# Patient Record
Sex: FEMALE | Race: WHITE | NOT HISPANIC OR LATINO | Employment: UNEMPLOYED | URBAN - METROPOLITAN AREA
[De-identification: names, ages, dates, MRNs, and addresses within clinical notes are randomized per-mention and may not be internally consistent; named-entity substitution may affect disease eponyms.]

---

## 2017-02-21 ENCOUNTER — OFFICE VISIT (OUTPATIENT)
Dept: PSYCHIATRY | Facility: CLINIC | Age: 42
End: 2017-02-21
Payer: COMMERCIAL

## 2017-02-21 VITALS
WEIGHT: 162 LBS | HEIGHT: 65 IN | SYSTOLIC BLOOD PRESSURE: 192 MMHG | DIASTOLIC BLOOD PRESSURE: 103 MMHG | BODY MASS INDEX: 26.99 KG/M2 | HEART RATE: 75 BPM

## 2017-02-21 DIAGNOSIS — F33.1 MODERATE EPISODE OF RECURRENT MAJOR DEPRESSIVE DISORDER: Primary | ICD-10-CM

## 2017-02-21 PROCEDURE — 3077F SYST BP >= 140 MM HG: CPT | Mod: S$GLB,,, | Performed by: PSYCHIATRY & NEUROLOGY

## 2017-02-21 PROCEDURE — 1160F RVW MEDS BY RX/DR IN RCRD: CPT | Mod: S$GLB,,, | Performed by: PSYCHIATRY & NEUROLOGY

## 2017-02-21 PROCEDURE — 3080F DIAST BP >= 90 MM HG: CPT | Mod: S$GLB,,, | Performed by: PSYCHIATRY & NEUROLOGY

## 2017-02-21 PROCEDURE — 99999 PR PBB SHADOW E&M-EST. PATIENT-LVL III: CPT | Mod: PBBFAC,,, | Performed by: PSYCHIATRY & NEUROLOGY

## 2017-02-21 PROCEDURE — 99203 OFFICE O/P NEW LOW 30 MIN: CPT | Mod: S$GLB,,, | Performed by: PSYCHIATRY & NEUROLOGY

## 2017-02-21 RX ORDER — LISDEXAMFETAMINE DIMESYLATE 40 MG/1
40 CAPSULE ORAL DAILY
Qty: 30 CAPSULE | Refills: 0 | Status: SHIPPED | OUTPATIENT
Start: 2017-02-21 | End: 2017-03-27 | Stop reason: SDUPTHER

## 2017-02-21 RX ORDER — FLUOXETINE HYDROCHLORIDE 20 MG/1
20 CAPSULE ORAL DAILY
Qty: 30 CAPSULE | Refills: 0 | Status: SHIPPED | OUTPATIENT
Start: 2017-02-21 | End: 2017-03-27 | Stop reason: SDUPTHER

## 2017-02-21 NOTE — PROGRESS NOTES
"Outpatient Psychiatry I nitial Visit (MD/NP)    2/21/2017    Mirna Pollock, a 41 y.o. female, for initial evaluation visit.  Patient is referred by Luc Venegas MD.      Reason for Encounter: Self-Referral    Complaints:  Pt states that recently her 21 year old daughter told her that she was transgender and wanted to get a sex change. Pt states that she was always okay with her daughter being [place] as she came out in high school. She states that she now feels that she did something wrong as a parent and is having a very hard time dealing with this situation. She states that she does not know how else to talk to her daughter about this situation even though she probably knows she should.    She states that she has a good relationship with her  and her 17 yo son.    She endorses depression 2/2 daughter's situation and anxiety related to "everyday life and stressors" She denies hopelessness/anhedonia/poor appetite or weight changes/ guilt or worthlessness/poor energy. Has trouble with sleep onset.  Pt also endorses poor concentration and focus but can pay attention after starting a task although she still states that she has a hard time finishing a task. Pt also endorses a hard time sitting still. She states that she has always been this way, except when on the Vyvanse, and struggled in school due to this.   She denies SI/HI/AVH/vidya or hypomania/paranoia/delusions/panic attacks.    Current Medications:  Scheduled Meds:None  Continuous Infusions:  PRN Meds:None    Stressors:  Daughter, ex mother-in-law,  is a  so worries about his well being, mother has Alzheimer's and is in a nursing home.    History:     Past Psychiatric History:   Previous therapy: yes  Previous psychiatric treatment and medication trials: yes - Vyvanse 40 mg, Prozac, Klonopin. Was seeing  Psychiatrist at [facility] for 2 years and stopped a year ago because insurance changed - states that it was depression " related to previous job and unfaithfulness in previous marriage as well as a bad divorce.  Previous psychiatric hospitalizations: no  Previous diagnoses: yes - MDD, LALITA, ADHD  Previous suicide attempts: no  History of violence: no  Currently in treatment with None.  Education: some college  Other pertinent history: None  Depression screening was performed with standardized tool: Yes - Depression    Substance Abuse History:  Recreational drugs: None  Use of alcohol: occasional, social use and minimal use  Use of caffeine: caffeinated soft drinks 1-3 /day  Tobacco use: no  Legal consequences of chemical use: no  Patient feels she ought to cut down on drinking and/or drug use: no  Patient has been annoyed by others criticizing her drinking or drug use: no  Patient has felt bad or guilty about drinking or drug use:no  Patient has had a drink or used drugs as an eye opener first thing in the morning to steady nerves, get rid of a hangover or get the day started: no  Use of OTC medications: None    The following portions of the patient's history were reviewed and updated as appropriate: allergies, current medications, past family history, past medical history, past social history, past surgical history and problem list.    Additional historical information includes: Family hx:  Unsure because was adopted    Record Review: moderate      Review Of Systems:     Medical Review Of Systems:  Normal ROS      Psychiatric Review Of Systems:  Sleep: yes, has trouble on most nights falling asleep  Appetite changes: no  Weight changes: no  Energy: no  Interest/pleasure/anhedonia: yes  Somatic symptoms: no  Libido: no  Anxiety/panic: yes  Guilty/hopeless: no  Self-injurious behavior/risky behavior: no  Any drugs: no  Alcohol: no     Current Evaluation:     General appearance: alert, appears stated age and cooperative  Extremities: extremities normal, atraumatic, no cyanosis or edema  Skin: Skin color, texture, turgor normal. No rashes  or lesions  Neurologic: Grossly normal    Mental Status Evaluation:  Appearance:  unremarkable, age appropriate   Behavior:  normal, cooperative   Speech:  no latency; no press   Mood:  steady   Affect:  congruent and appropriate   Thought Process:  normal and logical   Thought Content:  normal, no suicidality, no homicidality, delusions, or paranoia   Sensorium:  grossly intact   Cognition:  grossly intact   Insight:  intact, has awareness of illness   Judgment:  behavior is adequate to circumstances     SIGECAPS  Sleep: Trouble with sleep onset  Interest: Unchanged  Guilt: Denies  Energy: good  Concentration: poor  Appetite: unchanged  Psychomotor agitation: None  Suicidal intentions: no  Suicidal plan: no    Physical/Somatic Complaints  The patient lists: no physical complaints.    Functioning in Relationships:  Spouse/partner: Good  Peers: Good  Employers: Good    Other Pertinent Information  As above    Assessment - Diagnosis - Goals:   MDD, recurrent episode  LALITA  ADHD, Combined Type    Treatment Goals:  Specify outcomes written in observable, behavioral terms:   Anxiety: acquiring relapse prevention skills and reducing negative automatic thoughts  Depression: acquiring relapse prevention skills and reducing negative automatic thoughts    Treatment Plan/Recommendations:   Start Vyvanse 40 mg qAM and Prozac 20 mg qAM. Melatonin OTC for sleep.  Follow-up plan for depression was discussed with patient.    Referral to:  Psychotherapy    Review with patient: Treatment plan reviewed with the patient.  Medication risks/benefit reviewed with the patient    Ping Hill

## 2017-03-14 ENCOUNTER — TELEPHONE (OUTPATIENT)
Dept: ORTHOPEDICS | Facility: CLINIC | Age: 42
End: 2017-03-14

## 2017-03-14 ENCOUNTER — INITIAL CONSULT (OUTPATIENT)
Dept: ORTHOPEDICS | Facility: CLINIC | Age: 42
End: 2017-03-14
Payer: COMMERCIAL

## 2017-03-14 ENCOUNTER — HOSPITAL ENCOUNTER (OUTPATIENT)
Dept: RADIOLOGY | Facility: HOSPITAL | Age: 42
Discharge: HOME | End: 2017-03-14
Attending: ORTHOPAEDIC SURGERY
Payer: COMMERCIAL

## 2017-03-14 VITALS
DIASTOLIC BLOOD PRESSURE: 103 MMHG | HEART RATE: 81 BPM | SYSTOLIC BLOOD PRESSURE: 183 MMHG | HEIGHT: 65 IN | WEIGHT: 159.5 LBS | BODY MASS INDEX: 26.57 KG/M2

## 2017-03-14 DIAGNOSIS — M54.16 LUMBAR RADICULOPATHY: Primary | ICD-10-CM

## 2017-03-14 DIAGNOSIS — M54.50 LUMBAR SPINE PAIN: ICD-10-CM

## 2017-03-14 DIAGNOSIS — M54.50 LUMBAR SPINE PAIN: Primary | ICD-10-CM

## 2017-03-14 PROCEDURE — 3080F DIAST BP >= 90 MM HG: CPT | Mod: S$GLB,,, | Performed by: ORTHOPAEDIC SURGERY

## 2017-03-14 PROCEDURE — 3077F SYST BP >= 140 MM HG: CPT | Mod: S$GLB,,, | Performed by: ORTHOPAEDIC SURGERY

## 2017-03-14 PROCEDURE — 99212 OFFICE O/P EST SF 10 MIN: CPT | Mod: S$GLB,,, | Performed by: ORTHOPAEDIC SURGERY

## 2017-03-14 PROCEDURE — 1160F RVW MEDS BY RX/DR IN RCRD: CPT | Mod: S$GLB,,, | Performed by: ORTHOPAEDIC SURGERY

## 2017-03-14 PROCEDURE — 72120 X-RAY BEND ONLY L-S SPINE: CPT | Mod: 26,,, | Performed by: RADIOLOGY

## 2017-03-14 PROCEDURE — 72100 X-RAY EXAM L-S SPINE 2/3 VWS: CPT | Mod: 26,,, | Performed by: RADIOLOGY

## 2017-03-14 PROCEDURE — 72100 X-RAY EXAM L-S SPINE 2/3 VWS: CPT | Mod: TC

## 2017-03-14 PROCEDURE — 99999 PR PBB SHADOW E&M-EST. PATIENT-LVL III: CPT | Mod: PBBFAC,,, | Performed by: ORTHOPAEDIC SURGERY

## 2017-03-14 RX ORDER — HYDROCODONE BITARTRATE AND ACETAMINOPHEN 5; 325 MG/1; MG/1
1 TABLET ORAL NIGHTLY PRN
Qty: 60 TABLET | Refills: 0 | Status: SHIPPED | OUTPATIENT
Start: 2017-03-14 | End: 2018-04-12

## 2017-03-14 RX ORDER — METHYLPREDNISOLONE 4 MG/1
TABLET ORAL
Qty: 1 PACKAGE | Refills: 0 | Status: SHIPPED | OUTPATIENT
Start: 2017-03-14 | End: 2017-06-09

## 2017-03-14 NOTE — PROGRESS NOTES
The patient returns for follow up.    Last night she had the acute onset of RLE radiculopathy.    There was no inciting event and she has never had anything like this before.    On exam she ambulates without difficulty and has no focal motor or sensory deficits.  She has symmetric 2+ Bilateral PT/AT reflexes.  She has a positive R SLR    Radiographs today demonstrate age appropriate spondylosis.    A: RLE radiculopathy  P:  1) Medrol dosepak  2) Vicodin at night  3) MRI if not improving.

## 2017-03-20 NOTE — PROGRESS NOTES
STAFF NOTE:  Patient's case was formally presented to me by Dr. Hill and patient was seen by me in supervision on the same day.  I agree with her assessment and plan as specified to try Vyvanse and Prozac for ADHD and anxiety/depressive Sx management, respectively.

## 2017-03-27 ENCOUNTER — OFFICE VISIT (OUTPATIENT)
Dept: PSYCHIATRY | Facility: CLINIC | Age: 42
End: 2017-03-27
Payer: COMMERCIAL

## 2017-03-27 VITALS
HEART RATE: 63 BPM | SYSTOLIC BLOOD PRESSURE: 151 MMHG | DIASTOLIC BLOOD PRESSURE: 94 MMHG | WEIGHT: 156.81 LBS | HEIGHT: 65 IN | BODY MASS INDEX: 26.13 KG/M2

## 2017-03-27 DIAGNOSIS — F33.1 MODERATE EPISODE OF RECURRENT MAJOR DEPRESSIVE DISORDER: Primary | ICD-10-CM

## 2017-03-27 PROCEDURE — 99213 OFFICE O/P EST LOW 20 MIN: CPT | Mod: S$GLB,,, | Performed by: PSYCHIATRY & NEUROLOGY

## 2017-03-27 PROCEDURE — 99999 PR PBB SHADOW E&M-EST. PATIENT-LVL II: CPT | Mod: PBBFAC,,, | Performed by: PSYCHIATRY & NEUROLOGY

## 2017-03-27 RX ORDER — LISDEXAMFETAMINE DIMESYLATE 40 MG/1
40 CAPSULE ORAL DAILY
Qty: 30 CAPSULE | Refills: 0 | Status: SHIPPED | OUTPATIENT
Start: 2017-03-27 | End: 2017-03-27 | Stop reason: SDUPTHER

## 2017-03-27 RX ORDER — LISDEXAMFETAMINE DIMESYLATE 40 MG/1
40 CAPSULE ORAL DAILY
Qty: 30 CAPSULE | Refills: 0 | Status: SHIPPED | OUTPATIENT
Start: 2017-04-24 | End: 2017-03-27 | Stop reason: SDUPTHER

## 2017-03-27 RX ORDER — LISDEXAMFETAMINE DIMESYLATE 40 MG/1
40 CAPSULE ORAL DAILY
Qty: 30 CAPSULE | Refills: 0 | Status: SHIPPED | OUTPATIENT
Start: 2017-05-22 | End: 2017-06-05 | Stop reason: SDUPTHER

## 2017-03-27 RX ORDER — FLUOXETINE HYDROCHLORIDE 20 MG/1
20 CAPSULE ORAL DAILY
Qty: 30 CAPSULE | Refills: 2 | Status: SHIPPED | OUTPATIENT
Start: 2017-03-27 | End: 2017-06-05

## 2017-03-27 NOTE — PROGRESS NOTES
Outpatient Psychiatry Follow-Up Visit (MD/NP)    3/27/2017    Clinical Status of Patient:  Outpatient (Ambulatory)    Chief Complaint:  Mirna Pollock is a 41 y.o. female who presents today for follow-up of depression, anxiety and attention problems.  Met with patient.      Interval History and Content of Current Session:  Interim Events/Subjective Report/Content of Current Session: Pt states that she has been doing well on Vyvanse and reports no ADHD symptoms on interview. She states that she felt as if Prozac was making her more depressed for the very beginning of the month. She states that she is still trying to deal with her daughter getting a sex change. She denies any increased depression and anxiety/SI/HI/AVH and endorses good sleep and appetite.    Psychotherapy:  · Target symptoms: depression, lack of focus, anxiety   · Why chosen therapy is appropriate versus another modality: relevant to diagnosis  · Outcome monitoring methods: self-report  · Therapeutic intervention type: supportive psychotherapy  · Topics discussed/themes: parenting issues, symptom recognition  · The patient's response to the intervention is accepting. The patient's progress toward treatment goals is good.   · Duration of intervention: 10 minutes.    Review of Systems   · PSYCHIATRIC: Pertinant items are noted in the narrative.    Past Medical, Family and Social History: The patient's past medical, family and social history have been reviewed and updated as appropriate within the electronic medical record - see encounter notes.    Compliance: yes    Side effects: None    Risk Parameters:  Patient reports no suicidal ideation  Patient reports no homicidal ideation  Patient reports no self-injurious behavior  Patient reports no violent behavior    Exam (detailed: at least 9 elements; comprehensive: all 15 elements)   Constitutional  Vitals:  Most recent vital signs, dated less than 90 days prior to this appointment, were reviewed.  "  Vitals:    03/27/17 1400   BP: (!) 151/94   Pulse: 63   Weight: 71.1 kg (156 lb 12.8 oz)   Height: 5' 5" (1.651 m)        General:  unremarkable, age appropriate     Musculoskeletal  Muscle Strength/Tone:  no spasicity, no rigidity   Gait & Station:  non-ataxic     Psychiatric  Speech:  no latency; no press      Mood & Affect:  steady  congruent and appropriate   Thought Process:  normal and logical   Associations:  intact   Thought Content:  normal, no suicidality, no homicidality, delusions, or paranoia   Insight:  intact, has awareness of illness   Judgement: behavior is adequate to circumstances   Orientation:  grossly intact   Memory: intact for content of interview   Language: grossly intact   Attention Span & Concentration:  able to focus   Fund of Knowledge:  intact and appropriate to age and level of education     Assessment and Diagnosis   Status/Progress: Based on the examination today, the patient's problem(s) is/are improved.  New problems have not been presented today.   Co-morbidities, Diagnostic uncertainty and Lack of compliance are not complicating management of the primary condition.  There are no active rule-out diagnoses for this patient at this time.     General Impression:   MDD  LALITA  ADHD, Combined Type    Intervention/Counseling/Treatment Plan   · Continue Vyvanse 40 mg daily and Prozac 20 mg daily. Continue psychotherapy.      Return to Clinic: 3 months  "

## 2017-04-25 NOTE — PROGRESS NOTES
STAFF NOTE:  Patient's case was discussed with Dr. Hill in supervision.  I agree with her assessment and plan as specified to continue Prozac and Vyvanse for management of depressive, anxiety and ADHD Sx.

## 2017-04-26 ENCOUNTER — TELEPHONE (OUTPATIENT)
Dept: ORTHOPEDICS | Facility: CLINIC | Age: 42
End: 2017-04-26

## 2017-04-26 DIAGNOSIS — M25.531 RIGHT WRIST PAIN: Primary | ICD-10-CM

## 2017-04-26 NOTE — TELEPHONE ENCOUNTER
----- Message from Tequila Lutz sent at 4/26/2017  2:04 PM CDT -----  x_  1st Request  _  2nd Request  _  3rd Request        Who: LIANA CASAS [[id number]]    Why: Pt spoke to Dr. Birch at the [facility] and she told her to call her office to get an appointment scheduled. Please call her.    What Number to Call Back:[phone]    When to Expect a call back: (Before the end of the day)   -- if the call is after 12:00, the call back will be tomorrow.

## 2017-04-26 NOTE — TELEPHONE ENCOUNTER
Spoke w/pt, appt scheduled. Pt states right wrist pain. Pt states she will get xray at her convenience at [facility].

## 2017-06-02 DIAGNOSIS — N63.20 LUMP OF BREAST, LEFT: Primary | ICD-10-CM

## 2017-06-05 ENCOUNTER — OFFICE VISIT (OUTPATIENT)
Dept: PSYCHIATRY | Facility: CLINIC | Age: 42
End: 2017-06-05
Payer: COMMERCIAL

## 2017-06-05 VITALS
DIASTOLIC BLOOD PRESSURE: 84 MMHG | HEART RATE: 71 BPM | WEIGHT: 149 LBS | HEIGHT: 65 IN | SYSTOLIC BLOOD PRESSURE: 121 MMHG | BODY MASS INDEX: 24.83 KG/M2

## 2017-06-05 DIAGNOSIS — F33.1 MODERATE EPISODE OF RECURRENT MAJOR DEPRESSIVE DISORDER: Primary | ICD-10-CM

## 2017-06-05 PROCEDURE — 99213 OFFICE O/P EST LOW 20 MIN: CPT | Mod: S$PBB,,, | Performed by: PSYCHIATRY & NEUROLOGY

## 2017-06-05 PROCEDURE — 99999 PR PBB SHADOW E&M-EST. PATIENT-LVL II: CPT | Mod: PBBFAC,,, | Performed by: PSYCHIATRY & NEUROLOGY

## 2017-06-05 RX ORDER — LISDEXAMFETAMINE DIMESYLATE 40 MG/1
40 CAPSULE ORAL DAILY
Qty: 30 CAPSULE | Refills: 0 | Status: SHIPPED | OUTPATIENT
Start: 2017-07-03 | End: 2017-06-09 | Stop reason: SDUPTHER

## 2017-06-05 RX ORDER — LISDEXAMFETAMINE DIMESYLATE 40 MG/1
40 CAPSULE ORAL DAILY
Qty: 30 CAPSULE | Refills: 0 | Status: SHIPPED | OUTPATIENT
Start: 2017-06-05 | End: 2017-09-25 | Stop reason: SDUPTHER

## 2017-06-05 RX ORDER — ESCITALOPRAM OXALATE 10 MG/1
10 TABLET ORAL DAILY
Qty: 30 TABLET | Refills: 2 | Status: SHIPPED | OUTPATIENT
Start: 2017-06-05 | End: 2017-09-25 | Stop reason: SDUPTHER

## 2017-06-05 RX ORDER — LISDEXAMFETAMINE DIMESYLATE 40 MG/1
40 CAPSULE ORAL DAILY
Qty: 30 CAPSULE | Refills: 0 | Status: SHIPPED | OUTPATIENT
Start: 2017-07-31 | End: 2017-06-09 | Stop reason: SDUPTHER

## 2017-06-05 NOTE — PROGRESS NOTES
"Outpatient Psychiatry Follow-Up Visit (MD/NP)    6/5/2017    Clinical Status of Patient:  Outpatient (Ambulatory)    Chief Complaint:  Mirna Pollock is a 41 y.o. female who presents today for follow-up of depression, anxiety and attention problems.  Met with patient.      Interval History and Content of Current Session:  Interim Events/Subjective Report/Content of Current Session: Pt states that she has been doing well since last visit and reports no symptoms of ADHD. She states that she does not want to take Prozac any longer as she feels that it makes her "sadder" especially given that her daughter is going to have her breast removal surgery next month to continue with her transition.  Pt denies any symptoms of depression or anxiety as well as any SI/HI/AVH and endorses good sleep and appetite.     Psychotherapy:  · Target symptoms: depression, lack of focus, anxiety   · Why chosen therapy is appropriate versus another modality: relevant to diagnosis  · Outcome monitoring methods: self-report  · Therapeutic intervention type: supportive psychotherapy  · Topics discussed/themes: symptom recognition  · The patient's response to the intervention is accepting. The patient's progress toward treatment goals is good.   · Duration of intervention: 10 minutes.    Review of Systems   · PSYCHIATRIC: Pertinant items are noted in the narrative.    Past Medical, Family and Social History: The patient's past medical, family and social history have been reviewed and updated as appropriate within the electronic medical record - see encounter notes.    Compliance: yes    Side effects: None    Risk Parameters:  Patient reports no suicidal ideation  Patient reports no homicidal ideation  Patient reports no self-injurious behavior  Patient reports no violent behavior    Exam (detailed: at least 9 elements; comprehensive: all 15 elements)   Constitutional  Vitals:  Most recent vital signs, dated less than 90 days prior to this " "appointment, were not reviewed.   Vitals:    06/05/17 1353   BP: 121/84   Pulse: 71   Weight: 67.6 kg (149 lb)   Height: 5' 5" (1.651 m)        General:  unremarkable, age appropriate     Musculoskeletal  Muscle Strength/Tone:  no spasicity, no rigidity   Gait & Station:  non-ataxic     Psychiatric  Speech:  no latency; no press   Mood & Affect:  steady  congruent and appropriate   Thought Process:  normal and logical   Associations:  intact   Thought Content:  normal, no suicidality, no homicidality, delusions, or paranoia   Insight:  intact   Judgement: behavior is adequate to circumstances   Orientation:  grossly intact   Memory: intact for content of interview   Language: grossly intact   Attention Span & Concentration:  able to focus   Fund of Knowledge:  intact and appropriate to age and level of education     Assessment and Diagnosis   Status/Progress: Based on the examination today, the patient's problem(s) is/are well controlled.  New problems have not been presented today.   Co-morbidities and Diagnostic uncertainty are not complicating management of the primary condition.  There are no active rule-out diagnoses for this patient at this time.     General Impression:   MDD  LALITA  ADHD    Intervention/Counseling/Treatment Plan   · Continue Vyvanse 40 mg qAM. Discontinue Prozac 20 mg qAM and start LExapro 10 mg qAM.   · Resident transition in July discussed      Return to Clinic: 3 months     DO HUYEN [facility] PGY3  "

## 2017-06-09 ENCOUNTER — HOSPITAL ENCOUNTER (OUTPATIENT)
Dept: RADIOLOGY | Facility: HOSPITAL | Age: 42
Discharge: HOME | End: 2017-06-09
Attending: SURGERY
Payer: COMMERCIAL

## 2017-06-09 ENCOUNTER — OFFICE VISIT (OUTPATIENT)
Dept: SURGERY | Facility: CLINIC | Age: 42
End: 2017-06-09
Payer: COMMERCIAL

## 2017-06-09 VITALS
HEART RATE: 72 BPM | DIASTOLIC BLOOD PRESSURE: 92 MMHG | BODY MASS INDEX: 23.32 KG/M2 | SYSTOLIC BLOOD PRESSURE: 137 MMHG | WEIGHT: 140 LBS | TEMPERATURE: 98 F | HEIGHT: 65 IN

## 2017-06-09 DIAGNOSIS — N63.20 LUMP OF BREAST, LEFT: ICD-10-CM

## 2017-06-09 DIAGNOSIS — N60.02 BREAST CYST, LEFT: Primary | ICD-10-CM

## 2017-06-09 PROCEDURE — 77066 DX MAMMO INCL CAD BI: CPT | Mod: 26,,, | Performed by: RADIOLOGY

## 2017-06-09 PROCEDURE — 99204 OFFICE O/P NEW MOD 45 MIN: CPT | Mod: S$GLB,,, | Performed by: SURGERY

## 2017-06-09 PROCEDURE — 76642 ULTRASOUND BREAST LIMITED: CPT | Mod: 26,LT,, | Performed by: RADIOLOGY

## 2017-06-09 PROCEDURE — 77062 BREAST TOMOSYNTHESIS BI: CPT | Mod: TC

## 2017-06-09 PROCEDURE — 76642 ULTRASOUND BREAST LIMITED: CPT | Mod: TC,LT

## 2017-06-09 PROCEDURE — 99999 PR PBB SHADOW E&M-EST. PATIENT-LVL III: CPT | Mod: PBBFAC,,, | Performed by: SURGERY

## 2017-06-09 PROCEDURE — G0279 TOMOSYNTHESIS, MAMMO: HCPCS | Mod: 26,,, | Performed by: RADIOLOGY

## 2017-06-09 NOTE — LETTER
June 9, 2017      Luc Venegas MD  [address]  [address]  [place] [id number]           [facility]  [address]  [facility] [address]  Phone: [phone]  Fax: [phone]          Patient: Mirna Pollock   MR Number: [id number]   YOB: 1975   Date of Visit: 6/9/2017       Dear Dr. Luc Venegas:    Thank you for referring Mirna Pollock to me for evaluation. Attached you will find relevant portions of my assessment and plan of care.    If you have questions, please do not hesitate to call me. I look forward to following Mirna Pollock along with you.    Sincerely,    Gwyn Estes MD    Enclosure  CC:  No Recipients    If you would like to receive this communication electronically, please contact [email or web address] or [phone] to request more information on [employer] Link access.    For providers and/or their staff who would like to refer a patient to [facility], please contact us through our one-stop-shop provider referral line, [facility], at [phone].    If you feel you have received this communication in error or would no longer like to receive these types of communications, please e-mail [email or web address]

## 2017-06-09 NOTE — PROGRESS NOTES
History & Physical    SUBJECTIVE:     History of Present Illness:  Patient is a 41 y.o. female presents with a history of recently discovered left breast masses (3 in total)  No pain  Had a diagnostic breast imaging exam earlier today and all are simple cysts  Patient does have a past history of a benign left breast biopsy      Chief Complaint   Patient presents with    Breast Mass     Left Breast       Review of patient's allergies indicates:   Allergen Reactions    Iodine and iodide containing products Other (See Comments)    Penicillins Rash       Current Outpatient Prescriptions   Medication Sig Dispense Refill    amlodipine (NORVASC) 10 MG tablet TK 1 T PO QD  5    cloNIDine (CATAPRES) 0.2 MG tablet   2    escitalopram oxalate (LEXAPRO) 10 MG tablet Take 1 tablet (10 mg total) by mouth once daily. 30 tablet 2    hydrochlorothiazide (HYDRODIURIL) 25 MG tablet Take 25 mg by mouth once daily.        hydrOXYzine pamoate (VISTARIL) 25 MG Cap 1 cap 3x a day for 2 days, rest left over every 8 hours as needed for migraine 20 capsule 4    lisdexamfetamine (VYVANSE) 40 MG Cap Take 1 capsule (40 mg total) by mouth once daily. 30 capsule 0    nebivolol (BYSTOLIC) 10 MG Tab Take 2 tablets (20 mg total) by mouth once daily. 60 tablet 11    candesartan (ATACAND) 8 MG tablet Take 2 tablets (16 mg total) by mouth once daily. 60 tablet 11    hydrocodone-acetaminophen 5-325mg (NORCO) 5-325 mg per tablet Take 1 tablet by mouth nightly as needed for Pain (as needed for pain). 60 tablet 0     No current facility-administered medications for this visit.        Past Medical History:   Diagnosis Date    Anxiety     Headache     Hypertension     Kidney stones fibomyalgia, migraine, heart closer, kidey stones     Past Surgical History:   Procedure Laterality Date    APPENDECTOMY       SECTION      CHOLECYSTECTOMY      GALLBLADDER SURGERY      HYSTERECTOMY      12 yrs ago, still has ovaries, due to  "endometriosis by Dr. Hernandez     Family History   Problem Relation Age of Onset    Adopted: Yes    Heart disease Father     Migraines Son      Social History   Substance Use Topics    Smoking status: Never Smoker    Smokeless tobacco: Never Used    Alcohol use No        Review of Systems:      I have reviewed the Patient Summary Reports.    I have reviewed the Nursing Notes.      Review of Systems  Anesthesia Hx:  No problems with previous Anesthesia    Social:  Non-Smoker    Hematology/Oncology:  Hematology Normal   Oncology Normal     Cardiovascular:   Exercise tolerance: good    Hepatic/GI:  Hepatic/GI Normal    Endocrine:  Endocrine Normal        OBJECTIVE:     Vital Signs (Most Recent)  Temp: 97.9 °F (36.6 °C) (06/09/17 1523)  Pulse: 72 (06/09/17 1523)  BP: (!) 137/92 (06/09/17 1523)  5' 5" (1.651 m)  63.5 kg (140 lb)     Physical Exam:    Physical Exam  General:  Well nourished    Airway/Jaw/Neck:  AIRWAY FINDINGS: Normal      Chest/Lungs:  Chest/Lungs Clear I can palpate three masses  One at 6 oclock in the left breast about 4 cm below the areola, on at the inferior areolar edge and one in the upper outer left breast 8 cm from the nipple   Heart/Vascular:  Heart Findings: Normal Heart murmur: negative Vascular Findings: Normal    Abdomen:  Abdomen Findings: Normal      Mental Status:  Mental Status Findings: Normal          ASSESSMENT/PLAN:     Left breast masses  Simple cyst by imaging    PLAN:Plan   Patient reassured  Needs annual screening       "

## 2017-07-03 NOTE — PROGRESS NOTES
STAFF NOTE:  Patient's case was discussed with Dr. Hill in supervision.  I agree with her assessment and plan as specified to continue Vyvanse for ADHD Sx management and use Lexapro in place of Prozac for depression and anxiety symptom management.

## 2017-08-17 ENCOUNTER — OFFICE VISIT (OUTPATIENT)
Dept: NEUROLOGY | Facility: CLINIC | Age: 42
End: 2017-08-17
Payer: COMMERCIAL

## 2017-08-17 ENCOUNTER — TELEPHONE (OUTPATIENT)
Dept: NEUROLOGY | Facility: CLINIC | Age: 42
End: 2017-08-17

## 2017-08-17 VITALS
WEIGHT: 141.63 LBS | SYSTOLIC BLOOD PRESSURE: 172 MMHG | HEART RATE: 75 BPM | BODY MASS INDEX: 23.6 KG/M2 | HEIGHT: 65 IN | DIASTOLIC BLOOD PRESSURE: 102 MMHG

## 2017-08-17 DIAGNOSIS — M54.81 BILATERAL OCCIPITAL NEURALGIA: ICD-10-CM

## 2017-08-17 DIAGNOSIS — G43.719 CHRONIC MIGRAINE WITHOUT AURA, WITH INTRACTABLE MIGRAINE, SO STATED, WITHOUT MENTION OF STATUS MIGRAINOSUS: Primary | ICD-10-CM

## 2017-08-17 DIAGNOSIS — M79.18 MYOFASCIAL PAIN: ICD-10-CM

## 2017-08-17 DIAGNOSIS — I10 ESSENTIAL HYPERTENSION: ICD-10-CM

## 2017-08-17 PROCEDURE — 99214 OFFICE O/P EST MOD 30 MIN: CPT | Mod: 25,S$GLB,, | Performed by: NURSE PRACTITIONER

## 2017-08-17 PROCEDURE — 64400 NJX AA&/STRD TRIGEMINAL NRV: CPT | Mod: 50,S$GLB,, | Performed by: NURSE PRACTITIONER

## 2017-08-17 PROCEDURE — 3080F DIAST BP >= 90 MM HG: CPT | Mod: S$GLB,,, | Performed by: NURSE PRACTITIONER

## 2017-08-17 PROCEDURE — 64405 NJX AA&/STRD GR OCPL NRV: CPT | Mod: 51,50,S$GLB, | Performed by: NURSE PRACTITIONER

## 2017-08-17 PROCEDURE — 3008F BODY MASS INDEX DOCD: CPT | Mod: S$GLB,,, | Performed by: NURSE PRACTITIONER

## 2017-08-17 PROCEDURE — 3077F SYST BP >= 140 MM HG: CPT | Mod: S$GLB,,, | Performed by: NURSE PRACTITIONER

## 2017-08-17 PROCEDURE — 99999 PR PBB SHADOW E&M-EST. PATIENT-LVL III: CPT | Mod: PBBFAC,,, | Performed by: NURSE PRACTITIONER

## 2017-08-17 RX ORDER — HYDROXYZINE PAMOATE 25 MG/1
CAPSULE ORAL
Qty: 12 CAPSULE | Refills: 2 | Status: SHIPPED | OUTPATIENT
Start: 2017-08-17 | End: 2018-01-15 | Stop reason: SDUPTHER

## 2017-08-17 RX ORDER — LIDOCAINE HYDROCHLORIDE 20 MG/ML
6 INJECTION, SOLUTION INFILTRATION; PERINEURAL
Status: COMPLETED | OUTPATIENT
Start: 2017-08-17 | End: 2017-08-17

## 2017-08-17 RX ORDER — TIZANIDINE 2 MG/1
TABLET ORAL
Qty: 60 TABLET | Refills: 5 | Status: SHIPPED | OUTPATIENT
Start: 2017-08-17 | End: 2018-01-15 | Stop reason: SDUPTHER

## 2017-08-17 RX ORDER — PROMETHAZINE HYDROCHLORIDE 25 MG/1
25 TABLET ORAL EVERY 4 HOURS
Qty: 20 TABLET | Refills: 2 | Status: SHIPPED | OUTPATIENT
Start: 2017-08-17 | End: 2017-10-28 | Stop reason: SDUPTHER

## 2017-08-17 RX ORDER — METHYLPREDNISOLONE ACETATE 40 MG/ML
40 INJECTION, SUSPENSION INTRA-ARTICULAR; INTRALESIONAL; INTRAMUSCULAR; SOFT TISSUE
Status: COMPLETED | OUTPATIENT
Start: 2017-08-17 | End: 2017-08-17

## 2017-08-17 RX ORDER — PREDNISONE 20 MG/1
TABLET ORAL
Qty: 9 TABLET | Refills: 0 | Status: SHIPPED | OUTPATIENT
Start: 2017-08-17 | End: 2017-09-25 | Stop reason: ALTCHOICE

## 2017-08-17 RX ADMIN — LIDOCAINE HYDROCHLORIDE 6 ML: 20 INJECTION, SOLUTION INFILTRATION; PERINEURAL at 10:08

## 2017-08-17 RX ADMIN — METHYLPREDNISOLONE ACETATE 40 MG: 40 INJECTION, SUSPENSION INTRA-ARTICULAR; INTRALESIONAL; INTRAMUSCULAR; SOFT TISSUE at 10:08

## 2017-08-17 NOTE — LETTER
August 20, 2017      Luc Venegas MD  [address]  [address]  [place] [address]           [facility]  [address]  [place] [address]  Phone: [phone]  Fax: [phone]          Patient: Mirna Pollock   MR Number: [id number]   YOB: 1975   Date of Visit: 8/17/2017       Dear Dr. Luc Venegas:    Thank you for referring Mirna Pollock to me for evaluation. Attached you will find relevant portions of my assessment and plan of care.    If you have questions, please do not hesitate to call me. I look forward to following Mirna Pollock along with you.    Sincerely,        Enclosure  CC:  No Recipients    If you would like to receive this communication electronically, please contact [email or web address] or [phone] to request more information on [employer] Link access.    For providers and/or their staff who would like to refer a patient to [facility], please contact us through our one-stop-shop provider referral line, [facility], at [phone].    If you feel you have received this communication in error or would no longer like to receive these types of communications, please e-mail [email or web address]

## 2017-08-17 NOTE — TELEPHONE ENCOUNTER
----- Message from Dmitry Bruno sent at 8/17/2017 10:26 AM CDT -----  Contact: Anuradha with Pharmacy @ [phone]  Caller is calling to get clarity on the direction for the medication ( hydrOXYzine pamoate (VISTARIL) 25 MG Cap)      [facility] [address] [place] [address] BETHANY DODSON  [address] [facility] AT [facility]  [address] [facility]  [place] [address]  Phone: [phone] Fax: [phone]

## 2017-08-17 NOTE — TELEPHONE ENCOUNTER
----- Message from Dmitry Bruno sent at 8/17/2017 10:26 AM CDT -----  Contact: Anuradha with Pharmacy @ [phone]  Caller is calling to get clarity on the direction for the medication ( hydrOXYzine pamoate (VISTARIL) 25 MG Cap)      [facility] [address] [place] [address] BETHANY DODSON  [address] [facility] AT [facility]  [address] [facility]  [place] [address]  Phone: [phone] Fax: [phone]

## 2017-08-17 NOTE — PROGRESS NOTES
Established Patient   SUBJECTIVE:  Patient ID: Mirna Pollock is a 41 y.o. female.  Chief Complaint: Consult    History of Present Illness:  Mirna Pollock is a 41 y.o. female with history of CTS, depression, HTN, headaches, and DDD, who presents to the clinic alone for follow-up of headaches.     Recommendations made at last Office Visit on 7/28/2016:  - Bilateral GONB done in clinic   - refilled Vistaril   - F/up in 3 months     08/17/2017 - Interval History:  - Had been seeing Iker Beth PA-C   - Was taking Topamax, Zanaflex, and Vistaril - however has been out of medications for about 3 months  - Since being off the medication, she had been okay, however recently noticed migraines were beginning to occur more frequently with increased intensity and duration   - She finds the nerve blocks had been helping a lot, as long as she is consistent with getting the nerve block, however hasn't had this procedure for over a year   - Has been getting 5-6 migraines per month, each lasting 1-4 days in length   - Current migraine has been going on since last Friday (6 days)  - Blood pressure significantly elevated in clinic today, states her PCP is managing her medications in order to get her blood pressure better controlled   - Is requesting refills of medication as well as repeat nerve occipital and trigeminal nerve blocks     Headache history:   Age of onset -  13  Nature of pain -  Throbbing   Location - bioccipital   Aura -  White floaters   Duration - hrs  Frequency -  2/week  Time of day - anytime   7 days of pain - jaw pain, L neck, L shoulder,  L side throbbing     Associated symptoms:   Meningeal symptoms - photophobia, phonophobia, exercise intolerance +   Nausea/vomitting +   Nasal drainage   Visual symptoms  Pallor/flushing  Vertigo  Stroke symptoms  Confusion  Impaired concentration +  Pain worsened with physical activity +  Neck pain +  Whooshing sounds   Visual spots/blotches +    Triggers:   Stress +  "  Bright or flickering light  Strong smell  Vigorous exercise  Dietary factors   Visual strain     Motion intolerance as passenger:  No   Resolution of headache with sleep: yes     Therapies Tried & Response:  Tylenol, motrin, alleve - not help  excedrin - not help   imitrex - helped initially   topamax 50 mg - helped  Percocet - helps   Gabapentin - helps  Amlodipine - for HTN  Bystolic - for HTN  Lexapro - hasn't noticed change in HA, for depression     Current Medications:    amlodipine (NORVASC) 10 MG tablet, TK 1 T PO QD, Disp: , Rfl: 5    cloNIDine (CATAPRES) 0.2 MG tablet, , Disp: , Rfl: 2    escitalopram oxalate (LEXAPRO) 10 MG tablet, Take 1 tablet (10 mg total) by mouth once daily., Disp: 30 tablet, Rfl: 2    hydrochlorothiazide (HYDRODIURIL) 25 MG tablet, Take 25 mg by mouth once daily.  , Disp: , Rfl:     hydrocodone-acetaminophen 5-325mg (NORCO) 5-325 mg per tablet, Take 1 tablet by mouth nightly as needed for Pain (as needed for pain)., Disp: 60 tablet, Rfl: 0    lisdexamfetamine (VYVANSE) 40 MG Cap, Take 1 capsule (40 mg total) by mouth once daily., Disp: 30 capsule, Rfl: 0    nebivolol (BYSTOLIC) 10 MG Tab, Take 2 tablets (20 mg total) by mouth once daily., Disp: 60 tablet, Rfl: 11    candesartan (ATACAND) 8 MG tablet, Take 2 tablets (16 mg total) by mouth once daily., Disp: 60 tablet, Rfl: 11    hydrOXYzine pamoate (VISTARIL) 25 MG Cap, 1 capsule as needed for headache rescue, Disp: 12 capsule, Rfl: 2    predniSONE (DELTASONE) 20 MG tablet, 3 tabs in the morning for 3 days.  Take with food., Disp: 9 tablet, Rfl: 0    promethazine (PHENERGAN) 25 MG tablet, Take 1 tablet (25 mg total) by mouth every 4 (four) hours., Disp: 20 tablet, Rfl: 2    tizanidine (ZANAFLEX) 2 MG tablet, 1-2 tabs at bedtime.  No alcohol or driving with medication., Disp: 60 tablet, Rfl: 5    OBJECTIVE:  Vitals:  BP (!) 172/102 Comment: right arm  Pulse 75   Ht 5' 5" (1.651 m)   Wt 64.2 kg (141 lb 9.6 oz)   LMP " 12/14/2000   BMI 23.56 kg/m²     Physical Exam:  Patient is awake, alert and oriented to person, place and time.  Moves all 4 extremities against gravity.  Muscle strength symmetric bilaterally in all extremities.  Gait and station within normal limits.  Cranial Nerves II through XII without focal deficit.     Review of Data:   Notes reviewed from Iker Beth PA-C, Dr. Hill (Psychiatry), Dr. Estes (Breast surgeon)    Focused examination was undertaken today.     Procedure Note:   GONB (Greater Occipital Nerve Block): After informed consent was obtained (a copy was given to office staff to scan into the EMR), the patient was asked to remain in a sitting position her head resting prone on her chest. The area was prepped using alcohol swabs. 2% lidocaine (2 mL x2 sides of neck=4 mL total) and 40 mg (1 bottle per sitex2 for total of 80 mg)depomedrol was drawn up utilizing a 21 gauge needle. The occipital trigger points were palpated utilizing latex gloves, a 27 gauge needle and aspiration occured to ensure no medication was introduced into the blood stream during the technique. The medication was delivered bilateral (all of the above was duplicated for the opposite side) in sites 1) midway between the inion and mastoid along the occipital ridge, 2) 2 finger breaths superior lateral to the first injection and 3) 2 finger breaths superior medial to the first injection. The patient tolerated the procedure well with no active bleeding, erythema, or discharge.  The patient was assessed and allowed to leave after ten minutes.     Procedure Note:   Nerve Block (Trigeminal Nerve/Temporal Auricular Nerve CPT: 48452): After informed consent was obtained (asked office staff to scan into EMR), the patient was asked to remain in a sitting position.The area was prepped using alcohol swabs. 2% lidocaine (2.0 cc)  was drawn up utilizing a 22 gauge needle. A 30 gauge needle was used for the procedure. Three Temperoauricular  locations were palpated on the RIGHT side of the head and 0.2 ccs injected into 3 separate sites (1 near the top of the ear and 2 along the parietal region of the head). 2 supraglabellar areas were palpated on the RIGHT, approx 5-6 mm above the orbital ridge and then 5-6 mm lateral along the orbital ridge. 0.2 cc per site for a total of 0.4 cc given. This was repeated on the LEFT for a total of 1 full cc (5 injections, 0.2 cc a piece). The patient tolerated the procedure well with no active bleeding, erythema, or discharge.  The patient was assessed and allowed to leave after ten minutes.  Findings from repeat exam:  Gen: NAD  Derm: no drainage  Neuro: AOx3, VFF, EOMI,  FROM of all extremities, gait WNL   Pre-Procedure Pain - 9  Post-Procedure Pain- 2    ASSESSMENT:  1. Chronic migraine without aura, with intractable migraine, so stated, without mention of status migrainosus    2. Bilateral occipital neuralgia    3. Myofascial pain    4. Essential hypertension      PLAN:  - Seek approval for Botox injections for Chronic Migraine   - Bilateral TNB and GONB performed in clinic   - To break up headache cycle - Prednisone 60 mg x 3 days - take in AM with food   - For headache prevention - Zanaflex 2 mg 1-2 tabs at bedtime   - For migraine rescue - Vistaril 25 mg to be taken every 6-8 hours as needed   - For nausea - Phenergan   - In the future- will consider use of Triptan therapy for migraine abortive, however until her blood pressure is better controlled, will avoid use of triptans for now   - Continue tracking headaches   - Discussed goals of therapy are to decrease the frequency, intensity, and duration of headaches  - Follow up in 6 weeks or sooner if needed      Orders Placed This Encounter    predniSONE (DELTASONE) 20 MG tablet    promethazine (PHENERGAN) 25 MG tablet    hydrOXYzine pamoate (VISTARIL) 25 MG Cap    tizanidine (ZANAFLEX) 2 MG tablet    lidocaine HCL 20 mg/ml (2%) injection 6 mL     methylPREDNISolone acetate injection 40 mg     I spent 45 minutes face-to-face with the patient with >50% of the time spent with counseling and education regarding:  - results of data, diagnosis, and recommendations stated above  - Botox injections for chronic migraine including procedure, risks, benefits and potential side effects   - risks and benefits of phenergan, prednisone, vistaril, and zanaflex   - risks and benefits of GONB and TNB  - importance of healthy diet, regular exercise and sleep hygiene in the treatment of headaches      The patient verbalizes understanding and agreement with the treatment plan. Questions were sought and answered to her stated verbal satisfaction.    CC: MD Luci Echols, FNP-C  [facility]   [phone]    Dr. Farfan was available during today's encounter.

## 2017-08-22 ENCOUNTER — PATIENT MESSAGE (OUTPATIENT)
Dept: NEUROLOGY | Facility: CLINIC | Age: 42
End: 2017-08-22

## 2017-08-30 ENCOUNTER — TELEPHONE (OUTPATIENT)
Dept: NEUROLOGY | Facility: CLINIC | Age: 42
End: 2017-08-30

## 2017-08-30 NOTE — TELEPHONE ENCOUNTER
----- Message from Marv JASON Prabhakar sent at 8/30/2017  8:33 AM CDT -----  Contact: Self @ ext [phone] ([facility] between 8:30 AM -1:00 PM) or cell: [phone] (after 1:00 PM)  Pt is calling to speak with Luci or the nurse in regards to her [place] paperwork. Pt is asking to speak with them today so she can get the paperwork sent to the necessary parties asap. Pls call.

## 2017-09-05 ENCOUNTER — TELEPHONE (OUTPATIENT)
Dept: NEUROLOGY | Facility: CLINIC | Age: 42
End: 2017-09-05

## 2017-09-05 NOTE — TELEPHONE ENCOUNTER
Called and spoke to Patient after calling disability.They state Patient's disability paperwork was sent on 8/24.Informed her to call disability.

## 2017-09-05 NOTE — TELEPHONE ENCOUNTER
Called and informed Patient and informed her that her paperwork was sent back to [employer]-and that form is complete-she states she will call [employer] and verify this.

## 2017-09-25 ENCOUNTER — OFFICE VISIT (OUTPATIENT)
Dept: PSYCHIATRY | Facility: CLINIC | Age: 42
End: 2017-09-25
Payer: COMMERCIAL

## 2017-09-25 VITALS
BODY MASS INDEX: 25.49 KG/M2 | WEIGHT: 153 LBS | DIASTOLIC BLOOD PRESSURE: 100 MMHG | HEART RATE: 71 BPM | HEIGHT: 65 IN | SYSTOLIC BLOOD PRESSURE: 187 MMHG

## 2017-09-25 DIAGNOSIS — F90.0 ATTENTION DEFICIT HYPERACTIVITY DISORDER (ADHD), PREDOMINANTLY INATTENTIVE TYPE: ICD-10-CM

## 2017-09-25 DIAGNOSIS — F33.42 RECURRENT MAJOR DEPRESSIVE DISORDER, IN FULL REMISSION: Primary | ICD-10-CM

## 2017-09-25 DIAGNOSIS — G47.00 INSOMNIA, UNSPECIFIED TYPE: ICD-10-CM

## 2017-09-25 DIAGNOSIS — F41.1 GAD (GENERALIZED ANXIETY DISORDER): ICD-10-CM

## 2017-09-25 PROCEDURE — 99999 PR PBB SHADOW E&M-EST. PATIENT-LVL III: CPT | Mod: PBBFAC,,, | Performed by: PSYCHIATRY & NEUROLOGY

## 2017-09-25 PROCEDURE — 99213 OFFICE O/P EST LOW 20 MIN: CPT | Mod: S$GLB,,, | Performed by: PSYCHIATRY & NEUROLOGY

## 2017-09-25 RX ORDER — LISDEXAMFETAMINE DIMESYLATE 50 MG/1
50 CAPSULE ORAL DAILY
Qty: 30 CAPSULE | Refills: 0 | Status: SHIPPED | OUTPATIENT
Start: 2017-09-25 | End: 2017-09-25 | Stop reason: SDUPTHER

## 2017-09-25 RX ORDER — ESCITALOPRAM OXALATE 10 MG/1
10 TABLET ORAL DAILY
Qty: 30 TABLET | Refills: 2 | Status: SHIPPED | OUTPATIENT
Start: 2017-09-25 | End: 2018-01-08 | Stop reason: ALTCHOICE

## 2017-09-25 RX ORDER — ZOLPIDEM TARTRATE 5 MG/1
5 TABLET ORAL NIGHTLY PRN
Qty: 30 TABLET | Refills: 2 | Status: SHIPPED | OUTPATIENT
Start: 2017-09-25 | End: 2017-10-17 | Stop reason: SDUPTHER

## 2017-09-25 RX ORDER — LISDEXAMFETAMINE DIMESYLATE 50 MG/1
50 CAPSULE ORAL DAILY
Qty: 30 CAPSULE | Refills: 0 | Status: SHIPPED | OUTPATIENT
Start: 2017-10-23 | End: 2018-01-08 | Stop reason: SDUPTHER

## 2017-09-25 NOTE — PROGRESS NOTES
Outpatient Psychiatry Follow-Up Visit (MD/NP)    9/25/2017    Clinical Status of Patient:  Outpatient (Ambulatory)    Chief Complaint:  Mirna Pollock is a 41 y.o. female who presents today for follow-up of depression, anxiety and attention problems.  Met with patient.      Interval History and Content of Current Session:  Interim Events/Subjective Report/Content of Current Session: Patient presents for follow up today.  She is a transfer of care from Dr. Hill.  Pt states that she has been doing well since last visit except for continued trouble sleeping.   States that she is having trouble falling asleep.  She has also noticed more trouble sitting still and concentrating recently at work.  She is unable to state if this trouble concentrating is due to anxiety or ADHD symptoms.  She notices that her mood is much improved with the Lexapro but that she is still having anxiety about going into large public places such as to a concert.  She also has anxiety at night as her  frequently works at night.  Pt denies any SI/HI/AVH.    Psychotherapy:  · Target symptoms: depression, lack of focus, anxiety   · Why chosen therapy is appropriate versus another modality: relevant to diagnosis  · Outcome monitoring methods: self-report  · Therapeutic intervention type: supportive psychotherapy  · Topics discussed/themes: symptom recognition  · The patient's response to the intervention is accepting. The patient's progress toward treatment goals is good.   · Duration of intervention: 12 minutes.    Review of Systems   · PSYCHIATRIC: Pertinant items are noted in the narrative.  · NEUROLOGIC: No weakness, sensory changes, seizures, confusion, memory loss, headaches, tremor or other abnormal movements.  · CARDIOVASCULAR: No tachycardia or chest pain.    Past Medical, Family and Social History: The patient's past medical, family and social history have been reviewed and updated as appropriate within the electronic medical  "record - see encounter notes.    Compliance: yes    Side effects: None    Risk Parameters:  Patient reports no suicidal ideation  Patient reports no homicidal ideation  Patient reports no self-injurious behavior  Patient reports no violent behavior    Exam (detailed: at least 9 elements; comprehensive: all 15 elements)   Constitutional  Vitals:  Most recent vital signs, dated less than 90 days prior to this appointment, were reviewed.   Vitals:    09/25/17 1316   BP: (!) 194/100   Pulse: 71   Weight: 69.4 kg (153 lb)   Height: 5' 5" (1.651 m)        General:  unremarkable, age appropriate     Musculoskeletal  Muscle Strength/Tone:  no spasicity, no rigidity   Gait & Station:  non-ataxic     Psychiatric  Speech:  no latency; no press   Mood & Affect:  steady  congruent and appropriate   Thought Process:  normal and logical   Associations:  intact   Thought Content:  normal, no suicidality, no homicidality, delusions, or paranoia   Insight:  intact   Judgement: behavior is adequate to circumstances   Orientation:  grossly intact   Memory: intact for content of interview   Language: grossly intact   Attention Span & Concentration:  able to focus   Fund of Knowledge:  intact and appropriate to age and level of education     Assessment and Diagnosis   Status/Progress: Based on the examination today, the patient's problem(s) is/are adequately but not ideally controlled.  New problems have not been presented today.   Co-morbidities and Diagnostic uncertainty are not complicating management of the primary condition.  There are no active rule-out diagnoses for this patient at this time.     General Impression:   MDD  LALITA  ADHD    Intervention/Counseling/Treatment Plan   · Increase Vyvanse to 50 mg qAM. Continue Lexapro 10 mg qAM for now.  Consider increasing dose at future appointment if anxiety persists.  Patient unable to decipher if symptoms are from uncontrolled ADHD or worsening anxiety.  Will try the increase in " Vyvanse first given the quicker onset of action.  · Start Ambien 5mg nightly PRN for insomnia.  Discussed diagnosis, risks and benefits of proposed treatment above vs alternative treatments vs no treatment, and potential side effects of these treatments. The patient expresses understanding of the above and displays the capacity to agree with this treatment given said understanding. Patient also agrees that, currently, the benefits outweigh the risks and would like to pursue treatment at this time.  · Patient with known hypertension, currently on Norvasc, HCTZ, and clonidine.  Increased BP noted and previous vitals reviewed.  Instructed patient to follow up with PCP for management of BP prior to next appointment.  Also had patient repeat BP prior to leaving today.      Return to Clinic: 6 weeks

## 2017-09-25 NOTE — PATIENT INSTRUCTIONS
Lisdexamfetamine Oral Capsule  What is this medicine?  LISDEXAMFETAMINE (lis DEX am fet a meen) is used to treat attention-deficit hyperactivity disorder (ADHD) in adults and children. It is also used to treat binge-eating disorder in adults. Federal law prohibits giving this medicine to any person other than the person for whom it was prescribed. Do not share this medicine with anyone else.  How should I use this medicine?  Take this medicine by mouth. Follow the directions on the prescription label. Swallow the capsules with a drink of water. You may open capsule and add to a glass of water, then drink right away. Take your doses at regular intervals. Do not take your medicine more often than directed. Do not suddenly stop your medicine. You must gradually reduce the dose or you may feel withdrawal effects. Ask your doctor or health care professional for advice.  A special MedGuide will be given to you by the pharmacist with each prescription and refill. Be sure to read this information carefully each time.  Talk to your pediatrician regarding the use of this medicine in children. While this drug may be prescribed for children as young as 6 years of age for selected conditions, precautions do apply.  What side effects may I notice from receiving this medicine?  Side effects that you should report to your doctor or health care professional as soon as possible:  · allergic reactions like skin rash, itching or hives, swelling of the face, lips, or tongue  · changes in vision  · chest pain or chest tightness  · confusion, trouble speaking or understanding  · fast, irregular heartbeat  · fingers or toes feel numb, cool, painful  · hallucination, loss of contact with reality  · high blood pressure  · males: prolonged or painful erection  · seizures  · severe headaches  · shortness of breath  · suicidal thoughts or other mood changes  · trouble walking, dizziness, loss of balance or coordination  · uncontrollable head,  mouth, neck, arm, or leg movements  Side effects that usually do not require medical attention (report to your doctor or health care professional if they continue or are bothersome):  · anxious  · headache  · loss of appetite  · nausea, vomiting  · trouble sleeping  · weight loss  What may interact with this medicine?  Do not take this medicine with any of the following medications:  · MAOIs like Carbex, Eldepryl, Marplan, Nardil, and Parnate  · other stimulant medicines for attention disorders, weight loss, or to stay awake  This medicine may also interact with the following medications:  · acetazolamide  · ammonium chloride  · antacids  · ascorbic acid  · atomoxetine  · caffeine  · certain medicines for blood pressure  · certain medicines for depression, anxiety, or psychotic disturbances  · certain medicines for seizures like carbamazepine, phenobarbital, phenytoin  · certain medicines for stomach problems like cimetidine, famotidine, omeprazole, lansoprazole  · cold or allergy medicines  · green tea  · levodopa  · linezolid  · medicines for sleep during surgery  · methenamine  · norepinephrine  · phenothiazines like chlorpromazine, mesoridazine, prochlorperazine, thioridazine  · propoxyphene  · sodium acid phosphate  · sodium bicarbonate  What if I miss a dose?  If you miss a dose, take it as soon as you can. If it is almost time for your next dose, take only that dose. Do not take double or extra doses.  Where should I keep my medicine?  Keep out of the reach of children. This medicine can be abused. Keep your medicine in a safe place to protect it from theft. Do not share this medicine with anyone. Selling or giving away this medicine is dangerous and against the law.  Store at room temperature between 15 and 30 degrees C (59 and 86 degrees F). Protect from light. Keep container tightly closed. Throw away any unused medicine after the expiration date.  What should I tell my health care provider before I take  this medicine?  They need to know if you have any of these conditions:  · anxiety or panic attacks  · circulation problems in fingers and toes  · glaucoma  · hardening or blockages of the arteries or heart blood vessels  · heart disease or a heart defect  · high blood pressure  · history of a drug or alcohol abuse problem  · history of stroke  · kidney disease  · liver disease  · mental illness  · seizures  · suicidal thoughts, plans, or attempt; a previous suicide attempt by you or a family member  · thyroid disease  · Tourette's syndrome  · an unusual or allergic reaction to lisdexamfetamine, other medicines, foods, dyes, or preservatives  · pregnant or trying to get pregnant  · breast-feeding  What should I watch for while using this medicine?  Visit your doctor for regular check ups. This prescription requires that you follow special procedures with your doctor and pharmacy. You will need to have a new written prescription from your doctor every time you need a refill.  This medicine may affect your concentration, or hide signs of tiredness. Until you know how this medicine affects you, do not drive, ride a bicycle, use machinery, or do anything that needs mental alertness.  Tell your doctor or health care professional if this medicine loses its effects, or if you feel you need to take more than the prescribed amount. Do not change your dose without talking to your doctor or health care professional.  Decreased appetite is a common side effect when starting this medicine. Eating small, frequent meals or snacks can help. Talk to your doctor if you continue to have poor eating habits. Height and weight growth of a child taking this medicine will be monitored closely.  Do not take this medicine close to bedtime. It may prevent you from sleeping.  If you are going to need surgery, a MRI, CT scan, or other procedure, tell your doctor that you are taking this medicine. You may need to stop taking this medicine before  the procedure.  Tell your doctor or healthcare professional right away if you notice unexplained wounds on your fingers and toes while taking this medicine. You should also tell your healthcare provider if you experience numbness or pain, changes in the skin color, or sensitivity to temperature in your fingers or toes.  NOTE:This sheet is a summary. It may not cover all possible information. If you have questions about this medicine, talk to your doctor, pharmacist, or health care provider. Copyright© 2017 [employer]

## 2017-09-27 ENCOUNTER — INITIAL CONSULT (OUTPATIENT)
Dept: NEUROSURGERY | Facility: CLINIC | Age: 42
End: 2017-09-27
Payer: COMMERCIAL

## 2017-09-27 VITALS
SYSTOLIC BLOOD PRESSURE: 187 MMHG | WEIGHT: 148.81 LBS | RESPIRATION RATE: 15 BRPM | HEART RATE: 111 BPM | TEMPERATURE: 99 F | HEIGHT: 65 IN | DIASTOLIC BLOOD PRESSURE: 120 MMHG | BODY MASS INDEX: 24.79 KG/M2

## 2017-09-27 DIAGNOSIS — M54.12 CERVICAL RADICULOPATHY: Primary | ICD-10-CM

## 2017-09-27 DIAGNOSIS — M54.81 BILATERAL OCCIPITAL NEURALGIA: ICD-10-CM

## 2017-09-27 DIAGNOSIS — G43.719 CHRONIC MIGRAINE WITHOUT AURA, WITH INTRACTABLE MIGRAINE, SO STATED, WITHOUT MENTION OF STATUS MIGRAINOSUS: ICD-10-CM

## 2017-09-27 PROCEDURE — 99999 PR PBB SHADOW E&M-EST. PATIENT-LVL IV: CPT | Mod: PBBFAC,,, | Performed by: NEUROLOGICAL SURGERY

## 2017-09-27 PROCEDURE — 99204 OFFICE O/P NEW MOD 45 MIN: CPT | Mod: S$GLB,,, | Performed by: NEUROLOGICAL SURGERY

## 2017-09-27 NOTE — PROGRESS NOTES
Subjective:    Yeimy Demarionarinder     Patient ID: Mirna Pollock is a 41 y.o. female.    Chief Complaint: No chief complaint on file.    HPI   Pt is a 41 y.o. female who is new to me, seeing me for cervical spine. This is a patient with long standing posterior headaches that started when she was 13. Pt denies any seizures, numbness or tingling. Today her blood pressure elevated, she notes that her blood pressure increases with onset of headaches; does note history of cardiac problems. She is currently being followed by Dr. Elder for headache management      Review of Systems   Constitutional: Negative for chills, fatigue and fever.   HENT: Negative for sinus pressure and trouble swallowing.    Eyes: Negative.  Negative for visual disturbance.   Respiratory: Negative.    Cardiovascular: Negative.    Gastrointestinal: Negative.  Negative for nausea and vomiting.   Endocrine: Negative.    Genitourinary: Negative.    Musculoskeletal: Negative.    Neurological: Positive for headaches. Negative for dizziness, seizures, syncope, speech difficulty, weakness and numbness.       Objective:      Physical Exam:  Nursing note and vitals reviewed.    Constitutional: She appears well-developed.     Eyes: Pupils are equal, round, and reactive to light. Conjunctivae and EOM are normal.     Cardiovascular: Normal rate, regular rhythm, normal pulses and intact distal pulses.     Abdominal: Soft.     Psych/Behavior: She is alert. She is oriented to person, place, and time. She has a normal mood and affect.     Musculoskeletal: Gait is normal.        Neck: Range of motion is full. There is no tenderness. Muscle strength is 5/5. Tone is normal.        Back: Range of motion is full. There is no tenderness. Muscle strength is 5/5. Tone is normal.        Right Upper Extremities: Range of motion is full. There is no tenderness. Muscle strength is 5/5. Tone is normal.        Left Upper Extremities: Range of motion is full. There is no  tenderness. Muscle strength is 5/5. Tone is normal.       Right Lower Extremities: Range of motion is full. There is no tenderness. Muscle strength is 5/5. Tone is normal.        Left Lower Extremities: Range of motion is full. There is no tenderness. Muscle strength is 5/5. Tone is normal.     Neurological:        Coordination: She has a normal Romberg Test, normal finger to nose coordination, normal heel to shin coordination and normal tandem walking coordination.        DTRs: DTRs are normal. Tricep reflexes are 2+ on the right side and 2+ on the left side. Bicep reflexes are 2+ on the right side and 2+ on the left side. Brachioradialis reflexes are 2+ on the right side and 2+ on the left side. Patellar reflexes are 2+ on the right side and 2+ on the left side. Achilles reflexes are 2+ on the right side and 2+ on the left side.        Cranial nerves: Cranial nerve(s) II, III, IV, V, VI, VII, VIII, IX, X, XI and XII are intact.       Pt with history of cervical spinal problems that now has debilitating HA wit uncertain etiology. No real radiculopathy, but the HA have gotten worse. She has been seeing neurology for the headaches, but no imaging.   She is awake, alert and appropriate.   EOMF  Face is symmetric.  Tongue is midline  She has no focal deficits.     Imaging:   No imaging     Assessment/Plan:   Pt with prolonged history of HA with uncertain etiology that have not been responsive to treatment. Get  MRI, MRA, MRV to rule out any sort of mass lesion.     I, FABIANO Perez MD, personally performed the services described in this documentation. All medical record entries made by the scribe, Yeimy Albarran, were at my direction and in my presence.  I have reviewed the chart and agree that the record reflects my personal performance and is accurate and complete.

## 2017-09-28 ENCOUNTER — HOSPITAL ENCOUNTER (EMERGENCY)
Facility: HOSPITAL | Age: 42
Discharge: HOME | End: 2017-09-28
Attending: EMERGENCY MEDICINE
Payer: COMMERCIAL

## 2017-09-28 VITALS
SYSTOLIC BLOOD PRESSURE: 164 MMHG | TEMPERATURE: 98 F | HEART RATE: 92 BPM | OXYGEN SATURATION: 98 % | BODY MASS INDEX: 24.99 KG/M2 | HEIGHT: 65 IN | DIASTOLIC BLOOD PRESSURE: 89 MMHG | WEIGHT: 150 LBS | RESPIRATION RATE: 16 BRPM

## 2017-09-28 DIAGNOSIS — R51.9 HEADACHE: Primary | ICD-10-CM

## 2017-09-28 DIAGNOSIS — I15.9 SECONDARY HYPERTENSION: ICD-10-CM

## 2017-09-28 DIAGNOSIS — R06.02 SHORTNESS OF BREATH: ICD-10-CM

## 2017-09-28 LAB
ALBUMIN SERPL BCP-MCNC: 3.8 G/DL
ALP SERPL-CCNC: 105 U/L
ALT SERPL W/O P-5'-P-CCNC: 24 U/L
ANION GAP SERPL CALC-SCNC: 12 MMOL/L
APTT BLDCRRT: 24.1 SEC
AST SERPL-CCNC: 32 U/L
BASOPHILS # BLD AUTO: 0.08 K/UL
BASOPHILS NFR BLD: 0.7 %
BILIRUB SERPL-MCNC: 1.2 MG/DL
BILIRUB UR QL STRIP: NEGATIVE
BNP SERPL-MCNC: 14 PG/ML
BUN SERPL-MCNC: 16 MG/DL
BUN SERPL-MCNC: 16 MG/DL (ref 6–30)
CALCIUM SERPL-MCNC: 9.5 MG/DL
CHLORIDE SERPL-SCNC: 100 MMOL/L (ref 95–110)
CHLORIDE SERPL-SCNC: 101 MMOL/L
CLARITY UR REFRACT.AUTO: ABNORMAL
CO2 SERPL-SCNC: 24 MMOL/L
COLOR UR AUTO: YELLOW
CREAT SERPL-MCNC: 0.7 MG/DL (ref 0.5–1.4)
CREAT SERPL-MCNC: 0.8 MG/DL
D DIMER PPP IA.FEU-MCNC: 0.54 MG/L FEU
DIFFERENTIAL METHOD: ABNORMAL
EOSINOPHIL # BLD AUTO: 0.2 K/UL
EOSINOPHIL NFR BLD: 1.6 %
ERYTHROCYTE [DISTWIDTH] IN BLOOD BY AUTOMATED COUNT: 12.7 %
EST. GFR  (AFRICAN AMERICAN): >60 ML/MIN/1.73 M^2
EST. GFR  (NON AFRICAN AMERICAN): >60 ML/MIN/1.73 M^2
GLUCOSE SERPL-MCNC: 97 MG/DL
GLUCOSE SERPL-MCNC: 98 MG/DL (ref 70–110)
GLUCOSE UR QL STRIP: NEGATIVE
HCT VFR BLD AUTO: 42.6 %
HCT VFR BLD CALC: 46 %PCV (ref 36–54)
HGB BLD-MCNC: 14.6 G/DL
HGB UR QL STRIP: NEGATIVE
INR PPP: 0.9
KETONES UR QL STRIP: ABNORMAL
LEUKOCYTE ESTERASE UR QL STRIP: NEGATIVE
LIPASE SERPL-CCNC: 9 U/L
LYMPHOCYTES # BLD AUTO: 1.7 K/UL
LYMPHOCYTES NFR BLD: 14.6 %
MAGNESIUM SERPL-MCNC: 2.1 MG/DL
MCH RBC QN AUTO: 30.6 PG
MCHC RBC AUTO-ENTMCNC: 34.3 G/DL
MCV RBC AUTO: 89 FL
MONOCYTES # BLD AUTO: 1 K/UL
MONOCYTES NFR BLD: 8.5 %
NEUTROPHILS # BLD AUTO: 8.8 K/UL
NEUTROPHILS NFR BLD: 73.2 %
NITRITE UR QL STRIP: NEGATIVE
PH UR STRIP: 6 [PH] (ref 5–8)
PLATELET # BLD AUTO: 382 K/UL
PMV BLD AUTO: 9.7 FL
POC IONIZED CALCIUM: 1.14 MMOL/L (ref 1.06–1.42)
POC TCO2 (MEASURED): 25 MMOL/L (ref 23–29)
POTASSIUM BLD-SCNC: 3.3 MMOL/L (ref 3.5–5.1)
POTASSIUM SERPL-SCNC: 3.3 MMOL/L
PROT SERPL-MCNC: 8.3 G/DL
PROT UR QL STRIP: NEGATIVE
PROTHROMBIN TIME: 10.1 SEC
RBC # BLD AUTO: 4.77 M/UL
SAMPLE: ABNORMAL
SODIUM BLD-SCNC: 139 MMOL/L (ref 136–145)
SODIUM SERPL-SCNC: 137 MMOL/L
SP GR UR STRIP: 1.01 (ref 1–1.03)
TROPONIN I SERPL DL<=0.01 NG/ML-MCNC: <0.006 NG/ML
TSH SERPL DL<=0.005 MIU/L-ACNC: 1.51 UIU/ML
URN SPEC COLLECT METH UR: ABNORMAL
UROBILINOGEN UR STRIP-ACNC: NEGATIVE EU/DL
WBC # BLD AUTO: 11.94 K/UL

## 2017-09-28 PROCEDURE — 85610 PROTHROMBIN TIME: CPT

## 2017-09-28 PROCEDURE — 83690 ASSAY OF LIPASE: CPT

## 2017-09-28 PROCEDURE — 63600175 PHARM REV CODE 636 W HCPCS: Performed by: EMERGENCY MEDICINE

## 2017-09-28 PROCEDURE — 83735 ASSAY OF MAGNESIUM: CPT

## 2017-09-28 PROCEDURE — 96374 THER/PROPH/DIAG INJ IV PUSH: CPT

## 2017-09-28 PROCEDURE — A9585 GADOBUTROL INJECTION: HCPCS | Performed by: EMERGENCY MEDICINE

## 2017-09-28 PROCEDURE — 81003 URINALYSIS AUTO W/O SCOPE: CPT

## 2017-09-28 PROCEDURE — 80053 COMPREHEN METABOLIC PANEL: CPT

## 2017-09-28 PROCEDURE — 99285 EMERGENCY DEPT VISIT HI MDM: CPT | Mod: 25

## 2017-09-28 PROCEDURE — 85025 COMPLETE CBC W/AUTO DIFF WBC: CPT

## 2017-09-28 PROCEDURE — 99285 EMERGENCY DEPT VISIT HI MDM: CPT | Mod: ,,, | Performed by: EMERGENCY MEDICINE

## 2017-09-28 PROCEDURE — 25500020 PHARM REV CODE 255: Performed by: EMERGENCY MEDICINE

## 2017-09-28 PROCEDURE — 84484 ASSAY OF TROPONIN QUANT: CPT

## 2017-09-28 PROCEDURE — 96361 HYDRATE IV INFUSION ADD-ON: CPT

## 2017-09-28 PROCEDURE — 96375 TX/PRO/DX INJ NEW DRUG ADDON: CPT

## 2017-09-28 PROCEDURE — 85379 FIBRIN DEGRADATION QUANT: CPT

## 2017-09-28 PROCEDURE — 83880 ASSAY OF NATRIURETIC PEPTIDE: CPT

## 2017-09-28 PROCEDURE — 85730 THROMBOPLASTIN TIME PARTIAL: CPT

## 2017-09-28 PROCEDURE — 25000003 PHARM REV CODE 250: Performed by: EMERGENCY MEDICINE

## 2017-09-28 PROCEDURE — 94761 N-INVAS EAR/PLS OXIMETRY MLT: CPT

## 2017-09-28 PROCEDURE — 84443 ASSAY THYROID STIM HORMONE: CPT

## 2017-09-28 RX ORDER — METOCLOPRAMIDE HYDROCHLORIDE 5 MG/ML
10 INJECTION INTRAMUSCULAR; INTRAVENOUS
Status: COMPLETED | OUTPATIENT
Start: 2017-09-28 | End: 2017-09-28

## 2017-09-28 RX ORDER — DIPHENHYDRAMINE HYDROCHLORIDE 50 MG/ML
25 INJECTION INTRAMUSCULAR; INTRAVENOUS
Status: COMPLETED | OUTPATIENT
Start: 2017-09-28 | End: 2017-09-28

## 2017-09-28 RX ORDER — CLONIDINE HYDROCHLORIDE 0.1 MG/1
0.1 TABLET ORAL
Status: COMPLETED | OUTPATIENT
Start: 2017-09-28 | End: 2017-09-28

## 2017-09-28 RX ORDER — GADOBUTROL 604.72 MG/ML
10 INJECTION INTRAVENOUS
Status: COMPLETED | OUTPATIENT
Start: 2017-09-28 | End: 2017-09-28

## 2017-09-28 RX ORDER — CLONIDINE HYDROCHLORIDE 0.1 MG/1
0.2 TABLET ORAL
Status: DISCONTINUED | OUTPATIENT
Start: 2017-09-28 | End: 2017-09-28

## 2017-09-28 RX ADMIN — METOCLOPRAMIDE 10 MG: 5 INJECTION, SOLUTION INTRAMUSCULAR; INTRAVENOUS at 07:09

## 2017-09-28 RX ADMIN — CLONIDINE HYDROCHLORIDE 0.1 MG: 0.1 TABLET ORAL at 06:09

## 2017-09-28 RX ADMIN — DIPHENHYDRAMINE HYDROCHLORIDE 25 MG: 50 INJECTION, SOLUTION INTRAMUSCULAR; INTRAVENOUS at 07:09

## 2017-09-28 RX ADMIN — GADOBUTROL 10 ML: 604.72 INJECTION INTRAVENOUS at 10:09

## 2017-09-28 RX ADMIN — SODIUM CHLORIDE 1000 ML: 0.9 INJECTION, SOLUTION INTRAVENOUS at 06:09

## 2017-09-28 NOTE — ED TRIAGE NOTES
Pt reports high blood pressure since Monday. Pt reports migraine since Saturday. Pt states that this am she felt like her heart started racing, started feeling SOB and clammy. Pt denies CP.

## 2017-09-28 NOTE — ED PROVIDER NOTES
Encounter Date: 2017    SCRIBE #1 NOTE: I, Jackeline Marinelli, am scribing for, and in the presence of, Dr. Johnson.       History     Chief Complaint   Patient presents with    Hypertension     pt reports htn and tachycardia x 3 days    Time patient was seen by the provider: 5:42 AM      The patient is a 41 y.o. female with hx of: HTN and migraine located at the top of her head that presents to the ED with a complaint of hypertension and tachycardia for 4 days with associated headache. Pt has hx of heart surgery in her youth. Went to her PCP 3 days ago and reportedly had a systolic of 208. She endorses that it was high yesterday and today. She states that her pressure has been high before in the past but has never been accompanied with tachycardia. She endorses SOB but denies chest pain. Pt takes bystolic, lisinopril, and hydrochlorothiazide in the morning and takes clonidine at night. Pt also endorses fatigue, nausea, hot flashes, left shoulder pain and neck pain. Pt denies fevers, urinary symptoms, diaphoresis, hormone use, blood thinner use, recent travel and hx of smoking.     The history is provided by the patient and medical records.     Review of patient's allergies indicates:   Allergen Reactions    Iodine and iodide containing products Other (See Comments)    Penicillins Rash     Past Medical History:   Diagnosis Date    Anxiety     Headache(784.0)     Hypertension     Kidney stones fibomyalgia, migraine, heart closer, kidey stones     Past Surgical History:   Procedure Laterality Date    APPENDECTOMY       SECTION      CHOLECYSTECTOMY      GALLBLADDER SURGERY      HYSTERECTOMY      12 yrs ago, still has ovaries, due to endometriosis by Dr. Hernandez     Family History   Problem Relation Age of Onset    Adopted: Yes    Heart disease Father     Migraines Son      Social History   Substance Use Topics    Smoking status: Never Smoker    Smokeless tobacco: Never Used    Alcohol use No  "    Review of Systems   Constitutional: Negative for chills, diaphoresis and fever.        (+) hot flashes   HENT: Negative for congestion.    Eyes: Negative for pain.   Respiratory: Positive for shortness of breath.    Cardiovascular:        (+) HTN, tachycardia   Gastrointestinal: Positive for nausea. Negative for vomiting.   Genitourinary: Negative for difficulty urinating, dysuria and frequency.   Musculoskeletal: Positive for neck pain.        (+) left shoulder pain   Skin: Negative for rash.   Neurological: Positive for headaches.       Physical Exam     Initial Vitals [09/28/17 0523]   BP Pulse Resp Temp SpO2   (!) 202/102 (!) 125 18 98.2 °F (36.8 °C) 100 %      MAP       135.33         Vitals:    09/28/17 0523 09/28/17 0549 09/28/17 0602 09/28/17 0617   BP: (!) 202/102 (!) 180/115 (!) 167/87 (!) 167/92   Pulse: (!) 125 101 88 87   Resp: 18 (!) 22 (!) 22    Temp: 98.2 °F (36.8 °C)      TempSrc: Oral      SpO2: 100% 100% 99% 97%   Weight: 68 kg (150 lb)      Height: 5' 5" (1.651 m)       09/28/17 0632   BP: (!) 156/89   Pulse: 81   Resp: 15   Temp:    TempSrc:    SpO2: 97%   Weight:    Height:        Physical Exam    Nursing note and vitals reviewed.    Gen/Constitutional: Interactive. No acute distress  Head: Normocephalic, Atraumatic  Neck: supple, no masses or LAD  Eyes: PERRLA, conjunctiva clear  Ears, Nose and Throat: No rhinorrhea or stridor.  Cardiac: Tachycardic, No murmur  Pulmonary: CTA Bilat, no wheezes, rhonchi, rales.  GI: Abdomen soft, non-tender, non-distended; no rebound or guarding  : No CVA tenderness.  Musculoskeletal: Extremities warm, well perfused, no erythema, no edema  Skin: No rashes  Neuro: Alert and Oriented x 3; No focal motor or sensory deficits.  No pronator drift in upper or lower extremities, no face droop, no dysarthria.  Normal finger to nose and heel to shin.  No ataxia.  No direction changing nystagmus or vertical deviation on cover-uncover testing.   Psych: Normal " affect    ED Course   Procedures  Labs Reviewed   CBC W/ AUTO DIFFERENTIAL - Abnormal; Notable for the following:        Result Value    Platelets 382 (*)     Gran # 8.8 (*)     Gran% 73.2 (*)     Lymph% 14.6 (*)     All other components within normal limits   COMPREHENSIVE METABOLIC PANEL - Abnormal; Notable for the following:     Potassium 3.3 (*)     Total Bilirubin 1.2 (*)     All other components within normal limits   D DIMER, QUANTITATIVE - Abnormal; Notable for the following:     D-Dimer 0.54 (*)     All other components within normal limits   ISTAT PROCEDURE - Abnormal; Notable for the following:     POC Potassium 3.3 (*)     All other components within normal limits   B-TYPE NATRIURETIC PEPTIDE   APTT   LIPASE   MAGNESIUM   PROTIME-INR   TROPONIN I   TSH   URINALYSIS, REFLEX TO URINE CULTURE   ISTAT CHEM8     EKG Readings: (Independently Interpreted)   EKG: Sinus tachycardia, no HARRY's or STD's, non-specific twave pattern, no STEMI       Imaging Results          X-Ray Chest 1 View (Final result)  Result time 09/28/17 06:36:06    Final result by Kacey Toussaint MD (09/28/17 06:36:06)                 Impression:      No acute intra-thoracic process identified on this single radiographic view of the chest.      Electronically signed by: KACEY TOUSSAINT  Date:     09/28/17  Time:    06:36              Narrative:    Comparison: None available    Technique: Single frontal view of the chest.    Findings:   Cardiac monitoring leads overlie the chest.  The cardiomediastinal silhouette is within normal limits. The visualized airway is unremarkable.  The lungs appear symmetrically expanded without definite focal alveolar consolidation. No large pleural effusion or pneumothorax is appreciated.  Osseous structures demonstrate no acute abnormality.                                 Medical Decision Making:   History:   Old Medical Records: I decided to obtain old medical records.  Independently Interpreted Test(s):   I have  ordered and independently interpreted EKG Reading(s) - see prior notes  Pt presents with HTN, tachycardia and posterior headache I considered MI/ACS, CHF exacerbation, PRES, stroke, brain bleed, UTI, pulmonary embolism, migraine headache, tension headache, pheochromocytoma, among other diagnoses. Plan to check EKG, cardiac labs, D-dimer, MRI brain. Will treat blood pressure with oral clonidine - gave 0.1mg PO.  Prior to administration of clonidine her BP did come down some on it's own and her HR came down to below 100 on it's own as well.  Will give Reglan and benadryl for headache and will reevaluate.     D-dimer just a bit above 0.5.  I initially ordered this because of unexplained tachycardia.  The patient's tachycardia improved spontaneously after reassurance.  The patient is not having CP or hypoxia.  Ultimately I felt the chance of PE was low and did not feel patient warranted admit for VQ scan.  She cannot undergo CTA chest because has allergy to iodine.      Pt signed out to Dr. Petit pending other lab and MRI Brain and Neck imaging results.    Clinical Tests:  Labs Test(s) were ordered and reviewed by me.  Radiological study(s) were ordered and reviewed by me.  Medical test(s) were ordered and reviewed by me.          Scribe Attestation:   Scribe #1: I performed the above scribed service and the documentation accurately describes the services I performed. I attest to the accuracy of the note.    Attending Attestation:           Physician Attestation for Scribe:  Physician Attestation Statement for Scribe #1: I, Dr. Johnson, reviewed documentation, as scribed by Jackeline Marinelli in my presence, and it is both accurate and complete.                 ED Course      Clinical Impression:   Diagnoses of Shortness of breath and Headache were pertinent to this visit.                           Daniel Johnson MD  09/28/17 0713

## 2017-09-28 NOTE — ED NOTES
Pt care assumed.  Pt lying on stretcher, AAO x 4.  Pt on cardiac, blood pressure & pulse ox monitoring.  Pt denies any c/o pain at present.  Pt informed of plan of care at present.  Will continue to monitor.

## 2017-10-04 ENCOUNTER — TELEPHONE (OUTPATIENT)
Dept: NEUROSURGERY | Facility: CLINIC | Age: 42
End: 2017-10-04

## 2017-10-04 NOTE — TELEPHONE ENCOUNTER
----- Message from Cynthia Mccoy RN sent at 10/4/2017  9:06 AM CDT -----  Bhumi:  She is scheduled to see Elisa on 10/17.  Cynthia  ----- Message -----  From: Bhumi Elliott RN  Sent: 10/4/2017   8:52 AM  To: Cynthia Mccoy RN    Per Dr Perez can we get this patient scheduled with neurology or NP for headaches please

## 2017-10-17 DIAGNOSIS — G47.00 INSOMNIA, UNSPECIFIED TYPE: ICD-10-CM

## 2017-10-17 RX ORDER — ZOLPIDEM TARTRATE 10 MG/1
10 TABLET ORAL NIGHTLY PRN
Qty: 30 TABLET | Refills: 2 | Status: SHIPPED | OUTPATIENT
Start: 2017-10-17 | End: 2018-01-08 | Stop reason: SDUPTHER

## 2017-10-19 ENCOUNTER — OFFICE VISIT (OUTPATIENT)
Dept: NEUROLOGY | Facility: CLINIC | Age: 42
End: 2017-10-19
Payer: COMMERCIAL

## 2017-10-19 VITALS
SYSTOLIC BLOOD PRESSURE: 141 MMHG | HEIGHT: 65 IN | DIASTOLIC BLOOD PRESSURE: 102 MMHG | HEART RATE: 74 BPM | BODY MASS INDEX: 24.79 KG/M2 | WEIGHT: 148.81 LBS

## 2017-10-19 DIAGNOSIS — M54.81 BILATERAL OCCIPITAL NEURALGIA: ICD-10-CM

## 2017-10-19 DIAGNOSIS — G43.709 CHRONIC MIGRAINE WITHOUT AURA WITHOUT STATUS MIGRAINOSUS, NOT INTRACTABLE: Primary | ICD-10-CM

## 2017-10-19 DIAGNOSIS — I10 ESSENTIAL HYPERTENSION: ICD-10-CM

## 2017-10-19 DIAGNOSIS — M54.2 CERVICAL MUSCLE PAIN: ICD-10-CM

## 2017-10-19 DIAGNOSIS — R51.9 CHRONIC DAILY HEADACHE: ICD-10-CM

## 2017-10-19 PROCEDURE — 99999 PR PBB SHADOW E&M-EST. PATIENT-LVL III: CPT | Mod: PBBFAC,,, | Performed by: NURSE PRACTITIONER

## 2017-10-19 PROCEDURE — 99214 OFFICE O/P EST MOD 30 MIN: CPT | Mod: S$GLB,,, | Performed by: NURSE PRACTITIONER

## 2017-10-19 NOTE — LETTER
October 19, 2017      Luc Venegas MD  [address]  [address]  [facility] [address]           [facility]  [address]  [place] [address]  Phone: [phone]  Fax: [phone]          Patient: Mirna Pollock   MR Number: [id number]   YOB: 1975   Date of Visit: 10/19/2017       Dear Dr. Luc Venegas:    Thank you for referring Mirna Pollock to me for evaluation. Attached you will find relevant portions of my assessment and plan of care.    If you have questions, please do not hesitate to call me. I look forward to following Mirna Pollock along with you.    Sincerely,    Luci Ortez, [facility]    Enclosure  CC:  No Recipients    If you would like to receive this communication electronically, please contact [email or web address] or [phone] to request more information on [facility] Link access.    For providers and/or their staff who would like to refer a patient to [facility], please contact us through our one-stop-shop provider referral line, [facility], at [phone].    If you feel you have received this communication in error or would no longer like to receive these types of communications, please e-mail [email or web address]

## 2017-10-19 NOTE — PROGRESS NOTES
Established Patient   SUBJECTIVE:  Patient ID: Mirna Pollock is a 41 y.o. female.  Chief Complaint: Headache and Follow-up    History of Present Illness:  Mirna Pollock is a 41 y.o. female with a history of chronic migraines, CTS, cervical radiculopathy, insomnia, HTN, depression, who presents to clinic alone for follow-up of headaches.     Recommendations made at last Office Visit on 8/17/2017:  - Seek approval for Botox injections for Chronic Migraine   - Bilateral TNB and GONB performed in clinic   - To break up headache cycle - Prednisone 60 mg x 3 days - take in AM with food   - For headache prevention - Zanaflex 2 mg 1-2 tabs at bedtime   - For migraine rescue - Vistaril 25 mg to be taken every 6-8 hours as needed   - For nausea - Phenergan   - In the future- will consider use of Triptan therapy for migraine abortive, however until her blood pressure is better controlled, will avoid use of triptans for now   - Continue tracking headaches   - Discussed goals of therapy are to decrease the frequency, intensity, and duration of headaches  - Follow up in 6 weeks or sooner if needed      10/19/2017 - Interval History:  - Headaches have persisted in similar frequency, which she is happy about as she has been under increased stress with regards to her 's health, he has been suffering with his Gout, just got out of the hospital last week and has had to take time off work etc   - Blood pressure continues to be elevated despite numerous anti-hypertensive, states today is good for her (currently 141/102), has been regularly following up with her PCP for management   - Is very excited because she is going [place] at the end of November/early December   - Has continued to have a headache nearly everyday, not necessarily a migraine, however migraine have been at least once per month    - Has noticed more recently that she feels like she has intense pressure behind her eyes and  across the bridge of her nose that slightly feels like sinus pressure, this occurs 1-2 times per week   - Has been seeing a chiropractor as well as a masseuse regularly which she does feel has been helping   - Would still like to move forward with the Botox injections   - Does not feel adjustments to her medications are necessary at this time      08/17/2017 - Interval History:  - Had been seeing Iker Beth PA-C   - Was taking Topamax, Zanaflex, and Vistaril - however has been out of medications for about 3 months  - Since being off the medication, she had been okay, however recently noticed migraines were beginning to occur more frequently with increased intensity and duration   - She finds the nerve blocks had been helping a lot, as long as she is consistent with getting the nerve block, however hasn't had this procedure for over a year   - Has been getting 5-6 migraines per month, each lasting 1-4 days in length   - Current migraine has been going on since last Friday (6 days)  - Blood pressure significantly elevated in clinic today, states her PCP is managing her medications in order to get her blood pressure better controlled   - Is requesting refills of medication as well as repeat nerve occipital and trigeminal nerve blocks      Headache history:   Age of onset -  13  Nature of pain -  Throbbing   Location - bioccipital   Aura -  White floaters   Duration - hrs  Frequency -  2/week  Time of day - anytime   7 days of pain - jaw pain, L neck, L shoulder,  L side throbbing      Associated symptoms:   Meningeal symptoms - photophobia, phonophobia, exercise intolerance +   Nausea/vomitting +   Nasal drainage   Visual symptoms  Pallor/flushing  Vertigo  Stroke symptoms  Confusion  Impaired concentration +  Pain worsened with physical activity +  Neck pain +  Whooshing sounds   Visual spots/blotches +     Triggers:   Stress +   Bright or flickering light  Strong smell  Vigorous exercise  Dietary factors   Visual  strain      Motion intolerance as passenger:  No   Resolution of headache with sleep: yes      Therapies Tried & Response:  Tylenol, motrin, alleve - not help  excedrin - not help   imitrex - helped initially   topamax 50 mg - helped  Percocet - helps   Gabapentin - helps  Amlodipine - for HTN  Bystolic - for HTN  Lexapro - hasn't noticed change in HA, for depression     Current Medications:    Current Outpatient Prescriptions:     amlodipine (NORVASC) 10 MG tablet, TK 1 T PO QD, Disp: , Rfl: 5    cloNIDine (CATAPRES) 0.2 MG tablet, , Disp: , Rfl: 2    escitalopram oxalate (LEXAPRO) 10 MG tablet, Take 1 tablet (10 mg total) by mouth once daily., Disp: 30 tablet, Rfl: 2    hydrochlorothiazide (HYDRODIURIL) 25 MG tablet, Take 25 mg by mouth once daily.  , Disp: , Rfl:     hydrocodone-acetaminophen 5-325mg (NORCO) 5-325 mg per tablet, Take 1 tablet by mouth nightly as needed for Pain (as needed for pain)., Disp: 60 tablet, Rfl: 0    hydrOXYzine pamoate (VISTARIL) 25 MG Cap, 1 capsule as needed for headache rescue, Disp: 12 capsule, Rfl: 2    [START ON 10/23/2017] lisdexamfetamine (VYVANSE) 50 MG capsule, Take 1 capsule (50 mg total) by mouth once daily., Disp: 30 capsule, Rfl: 0    nebivolol (BYSTOLIC) 10 MG Tab, Take 2 tablets (20 mg total) by mouth once daily., Disp: 60 tablet, Rfl: 11    promethazine (PHENERGAN) 25 MG tablet, Take 1 tablet (25 mg total) by mouth every 4 (four) hours., Disp: 20 tablet, Rfl: 2    tizanidine (ZANAFLEX) 2 MG tablet, 1-2 tabs at bedtime.  No alcohol or driving with medication., Disp: 60 tablet, Rfl: 5    zolpidem (AMBIEN) 10 mg Tab, Take 1 tablet (10 mg total) by mouth nightly as needed (insomnia)., Disp: 30 tablet, Rfl: 2    candesartan (ATACAND) 8 MG tablet, Take 2 tablets (16 mg total) by mouth once daily., Disp: 60 tablet, Rfl: 11    Review of Systems - as per HPI, otherwise a balanced 10 systems review is negative     OBJECTIVE:  Vitals:  BP (!) 141/102   Pulse 74   Ht  "5' 5" (1.651 m)   Wt 67.5 kg (148 lb 13 oz)   LMP 12/14/2000   BMI 24.76 kg/m²     Physical Exam:  Patient is awake, alert and oriented to person, place and time.  Moves all 4 extremities against gravity.  Muscle strength symmetric bilaterally in all extremities.  Gait and station within normal limits.  Cranial Nerves II through XII without focal deficit.     Review of Data:   Notes from ED visit on 9/28/2017   Notes from psychiatry Dr. Love on 9/25   Labs:  Admission on 09/28/2017, Discharged on 09/28/2017   Component Date Value Ref Range Status    WBC 09/28/2017 11.94  3.90 - 12.70 K/uL Final    RBC 09/28/2017 4.77  4.00 - 5.40 M/uL Final    Hemoglobin 09/28/2017 14.6  12.0 - 16.0 g/dL Final    Hematocrit 09/28/2017 42.6  37.0 - 48.5 % Final    MCV 09/28/2017 89  82 - 98 fL Final    MCH 09/28/2017 30.6  27.0 - 31.0 pg Final    MCHC 09/28/2017 34.3  32.0 - 36.0 g/dL Final    RDW 09/28/2017 12.7  11.5 - 14.5 % Final    Platelets 09/28/2017 382* 150 - 350 K/uL Final    MPV 09/28/2017 9.7  9.2 - 12.9 fL Final    Gran # 09/28/2017 8.8* 1.8 - 7.7 K/uL Final    Lymph # 09/28/2017 1.7  1.0 - 4.8 K/uL Final    Mono # 09/28/2017 1.0  0.3 - 1.0 K/uL Final    Eos # 09/28/2017 0.2  0.0 - 0.5 K/uL Final    Baso # 09/28/2017 0.08  0.00 - 0.20 K/uL Final    Gran% 09/28/2017 73.2* 38.0 - 73.0 % Final    Lymph% 09/28/2017 14.6* 18.0 - 48.0 % Final    Mono% 09/28/2017 8.5  4.0 - 15.0 % Final    Eosinophil% 09/28/2017 1.6  0.0 - 8.0 % Final    Basophil% 09/28/2017 0.7  0.0 - 1.9 % Final    Differential Method 09/28/2017 Automated   Final    Sodium 09/28/2017 137  136 - 145 mmol/L Final    Potassium 09/28/2017 3.3* 3.5 - 5.1 mmol/L Final    Chloride 09/28/2017 101  95 - 110 mmol/L Final    CO2 09/28/2017 24  23 - 29 mmol/L Final    Glucose 09/28/2017 97  70 - 110 mg/dL Final    BUN, Bld 09/28/2017 16  6 - 20 mg/dL Final    Creatinine 09/28/2017 0.8  0.5 - 1.4 mg/dL Final    Calcium 09/28/2017 9.5  8.7 " - 10.5 mg/dL Final    Total Protein 09/28/2017 8.3  6.0 - 8.4 g/dL Final    Albumin 09/28/2017 3.8  3.5 - 5.2 g/dL Final    Total Bilirubin 09/28/2017 1.2* 0.1 - 1.0 mg/dL Final    Alkaline Phosphatase 09/28/2017 105  55 - 135 U/L Final    AST 09/28/2017 32  10 - 40 U/L Final    ALT 09/28/2017 24  10 - 44 U/L Final    Anion Gap 09/28/2017 12  8 - 16 mmol/L Final    eGFR if African American 09/28/2017 >60.0  >60 mL/min/1.73 m^2 Final    eGFR if non African American 09/28/2017 >60.0  >60 mL/min/1.73 m^2 Final    BNP 09/28/2017 14  0 - 99 pg/mL Final    aPTT 09/28/2017 24.1  21.0 - 32.0 sec Final    D-Dimer 09/28/2017 0.54* <0.50 mg/L FEU Final    Lipase 09/28/2017 9  4 - 60 U/L Final    Magnesium 09/28/2017 2.1  1.6 - 2.6 mg/dL Final    Prothrombin Time 09/28/2017 10.1  9.0 - 12.5 sec Final    INR 09/28/2017 0.9  0.8 - 1.2 Final    Troponin I 09/28/2017 <0.006  0.000 - 0.026 ng/mL Final    TSH 09/28/2017 1.507  0.400 - 4.000 uIU/mL Final    Specimen UA 09/28/2017 Urine, Clean Catch   Final    Color, UA 09/28/2017 Yellow  Yellow, Straw, Janie Final    Appearance, UA 09/28/2017 Hazy* Clear Final    pH, UA 09/28/2017 6.0  5.0 - 8.0 Final    Specific Gravity, UA 09/28/2017 1.010  1.005 - 1.030 Final    Protein, UA 09/28/2017 Negative  Negative Final    Glucose, UA 09/28/2017 Negative  Negative Final    Ketones, UA 09/28/2017 1+* Negative Final    Bilirubin (UA) 09/28/2017 Negative  Negative Final    Occult Blood UA 09/28/2017 Negative  Negative Final    Nitrite, UA 09/28/2017 Negative  Negative Final    Urobilinogen, UA 09/28/2017 Negative  <2.0 EU/dL Final    Leukocytes, UA 09/28/2017 Negative  Negative Final    POC Glucose 09/28/2017 98  70 - 110 mg/dL Final    POC BUN 09/28/2017 16  6 - 30 mg/dL Final    POC Creatinine 09/28/2017 0.7  0.5 - 1.4 mg/dL Final    POC Sodium 09/28/2017 139  136 - 145 mmol/L Final    POC Potassium 09/28/2017 3.3* 3.5 - 5.1 mmol/L Final    POC Chloride  2017 100  95 - 110 mmol/L Final    POC TCO2 (MEASURED) 2017 25  23 - 29 mmol/L Final    POC Ionized Calcium 2017 1.14  1.06 - 1.42 mmol/L Final    POC Hematocrit 2017 46  36 - 54 %PCV Final    Sample 2017 DENISSE   Final     Imagin2017 - MRI Brain W/WO contrast   Findings: The brain parenchyma is normal in contour. There is a small focus of T2 flair signal hyperintensity in the right lateral putamen suggestive for remote lacunar type infarct with diffusion hyperintensity without restriction compatible with T2 shine through. There are no abnormal areas of parenchymal enhancement. There are no areas are restricted diffusion to suggest acute infarction. A few additional punctate foci of T2 flair signal hyperintensity supratentorial white matter which are nonspecific and may represent sequela migraine headaches in light of history. The ventricles are normal in size and configuration without evidence for hydrocephalus. There are no abnormal areas of the gradient susceptibility to suggest parenchymal hemorrhage. No abnormal intra or extra axial fluid collections. The major intracranial T2  flow-voids are present.   Impression      Small T2 flair hyperintense focus right lateral putamen with diffusion hyperintensity. 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    A few scattered punctate regions of T2 flair signal hyperintensity elsewhere supratentorial parenchyma while overall nonspecific in light of the given history this may be sequela migraine headaches. Alternative differential including prior demyelination to be considered.    otherwise unremarkable MRI brain specifically without evidence for acute infarction or enhancing lesion.          Electronically signed by: VIOLETTE CARO DO  Date: 17  Time: 10:49      2017 - MRA Brain & Neck   Findings:    MRA head:    Anterior circulation:  The bilateral distal cervical, Gabby, cavernous and supraclinoid  segments of the ICA's and the visualized bilateral anterior and middle cerebral arteries are patent without evidence for significant focal stenosis or aneurysm. There is a left PCOM.    Posterior circulation: The distal vertebral arteries, basilar artery and posterior cerebral arteries are patent without evidence for significant focal stenosis or aneurysm.    MRA neck: The origins of the right brachycephalic,  left common carotid and left subclavian arteries from the arch are within normal limits. The origins of the vertebral arteries from the subclavian arteries are within normal limits. The vertebral arteries are unremarkable throughout their course without evidence for focal stenosis or occlusion.    Right carotid: The right common carotid artery, carotid bifurcation and extracranial portions of the internal carotid artery are patent without evidence for focal stenosis or occlusion.    Left carotid: The left common carotid artery, carotid bifurcation and extra cranial portions of the internal carotid artery are patent without evidence for focal stenosis or occlusion.    There is less than 50% proximal ICA stenosis by NASCET criteria.   Impression      Unremarkable MRA of the head specifically without evidence for focal stenosis or intracranial aneurysm.    Unremarkable MRA of the neck without evidence for significant focal stenosis or occlusion.      Electronically signed by: VIOLETTE CARO DO  Date: 09/28/17  Time: 10:40      9/28/2017 - MRV Brain W/O Contrast   Results:The superior sagittal sinus is patent.  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  The paired internal cerebral veins and basal veins of Jay are patent.  The vein of Zac and torcula Herophili are patent.  The straight sinus is patent.  The bilateral transverse and sigmoid dural venous sinuses are patent to the jugular foramen.   Impression      Unremarkable noncontrast MRV specifically without evidence for  venous sinus thrombosis..      Electronically signed by: VIOLETTE RENTERIAMILA CUELLAR  Date: 09/28/17  Time: 10:43      Note: I have independently reviewed any/all imaging/labs/tests and agree with the report (s) as documented.  Any discrepancies will be as noted/demarcated by free text.  BC [id number] 10/19/2017    Focused examination was undertaken today.     ASSESSMENT:  1. Chronic migraine without aura without status migrainosus, not intractable    2. Chronic daily headache    3. Essential hypertension    4. Cervical muscle pain    5. Bilateral occipital neuralgia      Medical Decision Making:  BOTOX  The patient has chronic migraines (G43.719) and suffers from headaches more than 15 days a month lasting more than 4 hours a day with no relief of symptoms despite trying multiple medications including Topamax, Gabapentin, Amlodipine, Bystolic, Lexapro, Wellbutrin, Sumatriptan, Phenergan, Clonidine, HCTZ, among others. Botox treatment was approved for chronic migraines in October 2010. The patient will be an ideal candidate for Botox. We are planning for 3 treatments 3 months apart and aiming for at least 50% improvement in the symptoms. If we see no improvement after 3 treatments, we will discontinue the injections.    PLAN:  - Will again seek approval for Botox for Chronic migraine   - Does not feel adjustments in her medications are necessary at this time  - Offered restarting Topamax, she would rather try Botox first   - Continue tracking headaches   - Discussed goals of therapy are to decrease the frequency, intensity, and duration of headaches  - Follow up in 1 month to start Botox     The patient verbalizes understanding and agreement with the treatment plan. Questions were sought and answered to her stated verbal satisfaction.    CC: MD Luci Echols, FNP-C  [facility]   [phone]    Dr. Farfan was available during today's encounter.

## 2017-10-29 RX ORDER — PROMETHAZINE HYDROCHLORIDE 25 MG/1
TABLET ORAL
Qty: 20 TABLET | Refills: 2 | Status: SHIPPED | OUTPATIENT
Start: 2017-10-29 | End: 2018-01-22 | Stop reason: SDUPTHER

## 2017-11-08 ENCOUNTER — OFFICE VISIT (OUTPATIENT)
Dept: OTOLARYNGOLOGY | Facility: CLINIC | Age: 42
End: 2017-11-08
Payer: COMMERCIAL

## 2017-11-08 VITALS
BODY MASS INDEX: 25.13 KG/M2 | SYSTOLIC BLOOD PRESSURE: 192 MMHG | WEIGHT: 151 LBS | TEMPERATURE: 99 F | HEART RATE: 79 BPM | DIASTOLIC BLOOD PRESSURE: 82 MMHG

## 2017-11-08 DIAGNOSIS — H61.21 EXCESSIVE CERUMEN IN EAR CANAL, RIGHT: Primary | ICD-10-CM

## 2017-11-08 PROCEDURE — 69210 REMOVE IMPACTED EAR WAX UNI: CPT | Mod: S$GLB,,, | Performed by: OTOLARYNGOLOGY

## 2017-11-08 PROCEDURE — 99999 PR PBB SHADOW E&M-EST. PATIENT-LVL III: CPT | Mod: PBBFAC,,, | Performed by: OTOLARYNGOLOGY

## 2017-11-08 PROCEDURE — 99203 OFFICE O/P NEW LOW 30 MIN: CPT | Mod: 25,S$GLB,, | Performed by: OTOLARYNGOLOGY

## 2017-11-08 RX ORDER — OFLOXACIN 3 MG/ML
5 SOLUTION AURICULAR (OTIC) DAILY
Qty: 5 ML | Refills: 0 | Status: SHIPPED | OUTPATIENT
Start: 2017-11-08 | End: 2017-11-12

## 2017-11-08 NOTE — PROGRESS NOTES
Subjective:      Mirna Pollock is a 41 y.o. female whom I am asked to see for evaluation of diminished hearing in the right ear recently. There is not a prior history of cerumen impaction. The patient has not been using ear drops to loosen wax immediately prior to this visit. The patient denies ear pain. She was seen by PCP yesterday who attempted cerumen removal but was not successful secondary to pain.    The following portions of the patient's history were reviewed and updated as appropriate: allergies, current medications, past family history, past medical history, past social history, past surgical history and problem list.    Review of Systems  Pertinent items are noted in HPI.      Objective:      Auditory canal(s) of the right ear are partially obstructed with cerumen.     Cerumen was removed using suction. Tympanic membranes are intact following the procedure.  Auditory canal with old blood.      Assessment:      Cerumen Impaction without otitis externa.      Plan:      1. Care instructions given.  2. Home treatment: Floxin Otic.  3. Follow-up as needed.

## 2017-11-27 NOTE — PROGRESS NOTES
STAFF NOTE:  Patient's case was discussed with Dr. Love in supervision.  I agree with his assessment and plan as specified to increase Vyvanse for better management of ADHD Sx.   I also agree with use of Lexapro for depression and anxiety Sx management.  Ambien OK PRN for insomnia, though want to avoid daily use.

## 2017-12-13 DIAGNOSIS — G43.709 CHRONIC MIGRAINE WITHOUT AURA WITHOUT STATUS MIGRAINOSUS, NOT INTRACTABLE: Primary | ICD-10-CM

## 2017-12-28 ENCOUNTER — PATIENT MESSAGE (OUTPATIENT)
Dept: PSYCHIATRY | Facility: CLINIC | Age: 42
End: 2017-12-28

## 2018-01-08 ENCOUNTER — OFFICE VISIT (OUTPATIENT)
Dept: PSYCHIATRY | Facility: CLINIC | Age: 43
End: 2018-01-08
Payer: COMMERCIAL

## 2018-01-08 VITALS
HEART RATE: 75 BPM | DIASTOLIC BLOOD PRESSURE: 89 MMHG | WEIGHT: 155.81 LBS | BODY MASS INDEX: 25.96 KG/M2 | HEIGHT: 65 IN | SYSTOLIC BLOOD PRESSURE: 179 MMHG

## 2018-01-08 DIAGNOSIS — F90.0 ATTENTION DEFICIT HYPERACTIVITY DISORDER (ADHD), PREDOMINANTLY INATTENTIVE TYPE: ICD-10-CM

## 2018-01-08 DIAGNOSIS — G47.00 INSOMNIA, UNSPECIFIED TYPE: ICD-10-CM

## 2018-01-08 DIAGNOSIS — F33.1 MODERATE EPISODE OF RECURRENT MAJOR DEPRESSIVE DISORDER: Primary | ICD-10-CM

## 2018-01-08 PROCEDURE — 3008F BODY MASS INDEX DOCD: CPT | Mod: CPTII,S$GLB,, | Performed by: PSYCHIATRY & NEUROLOGY

## 2018-01-08 PROCEDURE — 99214 OFFICE O/P EST MOD 30 MIN: CPT | Mod: S$GLB,,, | Performed by: PSYCHIATRY & NEUROLOGY

## 2018-01-08 PROCEDURE — 99999 PR PBB SHADOW E&M-EST. PATIENT-LVL III: CPT | Mod: PBBFAC,,, | Performed by: PSYCHIATRY & NEUROLOGY

## 2018-01-08 PROCEDURE — 3079F DIAST BP 80-89 MM HG: CPT | Mod: CPTII,S$GLB,, | Performed by: PSYCHIATRY & NEUROLOGY

## 2018-01-08 PROCEDURE — 3077F SYST BP >= 140 MM HG: CPT | Mod: CPTII,S$GLB,, | Performed by: PSYCHIATRY & NEUROLOGY

## 2018-01-08 RX ORDER — ZOLPIDEM TARTRATE 10 MG/1
10 TABLET ORAL NIGHTLY PRN
Qty: 30 TABLET | Refills: 2 | Status: SHIPPED | OUTPATIENT
Start: 2018-01-08 | End: 2018-04-09 | Stop reason: SDUPTHER

## 2018-01-08 RX ORDER — DULOXETIN HYDROCHLORIDE 60 MG/1
60 CAPSULE, DELAYED RELEASE ORAL DAILY
Qty: 30 CAPSULE | Refills: 2 | Status: SHIPPED | OUTPATIENT
Start: 2018-01-08 | End: 2018-04-09 | Stop reason: SDUPTHER

## 2018-01-08 RX ORDER — LISDEXAMFETAMINE DIMESYLATE 50 MG/1
50 CAPSULE ORAL DAILY
Qty: 30 CAPSULE | Refills: 0 | Status: SHIPPED | OUTPATIENT
Start: 2018-02-05 | End: 2018-01-15 | Stop reason: SDUPTHER

## 2018-01-08 RX ORDER — LISDEXAMFETAMINE DIMESYLATE 50 MG/1
50 CAPSULE ORAL DAILY
Qty: 30 CAPSULE | Refills: 0 | Status: SHIPPED | OUTPATIENT
Start: 2018-01-08 | End: 2018-01-08 | Stop reason: SDUPTHER

## 2018-01-08 RX ORDER — LISDEXAMFETAMINE DIMESYLATE 50 MG/1
50 CAPSULE ORAL EVERY MORNING
Qty: 30 CAPSULE | Refills: 0 | Status: SHIPPED | OUTPATIENT
Start: 2018-03-05 | End: 2018-09-26 | Stop reason: ALTCHOICE

## 2018-01-08 RX ORDER — DULOXETIN HYDROCHLORIDE 30 MG/1
30 CAPSULE, DELAYED RELEASE ORAL DAILY
Qty: 30 CAPSULE | Refills: 0 | Status: SHIPPED | OUTPATIENT
Start: 2018-01-08 | End: 2018-01-08 | Stop reason: SDUPTHER

## 2018-01-08 RX ORDER — DULOXETIN HYDROCHLORIDE 30 MG/1
30 CAPSULE, DELAYED RELEASE ORAL DAILY
Qty: 30 CAPSULE | Refills: 0 | Status: SHIPPED | OUTPATIENT
Start: 2018-01-08 | End: 2018-04-09

## 2018-01-08 NOTE — PROGRESS NOTES
Outpatient Psychiatry Follow-Up Visit (MD/NP)    1/8/2018    Clinical Status of Patient:  Outpatient (Ambulatory)    Chief Complaint:  Mirna Pollock is a 42 y.o. female who presents today for follow-up of depression, anxiety and attention problems.  Met with patient.      Interval History and Content of Current Session:  Interim Events/Subjective Report/Content of Current Session: Patient presents for follow up today.  She has has been battling depression for the last 6 weeks, increased stress over step children potentially leaving to move to [place]. Associated crying, decreased energy, decreased concentration. Notes that the crying comes spontaneously and is happening while she is at work.  She has had to miss days because of the depression.  She notes sleep has been good, denies anhedonia, denies hopelessness, denies SI, no plan/intent for self harm.  No HI, no AVH.  Reports intolerable side effect of diarrhea the entire time she has been on lexapro.  We tried going up to 20mg but she is interested in switching due to the side effect at this point.  Denies any side effects from the Ambien or Vyvanse and they are working well.  Anxiety is present about the well being of her step children.    Psychotherapy:  · Target symptoms: depression, anxiety   · Why chosen therapy is appropriate versus another modality: relevant to diagnosis  · Outcome monitoring methods: self-report  · Therapeutic intervention type: supportive psychotherapy  · Topics discussed/themes: symptom recognition  · The patient's response to the intervention is accepting. The patient's progress toward treatment goals is good.   · Duration of intervention: 9 minutes.    Review of Systems   · PSYCHIATRIC: Pertinant items are noted in the narrative.  · NEUROLOGIC: No weakness, sensory changes, seizures, confusion, memory loss, headaches, tremor or other abnormal movements.  · CARDIOVASCULAR: No tachycardia or chest pain.  · GASTROINTESTINAL:  "diarrhea    Past Medical, Family and Social History: The patient's past medical, family and social history have been reviewed and updated as appropriate within the electronic medical record - see encounter notes.    Compliance: yes    Side effects: GI upset, diarrhea    Risk Parameters:  Patient reports no suicidal ideation  Patient reports no homicidal ideation  Patient reports no self-injurious behavior  Patient reports no violent behavior    Exam (detailed: at least 9 elements; comprehensive: all 15 elements)   Constitutional  Vitals:  Most recent vital signs, dated less than 90 days prior to this appointment, were reviewed.   Vitals:    01/08/18 1119   BP: (!) 179/89   Pulse: 75   Weight: 70.7 kg (155 lb 12.8 oz)   Height: 5' 5" (1.651 m)        General:  unremarkable, age appropriate     Musculoskeletal  Muscle Strength/Tone:  no spasicity, no rigidity   Gait & Station:  non-ataxic     Psychiatric  Speech:  no latency; no press   Mood & Affect:  steady  congruent and appropriate   Thought Process:  normal and logical   Associations:  intact   Thought Content:  normal, no suicidality, no homicidality, delusions, or paranoia   Insight:  intact   Judgement: behavior is adequate to circumstances   Orientation:  grossly intact   Memory: intact for content of interview   Language: grossly intact   Attention Span & Concentration:  able to focus   Fund of Knowledge:  intact and appropriate to age and level of education     Assessment and Diagnosis   Status/Progress: Based on the examination today, the patient's problem(s) is/are adequately but not ideally controlled.  New problems have not been presented today.   Co-morbidities and Diagnostic uncertainty are not complicating management of the primary condition.  There are no active rule-out diagnoses for this patient at this time.      General Impression:   MDD, recurrent, moderate  LALITA  ADHD, predominately inattentive type    Intervention/Counseling/Treatment Plan "   · Continue Vyvanse 50 mg qAM. Stop lexapro (taper off by taking 10mg x 1 week then stop) and start Cymbalta 30mg daily x 2 weeks then increase to 60mg daily.  · Continue Ambien 10mg nightly PRN for insomnia.  Discussed diagnosis, risks and benefits of proposed treatment above vs alternative treatments vs no treatment, and potential side effects of these treatments. The patient expresses understanding of the above and displays the capacity to agree with this treatment given said understanding. Patient also agrees that, currently, the benefits outweigh the risks and would like to pursue treatment at this time.  Patient with known hypertension, currently on Norvasc, HCTZ, and clonidine.  She reports following up with her PCP a month ago with no changes to her regimen.  Discussed potential risks with her and she voiced understanding.  She opts to continue her regimen at this time and work closely with her PCP.    Return to Clinic: 1 month

## 2018-01-15 ENCOUNTER — OFFICE VISIT (OUTPATIENT)
Dept: NEUROLOGY | Facility: CLINIC | Age: 43
End: 2018-01-15
Payer: COMMERCIAL

## 2018-01-15 DIAGNOSIS — I10 ESSENTIAL HYPERTENSION: ICD-10-CM

## 2018-01-15 DIAGNOSIS — F32.A DEPRESSION, UNSPECIFIED DEPRESSION TYPE: ICD-10-CM

## 2018-01-15 DIAGNOSIS — G43.709 CHRONIC MIGRAINE WITHOUT AURA WITHOUT STATUS MIGRAINOSUS, NOT INTRACTABLE: Primary | ICD-10-CM

## 2018-01-15 PROCEDURE — 99999 PR PBB SHADOW E&M-EST. PATIENT-LVL III: CPT | Mod: PBBFAC,,, | Performed by: NURSE PRACTITIONER

## 2018-01-15 PROCEDURE — 99214 OFFICE O/P EST MOD 30 MIN: CPT | Mod: S$GLB,,, | Performed by: NURSE PRACTITIONER

## 2018-01-15 RX ORDER — HYDROXYZINE PAMOATE 25 MG/1
CAPSULE ORAL
Qty: 12 CAPSULE | Refills: 5 | Status: SHIPPED | OUTPATIENT
Start: 2018-01-15 | End: 2018-05-22 | Stop reason: SDUPTHER

## 2018-01-15 RX ORDER — KETOROLAC TROMETHAMINE 15.75 MG/1
15.75 SPRAY, METERED NASAL
Qty: 5 EACH | Refills: 5 | Status: SHIPPED | OUTPATIENT
Start: 2018-01-15 | End: 2019-05-27

## 2018-01-15 RX ORDER — TIZANIDINE 2 MG/1
TABLET ORAL
Qty: 60 TABLET | Refills: 5 | Status: SHIPPED | OUTPATIENT
Start: 2018-01-15 | End: 2018-08-28 | Stop reason: SDUPTHER

## 2018-01-21 VITALS
WEIGHT: 148.81 LBS | HEART RATE: 79 BPM | BODY MASS INDEX: 24.79 KG/M2 | SYSTOLIC BLOOD PRESSURE: 195 MMHG | HEIGHT: 65 IN | DIASTOLIC BLOOD PRESSURE: 124 MMHG

## 2018-01-22 ENCOUNTER — PATIENT MESSAGE (OUTPATIENT)
Dept: NEUROLOGY | Facility: CLINIC | Age: 43
End: 2018-01-22

## 2018-01-22 RX ORDER — PROMETHAZINE HYDROCHLORIDE 25 MG/1
TABLET ORAL
Qty: 20 TABLET | Refills: 4 | Status: SHIPPED | OUTPATIENT
Start: 2018-01-22 | End: 2018-05-22 | Stop reason: SDUPTHER

## 2018-01-22 NOTE — TELEPHONE ENCOUNTER
Spoke with Mrs. Pollock- (in regards to an appt..See message)    Per pt's request appt booked 01/29/2018 @ 2pm              BC Fleming   You 4 hours ago (10:51 AM)      Hey Evelina,   Can you please get this patient scheduled for a nerve block sometime this week?   Elisa  (Routing comment)

## 2018-01-29 ENCOUNTER — OFFICE VISIT (OUTPATIENT)
Dept: NEUROLOGY | Facility: CLINIC | Age: 43
End: 2018-01-29
Payer: COMMERCIAL

## 2018-01-29 ENCOUNTER — PATIENT MESSAGE (OUTPATIENT)
Dept: NEUROLOGY | Facility: CLINIC | Age: 43
End: 2018-01-29

## 2018-01-29 VITALS
HEIGHT: 65 IN | HEART RATE: 90 BPM | SYSTOLIC BLOOD PRESSURE: 161 MMHG | DIASTOLIC BLOOD PRESSURE: 104 MMHG | BODY MASS INDEX: 24.83 KG/M2 | WEIGHT: 149 LBS

## 2018-01-29 DIAGNOSIS — M54.81 BILATERAL OCCIPITAL NEURALGIA: ICD-10-CM

## 2018-01-29 DIAGNOSIS — G89.29 CHRONIC INTRACTABLE HEADACHE, UNSPECIFIED HEADACHE TYPE: ICD-10-CM

## 2018-01-29 DIAGNOSIS — R51.9 CHRONIC INTRACTABLE HEADACHE, UNSPECIFIED HEADACHE TYPE: ICD-10-CM

## 2018-01-29 DIAGNOSIS — I34.1 MITRAL VALVE PROLAPSE: ICD-10-CM

## 2018-01-29 DIAGNOSIS — Z87.74 STATUS POST PATCH CLOSURE OF ASD: ICD-10-CM

## 2018-01-29 DIAGNOSIS — I10 UNCONTROLLED HYPERTENSION: ICD-10-CM

## 2018-01-29 DIAGNOSIS — F32.A DEPRESSION, UNSPECIFIED DEPRESSION TYPE: ICD-10-CM

## 2018-01-29 DIAGNOSIS — G43.709 CHRONIC MIGRAINE WITHOUT AURA WITHOUT STATUS MIGRAINOSUS, NOT INTRACTABLE: Primary | ICD-10-CM

## 2018-01-29 PROCEDURE — 99999 PR PBB SHADOW E&M-EST. PATIENT-LVL III: CPT | Mod: PBBFAC,,, | Performed by: NURSE PRACTITIONER

## 2018-01-29 PROCEDURE — 64405 NJX AA&/STRD GR OCPL NRV: CPT | Mod: LT,S$GLB,ICN, | Performed by: NURSE PRACTITIONER

## 2018-01-29 RX ORDER — METHYLPREDNISOLONE ACETATE 40 MG/ML
40 INJECTION, SUSPENSION INTRA-ARTICULAR; INTRALESIONAL; INTRAMUSCULAR; SOFT TISSUE
Status: COMPLETED | OUTPATIENT
Start: 2018-01-29 | End: 2018-01-29

## 2018-01-29 RX ORDER — LIDOCAINE HYDROCHLORIDE 20 MG/ML
4 INJECTION, SOLUTION INFILTRATION; PERINEURAL
Status: COMPLETED | OUTPATIENT
Start: 2018-01-29 | End: 2018-01-29

## 2018-01-29 RX ADMIN — LIDOCAINE HYDROCHLORIDE 4 ML: 20 INJECTION, SOLUTION INFILTRATION; PERINEURAL at 10:01

## 2018-01-29 RX ADMIN — METHYLPREDNISOLONE ACETATE 40 MG: 40 INJECTION, SUSPENSION INTRA-ARTICULAR; INTRALESIONAL; INTRAMUSCULAR; SOFT TISSUE at 10:01

## 2018-01-29 NOTE — PROGRESS NOTES
Established Patient   SUBJECTIVE:  Patient ID: Mirna Pollock is a 42 y.o. female.  Chief Complaint: Headache and Follow-up    History of Present Illness:  Mirna Pollock is a 42 y.o. female with HTN, chronic migraines, kidney stones, anxiety, depression, CTS, and MVP, who presents to clinic alone for follow-up of headaches.     Recommendations made at last Office Visit on 1/15/2017:  - Literature on Botox injections for chronic migraine provided to patient, she will read and do research and contact office in the next few weeks to let us know how she would like to proceed with treatment   - Discussed I do feel Botox injections are the option for treatment of her headaches at this time   - Continue Zanaflex nightly to help with sleep and neck stiffness/myofascial pain as well as headache prevention   - For migraine abortive - given Sprix nasal spray               1 spray in each nostril at onset of migraine               May repeat dose every 6-8 hours as needed   - For rescue - has Vistaril available   - Refills provided of Zanaflex and Vistaril   - Advised she either go to the emergency room or to same day primary care clinic for urgent appointment for more aggressive management of her hypertension, which she is agreeable to   - Continue Cymbalta daily as advised by psychiatry for depression   - Continue tracking headaches   - Discussed goals of therapy are to decrease the frequency, intensity, and duration of headaches  - Follow up in 6 weeks or sooner if needed      01/29/2018 - Interval History:  She has been having a terrible migraine for the last 3 days, she has tried numerous medications without any relief.  States she tried to go to work today, but she had to leave because her headache got so bad.  She is requesting to repeat the nerve blocks as those shots gave her relief form 6-8 weeks in the past.  She has done research on the Botox injections and states her  is very hesitant for her to try  Botox injections for her migraines.  She has not been seen by her PCP nor cardiologist for further management of her blood pressure.  Discussed the challenges of treating her migraines with uncontrolled hypertension because I cannot prescribe her any triptan therapy until her blood pressure is under better control.  She has continued to take Cymbalta daily for her depression, she is unsure if this is giving her any relief with regards to her headaches or not.      01/15/2018 - Interval History:  She was approved for Botox injections, but was a no-show to her appointment last week.  States she spoke with her  about the injections and she was very nervous to try the Botox injections for her migraines.    She continues to experience headaches on 1-4 days per week with migraines occurring 1-2 days per week.  Recently she has been experiencing more migraines and headaches than usually, but admits she has been going through a lot emotionally as well as she got sick with the flu.  She has continued to regularly follow-up with her psychiatrist who changed her anti-depressant, she is now taking Cymbalta.  Since change in medication, she feels she has been experiencing a mild headache each day.  She has had to miss a few days of work due to her migraines since last visit.  Additionally, she feels occasionally she begins to get dizzy, this occurs with or without headaches, she has an appointment with her eye doctor next week as her vision is also getting progressively worse.  She did not find vistaril to be useful with regards to her headaches.  She does feel Zanaflex helps with her neck as well as helping to relax her neck muscles.  When she gets a migraine she has been taking Vistaril and Ibuprofen together which does help the pain, she also has been trying Excedrin migraines which also gives her some relief.  Patient with known HTN on multiple anti-hypertensive agents, blood pressure significantly elevated in clinic  today.  She is not symptomatic.  States she needs to follow-up with her primary care (who is outside the [facility] system) for better management.      10/19/2017 - Interval History:  - Headaches have persisted in similar frequency, which she is happy about as she has been under increased stress with regards to her 's health, he has been suffering with his Gout, just got out of the hospital last week and has had to take time off work etc   - Blood pressure continues to be elevated despite numerous anti-hypertensive, states today is good for her (currently 141/102), has been regularly following up with her PCP for management   - Is very excited because she is going on a cruise with her  to [place] at the end of November/early December   - Has continued to have a headache nearly everyday, not necessarily a migraine, however migraine have been at least once per month    - Has noticed more recently that she feels like she has intense pressure behind her eyes and across the bridge of her nose that slightly feels like sinus pressure, this occurs 1-2 times per week   - Has been seeing a chiropractor as well as a masseuse regularly which she does feel has been helping   - Would still like to move forward with the Botox injections   - Does not feel adjustments to her medications are necessary at this time      08/17/2017 - Interval History:  - Had been seeing Iker Beth PA-C   - Was taking Topamax, Zanaflex, and Vistaril - however has been out of medications for about 3 months  - Since being off the medication, she had been okay, however recently noticed migraines were beginning to occur more frequently with increased intensity and duration   - She finds the nerve blocks had been helping a lot, as long as she is consistent with getting the nerve block, however hasn't had this procedure for over a year   - Has been getting 5-6 migraines per month, each lasting 1-4 days in length   - Current migraine has been  going on since last Friday (6 days)  - Blood pressure significantly elevated in clinic today, states her PCP is managing her medications in order to get her blood pressure better controlled   - Is requesting refills of medication as well as repeat nerve occipital and trigeminal nerve blocks      Headache history:   Age of onset -  13  Nature of pain -  Throbbing   Location - bioccipital   Aura -  White floaters   Duration - hrs  Frequency -  2/week  Time of day - anytime   7 days of pain - jaw pain, L neck, L shoulder,  L side throbbing   Associated symptoms:   Meningeal symptoms - photophobia, phonophobia, exercise intolerance +   Nausea/vomitting +   Nasal drainage   Visual symptoms  Pallor/flushing  Vertigo  Stroke symptoms  Confusion  Impaired concentration +  Pain worsened with physical activity +  Neck pain +  Whooshing sounds   Visual spots/blotches +  Triggers:   Stress +   Bright or flickering light  Strong smell  Vigorous exercise  Dietary factors   Visual strain   Motion intolerance as passenger:  No   Resolution of headache with sleep: yes      Therapies Tried & Response:  Tylenol, motrin, alleve - not help  excedrin - not help   imitrex - helped initially   topamax 50 mg - helped  Percocet - helps   Gabapentin - helps  Amlodipine - for HTN  Bystolic - for HTN  Lexapro - hasn't noticed change in HA, for depression   GONB - help   Cymbalta - unsure, for depression     Current Medications:    amlodipine (NORVASC) 10 MG tablet, TK 1 T PO QD, Disp: , Rfl: 5    cloNIDine (CATAPRES) 0.2 MG tablet, 0.2 mg 3 (three) times daily. , Disp: , Rfl: 2    DULoxetine (CYMBALTA) 30 MG capsule, Take 1 capsule (30 mg total) by mouth once daily., Disp: 30 capsule, Rfl: 0    DULoxetine (CYMBALTA) 60 MG capsule, Take 1 capsule (60 mg total) by mouth once daily., Disp: 30 capsule, Rfl: 2    hydrochlorothiazide (HYDRODIURIL) 25 MG tablet, Take 25 mg by mouth once daily.  , Disp: , Rfl:     hydrocodone-acetaminophen  "5-325mg (NORCO) 5-325 mg per tablet, Take 1 tablet by mouth nightly as needed for Pain (as needed for pain)., Disp: 60 tablet, Rfl: 0    hydrOXYzine pamoate (VISTARIL) 25 MG Cap, 1 capsule as needed for headache rescue, Disp: 12 capsule, Rfl: 5    ketorolac 15.75 mg/spray Spry, 15.75 mg by Nasal route every 6 to 8 hours as needed., Disp: 5 each, Rfl: 5    [START ON 3/5/2018] lisdexamfetamine (VYVANSE) 50 MG capsule, Take 1 capsule (50 mg total) by mouth every morning., Disp: 30 capsule, Rfl: 0    nebivolol (BYSTOLIC) 10 MG Tab, Take 2 tablets (20 mg total) by mouth once daily., Disp: 60 tablet, Rfl: 11    promethazine (PHENERGAN) 25 MG tablet, Take 1 tab by mouth every 4-6 hours as needed for nausea., Disp: 20 tablet, Rfl: 4    tiZANidine (ZANAFLEX) 2 MG tablet, 1-2 tabs at bedtime.  No alcohol or driving with medication., Disp: 60 tablet, Rfl: 5    zolpidem (AMBIEN) 10 mg Tab, Take 1 tablet (10 mg total) by mouth nightly as needed (insomnia)., Disp: 30 tablet, Rfl: 2    candesartan (ATACAND) 8 MG tablet, Take 2 tablets (16 mg total) by mouth once daily., Disp: 60 tablet, Rfl: 1  Current Facility-Administered Medications:     onabotulinumtoxina injection 200 Units, 200 Units, Intramuscular, Q90 Days, BC Fleming    Review of Systems - as per HPI, otherwise a balanced 10 systems review is negative.    OBJECTIVE:  Vitals:  BP (!) 161/104 (BP Location: Right arm, Patient Position: Sitting, BP Method: Large (Automatic))   Pulse 90   Ht 5' 5" (1.651 m)   Wt 67.6 kg (149 lb)   LMP 12/14/2000   BMI 24.79 kg/m²     Physical Exam:  Constitutional: she appears well-developed and well-nourished. she is well groomed. NAD   HENT:    Head: Normocephalic and atraumatic  Eyes: Conjunctivae and EOM are normal  Musculoskeletal: Normal range of motion. No joint stiffness.   Skin: Skin is warm and dry.  Psychiatric: Mood and affect are normal    Neuro: Patient is awake, alert, and oriented to person, place, " and time. Language is intact and fluent.  Recent and remote memory are intact.  Normal attention and concentration.  Facial movement is symmetric.  Gait is normal.     Review of Data:   Notes from Dr. Whitaker in 2010 reviewed, surgical notes from closure of ASD reviewed   Labs:  No visits with results within 3 Month(s) from this visit.   Latest known visit with results is:   Admission on 09/28/2017, Discharged on 09/28/2017   Component Date Value Ref Range Status    WBC 09/28/2017 11.94  3.90 - 12.70 K/uL Final    RBC 09/28/2017 4.77  4.00 - 5.40 M/uL Final    Hemoglobin 09/28/2017 14.6  12.0 - 16.0 g/dL Final    Hematocrit 09/28/2017 42.6  37.0 - 48.5 % Final    MCV 09/28/2017 89  82 - 98 fL Final    MCH 09/28/2017 30.6  27.0 - 31.0 pg Final    MCHC 09/28/2017 34.3  32.0 - 36.0 g/dL Final    RDW 09/28/2017 12.7  11.5 - 14.5 % Final    Platelets 09/28/2017 382* 150 - 350 K/uL Final    MPV 09/28/2017 9.7  9.2 - 12.9 fL Final    Gran # (ANC) 09/28/2017 8.8* 1.8 - 7.7 K/uL Final    Lymph # 09/28/2017 1.7  1.0 - 4.8 K/uL Final    Mono # 09/28/2017 1.0  0.3 - 1.0 K/uL Final    Eos # 09/28/2017 0.2  0.0 - 0.5 K/uL Final    Baso # 09/28/2017 0.08  0.00 - 0.20 K/uL Final    Gran% 09/28/2017 73.2* 38.0 - 73.0 % Final    Lymph% 09/28/2017 14.6* 18.0 - 48.0 % Final    Mono% 09/28/2017 8.5  4.0 - 15.0 % Final    Eosinophil% 09/28/2017 1.6  0.0 - 8.0 % Final    Basophil% 09/28/2017 0.7  0.0 - 1.9 % Final    Differential Method 09/28/2017 Automated   Final    Sodium 09/28/2017 137  136 - 145 mmol/L Final    Potassium 09/28/2017 3.3* 3.5 - 5.1 mmol/L Final    Chloride 09/28/2017 101  95 - 110 mmol/L Final    CO2 09/28/2017 24  23 - 29 mmol/L Final    Glucose 09/28/2017 97  70 - 110 mg/dL Final    BUN, Bld 09/28/2017 16  6 - 20 mg/dL Final    Creatinine 09/28/2017 0.8  0.5 - 1.4 mg/dL Final    Calcium 09/28/2017 9.5  8.7 - 10.5 mg/dL Final    Total Protein 09/28/2017 8.3  6.0 - 8.4 g/dL Final    Albumin  09/28/2017 3.8  3.5 - 5.2 g/dL Final    Total Bilirubin 09/28/2017 1.2* 0.1 - 1.0 mg/dL Final    Alkaline Phosphatase 09/28/2017 105  55 - 135 U/L Final    AST 09/28/2017 32  10 - 40 U/L Final    ALT 09/28/2017 24  10 - 44 U/L Final    Anion Gap 09/28/2017 12  8 - 16 mmol/L Final    eGFR if African American 09/28/2017 >60.0  >60 mL/min/1.73 m^2 Final    eGFR if non African American 09/28/2017 >60.0  >60 mL/min/1.73 m^2 Final    BNP 09/28/2017 14  0 - 99 pg/mL Final    aPTT 09/28/2017 24.1  21.0 - 32.0 sec Final    D-Dimer 09/28/2017 0.54* <0.50 mg/L FEU Final    Lipase 09/28/2017 9  4 - 60 U/L Final    Magnesium 09/28/2017 2.1  1.6 - 2.6 mg/dL Final    Prothrombin Time 09/28/2017 10.1  9.0 - 12.5 sec Final    INR 09/28/2017 0.9  0.8 - 1.2 Final    Troponin I 09/28/2017 <0.006  0.000 - 0.026 ng/mL Final    TSH 09/28/2017 1.507  0.400 - 4.000 uIU/mL Final    Specimen UA 09/28/2017 Urine, Clean Catch   Final    Color, UA 09/28/2017 Yellow  Yellow, Straw, Janie Final    Appearance, UA 09/28/2017 Hazy* Clear Final    pH, UA 09/28/2017 6.0  5.0 - 8.0 Final    Specific Gravity, UA 09/28/2017 1.010  1.005 - 1.030 Final    Protein, UA 09/28/2017 Negative  Negative Final    Glucose, UA 09/28/2017 Negative  Negative Final    Ketones, UA 09/28/2017 1+* Negative Final    Bilirubin (UA) 09/28/2017 Negative  Negative Final    Occult Blood UA 09/28/2017 Negative  Negative Final    Nitrite, UA 09/28/2017 Negative  Negative Final    Urobilinogen, UA 09/28/2017 Negative  <2.0 EU/dL Final    Leukocytes, UA 09/28/2017 Negative  Negative Final    POC Glucose 09/28/2017 98  70 - 110 mg/dL Final    POC BUN 09/28/2017 16  6 - 30 mg/dL Final    POC Creatinine 09/28/2017 0.7  0.5 - 1.4 mg/dL Final    POC Sodium 09/28/2017 139  136 - 145 mmol/L Final    POC Potassium 09/28/2017 3.3* 3.5 - 5.1 mmol/L Final    POC Chloride 09/28/2017 100  95 - 110 mmol/L Final    POC TCO2 (MEASURED) 09/28/2017 25  23 - 29  mmol/L Final    POC Ionized Calcium 09/28/2017 1.14  1.06 - 1.42 mmol/L Final    POC Hematocrit 09/28/2017 46  36 - 54 %PCV Final    Sample 09/28/2017 DENISSE   Final     Imaging:  Results for orders placed or performed during the hospital encounter of 09/28/17   MRV Brain Without Contrast    Narrative    Procedure: MRV of the head without contrast    Technique: Noncontrast 2-D time-of-flight MRV of the head with coronal and sagittal source imaging and 3-dimensional mip projection views.      Comparison: None    Results:The superior sagittal sinus is patent.  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  The paired internal cerebral veins and basal veins of Jay are patent.  The vein of Zac and torcula Herophili are patent.  The straight sinus is patent.  The bilateral transverse and sigmoid dural venous sinuses are patent to the jugular foramen.    Impression     Unremarkable noncontrast MRV specifically without evidence for venous sinus thrombosis..      Electronically signed by: VIOLETTE CARO DO  Date:     09/28/17  Time:    10:43    MRI Brain W WO Contrast    Narrative    Procedure: MRI the brain with andwithout contrast.    Technique: Sagittal and axial T1, axial T2, axial FLAIR, axial gradient, axial diffusion imaging of the whole brain pre-contrast with postcontrast axial T1 and axial spoiled gradient imaging reformatted in the coronal and sagittal planes.. 10 ml of Gadavist injected intravenously.         Comparison: None    Findings: The brain parenchyma is normal in contour. There is a small focus of T2 flair signal hyperintensity in the right lateral putamen suggestive for remote lacunar type infarct with diffusion hyperintensity without restriction compatible with T2 shine through. There are no abnormal areas of parenchymal enhancement. There are no areas are restricted diffusion to suggest acute infarction. A few additional punctate foci of T2 flair signal  hyperintensity supratentorial white matter which are nonspecific and may represent sequela migraine headaches in light of history. The ventricles are normal in size and configuration without evidence for hydrocephalus. There are no abnormal areas of the gradient susceptibility to suggest parenchymal hemorrhage. No abnormal intra or extra axial fluid collections. The major intracranial T2  flow-voids are present.    Impression     Small T2 flair hyperintense focus right lateral putamen with diffusion hyperintensity. 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    A few scattered punctate regions of T2 flair signal hyperintensity elsewhere supratentorial parenchyma while overall nonspecific in light of the given history this may be sequela migraine headaches. Alternative differential including prior demyelination to be considered.    otherwise unremarkable MRI brain specifically without evidence for acute infarction or enhancing lesion.          Electronically signed by: VIOLETTE CARO DO  Date:     09/28/17  Time:    10:49    MRA Brain without contrast    Narrative    Procedure: MRA of the head and neck    Technique: noncontrast 3-D time of flight MRA head and postcontrast 3-D time-of-flight MRA head and neck. 10 ml of Gadavist injected intravenously.    Comparison: None    Findings:    MRA head:    Anterior circulation:  The bilateral distal cervical, Gabby, cavernous and supraclinoid segments of the ICA's and the visualized bilateral anterior and middle cerebral arteries are patent without evidence for significant focal stenosis or aneurysm. There is a left PCOM.    Posterior circulation: The distal vertebral arteries, basilar artery and posterior cerebral arteries are patent without evidence for significant focal stenosis or aneurysm.    MRA neck: The origins of the right brachycephalic,  left common carotid and left subclavian arteries from the arch are within normal limits. The origins of the  vertebral arteries from the subclavian arteries are within normal limits. The vertebral arteries are unremarkable throughout their course without evidence for focal stenosis or occlusion.    Right carotid: The right common carotid artery, carotid bifurcation and extracranial portions of the internal carotid artery are patent without evidence for focal stenosis or occlusion.    Left carotid: The left common carotid artery, carotid bifurcation and extra cranial portions of the internal carotid artery are patent without evidence for focal stenosis or occlusion.    There is less than 50% proximal ICA stenosis by NASCET criteria.    Impression     Unremarkable MRA of the head specifically without evidence for focal stenosis or intracranial aneurysm.    Unremarkable MRA of the neck without evidence for significant focal stenosis or occlusion.      Electronically signed by: VIOLETTE CARO DO  Date:     09/28/17  Time:    10:40      Note: I have independently reviewed any/all imaging/labs/tests and agree with the report (s) as documented.  Any discrepancies will be as noted/demarcated by free text.  ARA HDZ 1/29/2018    Focused examination was undertaken today.     ASSESSMENT:  1. Chronic migraine without aura without status migrainosus, not intractable    2. Uncontrolled hypertension    3. Status post patch closure of ASD    4. Mitral valve prolapse    5. Bilateral occipital neuralgia    6. Depression, unspecified depression type    7. Chronic intractable headache, unspecified headache type      PLAN:  - Bilateral GONB performed in clinic (see below)  - Referral placed to Cardiology for uncontrolled HTN, s/p ASD closure in 2010   - Continue Cymbalta daily to help with headaches and depression   - For migraine abortive - has Vistaril and Sprix available  - To help with sleep and headaches - has Zanaflex available    - May consider addition of Gabapentin in the future if needed   - Encouraged her to continue regular  follow-up with mental health to help with anxiety and depression   - Continue tracking headaches   - Discussed goals of therapy are to decrease the frequency, intensity, and duration of headaches  - Follow up in 3 months or sooner if needed     Orders Placed This Encounter    Ambulatory Referral to Cardiology    lidocaine HCL 20 mg/ml (2%) injection 4 mL    methylPREDNISolone acetate injection 40 mg     Procedure Note:   GONB (Greater Occipital Nerve Block): After informed consent was obtained (a copy was given to office staff to scan into the EMR), the patient was asked to remain in a sitting position her head resting prone on her chest. The area was prepped using alcohol swabs. 2% lidocaine (2 mL x2 sides of neck=4 mL total) and 40 mg (1 bottle per sitex2 for total of 80 mg)depomedrol was drawn up utilizing a 21 gauge needle. The occipital trigger points were palpated utilizing latex gloves, a 27 gauge needle and aspiration occured to ensure no medication was introduced into the blood stream during the technique. The medication was delivered bilateral (all of the above was duplicated for the opposite side) in sites 1) midway between the inion and mastoid along the occipital ridge, 2) 2 finger breaths superior lateral to the first injection and 3) 2 finger breaths superior medial to the first injection. The patient tolerated the procedure well with no active bleeding, erythema, or discharge.  The patient was assessed and allowed to leave after ten minutes.  Findings from repeat exam (10 mins after procedure) showed:  Gen: NAD  Derm: no drainage  Neuro: AOx3, EOMI, tongue in midline, FROM of all extremities, gait WNL   Pre-Procedure Pain- 9 out of 10   Post-Procedure Pain- 1 out of 10     I spent 35 minutes face-to-face with the patient with >50% of the time spent with counseling and education regarding:  - results of data, diagnosis, and recommendations stated above  - risks, benefits, and potential side effects  of GONB discussed   - alternative treatment options including cefaly device, addition of gabapentin offered   - importance of healthy diet, regular exercise and sleep hygiene in the treatment of headaches     Questions and concerns were sought and answered to the patient's stated verbal satisfaction.  The patient verbalizes understanding and agreement with the above stated treatment plan.     CC: MD Luci Echols, FNP-C  [facility]   [phone]    Dr. Farfan was available during today's encounter.

## 2018-02-01 ENCOUNTER — PATIENT MESSAGE (OUTPATIENT)
Dept: OPTOMETRY | Facility: CLINIC | Age: 43
End: 2018-02-01

## 2018-02-26 RX ORDER — TIZANIDINE 2 MG/1
TABLET ORAL
Qty: 60 TABLET | Refills: 5 | Status: SHIPPED | OUTPATIENT
Start: 2018-02-26 | End: 2018-04-25

## 2018-04-05 ENCOUNTER — TELEPHONE (OUTPATIENT)
Dept: CARDIOLOGY | Facility: CLINIC | Age: 43
End: 2018-04-05

## 2018-04-05 DIAGNOSIS — Z87.74 ADULT PATIENT WITH HISTORY OF CONGENITAL HEART DISEASE: Primary | ICD-10-CM

## 2018-04-09 ENCOUNTER — OFFICE VISIT (OUTPATIENT)
Dept: PSYCHIATRY | Facility: CLINIC | Age: 43
End: 2018-04-09
Payer: COMMERCIAL

## 2018-04-09 VITALS
HEIGHT: 65 IN | WEIGHT: 153.31 LBS | SYSTOLIC BLOOD PRESSURE: 195 MMHG | BODY MASS INDEX: 25.54 KG/M2 | DIASTOLIC BLOOD PRESSURE: 119 MMHG | HEART RATE: 86 BPM

## 2018-04-09 DIAGNOSIS — F33.1 MODERATE EPISODE OF RECURRENT MAJOR DEPRESSIVE DISORDER: ICD-10-CM

## 2018-04-09 DIAGNOSIS — G47.00 INSOMNIA, UNSPECIFIED TYPE: ICD-10-CM

## 2018-04-09 PROCEDURE — 99999 PR PBB SHADOW E&M-EST. PATIENT-LVL III: CPT | Mod: PBBFAC,,, | Performed by: PSYCHIATRY & NEUROLOGY

## 2018-04-09 PROCEDURE — 3008F BODY MASS INDEX DOCD: CPT | Mod: CPTII,S$GLB,, | Performed by: PSYCHIATRY & NEUROLOGY

## 2018-04-09 PROCEDURE — 3077F SYST BP >= 140 MM HG: CPT | Mod: CPTII,S$GLB,, | Performed by: PSYCHIATRY & NEUROLOGY

## 2018-04-09 PROCEDURE — 3080F DIAST BP >= 90 MM HG: CPT | Mod: CPTII,S$GLB,, | Performed by: PSYCHIATRY & NEUROLOGY

## 2018-04-09 PROCEDURE — 99214 OFFICE O/P EST MOD 30 MIN: CPT | Mod: S$GLB,,, | Performed by: PSYCHIATRY & NEUROLOGY

## 2018-04-09 RX ORDER — ZOLPIDEM TARTRATE 10 MG/1
10 TABLET ORAL NIGHTLY PRN
Qty: 30 TABLET | Refills: 3 | Status: SHIPPED | OUTPATIENT
Start: 2018-04-09 | End: 2018-10-08

## 2018-04-09 RX ORDER — DULOXETIN HYDROCHLORIDE 60 MG/1
60 CAPSULE, DELAYED RELEASE ORAL DAILY
Qty: 30 CAPSULE | Refills: 3 | Status: SHIPPED | OUTPATIENT
Start: 2018-04-09 | End: 2018-09-26 | Stop reason: SDUPTHER

## 2018-04-09 NOTE — PROGRESS NOTES
"Outpatient Psychiatry Follow-Up Visit (MD/NP)    4/9/2018    Clinical Status of Patient:  Outpatient (Ambulatory)    Chief Complaint:  Mirna Pollock is a 42 y.o. female who presents today for follow-up of depression, anxiety and attention problems.  Met with patient.      Interval History and Content of Current Session:  Interim Events/Subjective Report/Content of Current Session: Patient states that she had a rough week last week, feeling like she was on a "roller coaster", very emotional and crying.  Denies depression with it.  She states that she had a hysterectomy 19 years ago and is concerned that is could be related to her hormones.  She has an appointment next week for further evaluation of this.  States that she has current stressor of step children moving to [place] and the struggles that accompany the situation.  We again discuss patient's BP.  She has known congenital heart disease (mitral valve prolapse) with long history of HTN but recently uncontrolled. She has an appointment with a cardiologist on 4/25 so we opt to hold off on stimulant medication until she gets clearance from him for use.  Cymbalta 60mg has helped with her mood and anxiety overall, and denies side effects from the medication.  Also notes the Ambien works well some nights but other nights she still struggles to fall asleep.  No SI/HI/AVH.    Psychotherapy:  · Target symptoms: depression, anxiety   · Why chosen therapy is appropriate versus another modality: relevant to diagnosis  · Outcome monitoring methods: self-report  · Therapeutic intervention type: supportive psychotherapy  · Topics discussed/themes: symptom recognition  · The patient's response to the intervention is accepting. The patient's progress toward treatment goals is good.   · Duration of intervention: 9 minutes.    Review of Systems   · PSYCHIATRIC: Pertinant items are noted in the narrative.  · NEUROLOGIC: No weakness, sensory changes, seizures, confusion, memory " "loss, headaches, tremor or other abnormal movements.  · CARDIOVASCULAR: No tachycardia or chest pain.  · GASTROINTESTINAL: No abdominal pain, nausea, vomiting, diarrhea, or constipation    Past Medical, Family and Social History: The patient's past medical, family and social history have been reviewed and updated as appropriate within the electronic medical record - see encounter notes.    Compliance: yes    Side effects: None    Risk Parameters:  Patient reports no suicidal ideation  Patient reports no homicidal ideation  Patient reports no self-injurious behavior  Patient reports no violent behavior    Exam (detailed: at least 9 elements; comprehensive: all 15 elements)   Constitutional  Vitals:  Most recent vital signs, dated less than 90 days prior to this appointment, were reviewed.   Vitals:    04/09/18 1331   BP: (!) 195/119   Pulse: 86   Weight: 69.6 kg (153 lb 5.3 oz)   Height: 5' 5" (1.651 m)        General:  unremarkable, age appropriate     Musculoskeletal  Muscle Strength/Tone:  no spasicity, no rigidity   Gait & Station:  non-ataxic     Psychiatric  Speech:  no latency; no press   Mood & Affect:  steady  congruent and appropriate   Thought Process:  normal and logical   Associations:  intact   Thought Content:  normal, no suicidality, no homicidality, delusions, or paranoia   Insight:  intact   Judgement: behavior is adequate to circumstances   Orientation:  grossly intact   Memory: intact for content of interview   Language: grossly intact, able to name, able to repeat   Attention Span & Concentration:  able to focus   Fund of Knowledge:  intact and appropriate to age and level of education     Assessment and Diagnosis   Status/Progress: Based on the examination today, the patient's problem(s) is/are resolved.  New problems have not been presented today.   Co-morbidities and Diagnostic uncertainty are not complicating management of the primary condition.  There are no active rule-out diagnoses for " this patient at this time.      General Impression:   MDD, recurrent, in partial remission  LALITA  ADHD, predominately inattentive type    Intervention/Counseling/Treatment Plan   · Hold Vyvanse 50 mg qAM until cleared by cardiologist. She has an appointment on 4/25/18 with Echo and EKG scheduled for the same day.  · Continue Cymbalta 60mg daily.  · Continue Ambien 10mg nightly PRN for insomnia.    · Discussed diagnosis, risks and benefits of proposed treatment above vs alternative treatments vs no treatment, and potential side effects of these treatments. The patient expresses understanding of the above and displays the capacity to agree with this treatment given said understanding. Patient also agrees that, currently, the benefits outweigh the risks and would like to pursue treatment at this time.    Return to Clinic: 3 months

## 2018-04-12 ENCOUNTER — OFFICE VISIT (OUTPATIENT)
Dept: OBSTETRICS AND GYNECOLOGY | Facility: CLINIC | Age: 43
End: 2018-04-12
Payer: COMMERCIAL

## 2018-04-12 ENCOUNTER — PATIENT MESSAGE (OUTPATIENT)
Dept: ADMINISTRATIVE | Facility: OTHER | Age: 43
End: 2018-04-12

## 2018-04-12 ENCOUNTER — LAB VISIT (OUTPATIENT)
Dept: LAB | Facility: OTHER | Age: 43
End: 2018-04-12
Payer: COMMERCIAL

## 2018-04-12 VITALS
WEIGHT: 151.69 LBS | HEIGHT: 65 IN | BODY MASS INDEX: 25.27 KG/M2 | DIASTOLIC BLOOD PRESSURE: 98 MMHG | SYSTOLIC BLOOD PRESSURE: 172 MMHG

## 2018-04-12 DIAGNOSIS — R03.0 ELEVATED BLOOD PRESSURE READING: ICD-10-CM

## 2018-04-12 DIAGNOSIS — N64.4 BREAST PAIN: ICD-10-CM

## 2018-04-12 DIAGNOSIS — Z11.3 SCREEN FOR STD (SEXUALLY TRANSMITTED DISEASE): ICD-10-CM

## 2018-04-12 DIAGNOSIS — Z01.419 WOMEN'S ANNUAL ROUTINE GYNECOLOGICAL EXAMINATION: Primary | ICD-10-CM

## 2018-04-12 DIAGNOSIS — R92.30 DENSE BREAST TISSUE: ICD-10-CM

## 2018-04-12 DIAGNOSIS — Z12.31 ENCOUNTER FOR SCREENING MAMMOGRAM FOR BREAST CANCER: ICD-10-CM

## 2018-04-12 DIAGNOSIS — M54.2 NECK PAIN: ICD-10-CM

## 2018-04-12 DIAGNOSIS — N63.0 BREAST MASS: ICD-10-CM

## 2018-04-12 DIAGNOSIS — N62 LARGE BREASTS: ICD-10-CM

## 2018-04-12 DIAGNOSIS — R23.2 HOT FLASHES: ICD-10-CM

## 2018-04-12 LAB
ESTRADIOL SERPL-MCNC: 80 PG/ML
FSH SERPL-ACNC: 5.4 MIU/ML
LH SERPL-ACNC: 4.7 MIU/ML
TSH SERPL DL<=0.005 MIU/L-ACNC: 1.31 UIU/ML

## 2018-04-12 PROCEDURE — 87491 CHLMYD TRACH DNA AMP PROBE: CPT

## 2018-04-12 PROCEDURE — 87480 CANDIDA DNA DIR PROBE: CPT

## 2018-04-12 PROCEDURE — 99386 PREV VISIT NEW AGE 40-64: CPT | Mod: S$GLB,,, | Performed by: NURSE PRACTITIONER

## 2018-04-12 PROCEDURE — 99999 PR PBB SHADOW E&M-EST. PATIENT-LVL V: CPT | Mod: PBBFAC,,, | Performed by: NURSE PRACTITIONER

## 2018-04-12 PROCEDURE — 82670 ASSAY OF TOTAL ESTRADIOL: CPT

## 2018-04-12 PROCEDURE — 83002 ASSAY OF GONADOTROPIN (LH): CPT

## 2018-04-12 PROCEDURE — 36415 COLL VENOUS BLD VENIPUNCTURE: CPT

## 2018-04-12 PROCEDURE — 87510 GARDNER VAG DNA DIR PROBE: CPT

## 2018-04-12 PROCEDURE — 84443 ASSAY THYROID STIM HORMONE: CPT

## 2018-04-12 PROCEDURE — 83001 ASSAY OF GONADOTROPIN (FSH): CPT

## 2018-04-12 NOTE — PROGRESS NOTES
"CC: annual   Mirna Shelbie Pollock is a 42 y.o. year old  who presents for annual exam.  She is S/P hysterectomy- ovaries remain.  Hysterectomy was secondary to endometriosis. BP today is elevated 172/98.  Pt reports she has a congenital heart defect.  Reports had a Transesophageal echocardiography (NARDA) 10- years ago and since she has had uncontrolled BPs.  She has appt with peds cards next month for evaluation of uncontrolled BP. Pt is adopted- unsure of family history.  Reports she has been feeling down- on an "emotional roller coaster."  She has history of anxiety and depression - on Cymbalta and Wellbutrin.  Denies any SI/ HI.  Pt reports increased sweating and hot flashes.  She wants hormonal blood work for evaluation.  Pt reports easy bruising.  Pt reports migraines.   Pt is complaining of bilateral Breast pain that began 1 year ago.  The pain is located throughout bilateral breasts.  She describes the pain as soreness.  States the pain constant.  She is not on OCPs or HRT.  She reports occasional skin irritation between breasts and under breasts.  She denies nipple discharge.  She is not breastfeeding or pumping.  Denies any recent vigorous or repetitive use of pectoral muscles.  Denies associated neck, back, or shoulder problems. She wears a bra size 36 DD.  Reports she has been fitted for bras over the past 1 years to try and alleviate pain. Reports indentations in her shoulder from bra strap. No associated fever or erythema.  No recent history of trauma to the chest.  She has not tried any alleviating factors.  The pain is affecting her ability to perform daily activities- unable to excercise d/t pain.  The pain hinders her ability to exercise. She also reports difficulty sitting for long periods at the computer and difficulty sleeping. Unsure of family history pt is adopted.           ROS:  GENERAL: Feeling well overall.   SKIN: Denies rash or lesions.   HEAD: Denies head injury or headache.   NODES: " Denies enlarged lymph nodes.   CHEST: Denies chest pain or shortness of breath.   CARDIOVASCULAR: Denies palpitations or left sided chest pain.   ABDOMEN: No abdominal pain, nausea, vomiting or rectal bleeding.   URINARY: No dysuria or hematuria.  REPRODUCTIVE: See HPI.   BREASTS: Denies pain, lumps, or nipple discharge.   HEMATOLOGIC: No easy bruisability or excessive bleeding.   MUSCULOSKELETAL: Denies joint pain or swelling.   NEUROLOGIC: Denies syncope or weakness.   PSYCHIATRIC: Denies depression.    PE:    APPEARANCE: Well nourished, well developed, in no acute distress.  NODES: No inguinal lymph node enlargement.  ABDOMEN: Soft. No tenderness or masses. No hernias.  BREASTS: Symmetrical, no skin changes or visible lesions. Left breast with + 1 cm palpable mass at 6 o'clock and + 1 cm palpable mass between 2-3 o'clock - both freely moveable- non tender, No nipple discharge or adenopathy bilaterally.  Bilaterally dense breast tissue noted. + indentation on bilateral shoulders from bra straps  PELVIC: Normal external female genitalia without lesions. Normal hair distribution. Adequate perineal body, normal urethral meatus. Vagina without lesions or discharge. No significant cystocele or rectocele. Uterus and cervix surgically absent. Bimanual exam revealed no masses, tenderness or abnormality.  ANUS: Normal.    Diagnosis:  1. Women's annual routine gynecological examination    2. Encounter for screening mammogram for breast cancer    3. Dense breast tissue    4. Screen for STD (sexually transmitted disease)    5. Hot flashes    6. Elevated blood pressure reading    7. Breast pain    8. Large breasts    9. Neck pain          PLAN:  STD vaginal cultures  diagnostic mammo and US for evaluation of palpable masses/ dense breast tissue  FSH, LH, estradiol, TSH  Referral to cognitive behavior therapy  Discussed if SI/ HI occurs to reach out for help/ go to ER. Pt verbalized understanding  Peds cards for evaluation of  elevated BP  Discussed referral to plastic surgery for consult for Reduction Mammoplasty  Will refer to OBGYN physician for HRT consult pending clearance from cardiology   Orders Placed This Encounter    C. trachomatis/N. gonorrhoeae by AMP DNA Vagina    Vaginosis Screen by DNA Probe    Mammo Digital Screening Bilat with Tomosynthesis CAD    US Breast Bilateral Complete    Follicle stimulating hormone    Luteinizing hormone    Estradiol    TSH       Patient was counseled today on postmenopausal issues.     Follow-up in 1 year.          Camryn Barrow, BC-C

## 2018-04-13 LAB
C TRACH DNA SPEC QL NAA+PROBE: NOT DETECTED
CANDIDA RRNA VAG QL PROBE: NEGATIVE
G VAGINALIS RRNA GENITAL QL PROBE: NEGATIVE
N GONORRHOEA DNA SPEC QL NAA+PROBE: NOT DETECTED
T VAGINALIS RRNA GENITAL QL PROBE: NEGATIVE

## 2018-04-25 ENCOUNTER — OFFICE VISIT (OUTPATIENT)
Dept: CARDIOLOGY | Facility: CLINIC | Age: 43
End: 2018-04-25
Payer: COMMERCIAL

## 2018-04-25 ENCOUNTER — HOSPITAL ENCOUNTER (OUTPATIENT)
Dept: CARDIOLOGY | Facility: CLINIC | Age: 43
Discharge: HOME | End: 2018-04-25
Payer: COMMERCIAL

## 2018-04-25 ENCOUNTER — TELEPHONE (OUTPATIENT)
Dept: OPTOMETRY | Facility: CLINIC | Age: 43
End: 2018-04-25

## 2018-04-25 VITALS
OXYGEN SATURATION: 100 % | SYSTOLIC BLOOD PRESSURE: 160 MMHG | HEART RATE: 70 BPM | HEIGHT: 65 IN | DIASTOLIC BLOOD PRESSURE: 104 MMHG | WEIGHT: 153 LBS | BODY MASS INDEX: 25.49 KG/M2

## 2018-04-25 DIAGNOSIS — Z87.74 H/O CATHETER-BASED CLOSURE OF ATRIAL SEPTAL DEFECT: ICD-10-CM

## 2018-04-25 DIAGNOSIS — I10 ESSENTIAL HYPERTENSION: Primary | ICD-10-CM

## 2018-04-25 DIAGNOSIS — Q24.9 ADULT CONGENITAL HEART DISEASE: ICD-10-CM

## 2018-04-25 DIAGNOSIS — Z87.74 ADULT PATIENT WITH HISTORY OF CONGENITAL HEART DISEASE: ICD-10-CM

## 2018-04-25 LAB
DIASTOLIC DYSFUNCTION: NO
MITRAL VALVE REGURGITATION: NORMAL
RETIRED EF AND QEF - SEE NOTES: 55 (ref 55–65)

## 2018-04-25 PROCEDURE — 99204 OFFICE O/P NEW MOD 45 MIN: CPT | Mod: S$GLB,,, | Performed by: PEDIATRICS

## 2018-04-25 PROCEDURE — 3080F DIAST BP >= 90 MM HG: CPT | Mod: CPTII,S$GLB,, | Performed by: PEDIATRICS

## 2018-04-25 PROCEDURE — 93325 DOPPLER ECHO COLOR FLOW MAPG: CPT | Mod: S$GLB,,, | Performed by: PEDIATRICS

## 2018-04-25 PROCEDURE — 93320 DOPPLER ECHO COMPLETE: CPT | Mod: S$GLB,,, | Performed by: PEDIATRICS

## 2018-04-25 PROCEDURE — 93303 ECHO TRANSTHORACIC: CPT | Mod: S$GLB,,, | Performed by: PEDIATRICS

## 2018-04-25 PROCEDURE — 99999 PR PBB SHADOW E&M-EST. PATIENT-LVL III: CPT | Mod: PBBFAC,,, | Performed by: PEDIATRICS

## 2018-04-25 PROCEDURE — 3077F SYST BP >= 140 MM HG: CPT | Mod: CPTII,S$GLB,, | Performed by: PEDIATRICS

## 2018-04-25 PROCEDURE — 93000 ELECTROCARDIOGRAM COMPLETE: CPT | Mod: S$GLB,,, | Performed by: INTERNAL MEDICINE

## 2018-04-25 NOTE — TELEPHONE ENCOUNTER
EYEMED BENEFITS as of 4/26/18:    Member Name: LIANA MONTELONGO  Member ID: [id number]  Social Security Number: [id number]  YOB: 1975  Address: [address] [place] [address] [address] [place] [id number]  Phone Number: [phone]  Gender: Female  Responsible Member: LIANA MONTELONGO    Network: [id number]  Group: [employer] Vision Plan ([id number])  Benefit Level: 1  Service Eligibility  If you have more than one location under your User ID, choose a location then use the drop-down menu to choose the provider who is rendering services.     From the tabs below, select the type of benefit you will be providing, then check the box(es) next to the applicable service(s). You will not receive an authorization for this member. Instead, simply click Submit Claim to start the claim process.    Routine refers to routine vision benefits, including eye exams, lenses, frames and contact lenses.   Medical refers to benefits for medical eye care services, including diabetic eye care.   Additional Purchases will calculate member payments on additional pairs of glasses and other materials members receive discounts on so you can determine member out-of-pocket costs.  To learn more, download our Member Benefits Display Job Aid.          Location [address] [place] [address], [id number] ([id number])  Provider   Date of Service: 04/26/2018  Routine Medical Additional Purchase   is Eligible? Member Eligible As Of   Exam Yes 01/01/2018   Lenses Yes 01/01/2018   Frames Yes 01/01/2018   Contact Lenses Yes 01/01/2018   Contact Lens Fit & Follow-up Yes 01/01/2018  You will not get an authorization for this member; simply click Submit Claim to start the claim process.         Where did my authorization button go? Click here    Where is the reimbursement button? Click here    Click here to learn more about migrated members.          This information is based on data available in our system today and does not take into account future changes to the member's  plan. Please verify eligibility immediately prior to receiving services.    Service Restrictions  Plan allows the member to receive either contacts and frame, or frame and lens services  Related Members  Below are other members covered under the same subscriber ID. Select the member name to start a claim.    Member Name Group Name Member ID SPENCER#   KEELY MUÑOZ [employer] Plan [id number] 04/10/1998  LIANA MONTELONGO [employer] Plan [id number] 1975  Showing 1 to 2 of 2 family members  Member Benefits  Select the link below to view benefit details for this member. (Your browser must be java script-enabled to use this function.)    Note: you will not receive an authorization for this member. To complete the claim later, select Claims from the navigation window.     Hide Benefits    Routine In Network  Exam Services   Exam with Dilation as Necessary $10 Copay  Retinal Imaging Up to $39  Contact Lens Fit and Follow-Up   Fit and Follow-up - Standard Up to $40  Fit and Follow-up - Premium 10% off Retail Price  Frames   Frame $0 Copay; 20% off balance over $130 Allowance  Lenses   Single Vision $15 Copay  Bifocal $15 Copay  Trifocal $15 Copay  Lenticular $15 Copay  Progressive - Standard $30 Copay  Progressive - Premium $55 Copay  Lens Options   Anti Reflective Coating - Standard $45  Anti Reflective Coating - Premium Tier 1 $57  Anti Reflective Coating - Premium Tier 2 $68  Anti Reflective Coating - Premium Tier 3 20% off Retail Price  Photochromic - Plastic $75  Polycarbonate - Standard - age 19 and over $10 Copay  Polycarbonate - Standard - under age 19 $0 Copay  Scratch Coating - Standard Plastic $10 Copay  Tint - Solid or Gradient $15  UV Treatment $10 Copay  All Other Lens Options 20% off Retail Price  Contact Lenses   Contacts - Conventional $0 Copay; 15% off balance over $130 Allowance  Contacts - Disposable $0 Copay; 100% of balance over $130 Allowance  Contacts - Medically Necessary $0  Copay  Medical In Network  Medical Office Visit and Services   Office Visit - Medical Follow Up Exam $0 Copay  Retinal Imaging $0 Copay  Extended Ophthalmoscopy $0 Copay  Gonioscopy $0 Copay  Scanning Laser Imaging Posterior Segment $0 Copay  Additional Purchase In Network  Frames   Frame 20% off Retail Price  Lenses   Lenses 20% off Retail Price  Lens Options   Lens Options 20% off Retail Price  Packages   Frame, Lens and Lens Options Purchased as Complete Pair 40% off Retail Price  Contact Lenses   Contacts - Conventional 15% off Retail Price

## 2018-04-25 NOTE — PROGRESS NOTES
2018    re:Mirna Pollock  :1975    Luc Veneags MD  [address]  [place] [address]    [facility]    Dear Dr. Venegas:    Mirna Pollock is a 42 y.o. female seen today in the [facility].  Her diagnoses are as follows:  1.  Secundum atrial septal defect s/p Helex device closure in  by Dr. Whitaker for migraines.  No residual shunt.  2.  Unchanged headaches.  3.  Severe hypertension, history of renal stones.  No LVH on echo.    Plan:  1.  Check labs, renal US  2.  Referral to Dr. Sunny Nair for hypertension management.  3.  Follow up with me for repaired ASD in 2 years with echo and ekg.    Discussion:  Her congenital heart disease is well repaired, but her hypertension is significant.  I can not blame her ASD closure for the hypertension.  I am pleased that the heart looks so good, but she needs better BP control.    HPI:  She had a Helex device placed in .  Her migraines did not improve.  She tells me that her hypertension started after that procedure.  She gets ankle edema at the end of the day.  She has shortness of breath and chest tightness with exertion.  She was admitted a few years ago with severe hypertension to [facility].  She says they did a work up and found no etiology.  She has a history of renal stones.  She had lithotripsy about 15 years ago.  She checks her BP at home, and it is typically in the 160s/100s although is does often go to 200.  No hematuria.  No vision changes, weakness, TIA symptoms.    The review of systems is as noted above. It is otherwise negative for other symptoms related to the general, neurological, psychiatric, endocrine, gastrointestinal, genitourinary, respiratory, dermatologic, musculoskeletal, hematologic, and immunologic systems.    She is adopted and has no knowledge of her biological family history.    The review of systems is as noted above. It is otherwise  negative for other symptoms related to the general, neurological, psychiatric, endocrine, gastrointestinal, genitourinary, respiratory, dermatologic, musculoskeletal, hematologic, and immunologic systems.    Past Medical History:   Diagnosis Date    Anxiety     ASD (atrial septal defect)     Endometriosis     Headache(784.0)     Hypertension     Kidney stones fibomyalgia, migraine, heart closer, kidey stones     Past Surgical History:   Procedure Laterality Date    APPENDECTOMY      CARDIAC CATHETERIZATION  10/01/2010    ASD device closure     SECTION      x 2    CHOLECYSTECTOMY      HYSTERECTOMY      12 yrs ago, still has ovaries, due to endometriosis by Dr. Hernandez     Family History   Problem Relation Age of Onset    Adopted: Yes    Migraines Son     No Known Problems Son      Social History     Social History    Marital status:      Spouse name: N/A    Number of children: N/A    Years of education: N/A     Occupational History     [employer]  [employer]     Social History Main Topics    Smoking status: Never Smoker    Smokeless tobacco: Never Used    Alcohol use No    Drug use: No    Sexual activity: Yes     Partners: Male     Birth control/ protection: Coitus interruptus     Other Topics Concern    None     Social History Narrative    None     Current Outpatient Prescriptions on File Prior to Visit   Medication Sig Dispense Refill    amlodipine (NORVASC) 10 MG tablet TK 1 T PO QD  5    cloNIDine (CATAPRES) 0.2 MG tablet 0.2 mg 3 (three) times daily.   2    DULoxetine (CYMBALTA) 60 MG capsule Take 1 capsule (60 mg total) by mouth once daily. 30 capsule 3    hydrochlorothiazide (HYDRODIURIL) 25 MG tablet Take 25 mg by mouth once daily.        hydrOXYzine pamoate (VISTARIL) 25 MG Cap 1 capsule as needed for headache rescue 12 capsule 5    ketorolac 15.75 mg/spray Spry 15.75 mg by Nasal route every 6 to 8 hours as needed. 5 each 5    nebivolol (BYSTOLIC) 10 MG  "Tab Take 2 tablets (20 mg total) by mouth once daily. 60 tablet 11    promethazine (PHENERGAN) 25 MG tablet Take 1 tab by mouth every 4-6 hours as needed for nausea. 20 tablet 4    tiZANidine (ZANAFLEX) 2 MG tablet Take 1-2 tabs by mouth at bedtime.  No alcohol or driving with medication. 60 tablet 5    zolpidem (AMBIEN) 10 mg Tab Take 1 tablet (10 mg total) by mouth nightly as needed (insomnia). 30 tablet 3    lisdexamfetamine (VYVANSE) 50 MG capsule Take 1 capsule (50 mg total) by mouth every morning. 30 capsule 0    [DISCONTINUED] tiZANidine (ZANAFLEX) 2 MG tablet TAKE 1 TO 2 TABLETS BY MOUTH EVERY NIGHT AT BEDTIME. NO ALCOHOL OR DRIVING WITH MED 60 tablet 5     Current Facility-Administered Medications on File Prior to Visit   Medication Dose Route Frequency Provider Last Rate Last Dose    onabotulinumtoxina injection 200 Units  200 Units Intramuscular Q90 Days BC Fleming         Review of patient's allergies indicates:   Allergen Reactions    Iodine and iodide containing products Other (See Comments)    Penicillins Rash     Vitals:    04/25/18 1505 04/25/18 1508   BP: (!) 212/109 (!) 193/94   BP Location: Left arm Right arm   Patient Position: Sitting Sitting   BP Method: Large (Automatic) Large (Automatic)   Pulse: 73 70   SpO2: 100%    Weight: 69.4 kg (153 lb)    Height: 5' 5" (1.651 m)      In general, she is a very healthy-appearing nondysmorphic female in no apparent distress.  The eyes, nares, and oropharynx are clear.  Eyelids and conjunctiva are normal without drainage or erythema.  Pupils equal and round bilaterally.  The head is normocephalic and atraumatic.  The neck is supple without jugular venous distention or thyroid enlargement.  The lungs are clear to auscultation bilaterally.  There are no scars on the chest wall.  The first and second heart sounds are normal.  There are no murmurs, gallops, rubs, or clicks in the supine or standing position.  The abdominal exam is benign " without hepatosplenomegaly, tenderness, or distention.  Pulses are normal in all 4 extremities with brisk capillary refill and no clubbing, cyanosis, or edema.  No rashes are noted.    I personally reviewed the following tests performed today and my interpretation follows:  EKG normal except left axis deviation.  Echo normal except Helex device in good position. Normal function, no LVH.    Thank you for referring this patient to our clinic.  Please call with any questions.    Sincerely,        Jerry Peng MD  Pediatric Cardiology  Adult Congenital Heart Disease  Pediatric Heart Failure and Transplantation  [facility] [address] [place]  [address]  [phone]

## 2018-04-27 ENCOUNTER — PATIENT MESSAGE (OUTPATIENT)
Dept: OPTOMETRY | Facility: CLINIC | Age: 43
End: 2018-04-27

## 2018-05-02 PROBLEM — E66.3 OVERWEIGHT (BMI 25.0-29.9): Status: ACTIVE | Noted: 2018-05-02

## 2018-05-11 ENCOUNTER — OFFICE VISIT (OUTPATIENT)
Dept: NEUROLOGY | Facility: CLINIC | Age: 43
End: 2018-05-11
Payer: COMMERCIAL

## 2018-05-11 VITALS
SYSTOLIC BLOOD PRESSURE: 123 MMHG | DIASTOLIC BLOOD PRESSURE: 62 MMHG | WEIGHT: 149.94 LBS | BODY MASS INDEX: 24.98 KG/M2 | HEIGHT: 65 IN

## 2018-05-11 DIAGNOSIS — M54.81 BILATERAL OCCIPITAL NEURALGIA: ICD-10-CM

## 2018-05-11 DIAGNOSIS — G43.709 CHRONIC MIGRAINE WITHOUT AURA WITHOUT STATUS MIGRAINOSUS, NOT INTRACTABLE: Primary | ICD-10-CM

## 2018-05-11 DIAGNOSIS — R51.9 CHRONIC INTRACTABLE HEADACHE, UNSPECIFIED HEADACHE TYPE: ICD-10-CM

## 2018-05-11 DIAGNOSIS — G89.29 CHRONIC INTRACTABLE HEADACHE, UNSPECIFIED HEADACHE TYPE: ICD-10-CM

## 2018-05-11 DIAGNOSIS — I10 ESSENTIAL HYPERTENSION: ICD-10-CM

## 2018-05-11 PROCEDURE — 3008F BODY MASS INDEX DOCD: CPT | Mod: CPTII,S$GLB,, | Performed by: NURSE PRACTITIONER

## 2018-05-11 PROCEDURE — 3078F DIAST BP <80 MM HG: CPT | Mod: CPTII,S$GLB,, | Performed by: NURSE PRACTITIONER

## 2018-05-11 PROCEDURE — 99999 PR PBB SHADOW E&M-EST. PATIENT-LVL III: CPT | Mod: PBBFAC,,, | Performed by: NURSE PRACTITIONER

## 2018-05-11 PROCEDURE — 3074F SYST BP LT 130 MM HG: CPT | Mod: CPTII,S$GLB,, | Performed by: NURSE PRACTITIONER

## 2018-05-11 PROCEDURE — 64405 NJX AA&/STRD GR OCPL NRV: CPT | Mod: RT,S$GLB,, | Performed by: NURSE PRACTITIONER

## 2018-05-11 RX ORDER — LIDOCAINE HYDROCHLORIDE 20 MG/ML
4 INJECTION, SOLUTION INFILTRATION; PERINEURAL
Status: COMPLETED | OUTPATIENT
Start: 2018-05-11 | End: 2018-05-11

## 2018-05-11 RX ORDER — ZOLMITRIPTAN 5 MG/1
SPRAY NASAL
Qty: 12 EACH | Refills: 5 | Status: SHIPPED | OUTPATIENT
Start: 2018-05-11 | End: 2019-02-21 | Stop reason: SDUPTHER

## 2018-05-11 RX ORDER — METHYLPREDNISOLONE ACETATE 40 MG/ML
40 INJECTION, SUSPENSION INTRA-ARTICULAR; INTRALESIONAL; INTRAMUSCULAR; SOFT TISSUE
Status: COMPLETED | OUTPATIENT
Start: 2018-05-11 | End: 2018-05-11

## 2018-05-11 RX ADMIN — LIDOCAINE HYDROCHLORIDE 4 ML: 20 INJECTION, SOLUTION INFILTRATION; PERINEURAL at 02:05

## 2018-05-11 RX ADMIN — METHYLPREDNISOLONE ACETATE 40 MG: 40 INJECTION, SUSPENSION INTRA-ARTICULAR; INTRALESIONAL; INTRAMUSCULAR; SOFT TISSUE at 02:05

## 2018-05-11 NOTE — PROGRESS NOTES
Established Patient   SUBJECTIVE:  Patient ID: Mirna Pollock   Chief Complaint: Headache    History of Present Illness:  Mirna Pollock is a 42 y.o. female who presents to clinic alone for follow-up of headaches.     Recommendations made at last Office Visit on 1/29/2018:  - Bilateral GONB performed in clinic (see below)  - Referral placed to Cardiology for uncontrolled HTN, s/p ASD closure in 2010   - Continue Cymbalta daily to help with headaches and depression   - For migraine abortive - has Vistaril and Sprix available  - To help with sleep and headaches - has Zanaflex available    - May consider addition of Gabapentin in the future if needed   - Encouraged her to continue regular follow-up with mental health to help with anxiety and depression   - Continue tracking headaches   - Discussed goals of therapy are to decrease the frequency, intensity, and duration of headaches  - Follow up in 3 months or sooner if needed     05/11/2018 - Interval History:  This past week she has only been able to work two days, has missed 3 days of work this week due to migraines.  She still is unsure about getting Botox injections for chronic migraine, she would like to try it but her  does not feel this is a good treatment option, she is trying to convince him.  Of note, blood pressure significantly lower in today's visit than in past visits.  She reports her psychiatrist has discontinued vyvanse until she is given clearance from Cardio to restart taking Vyvanse.  She was seen by a pediatric cardiologist who referred her to an adult cardiologist, however she has not seen this provider, had to cancel appointment due to having a headache.  She is requesting repeat nerve blocks today.     01/29/2018 - Interval History:  She has been having a terrible migraine for the last 3 days, she has tried numerous medications without any relief.  States she tried to go to work today, but she had to leave because her headache got so  bad.  She is requesting to repeat the nerve blocks as those shots gave her relief form 6-8 weeks in the past.  She has done research on the Botox injections and states her  is very hesitant for her to try Botox injections for her migraines.  She has not been seen by her PCP nor cardiologist for further management of her blood pressure.  Discussed the challenges of treating her migraines with uncontrolled hypertension because I cannot prescribe her any triptan therapy until her blood pressure is under better control.  She has continued to take Cymbalta daily for her depression, she is unsure if this is giving her any relief with regards to her headaches or not.       01/15/2018 - Interval History:  She was approved for Botox injections, but was a no-show to her appointment last week.  States she spoke with her  about the injections and she was very nervous to try the Botox injections for her migraines.    She continues to experience headaches on 1-4 days per week with migraines occurring 1-2 days per week.  Recently she has been experiencing more migraines and headaches than usually, but admits she has been going through a lot emotionally as well as she got sick with the flu.  She has continued to regularly follow-up with her psychiatrist who changed her anti-depressant, she is now taking Cymbalta.  Since change in medication, she feels she has been experiencing a mild headache each day.  She has had to miss a few days of work due to her migraines since last visit.  Additionally, she feels occasionally she begins to get dizzy, this occurs with or without headaches, she has an appointment with her eye doctor next week as her vision is also getting progressively worse.  She did not find vistaril to be useful with regards to her headaches.  She does feel Zanaflex helps with her neck as well as helping to relax her neck muscles.  When she gets a migraine she has been taking Vistaril and Ibuprofen together  which does help the pain, she also has been trying Excedrin migraines which also gives her some relief.  Patient with known HTN on multiple anti-hypertensive agents, blood pressure significantly elevated in clinic today.  She is not symptomatic.  States she needs to follow-up with her primary care (who is outside the [facility] system) for better management.       10/19/2017 - Interval History:  - Headaches have persisted in similar frequency, which she is happy about as she has been under increased stress with regards to her 's health, he has been suffering with his Gout, just got out of the hospital last week and has had to take time off work etc   - Blood pressure continues to be elevated despite numerous anti-hypertensive, states today is good for her (currently 141/102), has been regularly following up with her PCP for management   - Is very excited because she is going on a cruise with her  to [place] at the end of November/early December   - Has continued to have a headache nearly everyday, not necessarily a migraine, however migraine have been at least once per month    - Has noticed more recently that she feels like she has intense pressure behind her eyes and across the bridge of her nose that slightly feels like sinus pressure, this occurs 1-2 times per week   - Has been seeing a chiropractor as well as a masseuse regularly which she does feel has been helping   - Would still like to move forward with the Botox injections   - Does not feel adjustments to her medications are necessary at this time      08/17/2017 - Interval History:  - Had been seeing Iker Beth PA-C   - Was taking Topamax, Zanaflex, and Vistaril - however has been out of medications for about 3 months  - Since being off the medication, she had been okay, however recently noticed migraines were beginning to occur more frequently with increased intensity and duration   - She finds the nerve blocks had been helping a lot, as  long as she is consistent with getting the nerve block, however hasn't had this procedure for over a year   - Has been getting 5-6 migraines per month, each lasting 1-4 days in length   - Current migraine has been going on since last Friday (6 days)  - Blood pressure significantly elevated in clinic today, states her PCP is managing her medications in order to get her blood pressure better controlled   - Is requesting refills of medication as well as repeat nerve occipital and trigeminal nerve blocks      Headache history:   Age of onset -  13  Nature of pain -  Throbbing   Location - bioccipital   Aura -  White floaters   Duration - hrs  Frequency -  2/week  Time of day - anytime   7 days of pain - jaw pain, L neck, L shoulder,  L side throbbing   Associated symptoms:   Meningeal symptoms - photophobia, phonophobia, exercise intolerance +   Nausea/vomitting +   Nasal drainage   Visual symptoms  Pallor/flushing  Vertigo  Stroke symptoms  Confusion  Impaired concentration +  Pain worsened with physical activity +  Neck pain +  Whooshing sounds   Visual spots/blotches +  Triggers:   Stress +   Bright or flickering light  Strong smell  Vigorous exercise  Dietary factors   Visual strain   Motion intolerance as passenger:  No   Resolution of headache with sleep: yes      Therapies Tried & Response:  Tylenol, motrin, alleve - not help  excedrin - not help   imitrex - helped initially   topamax 50 mg - helped  Percocet - helps   Gabapentin - helps  Amlodipine - for HTN  Bystolic - for HTN  Lexapro - hasn't noticed change in HA, for depression   GONB - helps   Cymbalta - unsure, for depression   Zomig - given today   Maxalt - no     Current Medications:    amlodipine (NORVASC) 10 MG tablet, TK 1 T PO QD, Disp: , Rfl: 5    cloNIDine (CATAPRES) 0.2 MG tablet, 0.2 mg 3 (three) times daily. , Disp: , Rfl: 2    DULoxetine (CYMBALTA) 60 MG capsule, Take 1 capsule (60 mg total) by mouth once daily., Disp: 30 capsule, Rfl: 3     "DULoxetine (CYMBALTA) 60 MG capsule, Take 1 capsule (60 mg total) by mouth once daily., Disp: 30 capsule, Rfl: 3    hydrochlorothiazide (HYDRODIURIL) 25 MG tablet, Take 25 mg by mouth once daily.  , Disp: , Rfl:     hydrOXYzine pamoate (VISTARIL) 25 MG Cap, 1 capsule as needed for headache rescue, Disp: 12 capsule, Rfl: 5    ketorolac 15.75 mg/spray Spry, 15.75 mg by Nasal route every 6 to 8 hours as needed., Disp: 5 each, Rfl: 5    lisdexamfetamine (VYVANSE) 50 MG capsule, Take 1 capsule (50 mg total) by mouth every morning., Disp: 30 capsule, Rfl: 0    nebivolol (BYSTOLIC) 10 MG Tab, Take 2 tablets (20 mg total) by mouth once daily., Disp: 60 tablet, Rfl: 11    promethazine (PHENERGAN) 25 MG tablet, Take 1 tab by mouth every 4-6 hours as needed for nausea., Disp: 20 tablet, Rfl: 4    tiZANidine (ZANAFLEX) 2 MG tablet, Take 1-2 tabs by mouth at bedtime.  No alcohol or driving with medication., Disp: 60 tablet, Rfl: 5    zolpidem (AMBIEN) 10 mg Tab, Take 1 tablet (10 mg total) by mouth nightly as needed (insomnia)., Disp: 30 tablet, Rfl: 3    zolpidem (AMBIEN) 10 mg Tab, Take 1 tablet (10 mg total) by mouth nightly as needed for insomnia, Disp: 30 tablet, Rfl: 3    ZOLMitriptan (ZOMIG) 5 mg Spry, 1 spray in nostril at onset of migraine, can repeat in 1 hours if needed. Max 2 sprays/day. Max 3 days/week., Disp: 12 each, Rfl: 5    Current Facility-Administered Medications:     onabotulinumtoxina injection 200 Units, 200 Units, Intramuscular, Q90 Days, BC Fleming    Review of Systems - as per HPI, otherwise a balanced 10 systems review is negative.    OBJECTIVE:  Vitals:  /62 (BP Location: Left arm, Patient Position: Sitting, BP Method: Large (Automatic))   Ht 5' 5" (1.651 m)   Wt 68 kg (149 lb 14.6 oz)   LMP 12/14/2000   BMI 24.95 kg/m²      Physical Exam:  Constitutional: She appears well-developed and well-nourished. She is well groomed. NAD, appears uncomfortable.    HENT:    Head: " Normocephalic and atraumatic, oral and nasal mucosa intact.    Eyes: Conjunctivae and EOM are normal. Pupils are equal, round, and reactive to light   Musculoskeletal: Normal range of motion. No joint stiffness.   Skin: Skin is warm and dry.  Psychiatric: Normal mood and affect.  Neuro: Patient is awake, alert and oriented to person, place and time.  Moves all 4 extremities against gravity. Gait and station normal.  Cranial Nerves II through XII without focal deficit.     Review of Data:   Notes from psychiatry, OBGYN, and cardiology reviewed   Labs:  Hospital Outpatient Visit on 04/25/2018   Component Date Value Ref Range Status    EF 04/25/2018 55  55 - 65 Final    Mitral Valve Regurgitation 04/25/2018 TRIVIAL   Final    Diastolic Dysfunction 04/25/2018 No   Final   Lab Visit on 04/25/2018   Component Date Value Ref Range Status    WBC 04/25/2018 15.63* 3.90 - 12.70 K/uL Final    RBC 04/25/2018 4.57  4.00 - 5.40 M/uL Final    Hemoglobin 04/25/2018 13.9  12.0 - 16.0 g/dL Final    Hematocrit 04/25/2018 41.2  37.0 - 48.5 % Final    MCV 04/25/2018 90  82 - 98 fL Final    MCH 04/25/2018 30.4  27.0 - 31.0 pg Final    MCHC 04/25/2018 33.7  32.0 - 36.0 g/dL Final    RDW 04/25/2018 12.5  11.5 - 14.5 % Final    Platelets 04/25/2018 449* 150 - 350 K/uL Final    MPV 04/25/2018 9.9  9.2 - 12.9 fL Final    Immature Granulocytes 04/25/2018 1.1* 0.0 - 0.5 % Final    Gran # (ANC) 04/25/2018 11.2* 1.8 - 7.7 K/uL Final    Immature Grans (Abs) 04/25/2018 0.17* 0.00 - 0.04 K/uL Final    Lymph # 04/25/2018 2.4  1.0 - 4.8 K/uL Final    Mono # 04/25/2018 1.4* 0.3 - 1.0 K/uL Final    Eos # 04/25/2018 0.3  0.0 - 0.5 K/uL Final    Baso # 04/25/2018 0.15  0.00 - 0.20 K/uL Final    nRBC 04/25/2018 0  0 /100 WBC Final    Gran% 04/25/2018 71.9  38.0 - 73.0 % Final    Lymph% 04/25/2018 15.2* 18.0 - 48.0 % Final    Mono% 04/25/2018 8.6  4.0 - 15.0 % Final    Eosinophil% 04/25/2018 2.2  0.0 - 8.0 % Final    Basophil%  04/25/2018 1.0  0.0 - 1.9 % Final    Differential Method 04/25/2018 Automated   Final    Sodium 04/25/2018 142  136 - 145 mmol/L Final    Potassium 04/25/2018 4.3  3.5 - 5.1 mmol/L Final    Chloride 04/25/2018 107  95 - 110 mmol/L Final    CO2 04/25/2018 26  23 - 29 mmol/L Final    Glucose 04/25/2018 88  70 - 110 mg/dL Final    BUN, Bld 04/25/2018 10  6 - 20 mg/dL Final    Creatinine 04/25/2018 0.8  0.5 - 1.4 mg/dL Final    Calcium 04/25/2018 10.2  8.7 - 10.5 mg/dL Final    Total Protein 04/25/2018 8.1  6.0 - 8.4 g/dL Final    Albumin 04/25/2018 4.1  3.5 - 5.2 g/dL Final    Total Bilirubin 04/25/2018 0.5  0.1 - 1.0 mg/dL Final    Alkaline Phosphatase 04/25/2018 93  55 - 135 U/L Final    AST 04/25/2018 20  10 - 40 U/L Final    ALT 04/25/2018 11  10 - 44 U/L Final    Anion Gap 04/25/2018 9  8 - 16 mmol/L Final    eGFR if African American 04/25/2018 >60.0  >60 mL/min/1.73 m^2 Final    eGFR if non African American 04/25/2018 >60.0  >60 mL/min/1.73 m^2 Final   Lab Visit on 04/12/2018   Component Date Value Ref Range Status    FSH 04/12/2018 5.40  See Text mIU/mL Final    LH 04/12/2018 4.7  See Text mIU/mL Final    Estradiol 04/12/2018 80  See Text pg/mL Final    TSH 04/12/2018 1.309  0.400 - 4.000 uIU/mL Final   Office Visit on 04/12/2018   Component Date Value Ref Range Status    Chlamydia, Amplified DNA 04/12/2018 Not Detected  Not Detected Final    N gonorrhoeae, amplified DNA 04/12/2018 Not Detected  Not Detected Final    Trichomonas vaginalis 04/12/2018 Negative  Negative Final    Gardnerella vaginalis 04/12/2018 Negative  Negative Final    Candida sp 04/12/2018 Negative  Negative Final     Imaging:  Results for orders placed or performed during the hospital encounter of 09/28/17   MRV Brain Without Contrast    Narrative    Procedure: MRV of the head without contrast    Technique: Noncontrast 2-D time-of-flight MRV of the head with coronal and sagittal source imaging and 3-dimensional mip  projection views.      Comparison: None    Results:The superior sagittal sinus is patent.  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  The paired internal cerebral veins and basal veins of Jay are patent.  The vein of Zac and torcula Herophili are patent.  The straight sinus is patent.  The bilateral transverse and sigmoid dural venous sinuses are patent to the jugular foramen.    Impression     Unremarkable noncontrast MRV specifically without evidence for venous sinus thrombosis..      Electronically signed by: VIOLETTE CARO DO  Date:     09/28/17  Time:    10:43    MRI Brain W WO Contrast    Narrative    Procedure: MRI the brain with andwithout contrast.    Technique: Sagittal and axial T1, axial T2, axial FLAIR, axial gradient, axial diffusion imaging of the whole brain pre-contrast with postcontrast axial T1 and axial spoiled gradient imaging reformatted in the coronal and sagittal planes.. 10 ml of Gadavist injected intravenously.         Comparison: None    Findings: The brain parenchyma is normal in contour. There is a small focus of T2 flair signal hyperintensity in the right lateral putamen suggestive for remote lacunar type infarct with diffusion hyperintensity without restriction compatible with T2 shine through. There are no abnormal areas of parenchymal enhancement. There are no areas are restricted diffusion to suggest acute infarction. A few additional punctate foci of T2 flair signal hyperintensity supratentorial white matter which are nonspecific and may represent sequela migraine headaches in light of history. The ventricles are normal in size and configuration without evidence for hydrocephalus. There are no abnormal areas of the gradient susceptibility to suggest parenchymal hemorrhage. No abnormal intra or extra axial fluid collections. The major intracranial T2  flow-voids are present.    Impression     Small T2 flair hyperintense focus right lateral  putamen with diffusion hyperintensity. 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    A few scattered punctate regions of T2 flair signal hyperintensity elsewhere supratentorial parenchyma while overall nonspecific in light of the given history this may be sequela migraine headaches. Alternative differential including prior demyelination to be considered.    otherwise unremarkable MRI brain specifically without evidence for acute infarction or enhancing lesion.          Electronically signed by: VIOLETTE CARO DO  Date:     09/28/17  Time:    10:49    MRA Brain without contrast    Narrative    Procedure: MRA of the head and neck    Technique: noncontrast 3-D time of flight MRA head and postcontrast 3-D time-of-flight MRA head and neck. 10 ml of Gadavist injected intravenously.    Comparison: None    Findings:    MRA head:    Anterior circulation:  The bilateral distal cervical, Gabby, cavernous and supraclinoid segments of the ICA's and the visualized bilateral anterior and middle cerebral arteries are patent without evidence for significant focal stenosis or aneurysm. There is a left PCOM.    Posterior circulation: The distal vertebral arteries, basilar artery and posterior cerebral arteries are patent without evidence for significant focal stenosis or aneurysm.    MRA neck: The origins of the right brachycephalic,  left common carotid and left subclavian arteries from the arch are within normal limits. The origins of the vertebral arteries from the subclavian arteries are within normal limits. The vertebral arteries are unremarkable throughout their course without evidence for focal stenosis or occlusion.    Right carotid: The right common carotid artery, carotid bifurcation and extracranial portions of the internal carotid artery are patent without evidence for focal stenosis or occlusion.    Left carotid: The left common carotid artery, carotid bifurcation and extra cranial portions of the  internal carotid artery are patent without evidence for focal stenosis or occlusion.    There is less than 50% proximal ICA stenosis by NASCET criteria.    Impression     Unremarkable MRA of the head specifically without evidence for focal stenosis or intracranial aneurysm.    Unremarkable MRA of the neck without evidence for significant focal stenosis or occlusion.      Electronically signed by: VIOLETTE CARO DO  Date:     09/28/17  Time:    10:40      Note: I have independently reviewed any/all imaging/labs/tests and agree with the report (s) as documented.  Any discrepancies will be as noted/demarcated by free text.  ANDREINA, ARA 5/11/2018    Focused examination was undertaken today.     ASSESSMENT:  1. Chronic migraine without aura without status migrainosus, not intractable    2. Chronic intractable headache, unspecified headache type    3. Bilateral occipital neuralgia    4. Essential hypertension      Medical Decision Making:  BOTOX  The patient has chronic migraines (G43.719) and suffers from headaches more than 15 days a month lasting more than 4 hours a day with no relief of symptoms despite trying multiple medications. Botox treatment was approved for chronic migraines in October 2010. The patient will be an ideal candidate for Botox. We are planning for 3 treatments 3 months apart and aiming for at least 50% improvement in the symptoms. If we see no improvement after 3 treatments, we will discontinue the injections.    PLAN:  - Bilateral GONB performed in clinic   - Start Botox injections for chronic migraine in two weeks   - Continue all medications as previously directed   - For migraine abortive - given Zomig nasal spray   - HTN - advised she is to monitor her blood pressure regularly, discussed dangers of using Zomig nasal spray with uncontrolled HTN   - Continue tracking headaches   - Discussed goals of therapy are to decrease the frequency, intensity, and duration of headaches  - Follow up in 2 weeks      Orders Placed This Encounter    ZOLMitriptan (ZOMIG) 5 mg Spry    lidocaine HCL 20 mg/ml (2%) injection 4 mL    methylPREDNISolone acetate injection 40 mg     Procedure Note:   GONB (Greater Occipital Nerve Block): After informed consent was obtained (a copy was given to office staff to scan into the EMR), the patient was asked to remain in a sitting position her head resting prone on her chest. The area was prepped using alcohol swabs. 2% lidocaine (2 mL x2 sides of neck=4 mL total) and 40 mg (1 bottle per sitex2 for total of 80 mg)depomedrol was drawn up utilizing a 21 gauge needle. The occipital trigger points were palpated utilizing latex gloves, a 27 gauge needle and aspiration occured to ensure no medication was introduced into the blood stream during the technique. The medication was delivered bilateral (all of the above was duplicated for the opposite side) in sites 1) midway between the inion and mastoid along the occipital ridge, 2) 2 finger breaths superior lateral to the first injection and 3) 2 finger breaths superior medial to the first injection. The patient tolerated the procedure well with no active bleeding, erythema, or discharge.  The patient was assessed and allowed to leave after ten minutes.  Findings from repeat exam (10 mins after procedure) showed:  Gen: NAD  Derm: no drainage  Neuro: AOx3, EOMI, tongue in midline, FROM of all extremities, gait WNL   Pre-Procedure Pain- 6 out of 10   Post-Procedure Pain- 0 out of 10     Counseling:  I spent 20 minutes face-to-face with the patient with > 50% of the time spent counseling and educating regarding the results of the data, diagnosis, and recommendations stated above, the risks, benefits, and potential side effects of the medications, and the future plan of care.  Questions and concerns were sought and answered to the patient's stated verbal satisfaction.  The patient verbalizes understanding and agreement with the above stated treatment  plan.     CC: MD Luci Echols, FNP-C  [facility]   [phone]    Dr. Farfan was available during today's encounter.

## 2018-05-11 NOTE — LETTER
May 14, 2018      Luc Venegas MD  [address]  [address]  [facility] [address]           [facility]  [address]  [place] [address]  Phone: [phone]  Fax: [phone]          Patient: Mirna Pollock   MR Number: [id number]   YOB: 1975   Date of Visit: 5/11/2018       Dear Dr. Luc Venegas:    Thank you for referring Mirna Pollock to me for evaluation. Attached you will find relevant portions of my assessment and plan of care.    If you have questions, please do not hesitate to call me. I look forward to following Mirna Pollock along with you.    Sincerely,    Luci Ortez, [facility]    Enclosure  CC:  No Recipients    If you would like to receive this communication electronically, please contact [email or web address] or [phone] to request more information on 5app Link access.    For providers and/or their staff who would like to refer a patient to [facility], please contact us through our one-stop-shop provider referral line, [facility], at [phone].    If you feel you have received this communication in error or would no longer like to receive these types of communications, please e-mail [email or web address]

## 2018-05-22 ENCOUNTER — PROCEDURE VISIT (OUTPATIENT)
Dept: NEUROLOGY | Facility: CLINIC | Age: 43
End: 2018-05-22
Payer: COMMERCIAL

## 2018-05-22 VITALS
WEIGHT: 154.13 LBS | SYSTOLIC BLOOD PRESSURE: 139 MMHG | DIASTOLIC BLOOD PRESSURE: 81 MMHG | BODY MASS INDEX: 25.68 KG/M2 | HEIGHT: 65 IN

## 2018-05-22 DIAGNOSIS — F41.9 ANXIETY: ICD-10-CM

## 2018-05-22 DIAGNOSIS — G43.709 CHRONIC MIGRAINE WITHOUT AURA WITHOUT STATUS MIGRAINOSUS, NOT INTRACTABLE: Primary | ICD-10-CM

## 2018-05-22 DIAGNOSIS — F33.9 RECURRENT MAJOR DEPRESSIVE DISORDER, REMISSION STATUS UNSPECIFIED: ICD-10-CM

## 2018-05-22 DIAGNOSIS — G47.9 TROUBLE IN SLEEPING: ICD-10-CM

## 2018-05-22 DIAGNOSIS — I10 ESSENTIAL HYPERTENSION: ICD-10-CM

## 2018-05-22 PROCEDURE — 64615 CHEMODENERV MUSC MIGRAINE: CPT | Mod: S$GLB,,, | Performed by: NURSE PRACTITIONER

## 2018-05-22 RX ORDER — PROMETHAZINE HYDROCHLORIDE 25 MG/1
TABLET ORAL
Qty: 20 TABLET | Refills: 5 | Status: SHIPPED | OUTPATIENT
Start: 2018-05-22 | End: 2018-07-23 | Stop reason: SDUPTHER

## 2018-05-22 RX ORDER — HYDROXYZINE PAMOATE 50 MG/1
CAPSULE ORAL
Qty: 15 CAPSULE | Refills: 2 | Status: SHIPPED | OUTPATIENT
Start: 2018-05-22 | End: 2018-07-30 | Stop reason: SDUPTHER

## 2018-05-22 NOTE — PROCEDURES
SUBJECTIVE:  Patient ID: Mirna Pollock  Chief Complaint: Botulinum Toxin Injection    History of Present Illness:  Mirna Pollock is a 42 y.o. female who presents to clinic with her  for follow-up of headaches and Botox injections.  History is provided by both the patient and her      Recommendations made at last Office Visit on 5/11/2018:  - Bilateral GONB performed in clinic   - Start Botox injections for chronic migraine in two weeks   - Continue all medications as previously directed   - For migraine abortive - given Zomig nasal spray   - HTN - advised she is to monitor her blood pressure regularly, discussed dangers of using Zomig nasal spray with uncontrolled HTN   - Continue tracking headaches   - Discussed goals of therapy are to decrease the frequency, intensity, and duration of headaches  - Follow up in 2 weeks     05/22/2018- Interval History:  Despite nerve blocks two weeks ago, she has been suffering with a headache nearly everyday since her last visit.  She did try Zomig nasal spray, however she took the first dose on day two of her migraine because she was nervous.  She does think it lessened the pain of her migraine, however did not abort it.  She did not repeat the dose.  Additionally, she continues to report poor sleep nightly.  She stopped drinking caffeine.  Headaches are the same as they have always been.  She denies any change in the nature or quality of her headaches. Risks, Benefits, and potential side effects of Botox discussed with patient.  Alternative treatments offered.  After thorough discussed regarding the procedure, patient has decided to move forward with initiating Botox injections for Chronic Migraine.  Patient understands we will complete 3 rounds of injections, if after 3 rounds, we do not see a 50% reduction in headaches, we will discontinue Botox injections.     Otherwise, information below is still accurate and current.     05/11/2018 - Interval  History:  This past week she has only been able to work two days, has missed 3 days of work this week due to migraines.  She still is unsure about getting Botox injections for chronic migraine, she would like to try it but her  does not feel this is a good treatment option, she is trying to convince him.  Of note, blood pressure significantly lower in today's visit than in past visits.  She reports her psychiatrist has discontinued vyvanse until she is given clearance from Cardio to restart taking Vyvanse.  She was seen by a pediatric cardiologist who referred her to an adult cardiologist, however she has not seen this provider, had to cancel appointment due to having a headache.  She is requesting repeat nerve blocks today.      01/29/2018 - Interval History:  She has been having a terrible migraine for the last 3 days, she has tried numerous medications without any relief.  States she tried to go to work today, but she had to leave because her headache got so bad.  She is requesting to repeat the nerve blocks as those shots gave her relief form 6-8 weeks in the past.  She has done research on the Botox injections and states her  is very hesitant for her to try Botox injections for her migraines.  She has not been seen by her PCP nor cardiologist for further management of her blood pressure.  Discussed the challenges of treating her migraines with uncontrolled hypertension because I cannot prescribe her any triptan therapy until her blood pressure is under better control.  She has continued to take Cymbalta daily for her depression, she is unsure if this is giving her any relief with regards to her headaches or not.       01/15/2018 - Interval History:  She was approved for Botox injections, but was a no-show to her appointment last week.  States she spoke with her  about the injections and she was very nervous to try the Botox injections for her migraines.    She continues to experience  headaches on 1-4 days per week with migraines occurring 1-2 days per week.  Recently she has been experiencing more migraines and headaches than usually, but admits she has been going through a lot emotionally as well as she got sick with the flu.  She has continued to regularly follow-up with her psychiatrist who changed her anti-depressant, she is now taking Cymbalta.  Since change in medication, she feels she has been experiencing a mild headache each day.  She has had to miss a few days of work due to her migraines since last visit.  Additionally, she feels occasionally she begins to get dizzy, this occurs with or without headaches, she has an appointment with her eye doctor next week as her vision is also getting progressively worse.  She did not find vistaril to be useful with regards to her headaches.  She does feel Zanaflex helps with her neck as well as helping to relax her neck muscles.  When she gets a migraine she has been taking Vistaril and Ibuprofen together which does help the pain, she also has been trying Excedrin migraines which also gives her some relief.  Patient with known HTN on multiple anti-hypertensive agents, blood pressure significantly elevated in clinic today.  She is not symptomatic.  States she needs to follow-up with her primary care (who is outside the [facility] system) for better management.       10/19/2017 - Interval History:  - Headaches have persisted in similar frequency, which she is happy about as she has been under increased stress with regards to her 's health, he has been suffering with his Gout, just got out of the hospital last week and has had to take time off work etc   - Blood pressure continues to be elevated despite numerous anti-hypertensive, states today is good for her (currently 141/102), has been regularly following up with her PCP for management   - Is very excited because [place]   - Has continued to have a headache nearly everyday, not necessarily a migraine, however migraine have been at least once per month    - Has noticed more recently that she feels like she has intense pressure behind her eyes and across the bridge of her nose that slightly feels like sinus pressure, this occurs 1-2 times per week   - Has been seeing a chiropractor as well as a masseuse regularly which she does feel has been helping   - Would still like to move forward with the Botox injections   - Does not feel adjustments to her medications are necessary at this time      08/17/2017 - Interval History:  - Had been seeing Iker Beth PA-C   - Was taking Topamax, Zanaflex, and Vistaril - however has been out of medications for about 3 months  - Since being off the medication, she had been okay, however recently noticed migraines were beginning to occur more frequently with increased intensity and duration   - She finds the nerve blocks had been helping a lot, as long as she is consistent with getting the nerve block, however hasn't had this procedure for over a year   - Has been getting 5-6 migraines per month, each lasting 1-4 days in length   - Current migraine has been going on since last Friday (6 days)  - Blood pressure significantly elevated in clinic today, states her PCP is managing her medications in order to get her blood pressure better controlled   - Is requesting refills of medication as well as repeat nerve occipital and trigeminal nerve blocks      Headache history:   Age of onset -  13  Nature of pain -  Throbbing   Location - bioccipital   Aura -  White floaters   Duration - hrs  Frequency -  2/week  Time of day - anytime   7 days of pain - jaw pain, L neck, L shoulder,  L side throbbing   Associated symptoms:   Meningeal symptoms - photophobia, phonophobia, exercise intolerance +   Nausea/vomitting +   Nasal drainage   Visual symptoms  Pallor/flushing  Vertigo  Stroke  symptoms  Confusion  Impaired concentration +  Pain worsened with physical activity +  Neck pain +  Whooshing sounds   Visual spots/blotches +  Triggers:   Stress +   Bright or flickering light  Strong smell  Vigorous exercise  Dietary factors   Visual strain   Motion intolerance as passenger:  No   Resolution of headache with sleep: yes      Therapies Tried & Response:  Tylenol, motrin, alleve - not help  excedrin - not help   imitrex - helped initially   topamax 50 mg - helped  Percocet - helps   Gabapentin - helps  Amlodipine - for HTN  Bystolic - for HTN  Lexapro - hasn't noticed change in HA, for depression   GONB - helps   Cymbalta - unsure, for depression   Zomig - given today   Maxalt - no   Botox - started today     Current Medications:    amlodipine (NORVASC) 10 MG tablet, TK 1 T PO QD, Disp: , Rfl: 5    cloNIDine (CATAPRES) 0.2 MG tablet, 0.2 mg 3 (three) times daily. , Disp: , Rfl: 2    DULoxetine (CYMBALTA) 60 MG capsule, Take 1 capsule (60 mg total) by mouth once daily., Disp: 30 capsule, Rfl: 3    DULoxetine (CYMBALTA) 60 MG capsule, Take 1 capsule (60 mg total) by mouth once daily., Disp: 30 capsule, Rfl: 3    hydrochlorothiazide (HYDRODIURIL) 25 MG tablet, Take 25 mg by mouth once daily.  , Disp: , Rfl:     hydrOXYzine pamoate (VISTARIL) 50 MG Cap, Take 1 capsule by mouth every 8 hours for 3 days.  No alcohol, no driving with medication. Rest may use as needed., Disp: 15 capsule, Rfl: 2    ketorolac 15.75 mg/spray Spry, 15.75 mg by Nasal route every 6 to 8 hours as needed., Disp: 5 each, Rfl: 5    lisdexamfetamine (VYVANSE) 50 MG capsule, Take 1 capsule (50 mg total) by mouth every morning., Disp: 30 capsule, Rfl: 0    nebivolol (BYSTOLIC) 10 MG Tab, Take 2 tablets (20 mg total) by mouth once daily., Disp: 60 tablet, Rfl: 11    promethazine (PHENERGAN) 25 MG tablet, Take 1 tab by mouth every 4-6 hours as needed for nausea., Disp: 20 tablet, Rfl: 5    tiZANidine (ZANAFLEX) 2 MG tablet,  "Take 1-2 tabs by mouth at bedtime.  No alcohol or driving with medication., Disp: 60 tablet, Rfl: 5    ZOLMitriptan (ZOMIG) 5 mg Spry, 1 spray in nostril at onset of migraine, can repeat in 1 hours if needed. Max 2 sprays/day. Max 3 days/week., Disp: 12 each, Rfl: 5    zolpidem (AMBIEN) 10 mg Tab, Take 1 tablet (10 mg total) by mouth nightly as needed (insomnia)., Disp: 30 tablet, Rfl: 3    zolpidem (AMBIEN) 10 mg Tab, Take 1 tablet (10 mg total) by mouth nightly as needed for insomnia, Disp: 30 tablet, Rfl: 3    Current Facility-Administered Medications:     onabotulinumtoxina injection 200 Units, 200 Units, Intramuscular, Q90 Days, BC Fleming    Review of Systems - as per HPI, otherwise a balanced 10 systems review is negative.    OBJECTIVE:  Vitals:  /81 (BP Location: Left arm, Patient Position: Sitting, BP Method: Large (Automatic))   Ht 5' 5" (1.651 m)   Wt 69.9 kg (154 lb 1.6 oz)   LMP 12/14/2000   BMI 25.64 kg/m²     Physical Exam:  Constitutional: she appears well-developed and well-nourished. she is well groomed. NAD     Review of Data:   Notes from cardiology reviewed.   Labs:  Hospital Outpatient Visit on 04/25/2018   Component Date Value Ref Range Status    EF 04/25/2018 55  55 - 65 Final    Mitral Valve Regurgitation 04/25/2018 TRIVIAL   Final    Diastolic Dysfunction 04/25/2018 No   Final   Lab Visit on 04/25/2018   Component Date Value Ref Range Status    WBC 04/25/2018 15.63* 3.90 - 12.70 K/uL Final    RBC 04/25/2018 4.57  4.00 - 5.40 M/uL Final    Hemoglobin 04/25/2018 13.9  12.0 - 16.0 g/dL Final    Hematocrit 04/25/2018 41.2  37.0 - 48.5 % Final    MCV 04/25/2018 90  82 - 98 fL Final    MCH 04/25/2018 30.4  27.0 - 31.0 pg Final    MCHC 04/25/2018 33.7  32.0 - 36.0 g/dL Final    RDW 04/25/2018 12.5  11.5 - 14.5 % Final    Platelets 04/25/2018 449* 150 - 350 K/uL Final    MPV 04/25/2018 9.9  9.2 - 12.9 fL Final    Immature Granulocytes 04/25/2018 1.1* 0.0 - " 0.5 % Final    Gran # (ANC) 04/25/2018 11.2* 1.8 - 7.7 K/uL Final    Immature Grans (Abs) 04/25/2018 0.17* 0.00 - 0.04 K/uL Final    Lymph # 04/25/2018 2.4  1.0 - 4.8 K/uL Final    Mono # 04/25/2018 1.4* 0.3 - 1.0 K/uL Final    Eos # 04/25/2018 0.3  0.0 - 0.5 K/uL Final    Baso # 04/25/2018 0.15  0.00 - 0.20 K/uL Final    nRBC 04/25/2018 0  0 /100 WBC Final    Gran% 04/25/2018 71.9  38.0 - 73.0 % Final    Lymph% 04/25/2018 15.2* 18.0 - 48.0 % Final    Mono% 04/25/2018 8.6  4.0 - 15.0 % Final    Eosinophil% 04/25/2018 2.2  0.0 - 8.0 % Final    Basophil% 04/25/2018 1.0  0.0 - 1.9 % Final    Differential Method 04/25/2018 Automated   Final    Sodium 04/25/2018 142  136 - 145 mmol/L Final    Potassium 04/25/2018 4.3  3.5 - 5.1 mmol/L Final    Chloride 04/25/2018 107  95 - 110 mmol/L Final    CO2 04/25/2018 26  23 - 29 mmol/L Final    Glucose 04/25/2018 88  70 - 110 mg/dL Final    BUN, Bld 04/25/2018 10  6 - 20 mg/dL Final    Creatinine 04/25/2018 0.8  0.5 - 1.4 mg/dL Final    Calcium 04/25/2018 10.2  8.7 - 10.5 mg/dL Final    Total Protein 04/25/2018 8.1  6.0 - 8.4 g/dL Final    Albumin 04/25/2018 4.1  3.5 - 5.2 g/dL Final    Total Bilirubin 04/25/2018 0.5  0.1 - 1.0 mg/dL Final    Alkaline Phosphatase 04/25/2018 93  55 - 135 U/L Final    AST 04/25/2018 20  10 - 40 U/L Final    ALT 04/25/2018 11  10 - 44 U/L Final    Anion Gap 04/25/2018 9  8 - 16 mmol/L Final    eGFR if African American 04/25/2018 >60.0  >60 mL/min/1.73 m^2 Final    eGFR if non African American 04/25/2018 >60.0  >60 mL/min/1.73 m^2 Final   Lab Visit on 04/12/2018   Component Date Value Ref Range Status    FSH 04/12/2018 5.40  See Text mIU/mL Final    LH 04/12/2018 4.7  See Text mIU/mL Final    Estradiol 04/12/2018 80  See Text pg/mL Final    TSH 04/12/2018 1.309  0.400 - 4.000 uIU/mL Final   Office Visit on 04/12/2018   Component Date Value Ref Range Status    Chlamydia, Amplified DNA 04/12/2018 Not Detected  Not  Detected Final    N gonorrhoeae, amplified DNA 04/12/2018 Not Detected  Not Detected Final    Trichomonas vaginalis 04/12/2018 Negative  Negative Final    Gardnerella vaginalis 04/12/2018 Negative  Negative Final    Candida sp 04/12/2018 Negative  Negative Final     Imaging:  Results for orders placed or performed during the hospital encounter of 09/28/17   MRV Brain Without Contrast    Narrative    Procedure: MRV of the head without contrast    Technique: Noncontrast 2-D time-of-flight MRV of the head with coronal and sagittal source imaging and 3-dimensional mip projection views.      Comparison: None    Results:The superior sagittal sinus is patent.  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  The paired internal cerebral veins and basal veins of Jay are patent.  The vein of Zac and torcula Herophili are patent.  The straight sinus is patent.  The bilateral transverse and sigmoid dural venous sinuses are patent to the jugular foramen.    Impression     Unremarkable noncontrast MRV specifically without evidence for venous sinus thrombosis..      Electronically signed by: VIOLETTE CARO DO  Date:     09/28/17  Time:    10:43    MRI Brain W WO Contrast    Narrative    Procedure: MRI the brain with andwithout contrast.    Technique: Sagittal and axial T1, axial T2, axial FLAIR, axial gradient, axial diffusion imaging of the whole brain pre-contrast with postcontrast axial T1 and axial spoiled gradient imaging reformatted in the coronal and sagittal planes.. 10 ml of Gadavist injected intravenously.         Comparison: None    Findings: The brain parenchyma is normal in contour. There is a small focus of T2 flair signal hyperintensity in the right lateral putamen suggestive for remote lacunar type infarct with diffusion hyperintensity without restriction compatible with T2 shine through. There are no abnormal areas of parenchymal enhancement. There are no areas are restricted  diffusion to suggest acute infarction. A few additional punctate foci of T2 flair signal hyperintensity supratentorial white matter which are nonspecific and may represent sequela migraine headaches in light of history. The ventricles are normal in size and configuration without evidence for hydrocephalus. There are no abnormal areas of the gradient susceptibility to suggest parenchymal hemorrhage. No abnormal intra or extra axial fluid collections. The major intracranial T2  flow-voids are present.    Impression     Small T2 flair hyperintense focus right lateral putamen with diffusion hyperintensity. 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    A few scattered punctate regions of T2 flair signal hyperintensity elsewhere supratentorial parenchyma while overall nonspecific in light of the given history this may be sequela migraine headaches. Alternative differential including prior demyelination to be considered.    otherwise unremarkable MRI brain specifically without evidence for acute infarction or enhancing lesion.          Electronically signed by: VIOLETTE CARO DO  Date:     09/28/17  Time:    10:49    MRA Brain without contrast    Narrative    Procedure: MRA of the head and neck    Technique: noncontrast 3-D time of flight MRA head and postcontrast 3-D time-of-flight MRA head and neck. 10 ml of Gadavist injected intravenously.    Comparison: None    Findings:    MRA head:    Anterior circulation:  The bilateral distal cervical, Gabby, cavernous and supraclinoid segments of the ICA's and the visualized bilateral anterior and middle cerebral arteries are patent without evidence for significant focal stenosis or aneurysm. There is a left PCOM.    Posterior circulation: The distal vertebral arteries, basilar artery and posterior cerebral arteries are patent without evidence for significant focal stenosis or aneurysm.    MRA neck: The origins of the right brachycephalic,  left common carotid and  left subclavian arteries from the arch are within normal limits. The origins of the vertebral arteries from the subclavian arteries are within normal limits. The vertebral arteries are unremarkable throughout their course without evidence for focal stenosis or occlusion.    Right carotid: The right common carotid artery, carotid bifurcation and extracranial portions of the internal carotid artery are patent without evidence for focal stenosis or occlusion.    Left carotid: The left common carotid artery, carotid bifurcation and extra cranial portions of the internal carotid artery are patent without evidence for focal stenosis or occlusion.    There is less than 50% proximal ICA stenosis by NASCET criteria.    Impression     Unremarkable MRA of the head specifically without evidence for focal stenosis or intracranial aneurysm.    Unremarkable MRA of the neck without evidence for significant focal stenosis or occlusion.      Electronically signed by: VIOLETTE CARO DO  Date:     09/28/17  Time:    10:40        Note: I have independently reviewed any/all imaging/labs/tests and agree with the report (s) as documented.  Any discrepancies will be as noted/demarcated by free text.  ARA HDZ 5/22/2018    ASSESSMENT:  1. Chronic migraine without aura without status migrainosus, not intractable    2. Essential hypertension    3. Recurrent major depressive disorder, remission status unspecified    4. Anxiety    5. Trouble in sleeping      PLAN:  - Botox administered in clinic for Chronic Migraine (see below)   - To break up headache cycle - take Hydroxyzine 50 mg capsule every 8 hours for 3 days.   - Continue all other medications as directed  - May consider addition of gabapentin at next appointment   - For migraine abortive - has Zomig NS available  - Stressed importance of using Zomig NS as soon as her migraine begins  - Discontinue use of Excedrin migraine, causing rebound headache  - Lifestyle modifications discussed at  length - eat meals at regular intervals, stay well hydrated, go to sleep and wake up around the same time each day, stress reduction, start exercising  - HTN  - well controlled on clonidine, amlodipine, bystolic and HCTZ, managed by cardiology and PCP  - MDD and anxiety - on cymbalta, established with mental health, encouraged regular f/up with mental health for further aid in anxiety/depression   - Trouble in sleep - has ambien, managed by mental health   - RTC in 6 weeks for f/up appointment   - RTC in 3 months for repeat Botox injections or sooner if needed     Orders Placed This Encounter    hydrOXYzine pamoate (VISTARIL) 50 MG Cap    promethazine (PHENERGAN) 25 MG tablet     All questions and concerns addressed.  Patient verbalizes understanding and is agreeable with the above stated treatment plan.      PROCEDURE NOTE:  BOTOX was performed as an indicated therapy for intractable chronic migraine headaches given that the patient failed more than 2 headache medications    Two patient identifiers were confirmed with the patient prior to performing this procedure. A time out to determine correct patient and and agreement on procedure performed was conducted prior to the procedure.      Botulinum Toxin Injection Procedure   Procedure: Chemical neurolysis   After risks and benefits were explained including bleeding, infection, worsening of pain, damage to the areas being injected, weakness of muscles, loss of muscle control, dysphagia if injecting the head or neck, facial droop if injecting the facial area, painful injection, allergic or other reaction to the medications being injected, and the failure of the procedure to help the problem, a signed consent was obtained.   The patient was placed in a comfortable area and the sites to be treated were identified.The area to be treated was prepped three times with alcohol and the alcohol allowed to dry. Next, a 30 gauge needle was used to inject the medication in the  area to be treated.      Total Botox used: 155 Units   Botox wastage: 45 Units     Injection sites:    muscle bilaterally ( a total of 10 units divided into 2 sites)   Procerus muscle (5 units)   Frontalis muscle bilaterally (a total of 20 units divided into 4 sites)   Temporalis muscle bilaterally (a total of 40 units divided into 8 sites)   Occipitalis muscle bilaterally (a total of 30 units divided into 6 sites)   Cervical paraspinal muscles (a total of 20 units divided into 4 sites)   Trapezius muscle bilaterally (a total of 30 units divided into 6 sites)   Complications: none   RTC for the next Botox injection: 3 months     CC: Joshua E Mizell, MD Elizabeth C Vulevich, FNP-C [facility]   [phone]

## 2018-05-22 NOTE — PATIENT INSTRUCTIONS
When you get a migraine:  1. Zomig nasal spray - 1 spray in one nostril as soon as it starts! (don't wait)  2. 2 hours later - if headache is still there, do a second zomig nasal spray in other nostril   3. 2 hours later, if headache is still there try taking a hydroxyzine and go to sleep  4. If after you take the hydroxyzine headache is still there, PLEASE SEND MESSAGE ON PATIENT PORTAL     Please work on Lifestyle modifications:  - meals at regular intervals  - start exercising  - go to sleep and wake up around the same time  - stress reduction   - go outside, enjoy the sunshine     Start tracking your headaches via Migraine Maged antonia on phone!    Will see you in 6 weeks for follow-up    Will see you in 12 weeks for repeat Botox injections

## 2018-06-05 RX ORDER — MELOXICAM 15 MG/1
15 TABLET ORAL DAILY
Qty: 30 TABLET | Refills: 1 | Status: SHIPPED | OUTPATIENT
Start: 2018-06-05 | End: 2019-05-27

## 2018-06-12 ENCOUNTER — HOSPITAL ENCOUNTER (OUTPATIENT)
Dept: RADIOLOGY | Facility: HOSPITAL | Age: 43
Discharge: HOME | End: 2018-06-12
Attending: SURGERY
Payer: COMMERCIAL

## 2018-06-12 DIAGNOSIS — N63.20 BREAST MASS, LEFT: Primary | ICD-10-CM

## 2018-06-12 DIAGNOSIS — N63.20 BREAST MASS, LEFT: ICD-10-CM

## 2018-06-12 PROCEDURE — 76642 ULTRASOUND BREAST LIMITED: CPT | Mod: TC,PO,LT

## 2018-06-12 PROCEDURE — 77062 BREAST TOMOSYNTHESIS BI: CPT | Mod: TC,PO

## 2018-06-12 PROCEDURE — 76642 ULTRASOUND BREAST LIMITED: CPT | Mod: 26,LT,, | Performed by: RADIOLOGY

## 2018-06-12 PROCEDURE — 77066 DX MAMMO INCL CAD BI: CPT | Mod: 26,,, | Performed by: RADIOLOGY

## 2018-06-12 PROCEDURE — G0279 TOMOSYNTHESIS, MAMMO: HCPCS | Mod: 26,,, | Performed by: RADIOLOGY

## 2018-06-30 NOTE — PROGRESS NOTES
STAFF NOTE:  Patient's case was discussed with Dr. Ramos Love in supervision.  I agree with his assessment and plan as specified to change Lexapro to Cymbalta and continue Vyvanse and Ambien for depression, anxiety, ADHD and insomnia Sx management.

## 2018-07-22 ENCOUNTER — PATIENT MESSAGE (OUTPATIENT)
Dept: ADMINISTRATIVE | Facility: OTHER | Age: 43
End: 2018-07-22

## 2018-07-23 DIAGNOSIS — G43.709 CHRONIC MIGRAINE WITHOUT AURA WITHOUT STATUS MIGRAINOSUS, NOT INTRACTABLE: ICD-10-CM

## 2018-07-23 RX ORDER — PROMETHAZINE HYDROCHLORIDE 25 MG/1
TABLET ORAL
Qty: 20 TABLET | Refills: 5 | Status: SHIPPED | OUTPATIENT
Start: 2018-07-23 | End: 2018-08-06 | Stop reason: SDUPTHER

## 2018-07-30 ENCOUNTER — PATIENT MESSAGE (OUTPATIENT)
Dept: NEUROLOGY | Facility: CLINIC | Age: 43
End: 2018-07-30

## 2018-07-30 DIAGNOSIS — G43.819 OTHER MIGRAINE WITHOUT STATUS MIGRAINOSUS, INTRACTABLE: Primary | ICD-10-CM

## 2018-07-30 DIAGNOSIS — G43.709 CHRONIC MIGRAINE WITHOUT AURA WITHOUT STATUS MIGRAINOSUS, NOT INTRACTABLE: ICD-10-CM

## 2018-07-30 RX ORDER — HYDROXYZINE PAMOATE 50 MG/1
CAPSULE ORAL
Qty: 15 CAPSULE | Refills: 2 | Status: SHIPPED | OUTPATIENT
Start: 2018-07-30 | End: 2018-08-28 | Stop reason: SDUPTHER

## 2018-07-31 RX ORDER — PREDNISONE 20 MG/1
TABLET ORAL
Qty: 9 TABLET | Refills: 0 | Status: SHIPPED | OUTPATIENT
Start: 2018-07-31 | End: 2018-09-26

## 2018-07-31 NOTE — TELEPHONE ENCOUNTER
Called and left message for Patient.Provided orders from [facility] for Prednisone.She has been advised that if she is no better to call and [facility] will attempt to work her in Thursday or Friday of this week.

## 2018-08-03 DIAGNOSIS — G43.709 CHRONIC MIGRAINE WITHOUT AURA WITHOUT STATUS MIGRAINOSUS, NOT INTRACTABLE: Primary | ICD-10-CM

## 2018-08-06 ENCOUNTER — PATIENT MESSAGE (OUTPATIENT)
Dept: NEUROLOGY | Facility: CLINIC | Age: 43
End: 2018-08-06

## 2018-08-06 ENCOUNTER — TELEPHONE (OUTPATIENT)
Dept: NEUROLOGY | Facility: CLINIC | Age: 43
End: 2018-08-06

## 2018-08-06 DIAGNOSIS — G43.709 CHRONIC MIGRAINE WITHOUT AURA WITHOUT STATUS MIGRAINOSUS, NOT INTRACTABLE: ICD-10-CM

## 2018-08-07 RX ORDER — PROMETHAZINE HYDROCHLORIDE 25 MG/1
TABLET ORAL
Qty: 20 TABLET | Refills: 5 | Status: SHIPPED | OUTPATIENT
Start: 2018-08-07 | End: 2018-11-30 | Stop reason: SDUPTHER

## 2018-08-08 ENCOUNTER — TELEPHONE (OUTPATIENT)
Dept: PHARMACY | Facility: CLINIC | Age: 43
End: 2018-08-08

## 2018-08-08 ENCOUNTER — PROCEDURE VISIT (OUTPATIENT)
Dept: NEUROLOGY | Facility: CLINIC | Age: 43
End: 2018-08-08
Payer: COMMERCIAL

## 2018-08-08 VITALS
WEIGHT: 154 LBS | HEIGHT: 65 IN | HEART RATE: 75 BPM | BODY MASS INDEX: 25.66 KG/M2 | DIASTOLIC BLOOD PRESSURE: 82 MMHG | SYSTOLIC BLOOD PRESSURE: 146 MMHG

## 2018-08-08 DIAGNOSIS — G43.709 CHRONIC MIGRAINE WITHOUT AURA WITHOUT STATUS MIGRAINOSUS, NOT INTRACTABLE: Primary | ICD-10-CM

## 2018-08-08 PROCEDURE — 64615 CHEMODENERV MUSC MIGRAINE: CPT | Mod: S$GLB,,, | Performed by: NURSE PRACTITIONER

## 2018-08-08 NOTE — PROCEDURES
SUBJECTIVE:  Patient ID: Mirna Pollock  Chief Complaint: Botulinum Toxin Injection and Follow-up    History of Present Illness:  Mirna Pollock is a 42 y.o. female who presents to clinic alone for follow-up of headaches and Botox injections.     Recommendations made at last Office Visit on 5/22/2018:  - Botox administered in clinic for Chronic Migraine (see below)   - To break up headache cycle - take Hydroxyzine 50 mg capsule every 8 hours for 3 days.   - Continue all other medications as directed  - May consider addition of gabapentin at next appointment   - For migraine abortive - has Zomig NS available  - Stressed importance of using Zomig NS as soon as her migraine begins  - Discontinue use of Excedrin migraine, causing rebound headache  - Lifestyle modifications discussed at length - eat meals at regular intervals, stay well hydrated, go to sleep and wake up around the same time each day, stress reduction, start exercising  - HTN  - well controlled on clonidine, amlodipine, bystolic and HCTZ, managed by cardiology and PCP  - MDD and anxiety - on cymbalta, established with mental health, encouraged regular f/up with mental health for further aid in anxiety/depression   - Trouble in sleep - has ambien, managed by mental health   - RTC in 6 weeks for f/up appointment   - RTC in 3 months for repeat Botox injections or sooner if needed     08/08/2018- Interval History:  Finds her migraines are no better since last round of Botox, admits she was under increased stress at one point due to issues with her 's job.  She does know a lot of her migraines are triggered from stress.  She has been getting a lot of migraines above her eyes as well as throughout her occipitalis and neck.  Overall, she does wish to continue with Botox injections, but is open to discussing additional prevention options.     05/22/2018- Interval History:  Despite nerve blocks two weeks ago, she has been suffering with a headache  nearly everyday since her last visit.  She did try Zomig nasal spray, however she took the first dose on day two of her migraine because she was nervous.  She does think it lessened the pain of her migraine, however did not abort it.  She did not repeat the dose.  Additionally, she continues to report poor sleep nightly.  She stopped drinking caffeine.  Headaches are the same as they have always been.  She denies any change in the nature or quality of her headaches. Risks, Benefits, and potential side effects of Botox discussed with patient.  Alternative treatments offered.  After thorough discussed regarding the procedure, patient has decided to move forward with initiating Botox injections for Chronic Migraine.  Patient understands we will complete 3 rounds of injections, if after 3 rounds, we do not see a 50% reduction in headaches, we will discontinue Botox injections.      05/11/2018 - Interval History:  This past week she has only been able to work two days, has missed 3 days of work this week due to migraines.  She still is unsure about getting Botox injections for chronic migraine, she would like to try it but her  does not feel this is a good treatment option, she is trying to convince him.  Of note, blood pressure significantly lower in today's visit than in past visits.  She reports her psychiatrist has discontinued vyvanse until she is given clearance from Cardio to restart taking Vyvanse.  She was seen by a pediatric cardiologist who referred her to an adult cardiologist, however she has not seen this provider, had to cancel appointment due to having a headache.  She is requesting repeat nerve blocks today.      01/29/2018 - Interval History:  She has been having a terrible migraine for the last 3 days, she has tried numerous medications without any relief.  States she tried to go to work today, but she had to leave because her headache got so bad.  She is requesting to repeat the nerve blocks as  those shots gave her relief form 6-8 weeks in the past.  She has done research on the Botox injections and states her  is very hesitant for her to try Botox injections for her migraines.  She has not been seen by her PCP nor cardiologist for further management of her blood pressure.  Discussed the challenges of treating her migraines with uncontrolled hypertension because I cannot prescribe her any triptan therapy until her blood pressure is under better control.  She has continued to take Cymbalta daily for her depression, she is unsure if this is giving her any relief with regards to her headaches or not.       01/15/2018 - Interval History:  She was approved for Botox injections, but was a no-show to her appointment last week.  States she spoke with her  about the injections and she was very nervous to try the Botox injections for her migraines.    She continues to experience headaches on 1-4 days per week with migraines occurring 1-2 days per week.  Recently she has been experiencing more migraines and headaches than usually, but admits she has been going through a lot emotionally as well as she got sick with the flu.  She has continued to regularly follow-up with her psychiatrist who changed her anti-depressant, she is now taking Cymbalta.  Since change in medication, she feels she has been experiencing a mild headache each day.  She has had to miss a few days of work due to her migraines since last visit.  Additionally, she feels occasionally she begins to get dizzy, this occurs with or without headaches, she has an appointment with her eye doctor next week as her vision is also getting progressively worse.  She did not find vistaril to be useful with regards to her headaches.  She does feel Zanaflex helps with her neck as well as helping to relax her neck muscles.  When she gets a migraine she has been taking Vistaril and Ibuprofen together which does help the pain, she also has been trying  Excedrin migraines which also gives her some relief.  Patient with known HTN on multiple anti-hypertensive agents, blood pressure significantly elevated in clinic today.  She is not symptomatic.  States she needs to follow-up with her primary care (who is outside the [facility] system) for better management.       10/19/2017 - Interval History:  - Headaches have persisted in similar frequency, which she is happy about as she has been under increased stress with regards to her 's health, he has been suffering with his Gout, just got out of the hospital last week and has had to take time off work etc   - Blood pressure continues to be elevated despite numerous anti-hypertensive, states today is good for her (currently 141/102), has been regularly following up with her PCP for management   - Is very excited because she is going on a cruise with her  to [place] at the end of November/early December   - Has continued to have a headache nearly everyday, not necessarily a migraine, however migraine have been at least once per month    - Has noticed more recently that she feels like she has intense pressure behind her eyes and across the bridge of her nose that slightly feels like sinus pressure, this occurs 1-2 times per week   - Has been seeing a chiropractor as well as a masseuse regularly which she does feel has been helping   - Would still like to move forward with the Botox injections   - Does not feel adjustments to her medications are necessary at this time      08/17/2017 - Interval History:  - Had been seeing Iker Beth PA-C   - Was taking Topamax, Zanaflex, and Vistaril - however has been out of medications for about 3 months  - Since being off the medication, she had been okay, however recently noticed migraines were beginning to occur more frequently with increased intensity and duration   - She finds the nerve blocks had been helping a lot, as long as she is consistent with getting the nerve  block, however hasn't had this procedure for over a year   - Has been getting 5-6 migraines per month, each lasting 1-4 days in length   - Current migraine has been going on since last Friday (6 days)  - Blood pressure significantly elevated in clinic today, states her PCP is managing her medications in order to get her blood pressure better controlled   - Is requesting refills of medication as well as repeat nerve occipital and trigeminal nerve blocks      Headache history:   Age of onset -  13  Nature of pain -  Throbbing   Location - bioccipital   Aura -  White floaters   Duration - hrs  Frequency -  2/week  Time of day - anytime   7 days of pain - jaw pain, L neck, L shoulder,  L side throbbing   Associated symptoms:   Meningeal symptoms - photophobia, phonophobia, exercise intolerance +   Nausea/vomitting +   Nasal drainage   Visual symptoms  Pallor/flushing  Vertigo  Stroke symptoms  Confusion  Impaired concentration +  Pain worsened with physical activity +  Neck pain +  Whooshing sounds   Visual spots/blotches +  Triggers:   Stress +   Bright or flickering light  Strong smell  Vigorous exercise  Dietary factors   Visual strain   Motion intolerance as passenger:  No   Resolution of headache with sleep: yes      Therapies Tried & Response:  Tylenol, motrin, alleve - not help  excedrin - not help   imitrex - helped initially   topamax 50 mg - helped  Percocet - helps   Gabapentin - helps  Amlodipine - for HTN  Bystolic - for HTN  Lexapro - hasn't noticed change in HA, for depression   GONB - helps   Cymbalta - unsure, for depression   Zomig - given today   Maxalt - no   Botox - started today     Current Medications:    amlodipine (NORVASC) 10 MG tablet, TK 1 T PO QD, Disp: , Rfl: 5    cloNIDine (CATAPRES) 0.2 MG tablet, 0.2 mg 3 (three) times daily. , Disp: , Rfl: 2    DULoxetine (CYMBALTA) 60 MG capsule, Take 1 capsule (60 mg total) by mouth once daily., Disp: 30 capsule, Rfl: 3    DULoxetine (CYMBALTA) 60  MG capsule, Take 1 capsule (60 mg total) by mouth once daily., Disp: 30 capsule, Rfl: 3    hydrochlorothiazide (HYDRODIURIL) 25 MG tablet, Take 25 mg by mouth once daily.  , Disp: , Rfl:     hydrOXYzine pamoate (VISTARIL) 50 MG Cap, Take 1 capsule by mouth every 8 hours for 3 days.  No alcohol, no driving with medication. Rest may use as needed., Disp: 15 capsule, Rfl: 2    ketorolac 15.75 mg/spray Spry, 15.75 mg by Nasal route every 6 to 8 hours as needed., Disp: 5 each, Rfl: 5    lisdexamfetamine (VYVANSE) 50 MG capsule, Take 1 capsule (50 mg total) by mouth every morning., Disp: 30 capsule, Rfl: 0    meloxicam (MOBIC) 15 MG tablet, Take 1 tablet (15 mg total) by mouth once daily., Disp: 30 tablet, Rfl: 1    nebivolol (BYSTOLIC) 10 MG Tab, Take 2 tablets (20 mg total) by mouth once daily., Disp: 60 tablet, Rfl: 11    predniSONE (DELTASONE) 20 MG tablet, Take 3 tabs in AM for 3 days. Take with food., Disp: 9 tablet, Rfl: 0    promethazine (PHENERGAN) 25 MG tablet, Take 1 tab by mouth every 4-6 hours as needed for nausea., Disp: 20 tablet, Rfl: 5    tiZANidine (ZANAFLEX) 2 MG tablet, Take 1-2 tabs by mouth at bedtime.  No alcohol or driving with medication., Disp: 60 tablet, Rfl: 5    ZOLMitriptan (ZOMIG) 5 mg Spry, 1 spray in nostril at onset of migraine, can repeat in 1 hours if needed. Max 2 sprays/day. Max 3 days/week., Disp: 12 each, Rfl: 5    zolpidem (AMBIEN) 10 mg Tab, Take 1 tablet (10 mg total) by mouth nightly as needed (insomnia)., Disp: 30 tablet, Rfl: 3    zolpidem (AMBIEN) 10 mg Tab, Take 1 tablet (10 mg total) by mouth nightly as needed for insomnia, Disp: 30 tablet, Rfl: 3    erenumab-aooe 70 mg/mL AtIn, Inject 140 mg into the skin every 28 days., Disp: 2 mL, Rfl: 11    Current Facility-Administered Medications:     onabotulinumtoxina injection 200 Units, 200 Units, Intramuscular, Q90 Days, BC Fleming, 200 Units at 08/08/18 1342    Review of Systems - as per HPI, otherwise  "a balanced 10 systems review is negative.    OBJECTIVE:  Vitals:  BP (!) 146/82 (BP Location: Left arm, Patient Position: Sitting, BP Method: X-Large (Automatic))   Pulse 75   Ht 5' 5" (1.651 m)   Wt 69.9 kg (154 lb)   LMP 12/14/2000   BMI 25.63 kg/m²     Physical Exam:  Constitutional: she appears well-developed and well-nourished. she is well groomed. NAD     Review of Data:   Labs:  No visits with results within 3 Month(s) from this visit.   Latest known visit with results is:   Hospital Outpatient Visit on 04/25/2018   Component Date Value Ref Range Status    EF 04/25/2018 55  55 - 65 Final    Mitral Valve Regurgitation 04/25/2018 TRIVIAL   Final    Diastolic Dysfunction 04/25/2018 No   Final     Imaging:  Results for orders placed or performed during the hospital encounter of 09/28/17   MRV Brain Without Contrast    Narrative    Procedure: MRV of the head without contrast    Technique: Noncontrast 2-D time-of-flight MRV of the head with coronal and sagittal source imaging and 3-dimensional mip projection views.      Comparison: None    Results:The superior sagittal sinus is patent.  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  The paired internal cerebral veins and basal veins of Jay are patent.  The vein of Zac and torcula Herophili are patent.  The straight sinus is patent.  The bilateral transverse and sigmoid dural venous sinuses are patent to the jugular foramen.    Impression     Unremarkable noncontrast MRV specifically without evidence for venous sinus thrombosis..      Electronically signed by: VIOLETTE CARO DO  Date:     09/28/17  Time:    10:43    MRI Brain W WO Contrast    Narrative    Procedure: MRI the brain with andwithout contrast.    Technique: Sagittal and axial T1, axial T2, axial FLAIR, axial gradient, axial diffusion imaging of the whole brain pre-contrast with postcontrast axial T1 and axial spoiled gradient imaging reformatted in the coronal " and sagittal planes.. 10 ml of Gadavist injected intravenously.         Comparison: None    Findings: The brain parenchyma is normal in contour. There is a small focus of T2 flair signal hyperintensity in the right lateral putamen suggestive for remote lacunar type infarct with diffusion hyperintensity without restriction compatible with T2 shine through. There are no abnormal areas of parenchymal enhancement. There are no areas are restricted diffusion to suggest acute infarction. A few additional punctate foci of T2 flair signal hyperintensity supratentorial white matter which are nonspecific and may represent sequela migraine headaches in light of history. The ventricles are normal in size and configuration without evidence for hydrocephalus. There are no abnormal areas of the gradient susceptibility to suggest parenchymal hemorrhage. No abnormal intra or extra axial fluid collections. The major intracranial T2  flow-voids are present.    Impression     Small T2 flair hyperintense focus right lateral putamen with diffusion hyperintensity. 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    A few scattered punctate regions of T2 flair signal hyperintensity elsewhere supratentorial parenchyma while overall nonspecific in light of the given history this may be sequela migraine headaches. Alternative differential including prior demyelination to be considered.    otherwise unremarkable MRI brain specifically without evidence for acute infarction or enhancing lesion.          Electronically signed by: VIOLETTE CARO DO  Date:     09/28/17  Time:    10:49    MRA Brain without contrast    Narrative    Procedure: MRA of the head and neck    Technique: noncontrast 3-D time of flight MRA head and postcontrast 3-D time-of-flight MRA head and neck. 10 ml of Gadavist injected intravenously.    Comparison: None    Findings:    MRA head:    Anterior circulation:  The bilateral distal cervical, Gabby, cavernous and  supraclinoid segments of the ICA's and the visualized bilateral anterior and middle cerebral arteries are patent without evidence for significant focal stenosis or aneurysm. There is a left PCOM.    Posterior circulation: The distal vertebral arteries, basilar artery and posterior cerebral arteries are patent without evidence for significant focal stenosis or aneurysm.    MRA neck: The origins of the right brachycephalic,  left common carotid and left subclavian arteries from the arch are within normal limits. The origins of the vertebral arteries from the subclavian arteries are within normal limits. The vertebral arteries are unremarkable throughout their course without evidence for focal stenosis or occlusion.    Right carotid: The right common carotid artery, carotid bifurcation and extracranial portions of the internal carotid artery are patent without evidence for focal stenosis or occlusion.    Left carotid: The left common carotid artery, carotid bifurcation and extra cranial portions of the internal carotid artery are patent without evidence for focal stenosis or occlusion.    There is less than 50% proximal ICA stenosis by NASCET criteria.    Impression     Unremarkable MRA of the head specifically without evidence for focal stenosis or intracranial aneurysm.    Unremarkable MRA of the neck without evidence for significant focal stenosis or occlusion.      Electronically signed by: VIOLETTE CARO DO  Date:     09/28/17  Time:    10:40      Note: I have independently reviewed any/all imaging/labs/tests and agree with the report (s) as documented.  Any discrepancies will be as noted/demarcated by free text.  ARA HDZ 8/8/2018    ASSESSMENT:  1. Chronic migraine without aura without status migrainosus, not intractable    2. Essential hypertension      PLAN:  - Botox administered in clinic for Chronic Migraine (see below)   - Continue all medications as directed  - Start Aimovig 140 mg once monthly SQ  injection   - No refills needed at this time  - RTC in 12 weeks for repeat Botox injections or sooner if needed     Orders Placed This Encounter    erenumab-aooe 70 mg/mL AtIn       All questions and concerns addressed.  Patient verbalizes understanding and is agreeable with the above stated treatment plan.      PROCEDURE NOTE:  BOTOX was performed as an indicated therapy for intractable chronic migraine headaches given that the patient failed more than 2 headache medications    Two patient identifiers were confirmed with the patient prior to performing this procedure. A time out to determine correct patient and and agreement on procedure performed was conducted prior to the procedure.      Botulinum Toxin Injection Procedure   Procedure: Chemical neurolysis   After risks and benefits were explained including bleeding, infection, worsening of pain, damage to the areas being injected, weakness of muscles, loss of muscle control, dysphagia if injecting the head or neck, facial droop if injecting the facial area, painful injection, allergic or other reaction to the medications being injected, and the failure of the procedure to help the problem, a signed consent was obtained.   The patient was placed in a comfortable area and the sites to be treated were identified.The area to be treated was prepped three times with alcohol and the alcohol allowed to dry. Next, a 30 gauge needle was used to inject the medication in the area to be treated.      Total Botox used: 155 Units   Botox wastage: 45 Units     Injection sites:    muscle bilaterally ( a total of 10 units divided into 2 sites)   Procerus muscle (5 units)   Frontalis muscle bilaterally (a total of 20 units divided into 4 sites)   Temporalis muscle bilaterally (a total of 40 units divided into 8 sites)   Occipitalis muscle bilaterally (a total of 30 units divided into 6 sites)   Cervical paraspinal muscles (a total of 20 units divided into 4 sites)    Trapezius muscle bilaterally (a total of 30 units divided into 6 sites)   Complications: none   RTC for the next Botox injection: 12 weeks     CC: MD Luci Echols FNP-YAEL  [facility]   [phone]

## 2018-08-13 NOTE — TELEPHONE ENCOUNTER
DOCUMENTATION ONLY:  Prior Authorization for Aimovig approved from 08/08/18 to 02/07/19    Case Id: [id number]    Co-pay: $148.44    Patient Assistance IS required.    DOCUMENTATION ONLY:  Financial Assistance for Aimovig approved.  Source: Aimovig Copay Card  BIN : [id number]  MOHANN : RADHIKA  ID : [id number]  Group: [id number]  Co-pay: $5.00    Forwarded to the clinical pharmacist for consult and shipment.    -ARR

## 2018-08-27 ENCOUNTER — TELEPHONE (OUTPATIENT)
Dept: PHARMACY | Facility: CLINIC | Age: 43
End: 2018-08-27

## 2018-08-27 ENCOUNTER — PATIENT MESSAGE (OUTPATIENT)
Dept: NEUROLOGY | Facility: CLINIC | Age: 43
End: 2018-08-27

## 2018-08-27 NOTE — PROGRESS NOTES
Initial Aimovig consult completed on 18 in person at [facility] . Aimovig 140mg picked up on 18. $5.00 copay paid in person at [facility]. Patient intends to start Aimovig on . Address confirmed. Confirmed 2 patient identifiers - name and . Therapy Appropriate.    Counseled patient on administration directions:  - Inject Aimvoig into the skin every 28 days.   - Take out of the refrigerator 30-60 minutes prior to injection.  - Wash hands before and after injection.  - Monthly RX will come with gauze, bandaids, and alcohol swabs.  - Patient may inject in either the tops of the thighs, abdomen- but at least 2 inches away from belly button, or the outer part of her upper arm (with assistance). Patient is injecting 2 pens, use 2 different injection sites. Patient was instructed to rotate injections sites.  - Patient is to wipe down the injection site with the alcohol pad, wait to dry.    - Remove white cap from pen  - Gently squeeze OR stretch the area of the cleaned skin and hold it firmly.  Place the pen flat against the skin then push down on the purple button and release - there will be an initial click; in 10-15 seconds you will hear a second click and the window will go from from clear to yellow, indicating injection is complete.  - Patient should rotate injection sites.   - Patient will use sharps container; once full, per [place] law, she/ he may lock the sharps container and place in trash. Pharmacy will replace the sharps at no additional charge.    Patient was counseled on possible side effects:  - Injection site reaction: redness, soreness, itching, bruising, which should resolve within 3-5 days.  - constipation    Patient did not have any further questions. Advised to keep a calendar to stay compliant. Consultation included: indication; goals of treatment; administration; storage and handling; side effects; how to handle side effects; the importance of compliance.  Patient understands to report any medication  changes to [facility] and provider. All questions answered and addressed to patients satisfaction. I will f/u with patient in 7-10 days from start, [facility] to contact patient in 3 weeks for refills.   $5.00 copay @004 paid at pick-up

## 2018-08-27 NOTE — TELEPHONE ENCOUNTER
Initial Aimovig consult begun on 18. Aimovig 140mg will be picked up at [facility] on 18. $5 Copay. Confirmed 2 patient identifiers - name and . Therapy Appropriate.    Counseled patient on administration directions:  - Inject 140mg into the skin every 28 days.   - Monthly RX will come with gauze, bandaids, and alcohol swabs.  - Patient may inject in either the tops of the thighs, abdomen- but at least 2 inches away from belly button, or the outer part of her upper arm (with assistance). If injecting 2 pens, use 2 different injection sites. Patient was instructed to rotate injections sites.  - Patient is to wipe down the injection site with the alcohol pad, wait to dry.    -Place the pen flat against the skin then push down on the purple button and release - there will be an initial click; in 10-15 seconds you will hear a second click and the window will go from from clear to yellow, indicating injection is complete.  - Patient should rotate injection sites.     MsJelly Pollock stated she'd complete the remainder of the consultation at pickup.

## 2018-08-28 DIAGNOSIS — G43.709 CHRONIC MIGRAINE WITHOUT AURA WITHOUT STATUS MIGRAINOSUS, NOT INTRACTABLE: ICD-10-CM

## 2018-08-28 RX ORDER — HYDROXYZINE PAMOATE 50 MG/1
CAPSULE ORAL
Qty: 15 CAPSULE | Refills: 2 | Status: SHIPPED | OUTPATIENT
Start: 2018-08-28 | End: 2018-09-26

## 2018-08-28 RX ORDER — TIZANIDINE 2 MG/1
TABLET ORAL
Qty: 60 TABLET | Refills: 5 | Status: SHIPPED | OUTPATIENT
Start: 2018-08-28 | End: 2019-02-21 | Stop reason: SDUPTHER

## 2018-09-19 NOTE — TELEPHONE ENCOUNTER
First Aimovig follow up. Pt started Aimovig on 8/28, next dose due 9/25. Pt plans to  Aimovig refill on 9/21. Pt is using a calendar to keep track of injection days (every 4 weeks). Pt reported no side effects. Pt stated 1 pen was fine, but she noticed a burning feeling while injecting the second pen. Pt reported she did not take medication out of fridge for 30-60 minutes before injecting. Advised pt to let Aimovig reach room temperature before injecting to help prevent this. Counseled pt on managing injection site reactions. Pt voiced understanding. Pt stated before she was on botox, she was missing weeks of work. She reported noticing a difference in her migraines after her 2nd round of botox, and has not had to miss work as much. Pt reported since starting Aimovig, she has noticed even more of a difference. She reported she has not been having nearly as much migraines. Usually they would last 3-4 days, but now they last about 1 day. Pt is rotating injection sites. Pt had no issues with the injection process. Reminded pt Aimovig is stable at room temperature for 7 days. Pt voiced understanding. Pt did not present to an ER or urgent care in the last month. Pt did not start any new medications, or develop any new allergies or health conditions. Pt had no further questions or concerns, and was advised to call [facility] should any arise.

## 2018-09-26 ENCOUNTER — OFFICE VISIT (OUTPATIENT)
Dept: PSYCHIATRY | Facility: CLINIC | Age: 43
End: 2018-09-26
Payer: COMMERCIAL

## 2018-09-26 VITALS — WEIGHT: 164.69 LBS | HEIGHT: 65 IN | BODY MASS INDEX: 27.44 KG/M2

## 2018-09-26 DIAGNOSIS — F33.9 EPISODE OF RECURRENT MAJOR DEPRESSIVE DISORDER, UNSPECIFIED DEPRESSION EPISODE SEVERITY: ICD-10-CM

## 2018-09-26 DIAGNOSIS — F40.10 SOCIAL ANXIETY DISORDER: Chronic | ICD-10-CM

## 2018-09-26 DIAGNOSIS — F41.1 GAD (GENERALIZED ANXIETY DISORDER): Primary | Chronic | ICD-10-CM

## 2018-09-26 DIAGNOSIS — M79.7 FIBROMYALGIA: Chronic | ICD-10-CM

## 2018-09-26 PROCEDURE — 99999 PR PBB SHADOW E&M-EST. PATIENT-LVL III: CPT | Mod: PBBFAC,,, | Performed by: PSYCHIATRY & NEUROLOGY

## 2018-09-26 PROCEDURE — 99213 OFFICE O/P EST LOW 20 MIN: CPT | Mod: S$GLB,,, | Performed by: PSYCHIATRY & NEUROLOGY

## 2018-09-26 RX ORDER — AMITRIPTYLINE HYDROCHLORIDE 25 MG/1
25 TABLET, FILM COATED ORAL NIGHTLY PRN
Qty: 30 TABLET | Refills: 1 | Status: SHIPPED | OUTPATIENT
Start: 2018-09-26 | End: 2018-11-07 | Stop reason: SDUPTHER

## 2018-09-26 RX ORDER — DULOXETIN HYDROCHLORIDE 60 MG/1
60 CAPSULE, DELAYED RELEASE ORAL DAILY
Qty: 30 CAPSULE | Refills: 5 | Status: SHIPPED | OUTPATIENT
Start: 2018-09-26 | End: 2019-06-05

## 2018-09-26 RX ORDER — ZOLPIDEM TARTRATE 10 MG/1
10 TABLET ORAL NIGHTLY PRN
Qty: 30 TABLET | Refills: 2 | Status: SHIPPED | OUTPATIENT
Start: 2018-09-26 | End: 2018-11-07 | Stop reason: SDUPTHER
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
## 2018-10-12 ENCOUNTER — TELEPHONE (OUTPATIENT)
Dept: PHARMACY | Facility: CLINIC | Age: 43
End: 2018-10-12

## 2018-10-12 NOTE — TELEPHONE ENCOUNTER
Patient returned phone call back to [facility] regard her specialty medication refill for estimate AIMOVIG 70mg/mL $5.00/004- Patient scheduled to have the medication ready &  on Fri 10/12 @1:30pm. Patient informed new medication: amitriptyline (ELAVIL) 25 MG tablet & zolpidem (AMBIEN) 10 mg Tab (dosage/direction changed). Patient was at work & she only have time to answer the refill questions only & declined questions for the clinical pharmacist. She estimate the injection date is: 10/25. Patient request for a sharp container to be dispense with her medication. Patient voiced understanding.

## 2018-11-01 ENCOUNTER — PROCEDURE VISIT (OUTPATIENT)
Dept: NEUROLOGY | Facility: CLINIC | Age: 43
End: 2018-11-01
Payer: COMMERCIAL

## 2018-11-01 VITALS
WEIGHT: 155 LBS | SYSTOLIC BLOOD PRESSURE: 123 MMHG | BODY MASS INDEX: 25.83 KG/M2 | HEIGHT: 65 IN | DIASTOLIC BLOOD PRESSURE: 77 MMHG

## 2018-11-01 DIAGNOSIS — G43.009 MIGRAINE WITHOUT AURA AND WITHOUT STATUS MIGRAINOSUS, NOT INTRACTABLE: ICD-10-CM

## 2018-11-01 DIAGNOSIS — F41.1 GAD (GENERALIZED ANXIETY DISORDER): Chronic | ICD-10-CM

## 2018-11-01 DIAGNOSIS — F33.9 RECURRENT MAJOR DEPRESSIVE DISORDER, REMISSION STATUS UNSPECIFIED: ICD-10-CM

## 2018-11-01 DIAGNOSIS — I10 ESSENTIAL HYPERTENSION: ICD-10-CM

## 2018-11-01 PROCEDURE — 64615 CHEMODENERV MUSC MIGRAINE: CPT | Mod: S$GLB,,, | Performed by: NURSE PRACTITIONER

## 2018-11-01 NOTE — PROCEDURES
SUBJECTIVE:  Patient ID: Mirna Pollock  Chief Complaint: Follow-up and Botulinum Toxin Injection    History of Present Illness:  Mirna Pollock is a 42 y.o. female who presents to clinic alone for follow-up of headaches and Botox injections.     Recommendations made at last Office Visit on 8/8/2018:  - Botox administered in clinic for Chronic Migraine (see below)   - Continue all medications as directed  - Start Aimovig 140 mg once monthly SQ injection   - No refills needed at this time  - RTC in 12 weeks for repeat Botox injections or sooner if needed     11/01/2018- Interval History:  Headaches have been significantly better, she has only had 3-4 migraines since her last round of Botox and is very happy Botox is helping decrease her migraine frequency.  She has been using Aimovig monthly, which she also feels has been helping decrease her migraine frequency.  Migraines continue to feel the same as they always have, she denies any change in the quality or nature of her migraines.  Wishes to continue with Botox injections due to the pronounced effect it has had on her migraines.     08/08/2018- Interval History:  Finds her migraines are no better since last round of Botox, admits she was under increased stress at one point due to issues with her 's job.  She does know a lot of her migraines are triggered from stress.  She has been getting a lot of migraines above her eyes as well as throughout her occipitalis and neck.  Overall, she does wish to continue with Botox injections, but is open to discussing additional prevention options.      05/22/2018- Interval History:  Despite nerve blocks two weeks ago, she has been suffering with a headache nearly everyday since her last visit.  She did try Zomig nasal spray, however she took the first dose on day two of her migraine because she was nervous.  She does think it lessened the pain of her migraine, however did not abort it.  She did not repeat the dose.   Additionally, she continues to report poor sleep nightly.  She stopped drinking caffeine.  Headaches are the same as they have always been.  She denies any change in the nature or quality of her headaches. Risks, Benefits, and potential side effects of Botox discussed with patient.  Alternative treatments offered.  After thorough discussed regarding the procedure, patient has decided to move forward with initiating Botox injections for Chronic Migraine.  Patient understands we will complete 3 rounds of injections, if after 3 rounds, we do not see a 50% reduction in headaches, we will discontinue Botox injections.      05/11/2018 - Interval History:  This past week she has only been able to work two days, has missed 3 days of work this week due to migraines.  She still is unsure about getting Botox injections for chronic migraine, she would like to try it but her  does not feel this is a good treatment option, she is trying to convince him.  Of note, blood pressure significantly lower in today's visit than in past visits.  She reports her psychiatrist has discontinued vyvanse until she is given clearance from Cardio to restart taking Vyvanse.  She was seen by a pediatric cardiologist who referred her to an adult cardiologist, however she has not seen this provider, had to cancel appointment due to having a headache.  She is requesting repeat nerve blocks today.      01/29/2018 - Interval History:  She has been having a terrible migraine for the last 3 days, she has tried numerous medications without any relief.  States she tried to go to work today, but she had to leave because her headache got so bad.  She is requesting to repeat the nerve blocks as those shots gave her relief form 6-8 weeks in the past.  She has done research on the Botox injections and states her  is very hesitant for her to try Botox injections for her migraines.  She has not been seen by her PCP nor cardiologist for further  management of her blood pressure.  Discussed the challenges of treating her migraines with uncontrolled hypertension because I cannot prescribe her any triptan therapy until her blood pressure is under better control.  She has continued to take Cymbalta daily for her depression, she is unsure if this is giving her any relief with regards to her headaches or not.       01/15/2018 - Interval History:  She was approved for Botox injections, but was a no-show to her appointment last week.  States she spoke with her  about the injections and she was very nervous to try the Botox injections for her migraines.    She continues to experience headaches on 1-4 days per week with migraines occurring 1-2 days per week.  Recently she has been experiencing more migraines and headaches than usually, but admits she has been going through a lot emotionally as well as she got sick with the flu.  She has continued to regularly follow-up with her psychiatrist who changed her anti-depressant, she is now taking Cymbalta.  Since change in medication, she feels she has been experiencing a mild headache each day.  She has had to miss a few days of work due to her migraines since last visit.  Additionally, she feels occasionally she begins to get dizzy, this occurs with or without headaches, she has an appointment with her eye doctor next week as her vision is also getting progressively worse.  She did not find vistaril to be useful with regards to her headaches.  She does feel Zanaflex helps with her neck as well as helping to relax her neck muscles.  When she gets a migraine she has been taking Vistaril and Ibuprofen together which does help the pain, she also has been trying Excedrin migraines which also gives her some relief.  Patient with known HTN on multiple anti-hypertensive agents, blood pressure significantly elevated in clinic today.  She is not symptomatic.  States she needs to follow-up with her primary care (who is  outside the [facility]) for better management.       10/19/2017 - Interval History:  - Headaches have persisted in similar frequency, which she is happy about as she has been under increased stress with regards to her 's health, he has been suffering with his Gout, just got out of the hospital last week and has had to take time off work etc   - Blood pressure continues to be elevated despite numerous anti-hypertensive, states today is good for her (currently 141/102), has been regularly following up with her PCP for management   - Is very excited because she is going on a cruise with her  to [place] at the end of November/early December   - Has continued to have a headache nearly everyday, not necessarily a migraine, however migraine have been at least once per month    - Has noticed more recently that she feels like she has intense pressure behind her eyes and across the bridge of her nose that slightly feels like sinus pressure, this occurs 1-2 times per week   - Has been seeing a chiropractor as well as a masseuse regularly which she does feel has been helping   - Would still like to move forward with the Botox injections   - Does not feel adjustments to her medications are necessary at this time      08/17/2017 - Interval History:  - Had been seeing Iker Beth PA-C   - Was taking Topamax, Zanaflex, and Vistaril - however has been out of medications for about 3 months  - Since being off the medication, she had been okay, however recently noticed migraines were beginning to occur more frequently with increased intensity and duration   - She finds the nerve blocks had been helping a lot, as long as she is consistent with getting the nerve block, however hasn't had this procedure for over a year   - Has been getting 5-6 migraines per month, each lasting 1-4 days in length   - Current migraine has been going on since last Friday (6 days)  - Blood pressure significantly elevated in clinic today,  states her PCP is managing her medications in order to get her blood pressure better controlled   - Is requesting refills of medication as well as repeat nerve occipital and trigeminal nerve blocks      Headache history:   Age of onset -  13  Nature of pain -  Throbbing   Location - bioccipital   Aura -  White floaters   Duration - hrs  Frequency -  2/week  Time of day - anytime   7 days of pain - jaw pain, L neck, L shoulder,  L side throbbing   Associated symptoms:   Meningeal symptoms - photophobia, phonophobia, exercise intolerance +   Nausea/vomitting +   Nasal drainage   Visual symptoms  Pallor/flushing  Vertigo  Stroke symptoms  Confusion  Impaired concentration +  Pain worsened with physical activity +  Neck pain +  Whooshing sounds   Visual spots/blotches +  Triggers:   Stress +   Bright or flickering light  Strong smell  Vigorous exercise  Dietary factors   Visual strain   Motion intolerance as passenger:  No   Resolution of headache with sleep: yes      Therapies Tried & Response:  Tylenol, motrin, alleve - not help  excedrin - not help   imitrex - helped initially   topamax 50 mg - helped  Percocet - helps   Gabapentin - helps  Amlodipine - for HTN  Bystolic - for HTN  Lexapro - hasn't noticed change in HA, for depression   GONB - helps   Cymbalta - unsure, for depression   Zomig - helps   Maxalt - no   Botox - helping     Current Medications:    amitriptyline (ELAVIL) 25 MG tablet, Take 1 tablet (25 mg total) by mouth nightly as needed for Insomnia., Disp: 30 tablet, Rfl: 1    DULoxetine (CYMBALTA) 60 MG capsule, Take 1 capsule (60 mg total) by mouth once daily., Disp: 30 capsule, Rfl: 3    DULoxetine (CYMBALTA) 60 MG capsule, Take 1 capsule (60 mg total) by mouth once daily., Disp: 30 capsule, Rfl: 5    erenumab-aooe (AIMOVIG AUTOINJECTOR) 70 mg/mL AtIn, Inject 140 mg into the skin every 28 days., Disp: 2 mL, Rfl: 11    meloxicam (MOBIC) 15 MG tablet, Take 1 tablet (15 mg total) by mouth once  "daily., Disp: 30 tablet, Rfl: 1    promethazine (PHENERGAN) 25 MG tablet, Take 1 tab by mouth every 4-6 hours as needed for nausea., Disp: 20 tablet, Rfl: 5    tiZANidine (ZANAFLEX) 2 MG tablet, Take 1 to 2 tablets by mouth at bedtime as needed.  ( No alcohol or driving with medication. ), Disp: 60 tablet, Rfl: 5    zolpidem (AMBIEN) 10 mg Tab, Take 1 tablet (10 mg total) by mouth nightly as needed (insomnia)., Disp: 30 tablet, Rfl: 2    cloNIDine (CATAPRES) 0.2 MG tablet, 0.2 mg 3 (three) times daily. , Disp: , Rfl: 2    ketorolac 15.75 mg/spray Spry, 15.75 mg by Nasal route every 6 to 8 hours as needed., Disp: 5 each, Rfl: 5    ZOLMitriptan (ZOMIG) 5 mg Spry, 1 spray in nostril at onset of migraine, can repeat in 1 hours if needed. Max 2 sprays/day. Max 3 days/week., Disp: 12 each, Rfl: 5    Current Facility-Administered Medications:     onabotulinumtoxina injection 200 Units, 200 Units, Intramuscular, Q90 Days, Lucirocío Ortez, FNP, 200 Units at 08/08/18 1342    Review of Systems - as per HPI, otherwise a balanced 10 systems review is negative.    OBJECTIVE:  Vitals:  /77 (BP Location: Left arm, Patient Position: Sitting, BP Method: Large (Automatic))   Ht 5' 5" (1.651 m)   Wt 70.3 kg (155 lb)   LMP 12/14/2000   BMI 25.79 kg/m²     Physical Exam:  Constitutional: she appears well-developed and well-nourished. she is well groomed. NAD   HENT:    Head: Normocephalic and atraumatic  Eyes: Conjunctivae and EOM are normal  Musculoskeletal: Normal range of motion. No joint stiffness.   Skin: Skin is warm and dry.  Psychiatric: Mood and affect are normal    Neuro: Patient is alert and oriented to person, place, and time. Language is intact and fluent.  Recent and remote memory are intact.  Normal attention and concentration.  Facial movement is symmetric.  Gait is normal.     Review of Data:   Notes from psychiatry reviewed.   Labs:  No visits with results within 3 Month(s) from this visit.   Latest " known visit with results is:   Hospital Outpatient Visit on 04/25/2018   Component Date Value Ref Range Status    Calculated EF 04/25/2018 55  55 - 65 Final    Mitral Valve Regurgitation 04/25/2018 TRIVIAL   Final    Diastolic Dysfunction 04/25/2018 No   Final     Imaging:  Results for orders placed or performed during the hospital encounter of 09/28/17   MRV Brain Without Contrast    Narrative    Procedure: MRV of the head without contrast    Technique: Noncontrast 2-D time-of-flight MRV of the head with coronal and sagittal source imaging and 3-dimensional mip projection views.      Comparison: None    Results:The superior sagittal sinus is patent.  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  The paired internal cerebral veins and basal veins of Jay are patent.  The vein of Zac and torcula Herophili are patent.  The straight sinus is patent.  The bilateral transverse and sigmoid dural venous sinuses are patent to the jugular foramen.    Impression     Unremarkable noncontrast MRV specifically without evidence for venous sinus thrombosis..      Electronically signed by: VIOLETTE CARO DO  Date:     09/28/17  Time:    10:43    MRI Brain W WO Contrast    Narrative    Procedure: MRI the brain with andwithout contrast.    Technique: Sagittal and axial T1, axial T2, axial FLAIR, axial gradient, axial diffusion imaging of the whole brain pre-contrast with postcontrast axial T1 and axial spoiled gradient imaging reformatted in the coronal and sagittal planes.. 10 ml of Gadavist injected intravenously.         Comparison: None    Findings: The brain parenchyma is normal in contour. There is a small focus of T2 flair signal hyperintensity in the right lateral putamen suggestive for remote lacunar type infarct with diffusion hyperintensity without restriction compatible with T2 shine through. There are no abnormal areas of parenchymal enhancement. There are no areas are restricted  diffusion to suggest acute infarction. A few additional punctate foci of T2 flair signal hyperintensity supratentorial white matter which are nonspecific and may represent sequela migraine headaches in light of history. The ventricles are normal in size and configuration without evidence for hydrocephalus. There are no abnormal areas of the gradient susceptibility to suggest parenchymal hemorrhage. No abnormal intra or extra axial fluid collections. The major intracranial T2  flow-voids are present.    Impression     Small T2 flair hyperintense focus right lateral putamen with diffusion hyperintensity. 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    A few scattered punctate regions of T2 flair signal hyperintensity elsewhere supratentorial parenchyma while overall nonspecific in light of the given history this may be sequela migraine headaches. Alternative differential including prior demyelination to be considered.    otherwise unremarkable MRI brain specifically without evidence for acute infarction or enhancing lesion.          Electronically signed by: VIOLETTE CARO DO  Date:     09/28/17  Time:    10:49    MRA Brain without contrast    Narrative    Procedure: MRA of the head and neck    Technique: noncontrast 3-D time of flight MRA head and postcontrast 3-D time-of-flight MRA head and neck. 10 ml of Gadavist injected intravenously.    Comparison: None    Findings:    MRA head:    Anterior circulation:  The bilateral distal cervical, Gabby, cavernous and supraclinoid segments of the ICA's and the visualized bilateral anterior and middle cerebral arteries are patent without evidence for significant focal stenosis or aneurysm. There is a left PCOM.    Posterior circulation: The distal vertebral arteries, basilar artery and posterior cerebral arteries are patent without evidence for significant focal stenosis or aneurysm.    MRA neck: The origins of the right brachycephalic,  left common carotid and  left subclavian arteries from the arch are within normal limits. The origins of the vertebral arteries from the subclavian arteries are within normal limits. The vertebral arteries are unremarkable throughout their course without evidence for focal stenosis or occlusion.    Right carotid: The right common carotid artery, carotid bifurcation and extracranial portions of the internal carotid artery are patent without evidence for focal stenosis or occlusion.    Left carotid: The left common carotid artery, carotid bifurcation and extra cranial portions of the internal carotid artery are patent without evidence for focal stenosis or occlusion.    There is less than 50% proximal ICA stenosis by NASCET criteria.    Impression     Unremarkable MRA of the head specifically without evidence for focal stenosis or intracranial aneurysm.    Unremarkable MRA of the neck without evidence for significant focal stenosis or occlusion.      Electronically signed by: VIOLETTE CARO DO  Date:     09/28/17  Time:    10:40      Note: I have independently reviewed any/all imaging/labs/tests and agree with the report (s) as documented.  Any discrepancies will be as noted/demarcated by free text.  ARA HDZ 11/1/2018    ASSESSMENT:  1. Chronic migraine    2. Migraine without aura and without status migrainosus, not intractable    3. Essential hypertension    4. LALITA (generalized anxiety disorder)    5. Recurrent major depressive disorder, remission status unspecified      PLAN:  - Botox administered in clinic for Chronic Migraine (see below)   - Continue Aimovig, tizanidine, cymbalta, and amitriptyline daily for migraine prevention   - For migraine abortive - has zomig NS available   - For rescue - has Sprix NS available   - No refills needed at this time   - HTN - blood pressure well controlled on clonidine, management per cardiology   - MDD/LALITA - well controlled on cymbalta and elavil, management per psychiatry who she sees regularly   -  RTC in 12 weeks for repeat Botox injections or sooner if needed     All questions and concerns addressed.  Patient verbalizes understanding and is agreeable with the above stated treatment plan.      PROCEDURE NOTE:  BOTOX was performed as an indicated therapy for intractable chronic migraine headaches given that the patient failed more than 2 headache medications    Two patient identifiers were confirmed with the patient prior to performing this procedure. A time out to determine correct patient and and agreement on procedure performed was conducted prior to the procedure.      Botulinum Toxin Injection Procedure   Procedure: Chemical neurolysis   After risks and benefits were explained including bleeding, infection, worsening of pain, damage to the areas being injected, weakness of muscles, loss of muscle control, dysphagia if injecting the head or neck, facial droop if injecting the facial area, painful injection, allergic or other reaction to the medications being injected, and the failure of the procedure to help the problem, a signed consent was obtained.   The patient was placed in a comfortable area and the sites to be treated were identified.The area to be treated was prepped three times with alcohol and the alcohol allowed to dry. Next, a 30 gauge needle was used to inject the medication in the area to be treated.      Total Botox used: 155 Units   Botox wastage: 45 Units     Injection sites:    muscle bilaterally ( a total of 10 units divided into 2 sites)   Procerus muscle (5 units)   Frontalis muscle bilaterally (a total of 20 units divided into 4 sites)   Temporalis muscle bilaterally (a total of 40 units divided into 8 sites)   Occipitalis muscle bilaterally (a total of 30 units divided into 6 sites)   Cervical paraspinal muscles (a total of 20 units divided into 4 sites)   Trapezius muscle bilaterally (a total of 30 units divided into 6 sites)   Complications: none   RTC for the next Botox  injection: 12 weeks     CC: MD Luci Echols, AVERYP-C  [facility]   [phone]

## 2018-11-05 ENCOUNTER — TELEPHONE (OUTPATIENT)
Dept: PHARMACY | Facility: CLINIC | Age: 43
End: 2018-11-05

## 2018-11-06 NOTE — PROGRESS NOTES
"Outpatient Psychiatry Follow-Up Visit (MD/NP)    11/7/2018    Clinical Status of Patient:  Outpatient (Ambulatory)    Chief Complaint:  Mirna Pollock is a 42 y.o. female who presents today for follow-up of depression, anxiety and attention problems.  Met with patient.      Interval History and Content of Current Session:  Interim Events/Subjective Report/Content of Current Session:   Chart reviewed, last seen by me on 9/26/18, continued Cymbalta and Ambien, initiated trial of Elavil; continued to hold Vyvanse until cardiology clearance. Has followed up with neurology since last visit but not cardiology.    Patient says she has been doing well since last visit, Elavil helping with sleep and mood. Less tearful, less frequent "bad days." Migraines less frequent. Taking Elavil and Ambien around 7 pm, works well without sedation when she wakes up at 3:30 am (works early hours as  in OR). Still adjusting with stepkids now in [place], says they're doing well at school but want to move back in with them. Fibromyalgia sxs under control. Appears less anxious.       Review of Systems   · PSYCHIATRIC: Pertinant items are noted in the narrative.  · NEUROLOGIC: No weakness, sensory changes, seizures, confusion, memory loss, headaches, tremor or other abnormal movements.  · CARDIOVASCULAR: No tachycardia or chest pain.  · GASTROINTESTINAL: No abdominal pain, nausea, vomiting, diarrhea, or constipation    Past Medical, Family and Social History: The patient's past medical, family and social history have been reviewed and updated as appropriate within the electronic medical record - see encounter notes.    Previous psychiatric treatment and medication trials: Vyvanse, Prozac, Klonopin, Lexapro (diarrhea)    Compliance: yes    Side effects: None    Risk Parameters:  Patient reports no suicidal ideation  Patient reports no homicidal ideation  Patient reports no self-injurious behavior  Patient reports no violent " "behavior    Exam (detailed: at least 9 elements; comprehensive: all 15 elements)   Constitutional  Vitals:  Most recent vital signs, dated less than 90 days prior to this appointment, were reviewed.   Vitals:    11/07/18 1345   Weight: 73.8 kg (162 lb 9.4 oz)   Height: 5' 5" (1.651 m)      General:  unremarkable, age appropriate     Musculoskeletal  Muscle Strength/Tone:  no spasicity, no rigidity   Gait & Station:  non-ataxic     Psychiatric  Speech:  no latency; no press   Mood & Affect:  steady  congruent and appropriate   Thought Process:  normal and logical   Associations:  intact   Thought Content:  normal, no suicidality, no homicidality, delusions, or paranoia   Insight:  intact   Judgement: behavior is adequate to circumstances   Orientation:  grossly intact   Memory: intact for content of interview   Language: grossly intact, able to name, able to repeat   Attention Span & Concentration:  able to focus   Fund of Knowledge:  intact and appropriate to age and level of education     Assessment and Diagnosis   Status/Progress: Based on the examination today, the patient's problem(s) is/are resolved.  New problems have not been presented today.   Co-morbidities and Diagnostic uncertainty are complicating management of the primary condition.  The working differential for this patient includes concentration issues 2/2 ADHD vs anxiety.      General Impression:   MDD, recurrent, in partial remission  LALITA  Social Anxiety Disorder  R/o ADHD  H/o fibromyalgia    Intervention/Counseling/Treatment Plan   · Continue Cymbalta 60mg daily for LALITA, fibromyalgia  · Continue Elavil 25 mg qHS for insomnia, migraines, insomnia  · Continue Ambien 5-10mg nightly PRN for insomnia   · Diagnostic uncertainty remaining about origin about concentration issues 2/2 ADHD/anxiety. Will continue to hold Vyvanse 50 mg qAM until cleared by cardiologist. Saw pediatric cardiologist on 4/25/18 who noted need for better BP control and referred to " adult cardiologist who patient has not yet seen.   · Referred to [facility] for CBT/supportive therapy   · Discussed diagnosis, risks and benefits of proposed treatment above vs alternative treatments vs no treatment, and potential side effects of these treatments. The patient expresses understanding of the above and displays the capacity to agree with this treatment given said understanding. Patient also agrees that, currently, the benefits outweigh the risks and would like to pursue treatment at this time.    Return to Clinic: 3 months or sooner if needed. Discussed maternity leave and coverage.    Kristi Asher MD  Psychiatry, [facility] PGY3

## 2018-11-07 ENCOUNTER — OFFICE VISIT (OUTPATIENT)
Dept: PSYCHIATRY | Facility: CLINIC | Age: 43
End: 2018-11-07
Payer: COMMERCIAL

## 2018-11-07 VITALS — BODY MASS INDEX: 27.09 KG/M2 | WEIGHT: 162.56 LBS | HEIGHT: 65 IN

## 2018-11-07 DIAGNOSIS — F40.10 SOCIAL ANXIETY DISORDER: Chronic | ICD-10-CM

## 2018-11-07 DIAGNOSIS — M79.7 FIBROMYALGIA: Chronic | ICD-10-CM

## 2018-11-07 DIAGNOSIS — F41.1 GAD (GENERALIZED ANXIETY DISORDER): Primary | Chronic | ICD-10-CM

## 2018-11-07 PROCEDURE — 99999 PR PBB SHADOW E&M-EST. PATIENT-LVL III: CPT | Mod: PBBFAC,,, | Performed by: PSYCHIATRY & NEUROLOGY

## 2018-11-07 PROCEDURE — 3008F BODY MASS INDEX DOCD: CPT | Mod: CPTII,S$GLB,, | Performed by: PSYCHIATRY & NEUROLOGY

## 2018-11-07 PROCEDURE — 99213 OFFICE O/P EST LOW 20 MIN: CPT | Mod: S$GLB,,, | Performed by: PSYCHIATRY & NEUROLOGY

## 2018-11-07 RX ORDER — AMITRIPTYLINE HYDROCHLORIDE 25 MG/1
25 TABLET, FILM COATED ORAL NIGHTLY
Qty: 90 TABLET | Refills: 1 | Status: SHIPPED | OUTPATIENT
Start: 2018-11-07 | End: 2019-02-21 | Stop reason: SDUPTHER

## 2018-11-07 RX ORDER — ZOLPIDEM TARTRATE 10 MG/1
TABLET ORAL
Qty: 30 TABLET | Refills: 2 | Status: SHIPPED | OUTPATIENT
Start: 2018-12-21 | End: 2019-03-06 | Stop reason: SDUPTHER

## 2018-11-07 RX ORDER — DULOXETIN HYDROCHLORIDE 60 MG/1
60 CAPSULE, DELAYED RELEASE ORAL DAILY
Qty: 90 CAPSULE | Refills: 1 | Status: SHIPPED | OUTPATIENT
Start: 2018-11-07 | End: 2019-03-06 | Stop reason: SDUPTHER

## 2018-11-08 NOTE — PROGRESS NOTES
Pt with anxiety is doing better with recent medication adjustments.  Agree with assessment and plan.

## 2018-11-30 DIAGNOSIS — G43.709 CHRONIC MIGRAINE WITHOUT AURA WITHOUT STATUS MIGRAINOSUS, NOT INTRACTABLE: ICD-10-CM

## 2018-11-30 RX ORDER — PROMETHAZINE HYDROCHLORIDE 25 MG/1
TABLET ORAL
Qty: 20 TABLET | Refills: 5 | Status: SHIPPED | OUTPATIENT
Start: 2018-11-30 | End: 2019-02-21 | Stop reason: SDUPTHER

## 2018-12-10 ENCOUNTER — TELEPHONE (OUTPATIENT)
Dept: PHARMACY | Facility: CLINIC | Age: 43
End: 2018-12-10

## 2019-01-14 DIAGNOSIS — G43.709 CHRONIC MIGRAINE WITHOUT AURA WITHOUT STATUS MIGRAINOSUS, NOT INTRACTABLE: Primary | ICD-10-CM

## 2019-01-16 ENCOUNTER — OFFICE VISIT (OUTPATIENT)
Dept: OPTOMETRY | Facility: CLINIC | Age: 44
End: 2019-01-16

## 2019-01-16 ENCOUNTER — OFFICE VISIT (OUTPATIENT)
Dept: OPTOMETRY | Facility: CLINIC | Age: 44
End: 2019-01-16
Payer: COMMERCIAL

## 2019-01-16 DIAGNOSIS — H52.11 MYOPIA WITH PRESBYOPIA OF RIGHT EYE: Primary | ICD-10-CM

## 2019-01-16 DIAGNOSIS — H52.4 MYOPIA WITH ASTIGMATISM AND PRESBYOPIA, LEFT: ICD-10-CM

## 2019-01-16 DIAGNOSIS — H52.202 MYOPIA WITH ASTIGMATISM AND PRESBYOPIA, LEFT: ICD-10-CM

## 2019-01-16 DIAGNOSIS — H52.12 MYOPIA WITH ASTIGMATISM AND PRESBYOPIA, LEFT: ICD-10-CM

## 2019-01-16 DIAGNOSIS — H52.4 MYOPIA WITH PRESBYOPIA OF RIGHT EYE: Primary | ICD-10-CM

## 2019-01-16 DIAGNOSIS — Z46.0 FITTING AND ADJUSTMENT OF SPECTACLES AND CONTACT LENSES: ICD-10-CM

## 2019-01-16 PROCEDURE — 92004 COMPRE OPH EXAM NEW PT 1/>: CPT | Mod: S$GLB,,, | Performed by: OPTOMETRIST

## 2019-01-16 PROCEDURE — 99999 PR PBB SHADOW E&M-EST. PATIENT-LVL II: CPT | Mod: PBBFAC,,, | Performed by: OPTOMETRIST

## 2019-01-16 PROCEDURE — 92310 CONTACT LENS FITTING OU: CPT | Mod: ,,, | Performed by: OPTOMETRIST

## 2019-01-16 PROCEDURE — 92015 DETERMINE REFRACTIVE STATE: CPT | Mod: S$GLB,,, | Performed by: OPTOMETRIST

## 2019-01-16 PROCEDURE — 92310 PR CONTACT LENS FITTING (NO CHANGE): ICD-10-PCS | Mod: ,,, | Performed by: OPTOMETRIST

## 2019-01-16 PROCEDURE — 99999 PR PBB SHADOW E&M-EST. PATIENT-LVL II: ICD-10-PCS | Mod: PBBFAC,,, | Performed by: OPTOMETRIST

## 2019-01-16 PROCEDURE — 92015 PR REFRACTION: ICD-10-PCS | Mod: S$GLB,,, | Performed by: OPTOMETRIST

## 2019-01-16 PROCEDURE — 92004 PR EYE EXAM, NEW PATIENT,COMPREHESV: ICD-10-PCS | Mod: S$GLB,,, | Performed by: OPTOMETRIST

## 2019-01-16 NOTE — PROGRESS NOTES
HPI     Pt here for contact lens fitting and routine eye exam. Pt states she   feels her current lenses dry out fast and sometimes she has to take them.   Pt does not sleep with her lenses but did when she was younger.    LESLI: 2018  Glasses? Yes  Contacts? Yes  H/o eye surgery: No  H/o eye injury: No  Known eye conditions? No    (-) Flashes/Floaters (-) Mucous (-) Tearing (-) Itching (-) Burning  (-) Eye Pain (-) Irritation (-) Redness  (-) Double vision (+) Blurry   vision up close without readers.    Eye gtt? No only saline solution      Last edited by Grabiel Paulson on 1/16/2019  1:36 PM. (History)        ROS     Negative for: Constitutional, Gastrointestinal, Neurological, Skin,   Genitourinary, Musculoskeletal, HENT, Endocrine, Cardiovascular, Eyes,   Respiratory, Psychiatric, Allergic/Imm, Heme/Lymph    Last edited by Fernando Murphy, OD on 1/16/2019  1:57 PM. (History)        Assessment /Plan     For exam results, see Encounter Report.    Myopia with presbyopia of right eye    Myopia with astigmatism and presbyopia, left      1. SRx updated. 1st time bifocal CL wearer. NVA likely better when undilated. RTC 2 weeks CLFU. Normal ocular health today.

## 2019-01-22 ENCOUNTER — PATIENT MESSAGE (OUTPATIENT)
Dept: OPTOMETRY | Facility: CLINIC | Age: 44
End: 2019-01-22

## 2019-01-22 ENCOUNTER — OFFICE VISIT (OUTPATIENT)
Dept: OPTOMETRY | Facility: CLINIC | Age: 44
End: 2019-01-22
Payer: COMMERCIAL

## 2019-01-22 DIAGNOSIS — H52.4 MYOPIA WITH PRESBYOPIA OF BOTH EYES: Primary | ICD-10-CM

## 2019-01-22 DIAGNOSIS — H10.13 ALLERGIC CONJUNCTIVITIS OF BOTH EYES: ICD-10-CM

## 2019-01-22 DIAGNOSIS — H52.13 MYOPIA WITH PRESBYOPIA OF BOTH EYES: Primary | ICD-10-CM

## 2019-01-22 PROCEDURE — 92499 UNLISTED OPH SVC/PROCEDURE: CPT | Mod: ,,, | Performed by: OPTOMETRIST

## 2019-01-22 PROCEDURE — 92499 PR CONTACT LENS F/U LEV 1: ICD-10-PCS | Mod: ,,, | Performed by: OPTOMETRIST

## 2019-01-22 NOTE — PROGRESS NOTES
HPI     Pt here for contact lens f/u. Pt states the lenses work well and she has   no problem with them except she ripped one of them.    Last edited by Grabiel Paulson on 1/22/2019  9:09 AM. (History)        ROS     Negative for: Constitutional, Gastrointestinal, Neurological, Skin,   Genitourinary, Musculoskeletal, HENT, Endocrine, Cardiovascular, Eyes,   Respiratory, Psychiatric, Allergic/Imm, Heme/Lymph    Last edited by Fernando Murphy, OD on 1/22/2019  9:19 AM. (History)        Assessment /Plan     For exam results, see Encounter Report.    Myopia with presbyopia of both eyes    Allergic conjunctivitis of both eyes      1. Pt doing well w/CL. CLRX finalized. RTC 1 year.   2. Rec Zaditor or [place] BID OU

## 2019-01-23 ENCOUNTER — OFFICE VISIT (OUTPATIENT)
Dept: PSYCHIATRY | Facility: CLINIC | Age: 44
End: 2019-01-23
Payer: COMMERCIAL

## 2019-01-23 VITALS — WEIGHT: 165.69 LBS | BODY MASS INDEX: 27.57 KG/M2

## 2019-01-23 DIAGNOSIS — F41.1 GAD (GENERALIZED ANXIETY DISORDER): Primary | Chronic | ICD-10-CM

## 2019-01-23 DIAGNOSIS — M79.7 FIBROMYALGIA: Chronic | ICD-10-CM

## 2019-01-23 DIAGNOSIS — F40.10 SOCIAL ANXIETY DISORDER: Chronic | ICD-10-CM

## 2019-01-23 PROCEDURE — 99213 PR OFFICE/OUTPT VISIT, EST, LEVL III, 20-29 MIN: ICD-10-PCS | Mod: S$GLB,,, | Performed by: PSYCHIATRY & NEUROLOGY

## 2019-01-23 PROCEDURE — 99213 OFFICE O/P EST LOW 20 MIN: CPT | Mod: S$GLB,,, | Performed by: PSYCHIATRY & NEUROLOGY

## 2019-01-23 PROCEDURE — 3008F BODY MASS INDEX DOCD: CPT | Mod: CPTII,S$GLB,, | Performed by: PSYCHIATRY & NEUROLOGY

## 2019-01-23 PROCEDURE — 99999 PR PBB SHADOW E&M-EST. PATIENT-LVL III: CPT | Mod: PBBFAC,,, | Performed by: PSYCHIATRY & NEUROLOGY

## 2019-01-23 PROCEDURE — 99999 PR PBB SHADOW E&M-EST. PATIENT-LVL III: ICD-10-PCS | Mod: PBBFAC,,, | Performed by: PSYCHIATRY & NEUROLOGY

## 2019-01-23 PROCEDURE — 3008F PR BODY MASS INDEX (BMI) DOCUMENTED: ICD-10-PCS | Mod: CPTII,S$GLB,, | Performed by: PSYCHIATRY & NEUROLOGY

## 2019-01-23 NOTE — PROGRESS NOTES
"Outpatient Psychiatry Follow-Up Visit (MD/NP)    2019    Clinical Status of Patient:  Outpatient (Ambulatory)    Chief Complaint:  Mirna Pollock is a 43 y.o. female who presents today for follow-up of depression, anxiety and attention problems.  Met with patient.      Interval History and Content of Current Session:  Interim Events/Subjective Report/Content of Current Session:   Chart reviewed, last seen by me 18, continued on medications; continued to hold Vyvanse until cardiology clearance.     Grandmother  over Thanksgiving holiday, also recently (2 weeks ago) in touch with biological family online (uncle; was adopted) which has been source of stress and anxiety but coping well.  has been supportive and source of . Has not been in touch with biological mother, says "I just want answers." Insomnia x1 week after finding out but now improved. Anxiety and fibromyalgia sxs under control. No complaints.       Review of Systems   · PSYCHIATRIC: Pertinant items are noted in the narrative.  · NEUROLOGIC: No weakness, sensory changes, seizures, confusion, memory loss, headaches, tremor or other abnormal movements.  · CARDIOVASCULAR: No tachycardia or chest pain.  · GASTROINTESTINAL: No abdominal pain, nausea, vomiting, diarrhea, or constipation    Past Medical, Family and Social History: The patient's past medical, family and social history have been reviewed and updated as appropriate within the electronic medical record - see encounter notes.    Previous psychiatric treatment and medication trials: Vyvanse, Prozac, Klonopin, Lexapro (diarrhea)    Compliance: yes    Side effects: None    Risk Parameters:  Patient reports no suicidal ideation  Patient reports no homicidal ideation  Patient reports no self-injurious behavior  Patient reports no violent behavior    Exam (detailed: at least 9 elements; comprehensive: all 15 elements)   Constitutional  Vitals:  Most recent vital signs, dated " less than 90 days prior to this appointment, were reviewed.   Vitals:    01/23/19 1051   Weight: 75.1 kg (165 lb 10.8 oz)      General:  unremarkable, age appropriate, purple hair     Musculoskeletal  Muscle Strength/Tone:  no spasicity, no rigidity   Gait & Station:  non-ataxic     Psychiatric  Speech:  no latency; no press   Mood & Affect:  steady  congruent and appropriate   Thought Process:  normal and logical   Associations:  intact   Thought Content:  normal, no suicidality, no homicidality, delusions, or paranoia   Insight:  intact   Judgement: behavior is adequate to circumstances   Orientation:  grossly intact   Memory: intact for content of interview   Language: grossly intact, able to name, able to repeat   Attention Span & Concentration:  able to focus   Fund of Knowledge:  intact and appropriate to age and level of education     Assessment and Diagnosis   Status/Progress: Based on the examination today, the patient's problem(s) is/are resolved.  New problems have not been presented today.   Co-morbidities and Diagnostic uncertainty are complicating management of the primary condition.  The working differential for this patient includes concentration issues 2/2 ADHD vs anxiety.      General Impression:   MDD, recurrent, in partial remission  LALITA  Social Anxiety Disorder  R/o ADHD  H/o fibromyalgia    Intervention/Counseling/Treatment Plan   · Continue Cymbalta 60mg daily for LALITA, fibromyalgia  · Continue Elavil 25 mg qHS for insomnia, migraines, insomnia  · Continue Ambien 5-10mg nightly PRN for insomnia   · Diagnostic uncertainty remaining about origin about concentration issues 2/2 ADHD/anxiety. Will continue to hold Vyvanse 50 mg qAM until cleared by cardiologist. Saw pediatric cardiologist on 4/25/18 who noted need for better BP control and referred to adult cardiologist who patient has not yet seen.   · Referred to [facility] for CBT/supportive therapy   · Discussed diagnosis, risks and  benefits of proposed treatment above vs alternative treatments vs no treatment, and potential side effects of these treatments. The patient expresses understanding of the above and displays the capacity to agree with this treatment given said understanding. Patient also agrees that, currently, the benefits outweigh the risks and would like to pursue treatment at this time.    Return to Clinic: 6 weeks or sooner if needed.     Kristi Asher MD  Psychiatry, [facility] PGY3

## 2019-01-23 NOTE — PROGRESS NOTES
Pt with anxiety is coping with family situation.  Agree with medication and therapy recommendations.

## 2019-01-24 ENCOUNTER — TELEPHONE (OUTPATIENT)
Dept: PHARMACY | Facility: CLINIC | Age: 44
End: 2019-01-24

## 2019-01-24 NOTE — TELEPHONE ENCOUNTER
Refill call for Aimovig. Verified that patient will pickup at [facility] on Friday 1/25/19. $5.00 copay at 004.     José Luis Carmona, PharmD  Clinical Pharmacist  [facility]  P: [phone]

## 2019-02-05 ENCOUNTER — PATIENT MESSAGE (OUTPATIENT)
Dept: NEUROLOGY | Facility: CLINIC | Age: 44
End: 2019-02-05

## 2019-02-05 RX ORDER — HYDROXYZINE PAMOATE 25 MG/1
CAPSULE ORAL
Qty: 12 CAPSULE | Refills: 0 | Status: SHIPPED | OUTPATIENT
Start: 2019-02-05 | End: 2019-02-21 | Stop reason: SDUPTHER

## 2019-02-14 ENCOUNTER — PROCEDURE VISIT (OUTPATIENT)
Dept: NEUROLOGY | Facility: CLINIC | Age: 44
End: 2019-02-14
Payer: COMMERCIAL

## 2019-02-14 VITALS — BODY MASS INDEX: 27.49 KG/M2 | HEIGHT: 65 IN | WEIGHT: 165 LBS

## 2019-02-14 DIAGNOSIS — M79.7 FIBROMYALGIA: Chronic | ICD-10-CM

## 2019-02-14 DIAGNOSIS — G43.009 MIGRAINE WITHOUT AURA AND WITHOUT STATUS MIGRAINOSUS, NOT INTRACTABLE: ICD-10-CM

## 2019-02-14 DIAGNOSIS — F41.1 GAD (GENERALIZED ANXIETY DISORDER): Chronic | ICD-10-CM

## 2019-02-14 DIAGNOSIS — I10 ESSENTIAL HYPERTENSION: ICD-10-CM

## 2019-02-14 PROCEDURE — 64615 PR CHEMODENERVATION OF MUSCLE FOR CHRONIC MIGRAINE: ICD-10-PCS | Mod: S$GLB,,, | Performed by: NURSE PRACTITIONER

## 2019-02-14 PROCEDURE — 99213 OFFICE O/P EST LOW 20 MIN: CPT | Mod: 25,S$GLB,, | Performed by: NURSE PRACTITIONER

## 2019-02-14 PROCEDURE — 64615 CHEMODENERV MUSC MIGRAINE: CPT | Mod: S$GLB,,, | Performed by: NURSE PRACTITIONER

## 2019-02-14 PROCEDURE — 99213 PR OFFICE/OUTPT VISIT, EST, LEVL III, 20-29 MIN: ICD-10-PCS | Mod: 25,S$GLB,, | Performed by: NURSE PRACTITIONER

## 2019-02-14 NOTE — PROCEDURES
"SUBJECTIVE:  Patient ID: Mirna Pollock  Chief Complaint: Follow-up and Botulinum Toxin Injection    History of Present Illness:  Mirna Pollock is a 43 y.o. female who presents to clinic alone for follow-up of headaches and Botox injections.     Recommendations made at last Office Visit on 11/1/2018:  - Botox administered in clinic for Chronic Migraine (see below)   - Continue Aimovig, tizanidine, cymbalta, and amitriptyline daily for migraine prevention   - For migraine abortive - has zomig NS available   - For rescue - has Sprix NS available   - No refills needed at this time   - HTN - blood pressure well controlled on clonidine, management per cardiology   - LALITA - well controlled on cymbalta and elavil, management per psychiatry who she sees regularly   - RTC in 12 weeks for repeat Botox injections or sooner if needed     02/14/2019- Interval History:  Headaches and migraines continue to be "much better", "I am not getting them nearly as much", though she is keenly aware of when a cold front is coming through as she will always get a migraine.  She has continued using Aimovig monthly as well as taking tizanidine, cymbalta, and amitriptyline daily for migraine prevention.  She is very happy with the current state of her migraines and does not wish to make any adjustments to her treatment plan at this time.  Migraines continue to feel the same as they always have.    11/01/2018- Interval History:  Headaches have been significantly better, she has only had 3-4 migraines since her last round of Botox and is very happy Botox is helping decrease her migraine frequency.  She has been using Aimovig monthly, which she also feels has been helping decrease her migraine frequency.  Migraines continue to feel the same as they always have, she denies any change in the quality or nature of her migraines.  Wishes to continue with Botox injections due to the pronounced effect it has had on her migraines.      08/08/2018- " Interval History:  Finds her migraines are no better since last round of Botox, admits she was under increased stress at one point due to issues with her 's job.  She does know a lot of her migraines are triggered from stress.  She has been getting a lot of migraines above her eyes as well as throughout her occipitalis and neck.  Overall, she does wish to continue with Botox injections, but is open to discussing additional prevention options.      05/22/2018- Interval History:  Despite nerve blocks two weeks ago, she has been suffering with a headache nearly everyday since her last visit.  She did try Zomig nasal spray, however she took the first dose on day two of her migraine because she was nervous.  She does think it lessened the pain of her migraine, however did not abort it.  She did not repeat the dose.  Additionally, she continues to report poor sleep nightly.  She stopped drinking caffeine.  Headaches are the same as they have always been.  She denies any change in the nature or quality of her headaches. Risks, Benefits, and potential side effects of Botox discussed with patient.  Alternative treatments offered.  After thorough discussed regarding the procedure, patient has decided to move forward with initiating Botox injections for Chronic Migraine.  Patient understands we will complete 3 rounds of injections, if after 3 rounds, we do not see a 50% reduction in headaches, we will discontinue Botox injections.      05/11/2018 - Interval History:  This past week she has only been able to work two days, has missed 3 days of work this week due to migraines.  She still is unsure about getting Botox injections for chronic migraine, she would like to try it but her  does not feel this is a good treatment option, she is trying to convince him.  Of note, blood pressure significantly lower in today's visit than in past visits.  She reports her psychiatrist has discontinued vyvanse until she is given  clearance from Cardio to restart taking Vyvanse.  She was seen by a pediatric cardiologist who referred her to an adult cardiologist, however she has not seen this provider, had to cancel appointment due to having a headache.  She is requesting repeat nerve blocks today.      01/29/2018 - Interval History:  She has been having a terrible migraine for the last 3 days, she has tried numerous medications without any relief.  States she tried to go to work today, but she had to leave because her headache got so bad.  She is requesting to repeat the nerve blocks as those shots gave her relief form 6-8 weeks in the past.  She has done research on the Botox injections and states her  is very hesitant for her to try Botox injections for her migraines.  She has not been seen by her PCP nor cardiologist for further management of her blood pressure.  Discussed the challenges of treating her migraines with uncontrolled hypertension because I cannot prescribe her any triptan therapy until her blood pressure is under better control.  She has continued to take Cymbalta daily for her depression, she is unsure if this is giving her any relief with regards to her headaches or not.       01/15/2018 - Interval History:  She was approved for Botox injections, but was a no-show to her appointment last week.  States she spoke with her  about the injections and she was very nervous to try the Botox injections for her migraines.    She continues to experience headaches on 1-4 days per week with migraines occurring 1-2 days per week.  Recently she has been experiencing more migraines and headaches than usually, but admits she has been going through a lot emotionally as well as she got sick with the flu.  She has continued to regularly follow-up with her psychiatrist who changed her anti-depressant, she is now taking Cymbalta.  Since change in medication, she feels she has been experiencing a mild headache each day.  She has had  to miss a few days of work due to her migraines since last visit.  Additionally, she feels occasionally she begins to get dizzy, this occurs with or without headaches, she has an appointment with her eye doctor next week as her vision is also getting progressively worse.  She did not find vistaril to be useful with regards to her headaches.  She does feel Zanaflex helps with her neck as well as helping to relax her neck muscles.  When she gets a migraine she has been taking Vistaril and Ibuprofen together which does help the pain, she also has been trying Excedrin migraines which also gives her some relief.  Patient with known HTN on multiple anti-hypertensive agents, blood pressure significantly elevated in clinic today.  She is not symptomatic.  States she needs to follow-up with her primary care (who is outside the [facility] system) for better management.       10/19/2017 - Interval History:  - Headaches have persisted in similar frequency, which she is happy about as she has been under increased stress with regards to her 's health, he has been suffering with his Gout, just got out of the hospital last week and has had to take time off work etc   - Blood pressure continues to be elevated despite numerous anti-hypertensive, states today is good for her (currently 141/102), has been regularly following up with her PCP for management   - Is very excited because she is going [place] [place]early December   - Has continued to have a headache nearly everyday, not necessarily a migraine, however migraine have been at least once per month    - Has noticed more recently that she feels like she has intense pressure behind her eyes and across the bridge of her nose that slightly feels like sinus pressure, this occurs 1-2 times per week   - Has been seeing a chiropractor as well as a masseuse regularly which she does feel has been helping   - Would still like to move  forward with the Botox injections   - Does not feel adjustments to her medications are necessary at this time      08/17/2017 - Interval History:  - Had been seeing Iker Beth PA-C   - Was taking Topamax, Zanaflex, and Vistaril - however has been out of medications for about 3 months  - Since being off the medication, she had been okay, however recently noticed migraines were beginning to occur more frequently with increased intensity and duration   - She finds the nerve blocks had been helping a lot, as long as she is consistent with getting the nerve block, however hasn't had this procedure for over a year   - Has been getting 5-6 migraines per month, each lasting 1-4 days in length   - Current migraine has been going on since last Friday (6 days)  - Blood pressure significantly elevated in clinic today, states her PCP is managing her medications in order to get her blood pressure better controlled   - Is requesting refills of medication as well as repeat nerve occipital and trigeminal nerve blocks      Headache history:   Age of onset -  13  Nature of pain -  Throbbing   Location - bioccipital   Aura -  White floaters   Duration - hrs  Frequency -  2/week  Time of day - anytime   7 days of pain - jaw pain, L neck, L shoulder,  L side throbbing   Associated symptoms:   Meningeal symptoms - photophobia, phonophobia, exercise intolerance +   Nausea/vomitting +   Nasal drainage   Visual symptoms  Pallor/flushing  Vertigo  Stroke symptoms  Confusion  Impaired concentration +  Pain worsened with physical activity +  Neck pain +  Whooshing sounds   Visual spots/blotches +  Triggers:   Stress +   Bright or flickering light  Strong smell  Vigorous exercise  Dietary factors   Visual strain   Motion intolerance as passenger:  No   Resolution of headache with sleep: yes      Therapies Tried & Response:  Tylenol, motrin, alleve - not help  excedrin - not help   imitrex - helped initially   topamax 50 mg - helped  Percocet -  helps   Gabapentin - helps  Amlodipine - for HTN  Bystolic - for HTN  Lexapro - hasn't noticed change in HA, for depression   GONB - helps   Cymbalta - unsure, for depression   Zomig - helps   Maxalt - no   Botox - helping     Current Medications:    amitriptyline (ELAVIL) 25 MG tablet, Take 1 tablet (25 mg total) by mouth every evening., Disp: 90 tablet, Rfl: 1    cloNIDine (CATAPRES) 0.2 MG tablet, 0.2 mg 3 (three) times daily. , Disp: , Rfl: 2    DULoxetine (CYMBALTA) 60 MG capsule, Take 1 capsule (60 mg total) by mouth once daily., Disp: 30 capsule, Rfl: 5    DULoxetine (CYMBALTA) 60 MG capsule, Take 1 capsule (60 mg total) by mouth once daily., Disp: 90 capsule, Rfl: 1    erenumab-aooe (AIMOVIG AUTOINJECTOR) 70 mg/mL AtIn, Inject 140 mg into the skin every 28 days., Disp: 2 mL, Rfl: 11    hydrOXYzine pamoate (VISTARIL) 25 MG Cap, TAKE ONE CAPSULE BY MOUTH EVERY 6 TO 8 HOURS AS NEEDED, Disp: 12 capsule, Rfl: 0    ketorolac 15.75 mg/spray Spry, 15.75 mg by Nasal route every 6 to 8 hours as needed., Disp: 5 each, Rfl: 5    meloxicam (MOBIC) 15 MG tablet, Take 1 tablet (15 mg total) by mouth once daily., Disp: 30 tablet, Rfl: 1    promethazine (PHENERGAN) 25 MG tablet, Take 1 tab by mouth every 4-6 hours as needed for nausea., Disp: 20 tablet, Rfl: 5    tiZANidine (ZANAFLEX) 2 MG tablet, Take 1 to 2 tablets by mouth at bedtime as needed.  ( No alcohol or driving with medication. ), Disp: 60 tablet, Rfl: 5    ZOLMitriptan (ZOMIG) 5 mg Spry, 1 spray in nostril at onset of migraine, can repeat in 1 hours if needed. Max 2 sprays/day. Max 3 days/week., Disp: 12 each, Rfl: 5    zolpidem (AMBIEN) 10 mg Tab, Take ½- to 1 tablet by mouth nightly as needed for  insomnia, Disp: 30 tablet, Rfl: 2    Current Facility-Administered Medications:     onabotulinumtoxina injection 200 Units, 200 Units, Intramuscular, Q90 Days, BC Fleming, 200 Units at 02/14/19 1328    onabotulinumtoxina injection 200 Units,  "200 Units, Intramuscular, Q90 Days, Luci Ortez, FNERIN    Review of Systems - as per HPI, otherwise a balanced 10 systems review is negative.    OBJECTIVE:  Vitals:  Ht 5' 5" (1.651 m)   Wt 74.8 kg (165 lb)   LMP 12/14/2000   BMI 27.46 kg/m²     Physical Exam:  Constitutional: she appears well-developed and well-nourished. she is well groomed. NAD   HENT:    Head: Normocephalic and atraumatic  Eyes: Conjunctivae and EOM are normal  Musculoskeletal: Normal range of motion. No joint stiffness.   Skin: Skin is warm and dry.  Psychiatric: Mood and affect are normal    Neuro: Patient is alert and oriented to person, place, and time. Language is intact and fluent.  Recent and remote memory are intact.  Normal attention and concentration.  Facial movement is symmetric.  Gait is normal.     Review of Data:   Notes from psychiatry, and optometry reviewed.   Labs:  No visits with results within 3 Month(s) from this visit.   Latest known visit with results is:   Hospital Outpatient Visit on 04/25/2018   Component Date Value Ref Range Status    QEF 04/25/2018 55  55 - 65 Final    Mitral Valve Regurgitation 04/25/2018 TRIVIAL   Final    Diastolic Dysfunction 04/25/2018 No   Final     Imaging:  Results for orders placed or performed during the hospital encounter of 09/28/17   MRV Brain Without Contrast    Narrative    Procedure: MRV of the head without contrast    Technique: Noncontrast 2-D time-of-flight MRV of the head with coronal and sagittal source imaging and 3-dimensional mip projection views.      Comparison: None    Results:The superior sagittal sinus is patent.  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  The paired internal cerebral veins and basal veins of Jay are patent.  The vein of Zac and torcula Herophili are patent.  The straight sinus is patent.  The bilateral transverse and sigmoid dural venous sinuses are patent to the jugular foramen.    Impression     " Unremarkable noncontrast MRV specifically without evidence for venous sinus thrombosis..      Electronically signed by: VIOLETTE CARO   Date:     09/28/17  Time:    10:43    MRI Brain W WO Contrast    Narrative    Procedure: MRI the brain with andwithout contrast.    Technique: Sagittal and axial T1, axial T2, axial FLAIR, axial gradient, axial diffusion imaging of the whole brain pre-contrast with postcontrast axial T1 and axial spoiled gradient imaging reformatted in the coronal and sagittal planes.. 10 ml of Gadavist injected intravenously.         Comparison: None    Findings: The brain parenchyma is normal in contour. There is a small focus of T2 flair signal hyperintensity in the right lateral putamen suggestive for remote lacunar type infarct with diffusion hyperintensity without restriction compatible with T2 shine through. There are no abnormal areas of parenchymal enhancement. There are no areas are restricted diffusion to suggest acute infarction. A few additional punctate foci of T2 flair signal hyperintensity supratentorial white matter which are nonspecific and may represent sequela migraine headaches in light of history. The ventricles are normal in size and configuration without evidence for hydrocephalus. There are no abnormal areas of the gradient susceptibility to suggest parenchymal hemorrhage. No abnormal intra or extra axial fluid collections. The major intracranial T2  flow-voids are present.    Impression     Small T2 flair hyperintense focus right lateral putamen with diffusion hyperintensity. 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    A few scattered punctate regions of T2 flair signal hyperintensity elsewhere supratentorial parenchyma while overall nonspecific in light of the given history this may be sequela migraine headaches. Alternative differential including prior demyelination to be considered.    otherwise unremarkable MRI brain specifically without evidence  for acute infarction or enhancing lesion.          Electronically signed by: VIOLETTE CARO DO  Date:     09/28/17  Time:    10:49    MRA Brain without contrast    Narrative    Procedure: MRA of the head and neck    Technique: noncontrast 3-D time of flight MRA head and postcontrast 3-D time-of-flight MRA head and neck. 10 ml of Gadavist injected intravenously.    Comparison: None    Findings:    MRA head:    Anterior circulation:  The bilateral distal cervical, Gabby, cavernous and supraclinoid segments of the ICA's and the visualized bilateral anterior and middle cerebral arteries are patent without evidence for significant focal stenosis or aneurysm. There is a left PCOM.    Posterior circulation: The distal vertebral arteries, basilar artery and posterior cerebral arteries are patent without evidence for significant focal stenosis or aneurysm.    MRA neck: The origins of the right brachycephalic,  left common carotid and left subclavian arteries from the arch are within normal limits. The origins of the vertebral arteries from the subclavian arteries are within normal limits. The vertebral arteries are unremarkable throughout their course without evidence for focal stenosis or occlusion.    Right carotid: The right common carotid artery, carotid bifurcation and extracranial portions of the internal carotid artery are patent without evidence for focal stenosis or occlusion.    Left carotid: The left common carotid artery, carotid bifurcation and extra cranial portions of the internal carotid artery are patent without evidence for focal stenosis or occlusion.    There is less than 50% proximal ICA stenosis by NASCET criteria.    Impression     Unremarkable MRA of the head specifically without evidence for focal stenosis or intracranial aneurysm.    Unremarkable MRA of the neck without evidence for significant focal stenosis or occlusion.      Electronically signed by: VIOLETTE CARO DO  Date:      09/28/17  Time:    10:40        Note: I have independently reviewed any/all imaging/labs/tests and agree with the report (s) as documented.  Any discrepancies will be as noted/demarcated by free text.  ARA HDZ 2/14/2019    ASSESSMENT:  1. Chronic migraine    2. Migraine without aura and without status migrainosus, not intractable    3. Essential hypertension    4. LALITA (generalized anxiety disorder)    5. Fibromyalgia      PLAN:  - Botox administered in clinic for Chronic Migraine (see below)   - Continue Aimovig, tizanidine, cymbalta, and amitriptyline daily for migraine prevention   - For migraine abortive - has zomig NS available   - For rescue - has Sprix NS available   - No refills needed at this time   - HTN - blood pressure well controlled on clonidine, management per cardiology   - LALITA - well controlled on cymbalta and elavil, management per psychiatry who she sees regularly  - Fibromyalgia - well controlled on current regimen, management per PCP   - RTC in 12 weeks for repeat Botox injections or sooner if needed     All questions and concerns addressed.  Patient verbalizes understanding and is agreeable with the above stated treatment plan.      PROCEDURE NOTE:  BOTOX was performed as an indicated therapy for intractable chronic migraine headaches given that the patient failed more than 2 headache medications    Two patient identifiers were confirmed with the patient prior to performing this procedure. A time out to determine correct patient and and agreement on procedure performed was conducted prior to the procedure.      Botulinum Toxin Injection Procedure   Procedure: Chemical neurolysis   After risks and benefits were explained including bleeding, infection, worsening of pain, damage to the areas being injected, weakness of muscles, loss of muscle control, dysphagia if injecting the head or neck, facial droop if injecting the facial area, painful injection, allergic or other reaction to the  medications being injected, and the failure of the procedure to help the problem, a signed consent was obtained.   The patient was placed in a comfortable area and the sites to be treated were identified.The area to be treated was prepped three times with alcohol and the alcohol allowed to dry. Next, a 30 gauge needle was used to inject the medication in the area to be treated.      Total Botox used: 155 Units   Botox wastage: 45 Units     Injection sites:    muscle bilaterally ( a total of 10 units divided into 2 sites)   Procerus muscle (5 units)   Frontalis muscle bilaterally (a total of 20 units divided into 4 sites)   Temporalis muscle bilaterally (a total of 40 units divided into 8 sites)   Occipitalis muscle bilaterally (a total of 30 units divided into 6 sites)   Cervical paraspinal muscles (a total of 20 units divided into 4 sites)   Trapezius muscle bilaterally (a total of 30 units divided into 6 sites)   Complications: none   RTC for the next Botox injection: 12 weeks     CC: MD Luci Echols FNP-C  [facility]   [phone]

## 2019-02-15 ENCOUNTER — TELEPHONE (OUTPATIENT)
Dept: PHARMACY | Facility: CLINIC | Age: 44
End: 2019-02-15

## 2019-02-15 NOTE — TELEPHONE ENCOUNTER
Refill call for aimovig. Patient confirmed they are in need of a refill. Will prepare for p/u 2/19 with patient consent.

## 2019-02-20 ENCOUNTER — PATIENT MESSAGE (OUTPATIENT)
Dept: NEUROLOGY | Facility: CLINIC | Age: 44
End: 2019-02-20

## 2019-02-20 DIAGNOSIS — G43.709 CHRONIC MIGRAINE WITHOUT AURA WITHOUT STATUS MIGRAINOSUS, NOT INTRACTABLE: ICD-10-CM

## 2019-02-21 RX ORDER — HYDROXYZINE PAMOATE 25 MG/1
CAPSULE ORAL
Qty: 12 CAPSULE | Refills: 0 | Status: SHIPPED | OUTPATIENT
Start: 2019-02-21 | End: 2019-08-16 | Stop reason: SDUPTHER

## 2019-02-21 RX ORDER — ZOLMITRIPTAN 5 MG/1
SPRAY NASAL
Qty: 12 EACH | Refills: 5 | Status: SHIPPED | OUTPATIENT
Start: 2019-02-21 | End: 2019-05-27

## 2019-02-21 RX ORDER — AMITRIPTYLINE HYDROCHLORIDE 25 MG/1
25 TABLET, FILM COATED ORAL NIGHTLY
Qty: 90 TABLET | Refills: 1 | Status: SHIPPED | OUTPATIENT
Start: 2019-02-21 | End: 2019-03-06 | Stop reason: SDUPTHER

## 2019-02-21 RX ORDER — PROMETHAZINE HYDROCHLORIDE 25 MG/1
TABLET ORAL
Qty: 20 TABLET | Refills: 5 | Status: SHIPPED | OUTPATIENT
Start: 2019-02-21 | End: 2019-04-23 | Stop reason: SDUPTHER

## 2019-02-21 RX ORDER — TIZANIDINE 2 MG/1
TABLET ORAL
Qty: 60 TABLET | Refills: 5 | Status: SHIPPED | OUTPATIENT
Start: 2019-02-21 | End: 2019-08-08 | Stop reason: SDUPTHER

## 2019-03-06 ENCOUNTER — OFFICE VISIT (OUTPATIENT)
Dept: PSYCHIATRY | Facility: CLINIC | Age: 44
End: 2019-03-06
Payer: COMMERCIAL

## 2019-03-06 VITALS — WEIGHT: 165.81 LBS | BODY MASS INDEX: 27.63 KG/M2 | HEIGHT: 65 IN

## 2019-03-06 DIAGNOSIS — G47.00 INSOMNIA, UNSPECIFIED TYPE: Chronic | ICD-10-CM

## 2019-03-06 DIAGNOSIS — F41.1 GAD (GENERALIZED ANXIETY DISORDER): Primary | Chronic | ICD-10-CM

## 2019-03-06 DIAGNOSIS — G43.709 CHRONIC MIGRAINE WITHOUT AURA WITHOUT STATUS MIGRAINOSUS, NOT INTRACTABLE: ICD-10-CM

## 2019-03-06 DIAGNOSIS — F40.10 SOCIAL ANXIETY DISORDER: Chronic | ICD-10-CM

## 2019-03-06 PROCEDURE — 99999 PR PBB SHADOW E&M-EST. PATIENT-LVL III: ICD-10-PCS | Mod: PBBFAC,,, | Performed by: PSYCHIATRY & NEUROLOGY

## 2019-03-06 PROCEDURE — 99213 OFFICE O/P EST LOW 20 MIN: CPT | Mod: S$GLB,,, | Performed by: PSYCHIATRY & NEUROLOGY

## 2019-03-06 PROCEDURE — 99999 PR PBB SHADOW E&M-EST. PATIENT-LVL III: CPT | Mod: PBBFAC,,, | Performed by: PSYCHIATRY & NEUROLOGY

## 2019-03-06 PROCEDURE — 99213 PR OFFICE/OUTPT VISIT, EST, LEVL III, 20-29 MIN: ICD-10-PCS | Mod: S$GLB,,, | Performed by: PSYCHIATRY & NEUROLOGY

## 2019-03-06 RX ORDER — AMITRIPTYLINE HYDROCHLORIDE 25 MG/1
25 TABLET, FILM COATED ORAL NIGHTLY
Qty: 90 TABLET | Refills: 1 | Status: SHIPPED | OUTPATIENT
Start: 2019-03-06 | End: 2019-08-27 | Stop reason: SDUPTHER

## 2019-03-06 RX ORDER — DULOXETIN HYDROCHLORIDE 60 MG/1
60 CAPSULE, DELAYED RELEASE ORAL DAILY
Qty: 90 CAPSULE | Refills: 1 | Status: SHIPPED | OUTPATIENT
Start: 2019-03-06 | End: 2019-08-27 | Stop reason: SDUPTHER

## 2019-03-06 RX ORDER — ZOLPIDEM TARTRATE 10 MG/1
TABLET ORAL
Qty: 30 TABLET | Refills: 2 | Status: SHIPPED | OUTPATIENT
Start: 2019-03-06 | End: 2019-06-05 | Stop reason: SDUPTHER

## 2019-03-06 NOTE — PROGRESS NOTES
Outpatient Psychiatry Follow-Up Visit (MD/NP)    3/6/2019    Clinical Status of Patient:  Outpatient (Ambulatory)    Chief Complaint:  Mirna Pollock is a 43 y.o. female who presents today for follow-up of depression, anxiety and attention problems.  Met with patient.      Interval History and Content of Current Session:  Interim Events/Subjective Report/Content of Current Session:   Chart reviewed, last seen by me 6 weeks ago, continued on medications; continued to hold Vyvanse until cardiology clearance.     Doing well since last visit, quit job 2 weeks ago since inheriting money. Still in touch with biological family online (uncle; was adopted) which has been source of stress and anxiety but coping well. Planning cruise in June, and [place].  has been supportive and source of . Insomnia resumed; staying up late since she doesn't have to wake up in the morning. HA, anxiety and fibromyalgia sxs under control. No complaints.     Review of Systems   · PSYCHIATRIC: Pertinant items are noted in the narrative.  · NEUROLOGIC: No weakness, sensory changes, seizures, confusion, memory loss, headaches, tremor or other abnormal movements.  · CARDIOVASCULAR: No tachycardia or chest pain.  · GASTROINTESTINAL: No abdominal pain, nausea, vomiting, diarrhea, or constipation    Past Medical, Family and Social History: The patient's past medical, family and social history have been reviewed and updated as appropriate within the electronic medical record - see encounter notes.    Previous psychiatric treatment and medication trials: Vyvanse, Prozac, Klonopin, Lexapro (diarrhea)    Compliance: yes    Side effects: None    Risk Parameters:  Patient reports no suicidal ideation  Patient reports no homicidal ideation  Patient reports no self-injurious behavior  Patient reports no violent behavior    Exam (detailed: at least 9 elements; comprehensive: all 15 elements)  "  Constitutional  Vitals:  Most recent vital signs, dated less than 90 days prior to this appointment, were reviewed.   Vitals:    03/06/19 1353   Weight: 75.2 kg (165 lb 12.6 oz)   Height: 5' 5" (1.651 m)      General:  unremarkable, age appropriate, purple hair     Musculoskeletal  Muscle Strength/Tone:  no spasicity, no rigidity   Gait & Station:  non-ataxic     Psychiatric  Speech:  no latency; no press   Mood & Affect:  steady  congruent and appropriate   Thought Process:  normal and logical   Associations:  intact   Thought Content:  normal, no suicidality, no homicidality, delusions, or paranoia   Insight:  intact   Judgement: behavior is adequate to circumstances   Orientation:  grossly intact   Memory: intact for content of interview   Language: grossly intact, able to name, able to repeat   Attention Span & Concentration:  able to focus   Fund of Knowledge:  intact and appropriate to age and level of education     Assessment and Diagnosis   Status/Progress: Based on the examination today, the patient's problem(s) is/are resolved.  New problems have not been presented today.   Co-morbidities and Diagnostic uncertainty are complicating management of the primary condition.  The working differential for this patient includes concentration issues 2/2 ADHD vs anxiety.      General Impression:   MDD, recurrent, in partial remission  LALITA  Social Anxiety Disorder  R/o ADHD  H/o fibromyalgia    Intervention/Counseling/Treatment Plan   · Continue Cymbalta 60mg daily for LALITA, fibromyalgia  · Continue Elavil 25 mg qHS for insomnia, migraines, insomnia  · Continue Ambien 5-10mg nightly PRN for insomnia   · Diagnostic uncertainty remaining about origin about concentration issues 2/2 ADHD/anxiety. Will continue to hold Vyvanse 50 mg qAM until cleared by cardiologist. Saw pediatric cardiologist on 4/25/18 who noted need for better BP control and referred to adult cardiologist who patient has not yet seen. Encouraged f/u. "   · Previously referred to [facility] for CBT/supportive therapy   · Discussed diagnosis, risks and benefits of proposed treatment above vs alternative treatments vs no treatment, and potential side effects of these treatments. The patient expresses understanding of the above and displays the capacity to agree with this treatment given said understanding. Patient also agrees that, currently, the benefits outweigh the risks and would like to pursue treatment at this time.    Return to Clinic: 3 months or sooner if needed.     Kristi Asher MD  Psychiatry, [facility] PGY3

## 2019-03-13 ENCOUNTER — TELEPHONE (OUTPATIENT)
Dept: PHARMACY | Facility: CLINIC | Age: 44
End: 2019-03-13

## 2019-03-14 NOTE — TELEPHONE ENCOUNTER
Incoming call for Aimovig refill. Confirmed two patient identifiers - name + . She denies any side effects or missed doses. She confirms no changes to allergies, health conditions, medications or insurance. Patient has 0 doses on hand and due for injection on 3/28/19. She will  A[facility]ig on 3/20/19 at [facility]. $5 copay. All questions answered to patient's satisfaction. TTN

## 2019-03-26 ENCOUNTER — PATIENT MESSAGE (OUTPATIENT)
Dept: NEUROLOGY | Facility: CLINIC | Age: 44
End: 2019-03-26

## 2019-03-26 DIAGNOSIS — G43.709 CHRONIC MIGRAINE WITHOUT AURA WITHOUT STATUS MIGRAINOSUS, NOT INTRACTABLE: ICD-10-CM

## 2019-04-04 ENCOUNTER — TELEPHONE (OUTPATIENT)
Dept: NEUROLOGY | Facility: CLINIC | Age: 44
End: 2019-04-04

## 2019-04-12 NOTE — PROGRESS NOTES
STAFF NOTE:  Patient's case was formally presented to me by Dr. Love in supervision.  I agree with the assessment and plan as specified to hold Vyvanse until cleared by Cardiology, as well as continue other meds as noted.

## 2019-04-22 ENCOUNTER — PATIENT MESSAGE (OUTPATIENT)
Dept: OBSTETRICS AND GYNECOLOGY | Facility: CLINIC | Age: 44
End: 2019-04-22

## 2019-04-23 ENCOUNTER — TELEPHONE (OUTPATIENT)
Dept: PHARMACY | Facility: CLINIC | Age: 44
End: 2019-04-23

## 2019-04-23 DIAGNOSIS — G43.709 CHRONIC MIGRAINE WITHOUT AURA WITHOUT STATUS MIGRAINOSUS, NOT INTRACTABLE: ICD-10-CM

## 2019-04-23 RX ORDER — PROMETHAZINE HYDROCHLORIDE 25 MG/1
TABLET ORAL
Qty: 20 TABLET | Refills: 5 | Status: SHIPPED | OUTPATIENT
Start: 2019-04-23 | End: 2019-08-16 | Stop reason: SDUPTHER

## 2019-04-30 ENCOUNTER — OFFICE VISIT (OUTPATIENT)
Dept: URGENT CARE | Facility: CLINIC | Age: 44
End: 2019-04-30
Payer: COMMERCIAL

## 2019-04-30 VITALS
TEMPERATURE: 99 F | BODY MASS INDEX: 27.49 KG/M2 | HEIGHT: 65 IN | WEIGHT: 165 LBS | DIASTOLIC BLOOD PRESSURE: 116 MMHG | SYSTOLIC BLOOD PRESSURE: 179 MMHG | OXYGEN SATURATION: 99 % | HEART RATE: 102 BPM | RESPIRATION RATE: 20 BRPM

## 2019-04-30 DIAGNOSIS — J01.00 ACUTE NON-RECURRENT MAXILLARY SINUSITIS: Primary | ICD-10-CM

## 2019-04-30 PROCEDURE — 3008F BODY MASS INDEX DOCD: CPT | Mod: CPTII,S$GLB,, | Performed by: SURGERY

## 2019-04-30 PROCEDURE — 3080F PR MOST RECENT DIASTOLIC BLOOD PRESSURE >= 90 MM HG: ICD-10-PCS | Mod: CPTII,S$GLB,, | Performed by: SURGERY

## 2019-04-30 PROCEDURE — 99214 OFFICE O/P EST MOD 30 MIN: CPT | Mod: S$GLB,,, | Performed by: SURGERY

## 2019-04-30 PROCEDURE — 3077F SYST BP >= 140 MM HG: CPT | Mod: CPTII,S$GLB,, | Performed by: SURGERY

## 2019-04-30 PROCEDURE — 99214 PR OFFICE/OUTPT VISIT, EST, LEVL IV, 30-39 MIN: ICD-10-PCS | Mod: S$GLB,,, | Performed by: SURGERY

## 2019-04-30 PROCEDURE — 3080F DIAST BP >= 90 MM HG: CPT | Mod: CPTII,S$GLB,, | Performed by: SURGERY

## 2019-04-30 PROCEDURE — 3008F PR BODY MASS INDEX (BMI) DOCUMENTED: ICD-10-PCS | Mod: CPTII,S$GLB,, | Performed by: SURGERY

## 2019-04-30 PROCEDURE — 3077F PR MOST RECENT SYSTOLIC BLOOD PRESSURE >= 140 MM HG: ICD-10-PCS | Mod: CPTII,S$GLB,, | Performed by: SURGERY

## 2019-04-30 RX ORDER — DOXYCYCLINE 100 MG/1
100 CAPSULE ORAL 2 TIMES DAILY
Qty: 14 CAPSULE | Refills: 0 | Status: SHIPPED | OUTPATIENT
Start: 2019-04-30 | End: 2019-05-07

## 2019-04-30 RX ORDER — FLUTICASONE PROPIONATE 50 MCG
2 SPRAY, SUSPENSION (ML) NASAL 2 TIMES DAILY
Qty: 18.2 ML | Refills: 0 | Status: SHIPPED | OUTPATIENT
Start: 2019-04-30 | End: 2019-05-27

## 2019-04-30 RX ORDER — AZELASTINE 1 MG/ML
2 SPRAY, METERED NASAL 2 TIMES DAILY
Qty: 30 ML | Refills: 0 | Status: SHIPPED | OUTPATIENT
Start: 2019-04-30 | End: 2019-08-27

## 2019-04-30 RX ORDER — PROMETHAZINE HYDROCHLORIDE AND CODEINE PHOSPHATE 6.25; 1 MG/5ML; MG/5ML
5 SOLUTION ORAL EVERY 4 HOURS PRN
Qty: 240 ML | Refills: 0 | Status: SHIPPED | OUTPATIENT
Start: 2019-04-30 | End: 2019-05-05

## 2019-04-30 RX ORDER — PREDNISONE 20 MG/1
40 TABLET ORAL DAILY
Qty: 10 TABLET | Refills: 0 | Status: SHIPPED | OUTPATIENT
Start: 2019-05-01 | End: 2019-05-06

## 2019-04-30 NOTE — PROGRESS NOTES
"Subjective:       Patient ID: Mirna Pollock is a 43 y.o. female.    Vitals:  height is 5' 5" (1.651 m) and weight is 74.8 kg (165 lb). Her temperature is 98.6 °F (37 °C). Her blood pressure is 179/116 (abnormal) and her pulse is 102. Her respiration is 20 and oxygen saturation is 99%.     Chief Complaint: Sinus Problem    Pt has rubin having sinus congestion , coughing and ear pain since Friday. She has been taking ny quil that helped her sleep and day quil that hasnt helped.    Sinus Problem   This is a new problem. The current episode started today. The problem has been gradually worsening since onset. There has been no fever. Associated symptoms include congestion, coughing and ear pain. Pertinent negatives include no chills, diaphoresis, shortness of breath, sinus pressure or sore throat. The treatment provided no relief.       Constitution: Negative for chills, sweating, fatigue and fever.   HENT: Positive for ear pain and congestion. Negative for sinus pain, sinus pressure, sore throat and voice change.    Neck: Negative for painful lymph nodes.   Eyes: Negative for eye redness.   Respiratory: Positive for cough. Negative for chest tightness, sputum production, bloody sputum, COPD, shortness of breath, stridor, wheezing and asthma.    Gastrointestinal: Negative for nausea and vomiting.   Musculoskeletal: Negative for muscle ache.   Skin: Negative for rash.   Allergic/Immunologic: Negative for seasonal allergies and asthma.   Hematologic/Lymphatic: Negative for swollen lymph nodes.       Objective:      Physical Exam   Constitutional: She is oriented to person, place, and time. She appears well-developed and well-nourished. She is cooperative.  Non-toxic appearance. She does not appear ill. No distress.   HENT:   Head: Normocephalic and atraumatic.   Right Ear: Hearing, tympanic membrane, external ear and ear canal normal.   Left Ear: Hearing, tympanic membrane, external ear and ear canal normal.   Nose: " Mucosal edema and rhinorrhea present. No nasal deformity. No epistaxis. Right sinus exhibits maxillary sinus tenderness. Right sinus exhibits no frontal sinus tenderness. Left sinus exhibits maxillary sinus tenderness. Left sinus exhibits no frontal sinus tenderness.   Mouth/Throat: Uvula is midline and mucous membranes are normal. No trismus in the jaw. Normal dentition. No uvula swelling. Posterior oropharyngeal edema and posterior oropharyngeal erythema present. No oropharyngeal exudate.   Eyes: Conjunctivae and lids are normal. No scleral icterus.   Sclera clear bilat   Neck: Trachea normal, full passive range of motion without pain and phonation normal. Neck supple.   Cardiovascular: Normal rate, regular rhythm, normal heart sounds, intact distal pulses and normal pulses.   Pulmonary/Chest: Effort normal and breath sounds normal. No respiratory distress.   Abdominal: Soft. Normal appearance and bowel sounds are normal. She exhibits no distension. There is no tenderness.   Musculoskeletal: Normal range of motion. She exhibits no edema or deformity.   Neurological: She is alert and oriented to person, place, and time. She exhibits normal muscle tone. Coordination normal.   Skin: Skin is warm, dry and intact. She is not diaphoretic. No pallor.   Psychiatric: She has a normal mood and affect. Her speech is normal and behavior is normal. Judgment and thought content normal. Cognition and memory are normal.   Nursing note and vitals reviewed.      Assessment:       1. Acute non-recurrent maxillary sinusitis        Plan:         Acute non-recurrent maxillary sinusitis  -     promethazine-codeine 6.25-10 mg/5 ml (PHENERGAN WITH CODEINE) 6.25-10 mg/5 mL syrup; Take 5 mLs by mouth every 4 (four) hours as needed for Cough.  Dispense: 240 mL; Refill: 0  -     predniSONE (DELTASONE) 20 MG tablet; Take 2 tablets (40 mg total) by mouth once daily. for 5 days  Dispense: 10 tablet; Refill: 0  -     fluticasone propionate  (FLONASE) 50 mcg/actuation nasal spray; 2 sprays (100 mcg total) by Each Nare route 2 (two) times daily.  Dispense: 18.2 mL; Refill: 0  -     azelastine (ASTELIN) 137 mcg (0.1 %) nasal spray; 2 sprays (274 mcg total) by Nasal route 2 (two) times daily.  Dispense: 30 mL; Refill: 0  -     oxymetazoline 0.05 % Mist; 2 sprays by Nasal route nightly as needed.  Dispense: 30 mL; Refill: 0  -     doxycycline (VIBRAMYCIN) 100 MG Cap; Take 1 capsule (100 mg total) by mouth 2 (two) times daily. for 7 days  Dispense: 14 capsule; Refill: 0    Recommended stop decongestants. Check BP at home and if it remains high in 3-4 hours, if chest pain or dizziness develops, go to ER.    Patient Instructions     Sinusitis (Antibiotic Treatment)    The sinuses are air-filled spaces within the bones of the face. They connect to the inside of the nose. Sinusitis is an inflammation of the tissue lining the sinus cavity. Sinus inflammation can occur during a cold. It can also be due to allergies to pollens and other particles in the air. Sinusitis can cause symptoms of sinus congestion and fullness. A sinus infection causes fever, headache and facial pain. There is often green or yellow drainage from the nose or into the back of the throat (post-nasal drip). You have been given antibiotics to treat this condition.  Home care:  · Take the full course of antibiotics as instructed. Do not stop taking them, even if you feel better.  · Drink plenty of water, hot tea, and other liquids. This may help thin mucus. It also may promote sinus drainage.  · Heat may help soothe painful areas of the face. Use a towel soaked in hot water. Or,  the shower and direct the hot spray onto your face. Using a vaporizer along with a menthol rub at night may also help.   · An expectorant containing guaifenesin may help thin the mucus and promote drainage from the sinuses.  · Over-the-counter decongestants may be used unless a similar medicine was prescribed.  Nasal sprays work the fastest. Use one that contains phenylephrine or oxymetazoline. First blow the nose gently. Then use the spray. Do not use these medicines more often than directed on the label or symptoms may get worse. You may also use tablets containing pseudoephedrine. Avoid products that combine ingredients, because side effects may be increased. Read labels. You can also ask the pharmacist for help. (NOTE: Persons with high blood pressure should not use decongestants. They can raise blood pressure.)  · Over-the-counter antihistamines may help if allergies contributed to your sinusitis.    · Do not use nasal rinses or irrigation during an acute sinus infection, unless told to by your health care provider. Rinsing may spread the infection to other sinuses.  · Use acetaminophen or ibuprofen to control pain, unless another pain medicine was prescribed. (If you have chronic liver or kidney disease or ever had a stomach ulcer, talk with your doctor before using these medicines. Aspirin should never be used in anyone under 18 years of age who is ill with a fever. It may cause severe liver damage.)  · Don't smoke. This can worsen symptoms.  Follow-up care  Follow up with your healthcare provider or our staff if you are not improving within the next week.  When to seek medical advice  Call your healthcare provider if any of these occur:  · Facial pain or headache becoming more severe  · Stiff neck  · Unusual drowsiness or confusion  · Swelling of the forehead or eyelids  · Vision problems, including blurred or double vision  · Fever of 100.4ºF (38ºC) or higher, or as directed by your healthcare provider  · Seizure  · Breathing problems  · Symptoms not resolving within 10 days  Date Last Reviewed: 4/13/2015 © 2000-2017 [employer]. [address] [place] [address]. All rights reserved. This information is not intended as a substitute for professional medical care. Always follow your  healthcare professional's instructions.

## 2019-04-30 NOTE — PATIENT INSTRUCTIONS
Sinusitis (Antibiotic Treatment)    The sinuses are air-filled spaces within the bones of the face. They connect to the inside of the nose. Sinusitis is an inflammation of the tissue lining the sinus cavity. Sinus inflammation can occur during a cold. It can also be due to allergies to pollens and other particles in the air. Sinusitis can cause symptoms of sinus congestion and fullness. A sinus infection causes fever, headache and facial pain. There is often green or yellow drainage from the nose or into the back of the throat (post-nasal drip). You have been given antibiotics to treat this condition.  Home care:  · Take the full course of antibiotics as instructed. Do not stop taking them, even if you feel better.  · Drink plenty of water, hot tea, and other liquids. This may help thin mucus. It also may promote sinus drainage.  · Heat may help soothe painful areas of the face. Use a towel soaked in hot water. Or,  the shower and direct the hot spray onto your face. Using a vaporizer along with a menthol rub at night may also help.   · An expectorant containing guaifenesin may help thin the mucus and promote drainage from the sinuses.  · Over-the-counter decongestants may be used unless a similar medicine was prescribed. Nasal sprays work the fastest. Use one that contains phenylephrine or oxymetazoline. First blow the nose gently. Then use the spray. Do not use these medicines more often than directed on the label or symptoms may get worse. You may also use tablets containing pseudoephedrine. Avoid products that combine ingredients, because side effects may be increased. Read labels. You can also ask the pharmacist for help. (NOTE: Persons with high blood pressure should not use decongestants. They can raise blood pressure.)  · Over-the-counter antihistamines may help if allergies contributed to your sinusitis.    · Do not use nasal rinses or irrigation during an acute sinus infection, unless told to by  your health care provider. Rinsing may spread the infection to other sinuses.  · Use acetaminophen or ibuprofen to control pain, unless another pain medicine was prescribed. (If you have chronic liver or kidney disease or ever had a stomach ulcer, talk with your doctor before using these medicines. Aspirin should never be used in anyone under 18 years of age who is ill with a fever. It may cause severe liver damage.)  · Don't smoke. This can worsen symptoms.  Follow-up care  Follow up with your healthcare provider or our staff if you are not improving within the next week.  When to seek medical advice  Call your healthcare provider if any of these occur:  · Facial pain or headache becoming more severe  · Stiff neck  · Unusual drowsiness or confusion  · Swelling of the forehead or eyelids  · Vision problems, including blurred or double vision  · Fever of 100.4ºF (38ºC) or higher, or as directed by your healthcare provider  · Seizure  · Breathing problems  · Symptoms not resolving within 10 days  Date Last Reviewed: 4/13/2015  © 4303-6056 [employer]. [address] [place] [address], [place] [address]. All rights reserved. This information is not intended as a substitute for professional medical care. Always follow your healthcare professional's instructions.

## 2019-05-01 ENCOUNTER — PATIENT MESSAGE (OUTPATIENT)
Dept: NEUROLOGY | Facility: CLINIC | Age: 44
End: 2019-05-01

## 2019-05-19 ENCOUNTER — PATIENT MESSAGE (OUTPATIENT)
Dept: OBSTETRICS AND GYNECOLOGY | Facility: CLINIC | Age: 44
End: 2019-05-19

## 2019-05-22 ENCOUNTER — PROCEDURE VISIT (OUTPATIENT)
Dept: NEUROLOGY | Facility: CLINIC | Age: 44
End: 2019-05-22
Payer: COMMERCIAL

## 2019-05-22 VITALS
SYSTOLIC BLOOD PRESSURE: 133 MMHG | BODY MASS INDEX: 27.66 KG/M2 | WEIGHT: 166 LBS | DIASTOLIC BLOOD PRESSURE: 62 MMHG | HEIGHT: 65 IN

## 2019-05-22 PROCEDURE — 64615 CHEMODENERV MUSC MIGRAINE: CPT | Mod: S$GLB,,, | Performed by: NURSE PRACTITIONER

## 2019-05-22 PROCEDURE — 64615 PR CHEMODENERVATION OF MUSCLE FOR CHRONIC MIGRAINE: ICD-10-PCS | Mod: S$GLB,,, | Performed by: NURSE PRACTITIONER

## 2019-05-23 ENCOUNTER — TELEPHONE (OUTPATIENT)
Dept: PHARMACY | Facility: CLINIC | Age: 44
End: 2019-05-23

## 2019-05-27 ENCOUNTER — TELEPHONE (OUTPATIENT)
Dept: OBSTETRICS AND GYNECOLOGY | Facility: CLINIC | Age: 44
End: 2019-05-27

## 2019-05-27 ENCOUNTER — HOSPITAL ENCOUNTER (OUTPATIENT)
Dept: RADIOLOGY | Facility: OTHER | Age: 44
Discharge: HOME | End: 2019-05-27
Attending: NURSE PRACTITIONER
Payer: COMMERCIAL

## 2019-05-27 ENCOUNTER — OFFICE VISIT (OUTPATIENT)
Dept: OBSTETRICS AND GYNECOLOGY | Facility: CLINIC | Age: 44
End: 2019-05-27
Payer: COMMERCIAL

## 2019-05-27 VITALS
BODY MASS INDEX: 27.77 KG/M2 | DIASTOLIC BLOOD PRESSURE: 72 MMHG | SYSTOLIC BLOOD PRESSURE: 116 MMHG | WEIGHT: 166.88 LBS

## 2019-05-27 DIAGNOSIS — N64.4 BREAST PAIN: ICD-10-CM

## 2019-05-27 DIAGNOSIS — N64.4 BREAST PAIN: Primary | ICD-10-CM

## 2019-05-27 PROCEDURE — 76642 ULTRASOUND BREAST LIMITED: CPT | Mod: 26,LT,, | Performed by: RADIOLOGY

## 2019-05-27 PROCEDURE — 3078F DIAST BP <80 MM HG: CPT | Mod: CPTII,S$GLB,, | Performed by: NURSE PRACTITIONER

## 2019-05-27 PROCEDURE — 99999 PR PBB SHADOW E&M-EST. PATIENT-LVL III: ICD-10-PCS | Mod: PBBFAC,,, | Performed by: NURSE PRACTITIONER

## 2019-05-27 PROCEDURE — 99999 PR PBB SHADOW E&M-EST. PATIENT-LVL III: CPT | Mod: PBBFAC,,, | Performed by: NURSE PRACTITIONER

## 2019-05-27 PROCEDURE — 76642 ULTRASOUND BREAST LIMITED: CPT | Mod: TC,LT

## 2019-05-27 PROCEDURE — 3078F PR MOST RECENT DIASTOLIC BLOOD PRESSURE < 80 MM HG: ICD-10-PCS | Mod: CPTII,S$GLB,, | Performed by: NURSE PRACTITIONER

## 2019-05-27 PROCEDURE — 99213 PR OFFICE/OUTPT VISIT, EST, LEVL III, 20-29 MIN: ICD-10-PCS | Mod: S$GLB,,, | Performed by: NURSE PRACTITIONER

## 2019-05-27 PROCEDURE — 3008F BODY MASS INDEX DOCD: CPT | Mod: CPTII,S$GLB,, | Performed by: NURSE PRACTITIONER

## 2019-05-27 PROCEDURE — 3074F PR MOST RECENT SYSTOLIC BLOOD PRESSURE < 130 MM HG: ICD-10-PCS | Mod: CPTII,S$GLB,, | Performed by: NURSE PRACTITIONER

## 2019-05-27 PROCEDURE — 3008F PR BODY MASS INDEX (BMI) DOCUMENTED: ICD-10-PCS | Mod: CPTII,S$GLB,, | Performed by: NURSE PRACTITIONER

## 2019-05-27 PROCEDURE — 99213 OFFICE O/P EST LOW 20 MIN: CPT | Mod: S$GLB,,, | Performed by: NURSE PRACTITIONER

## 2019-05-27 PROCEDURE — 76642 US BREAST LEFT LIMITED: ICD-10-PCS | Mod: 26,LT,, | Performed by: RADIOLOGY

## 2019-05-27 PROCEDURE — 3074F SYST BP LT 130 MM HG: CPT | Mod: CPTII,S$GLB,, | Performed by: NURSE PRACTITIONER

## 2019-05-27 NOTE — PROGRESS NOTES
CC: left breast pain    Mirna Pollock  complains of left breast pain that started  lump that she noticed 2 months ago. It is located outer quadrant of left breast.  She denies skin changes.  She denies nipple discharge.  She is not breastfeeding or pumping. she describes the pain as burning.  States the pain is constanty.  She is not on OCPs or HRT.  She denies any pain to her right breast.  Denies any recent vigorous or repetitive use of pectoral muscles.  No associated neck, back, or shoulder problems.  No associated fever or erythema.  No recent history of trauma to the chest.  She has not tried any alleviating factors.  The pain does not affect her ability to perform daily activities.  Pt wants to defer mammo until screening mammo is due in 2019- desires breast US at present.     ROS:  GENERAL: No chills, fatigability or weight loss.  NEUROLOGICAL: No headaches. No vision changes.  CARDIOVASCULAR: No chest pain. No shortness of breath. No leg cramps.  ABDOMEN: No abdominal pain. Denies nausea. Denies vomiting. No diarrhea. No constipation  BREAST: See HPI  URINARY: No incontinence, no nocturia, no frequency and no dysuria.  VULVAR: No pain, no lesions and no itching.  VAGINA: No relaxation, no itching, no discharge, no abnormal bleeding and no lesions.      Vitals:    19 1048   BP: 116/72     GENERAL: healthy  Neck: neck supply, no thryomegaly  LYMPH: No epitrochlear, supraclavicular, or axillary lymphadenopathy  BREASTS: Symmetrical, no skin changes or visible lesions. + 1.5 cm palpable mass to left breast between 8-9 o'clock freely moveable- tender to palpation, No nipple discharge or adenopathy bilaterally.  ABDOMEN: no masses, hepatosplenomegaly, hernias    Mirna was seen today for breast pain.    Diagnoses and all orders for this visit:    Breast pain  -     US Breast Left Complete; Future      Left breast US  Discussed decreasing caffeine intake   Discussed supportive measures with a good  support bra, anti-inflammatories (ibuprofen) as needed, warm compresses    F/U PRN no resolution of symptoms    Camryn Barrow, FNP-C

## 2019-05-27 NOTE — TELEPHONE ENCOUNTER
Tried contacting patient with result information, LVM for patient to contact the office back. Will try again later and send message via portal.

## 2019-06-04 ENCOUNTER — TELEPHONE (OUTPATIENT)
Dept: OBSTETRICS AND GYNECOLOGY | Facility: CLINIC | Age: 44
End: 2019-06-04

## 2019-06-04 ENCOUNTER — TELEPHONE (OUTPATIENT)
Dept: RADIOLOGY | Facility: OTHER | Age: 44
End: 2019-06-04

## 2019-06-04 ENCOUNTER — PATIENT MESSAGE (OUTPATIENT)
Dept: OBSTETRICS AND GYNECOLOGY | Facility: CLINIC | Age: 44
End: 2019-06-04

## 2019-06-04 ENCOUNTER — PATIENT MESSAGE (OUTPATIENT)
Dept: ADMINISTRATIVE | Facility: OTHER | Age: 44
End: 2019-06-04

## 2019-06-04 DIAGNOSIS — R92.8 ABNORMAL MAMMOGRAM: Primary | ICD-10-CM

## 2019-06-04 NOTE — PROGRESS NOTES
Outpatient Psychiatry Follow-Up Visit (MD/NP)    6/5/2019    Clinical Status of Patient:  Outpatient (Ambulatory)    Chief Complaint:  Mirna Pollock is a 43 y.o. female who presents today for follow-up of depression, anxiety and attention problems.  Met with patient.      Interval History and Content of Current Session:  Interim Events/Subjective Report/Content of Current Session:   Chart reviewed, last seen by me 3 months ago, continued on medications; continued to hold Vyvanse until cardiology clearance.     Met father 1 mo ago, smothering with attention; feeling overwhelmed. Leaving next week for cruise, looking forward to it. Found out yesterday has to get breast biopsy.  has been supportive and source of , lost job and financial stress. Occasionally insomnia 2/2 rumination. Amenable to therapy. HA, anxiety and fibromyalgia sxs under control. Compliant with meds, denies AEs.      Review of Systems   · PSYCHIATRIC: Pertinant items are noted in the narrative.  · NEUROLOGIC: No weakness, sensory changes, seizures, confusion, memory loss, headaches, tremor or other abnormal movements.  · CARDIOVASCULAR: No tachycardia or chest pain.  · GASTROINTESTINAL: No abdominal pain, nausea, vomiting, diarrhea, or constipation    Past Medical, Family and Social History: The patient's past medical, family and social history have been reviewed and updated as appropriate within the electronic medical record - see encounter notes.    Previous psychiatric treatment and medication trials: Vyvanse, Prozac, Klonopin, Lexapro (diarrhea)    Compliance: yes    Side effects: None    Risk Parameters:  Patient reports no suicidal ideation  Patient reports no homicidal ideation  Patient reports no self-injurious behavior  Patient reports no violent behavior    Exam (detailed: at least 9 elements; comprehensive: all 15 elements)   Constitutional  Vitals:  Most recent vital signs, dated less than 90 days prior to this  "appointment, were reviewed.   Vitals:    06/05/19 0830   Weight: 76.5 kg (168 lb 8.7 oz)   Height: 5' 5" (1.651 m)      General:  unremarkable, age appropriate, purple hair     Musculoskeletal  Muscle Strength/Tone:  no spasicity, no rigidity   Gait & Station:  non-ataxic     Psychiatric  Speech:  no latency; no press   Mood & Affect:  steady  congruent and appropriate   Thought Process:  normal and logical   Associations:  intact   Thought Content:  normal, no suicidality, no homicidality, delusions, or paranoia   Insight:  intact   Judgement: behavior is adequate to circumstances   Orientation:  grossly intact   Memory: intact for content of interview   Language: grossly intact, able to name, able to repeat   Attention Span & Concentration:  able to focus   Fund of Knowledge:  intact and appropriate to age and level of education     Assessment and Diagnosis   Status/Progress: Based on the examination today, the patient's problem(s) is/are adequately but not ideally controlled.  New problems have not been presented today.   Co-morbidities and Diagnostic uncertainty are complicating management of the primary condition.  The working differential for this patient includes concentration issues 2/2 ADHD vs anxiety.      General Impression:   MDD, recurrent, in partial remission  LALITA  Social Anxiety Disorder  R/o ADHD  H/o fibromyalgia    Intervention/Counseling/Treatment Plan   · Continue Cymbalta 60mg daily for LALITA, fibromyalgia  · Continue Elavil 25 mg qHS for insomnia, migraines  · Continue Ambien 5-10mg nightly PRN for insomnia   · Diagnostic uncertainty remaining about origin about concentration issues 2/2 ADHD/anxiety. Pt no longer employed and concentration problems no longer chief complaint. Will continue to hold Vyvanse 50 mg qAM until cleared by cardiologist. Saw pediatric cardiologist on 4/25/18 who noted need for better BP control and referred to adult cardiologist who patient has not yet seen. Encouraged " f/u.   · Encouraged therapy, placed referral   · Discussed diagnosis, risks and benefits of proposed treatment above vs alternative treatments vs no treatment, and potential side effects of these treatments. The patient expresses understanding of the above and displays the capacity to agree with this treatment given said understanding. Patient also agrees that, currently, the benefits outweigh the risks and would like to pursue treatment at this time.    Return to Clinic: 3 months or sooner if needed. Discussed resident transition.      Kristi Asher MD  Psychiatry, [facility] PGY3

## 2019-06-04 NOTE — TELEPHONE ENCOUNTER
Called patient to review recommendation for breast biopsy and schedule procedure. No answer, unable to leave voicemail. Portal message sent to return call.

## 2019-06-05 ENCOUNTER — OFFICE VISIT (OUTPATIENT)
Dept: PSYCHIATRY | Facility: CLINIC | Age: 44
End: 2019-06-05
Payer: COMMERCIAL

## 2019-06-05 VITALS — BODY MASS INDEX: 28.08 KG/M2 | WEIGHT: 168.56 LBS | HEIGHT: 65 IN

## 2019-06-05 DIAGNOSIS — F40.10 SOCIAL ANXIETY DISORDER: Chronic | ICD-10-CM

## 2019-06-05 DIAGNOSIS — F41.1 GAD (GENERALIZED ANXIETY DISORDER): Primary | ICD-10-CM

## 2019-06-05 PROCEDURE — 99213 OFFICE O/P EST LOW 20 MIN: CPT | Mod: S$GLB,,, | Performed by: PSYCHIATRY & NEUROLOGY

## 2019-06-05 PROCEDURE — 99999 PR PBB SHADOW E&M-EST. PATIENT-LVL III: CPT | Mod: PBBFAC,,, | Performed by: PSYCHIATRY & NEUROLOGY

## 2019-06-05 PROCEDURE — 99213 PR OFFICE/OUTPT VISIT, EST, LEVL III, 20-29 MIN: ICD-10-PCS | Mod: S$GLB,,, | Performed by: PSYCHIATRY & NEUROLOGY

## 2019-06-05 PROCEDURE — 99999 PR PBB SHADOW E&M-EST. PATIENT-LVL III: ICD-10-PCS | Mod: PBBFAC,,, | Performed by: PSYCHIATRY & NEUROLOGY

## 2019-06-05 RX ORDER — ZOLPIDEM TARTRATE 10 MG/1
TABLET ORAL
Qty: 30 TABLET | Refills: 2 | Status: SHIPPED | OUTPATIENT
Start: 2019-06-05 | End: 2019-08-27 | Stop reason: SDUPTHER

## 2019-06-05 NOTE — PROGRESS NOTES
Pt with anxiety and depression with attention complaints.  Agree with assessment and plan, including holding Vyvanse.

## 2019-06-25 ENCOUNTER — TELEPHONE (OUTPATIENT)
Dept: PHARMACY | Facility: CLINIC | Age: 44
End: 2019-06-25

## 2019-06-25 NOTE — TELEPHONE ENCOUNTER
Refill and followup call for Aimovig. Patient confirmed need of the refill. Patient will  from [facility] on  with patient consent. Copay $5.00 at 004. Address confirmed. Patient has 0 doses remaining (0 day supply)/ next injection due . Patient denies missed doses and no side effects.  No new medications/allergies/medical conditions. No ER/Urgent care visits in past month. Sharps container needed. Patient taking the medication as directed. Patient denies any further questions. Confirmed 2 patient identifiers - Name and .    Justice Edwards, PharmD  Clinical Pharmacist  [facility]  P: [phone]

## 2019-07-01 ENCOUNTER — HOSPITAL ENCOUNTER (OUTPATIENT)
Dept: RADIOLOGY | Facility: OTHER | Age: 44
Discharge: HOME | End: 2019-07-01
Attending: NURSE PRACTITIONER
Payer: COMMERCIAL

## 2019-07-01 DIAGNOSIS — R92.8 ABNORMAL MAMMOGRAM: ICD-10-CM

## 2019-07-01 DIAGNOSIS — N63.0 LUMP OR MASS IN BREAST: Primary | ICD-10-CM

## 2019-07-01 PROCEDURE — 27201068 US BREAST BIOPSY WITH IMAGING 1ST SITE LEFT

## 2019-07-01 PROCEDURE — 88305 TISSUE EXAM BY PATHOLOGIST: CPT | Performed by: PATHOLOGY

## 2019-07-01 PROCEDURE — 25000003 PHARM REV CODE 250: Performed by: RADIOLOGY

## 2019-07-01 PROCEDURE — 88305 TISSUE EXAM BY PATHOLOGIST: CPT | Mod: 26,,, | Performed by: PATHOLOGY

## 2019-07-01 PROCEDURE — 19083 BX BREAST 1ST LESION US IMAG: CPT | Mod: LT,,, | Performed by: RADIOLOGY

## 2019-07-01 PROCEDURE — 19083 US BREAST BIOPSY WITH IMAGING 1ST SITE LEFT: ICD-10-PCS | Mod: LT,,, | Performed by: RADIOLOGY

## 2019-07-01 PROCEDURE — 88305 TISSUE SPECIMEN TO PATHOLOGY, RADIOLOGY: ICD-10-PCS | Mod: 26,,, | Performed by: PATHOLOGY

## 2019-07-01 RX ORDER — LIDOCAINE HYDROCHLORIDE AND EPINEPHRINE 20; 10 MG/ML; UG/ML
10 INJECTION, SOLUTION INFILTRATION; PERINEURAL ONCE
Status: COMPLETED | OUTPATIENT
Start: 2019-07-01 | End: 2019-07-01

## 2019-07-01 RX ORDER — LIDOCAINE HYDROCHLORIDE 10 MG/ML
5 INJECTION INFILTRATION; PERINEURAL ONCE
Status: COMPLETED | OUTPATIENT
Start: 2019-07-01 | End: 2019-07-01

## 2019-07-01 RX ADMIN — LIDOCAINE HYDROCHLORIDE 5 ML: 10 INJECTION, SOLUTION INFILTRATION; PERINEURAL at 09:07

## 2019-07-01 RX ADMIN — LIDOCAINE HYDROCHLORIDE AND EPINEPHRINE 10 ML: 20; 10 INJECTION, SOLUTION INFILTRATION; PERINEURAL at 09:07

## 2019-07-03 ENCOUNTER — TELEPHONE (OUTPATIENT)
Dept: RADIOLOGY | Facility: OTHER | Age: 44
End: 2019-07-03

## 2019-07-03 NOTE — TELEPHONE ENCOUNTER
Patient notified of left breast biopsy results per pathology reported on 7/2/19. Diagnosis: FIBROADENOMA, BENIGN. Explained to patient results are negative for breast cancer. Instructed on need for short interval follow up in 6 months. Questions answered. Reviewed option for surgical consult if area continues to be painful. Voices understanding, will follow up if needed.

## 2019-07-16 ENCOUNTER — TELEPHONE (OUTPATIENT)
Dept: PHARMACY | Facility: CLINIC | Age: 44
End: 2019-07-16

## 2019-08-08 DIAGNOSIS — G43.709 CHRONIC MIGRAINE WITHOUT AURA WITHOUT STATUS MIGRAINOSUS, NOT INTRACTABLE: ICD-10-CM

## 2019-08-09 RX ORDER — TIZANIDINE 2 MG/1
TABLET ORAL
Qty: 60 TABLET | Refills: 0 | Status: SHIPPED | OUTPATIENT
Start: 2019-08-09 | End: 2019-08-16 | Stop reason: SDUPTHER

## 2019-08-12 ENCOUNTER — PATIENT MESSAGE (OUTPATIENT)
Dept: NEUROLOGY | Facility: CLINIC | Age: 44
End: 2019-08-12

## 2019-08-16 ENCOUNTER — TELEPHONE (OUTPATIENT)
Dept: PHARMACY | Facility: CLINIC | Age: 44
End: 2019-08-16

## 2019-08-16 ENCOUNTER — PROCEDURE VISIT (OUTPATIENT)
Dept: NEUROLOGY | Facility: CLINIC | Age: 44
End: 2019-08-16
Payer: COMMERCIAL

## 2019-08-16 VITALS
BODY MASS INDEX: 27.99 KG/M2 | DIASTOLIC BLOOD PRESSURE: 77 MMHG | HEIGHT: 65 IN | SYSTOLIC BLOOD PRESSURE: 133 MMHG | WEIGHT: 168 LBS

## 2019-08-16 DIAGNOSIS — F33.9 RECURRENT MAJOR DEPRESSIVE DISORDER, REMISSION STATUS UNSPECIFIED: ICD-10-CM

## 2019-08-16 DIAGNOSIS — R26.89 BALANCE PROBLEM: ICD-10-CM

## 2019-08-16 DIAGNOSIS — I10 ESSENTIAL HYPERTENSION: ICD-10-CM

## 2019-08-16 DIAGNOSIS — F41.1 GAD (GENERALIZED ANXIETY DISORDER): Chronic | ICD-10-CM

## 2019-08-16 DIAGNOSIS — G43.009 MIGRAINE WITHOUT AURA AND WITHOUT STATUS MIGRAINOSUS, NOT INTRACTABLE: ICD-10-CM

## 2019-08-16 DIAGNOSIS — M79.7 FIBROMYALGIA: Chronic | ICD-10-CM

## 2019-08-16 PROCEDURE — 64615 PR CHEMODENERVATION OF MUSCLE FOR CHRONIC MIGRAINE: ICD-10-PCS | Mod: S$GLB,,, | Performed by: NURSE PRACTITIONER

## 2019-08-16 PROCEDURE — 99213 OFFICE O/P EST LOW 20 MIN: CPT | Mod: 25,S$GLB,, | Performed by: NURSE PRACTITIONER

## 2019-08-16 PROCEDURE — 99213 PR OFFICE/OUTPT VISIT, EST, LEVL III, 20-29 MIN: ICD-10-PCS | Mod: 25,S$GLB,, | Performed by: NURSE PRACTITIONER

## 2019-08-16 PROCEDURE — 64615 CHEMODENERV MUSC MIGRAINE: CPT | Mod: S$GLB,,, | Performed by: NURSE PRACTITIONER

## 2019-08-16 RX ORDER — SUMATRIPTAN SUCCINATE 100 MG/1
100 TABLET ORAL
Qty: 12 TABLET | Refills: 5 | Status: SHIPPED | OUTPATIENT
Start: 2019-08-16 | End: 2019-12-04 | Stop reason: SDUPTHER

## 2019-08-16 RX ORDER — PROMETHAZINE HYDROCHLORIDE 25 MG/1
TABLET ORAL
Qty: 20 TABLET | Refills: 5 | Status: SHIPPED | OUTPATIENT
Start: 2019-08-16 | End: 2019-12-04 | Stop reason: SDUPTHER

## 2019-08-16 RX ORDER — HYDROXYZINE PAMOATE 25 MG/1
CAPSULE ORAL
Qty: 12 CAPSULE | Refills: 5 | Status: SHIPPED | OUTPATIENT
Start: 2019-08-16 | End: 2019-08-27

## 2019-08-16 RX ORDER — TIZANIDINE 2 MG/1
TABLET ORAL
Qty: 60 TABLET | Refills: 11 | Status: SHIPPED | OUTPATIENT
Start: 2019-08-16 | End: 2020-05-18

## 2019-08-16 NOTE — PROCEDURES
"SUBJECTIVE:  Patient ID: Mirna Pollock  Chief Complaint: Follow-up and Botulinum Toxin Injection    History of Present Illness:  Mirna Pollock is a 43 y.o. female who presents to clinic alone for follow-up of headaches and Botox injections.     08/16/2019- Interval History:  Migraines and headaches continue to respond well to Botox and Aimovig, gets one severe migraine per month, and 1-2 moderate migraines.  She has been treating her migraines with ibuprofen, which is not very effective.  In the past, she was using triptans, however she does not have any abortive medication available at this time.  Migraines continue to feel the same as they always have, she denies any change in the quality or nature of her migraines.  She is happy with her current regimen and does not feel adjustments to her treatment plan are needed at this time.    She has additional concerns including frequently feeling as if she is more off balance than usual, is occurring more frequently as time progresses.  She is not dizzy, just feels as if she is going to fall over, occurs 8-10 times per month.  She is also dropping things more frequently.      05/22/2019- Interval History:  Botox continues to be effective for migraine prevention, for the first 8-10 weeks she experiences less than 3 migraines per month.  She has continued using Aimovig 140 mg SQ monthly injections.  She is very happy with the response of her migraines to Botox injections and wishes to continue with current treatment plan.        02/14/2019- Interval History:  Headaches and migraines continue to be "much better", "I am not getting them nearly as much", though she is keenly aware of when a cold front is coming through as she will always get a migraine.  She has continued using Aimovig monthly as well as taking tizanidine, cymbalta, and amitriptyline daily for migraine prevention.  She is very happy with the current state of her migraines and does not wish to make any " adjustments to her treatment plan at this time.  Migraines continue to feel the same as they always have.     11/01/2018- Interval History:  Headaches have been significantly better, she has only had 3-4 migraines since her last round of Botox and is very happy Botox is helping decrease her migraine frequency.  She has been using Aimovig monthly, which she also feels has been helping decrease her migraine frequency.  Migraines continue to feel the same as they always have, she denies any change in the quality or nature of her migraines.  Wishes to continue with Botox injections due to the pronounced effect it has had on her migraines.      08/08/2018- Interval History:  Finds her migraines are no better since last round of Botox, admits she was under increased stress at one point due to issues with her 's job.  She does know a lot of her migraines are triggered from stress.  She has been getting a lot of migraines above her eyes as well as throughout her occipitalis and neck.  Overall, she does wish to continue with Botox injections, but is open to discussing additional prevention options.      05/22/2018- Interval History:  Despite nerve blocks two weeks ago, she has been suffering with a headache nearly everyday since her last visit.  She did try Zomig nasal spray, however she took the first dose on day two of her migraine because she was nervous.  She does think it lessened the pain of her migraine, however did not abort it.  She did not repeat the dose.  Additionally, she continues to report poor sleep nightly.  She stopped drinking caffeine.  Headaches are the same as they have always been.  She denies any change in the nature or quality of her headaches. Risks, Benefits, and potential side effects of Botox discussed with patient.  Alternative treatments offered.  After thorough discussed regarding the procedure, patient has decided to move forward with initiating Botox injections for Chronic Migraine.   Patient understands we will complete 3 rounds of injections, if after 3 rounds, we do not see a 50% reduction in headaches, we will discontinue Botox injections.      05/11/2018 - Interval History:  This past week she has only been able to work two days, has missed 3 days of work this week due to migraines.  She still is unsure about getting Botox injections for chronic migraine, she would like to try it but her  does not feel this is a good treatment option, she is trying to convince him.  Of note, blood pressure significantly lower in today's visit than in past visits.  She reports her psychiatrist has discontinued vyvanse until she is given clearance from Cardio to restart taking Vyvanse.  She was seen by a pediatric cardiologist who referred her to an adult cardiologist, however she has not seen this provider, had to cancel appointment due to having a headache.  She is requesting repeat nerve blocks today.      01/29/2018 - Interval History:  She has been having a terrible migraine for the last 3 days, she has tried numerous medications without any relief.  States she tried to go to work today, but she had to leave because her headache got so bad.  She is requesting to repeat the nerve blocks as those shots gave her relief form 6-8 weeks in the past.  She has done research on the Botox injections and states her  is very hesitant for her to try Botox injections for her migraines.  She has not been seen by her PCP nor cardiologist for further management of her blood pressure.  Discussed the challenges of treating her migraines with uncontrolled hypertension because I cannot prescribe her any triptan therapy until her blood pressure is under better control.  She has continued to take Cymbalta daily for her depression, she is unsure if this is giving her any relief with regards to her headaches or not.       01/15/2018 - Interval History:  She was approved for Botox injections, but was a no-show to  her appointment last week.  States she spoke with her  about the injections and she was very nervous to try the Botox injections for her migraines.    She continues to experience headaches on 1-4 days per week with migraines occurring 1-2 days per week.  Recently she has been experiencing more migraines and headaches than usually, but admits she has been going through a lot emotionally as well as she got sick with the flu.  She has continued to regularly follow-up with her psychiatrist who changed her anti-depressant, she is now taking Cymbalta.  Since change in medication, she feels she has been experiencing a mild headache each day.  She has had to miss a few days of work due to her migraines since last visit.  Additionally, she feels occasionally she begins to get dizzy, this occurs with or without headaches, she has an appointment with her eye doctor next week as her vision is also getting progressively worse.  She did not find vistaril to be useful with regards to her headaches.  She does feel Zanaflex helps with her neck as well as helping to relax her neck muscles.  When she gets a migraine she has been taking Vistaril and Ibuprofen together which does help the pain, she also has been trying Excedrin migraines which also gives her some relief.  Patient with known HTN on multiple anti-hypertensive agents, blood pressure significantly elevated in clinic today.  She is not symptomatic.  States she needs to follow-up with her primary care (who is outside the [facility] system) for better management.       10/19/2017 - Interval History:  - Headaches have persisted in similar frequency, which she is happy about as she has been under increased stress with regards to her 's health, he has been suffering with his Gout, just got out of the hospital last week and has had to take time off work etc   - Blood pressure continues to be elevated despite numerous anti-hypertensive, states today is good for her  (currently 141/102), has been regularly following up with her PCP for management   - Is very excited because she is going on a cruise with her  to [place] at the end of November/early December   - Has continued to have a headache nearly everyday, not necessarily a migraine, however migraine have been at least once per month    - Has noticed more recently that she feels like she has intense pressure behind her eyes and across the bridge of her nose that slightly feels like sinus pressure, this occurs 1-2 times per week   - Has been seeing a chiropractor as well as a masseuse regularly which she does feel has been helping   - Would still like to move forward with the Botox injections   - Does not feel adjustments to her medications are necessary at this time      08/17/2017 - Interval History:  - Had been seeing Iker Beth PA-C   - Was taking Topamax, Zanaflex, and Vistaril - however has been out of medications for about 3 months  - Since being off the medication, she had been okay, however recently noticed migraines were beginning to occur more frequently with increased intensity and duration   - She finds the nerve blocks had been helping a lot, as long as she is consistent with getting the nerve block, however hasn't had this procedure for over a year   - Has been getting 5-6 migraines per month, each lasting 1-4 days in length   - Current migraine has been going on since last Friday (6 days)  - Blood pressure significantly elevated in clinic today, states her PCP is managing her medications in order to get her blood pressure better controlled   - Is requesting refills of medication as well as repeat nerve occipital and trigeminal nerve blocks      Headache history:   Age of onset -  13  Nature of pain -  Throbbing   Location - bioccipital   Aura -  White floaters   Duration - hrs  Frequency -  2/week  Time of day - anytime   7 days of pain - jaw pain, L neck, L shoulder,  L side throbbing   Associated  symptoms:   Meningeal symptoms - photophobia, phonophobia, exercise intolerance +   Nausea/vomitting +   Nasal drainage   Visual symptoms  Pallor/flushing  Vertigo  Stroke symptoms  Confusion  Impaired concentration +  Pain worsened with physical activity +  Neck pain +  Whooshing sounds   Visual spots/blotches +  Triggers:   Stress +   Bright or flickering light  Strong smell  Vigorous exercise  Dietary factors   Visual strain   Motion intolerance as passenger:  No   Resolution of headache with sleep: yes      Therapies Tried & Response:  Tylenol, motrin, alleve - not help  excedrin - not help   imitrex - helped initially   topamax 50 mg - helped  Percocet - helps   Gabapentin - helps  Amlodipine - for HTN  Bystolic - for HTN  Lexapro - hasn't noticed change in HA, for depression   GONB - helps   Cymbalta - unsure, for depression   Zomig - helps    Maxalt - no   Botox - helping   Sumatriptan - retried today   Vistrail - effective   Aimovig - helping     Current Medications:    amitriptyline (ELAVIL) 25 MG tablet, Take 1 tablet (25 mg total) by mouth every evening., Disp: 90 tablet, Rfl: 1    cloNIDine (CATAPRES) 0.2 MG tablet, 0.2 mg 3 (three) times daily. , Disp: , Rfl: 2    DULoxetine (CYMBALTA) 60 MG capsule, Take 1 capsule (60 mg total) by mouth once daily., Disp: 90 capsule, Rfl: 1    erenumab-aooe 140 mg/mL AtIn, Inject 140 mg into the skin every 28 days., Disp: 1 mL, Rfl: 3    hydrOXYzine pamoate (VISTARIL) 25 MG Cap, TAKE ONE CAPSULE BY MOUTH EVERY 6 TO 8 HOURS AS NEEDED, Disp: 12 capsule, Rfl: 5    promethazine (PHENERGAN) 25 MG tablet, Take 1 tab by mouth every 4-6 hours as needed for nausea., Disp: 20 tablet, Rfl: 5    tiZANidine (ZANAFLEX) 2 MG tablet, TAKE 1 TO 2 TABLETS BY MOUTH AT BEDTIME AS NEEDED(NO ALCOHOL OR DRIVING WITH MEDICATION), Disp: 60 tablet, Rfl: 11    zolpidem (AMBIEN) 10 mg Tab, Take ½- to 1 tablet by mouth nightly as needed for  insomnia, Disp: 30 tablet, Rfl: 2    azelastine  "(ASTELIN) 137 mcg (0.1 %) nasal spray, 2 sprays (274 mcg total) by Nasal route 2 (two) times daily., Disp: 30 mL, Rfl: 0    sumatriptan (IMITREX) 100 MG tablet, Take 1 tablet (100 mg total) by mouth every 2 (two) hours as needed for Migraine. Max 2 tabs/day. Max 3 days/week., Disp: 12 tablet, Rfl: 5    Current Facility-Administered Medications:     onabotulinumtoxina injection 200 Units, 200 Units, Intramuscular, Q90 Days, AVERY FlemingP, 200 Units at 08/16/19 1044    onabotulinumtoxina injection 200 Units, 200 Units, Intramuscular, Q90 Days, BC Fleming    Review of Systems - as per HPI, otherwise a balanced 10 systems review is negative.    OBJECTIVE:  Vitals:  /77 (BP Location: Left arm, Patient Position: Sitting, BP Method: Large (Automatic))   Ht 5' 5" (1.651 m)   Wt 76.2 kg (167 lb 15.9 oz)   LMP 12/14/2000   BMI 27.96 kg/m²     Physical Exam:  Constitutional: she appears well-developed and well-nourished. she is well groomed. NAD   HENT:    Head: Normocephalic and atraumatic  Eyes: Conjunctivae and EOM are normal  Musculoskeletal: Normal range of motion. No joint stiffness.   Skin: Skin is warm and dry.  Psychiatric: Mood and affect are normal    Neuro: Patient is alert and oriented to person, place, and time. Language is intact and fluent.  Recent and remote memory are intact.  Normal attention and concentration.  Facial movement is symmetric.      Review of Data:   Notes from psychiatry and cardiology reviewed.   Labs:  No visits with results within 3 Month(s) from this visit.   Latest known visit with results is:   Hospital Outpatient Visit on 04/25/2018   Component Date Value Ref Range Status    QEF 04/25/2018 55  55 - 65 Final    Mitral Valve Regurgitation 04/25/2018 TRIVIAL   Final    Diastolic Dysfunction 04/25/2018 No   Final     Imaging:  Results for orders placed or performed during the hospital encounter of 09/28/17   MRV Brain Without Contrast    Narrative    " Procedure: MRV of the head without contrast    Technique: Noncontrast 2-D time-of-flight MRV of the head with coronal and sagittal source imaging and 3-dimensional mip projection views.      Comparison: None    Results:The superior sagittal sinus is patent.  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  The paired internal cerebral veins and basal veins of Jay are patent.  The vein of Zac and torcula Herophili are patent.  The straight sinus is patent.  The bilateral transverse and sigmoid dural venous sinuses are patent to the jugular foramen.    Impression     Unremarkable noncontrast MRV specifically without evidence for venous sinus thrombosis..      Electronically signed by: VIOLETTE CARO DO  Date:     09/28/17  Time:    10:43    MRI Brain W WO Contrast    Narrative    Procedure: MRI the brain with andwithout contrast.    Technique: Sagittal and axial T1, axial T2, axial FLAIR, axial gradient, axial diffusion imaging of the whole brain pre-contrast with postcontrast axial T1 and axial spoiled gradient imaging reformatted in the coronal and sagittal planes.. 10 ml of Gadavist injected intravenously.         Comparison: None    Findings: The brain parenchyma is normal in contour. There is a small focus of T2 flair signal hyperintensity in the right lateral putamen suggestive for remote lacunar type infarct with diffusion hyperintensity without restriction compatible with T2 shine through. There are no abnormal areas of parenchymal enhancement. There are no areas are restricted diffusion to suggest acute infarction. A few additional punctate foci of T2 flair signal hyperintensity supratentorial white matter which are nonspecific and may represent sequela migraine headaches in light of history. The ventricles are normal in size and configuration without evidence for hydrocephalus. There are no abnormal areas of the gradient susceptibility to suggest parenchymal hemorrhage. No  abnormal intra or extra axial fluid collections. The major intracranial T2  flow-voids are present.    Impression     Small T2 flair hyperintense focus right lateral putamen with diffusion hyperintensity. 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    A few scattered punctate regions of T2 flair signal hyperintensity elsewhere supratentorial parenchyma while overall nonspecific in light of the given history this may be sequela migraine headaches. Alternative differential including prior demyelination to be considered.    otherwise unremarkable MRI brain specifically without evidence for acute infarction or enhancing lesion.          Electronically signed by: VIOLETTE CARO DO  Date:     09/28/17  Time:    10:49    MRA Brain without contrast    Narrative    Procedure: MRA of the head and neck    Technique: noncontrast 3-D time of flight MRA head and postcontrast 3-D time-of-flight MRA head and neck. 10 ml of Gadavist injected intravenously.    Comparison: None    Findings:    MRA head:    Anterior circulation:  The bilateral distal cervical, Gabby, cavernous and supraclinoid segments of the ICA's and the visualized bilateral anterior and middle cerebral arteries are patent without evidence for significant focal stenosis or aneurysm. There is a left PCOM.    Posterior circulation: The distal vertebral arteries, basilar artery and posterior cerebral arteries are patent without evidence for significant focal stenosis or aneurysm.    MRA neck: The origins of the right brachycephalic,  left common carotid and left subclavian arteries from the arch are within normal limits. The origins of the vertebral arteries from the subclavian arteries are within normal limits. The vertebral arteries are unremarkable throughout their course without evidence for focal stenosis or occlusion.    Right carotid: The right common carotid artery, carotid bifurcation and extracranial portions of the internal carotid artery are  patent without evidence for focal stenosis or occlusion.    Left carotid: The left common carotid artery, carotid bifurcation and extra cranial portions of the internal carotid artery are patent without evidence for focal stenosis or occlusion.    There is less than 50% proximal ICA stenosis by NASCET criteria.    Impression     Unremarkable MRA of the head specifically without evidence for focal stenosis or intracranial aneurysm.    Unremarkable MRA of the neck without evidence for significant focal stenosis or occlusion.      Electronically signed by: VIOLETTE CARO DO  Date:     09/28/17  Time:    10:40        Note: I have independently reviewed any/all imaging/labs/tests and agree with the report (s) as documented.  Any discrepancies will be as noted/demarcated by free text.  ARA HDZ 8/16/2019    ASSESSMENT:  1. Chronic migraine    2. Migraine without aura and without status migrainosus, not intractable    3. Balance problem    4. LALITA (generalized anxiety disorder)    5. Essential hypertension    6. Fibromyalgia    7. Recurrent major depressive disorder, remission status unspecified      PLAN:  - Botox administered in clinic for Chronic Migraine (see below)   - Continue Amitriptyline, cymbalta, and tizanidine daily for migraine prevention  - For migraine abortive - given sumatriptan 100 mg tabs  - For rescue - refilled vistaril   - For nausea - refilled phenergan  - Referral to Dr. Jones for balance disorder   - MDD/LALITA - stable on cymbalta and elavil, management per psychiatry   - Fibromyalgia - symptoms are better with cymbalta, continue current regimen   - HTN - blood pressure well controlled on clonidine, management per cardiology   - RTC in 12 weeks for repeat Botox injections or sooner if needed     Orders Placed This Encounter    Ambulatory Referral to Neurology    hydrOXYzine pamoate (VISTARIL) 25 MG Cap    sumatriptan (IMITREX) 100 MG tablet    promethazine (PHENERGAN) 25 MG tablet     tiZANidine (ZANAFLEX) 2 MG tablet       All questions and concerns addressed.  Patient verbalizes understanding and is agreeable with the above stated treatment plan.      PROCEDURE NOTE:  BOTOX was performed as an indicated therapy for intractable chronic migraine headaches given that the patient failed more than 2 headache medications    Two patient identifiers were confirmed with the patient prior to performing this procedure. A time out to determine correct patient and and agreement on procedure performed was conducted prior to the procedure.      Botulinum Toxin Injection Procedure   Procedure: Chemical neurolysis   After risks and benefits were explained including bleeding, infection, worsening of pain, damage to the areas being injected, weakness of muscles, loss of muscle control, dysphagia if injecting the head or neck, facial droop if injecting the facial area, painful injection, allergic or other reaction to the medications being injected, and the failure of the procedure to help the problem, a signed consent was obtained.   The patient was placed in a comfortable area and the sites to be treated were identified.The area to be treated was prepped three times with alcohol and the alcohol allowed to dry. Next, a 30 gauge needle was used to inject the medication in the area to be treated.      Total Botox used: 155 Units   Botox wastage: 45 Units     Injection sites:    muscle bilaterally ( a total of 10 units divided into 2 sites)   Procerus muscle (5 units)   Frontalis muscle bilaterally (a total of 20 units divided into 4 sites)   Temporalis muscle bilaterally (a total of 40 units divided into 8 sites)   Occipitalis muscle bilaterally (a total of 30 units divided into 6 sites)   Cervical paraspinal muscles (a total of 20 units divided into 4 sites)   Trapezius muscle bilaterally (a total of 30 units divided into 6 sites)   Complications: none   RTC for the next Botox injection: 12 weeks     CC:  MD Luci Echols, FNP-C  [facility]   [phone]

## 2019-08-27 ENCOUNTER — OFFICE VISIT (OUTPATIENT)
Dept: PSYCHIATRY | Facility: CLINIC | Age: 44
End: 2019-08-27
Payer: COMMERCIAL

## 2019-08-27 VITALS — BODY MASS INDEX: 28.16 KG/M2 | HEIGHT: 65 IN | WEIGHT: 169 LBS

## 2019-08-27 DIAGNOSIS — F41.1 GAD (GENERALIZED ANXIETY DISORDER): ICD-10-CM

## 2019-08-27 DIAGNOSIS — F33.41 RECURRENT MAJOR DEPRESSION IN PARTIAL REMISSION: Primary | ICD-10-CM

## 2019-08-27 DIAGNOSIS — F40.10 SOCIAL ANXIETY DISORDER: ICD-10-CM

## 2019-08-27 DIAGNOSIS — G43.709 CHRONIC MIGRAINE WITHOUT AURA WITHOUT STATUS MIGRAINOSUS, NOT INTRACTABLE: ICD-10-CM

## 2019-08-27 PROCEDURE — 3008F PR BODY MASS INDEX (BMI) DOCUMENTED: ICD-10-PCS | Mod: CPTII,S$GLB,, | Performed by: PSYCHIATRY & NEUROLOGY

## 2019-08-27 PROCEDURE — 99999 PR PBB SHADOW E&M-EST. PATIENT-LVL III: CPT | Mod: PBBFAC,,, | Performed by: PSYCHIATRY & NEUROLOGY

## 2019-08-27 PROCEDURE — 3008F BODY MASS INDEX DOCD: CPT | Mod: CPTII,S$GLB,, | Performed by: PSYCHIATRY & NEUROLOGY

## 2019-08-27 PROCEDURE — 99212 PR OFFICE/OUTPT VISIT, EST, LEVL II, 10-19 MIN: ICD-10-PCS | Mod: S$GLB,,, | Performed by: PSYCHIATRY & NEUROLOGY

## 2019-08-27 PROCEDURE — 99999 PR PBB SHADOW E&M-EST. PATIENT-LVL III: ICD-10-PCS | Mod: PBBFAC,,, | Performed by: PSYCHIATRY & NEUROLOGY

## 2019-08-27 PROCEDURE — 99212 OFFICE O/P EST SF 10 MIN: CPT | Mod: S$GLB,,, | Performed by: PSYCHIATRY & NEUROLOGY

## 2019-08-27 RX ORDER — ZOLPIDEM TARTRATE 10 MG/1
TABLET ORAL
Qty: 30 TABLET | Refills: 2 | Status: SHIPPED | OUTPATIENT
Start: 2019-09-04 | End: 2019-11-26 | Stop reason: SDUPTHER

## 2019-08-27 RX ORDER — DULOXETIN HYDROCHLORIDE 60 MG/1
60 CAPSULE, DELAYED RELEASE ORAL DAILY
Qty: 90 CAPSULE | Refills: 1 | Status: SHIPPED | OUTPATIENT
Start: 2019-08-27 | End: 2020-02-18 | Stop reason: SDUPTHER

## 2019-08-27 RX ORDER — AMITRIPTYLINE HYDROCHLORIDE 25 MG/1
25 TABLET, FILM COATED ORAL NIGHTLY
Qty: 90 TABLET | Refills: 1 | Status: SHIPPED | OUTPATIENT
Start: 2019-08-27 | End: 2019-11-26 | Stop reason: SDUPTHER

## 2019-08-27 NOTE — PROGRESS NOTES
"Supervising Psychiatry Staff:  I discussed Ms. Pollock's progress with Dr Maryjo Gustafson in regular caseload supervision. I agree with the above interval history, ROS, findings, and plan, which reflect my own.    I would add that patient should be referred for "CBT-I" program for insomnia, which has shown greater efficacy than medications for insomnia. 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    Finally, regardless of the outcome of her further evaluation for "ADD," she is NOT a candidate for treatment with any psychostimulant due to her congential structural heart defects. She is in that small population orf persons at risk for sudden cardiac death when taking stimulants. Attention problems in a patient who also reports other anticholinergic side effects from Elavil is not surprising. She has many reasons for attention dysregulation other than a primary attention deficit disorder.      "

## 2019-10-07 ENCOUNTER — TELEPHONE (OUTPATIENT)
Dept: NEUROLOGY | Facility: CLINIC | Age: 44
End: 2019-10-07

## 2019-10-21 ENCOUNTER — TELEPHONE (OUTPATIENT)
Dept: PHARMACY | Facility: CLINIC | Age: 44
End: 2019-10-21

## 2019-11-04 ENCOUNTER — PATIENT MESSAGE (OUTPATIENT)
Dept: NEUROLOGY | Facility: CLINIC | Age: 44
End: 2019-11-04

## 2019-11-06 ENCOUNTER — PATIENT MESSAGE (OUTPATIENT)
Dept: NEUROLOGY | Facility: CLINIC | Age: 44
End: 2019-11-06

## 2019-11-13 ENCOUNTER — PATIENT MESSAGE (OUTPATIENT)
Dept: NEUROLOGY | Facility: CLINIC | Age: 44
End: 2019-11-13

## 2019-11-14 ENCOUNTER — PATIENT MESSAGE (OUTPATIENT)
Dept: NEUROLOGY | Facility: CLINIC | Age: 44
End: 2019-11-14

## 2019-11-19 ENCOUNTER — TELEPHONE (OUTPATIENT)
Dept: PHARMACY | Facility: CLINIC | Age: 44
End: 2019-11-19

## 2019-11-22 ENCOUNTER — TELEPHONE (OUTPATIENT)
Dept: NEUROLOGY | Facility: CLINIC | Age: 44
End: 2019-11-22

## 2019-11-25 NOTE — PROGRESS NOTES
"Outpatient Psychiatry Follow-Up Visit (MD/NP)    11/26/2019    Clinical Status of Patient:  Outpatient (Ambulatory)    Chief Complaint:  Mirna Pollock is a 43 y.o. female who presents today for follow-up of depression, anxiety and attention problems. Patient last seen for follow-up on 8/27/19, at which time patient was stable and was continued on Cymbalta 60mg daily, Elavil 25mg qHS, and Ambien 5-10mg qHS PRN. Met with patient.      Interval History and Content of Current Session:  Interim Events/Subjective Report/Content of Current Session:   Continues to be doing okay overall. Denies any new stressors since last appointment. Continues to require Ambien 10mg every night - "if I don't, I don't fall asleep". Discussed long-term goal to discontinue Ambien at some point and discussed the CBT-I program - patient would like to think about it before placing a referral for this program. Continues to report dry mouth and constipation with Elavil but these side effects are tolerable. Wishes to resume current medication regimen. Patient stable from a psychiatric standpoint - patient denies SI, HI, and AVH and does not demonstrate objective signs/symptoms of vidya, psychosis, or affective instability to the point of suicidality or homicidality.       Psychiatric Review of Systems-is patient experiencing or having changes in  sleep: poor sleep last night, but no changes overall   appetite: no  weight: no  energy/anergy: no  anhedonia: no  libido: no  anxiety: no  guilty/hopelessness: no  concentration: no  Irritability: no  Paranoia/delusions: no  S.I.B.s/risky behavior: no    Review of Systems   · PSYCHIATRIC: Pertinant items are noted in the narrative.  · CONSTITUTIONAL: No weight gain or loss.  · MUSCULOSKELETAL: No pain or stiffness of the joints.  · NEUROLOGIC: No weakness, sensory changes, seizures, confusion, memory loss, tremor or other abnormal movements.  · RESPIRATORY: No shortness of breath.  · CARDIOVASCULAR: " "No tachycardia or chest pain.  · GASTROINTESTINAL: + Constipation. No nausea, vomiting, pain, or diarrhea.    Past Medical, Family and Social History: The patient's past medical, family and social history have been reviewed and updated as appropriate within the electronic medical record - see encounter notes.    Compliance: yes    Side effects: constipation, dry eyes from Elavil    Risk Parameters:  Patient reports no suicidal ideation  Patient reports no homicidal ideation  Patient reports no self-injurious behavior  Patient reports no violent behavior    Exam (detailed: at least 9 elements; comprehensive: all 15 elements)   Constitutional  Vitals:  Most recent vital signs, dated less than 90 days prior to this appointment, were reviewed.   Vitals:    11/26/19 1002   Weight: 77.4 kg (170 lb 10.2 oz)        General:  age appropriate, casually dressed     Musculoskeletal  Muscle Strength/Tone:  not examined, no rigidity   Gait & Station:  non-ataxic     Psychiatric  Speech:  no latency; no press   Mood & Affect:  "okay"  congruent and appropriate   Thought Process:  goal-directed, logical   Associations:  intact   Thought Content:  no suicidality, no homicidality, delusions, or paranoia   Insight:  intact   Judgement: behavior is adequate to circumstances   Orientation:  grossly intact   Memory: intact for content of interview   Language: grossly intact   Attention Span & Concentration:  able to focus   Fund of Knowledge:  intact and appropriate to age and level of education     Assessment and Diagnosis   Status/Progress: Based on the examination today, the patient's problem(s) is/are adequately but not ideally controlled.  New problems have not been presented today. Co-morbidities and Diagnostic uncertainty are complicating management of the primary condition.  The working differential for this patient includes concentration issues 2/2 ADHD vs anxiety.     General Impression:    ICD-10-CM ICD-9-CM   1. Recurrent " major depression in partial remission F33.41 296.35   2. LALITA (generalized anxiety disorder) F41.1 300.02   3. Social anxiety disorder F40.10 300.23   R/o ADHD  H/o fibromyalgia    Intervention/Counseling/Treatment Plan   · Continue Cymbalta 60mg daily for LALITA, fibromyalgia  · Continue Elavil 25 mg qHS for insomnia, migraines  · Continue Ambien 5-10mg nightly PRN for insomnia   · Recommended referral to [facility] program for insomnia, which has shown greater efficacy than medications for insomnia. 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 However, patient is not interested at this time.   · Diagnostic uncertainty remaining about origin about concentration issues 2/2 ADHD/anxiety. Patient is NOT a candidate for treatment with any psychostimulant due to her congential structural heart defects. She is in that small population of persons at risk for sudden cardiac death when taking stimulants. Attention problems in a patient who also reports other anticholinergic side effects from Elavil is not surprising. She has many reasons for attention dysregulation other than a primary attention deficit disorder.  · Encouraged therapy, placed referral     Discussed with patient informed consent including diagnosis, risks and benefits of proposed treatment above vs. alternative treatments vs. no treatment, as well as serious and common side effects of these treatments, and the inherent unpredictability of individual responses to these treatments. The patient expresses understanding of the above and displays the capacity to agree with this current plan. Patient also agrees that, currently, the benefits outweigh the risks and would like to pursue treatment at this time, and had no other questions.    Instructions:  · Take all medications as prescribed.    · Abstain from  recreational drugs and alcohol.  · Present to ED or call 911 for SI/HI plan or intent, psychosis, or medical emergency.    Return to Clinic: 3 months    Case to be discussed with supervising staff, Dr. Abner Jin MD.    Maryjo Gustafson DO   [facility], PGY-3

## 2019-11-26 ENCOUNTER — TELEPHONE (OUTPATIENT)
Dept: PSYCHIATRY | Facility: CLINIC | Age: 44
End: 2019-11-26

## 2019-11-26 ENCOUNTER — OFFICE VISIT (OUTPATIENT)
Dept: PSYCHIATRY | Facility: CLINIC | Age: 44
End: 2019-11-26
Payer: COMMERCIAL

## 2019-11-26 VITALS — WEIGHT: 170.63 LBS | BODY MASS INDEX: 28.4 KG/M2

## 2019-11-26 DIAGNOSIS — F33.41 RECURRENT MAJOR DEPRESSION IN PARTIAL REMISSION: Primary | ICD-10-CM

## 2019-11-26 DIAGNOSIS — F40.10 SOCIAL ANXIETY DISORDER: ICD-10-CM

## 2019-11-26 DIAGNOSIS — G43.709 CHRONIC MIGRAINE WITHOUT AURA WITHOUT STATUS MIGRAINOSUS, NOT INTRACTABLE: ICD-10-CM

## 2019-11-26 DIAGNOSIS — F41.1 GAD (GENERALIZED ANXIETY DISORDER): ICD-10-CM

## 2019-11-26 PROCEDURE — 99999 PR PBB SHADOW E&M-EST. PATIENT-LVL III: ICD-10-PCS | Mod: PBBFAC,,, | Performed by: PSYCHIATRY & NEUROLOGY

## 2019-11-26 PROCEDURE — 3008F PR BODY MASS INDEX (BMI) DOCUMENTED: ICD-10-PCS | Mod: CPTII,S$GLB,, | Performed by: PSYCHIATRY & NEUROLOGY

## 2019-11-26 PROCEDURE — 99212 OFFICE O/P EST SF 10 MIN: CPT | Mod: S$GLB,,, | Performed by: PSYCHIATRY & NEUROLOGY

## 2019-11-26 PROCEDURE — 99212 PR OFFICE/OUTPT VISIT, EST, LEVL II, 10-19 MIN: ICD-10-PCS | Mod: S$GLB,,, | Performed by: PSYCHIATRY & NEUROLOGY

## 2019-11-26 PROCEDURE — 3008F BODY MASS INDEX DOCD: CPT | Mod: CPTII,S$GLB,, | Performed by: PSYCHIATRY & NEUROLOGY

## 2019-11-26 PROCEDURE — 99999 PR PBB SHADOW E&M-EST. PATIENT-LVL III: CPT | Mod: PBBFAC,,, | Performed by: PSYCHIATRY & NEUROLOGY

## 2019-11-26 RX ORDER — AMITRIPTYLINE HYDROCHLORIDE 25 MG/1
25 TABLET, FILM COATED ORAL NIGHTLY
Qty: 30 TABLET | Refills: 2 | Status: SHIPPED | OUTPATIENT
Start: 2019-11-26 | End: 2020-02-18

## 2019-11-26 RX ORDER — ZOLPIDEM TARTRATE 10 MG/1
TABLET ORAL
Qty: 30 TABLET | Refills: 2 | Status: SHIPPED | OUTPATIENT
Start: 2019-11-26 | End: 2020-02-18 | Stop reason: SDUPTHER

## 2019-11-26 NOTE — TELEPHONE ENCOUNTER
----- Message from Maryjo Gustafson DO sent at 11/26/2019  4:05 PM CST -----  Contact: pt  2/18/20##    ----- Message -----  From: Marie Vargas MA  Sent: 11/26/2019   3:00 PM CST  To: Maryjo Gustafson DO    Pt said you wanted her to sched for 2/28/2020 at 11 am. There is no schedule open for you on this date. Please advise. Thanks

## 2019-11-27 ENCOUNTER — TELEPHONE (OUTPATIENT)
Dept: NEUROLOGY | Facility: CLINIC | Age: 44
End: 2019-11-27

## 2019-11-27 NOTE — TELEPHONE ENCOUNTER
Spoke with Brandie with [facility] not arrived for patient appointment today, will have Botox to arrive on 12/3/19.     Call then placed to patient to reschedule appointment to Wednesday 12/4/19 at 12 Noon per Elisa Ortez. Patient also stated she needs refills on medications, advised will let Elisa know. Also gave patient number to billing office as she had questions about how her insurance was being billed since she had [employer] and C together at one point and now she only has [facility],

## 2019-12-01 DIAGNOSIS — G43.709 CHRONIC MIGRAINE WITHOUT AURA WITHOUT STATUS MIGRAINOSUS, NOT INTRACTABLE: ICD-10-CM

## 2019-12-02 RX ORDER — HYDROXYZINE PAMOATE 25 MG/1
CAPSULE ORAL
Qty: 12 CAPSULE | Refills: 0 | Status: SHIPPED | OUTPATIENT
Start: 2019-12-02 | End: 2019-12-04 | Stop reason: SDUPTHER

## 2019-12-04 ENCOUNTER — PROCEDURE VISIT (OUTPATIENT)
Dept: NEUROLOGY | Facility: CLINIC | Age: 44
End: 2019-12-04
Payer: COMMERCIAL

## 2019-12-04 VITALS — HEIGHT: 65 IN | BODY MASS INDEX: 28.4 KG/M2

## 2019-12-04 DIAGNOSIS — F33.41 RECURRENT MAJOR DEPRESSION IN PARTIAL REMISSION: ICD-10-CM

## 2019-12-04 DIAGNOSIS — I10 ESSENTIAL HYPERTENSION: ICD-10-CM

## 2019-12-04 DIAGNOSIS — G43.009 MIGRAINE WITHOUT AURA AND WITHOUT STATUS MIGRAINOSUS, NOT INTRACTABLE: ICD-10-CM

## 2019-12-04 DIAGNOSIS — F41.1 GAD (GENERALIZED ANXIETY DISORDER): Chronic | ICD-10-CM

## 2019-12-04 PROCEDURE — 64615 CHEMODENERV MUSC MIGRAINE: CPT | Mod: S$GLB,,, | Performed by: NURSE PRACTITIONER

## 2019-12-04 PROCEDURE — 64615 PR CHEMODENERVATION OF MUSCLE FOR CHRONIC MIGRAINE: ICD-10-PCS | Mod: S$GLB,,, | Performed by: NURSE PRACTITIONER

## 2019-12-04 PROCEDURE — 99499 NO LOS: ICD-10-PCS | Mod: S$GLB,,, | Performed by: NURSE PRACTITIONER

## 2019-12-04 PROCEDURE — 99499 UNLISTED E&M SERVICE: CPT | Mod: S$GLB,,, | Performed by: NURSE PRACTITIONER

## 2019-12-04 RX ORDER — SUMATRIPTAN SUCCINATE 100 MG/1
100 TABLET ORAL
Qty: 12 TABLET | Refills: 5 | Status: SHIPPED | OUTPATIENT
Start: 2019-12-04 | End: 2020-01-07 | Stop reason: SDUPTHER

## 2019-12-04 RX ORDER — PROMETHAZINE HYDROCHLORIDE 25 MG/1
TABLET ORAL
Qty: 20 TABLET | Refills: 5 | Status: SHIPPED | OUTPATIENT
Start: 2019-12-04 | End: 2020-03-25 | Stop reason: SDUPTHER

## 2019-12-04 RX ORDER — HYDROXYZINE PAMOATE 25 MG/1
CAPSULE ORAL
Qty: 12 CAPSULE | Refills: 0 | Status: SHIPPED | OUTPATIENT
Start: 2019-12-04 | End: 2020-03-25 | Stop reason: SDUPTHER

## 2019-12-04 NOTE — PROCEDURES
SUBJECTIVE:  Patient ID: Mirna Pollock  Chief Complaint: Follow-up and Botulinum Toxin Injection    History of Present Illness:  Mirna Pollock is a 44 y.o. female with migraines, HTN, LALITA, and MDD who presents to clinic alone for follow-up of headaches and Botox injections.     Recommendations made at last Office Visit on 8/16/2019:  - Botox administered in clinic for Chronic Migraine (see below)   - Continue Amitriptyline, cymbalta, and tizanidine daily for migraine prevention  - For migraine abortive - given sumatriptan 100 mg tabs  - For rescue - refilled vistaril   - For nausea - refilled phenergan  - Referral to Dr. Jones for balance disorder   - MDD/LALITA - stable on cymbalta and elavil, management per psychiatry   - Fibromyalgia - symptoms are better with cymbalta, continue current regimen   - HTN - blood pressure well controlled on clonidine, management per cardiology   - RTC in 12 weeks for repeat Botox injections or sooner if needed     12/04/2019- Interval History:  Reports she has been experiencing 5-6 migraines per month, admits she has been under increased stress found her birth mother who was not interested in meeting her, is in contact with twin brothers.   She has continued taking aimovig 140 mg, which she feels has greatly improved her.  She typically wakes up with a migraine if she is going to get one, typically is resolved with one dose of sumatriptan 100 mg, will feel back to normal by the afternoon.   She is pleased with the current state of her migraines and attributes the increased frequency of migraines over the last 12 weeks to fluctuating weather and increased stress.  Does not wish to make adjustments to her treatment plan at this time.     Otherwise, information below is still accurate and current.     08/16/2019- Interval History:  Migraines and headaches continue to respond well to Botox and Aimovig, gets one severe migraine per month, and 1-2 moderate migraines.  She has been  "treating her migraines with ibuprofen, which is not very effective.  In the past, she was using triptans, however she does not have any abortive medication available at this time.  Migraines continue to feel the same as they always have, she denies any change in the quality or nature of her migraines.  She is happy with her current regimen and does not feel adjustments to her treatment plan are needed at this time.    She has additional concerns including frequently feeling as if she is more off balance than usual, is occurring more frequently as time progresses.  She is not dizzy, just feels as if she is going to fall over, occurs 8-10 times per month.  She is also dropping things more frequently.       05/22/2019- Interval History:  Botox continues to be effective for migraine prevention, for the first 8-10 weeks she experiences less than 3 migraines per month.  She has continued using Aimovig 140 mg SQ monthly injections.  She is very happy with the response of her migraines to Botox injections and wishes to continue with current treatment plan.        02/14/2019- Interval History:  Headaches and migraines continue to be "much better", "I am not getting them nearly as much", though she is keenly aware of when a cold front is coming through as she will always get a migraine.  She has continued using Aimovig monthly as well as taking tizanidine, cymbalta, and amitriptyline daily for migraine prevention.  She is very happy with the current state of her migraines and does not wish to make any adjustments to her treatment plan at this time.  Migraines continue to feel the same as they always have.     11/01/2018- Interval History:  Headaches have been significantly better, she has only had 3-4 migraines since her last round of Botox and is very happy Botox is helping decrease her migraine frequency.  She has been using Aimovig monthly, which she also feels has been helping decrease her migraine frequency.  Migraines " continue to feel the same as they always have, she denies any change in the quality or nature of her migraines.  Wishes to continue with Botox injections due to the pronounced effect it has had on her migraines.      08/08/2018- Interval History:  Finds her migraines are no better since last round of Botox, admits she was under increased stress at one point due to issues with her 's job.  She does know a lot of her migraines are triggered from stress.  She has been getting a lot of migraines above her eyes as well as throughout her occipitalis and neck.  Overall, she does wish to continue with Botox injections, but is open to discussing additional prevention options.      05/22/2018- Interval History:  Despite nerve blocks two weeks ago, she has been suffering with a headache nearly everyday since her last visit.  She did try Zomig nasal spray, however she took the first dose on day two of her migraine because she was nervous.  She does think it lessened the pain of her migraine, however did not abort it.  She did not repeat the dose.  Additionally, she continues to report poor sleep nightly.  She stopped drinking caffeine.  Headaches are the same as they have always been.  She denies any change in the nature or quality of her headaches. Risks, Benefits, and potential side effects of Botox discussed with patient.  Alternative treatments offered.  After thorough discussed regarding the procedure, patient has decided to move forward with initiating Botox injections for Chronic Migraine.  Patient understands we will complete 3 rounds of injections, if after 3 rounds, we do not see a 50% reduction in headaches, we will discontinue Botox injections.      05/11/2018 - Interval History:  This past week she has only been able to work two days, has missed 3 days of work this week due to migraines.  She still is unsure about getting Botox injections for chronic migraine, she would like to try it but her  does  not feel this is a good treatment option, she is trying to convince him.  Of note, blood pressure significantly lower in today's visit than in past visits.  She reports her psychiatrist has discontinued vyvanse until she is given clearance from Cardio to restart taking Vyvanse.  She was seen by a pediatric cardiologist who referred her to an adult cardiologist, however she has not seen this provider, had to cancel appointment due to having a headache.  She is requesting repeat nerve blocks today.      01/29/2018 - Interval History:  She has been having a terrible migraine for the last 3 days, she has tried numerous medications without any relief.  States she tried to go to work today, but she had to leave because her headache got so bad.  She is requesting to repeat the nerve blocks as those shots gave her relief form 6-8 weeks in the past.  She has done research on the Botox injections and states her  is very hesitant for her to try Botox injections for her migraines.  She has not been seen by her PCP nor cardiologist for further management of her blood pressure.  Discussed the challenges of treating her migraines with uncontrolled hypertension because I cannot prescribe her any triptan therapy until her blood pressure is under better control.  She has continued to take Cymbalta daily for her depression, she is unsure if this is giving her any relief with regards to her headaches or not.       01/15/2018 - Interval History:  She was approved for Botox injections, but was a no-show to her appointment last week.  States she spoke with her  about the injections and she was very nervous to try the Botox injections for her migraines.    She continues to experience headaches on 1-4 days per week with migraines occurring 1-2 days per week.  Recently she has been experiencing more migraines and headaches than usually, but admits she has been going through a lot emotionally as well as she got sick with the  flu.  She has continued to regularly follow-up with her psychiatrist who changed her anti-depressant, she is now taking Cymbalta.  Since change in medication, she feels she has been experiencing a mild headache each day.  She has had to miss a few days of work due to her migraines since last visit.  Additionally, she feels occasionally she begins to get dizzy, this occurs with or without headaches, she has an appointment with her eye doctor next week as her vision is also getting progressively worse.  She did not find vistaril to be useful with regards to her headaches.  She does feel Zanaflex helps with her neck as well as helping to relax her neck muscles.  When she gets a migraine she has been taking Vistaril and Ibuprofen together which does help the pain, she also has been trying Excedrin migraines which also gives her some relief.  Patient with known HTN on multiple anti-hypertensive agents, blood pressure significantly elevated in clinic today.  She is not symptomatic.  States she needs to follow-up with her primary care (who is outside the [facility] system) for better management.       10/19/2017 - Interval History:  - Headaches have persisted in similar frequency, which she is happy about as she has been under increased stress with regards to her 's health, he has been suffering with his Gout, just got out of the hospital last week and has had to take time off work etc   - Blood pressure continues to be elevated despite numerous anti-hypertensive, states today is good for her (currently 141/102), has been regularly following up with her PCP for management   - Is very excited because she [place]early [place]   - Has continued to have a headache nearly everyday, not necessarily a migraine, however migraine have been at least once per month    - Has noticed more recently that she feels like she has intense pressure behind her eyes and  across the bridge of her nose that slightly feels like sinus pressure, this occurs 1-2 times per week   - Has been seeing a chiropractor as well as a masseuse regularly which she does feel has been helping   - Would still like to move forward with the Botox injections   - Does not feel adjustments to her medications are necessary at this time      08/17/2017 - Interval History:  - Had been seeing Iker Beth PA-C   - Was taking Topamax, Zanaflex, and Vistaril - however has been out of medications for about 3 months  - Since being off the medication, she had been okay, however recently noticed migraines were beginning to occur more frequently with increased intensity and duration   - She finds the nerve blocks had been helping a lot, as long as she is consistent with getting the nerve block, however hasn't had this procedure for over a year   - Has been getting 5-6 migraines per month, each lasting 1-4 days in length   - Current migraine has been going on since last Friday (6 days)  - Blood pressure significantly elevated in clinic today, states her PCP is managing her medications in order to get her blood pressure better controlled   - Is requesting refills of medication as well as repeat nerve occipital and trigeminal nerve blocks      Headache history:   Age of onset -  13  Nature of pain -  Throbbing   Location - bioccipital   Aura -  White floaters   Duration - hrs  Frequency -  2/week  Time of day - anytime   7 days of pain - jaw pain, L neck, L shoulder,  L side throbbing   Associated symptoms:   Meningeal symptoms - photophobia, phonophobia, exercise intolerance +   Nausea/vomitting +   Nasal drainage   Visual symptoms  Pallor/flushing  Vertigo  Stroke symptoms  Confusion  Impaired concentration +  Pain worsened with physical activity +  Neck pain +  Whooshing sounds   Visual spots/blotches +  Triggers:   Stress +   Bright or flickering light  Strong smell  Vigorous exercise  Dietary factors   Visual strain    Motion intolerance as passenger:  No   Resolution of headache with sleep: yes      Therapies Tried & Response:  Tylenol, motrin, alleve - not help  excedrin - not help   imitrex - helped initially   topamax 50 mg - helped  Percocet - helps   Gabapentin - helps  Amlodipine - for HTN  Bystolic - for HTN  Lexapro - hasn't noticed change in HA, for depression   GONB - helps   Cymbalta - unsure, for depression   Zomig - helps    Maxalt - no   Botox - helping   Sumatriptan - retried today   Vistrail - effective   Aimovig - helping     Current Medications:    amitriptyline (ELAVIL) 25 MG tablet, Take 1 tablet (25 mg total) by mouth every evening., Disp: 30 tablet, Rfl: 2    cloNIDine (CATAPRES) 0.2 MG tablet, 0.2 mg 3 (three) times daily. , Disp: , Rfl: 2    DULoxetine (CYMBALTA) 60 MG capsule, Take 1 capsule (60 mg total) by mouth once daily., Disp: 90 capsule, Rfl: 1    erenumab-aooe 140 mg/mL AtIn, Inject 140 mg into the skin every 28 days., Disp: 1 mL, Rfl: 3    promethazine (PHENERGAN) 25 MG tablet, Take 1 tab by mouth every 4-6 hours as needed for nausea., Disp: 20 tablet, Rfl: 5    sumatriptan (IMITREX) 100 MG tablet, Take 1 tablet (100 mg total) by mouth every 2 (two) hours as needed for Migraine. Max 2 tabs/day. Max 3 days/week., Disp: 12 tablet, Rfl: 5    tiZANidine (ZANAFLEX) 2 MG tablet, TAKE 1 TO 2 TABLETS BY MOUTH AT BEDTIME AS NEEDED(NO ALCOHOL OR DRIVING WITH MEDICATION), Disp: 60 tablet, Rfl: 11    zolpidem (AMBIEN) 10 mg Tab, Take ½- to 1 tablet by mouth nightly as needed for  insomnia, Disp: 30 tablet, Rfl: 2    hydrOXYzine pamoate (VISTARIL) 25 MG Cap, TAKE 1 CAPSULE BY MOUTH EVERY 6 TO 8 HOURS AS NEEDED, Disp: 12 capsule, Rfl: 0    Current Facility-Administered Medications:     onabotulinumtoxina injection 200 Units, 200 Units, Intramuscular, Q90 Days, BC Fleming, 200 Units at 08/16/19 1044    onabotulinumtoxina injection 200 Units, 200 Units, Intramuscular, Q90 Days,  "BC Fleming    onabotulinumtoxina injection 200 Units, 200 Units, Intramuscular, Q90 Days, BC Fleming    Review of Systems - as per HPI, otherwise a balanced 10 systems review is negative.    OBJECTIVE:  Vitals:  Ht 5' 5" (1.651 m)   LMP 12/14/2000   BMI 28.40 kg/m²     Physical Exam:  Constitutional: she appears well-developed and well-nourished. she is well groomed. NAD   HENT:    Head: Normocephalic and atraumatic  Eyes: Conjunctivae and EOM are normal  Musculoskeletal: Normal range of motion. No joint stiffness.   Skin: Skin is warm and dry.  Psychiatric: Mood and affect are normal    Neuro: Patient is alert and oriented to person, place, and time. Language is intact and fluent.  Recent and remote memory are intact.  Normal attention and concentration.  Facial movement is symmetric.  Gait is normal.     Review of Data:   Notes from psychiatry and cardiology reviewed.   Labs:  No visits with results within 3 Month(s) from this visit.   Latest known visit with results is:   Hospital Outpatient Visit on 04/25/2018   Component Date Value Ref Range Status    QEF 04/25/2018 55  55 - 65 Final    Mitral Valve Regurgitation 04/25/2018 TRIVIAL   Final    Diastolic Dysfunction 04/25/2018 No   Final   Results for LIANA CASAS (MRN [id number]) as of 12/4/2019 12:46   Ref. Range 4/25/2018 16:23   WBC Latest Ref Range: 3.90 - 12.70 K/uL 15.63 (H)   RBC Latest Ref Range: 4.00 - 5.40 M/uL 4.57   Hemoglobin Latest Ref Range: 12.0 - 16.0 g/dL 13.9   Hematocrit Latest Ref Range: 37.0 - 48.5 % 41.2   MCV Latest Ref Range: 82 - 98 fL 90   MCH Latest Ref Range: 27.0 - 31.0 pg 30.4   MCHC Latest Ref Range: 32.0 - 36.0 g/dL 33.7   RDW Latest Ref Range: 11.5 - 14.5 % 12.5   Platelets Latest Ref Range: 150 - 350 K/uL 449 (H)   MPV Latest Ref Range: 9.2 - 12.9 fL 9.9   Gran% Latest Ref Range: 38.0 - 73.0 % 71.9   Gran # (ANC) Latest Ref Range: 1.8 - 7.7 K/uL 11.2 (H)   Lymph% Latest Ref Range: 18.0 - 48.0 % " 15.2 (L)   Lymph # Latest Ref Range: 1.0 - 4.8 K/uL 2.4   Mono% Latest Ref Range: 4.0 - 15.0 % 8.6   Mono # Latest Ref Range: 0.3 - 1.0 K/uL 1.4 (H)   Eosinophil% Latest Ref Range: 0.0 - 8.0 % 2.2   Eos # Latest Ref Range: 0.0 - 0.5 K/uL 0.3   Basophil% Latest Ref Range: 0.0 - 1.9 % 1.0   Baso # Latest Ref Range: 0.00 - 0.20 K/uL 0.15   nRBC Latest Ref Range: 0 /100 WBC 0   Differential Method Unknown Automated   Immature Grans (Abs) Latest Ref Range: 0.00 - 0.04 K/uL 0.17 (H)   Immature Granulocytes Latest Ref Range: 0.0 - 0.5 % 1.1 (H)   Sodium Latest Ref Range: 136 - 145 mmol/L 142   Potassium Latest Ref Range: 3.5 - 5.1 mmol/L 4.3   Chloride Latest Ref Range: 95 - 110 mmol/L 107   CO2 Latest Ref Range: 23 - 29 mmol/L 26   Anion Gap Latest Ref Range: 8 - 16 mmol/L 9   BUN, Bld Latest Ref Range: 6 - 20 mg/dL 10   Creatinine Latest Ref Range: 0.5 - 1.4 mg/dL 0.8   eGFR if non African American Latest Ref Range: >60 mL/min/1.73 m^2 >60.0   eGFR if African American Latest Ref Range: >60 mL/min/1.73 m^2 >60.0   Glucose Latest Ref Range: 70 - 110 mg/dL 88   Calcium Latest Ref Range: 8.7 - 10.5 mg/dL 10.2   Alkaline Phosphatase Latest Ref Range: 55 - 135 U/L 93   PROTEIN TOTAL Latest Ref Range: 6.0 - 8.4 g/dL 8.1   Albumin Latest Ref Range: 3.5 - 5.2 g/dL 4.1   BILIRUBIN TOTAL Latest Ref Range: 0.1 - 1.0 mg/dL 0.5   AST Latest Ref Range: 10 - 40 U/L 20   ALT Latest Ref Range: 10 - 44 U/L 11     Imaging:  Results for orders placed or performed during the hospital encounter of 09/28/17   MRV Brain Without Contrast    Narrative    Procedure: MRV of the head without contrast    Technique: Noncontrast 2-D time-of-flight MRV of the head with coronal and sagittal source imaging and 3-dimensional mip projection views.      Comparison: None    Results:The superior sagittal sinus is patent.  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  The paired internal cerebral veins and basal veins of Jay  are patent.  The vein of Zac and torcula Herophili are patent.  The straight sinus is patent.  The bilateral transverse and sigmoid dural venous sinuses are patent to the jugular foramen.    Impression     Unremarkable noncontrast MRV specifically without evidence for venous sinus thrombosis..      Electronically signed by: VIOLETTE CARO DO  Date:     09/28/17  Time:    10:43    MRI Brain W WO Contrast    Narrative    Procedure: MRI the brain with andwithout contrast.    Technique: Sagittal and axial T1, axial T2, axial FLAIR, axial gradient, axial diffusion imaging of the whole brain pre-contrast with postcontrast axial T1 and axial spoiled gradient imaging reformatted in the coronal and sagittal planes.. 10 ml of Gadavist injected intravenously.         Comparison: None    Findings: The brain parenchyma is normal in contour. There is a small focus of T2 flair signal hyperintensity in the right lateral putamen suggestive for remote lacunar type infarct with diffusion hyperintensity without restriction compatible with T2 shine through. There are no abnormal areas of parenchymal enhancement. There are no areas are restricted diffusion to suggest acute infarction. A few additional punctate foci of T2 flair signal hyperintensity supratentorial white matter which are nonspecific and may represent sequela migraine headaches in light of history. The ventricles are normal in size and configuration without evidence for hydrocephalus. There are no abnormal areas of the gradient susceptibility to suggest parenchymal hemorrhage. No abnormal intra or extra axial fluid collections. The major intracranial T2  flow-voids are present.    Impression     Small T2 flair hyperintense focus right lateral putamen with diffusion hyperintensity. 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    A few scattered punctate regions of T2 flair signal hyperintensity elsewhere supratentorial parenchyma while overall nonspecific in  light of the given history this may be sequela migraine headaches. Alternative differential including prior demyelination to be considered.    otherwise unremarkable MRI brain specifically without evidence for acute infarction or enhancing lesion.          Electronically signed by: VIOLETTE CARO DO  Date:     09/28/17  Time:    10:49    MRA Brain without contrast    Narrative    Procedure: MRA of the head and neck    Technique: noncontrast 3-D time of flight MRA head and postcontrast 3-D time-of-flight MRA head and neck. 10 ml of Gadavist injected intravenously.    Comparison: None    Findings:    MRA head:    Anterior circulation:  The bilateral distal cervical, Gabby, cavernous and supraclinoid segments of the ICA's and the visualized bilateral anterior and middle cerebral arteries are patent without evidence for significant focal stenosis or aneurysm. There is a left PCOM.    Posterior circulation: The distal vertebral arteries, basilar artery and posterior cerebral arteries are patent without evidence for significant focal stenosis or aneurysm.    MRA neck: The origins of the right brachycephalic,  left common carotid and left subclavian arteries from the arch are within normal limits. The origins of the vertebral arteries from the subclavian arteries are within normal limits. The vertebral arteries are unremarkable throughout their course without evidence for focal stenosis or occlusion.    Right carotid: The right common carotid artery, carotid bifurcation and extracranial portions of the internal carotid artery are patent without evidence for focal stenosis or occlusion.    Left carotid: The left common carotid artery, carotid bifurcation and extra cranial portions of the internal carotid artery are patent without evidence for focal stenosis or occlusion.    There is less than 50% proximal ICA stenosis by NASCET criteria.    Impression     Unremarkable MRA of the head specifically without evidence for focal  stenosis or intracranial aneurysm.    Unremarkable MRA of the neck without evidence for significant focal stenosis or occlusion.      Electronically signed by: VIOLETTE CARO   Date:     09/28/17  Time:    10:40        Note: I have independently reviewed any/all imaging/labs/tests and agree with the report (s) as documented.  Any discrepancies will be as noted/demarcated by free text.  ARA HDZ 12/4/2019    ASSESSMENT:  1. Chronic migraine    2. Migraine without aura and without status migrainosus, not intractable    3. Recurrent major depression in partial remission    4. LALITA (generalized anxiety disorder)    5. Essential hypertension      PLAN:  - Botox administered in clinic for Chronic Migraine (see below)   - Continue Aimovig 140 mg SQ monthly injection  - Continue Amitriptyline, Cymbalta, and tizanidine for migraine prevention  - For migraine abortive - has sumatriptan 100 mg tabs available   - For rescue - has vistaril available  - She is not to take Ambien on days she uses vistaril   - For nausea - refilled phenergan 25 mg tabs   - MDD/LALITA - under increased stress related to finding her birth mother, established with psychiatrist who she sees regularly, continue f/up with Dr. Gustafson for management   - HTN - BP well controlled on clonidine, management per cardiology   - RTC in 12 weeks for repeat Botox injections or sooner if needed     Orders Placed This Encounter    sumatriptan (IMITREX) 100 MG tablet    hydrOXYzine pamoate (VISTARIL) 25 MG Cap    promethazine (PHENERGAN) 25 MG tablet       All questions and concerns addressed.  Patient verbalizes understanding and is agreeable with the above stated treatment plan.      PROCEDURE NOTE:  BOTOX was performed as an indicated therapy for intractable chronic migraine headaches given that the patient failed more than 2 headache medications    A time out was conducted just before the start of the procedure to verify the correct patient and procedure,  procedure location, and all relevant critical information.     The Botox injections have achieved well over 50%  improvement in the patient's symptoms. Migraines have been reduced at least 7 days per month and the number of cumulative hours suffering with headaches has been reduced at least 100 total hours per month. Today she does have a headache indicating that the Botox has worn off. Frequency of treatment is every 12 weeks unless no response to the treatments, at which time we will discontinue the injections.  Botulinum Toxin Injection Procedure     PROCEDURE PERFORMED: Botulinum toxin injection (23935)  CLINICAL INDICATION: G43.719  After risks and benefits were explained including bleeding, infection, worsening of pain, damage to the areas being injected, weakness of muscles, loss of muscle control, dysphagia if injecting the head or neck, facial droop if injecting the facial area, painful injection, allergic or other reaction to the medications being injected, and the failure of the procedure to help the problem, a signed consent was obtained.   The patient was placed in a comfortable area and the sites to be treated were identified.The area to be treated was prepped three times with alcohol and the alcohol allowed to dry. Next, a 30 gauge needle was used to inject the medication in the area to be treated.      Total Botox used: 155 Units   Unavoidable waste: 45 Units     Injection sites:    muscle bilaterally ( a total of 10 units divided into 2 sites)   Procerus muscle (5 units)   Frontalis muscle bilaterally (a total of 20 units divided into 4 sites)   Temporalis muscle bilaterally (a total of 40 units divided into 8 sites)   Occipitalis muscle bilaterally (a total of 30 units divided into 6 sites)   Cervical paraspinal muscles (a total of 20 units divided into 4 sites)   Trapezius muscle bilaterally (a total of 30 units divided into 6 sites)   Complications: none   RTC for the next Botox  injection: 12 weeks     BOTOX DELIVERED FROM OUTSIDE PHARMACY USED FOR PATIENT'S INJECTIONS.    CC: MD Luci Echols FNP-C  [facility]   [phone]

## 2019-12-09 NOTE — PROGRESS NOTES
Supervising Psychiatry Staff:  I discussed Ms. Pollock's progress with Dr Maryjo Gustafson in regular caseload supervision. I agree with the above interval history, ROS, findings, and plan, which reflect my own. I strongly concur with referral to CBT-I and I am very surprised at her initial decision to forgo this time-limited therapy that is extremely well proven to be superior to sedative hypnotic medications for initial insomnia. She is 44 years old and her fall risk increases daily. We will continue to work on this in her follow-up visits.

## 2019-12-17 ENCOUNTER — TELEPHONE (OUTPATIENT)
Dept: PHARMACY | Facility: CLINIC | Age: 44
End: 2019-12-17

## 2019-12-20 NOTE — TELEPHONE ENCOUNTER
RX incoming call from patient regarding Aimovig to [facility]. Shipping out  for  arrival with patients consent. Copay of $5.00 charging [facility] (8092) @ 004.Patient stated she was not in need of any supplies at this time for this refill shipment. Address and  confirmed. Patient has 0 doses on hand at this time. Patient has not started any new medications, has had no missed doses and no side effects present. Patient is currently taking the medication as directed by doctors instruction, injecting once on the  monthly. Patient does have a safe place in their residence to keep medication at desired temperature away from children and pets. Patient also does have the capability of contacting 911 in the event of an emergency. Patient states they do not have any questions or concerns at this time. Patients next injection is scheduled for Saturday, .

## 2020-01-08 RX ORDER — SUMATRIPTAN SUCCINATE 100 MG/1
100 TABLET ORAL
Qty: 12 TABLET | Refills: 5 | Status: SHIPPED | OUTPATIENT
Start: 2020-01-08 | End: 2020-06-09 | Stop reason: SDUPTHER

## 2020-01-28 ENCOUNTER — OFFICE VISIT (OUTPATIENT)
Dept: OPTOMETRY | Facility: CLINIC | Age: 45
End: 2020-01-28

## 2020-01-28 ENCOUNTER — OFFICE VISIT (OUTPATIENT)
Dept: OPTOMETRY | Facility: CLINIC | Age: 45
End: 2020-01-28
Payer: COMMERCIAL

## 2020-01-28 DIAGNOSIS — H52.4 MYOPIA WITH PRESBYOPIA OF BOTH EYES: Primary | ICD-10-CM

## 2020-01-28 DIAGNOSIS — Z46.0 FITTING AND ADJUSTMENT OF SPECTACLES AND CONTACT LENSES: ICD-10-CM

## 2020-01-28 DIAGNOSIS — H52.13 MYOPIA WITH PRESBYOPIA OF BOTH EYES: Primary | ICD-10-CM

## 2020-01-28 DIAGNOSIS — H52.202 MYOPIA WITH ASTIGMATISM AND PRESBYOPIA, LEFT: ICD-10-CM

## 2020-01-28 DIAGNOSIS — H52.4 MYOPIA WITH PRESBYOPIA OF LEFT EYE: ICD-10-CM

## 2020-01-28 DIAGNOSIS — H52.12 MYOPIA WITH PRESBYOPIA OF LEFT EYE: ICD-10-CM

## 2020-01-28 DIAGNOSIS — H52.12 MYOPIA WITH ASTIGMATISM AND PRESBYOPIA, LEFT: ICD-10-CM

## 2020-01-28 DIAGNOSIS — H52.4 MYOPIA WITH ASTIGMATISM AND PRESBYOPIA, LEFT: ICD-10-CM

## 2020-01-28 DIAGNOSIS — H10.13 ALLERGIC CONJUNCTIVITIS OF BOTH EYES: Primary | ICD-10-CM

## 2020-01-28 PROCEDURE — 99999 PR PBB SHADOW E&M-EST. PATIENT-LVL II: CPT | Mod: PBBFAC,,, | Performed by: OPTOMETRIST

## 2020-01-28 PROCEDURE — 92310 CONTACT LENS FITTING OU: CPT | Mod: CSM,,, | Performed by: OPTOMETRIST

## 2020-01-28 PROCEDURE — 92014 COMPRE OPH EXAM EST PT 1/>: CPT | Mod: S$GLB,,, | Performed by: OPTOMETRIST

## 2020-01-28 PROCEDURE — 92015 PR REFRACTION: ICD-10-PCS | Mod: S$GLB,,, | Performed by: OPTOMETRIST

## 2020-01-28 PROCEDURE — 99999 PR PBB SHADOW E&M-EST. PATIENT-LVL III: ICD-10-PCS | Mod: PBBFAC,,, | Performed by: OPTOMETRIST

## 2020-01-28 PROCEDURE — 99999 PR PBB SHADOW E&M-EST. PATIENT-LVL III: CPT | Mod: PBBFAC,,, | Performed by: OPTOMETRIST

## 2020-01-28 PROCEDURE — 99999 PR PBB SHADOW E&M-EST. PATIENT-LVL II: ICD-10-PCS | Mod: PBBFAC,,, | Performed by: OPTOMETRIST

## 2020-01-28 PROCEDURE — 92014 PR EYE EXAM, EST PATIENT,COMPREHESV: ICD-10-PCS | Mod: S$GLB,,, | Performed by: OPTOMETRIST

## 2020-01-28 PROCEDURE — 92310 PR CONTACT LENS FITTING (NO CHANGE): ICD-10-PCS | Mod: CSM,,, | Performed by: OPTOMETRIST

## 2020-01-28 PROCEDURE — 92015 DETERMINE REFRACTIVE STATE: CPT | Mod: S$GLB,,, | Performed by: OPTOMETRIST

## 2020-01-28 RX ORDER — MELOXICAM 15 MG/1
TABLET ORAL
COMMUNITY
Start: 2020-01-14 | End: 2020-06-09

## 2020-01-28 RX ORDER — NEOMYCIN SULFATE, POLYMYXIN B SULFATE AND DEXAMETHASONE 3.5; 10000; 1 MG/ML; [USP'U]/ML; MG/ML
1 SUSPENSION/ DROPS OPHTHALMIC 4 TIMES DAILY
Qty: 5 ML | Refills: 0 | Status: SHIPPED | OUTPATIENT
Start: 2020-01-28 | End: 2020-02-18

## 2020-01-28 NOTE — PROGRESS NOTES
HPI     LESLI:01/22/2019  Glasses? yes  Contacts? Yes doesn't like current brand   H/o eye surgery, injections or laser: no  H/o eye injury: no  Known eye conditions? no  Family h/o eye conditions? no  Eye gtts?no    (-) Flashes (-) Floaters (+) Mucous crusty in the am   (-) Tearing (+) Itching OU  (-) Burning   (-+ Headaches h/o migraines  (-) Eye Pain/discomfort (-) Irritation   (+) Redness when OU is itching  (-) Double vision (+) Blurry vision when   contacts are dry   Diabetic? (-)  A1c?  (No results found for: HGBA1C)        Last edited by Nery Hannah on 1/28/2020 10:47 AM. (History)            Assessment /Plan     For exam results, see Encounter Report.    Allergic conjunctivitis of both eyes  -     neomycin-polymyxin-dexamethasone (MAXITROL) 3.5mg/mL-10,000 unit/mL-0.1 % DrpS; Place 1 drop into both eyes 4 (four) times daily. for 7 days  Dispense: 5 mL; Refill: 0    Myopia with astigmatism and presbyopia, left    Myopia with presbyopia of left eye      1. Rx Maxitrol QID OU x 7 days. Recommend Zaditor or Alaway bid OU and cool compresses to help soothe itching. Patient advised to RTC if condition gets worse.   2-3. SRx released to patient. Patient educated on lens options. Normal ocular health. RTC 1 year for routine exam.    Pt didn't like her Ultra for Presb. Given trials for Dailies Total One MF. CLFU in 1 week.

## 2020-01-30 ENCOUNTER — TELEPHONE (OUTPATIENT)
Dept: PHARMACY | Facility: CLINIC | Age: 45
End: 2020-01-30

## 2020-02-04 NOTE — PROGRESS NOTES
"Outpatient Psychiatry Follow-Up Visit (MD/NP)    2/18/2020    Clinical Status of Patient:  Outpatient (Ambulatory)    Chief Complaint:  Mirna Pollock is a 44 y.o. female who presents today for follow-up of depression, anxiety and attention problems. Patient last seen for follow-up on 8/27/19, at which time patient was stable and was continued on Cymbalta 60mg daily, Elavil 25mg qHS, and Ambien 5-10mg qHS PRN. Met with patient.      Interval History and Content of Current Session:  Interim Events/Subjective Report/Content of Current Session:   Reports worsening anxiety since last appointment. Unable to identify a trigger initially, but then says that her  is a  and has been more busy due to the [place] season. Is hoping her anxiety will improve once this is over and she goes on vacation. Rates anxiety 5/10 compared to 1-2/10. "I can't shut my brain off at night". Is taking Ambien 10mg every night. Continuing to report constipation and dry eyes with Elavil. Again proposed CBT-I referral and informed patient that this has been shown to be more effective for treating insomnia than medications. Amenable to referral to CBT-I. Pt says that her insomnia is more of a problem than the anxiety. Amenable to switching Elavil to Doxepin. Again discussed the long-term risks of using Ambien (i.e., increased risk for falls), and the long-term goal to eventually discontinue Ambien.    Patient stable from a psychiatric standpoint - patient denies SI, HI, and AVH and does not demonstrate objective signs/symptoms of vidya, psychosis, or affective instability to the point of suicidality or homicidality.    Psychiatric Review of Systems-is patient experiencing or having changes in  sleep: poor sleep onset ("I'm adrian if I get 3 hours")  appetite: no  weight: no  energy/anergy: no  anhedonia: no  libido: no  anxiety:   guilty/hopelessness: no  concentration: no  Irritability: no  Paranoia/delusions: " "no  S.I.B.s/risky behavior: no    Review of Systems   · PSYCHIATRIC: Pertinant items are noted in the narrative.  · CONSTITUTIONAL: No weight gain or loss.  · MUSCULOSKELETAL: No pain or stiffness of the joints.  · NEUROLOGIC: No weakness, sensory changes, seizures, confusion, memory loss, tremor or other abnormal movements.  · RESPIRATORY: No shortness of breath.  · CARDIOVASCULAR: No tachycardia or chest pain.  · GASTROINTESTINAL: + Constipation. No nausea, vomiting, pain, or diarrhea.    Past Medical, Family and Social History: The patient's past medical, family and social history have been reviewed and updated as appropriate within the electronic medical record - see encounter notes.    Compliance: yes    Side effects: constipation, dry eyes from Elavil    Risk Parameters:  Patient reports no suicidal ideation  Patient reports no homicidal ideation  Patient reports no self-injurious behavior  Patient reports no violent behavior    Exam (detailed: at least 9 elements; comprehensive: all 15 elements)   Constitutional  Vitals:  Most recent vital signs, dated less than 90 days prior to this appointment, were reviewed.   Vitals:    02/18/20 1050   Weight: 76.9 kg (169 lb 8.5 oz)        General:  age appropriate, casually dressed     Musculoskeletal  Muscle Strength/Tone:  not examined, no rigidity   Gait & Station:  non-ataxic     Psychiatric  Speech:  no latency; no press   Mood & Affect:  "okay"  congruent and appropriate   Thought Process:  goal-directed, logical   Associations:  intact   Thought Content:  no suicidality, no homicidality, delusions, or paranoia   Insight:  intact   Judgement: behavior is adequate to circumstances   Orientation:  grossly intact   Memory: intact for content of interview   Language: grossly intact   Attention Span & Concentration:  able to focus   Fund of Knowledge:  intact and appropriate to age and level of education     Assessment and Diagnosis   Status/Progress: Based on the " examination today, the patient's problem(s) is/are adequately but not ideally controlled.  New problems have not been presented today. Co-morbidities and Diagnostic uncertainty are complicating management of the primary condition.    Diagnostic uncertainty remaining about origin about concentration issues 2/2 ADHD/anxiety. Patient is NOT a candidate for treatment with any psychostimulant due to her congential structural heart defects. She is in that small population of persons at risk for sudden cardiac death when taking stimulants. Attention problems in a patient who also reports other anticholinergic side effects from Elavil is not surprising. She has many reasons for attention dysregulation other than a primary attention deficit disorder.    In addition, I recommended a referral to [facility] program for insomnia, which has shown greater efficacy than medications for insomnia. 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 However, patient is not interested at this time.    General Impression:    ICD-10-CM ICD-9-CM   1. Recurrent major depression in partial remission F33.41 296.35   2. LALITA (generalized anxiety disorder) F41.1 300.02   3. Social anxiety disorder F40.10 300.23   4. Insomnia, unspecified type G47.00 780.52   R/o ADHD  H/o fibromyalgia    Intervention/Counseling/Treatment Plan   · Continue Cymbalta 60mg daily for LALITA, fibromyalgia  · Stop Elavil 25 mg at bedtime  · Start Doxepin 10mg at bedtime   · Continue Ambien 10mg nightly PRN for insomnia    · Referral to Psychology for CBT-I     Discussed with patient informed consent including diagnosis, risks and benefits of proposed treatment above vs. alternative treatments vs. no treatment, as well as serious and common side effects of these treatments, and the inherent unpredictability of individual  responses to these treatments. The patient expresses understanding of the above and displays the capacity to agree with this current plan. Patient also agrees that, currently, the benefits outweigh the risks and would like to pursue treatment at this time, and had no other questions.    Instructions:  · Take all medications as prescribed.    · Abstain from recreational drugs and alcohol.  · Present to ED or call 911 for SI/HI plan or intent, psychosis, or medical emergency.    Return to Clinic: 3 months    Case to be discussed with supervising staff, Dr. Abner Jin MD.    Maryjo Gustafson, DO   [facility], PGY-3

## 2020-02-05 ENCOUNTER — PATIENT MESSAGE (OUTPATIENT)
Dept: OPTOMETRY | Facility: CLINIC | Age: 45
End: 2020-02-05

## 2020-02-18 ENCOUNTER — OFFICE VISIT (OUTPATIENT)
Dept: PSYCHIATRY | Facility: CLINIC | Age: 45
End: 2020-02-18
Payer: COMMERCIAL

## 2020-02-18 VITALS — WEIGHT: 169.56 LBS | BODY MASS INDEX: 28.21 KG/M2

## 2020-02-18 DIAGNOSIS — F41.1 GAD (GENERALIZED ANXIETY DISORDER): Chronic | ICD-10-CM

## 2020-02-18 DIAGNOSIS — F40.10 SOCIAL ANXIETY DISORDER: Chronic | ICD-10-CM

## 2020-02-18 DIAGNOSIS — F33.41 RECURRENT MAJOR DEPRESSION IN PARTIAL REMISSION: Primary | ICD-10-CM

## 2020-02-18 DIAGNOSIS — G47.00 INSOMNIA, UNSPECIFIED TYPE: ICD-10-CM

## 2020-02-18 PROCEDURE — 3008F PR BODY MASS INDEX (BMI) DOCUMENTED: ICD-10-PCS | Mod: CPTII,S$GLB,, | Performed by: PSYCHIATRY & NEUROLOGY

## 2020-02-18 PROCEDURE — 3008F BODY MASS INDEX DOCD: CPT | Mod: CPTII,S$GLB,, | Performed by: PSYCHIATRY & NEUROLOGY

## 2020-02-18 PROCEDURE — 99212 PR OFFICE/OUTPT VISIT, EST, LEVL II, 10-19 MIN: ICD-10-PCS | Mod: S$GLB,,, | Performed by: PSYCHIATRY & NEUROLOGY

## 2020-02-18 PROCEDURE — 99999 PR PBB SHADOW E&M-EST. PATIENT-LVL III: CPT | Mod: PBBFAC,,, | Performed by: PSYCHIATRY & NEUROLOGY

## 2020-02-18 PROCEDURE — 99999 PR PBB SHADOW E&M-EST. PATIENT-LVL III: ICD-10-PCS | Mod: PBBFAC,,, | Performed by: PSYCHIATRY & NEUROLOGY

## 2020-02-18 PROCEDURE — 99212 OFFICE O/P EST SF 10 MIN: CPT | Mod: S$GLB,,, | Performed by: PSYCHIATRY & NEUROLOGY

## 2020-02-18 RX ORDER — ZOLPIDEM TARTRATE 10 MG/1
TABLET ORAL
Qty: 30 TABLET | Refills: 2 | Status: SHIPPED | OUTPATIENT
Start: 2020-02-18 | End: 2020-07-28 | Stop reason: SDUPTHER

## 2020-02-18 RX ORDER — DOXEPIN HYDROCHLORIDE 10 MG/1
10 CAPSULE ORAL NIGHTLY
Qty: 90 CAPSULE | Refills: 1 | Status: SHIPPED | OUTPATIENT
Start: 2020-02-18 | End: 2020-07-28 | Stop reason: SDUPTHER

## 2020-02-18 RX ORDER — DULOXETIN HYDROCHLORIDE 60 MG/1
60 CAPSULE, DELAYED RELEASE ORAL DAILY
Qty: 90 CAPSULE | Refills: 1 | Status: SHIPPED | OUTPATIENT
Start: 2020-02-18 | End: 2020-07-28 | Stop reason: SDUPTHER

## 2020-02-19 ENCOUNTER — DOCUMENTATION ONLY (OUTPATIENT)
Dept: PSYCHIATRY | Facility: CLINIC | Age: 45
End: 2020-02-19

## 2020-02-19 NOTE — PROGRESS NOTES
Sw received a referral for pt in regards to CBT-I. Sw placed a call to pt to discuss tx. No answer. Sw left a vm for immediate call back.

## 2020-02-21 ENCOUNTER — TELEPHONE (OUTPATIENT)
Dept: PHARMACY | Facility: CLINIC | Age: 45
End: 2020-02-21

## 2020-02-26 ENCOUNTER — OFFICE VISIT (OUTPATIENT)
Dept: OPTOMETRY | Facility: CLINIC | Age: 45
End: 2020-02-26
Payer: COMMERCIAL

## 2020-02-26 DIAGNOSIS — H52.4 MYOPIA WITH PRESBYOPIA OF BOTH EYES: Primary | ICD-10-CM

## 2020-02-26 DIAGNOSIS — H52.13 MYOPIA WITH PRESBYOPIA OF BOTH EYES: Primary | ICD-10-CM

## 2020-02-26 PROCEDURE — 92499 UNLISTED OPH SVC/PROCEDURE: CPT | Mod: ,,, | Performed by: OPTOMETRIST

## 2020-02-26 PROCEDURE — 99999 PR PBB SHADOW E&M-EST. PATIENT-LVL II: CPT | Mod: PBBFAC,,, | Performed by: OPTOMETRIST

## 2020-02-26 PROCEDURE — 99999 PR PBB SHADOW E&M-EST. PATIENT-LVL II: ICD-10-PCS | Mod: PBBFAC,,, | Performed by: OPTOMETRIST

## 2020-02-26 PROCEDURE — 92499 PR CONTACT LENS F/U LEV 1: ICD-10-PCS | Mod: ,,, | Performed by: OPTOMETRIST

## 2020-02-26 NOTE — PROGRESS NOTES
HPI     Pt is coming in for CL fit   Pt likes comfort and va OU       Last edited by Nery Hannah on 2/26/2020 11:19 AM. (History)            Assessment /Plan     For exam results, see Encounter Report.    Myopia with presbyopia of both eyes      CLRx updated. RTC 1 year Routine and CL exam

## 2020-02-27 ENCOUNTER — TELEPHONE (OUTPATIENT)
Dept: OPTOMETRY | Facility: CLINIC | Age: 45
End: 2020-02-27

## 2020-02-27 ENCOUNTER — TELEPHONE (OUTPATIENT)
Dept: NEUROLOGY | Facility: CLINIC | Age: 45
End: 2020-02-27

## 2020-02-27 NOTE — TELEPHONE ENCOUNTER
MAMADOU ---- Message from Fernando Murphy OD sent at 2/26/2020  4:25 PM CST -----  Pt was mistakenly written out Rx for Dailies Aqua Comfort Plus MF but was given Dailies total 1 MF. Changed in computer. Please notify the pt of the mistake and let her know that it's updated on [facility]. I'm not sure if she ordered her CL already

## 2020-02-27 NOTE — TELEPHONE ENCOUNTER
Call placed to patient. Left detailed message highlighting policy and explained that receiving outside medications have been discontinued. Also advised patient that she is free to contact the clinic to speak with a supervisor or manager to discuss further.

## 2020-02-28 ENCOUNTER — TELEPHONE (OUTPATIENT)
Dept: NEUROLOGY | Facility: CLINIC | Age: 45
End: 2020-02-28

## 2020-02-28 NOTE — TELEPHONE ENCOUNTER
----- Message from Wilma Kim sent at 2/27/2020  4:37 PM CST -----  Contact: pt called [phone]  Patient is calling back because  Nurse left Voicemail message about her Botox appointment that is scheduled on tomorrow.  She was just informed that they can no longer accept outside medications. And Nurse proceeded to leave message that patient can call back and speak with Supervisor. Please contact Patient at earliest if possible she states that she is available at anytime.

## 2020-02-28 NOTE — TELEPHONE ENCOUNTER
Call returned to pt. Discussed issues with Botox and C requiring med come from contracted pharmacy. Pt states will contact this office once she returns from vacation to determine if a resolution was reached. Thanked me for the call.

## 2020-03-12 ENCOUNTER — TELEPHONE (OUTPATIENT)
Dept: NEUROLOGY | Facility: CLINIC | Age: 45
End: 2020-03-12

## 2020-03-13 ENCOUNTER — PATIENT MESSAGE (OUTPATIENT)
Dept: NEUROLOGY | Facility: CLINIC | Age: 45
End: 2020-03-13

## 2020-03-24 ENCOUNTER — TELEPHONE (OUTPATIENT)
Dept: PHARMACY | Facility: CLINIC | Age: 45
End: 2020-03-24

## 2020-03-25 ENCOUNTER — PROCEDURE VISIT (OUTPATIENT)
Dept: NEUROLOGY | Facility: CLINIC | Age: 45
End: 2020-03-25
Payer: COMMERCIAL

## 2020-03-25 VITALS
TEMPERATURE: 98 F | HEIGHT: 65 IN | SYSTOLIC BLOOD PRESSURE: 165 MMHG | DIASTOLIC BLOOD PRESSURE: 100 MMHG | BODY MASS INDEX: 28.21 KG/M2

## 2020-03-25 DIAGNOSIS — G43.009 MIGRAINE WITHOUT AURA AND WITHOUT STATUS MIGRAINOSUS, NOT INTRACTABLE: ICD-10-CM

## 2020-03-25 PROCEDURE — 64615 CHEMODENERV MUSC MIGRAINE: CPT | Mod: S$GLB,,, | Performed by: NURSE PRACTITIONER

## 2020-03-25 PROCEDURE — 64615 PR CHEMODENERVATION OF MUSCLE FOR CHRONIC MIGRAINE: ICD-10-PCS | Mod: S$GLB,,, | Performed by: NURSE PRACTITIONER

## 2020-03-25 RX ORDER — HYDROXYZINE PAMOATE 25 MG/1
CAPSULE ORAL
Qty: 12 CAPSULE | Refills: 0 | Status: SHIPPED | OUTPATIENT
Start: 2020-03-25 | End: 2020-09-15 | Stop reason: SDUPTHER

## 2020-03-25 RX ORDER — PROMETHAZINE HYDROCHLORIDE 25 MG/1
TABLET ORAL
Qty: 20 TABLET | Refills: 5 | Status: SHIPPED | OUTPATIENT
Start: 2020-03-25 | End: 2020-06-09 | Stop reason: SDUPTHER

## 2020-03-25 RX ORDER — AMITRIPTYLINE HYDROCHLORIDE 25 MG/1
TABLET, FILM COATED ORAL
COMMUNITY
Start: 2020-03-21 | End: 2020-07-17

## 2020-03-25 NOTE — PROCEDURES
SUBJECTIVE:  Patient ID: Mirna Pollock  Chief Complaint: Follow-up and Botulinum Toxin Injection    History of Present Illness:  Mirna Pollock is a 44 y.o. female who presents to clinic alone for follow-up of headaches and Botox injections.     Recommendations made at last Office Visit on 12/4/2019:  - Botox administered in clinic for Chronic Migraine (see below)   - Continue Aimovig 140 mg SQ monthly injection  - Continue Amitriptyline, Cymbalta, and tizanidine for migraine prevention  - For migraine abortive - has sumatriptan 100 mg tabs available   - For rescue - has vistaril available  - She is not to take Ambien on days she uses vistaril   - For nausea - refilled phenergan 25 mg tabs   - MDD/LALITA - under increased stress related to finding her birth mother, established with psychiatrist who she sees regularly, continue f/up with Dr. Gustafson for management   - HTN - BP well controlled on clonidine, management per cardiology   - RTC in 12 weeks for repeat Botox injections or sooner if needed     03/25/2020- Interval History:  She is about a month overdue for her Botox injections and has noticed her migraines have gradually become more frequent and more intense.  Prior to one month ago, migraines had been under good control.  She does admit she has been under a significant amount of stress related to Covid-19 outbreak.      Otherwise, information below is still accurate and current.     12/04/2019- Interval History:  Reports she has been experiencing 5-6 migraines per month, admits she has been under increased stress found her birth mother who was not interested in meeting her, is in contact with twin brothers.   She has continued taking aimovig 140 mg, which she feels has greatly improved her.  She typically wakes up with a migraine if she is going to get one, typically is resolved with one dose of sumatriptan 100 mg, will feel back to normal by the afternoon.   She is pleased with the current state of her  "migraines and attributes the increased frequency of migraines over the last 12 weeks to fluctuating weather and increased stress.  Does not wish to make adjustments to her treatment plan at this time.      Otherwise, information below is still accurate and current.      08/16/2019- Interval History:  Migraines and headaches continue to respond well to Botox and Aimovig, gets one severe migraine per month, and 1-2 moderate migraines.  She has been treating her migraines with ibuprofen, which is not very effective.  In the past, she was using triptans, however she does not have any abortive medication available at this time.  Migraines continue to feel the same as they always have, she denies any change in the quality or nature of her migraines.  She is happy with her current regimen and does not feel adjustments to her treatment plan are needed at this time.    She has additional concerns including frequently feeling as if she is more off balance than usual, is occurring more frequently as time progresses.  She is not dizzy, just feels as if she is going to fall over, occurs 8-10 times per month.  She is also dropping things more frequently.       05/22/2019- Interval History:  Botox continues to be effective for migraine prevention, for the first 8-10 weeks she experiences less than 3 migraines per month.  She has continued using Aimovig 140 mg SQ monthly injections.  She is very happy with the response of her migraines to Botox injections and wishes to continue with current treatment plan.        02/14/2019- Interval History:  Headaches and migraines continue to be "much better", "I am not getting them nearly as much", though she is keenly aware of when a cold front is coming through as she will always get a migraine.  She has continued using Aimovig monthly as well as taking tizanidine, cymbalta, and amitriptyline daily for migraine prevention.  She is very happy with the current state of her migraines and does " not wish to make any adjustments to her treatment plan at this time.  Migraines continue to feel the same as they always have.     11/01/2018- Interval History:  Headaches have been significantly better, she has only had 3-4 migraines since her last round of Botox and is very happy Botox is helping decrease her migraine frequency.  She has been using Aimovig monthly, which she also feels has been helping decrease her migraine frequency.  Migraines continue to feel the same as they always have, she denies any change in the quality or nature of her migraines.  Wishes to continue with Botox injections due to the pronounced effect it has had on her migraines.      08/08/2018- Interval History:  Finds her migraines are no better since last round of Botox, admits she was under increased stress at one point due to issues with her 's job.  She does know a lot of her migraines are triggered from stress.  She has been getting a lot of migraines above her eyes as well as throughout her occipitalis and neck.  Overall, she does wish to continue with Botox injections, but is open to discussing additional prevention options.      05/22/2018- Interval History:  Despite nerve blocks two weeks ago, she has been suffering with a headache nearly everyday since her last visit.  She did try Zomig nasal spray, however she took the first dose on day two of her migraine because she was nervous.  She does think it lessened the pain of her migraine, however did not abort it.  She did not repeat the dose.  Additionally, she continues to report poor sleep nightly.  She stopped drinking caffeine.  Headaches are the same as they have always been.  She denies any change in the nature or quality of her headaches. Risks, Benefits, and potential side effects of Botox discussed with patient.  Alternative treatments offered.  After thorough discussed regarding the procedure, patient has decided to move forward with initiating Botox injections  for Chronic Migraine.  Patient understands we will complete 3 rounds of injections, if after 3 rounds, we do not see a 50% reduction in headaches, we will discontinue Botox injections.      05/11/2018 - Interval History:  This past week she has only been able to work two days, has missed 3 days of work this week due to migraines.  She still is unsure about getting Botox injections for chronic migraine, she would like to try it but her  does not feel this is a good treatment option, she is trying to convince him.  Of note, blood pressure significantly lower in today's visit than in past visits.  She reports her psychiatrist has discontinued vyvanse until she is given clearance from Cardio to restart taking Vyvanse.  She was seen by a pediatric cardiologist who referred her to an adult cardiologist, however she has not seen this provider, had to cancel appointment due to having a headache.  She is requesting repeat nerve blocks today.      01/29/2018 - Interval History:  She has been having a terrible migraine for the last 3 days, she has tried numerous medications without any relief.  States she tried to go to work today, but she had to leave because her headache got so bad.  She is requesting to repeat the nerve blocks as those shots gave her relief form 6-8 weeks in the past.  She has done research on the Botox injections and states her  is very hesitant for her to try Botox injections for her migraines.  She has not been seen by her PCP nor cardiologist for further management of her blood pressure.  Discussed the challenges of treating her migraines with uncontrolled hypertension because I cannot prescribe her any triptan therapy until her blood pressure is under better control.  She has continued to take Cymbalta daily for her depression, she is unsure if this is giving her any relief with regards to her headaches or not.       01/15/2018 - Interval History:  She was approved for Botox injections,  but was a no-show to her appointment last week.  States she spoke with her  about the injections and she was very nervous to try the Botox injections for her migraines.    She continues to experience headaches on 1-4 days per week with migraines occurring 1-2 days per week.  Recently she has been experiencing more migraines and headaches than usually, but admits she has been going through a lot emotionally as well as she got sick with the flu.  She has continued to regularly follow-up with her psychiatrist who changed her anti-depressant, she is now taking Cymbalta.  Since change in medication, she feels she has been experiencing a mild headache each day.  She has had to miss a few days of work due to her migraines since last visit.  Additionally, she feels occasionally she begins to get dizzy, this occurs with or without headaches, she has an appointment with her eye doctor next week as her vision is also getting progressively worse.  She did not find vistaril to be useful with regards to her headaches.  She does feel Zanaflex helps with her neck as well as helping to relax her neck muscles.  When she gets a migraine she has been taking Vistaril and Ibuprofen together which does help the pain, she also has been trying Excedrin migraines which also gives her some relief.  Patient with known HTN on multiple anti-hypertensive agents, blood pressure significantly elevated in clinic today.  She is not symptomatic.  States she needs to follow-up with her primary care (who is outside the [facility] system) for better management.       10/19/2017 - Interval History:  - Headaches have persisted in similar frequency, which she is happy about as she has been under increased stress with regards to her 's health, he has been suffering with his Gout, just got out of the hospital last week and has had to take time off work etc   - Blood pressure continues to be elevated despite numerous anti-hypertensive, states today  is good for her (currently 141/102), has been regularly following up with her PCP for management   - Is very excited because she is going on a cruise with her  to [place] at the end of November/early December   - Has continued to have a headache nearly everyday, not necessarily a migraine, however migraine have been at least once per month    - Has noticed more recently that she feels like she has intense pressure behind her eyes and across the bridge of her nose that slightly feels like sinus pressure, this occurs 1-2 times per week   - Has been seeing a chiropractor as well as a masseuse regularly which she does feel has been helping   - Would still like to move forward with the Botox injections   - Does not feel adjustments to her medications are necessary at this time      08/17/2017 - Interval History:  - Had been seeing Iker Beth PA-C   - Was taking Topamax, Zanaflex, and Vistaril - however has been out of medications for about 3 months  - Since being off the medication, she had been okay, however recently noticed migraines were beginning to occur more frequently with increased intensity and duration   - She finds the nerve blocks had been helping a lot, as long as she is consistent with getting the nerve block, however hasn't had this procedure for over a year   - Has been getting 5-6 migraines per month, each lasting 1-4 days in length   - Current migraine has been going on since last Friday (6 days)  - Blood pressure significantly elevated in clinic today, states her PCP is managing her medications in order to get her blood pressure better controlled   - Is requesting refills of medication as well as repeat nerve occipital and trigeminal nerve blocks      Headache history:   Age of onset -  13  Nature of pain -  Throbbing   Location - bioccipital   Aura -  White floaters   Duration - hrs  Frequency -  2/week  Time of day - anytime   7 days of pain - jaw pain, L neck, L shoulder,  L side throbbing    Associated symptoms:   Meningeal symptoms - photophobia, phonophobia, exercise intolerance +   Nausea/vomitting +   Nasal drainage   Visual symptoms  Pallor/flushing  Vertigo  Stroke symptoms  Confusion  Impaired concentration +  Pain worsened with physical activity +  Neck pain +  Whooshing sounds   Visual spots/blotches +  Triggers:   Stress +   Bright or flickering light  Strong smell  Vigorous exercise  Dietary factors   Visual strain   Motion intolerance as passenger:  No   Resolution of headache with sleep: yes      Therapies Tried & Response:  Tylenol, motrin, alleve - not help  excedrin - not help   imitrex - helped initially   topamax 50 mg - helped  Percocet - helps   Gabapentin - helps  Amlodipine - for HTN  Bystolic - for HTN  Lexapro - hasn't noticed change in HA, for depression   GONB - helps   Cymbalta - unsure, for depression   Zomig - helps    Maxalt - no   Botox - helping   Sumatriptan - retried today   Vistrail - effective   Aimovig - helping     Current Medications:    doxepin (SINEQUAN) 10 MG capsule, Take 1 capsule (10 mg total) by mouth every evening., Disp: 90 capsule, Rfl: 1    DULoxetine (CYMBALTA) 60 MG capsule, Take 1 capsule (60 mg total) by mouth once daily., Disp: 90 capsule, Rfl: 1    erenumab-aooe 140 mg/mL AtIn, Inject 140 mg into the skin every 28 days., Disp: 1 mL, Rfl: 3    hydrOXYzine pamoate (VISTARIL) 25 MG Cap, TAKE 1 CAPSULE BY MOUTH EVERY 6 TO 8 HOURS AS NEEDED, Disp: 12 capsule, Rfl: 0    promethazine (PHENERGAN) 25 MG tablet, Take 1 tab by mouth every 4-6 hours as needed for nausea., Disp: 20 tablet, Rfl: 5    sumatriptan (IMITREX) 100 MG tablet, Take 1 tablet (100 mg total) by mouth every 2 (two) hours as needed for Migraine. Max 2 tabs/day. Max 3 days/week., Disp: 12 tablet, Rfl: 5    tiZANidine (ZANAFLEX) 2 MG tablet, TAKE 1 TO 2 TABLETS BY MOUTH AT BEDTIME AS NEEDED(NO ALCOHOL OR DRIVING WITH MEDICATION), Disp: 60 tablet, Rfl: 11    zolpidem (AMBIEN) 10 mg  "Tab, Take ½- to 1 tablet by mouth nightly as needed for  insomnia, Disp: 30 tablet, Rfl: 2    amitriptyline (ELAVIL) 25 MG tablet, TK 1 T PO QPM, Disp: , Rfl:     meloxicam (MOBIC) 15 MG tablet, TK 1 T PO ONCE D, Disp: , Rfl:     Current Facility-Administered Medications:     onabotulinumtoxina injection 200 Units, 200 Units, Intramuscular, Q90 Days, Luci Vulevich, FNP, 200 Units at 12/04/19 1251    onabotulinumtoxina injection 200 Units, 200 Units, Intramuscular, Q90 Days, Luci Vulevich, FNP    onabotulinumtoxina injection 200 Units, 200 Units, Intramuscular, Q90 Days, Luci Vulevich, FNP    onabotulinumtoxina injection 200 Units, 200 Units, Intramuscular, Q10 weeks, Luci Vulevich, FNP    Review of Systems - as per HPI, otherwise a balanced 10 systems review is negative.    OBJECTIVE:  Vitals:  BP (!) 165/100 (BP Location: Left arm, Patient Position: Sitting)   Temp 98.3 °F (36.8 °C) (Oral)   Ht 5' 5" (1.651 m)   LMP 12/14/2000   BMI 28.21 kg/m²     Physical Exam:  Constitutional: she appears well-developed and well-nourished. she is well groomed. NAD     ASSESSMENT:  1. Chronic migraine    2. Migraine without aura and without status migrainosus, not intractable      PLAN:  - Botox administered in clinic for Chronic Migraine (see below)   - Continue all medications as directed   - Refills provided   - RTC in 12 weeks for repeat Botox injections or sooner if needed     Orders Placed This Encounter    promethazine (PHENERGAN) 25 MG tablet    hydrOXYzine pamoate (VISTARIL) 25 MG Cap       All questions and concerns addressed.  Patient verbalizes understanding and is agreeable with the above stated treatment plan.      PROCEDURE NOTE:  BOTOX was performed as an indicated therapy for intractable chronic migraine headaches given that the patient failed more than 2 headache medications    A time out was conducted just before the start of the procedure to verify the correct patient and " procedure, procedure location, and all relevant critical information.     Botulinum Toxin Injection Procedure     PROCEDURE PERFORMED: Botulinum toxin injection (11419)  CLINICAL INDICATION: G43.719  After risks and benefits were explained including bleeding, infection, worsening of pain, damage to the areas being injected, weakness of muscles, loss of muscle control, dysphagia if injecting the head or neck, facial droop if injecting the facial area, painful injection, allergic or other reaction to the medications being injected, and the failure of the procedure to help the problem, a signed consent was obtained.   The patient was placed in a comfortable area and the sites to be treated were identified.The area to be treated was prepped three times with alcohol and the alcohol allowed to dry. Next, a 30 gauge needle was used to inject the medication in the area to be treated.      Total Botox used: 155 Units   Unavoidable waste: 45 Units     Injection sites:    muscle bilaterally ( a total of 10 units divided into 2 sites)   Procerus muscle (5 units)   Frontalis muscle bilaterally (a total of 20 units divided into 4 sites)   Temporalis muscle bilaterally (a total of 40 units divided into 8 sites)   Occipitalis muscle bilaterally (a total of 30 units divided into 6 sites)   Cervical paraspinal muscles (a total of 20 units divided into 4 sites)   Trapezius muscle bilaterally (a total of 30 units divided into 6 sites)   Complications: none   RTC for the next Botox injection: 12 weeks     CC: MD Luci Echols, BC-YAEL  [facility] Department of Neurology   [phone]

## 2020-03-31 NOTE — PROGRESS NOTES
Supervising Psychiatry Staff:  I discussed Ms. Pollock's progress with Dr Maryjo Gustafson in regular caseload supervision. I agree with the above interval history, ROS, findings, and plan, which reflect my own.

## 2020-04-07 ENCOUNTER — PATIENT MESSAGE (OUTPATIENT)
Dept: NEUROLOGY | Facility: CLINIC | Age: 45
End: 2020-04-07

## 2020-04-23 ENCOUNTER — TELEPHONE (OUTPATIENT)
Dept: PHARMACY | Facility: CLINIC | Age: 45
End: 2020-04-23

## 2020-05-04 NOTE — PROGRESS NOTES
"Outpatient Psychiatry Follow-Up Visit (MD/NP)    5/18/2020    Clinical Status of Patient:  Outpatient (Ambulatory)    TELE PSYCHIATRY   Disclaimer   *The patient was informed despite using HIPPA compliant technology there may be risks including security breach, technological failure, inability to perform a comprehensive physical exam which could delay or prevent an accurate diagnosis, and potential complications from treatment decisions rendered over a telemedical platform. The patient understands and consented to the use of tele-health service as being a safe measure to mitigate during COVID Crisis.  The patient was also informed of the relationship between the physician and patient and the respective role of any other health care provider with respect to management of the patient; and notified that the pt may decline to receive medical services by telemedicine and may withdraw from such care at any time.     Patient's Current location, exact physical address: "not sure, I am in [place] right now"  In Case of Emergency pts next of kin  Name: Jeremias Pollock ()  Phone number:  [phone]  Visit type: Virtual visit with synchronous audio only  Total time spent with patient: 11 minutes    Chief Complaint:  Mirna Pollock is a 44 y.o. female who presents today for follow-up of depression, anxiety and attention problems. Patient last seen for follow-up on 2/18/20, at which time Elavil 25mg QHS was switched to Doxpein 10mg daily and was continued on Cymbalta 60mg daily and Ambien 5-10mg qHS PRN. Referral to CBT-I was placed, but appears that pt never f/u with getting set up w/ this program. Met with patient.      Interval History and Content of Current Session:  Interim Events/Subjective Report/Content of Current Session:   "I've been up and down". Mother passed away from COVID-19 pandemic. "It's been a lot in terms of not being able to leave the house". Says that her anxiety has largely remained unchanged " "(5/10, same as last appointment). Reports significant improvement in sleep with initiation of Doxepin 10mg QHS, however, continues to take Ambien nightly. Again, discussed the long-term risks of using Ambien (i.e., increased risk for falls), and the long-term goal to eventually discontinue Ambien. Patient would like to continue medication regimen as is. Does not need refills for any her of medications at this time. Denies SI/HI/AVH.    Psychiatric Review of Systems-is patient experiencing or having changes in  sleep: improved, 6-7 hours/night  appetite: no  weight: no  energy/anergy: no  anhedonia: no  libido: no  anxiety: "about the same"  guilty/hopelessness: no  concentration: no  Irritability: no  Paranoia/delusions: no  S.I.B.s/risky behavior: no    Review of Systems   · PSYCHIATRIC: Pertinant items are noted in the narrative.  · CONSTITUTIONAL: No weight gain or loss.  · MUSCULOSKELETAL: No pain or stiffness of the joints.  · NEUROLOGIC: No weakness, sensory changes, seizures, confusion, memory loss, tremor or other abnormal movements.  · RESPIRATORY: No shortness of breath.  · CARDIOVASCULAR: No tachycardia or chest pain.  · GASTROINTESTINAL: No nausea, vomiting, pain, constipation, diarrhea.    Past Medical, Family and Social History: The patient's past medical, family and social history have been reviewed and updated as appropriate within the electronic medical record - see encounter notes.    Compliance: yes    Side effects: None     Risk Parameters:  Patient reports no suicidal ideation  Patient reports no homicidal ideation  Patient reports no self-injurious behavior  Patient reports no violent behavior    Exam (detailed: at least 9 elements; comprehensive: all 15 elements)   Constitutional  Vitals:  Most recent vital signs, dated less than 90 days prior to this appointment, were reviewed.   There were no vitals filed for this visit.     General:  not examined (telephone visit) " "    Musculoskeletal  Muscle Strength/Tone:  not examined (telephone visit)   Gait & Station:  not examined (telephone visit)     Psychiatric  Speech:  no latency; no press   Mood & Affect:  "okay"  not examined (telephone visit)   Thought Process:  goal-directed, logical   Associations:  intact   Thought Content:  no suicidality, no homicidality, delusions, or paranoia   Insight:  intact   Judgement: behavior is adequate to circumstances   Orientation:  grossly intact   Memory: intact for content of interview   Language: grossly intact   Attention Span & Concentration:  able to focus   Fund of Knowledge:  intact and appropriate to age and level of education     Assessment and Diagnosis   Status/Progress: Based on the examination today, the patient's problem(s) is/are improved.  New problems have not been presented today. Co-morbidities and Diagnostic uncertainty are complicating management of the primary condition.    Diagnostic uncertainty remaining about origin about concentration issues 2/2 ADHD/anxiety. Patient is NOT a candidate for treatment with any psychostimulant due to her congential structural heart defects. She is in that small population of persons at risk for sudden cardiac death when taking stimulants. Attention problems in a patient who also reports other anticholinergic side effects from Elavil is not surprising. She has many reasons for attention dysregulation other than a primary attention deficit disorder.    In addition, I recommended a referral to [facility] program for insomnia, which has shown greater efficacy than medications for insomnia. 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 However, patient is not interested at this time.    General Impression:    ICD-10-CM ICD-9-CM   1. Recurrent major depression in partial remission F33.41 " 296.35   2. LALITA (generalized anxiety disorder) F41.1 300.02   3. Social anxiety disorder F40.10 300.23   R/o ADHD  H/o fibromyalgia    Intervention/Counseling/Treatment Plan   · Continue Cymbalta 60mg daily for LALITA, fibromyalgia  · Continue Doxepin 10mg at bedtime   · Continue Ambien 10mg nightly PRN for insomnia    · Referral to Psychology for CBT-I     Discussed with patient informed consent including diagnosis, risks and benefits of proposed treatment above vs. alternative treatments vs. no treatment, as well as serious and common side effects of these treatments, and the inherent unpredictability of individual responses to these treatments. The patient expresses understanding of the above and displays the capacity to agree with this current plan. Patient also agrees that, currently, the benefits outweigh the risks and would like to pursue treatment at this time, and had no other questions.    Instructions:  · Take all medications as prescribed.    · Abstain from recreational drugs and alcohol.  · Present to ED or call 911 for SI/HI plan or intent, psychosis, or medical emergency.    Return to Clinic: 3 months. Discussed resident transition in July.    Case to be discussed with supervising staff, Dr. Abner Jin MD.    Maryjo Gustafson DO   [facility], PGY-3

## 2020-05-07 RX ORDER — TIZANIDINE 2 MG/1
TABLET ORAL
Qty: 60 TABLET | Refills: 2 | Status: SHIPPED | OUTPATIENT
Start: 2020-05-07 | End: 2020-06-09

## 2020-05-13 ENCOUNTER — PATIENT MESSAGE (OUTPATIENT)
Dept: PSYCHIATRY | Facility: CLINIC | Age: 45
End: 2020-05-13

## 2020-05-18 ENCOUNTER — OFFICE VISIT (OUTPATIENT)
Dept: PSYCHIATRY | Facility: CLINIC | Age: 45
End: 2020-05-18
Payer: COMMERCIAL

## 2020-05-18 DIAGNOSIS — F40.10 SOCIAL ANXIETY DISORDER: ICD-10-CM

## 2020-05-18 DIAGNOSIS — F33.41 RECURRENT MAJOR DEPRESSION IN PARTIAL REMISSION: Primary | ICD-10-CM

## 2020-05-18 DIAGNOSIS — F41.1 GAD (GENERALIZED ANXIETY DISORDER): ICD-10-CM

## 2020-05-18 PROCEDURE — 99213 PR OFFICE/OUTPT VISIT, EST, LEVL III, 20-29 MIN: ICD-10-PCS | Mod: 95,,, | Performed by: PSYCHIATRY & NEUROLOGY

## 2020-05-18 PROCEDURE — 99213 OFFICE O/P EST LOW 20 MIN: CPT | Mod: 95,,, | Performed by: PSYCHIATRY & NEUROLOGY

## 2020-05-21 ENCOUNTER — TELEPHONE (OUTPATIENT)
Dept: PHARMACY | Facility: CLINIC | Age: 45
End: 2020-05-21

## 2020-05-22 NOTE — TELEPHONE ENCOUNTER
Call placed to [facility]. New verbal prescription given to pharmacist Olivia for Botox 100 unit vial, quantity of 2 to inject up to 200 units every 10 weeks with 4 refills.  
No

## 2020-06-04 ENCOUNTER — TELEPHONE (OUTPATIENT)
Dept: NEUROLOGY | Facility: CLINIC | Age: 45
End: 2020-06-04

## 2020-06-04 NOTE — TELEPHONE ENCOUNTER
Maggie Gaffneybeth Staff             Luci Ortez,     I have not been able to secure an authorization for Ms. Pollock which is scheduled for 6/8/2020. I am attempting to work with the insurance company. However, case is currently pending consent from Ms. Pollock to ship Botox to office. The [employer] rep called Ms Pollock & left a message for return call.  [employer] does not ship medication on Friday due to the weekend. Is this request of medical urgency or can it be rescheduled to give additional time to obtain authorization? Please reach out to myself and patient with decision, for I am unable to reschedule and/or cancel services. If you have any further questions, you can contact me at [phone].     Thanks   Maggie MILLIGAN

## 2020-06-05 ENCOUNTER — TELEPHONE (OUTPATIENT)
Dept: NEUROLOGY | Facility: CLINIC | Age: 45
End: 2020-06-05

## 2020-06-05 NOTE — TELEPHONE ENCOUNTER
----- Message from Tamia Torres sent at 6/5/2020 12:17 PM CDT -----  Contact: [facility]  Reason Medication would be at the office on Monday        Contact: [phone]

## 2020-06-05 NOTE — TELEPHONE ENCOUNTER
Left detailed message on voicemail requesting a call back in regards to rescheduling Monday's Botox appt (See details below)        Luci Ortez, BC Osuna MA   Cc: Shannon Pedro LPN; Maggie Cohen   Caller: Unspecified (Yesterday,  4:11 PM)             Yep we can push her appointment back   Thanks,   Elisa    Previous Messages      ----- Message -----   From: Evelina Osuna MA   Sent: 6/4/2020   4:11 PM CDT   To: Shannon Pedro LPN, *

## 2020-06-05 NOTE — TELEPHONE ENCOUNTER
----- Message from Malgorzata Mckeon sent at 6/5/2020  1:48 PM CDT -----  Contact: self @ [phone]  Pt says she is returning Evelina's call concerning her appt on Monday.  Pt says her Botox should arrive on Monday so she can be rescheduled for anytime next week mid morning.

## 2020-06-05 NOTE — TELEPHONE ENCOUNTER
----- Message from Claudia Edwards sent at 6/5/2020 11:35 AM CDT -----  Contact: Mirna Wallace returning call from Evelina about rescheduling Botox appt.  Please contact her again at [phone]

## 2020-06-09 ENCOUNTER — PROCEDURE VISIT (OUTPATIENT)
Dept: NEUROLOGY | Facility: CLINIC | Age: 45
End: 2020-06-09
Payer: COMMERCIAL

## 2020-06-09 ENCOUNTER — TELEPHONE (OUTPATIENT)
Dept: NEUROLOGY | Facility: CLINIC | Age: 45
End: 2020-06-09

## 2020-06-09 VITALS
BODY MASS INDEX: 28.45 KG/M2 | DIASTOLIC BLOOD PRESSURE: 88 MMHG | WEIGHT: 170.75 LBS | SYSTOLIC BLOOD PRESSURE: 138 MMHG | HEIGHT: 65 IN

## 2020-06-09 DIAGNOSIS — G43.009 MIGRAINE WITHOUT AURA AND WITHOUT STATUS MIGRAINOSUS, NOT INTRACTABLE: ICD-10-CM

## 2020-06-09 PROCEDURE — 64615 PR CHEMODENERVATION OF MUSCLE FOR CHRONIC MIGRAINE: ICD-10-PCS | Mod: S$GLB,,, | Performed by: NURSE PRACTITIONER

## 2020-06-09 PROCEDURE — 64615 CHEMODENERV MUSC MIGRAINE: CPT | Mod: S$GLB,,, | Performed by: NURSE PRACTITIONER

## 2020-06-09 RX ORDER — TIZANIDINE 4 MG/1
6 TABLET ORAL NIGHTLY
Qty: 45 TABLET | Refills: 5 | Status: SHIPPED | OUTPATIENT
Start: 2020-06-09 | End: 2020-09-15 | Stop reason: SDUPTHER

## 2020-06-09 RX ORDER — SUMATRIPTAN SUCCINATE 100 MG/1
100 TABLET ORAL
Qty: 12 TABLET | Refills: 5 | Status: SHIPPED | OUTPATIENT
Start: 2020-06-09 | End: 2020-09-15 | Stop reason: SDUPTHER

## 2020-06-09 RX ORDER — PROMETHAZINE HYDROCHLORIDE 25 MG/1
TABLET ORAL
Qty: 20 TABLET | Refills: 5 | Status: SHIPPED | OUTPATIENT
Start: 2020-06-09 | End: 2020-09-15 | Stop reason: SDUPTHER

## 2020-06-09 NOTE — PROCEDURES
SUBJECTIVE:  Patient ID: Mirna Pollock  Chief Complaint: Follow-up and Botulinum Toxin Injection    History of Present Illness:  Mirna Pollock is a 44 y.o. female who presents to clinic alone for follow-up of headaches and Botox injections.     06/09/2020- Interval History:  Migraines continue to be well controlled with dual Aimovig and Botox injections, she is very pleased with the current state of her migraines and wishes to continue with current treatment plan.      Otherwise, information below is still accurate and current.     03/25/2020- Interval History:  She is about a month overdue for her Botox injections and has noticed her migraines have gradually become more frequent and more intense.  Prior to one month ago, migraines had been under good control.  She does admit she has been under a significant amount of stress related to Covid-19 outbreak.       Otherwise, information below is still accurate and current.      12/04/2019- Interval History:  Reports she has been experiencing 5-6 migraines per month, admits she has been under increased stress found her birth mother who was not interested in meeting her, is in contact with twin brothers.   She has continued taking aimovig 140 mg, which she feels has greatly improved her.  She typically wakes up with a migraine if she is going to get one, typically is resolved with one dose of sumatriptan 100 mg, will feel back to normal by the afternoon.   She is pleased with the current state of her migraines and attributes the increased frequency of migraines over the last 12 weeks to fluctuating weather and increased stress.  Does not wish to make adjustments to her treatment plan at this time.      Otherwise, information below is still accurate and current.      08/16/2019- Interval History:  Migraines and headaches continue to respond well to Botox and Aimovig, gets one severe migraine per month, and 1-2 moderate migraines.  She has been treating her  "migraines with ibuprofen, which is not very effective.  In the past, she was using triptans, however she does not have any abortive medication available at this time.  Migraines continue to feel the same as they always have, she denies any change in the quality or nature of her migraines.  She is happy with her current regimen and does not feel adjustments to her treatment plan are needed at this time.    She has additional concerns including frequently feeling as if she is more off balance than usual, is occurring more frequently as time progresses.  She is not dizzy, just feels as if she is going to fall over, occurs 8-10 times per month.  She is also dropping things more frequently.       05/22/2019- Interval History:  Botox continues to be effective for migraine prevention, for the first 8-10 weeks she experiences less than 3 migraines per month.  She has continued using Aimovig 140 mg SQ monthly injections.  She is very happy with the response of her migraines to Botox injections and wishes to continue with current treatment plan.        02/14/2019- Interval History:  Headaches and migraines continue to be "much better", "I am not getting them nearly as much", though she is keenly aware of when a cold front is coming through as she will always get a migraine.  She has continued using Aimovig monthly as well as taking tizanidine, cymbalta, and amitriptyline daily for migraine prevention.  She is very happy with the current state of her migraines and does not wish to make any adjustments to her treatment plan at this time.  Migraines continue to feel the same as they always have.     11/01/2018- Interval History:  Headaches have been significantly better, she has only had 3-4 migraines since her last round of Botox and is very happy Botox is helping decrease her migraine frequency.  She has been using Aimovig monthly, which she also feels has been helping decrease her migraine frequency.  Migraines continue to " feel the same as they always have, she denies any change in the quality or nature of her migraines.  Wishes to continue with Botox injections due to the pronounced effect it has had on her migraines.      08/08/2018- Interval History:  Finds her migraines are no better since last round of Botox, admits she was under increased stress at one point due to issues with her 's job.  She does know a lot of her migraines are triggered from stress.  She has been getting a lot of migraines above her eyes as well as throughout her occipitalis and neck.  Overall, she does wish to continue with Botox injections, but is open to discussing additional prevention options.      05/22/2018- Interval History:  Despite nerve blocks two weeks ago, she has been suffering with a headache nearly everyday since her last visit.  She did try Zomig nasal spray, however she took the first dose on day two of her migraine because she was nervous.  She does think it lessened the pain of her migraine, however did not abort it.  She did not repeat the dose.  Additionally, she continues to report poor sleep nightly.  She stopped drinking caffeine.  Headaches are the same as they have always been.  She denies any change in the nature or quality of her headaches. Risks, Benefits, and potential side effects of Botox discussed with patient.  Alternative treatments offered.  After thorough discussed regarding the procedure, patient has decided to move forward with initiating Botox injections for Chronic Migraine.  Patient understands we will complete 3 rounds of injections, if after 3 rounds, we do not see a 50% reduction in headaches, we will discontinue Botox injections.      05/11/2018 - Interval History:  This past week she has only been able to work two days, has missed 3 days of work this week due to migraines.  She still is unsure about getting Botox injections for chronic migraine, she would like to try it but her  does not feel this  is a good treatment option, she is trying to convince him.  Of note, blood pressure significantly lower in today's visit than in past visits.  She reports her psychiatrist has discontinued vyvanse until she is given clearance from Cardio to restart taking Vyvanse.  She was seen by a pediatric cardiologist who referred her to an adult cardiologist, however she has not seen this provider, had to cancel appointment due to having a headache.  She is requesting repeat nerve blocks today.      01/29/2018 - Interval History:  She has been having a terrible migraine for the last 3 days, she has tried numerous medications without any relief.  States she tried to go to work today, but she had to leave because her headache got so bad.  She is requesting to repeat the nerve blocks as those shots gave her relief form 6-8 weeks in the past.  She has done research on the Botox injections and states her  is very hesitant for her to try Botox injections for her migraines.  She has not been seen by her PCP nor cardiologist for further management of her blood pressure.  Discussed the challenges of treating her migraines with uncontrolled hypertension because I cannot prescribe her any triptan therapy until her blood pressure is under better control.  She has continued to take Cymbalta daily for her depression, she is unsure if this is giving her any relief with regards to her headaches or not.       01/15/2018 - Interval History:  She was approved for Botox injections, but was a no-show to her appointment last week.  States she spoke with her  about the injections and she was very nervous to try the Botox injections for her migraines.    She continues to experience headaches on 1-4 days per week with migraines occurring 1-2 days per week.  Recently she has been experiencing more migraines and headaches than usually, but admits she has been going through a lot emotionally as well as she got sick with the flu.  She has  continued to regularly follow-up with her psychiatrist who changed her anti-depressant, she is now taking Cymbalta.  Since change in medication, she feels she has been experiencing a mild headache each day.  She has had to miss a few days of work due to her migraines since last visit.  Additionally, she feels occasionally she begins to get dizzy, this occurs with or without headaches, she has an appointment with her eye doctor next week as her vision is also getting progressively worse.  She did not find vistaril to be useful with regards to her headaches.  She does feel Zanaflex helps with her neck as well as helping to relax her neck muscles.  When she gets a migraine she has been taking Vistaril and Ibuprofen together which does help the pain, she also has been trying Excedrin migraines which also gives her some relief.  Patient with known HTN on multiple anti-hypertensive agents, blood pressure significantly elevated in clinic today.  She is not symptomatic.  States she needs to follow-up with her primary care (who is outside the [facility] system) for better management.       10/19/2017 - Interval History:  - Headaches have persisted in similar frequency, which she is happy about as she has been under increased stress with regards to her 's health, he has been suffering with his Gout, just got out of the hospital last week and has had to take time off work etc   - Blood pressure continues to be elevated despite numerous anti-hypertensive, states today is good for her (currently 141/102), has been regularly following up with her PCP for management   - Is very excited because she is going [place] the end of [place]early December   - Has continued to have a headache nearly everyday, not necessarily a migraine, however migraine have been at least once per month    - Has noticed more recently that she feels like she has intense pressure behind her eyes and across the bridge  of her nose that slightly feels like sinus pressure, this occurs 1-2 times per week   - Has been seeing a chiropractor as well as a masseuse regularly which she does feel has been helping   - Would still like to move forward with the Botox injections   - Does not feel adjustments to her medications are necessary at this time      08/17/2017 - Interval History:  - Had been seeing Iker Beth PA-C   - Was taking Topamax, Zanaflex, and Vistaril - however has been out of medications for about 3 months  - Since being off the medication, she had been okay, however recently noticed migraines were beginning to occur more frequently with increased intensity and duration   - She finds the nerve blocks had been helping a lot, as long as she is consistent with getting the nerve block, however hasn't had this procedure for over a year   - Has been getting 5-6 migraines per month, each lasting 1-4 days in length   - Current migraine has been going on since last Friday (6 days)  - Blood pressure significantly elevated in clinic today, states her PCP is managing her medications in order to get her blood pressure better controlled   - Is requesting refills of medication as well as repeat nerve occipital and trigeminal nerve blocks      Headache history:   Age of onset -  13  Nature of pain -  Throbbing   Location - bioccipital   Aura -  White floaters   Duration - hrs  Frequency -  2/week  Time of day - anytime   7 days of pain - jaw pain, L neck, L shoulder,  L side throbbing   Associated symptoms:   Meningeal symptoms - photophobia, phonophobia, exercise intolerance +   Nausea/vomitting +   Nasal drainage   Visual symptoms  Pallor/flushing  Vertigo  Stroke symptoms  Confusion  Impaired concentration +  Pain worsened with physical activity +  Neck pain +  Whooshing sounds   Visual spots/blotches +  Triggers:   Stress +   Bright or flickering light  Strong smell  Vigorous exercise  Dietary factors   Visual strain   Motion  intolerance as passenger:  No   Resolution of headache with sleep: yes      Therapies Tried & Response:  Tylenol, motrin, alleve - not help  excedrin - not help   imitrex - helped initially   topamax 50 mg - helped  Percocet - helps   Gabapentin - helps  Amlodipine - for HTN  Bystolic - for HTN  Lexapro - hasn't noticed change in HA, for depression   GONB - helps   Cymbalta - unsure, for depression   Zomig - helps    Maxalt - no   Botox - helping   Sumatriptan -  Vistrail - effective   Aimovig - helping     Current Medications:    amitriptyline (ELAVIL) 25 MG tablet, TK 1 T PO QPM, Disp: , Rfl:     doxepin (SINEQUAN) 10 MG capsule, Take 1 capsule (10 mg total) by mouth every evening., Disp: 90 capsule, Rfl: 1    DULoxetine (CYMBALTA) 60 MG capsule, Take 1 capsule (60 mg total) by mouth once daily., Disp: 90 capsule, Rfl: 1    erenumab-aooe 140 mg/mL AtIn, Inject 140 mg into the skin every 28 days., Disp: 1 mL, Rfl: 3    hydrOXYzine pamoate (VISTARIL) 25 MG Cap, TAKE 1 CAPSULE BY MOUTH EVERY 6 TO 8 HOURS AS NEEDED, Disp: 12 capsule, Rfl: 0    promethazine (PHENERGAN) 25 MG tablet, Take 1 tab by mouth every 4-6 hours as needed for nausea., Disp: 20 tablet, Rfl: 5    sumatriptan (IMITREX) 100 MG tablet, Take 1 tablet (100 mg total) by mouth every 2 (two) hours as needed for Migraine. Max 2 tabs/day. Max 3 days/week., Disp: 12 tablet, Rfl: 5    tiZANidine (ZANAFLEX) 4 MG tablet, Take 1.5 tablets (6 mg total) by mouth every evening. ( NO ALCOHOL OR DRIVING WITH MEDICATION), Disp: 45 tablet, Rfl: 5    zolpidem (AMBIEN) 10 mg Tab, Take ½- to 1 tablet by mouth nightly as needed for  insomnia, Disp: 30 tablet, Rfl: 2    Current Facility-Administered Medications:     onabotulinumtoxina injection 200 Units, 200 Units, Intramuscular, Q90 Days, BC Fleming, 200 Units at 12/04/19 1251    onabotulinumtoxina injection 200 Units, 200 Units, Intramuscular, Q90 Days, BC Fleming     "onabotulinumtoxina injection 200 Units, 200 Units, Intramuscular, Q90 Days, Luci Vulevich, FNP    onabotulinumtoxina injection 200 Units, 200 Units, Intramuscular, Q10 weeks, Luci Vulevich, FNP, 200 Units at 03/25/20 0949    onabotulinumtoxina injection 200 Units, 200 Units, Intramuscular, Q90 Days, Luci Vulevich, FNP, 200 Units at 06/09/20 1436    Review of Systems - as per HPI, otherwise a balanced 10 systems review is negative.    OBJECTIVE:  Vitals:  /88 (BP Location: Left arm, Patient Position: Sitting, BP Method: Large (Automatic))   Ht 5' 5" (1.651 m)   Wt 77.4 kg (170 lb 11.9 oz)   LMP 12/14/2000   BMI 28.41 kg/m²     Physical Exam:  Constitutional: she appears well-developed and well-nourished. she is well groomed. NAD     ASSESSMENT:  1. Chronic migraine    2. Migraine without aura and without status migrainosus, not intractable      PLAN:  - Botox administered in clinic for Chronic Migraine (see below)   - Okay to increase tizanidine to 6 mg nightly   - Continue elavil 25 mg nightly and cymbalta 60 mg daily as recommended by psychiatrist   - For migraine abortive - sumatriptan 100 mg tabs   - Refills provided   - RTC in 12 weeks for repeat Botox injections or sooner if needed     Orders Placed This Encounter    sumatriptan (IMITREX) 100 MG tablet    promethazine (PHENERGAN) 25 MG tablet    tiZANidine (ZANAFLEX) 4 MG tablet       All questions and concerns addressed.  Patient verbalizes understanding and is agreeable with the above stated treatment plan.      PROCEDURE NOTE:  BOTOX was performed as an indicated therapy for intractable chronic migraine headaches given that the patient failed more than 2 headache medications    A time out was conducted just before the start of the procedure to verify the correct patient and procedure, procedure location, and all relevant critical information.     Botulinum Toxin Injection Procedure     PROCEDURE PERFORMED: Botulinum toxin " injection (42574)  CLINICAL INDICATION: G43.719  After risks and benefits were explained including bleeding, infection, worsening of pain, damage to the areas being injected, weakness of muscles, loss of muscle control, dysphagia if injecting the head or neck, facial droop if injecting the facial area, painful injection, allergic or other reaction to the medications being injected, and the failure of the procedure to help the problem, a signed consent was obtained.   The patient was placed in a comfortable area and the sites to be treated were identified.The area to be treated was prepped three times with alcohol and the alcohol allowed to dry. Next, a 30 gauge needle was used to inject the medication in the area to be treated.      Total Botox used: 155 Units   Unavoidable waste: 45 Units     Injection sites:    muscle bilaterally ( a total of 10 units divided into 2 sites)   Procerus muscle (5 units)   Frontalis muscle bilaterally (a total of 20 units divided into 4 sites)   Temporalis muscle bilaterally (a total of 40 units divided into 8 sites)   Occipitalis muscle bilaterally (a total of 30 units divided into 6 sites)   Cervical paraspinal muscles (a total of 20 units divided into 4 sites)   Trapezius muscle bilaterally (a total of 30 units divided into 6 sites)   Complications: none   RTC for the next Botox injection: 12 weeks     BOTOX DELIVERED FROM OUTSIDE PHARMACY USED FOR PATIENT'S INJECTIONS.     CC: MD Luci Echols, [facility] Department [facility]   [phone]

## 2020-06-17 ENCOUNTER — TELEPHONE (OUTPATIENT)
Dept: PHARMACY | Facility: CLINIC | Age: 45
End: 2020-06-17

## 2020-06-30 NOTE — ED NOTES
Order updated to  [id number]    Patient can have performed at [facility].      Please update patient and let them schedule with radiology for ultrasound.       Pt ambulated to bathroom without difficulty.  Returned to stretcher & placed back on monitor.

## 2020-07-13 ENCOUNTER — TELEPHONE (OUTPATIENT)
Dept: PHARMACY | Facility: CLINIC | Age: 45
End: 2020-07-13

## 2020-07-24 ENCOUNTER — TELEPHONE (OUTPATIENT)
Dept: OBSTETRICS AND GYNECOLOGY | Facility: CLINIC | Age: 45
End: 2020-07-24

## 2020-07-28 ENCOUNTER — OFFICE VISIT (OUTPATIENT)
Dept: PSYCHIATRY | Facility: CLINIC | Age: 45
End: 2020-07-28
Payer: COMMERCIAL

## 2020-07-28 VITALS
DIASTOLIC BLOOD PRESSURE: 97 MMHG | BODY MASS INDEX: 28.32 KG/M2 | WEIGHT: 170.19 LBS | HEART RATE: 88 BPM | SYSTOLIC BLOOD PRESSURE: 192 MMHG

## 2020-07-28 DIAGNOSIS — M79.7 FIBROMYALGIA: Chronic | ICD-10-CM

## 2020-07-28 DIAGNOSIS — F33.41 RECURRENT MAJOR DEPRESSION IN PARTIAL REMISSION: ICD-10-CM

## 2020-07-28 DIAGNOSIS — G43.009 MIGRAINE WITHOUT AURA AND WITHOUT STATUS MIGRAINOSUS, NOT INTRACTABLE: ICD-10-CM

## 2020-07-28 DIAGNOSIS — I10 ESSENTIAL HYPERTENSION: ICD-10-CM

## 2020-07-28 DIAGNOSIS — F40.10 SOCIAL ANXIETY DISORDER: Chronic | ICD-10-CM

## 2020-07-28 DIAGNOSIS — Z91.89 EXCESSIVE CONSUMPTION OF SODA POP: ICD-10-CM

## 2020-07-28 DIAGNOSIS — F41.1 GAD (GENERALIZED ANXIETY DISORDER): Primary | Chronic | ICD-10-CM

## 2020-07-28 PROCEDURE — 99999 PR PBB SHADOW E&M-EST. PATIENT-LVL III: CPT | Mod: PBBFAC,,, | Performed by: STUDENT IN AN ORGANIZED HEALTH CARE EDUCATION/TRAINING PROGRAM

## 2020-07-28 PROCEDURE — 90833 PR PSYCHOTHERAPY W/PATIENT W/E&M, 30 MIN (ADD ON): ICD-10-PCS | Mod: ,,, | Performed by: STUDENT IN AN ORGANIZED HEALTH CARE EDUCATION/TRAINING PROGRAM

## 2020-07-28 PROCEDURE — 90833 PSYTX W PT W E/M 30 MIN: CPT | Mod: ,,, | Performed by: STUDENT IN AN ORGANIZED HEALTH CARE EDUCATION/TRAINING PROGRAM

## 2020-07-28 PROCEDURE — 99999 PR PBB SHADOW E&M-EST. PATIENT-LVL III: ICD-10-PCS | Mod: PBBFAC,,, | Performed by: STUDENT IN AN ORGANIZED HEALTH CARE EDUCATION/TRAINING PROGRAM

## 2020-07-28 PROCEDURE — 99215 OFFICE O/P EST HI 40 MIN: CPT | Mod: S$PBB,,, | Performed by: STUDENT IN AN ORGANIZED HEALTH CARE EDUCATION/TRAINING PROGRAM

## 2020-07-28 PROCEDURE — 99215 PR OFFICE/OUTPT VISIT, EST, LEVL V, 40-54 MIN: ICD-10-PCS | Mod: S$PBB,,, | Performed by: STUDENT IN AN ORGANIZED HEALTH CARE EDUCATION/TRAINING PROGRAM

## 2020-07-28 RX ORDER — ZOLPIDEM TARTRATE 10 MG/1
TABLET ORAL
Qty: 30 TABLET | Refills: 1 | Status: SHIPPED | OUTPATIENT
Start: 2020-07-28 | End: 2020-07-28 | Stop reason: SDUPTHER

## 2020-07-28 RX ORDER — DOXEPIN HYDROCHLORIDE 10 MG/1
10 CAPSULE ORAL NIGHTLY
Qty: 30 CAPSULE | Refills: 1 | Status: SHIPPED | OUTPATIENT
Start: 2020-07-28 | End: 2020-08-28 | Stop reason: SDUPTHER

## 2020-07-28 RX ORDER — BUPROPION HYDROCHLORIDE 150 MG/1
150 TABLET ORAL DAILY
Qty: 30 TABLET | Refills: 1 | Status: SHIPPED | OUTPATIENT
Start: 2020-07-28 | End: 2020-08-28 | Stop reason: SDUPTHER

## 2020-07-28 RX ORDER — DULOXETIN HYDROCHLORIDE 60 MG/1
60 CAPSULE, DELAYED RELEASE ORAL DAILY
Qty: 90 CAPSULE | Refills: 1 | Status: SHIPPED | OUTPATIENT
Start: 2020-07-28 | End: 2020-11-20 | Stop reason: SDUPTHER

## 2020-07-28 NOTE — PATIENT INSTRUCTIONS
Start wellbutrin    Buy blood pressure cuff and record all three values daily    Slow decrease in coca cola consumption   - for next month: drink one water for every 3 sodas

## 2020-07-28 NOTE — PROGRESS NOTES
"  7/28/2020 10:42 AM   Mirna Pollock   1975   [id number]           OUTPATIENT PSYCHIATRY FOLLOW- UP VISIT    Reason for Encounter:  Mirna Pollock, a 44 y.o. female, with past psychiatric history of depression, anxiety, insomnia significant past medical history of migraines (requiring every 3 months botox; and monthly monoclonal antibodies); fibromyalgia; possible IBS . Patient has been involved with mental health providers for 10 years. Today, patient presents alone with for follow up of anxiety .  At last appointment in May ago, elavil was discontinued and doxepin was started; CBT- I was encouraged.     Life event tracker/ stressors/ relationships: mom passed away from COVID;  is  which is stressful now; step kids     Diet: drinks coke a cola all day; has cut down on junk food    Activity- walks on treadmill     Current psychotropic medication regimen-  Doxepin 10mg nightly  Ambien 5-10 mg nightly   Cymbalta 60 mg daily     Compliance: yes    Side effects: no    Patient's overall opinion of current regimen: "seems to work"      Interval History and Content of Current Session:    Just met biological mom and uncle in [place] 2 weeks ago- it went well  She was expecting her mom to be emotional but she was not; she wasn't able to ask her mom a lot of questions she wanted to ask  Texts with bio dad everyday; she met him 2 years ago  Continues to have trouble with migraines; they are worsened with stress  For breakthrough; up to 5 times a month uses Imitrex   Continues to have daily anxiety which is unchanged   Has bowel cramps and constipation  "Cry at drop of hat"; wonders if it has to do with not being on hormones after having hysterectomy   Crying spells weekly   Easily irritated  Both worsening               Review of Systems     Key features of past history:  Ex- was an alcoholic - has 2 older kids with  Bio father was an alcoholic  Been with current  since 2010  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    Medical Review of Symptoms  History obtained from the patient    General : NO chills or fever   Respiratory: NO cough, shortness of breath   Cardiovascular: NO chest pain, palpitations or racing heart   Gastrointestinal: NO nausea, vomiting, + constipation    Neurological: NO confusion, dizziness, or tremors, + headaches    Psychiatric: please see HPI     Objective       ALLERGIES:  Review of patient's allergies indicates:   Allergen Reactions    Iodine and iodide containing products Other (See Comments)    Penicillins Rash       RELEVANT LABS/STUDIES:    Recent results reviewed and discussed with patient.     Significant Past labs/ labs to track:  none    Constitutional  Vitals:    Vitals:    07/28/20 1004   BP: (!) 192/97   Pulse: 88   Weight: 77.2 kg (170 lb 3.1 oz)            PHYSICAL EXAM:   General: well developed, in no acute distress   Neurologic:   Gait: Normal   Psychomotor signs:  No involuntary movements or tremor  AIMS: none    PSYCHIATRIC EXAM:   Level of Consciousness: awake and alert  Orientation: orientated to situation, person, place, and time  Grooming: non- disheveled, well- groomed   Psychomotor Behavior: no psychomotor agitation or retardation; fidgety    Speech: normal rate, rhythm, and tone  Language: fluent and appropriate  Mood: neutral   Affect: mood-congruent; anxious   Thought Process: linear, fair motivation for goals   Associations: fully intact   Thought Content: no SI HI AVH  Memory: intact  Attention: intact  Fund of Knowledge: appropriate for education level   Insight: fair to good  Judgment: fair to good    Assessment and Diagnosis   Status/Progress: Based on the examination today, the patient's problem(s) is/are adequately but not ideally  controlled.  New problems have been presented today.   Co-morbidities are complicating management of the primary condition.  There are no active rule-out diagnoses for this patient at this time.     General Impression:       ICD-10-CM ICD-9-CM   1. LALITA (generalized anxiety disorder)  F41.1 300.02   2. Social anxiety disorder  F40.10 300.23   3. Recurrent major depression in partial remission  F33.41 296.35   4. Migraine without aura and without status migrainosus, not intractable  G43.009 346.10   5. Chronic migraine  G43.709 346.70   6. Fibromyalgia  M79.7 729.1   7. Essential hypertension  I10 401.9   8. Excessive consumption of soda pop  Z91.89 V15.89         Intervention/Counseling/Treatment Plan   · Medication Management: -    Continue:  Doxepin 10mg nightly  Ambien 5-10 mg nightly   Cymbalta 60 mg daily     Start:  Wellbutrin  mg daily for depression augmentation; dopamine dysregulation from soda addiction   -patient understands that she must buy a blood pressure cuff and record (instructed how to properly take BP)    Long-term goal to cut down on coca-cola consumption- she drinks daily and believe this is major contributor to patients inflammatory processes (migraines; IBS; fibromyalgia; and psychiatric symptoms)     · Labs: reviewed most recent  · The treatment plan and follow up plan were reviewed with the patient.  · Discussed with patient informed consent, risks vs. benefits, alternative treatments, side effect profile and the inherent unpredictability of individual responses to these treatments. The patient expresses understanding of the above and displays the capacity to agree with this current plan and had no other questions.  · Encouraged Patient to keep future appointments.   · Take medications as prescribed and abstain from substance abuse.   · In the event of an emergency patient was advised to go to the emergency room.    Return to Clinic: 1 month    60 total minutes spent with > than 50% of  total time spend on coordination of care and counseling   (which included pts differential diagnosis and prognosis for psychiatric conditions, risks, benefits of treatments, instructions and adherence to treatment plan, risk reduction, reviewing current psychiatric medication regimen, medical problems and social stressors. In addtion to possible discussion with other healthcare provider/s)    PSYCHOTHERAPY ADD-ON:  35 total minutes spent with patient alone in session today on psychotherapeutic techniques including motivational interviewing, supportive psychotherapy, and introduction to concepts of Cognitive Behavioral Therapy. Patient responds well to these therapeutic techniques.     Primary focus today: decreasing soda consumption    Target symptoms: anxiety , substance abuse, mood swings  Outcome monitoring methods: self-report  The patient's response to intervention is accepting.  The patient's progress toward goals is fair .      Felipe Nair MD  [facility], PGY-3  7/28/2020 10:42 AM

## 2020-08-04 ENCOUNTER — HOSPITAL ENCOUNTER (OUTPATIENT)
Dept: RADIOLOGY | Facility: OTHER | Age: 45
Discharge: HOME | End: 2020-08-04
Attending: OBSTETRICS & GYNECOLOGY
Payer: COMMERCIAL

## 2020-08-04 ENCOUNTER — OFFICE VISIT (OUTPATIENT)
Dept: OBSTETRICS AND GYNECOLOGY | Facility: CLINIC | Age: 45
End: 2020-08-04

## 2020-08-04 VITALS
HEIGHT: 65 IN | SYSTOLIC BLOOD PRESSURE: 128 MMHG | WEIGHT: 168.63 LBS | DIASTOLIC BLOOD PRESSURE: 68 MMHG | BODY MASS INDEX: 28.1 KG/M2

## 2020-08-04 DIAGNOSIS — Z12.39 BREAST CANCER SCREENING: Primary | ICD-10-CM

## 2020-08-04 DIAGNOSIS — N39.3 SUI (STRESS URINARY INCONTINENCE, FEMALE): ICD-10-CM

## 2020-08-04 DIAGNOSIS — N63.0 BREAST MASS IN FEMALE: ICD-10-CM

## 2020-08-04 DIAGNOSIS — R10.2 PELVIC PAIN: ICD-10-CM

## 2020-08-04 DIAGNOSIS — Z01.419 WELL WOMAN EXAM WITH ROUTINE GYNECOLOGICAL EXAM: ICD-10-CM

## 2020-08-04 DIAGNOSIS — N95.1 HOT FLASHES DUE TO MENOPAUSE: ICD-10-CM

## 2020-08-04 LAB
BILIRUB SERPL-MCNC: ABNORMAL MG/DL
BLOOD URINE, POC: ABNORMAL
CLARITY, POC UA: ABNORMAL
COLOR, POC UA: YELLOW
GLUCOSE UR QL STRIP: NORMAL
KETONES UR QL STRIP: ABNORMAL
LEUKOCYTE ESTERASE URINE, POC: ABNORMAL
NITRITE, POC UA: ABNORMAL
PH, POC UA: 5
PROTEIN, POC: ABNORMAL
SPECIFIC GRAVITY, POC UA: 1.01
UROBILINOGEN, POC UA: NORMAL

## 2020-08-04 PROCEDURE — 76830 TRANSVAGINAL US NON-OB: CPT | Mod: 26,,, | Performed by: RADIOLOGY

## 2020-08-04 PROCEDURE — 87510 GARDNER VAG DNA DIR PROBE: CPT

## 2020-08-04 PROCEDURE — 99396 PREV VISIT EST AGE 40-64: CPT | Mod: 25,S$PBB,, | Performed by: OBSTETRICS & GYNECOLOGY

## 2020-08-04 PROCEDURE — 87491 CHLMYD TRACH DNA AMP PROBE: CPT

## 2020-08-04 PROCEDURE — 99999 PR PBB SHADOW E&M-EST. PATIENT-LVL IV: CPT | Mod: PBBFAC,,, | Performed by: OBSTETRICS & GYNECOLOGY

## 2020-08-04 PROCEDURE — 81002 URINALYSIS NONAUTO W/O SCOPE: CPT | Mod: PBBFAC | Performed by: OBSTETRICS & GYNECOLOGY

## 2020-08-04 PROCEDURE — 99396 PR PREVENTIVE VISIT,EST,40-64: ICD-10-PCS | Mod: 25,S$PBB,, | Performed by: OBSTETRICS & GYNECOLOGY

## 2020-08-04 PROCEDURE — 76856 US PELVIS COMP WITH TRANSVAG NON-OB (XPD): ICD-10-PCS | Mod: 26,,, | Performed by: RADIOLOGY

## 2020-08-04 PROCEDURE — 76856 US EXAM PELVIC COMPLETE: CPT | Mod: 26,,, | Performed by: RADIOLOGY

## 2020-08-04 PROCEDURE — 99999 PR PBB SHADOW E&M-EST. PATIENT-LVL IV: ICD-10-PCS | Mod: PBBFAC,,, | Performed by: OBSTETRICS & GYNECOLOGY

## 2020-08-04 PROCEDURE — 87480 CANDIDA DNA DIR PROBE: CPT

## 2020-08-04 PROCEDURE — 76830 TRANSVAGINAL US NON-OB: CPT | Mod: TC

## 2020-08-04 PROCEDURE — 76830 US PELVIS COMP WITH TRANSVAG NON-OB (XPD): ICD-10-PCS | Mod: 26,,, | Performed by: RADIOLOGY

## 2020-08-05 ENCOUNTER — HOSPITAL ENCOUNTER (OUTPATIENT)
Dept: RADIOLOGY | Facility: OTHER | Age: 45
Discharge: HOME | End: 2020-08-05
Attending: OBSTETRICS & GYNECOLOGY
Payer: COMMERCIAL

## 2020-08-05 DIAGNOSIS — Z12.39 BREAST CANCER SCREENING: ICD-10-CM

## 2020-08-05 DIAGNOSIS — Z12.31 VISIT FOR SCREENING MAMMOGRAM: ICD-10-CM

## 2020-08-05 LAB
CANDIDA RRNA VAG QL PROBE: NEGATIVE
G VAGINALIS RRNA GENITAL QL PROBE: NEGATIVE
T VAGINALIS RRNA GENITAL QL PROBE: NEGATIVE

## 2020-08-05 PROCEDURE — 77067 SCR MAMMO BI INCL CAD: CPT | Mod: TC

## 2020-08-05 PROCEDURE — 77067 SCR MAMMO BI INCL CAD: CPT | Mod: 26,,, | Performed by: RADIOLOGY

## 2020-08-05 PROCEDURE — 77063 MAMMO DIGITAL SCREENING BILAT WITH TOMOSYNTHESIS_CAD: ICD-10-PCS | Mod: 26,,, | Performed by: RADIOLOGY

## 2020-08-05 PROCEDURE — 77067 MAMMO DIGITAL SCREENING BILAT WITH TOMOSYNTHESIS_CAD: ICD-10-PCS | Mod: 26,,, | Performed by: RADIOLOGY

## 2020-08-05 PROCEDURE — 77063 BREAST TOMOSYNTHESIS BI: CPT | Mod: 26,,, | Performed by: RADIOLOGY

## 2020-08-05 RX ORDER — ESTRADIOL 0.05 MG/D
1 FILM, EXTENDED RELEASE TRANSDERMAL WEEKLY
Qty: 4 PATCH | Refills: 11 | Status: SHIPPED | OUTPATIENT
Start: 2020-08-05 | End: 2023-06-08

## 2020-08-07 LAB
C TRACH DNA SPEC QL NAA+PROBE: NOT DETECTED
N GONORRHOEA DNA SPEC QL NAA+PROBE: NOT DETECTED

## 2020-08-11 NOTE — PROGRESS NOTES
Supervising Psychiatry Staff:  I discussed Ms. Pollock's progress with Dr Angie Nair (MD) in regular caseload supervision. I agree with the above interval history, ROS, findings, and plan, which reflect my own.

## 2020-08-12 ENCOUNTER — TELEPHONE (OUTPATIENT)
Dept: PHARMACY | Facility: CLINIC | Age: 45
End: 2020-08-12

## 2020-08-26 ENCOUNTER — TELEPHONE (OUTPATIENT)
Dept: NEUROLOGY | Facility: CLINIC | Age: 45
End: 2020-08-26
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
## 2020-08-26 NOTE — TELEPHONE ENCOUNTER
----- Message from Marv Radford sent at 8/26/2020  4:53 PM CDT -----  Contact: pt @ [phone]  Pt calling back to confirm she can do botox appt on 09/02/20 at 3 PM

## 2020-08-26 NOTE — TELEPHONE ENCOUNTER
Maggie Verma Staff             Luci Ortez,SHAMA,       Called [facility] at [phone] spoke to Mounika regan states that they are unable to set up shipment for Botox to be delivered due to the weather conditions in [place].   I am unable to reschedule and/or cancel services. If you have any further questions, you can contact me at [phone].     Thanks,   Maggie MILLIGAN

## 2020-08-28 ENCOUNTER — OFFICE VISIT (OUTPATIENT)
Dept: PSYCHIATRY | Facility: CLINIC | Age: 45
End: 2020-08-28
Payer: COMMERCIAL

## 2020-08-28 DIAGNOSIS — E66.3 OVERWEIGHT (BMI 25.0-29.9): ICD-10-CM

## 2020-08-28 DIAGNOSIS — F41.1 GAD (GENERALIZED ANXIETY DISORDER): Primary | Chronic | ICD-10-CM

## 2020-08-28 DIAGNOSIS — I10 ESSENTIAL HYPERTENSION: ICD-10-CM

## 2020-08-28 DIAGNOSIS — Q24.9 ADULT CONGENITAL HEART DISEASE: ICD-10-CM

## 2020-08-28 DIAGNOSIS — M79.7 FIBROMYALGIA: Chronic | ICD-10-CM

## 2020-08-28 DIAGNOSIS — Z91.89 EXCESSIVE CONSUMPTION OF SODA POP: ICD-10-CM

## 2020-08-28 DIAGNOSIS — G43.009 MIGRAINE WITHOUT AURA AND WITHOUT STATUS MIGRAINOSUS, NOT INTRACTABLE: ICD-10-CM

## 2020-08-28 DIAGNOSIS — F33.41 RECURRENT MAJOR DEPRESSION IN PARTIAL REMISSION: ICD-10-CM

## 2020-08-28 DIAGNOSIS — F40.10 SOCIAL ANXIETY DISORDER: Chronic | ICD-10-CM

## 2020-08-28 PROCEDURE — 99214 OFFICE O/P EST MOD 30 MIN: CPT | Mod: 95,,, | Performed by: STUDENT IN AN ORGANIZED HEALTH CARE EDUCATION/TRAINING PROGRAM

## 2020-08-28 PROCEDURE — 90833 PR PSYCHOTHERAPY W/PATIENT W/E&M, 30 MIN (ADD ON): ICD-10-PCS | Mod: 95,,, | Performed by: STUDENT IN AN ORGANIZED HEALTH CARE EDUCATION/TRAINING PROGRAM

## 2020-08-28 PROCEDURE — 99214 PR OFFICE/OUTPT VISIT, EST, LEVL IV, 30-39 MIN: ICD-10-PCS | Mod: 95,,, | Performed by: STUDENT IN AN ORGANIZED HEALTH CARE EDUCATION/TRAINING PROGRAM

## 2020-08-28 PROCEDURE — 90833 PSYTX W PT W E/M 30 MIN: CPT | Mod: 95,,, | Performed by: STUDENT IN AN ORGANIZED HEALTH CARE EDUCATION/TRAINING PROGRAM

## 2020-08-28 RX ORDER — DOXEPIN HYDROCHLORIDE 10 MG/1
10 CAPSULE ORAL NIGHTLY
Qty: 30 CAPSULE | Refills: 2 | Status: SHIPPED | OUTPATIENT
Start: 2020-08-28 | End: 2021-12-02

## 2020-08-28 RX ORDER — ZOLPIDEM TARTRATE 10 MG/1
TABLET ORAL
Qty: 30 TABLET | Refills: 2 | Status: SHIPPED | OUTPATIENT
Start: 2020-08-28 | End: 2020-11-20 | Stop reason: SDUPTHER

## 2020-08-28 RX ORDER — BUPROPION HYDROCHLORIDE 150 MG/1
150 TABLET ORAL DAILY
Qty: 30 TABLET | Refills: 2 | Status: SHIPPED | OUTPATIENT
Start: 2020-08-28 | End: 2020-11-20 | Stop reason: SDUPTHER
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
## 2020-09-03 DIAGNOSIS — G43.009 MIGRAINE WITHOUT AURA AND WITHOUT STATUS MIGRAINOSUS, NOT INTRACTABLE: ICD-10-CM

## 2020-09-14 ENCOUNTER — TELEPHONE (OUTPATIENT)
Dept: PHARMACY | Facility: CLINIC | Age: 45
End: 2020-09-14

## 2020-09-14 NOTE — TELEPHONE ENCOUNTER
Call attempt 1 for [employer] refill - LVM and MyChart message sent. Copay $35.00 at 004. **Patient will need a new copay card**

## 2020-09-24 ENCOUNTER — TELEPHONE (OUTPATIENT)
Dept: NEUROLOGY | Facility: CLINIC | Age: 45
End: 2020-09-24

## 2020-09-24 ENCOUNTER — OFFICE VISIT (OUTPATIENT)
Dept: PHYSICAL MEDICINE AND REHAB | Facility: CLINIC | Age: 45
End: 2020-09-24
Payer: COMMERCIAL

## 2020-09-24 VITALS — HEIGHT: 65 IN | BODY MASS INDEX: 28.12 KG/M2

## 2020-09-24 DIAGNOSIS — G43.709 CHRONIC MIGRAINE WITHOUT AURA WITHOUT STATUS MIGRAINOSUS, NOT INTRACTABLE: ICD-10-CM

## 2020-09-24 DIAGNOSIS — G43.009 MIGRAINE WITHOUT AURA AND WITHOUT STATUS MIGRAINOSUS, NOT INTRACTABLE: ICD-10-CM

## 2020-09-24 PROCEDURE — 99499 NO LOS: ICD-10-PCS | Mod: S$GLB,,, | Performed by: NURSE PRACTITIONER

## 2020-09-24 PROCEDURE — 99499 UNLISTED E&M SERVICE: CPT | Mod: S$GLB,,, | Performed by: NURSE PRACTITIONER

## 2020-09-24 PROCEDURE — 64615 PR CHEMODENERVATION OF MUSCLE FOR CHRONIC MIGRAINE: ICD-10-PCS | Mod: S$GLB,,, | Performed by: NURSE PRACTITIONER

## 2020-09-24 PROCEDURE — 64615 CHEMODENERV MUSC MIGRAINE: CPT | Mod: S$GLB,,, | Performed by: NURSE PRACTITIONER

## 2020-09-24 PROCEDURE — 99999 PR PBB SHADOW E&M-EST. PATIENT-LVL III: CPT | Mod: PBBFAC,,, | Performed by: NURSE PRACTITIONER

## 2020-09-24 PROCEDURE — 99999 PR PBB SHADOW E&M-EST. PATIENT-LVL III: ICD-10-PCS | Mod: PBBFAC,,, | Performed by: NURSE PRACTITIONER

## 2020-09-24 RX ORDER — HYDROXYZINE PAMOATE 25 MG/1
CAPSULE ORAL
Qty: 12 CAPSULE | Refills: 2 | Status: SHIPPED | OUTPATIENT
Start: 2020-09-24 | End: 2021-02-10 | Stop reason: SDUPTHER

## 2020-09-24 NOTE — PROGRESS NOTES
Ativan 0.5 mg IV given for c/o nausea. SUBJECTIVE:  Patient ID: Mirna Pollock  Chief Complaint: Botulinum Toxin Injection    History of Present Illness:  Mirna Pollock is a 44 y.o. female who presents to clinic alone for follow-up of headaches and Botox injections.     Recommendations made at last Office Visit on 6/9/2020:  - Botox administered in clinic for Chronic Migraine (see below)   - Okay to increase tizanidine to 6 mg nightly   - Continue elavil 25 mg nightly and cymbalta 60 mg daily as recommended by psychiatrist   - For migraine abortive - sumatriptan 100 mg tabs   - Refills provided   - RTC in 12 weeks for repeat Botox injections or sooner if needed     09/24/2020- Interval History:  She continues to feels migraines are well controlled on her current regimen. She continues to experience a greater than 50% reduction in the frequency of her migraines since starting Botox injections and wishes to continue with Botox for migraine control.      Otherwise, information below is still accurate and current.     06/09/2020- Interval History:  Migraines continue to be well controlled with dual Aimovig and Botox injections, she is very pleased with the current state of her migraines and wishes to continue with current treatment plan.       Otherwise, information below is still accurate and current.      03/25/2020- Interval History:  She is about a month overdue for her Botox injections and has noticed her migraines have gradually become more frequent and more intense.  Prior to one month ago, migraines had been under good control.  She does admit she has been under a significant amount of stress related to Covid-19 outbreak.       Otherwise, information below is still accurate and current.      12/04/2019- Interval History:  Reports she has been experiencing 5-6 migraines per month, admits she has been under increased stress found her birth mother who was not interested in meeting her, is in contact with twin brothers.   She has continued taking aimovig 140  "mg, which she feels has greatly improved her.  She typically wakes up with a migraine if she is going to get one, typically is resolved with one dose of sumatriptan 100 mg, will feel back to normal by the afternoon.   She is pleased with the current state of her migraines and attributes the increased frequency of migraines over the last 12 weeks to fluctuating weather and increased stress.  Does not wish to make adjustments to her treatment plan at this time.      Otherwise, information below is still accurate and current.      08/16/2019- Interval History:  Migraines and headaches continue to respond well to Botox and Aimovig, gets one severe migraine per month, and 1-2 moderate migraines.  She has been treating her migraines with ibuprofen, which is not very effective.  In the past, she was using triptans, however she does not have any abortive medication available at this time.  Migraines continue to feel the same as they always have, she denies any change in the quality or nature of her migraines.  She is happy with her current regimen and does not feel adjustments to her treatment plan are needed at this time.    She has additional concerns including frequently feeling as if she is more off balance than usual, is occurring more frequently as time progresses.  She is not dizzy, just feels as if she is going to fall over, occurs 8-10 times per month.  She is also dropping things more frequently.       05/22/2019- Interval History:  Botox continues to be effective for migraine prevention, for the first 8-10 weeks she experiences less than 3 migraines per month.  She has continued using Aimovig 140 mg SQ monthly injections.  She is very happy with the response of her migraines to Botox injections and wishes to continue with current treatment plan.        02/14/2019- Interval History:  Headaches and migraines continue to be "much better", "I am not getting them nearly as much", though she is keenly aware of when a " cold front is coming through as she will always get a migraine.  She has continued using Aimovig monthly as well as taking tizanidine, cymbalta, and amitriptyline daily for migraine prevention.  She is very happy with the current state of her migraines and does not wish to make any adjustments to her treatment plan at this time.  Migraines continue to feel the same as they always have.     11/01/2018- Interval History:  Headaches have been significantly better, she has only had 3-4 migraines since her last round of Botox and is very happy Botox is helping decrease her migraine frequency.  She has been using Aimovig monthly, which she also feels has been helping decrease her migraine frequency.  Migraines continue to feel the same as they always have, she denies any change in the quality or nature of her migraines.  Wishes to continue with Botox injections due to the pronounced effect it has had on her migraines.      08/08/2018- Interval History:  Finds her migraines are no better since last round of Botox, admits she was under increased stress at one point due to issues with her 's job.  She does know a lot of her migraines are triggered from stress.  She has been getting a lot of migraines above her eyes as well as throughout her occipitalis and neck.  Overall, she does wish to continue with Botox injections, but is open to discussing additional prevention options.      05/22/2018- Interval History:  Despite nerve blocks two weeks ago, she has been suffering with a headache nearly everyday since her last visit.  She did try Zomig nasal spray, however she took the first dose on day two of her migraine because she was nervous.  She does think it lessened the pain of her migraine, however did not abort it.  She did not repeat the dose.  Additionally, she continues to report poor sleep nightly.  She stopped drinking caffeine.  Headaches are the same as they have always been.  She denies any change in the nature  or quality of her headaches. Risks, Benefits, and potential side effects of Botox discussed with patient.  Alternative treatments offered.  After thorough discussed regarding the procedure, patient has decided to move forward with initiating Botox injections for Chronic Migraine.  Patient understands we will complete 3 rounds of injections, if after 3 rounds, we do not see a 50% reduction in headaches, we will discontinue Botox injections.      05/11/2018 - Interval History:  This past week she has only been able to work two days, has missed 3 days of work this week due to migraines.  She still is unsure about getting Botox injections for chronic migraine, she would like to try it but her  does not feel this is a good treatment option, she is trying to convince him.  Of note, blood pressure significantly lower in today's visit than in past visits.  She reports her psychiatrist has discontinued vyvanse until she is given clearance from Cardio to restart taking Vyvanse.  She was seen by a pediatric cardiologist who referred her to an adult cardiologist, however she has not seen this provider, had to cancel appointment due to having a headache.  She is requesting repeat nerve blocks today.      01/29/2018 - Interval History:  She has been having a terrible migraine for the last 3 days, she has tried numerous medications without any relief.  States she tried to go to work today, but she had to leave because her headache got so bad.  She is requesting to repeat the nerve blocks as those shots gave her relief form 6-8 weeks in the past.  She has done research on the Botox injections and states her  is very hesitant for her to try Botox injections for her migraines.  She has not been seen by her PCP nor cardiologist for further management of her blood pressure.  Discussed the challenges of treating her migraines with uncontrolled hypertension because I cannot prescribe her any triptan therapy until her blood  pressure is under better control.  She has continued to take Cymbalta daily for her depression, she is unsure if this is giving her any relief with regards to her headaches or not.       01/15/2018 - Interval History:  She was approved for Botox injections, but was a no-show to her appointment last week.  States she spoke with her  about the injections and she was very nervous to try the Botox injections for her migraines.    She continues to experience headaches on 1-4 days per week with migraines occurring 1-2 days per week.  Recently she has been experiencing more migraines and headaches than usually, but admits she has been going through a lot emotionally as well as she got sick with the flu.  She has continued to regularly follow-up with her psychiatrist who changed her anti-depressant, she is now taking Cymbalta.  Since change in medication, she feels she has been experiencing a mild headache each day.  She has had to miss a few days of work due to her migraines since last visit.  Additionally, she feels occasionally she begins to get dizzy, this occurs with or without headaches, she has an appointment with her eye doctor next week as her vision is also getting progressively worse.  She did not find vistaril to be useful with regards to her headaches.  She does feel Zanaflex helps with her neck as well as helping to relax her neck muscles.  When she gets a migraine she has been taking Vistaril and Ibuprofen together which does help the pain, she also has been trying Excedrin migraines which also gives her some relief.  Patient with known HTN on multiple anti-hypertensive agents, blood pressure significantly elevated in clinic today.  She is not symptomatic.  States she needs to follow-up with her primary care (who is outside the [facility] system) for better management.       10/19/2017 - Interval History:  - Headaches have persisted in similar frequency, which she is happy about as she has been under  increased stress with regards to her 's health, he has been suffering with his Gout, just got out of the hospital last week and has had to take time off work etc   - Blood pressure continues to be elevated despite numerous anti-hypertensive, states today is good for her (currently 141/102), has been regularly following up with her PCP for management   - Is very excited because she is going on a cruise with her  to [place] at the end of November/early December   - Has continued to have a headache nearly everyday, not necessarily a migraine, however migraine have been at least once per month    - Has noticed more recently that she feels like she has intense pressure behind her eyes and across the bridge of her nose that slightly feels like sinus pressure, this occurs 1-2 times per week   - Has been seeing a chiropractor as well as a masseuse regularly which she does feel has been helping   - Would still like to move forward with the Botox injections   - Does not feel adjustments to her medications are necessary at this time      08/17/2017 - Interval History:  - Had been seeing Iker Beth PA-C   - Was taking Topamax, Zanaflex, and Vistaril - however has been out of medications for about 3 months  - Since being off the medication, she had been okay, however recently noticed migraines were beginning to occur more frequently with increased intensity and duration   - She finds the nerve blocks had been helping a lot, as long as she is consistent with getting the nerve block, however hasn't had this procedure for over a year   - Has been getting 5-6 migraines per month, each lasting 1-4 days in length   - Current migraine has been going on since last Friday (6 days)  - Blood pressure significantly elevated in clinic today, states her PCP is managing her medications in order to get her blood pressure better controlled   - Is requesting refills of medication as well as repeat nerve occipital and trigeminal  nerve blocks      Headache history:   Age of onset -  13  Nature of pain -  Throbbing   Location - bioccipital   Aura -  White floaters   Duration - hrs  Frequency -  2/week  Time of day - anytime   7 days of pain - jaw pain, L neck, L shoulder,  L side throbbing   Associated symptoms:   Meningeal symptoms - photophobia, phonophobia, exercise intolerance +   Nausea/vomitting +   Nasal drainage   Visual symptoms  Pallor/flushing  Vertigo  Stroke symptoms  Confusion  Impaired concentration +  Pain worsened with physical activity +  Neck pain +  Whooshing sounds   Visual spots/blotches +  Triggers:   Stress +   Bright or flickering light  Strong smell  Vigorous exercise  Dietary factors   Visual strain   Motion intolerance as passenger:  No   Resolution of headache with sleep: yes      Therapies Tried & Response:  Tylenol, motrin, alleve - not help  excedrin - not help   imitrex - helped initially   topamax 50 mg - helped  Percocet - helps   Gabapentin - helps  Amlodipine - for HTN  Bystolic - for HTN  Lexapro - hasn't noticed change in HA, for depression   GONB - helps   Cymbalta - unsure, for depression   Zomig - helps    Maxalt - no   Botox - helping   Sumatriptan -  Vistrail - effective   Aimovig - helping     Current Medications:    buPROPion (WELLBUTRIN XL) 150 MG TB24 tablet, Take 1 tablet (150 mg total) by mouth once daily., Disp: 30 tablet, Rfl: 2    doxepin (SINEQUAN) 10 MG capsule, Take 1 capsule (10 mg total) by mouth every evening., Disp: 30 capsule, Rfl: 2    DULoxetine (CYMBALTA) 60 MG capsule, Take 1 capsule (60 mg total) by mouth once daily., Disp: 90 capsule, Rfl: 1    erenumab-aooe (AIMOVIG) 140 mg/mL autoinjector, Inject 140 mg into the skin every 28 days., Disp: 1 mL, Rfl: 5    estradiol 0.05 mg/24 hr td ptsw (VIVELLE-DOT) 0.05 mg/24 hr, Place 1 patch onto the skin once a week., Disp: 4 patch, Rfl: 11    hydrOXYzine pamoate (VISTARIL) 25 MG Cap, TAKE 1 CAPSULE BY MOUTH EVERY 6 TO 8 HOURS AS  "NEEDED, Disp: 12 capsule, Rfl: 2    promethazine (PHENERGAN) 25 MG tablet, Take 1 tab by mouth every 4-6 hours as needed for nausea., Disp: 20 tablet, Rfl: 5    sumatriptan (IMITREX) 100 MG tablet, Take 1 tablet (100 mg total) by mouth every 2 (two) hours as needed for Migraine. Max 2 tabs/day., Disp: 12 tablet, Rfl: 5    tiZANidine (ZANAFLEX) 4 MG tablet, Take 1.5 tablets (6 mg total) by mouth every evening. ( NO ALCOHOL OR DRIVING WITH MEDICATION), Disp: 45 tablet, Rfl: 5    zolpidem (AMBIEN) 10 mg Tab, Take once tab nightly, Disp: 30 tablet, Rfl: 2    Current Facility-Administered Medications:     onabotulinumtoxina injection 200 Units, 200 Units, Intramuscular, Q90 Days, Luci Vulevich, FNP, 200 Units at 12/04/19 1251    onabotulinumtoxina injection 200 Units, 200 Units, Intramuscular, Q90 Days, Luci Vulevich, FNP    onabotulinumtoxina injection 200 Units, 200 Units, Intramuscular, Q90 Days, Luci Vulevich, FNP    onabotulinumtoxina injection 200 Units, 200 Units, Intramuscular, Q10 weeks, Luci Vulevich, FNP, 200 Units at 03/25/20 0949    onabotulinumtoxina injection 200 Units, 200 Units, Intramuscular, Q90 Days, Luci Vulevich, FNP, 200 Units at 09/29/20 0920    onabotulinumtoxina injection 200 Units, 200 Units, Intramuscular, q12 weeks, Luci Vulevich, FNP    Review of Systems - as per HPI, otherwise a balanced 10 systems review is negative.    OBJECTIVE:  Vitals:  Ht 5' 5" (1.651 m)   LMP 12/14/2000   BMI 28.12 kg/m²     Physical Exam:  Constitutional: she appears well-developed and well-nourished. she is well groomed. NAD     ASSESSMENT:  1. Chronic migraine    2. Migraine without aura and without status migrainosus, not intractable      PLAN:  - Botox administered in clinic for Chronic Migraine (see below)   - Continue all medications as directed   - No refills needed at this time   - RTC in 12 weeks for repeat Botox injections or sooner if needed     Orders Placed This " Encounter    hydrOXYzine pamoate (VISTARIL) 25 MG Cap       All questions and concerns addressed.  Patient verbalizes understanding and is agreeable with the above stated treatment plan.      PROCEDURE NOTE:  BOTOX was performed as an indicated therapy for intractable chronic migraine headaches given that the patient failed more than 2 headache medications    A time out was conducted just before the start of the procedure to verify the correct patient and procedure, procedure location, and all relevant critical information.     Botulinum Toxin Injection Procedure     PROCEDURE PERFORMED: Botulinum toxin injection (79013)  CLINICAL INDICATION: G43.719  After risks and benefits were explained including bleeding, infection, worsening of pain, damage to the areas being injected, weakness of muscles, loss of muscle control, dysphagia if injecting the head or neck, facial droop if injecting the facial area, painful injection, allergic or other reaction to the medications being injected, and the failure of the procedure to help the problem, a signed consent was obtained.   The patient was placed in a comfortable area and the sites to be treated were identified.The area to be treated was prepped three times with alcohol and the alcohol allowed to dry. Next, a 30 gauge needle was used to inject the medication in the area to be treated.      Total Botox used: 155 Units   Unavoidable waste: 45 Units     Injection sites:    muscle bilaterally ( a total of 10 units divided into 2 sites)   Procerus muscle (5 units)   Frontalis muscle bilaterally (a total of 20 units divided into 4 sites)   Temporalis muscle bilaterally (a total of 40 units divided into 8 sites)   Occipitalis muscle bilaterally (a total of 30 units divided into 6 sites)   Cervical paraspinal muscles (a total of 20 units divided into 4 sites)   Trapezius muscle bilaterally (a total of 30 units divided into 6 sites)   Complications: none   RTC for the  next Botox injection: 12 weeks     BOTOX DELIVERED FROM OUTSIDE SPECIALTY PHARMACY USED FOR PATIENT'S INJECTIONS.     CC: MD Luci Echols, BC-YAEL  [facility]   [phone]

## 2020-09-29 ENCOUNTER — TELEPHONE (OUTPATIENT)
Dept: NEUROLOGY | Facility: CLINIC | Age: 45
End: 2020-09-29

## 2020-09-29 PROCEDURE — 99499 NO LOS: ICD-10-PCS | Mod: JG,S$GLB,, | Performed by: NURSE PRACTITIONER

## 2020-09-29 PROCEDURE — 99499 UNLISTED E&M SERVICE: CPT | Mod: JG,S$GLB,, | Performed by: NURSE PRACTITIONER

## 2020-10-01 ENCOUNTER — TELEPHONE (OUTPATIENT)
Dept: PHARMACY | Facility: CLINIC | Age: 45
End: 2020-10-01

## 2020-10-01 NOTE — TELEPHONE ENCOUNTER
FOR DOCUMENTATION ONLY:  [Patient gave permission to renew Aimovig copay card]  Financial Assistance for Aimovig approved with a Co-Pay Card from 10/01/20 - 10/01/21    ID: [id number]  BIN: [id number]  PCN: [id number]  GRP: [id number]    Max Amount: $5 Co-Pay per fill with a cap of $3500/yr in assistance.

## 2020-10-09 ENCOUNTER — PATIENT MESSAGE (OUTPATIENT)
Dept: PHYSICAL MEDICINE AND REHAB | Facility: CLINIC | Age: 45
End: 2020-10-09

## 2020-10-09 DIAGNOSIS — G43.009 MIGRAINE WITHOUT AURA AND WITHOUT STATUS MIGRAINOSUS, NOT INTRACTABLE: Primary | ICD-10-CM

## 2020-10-09 RX ORDER — AMITRIPTYLINE HYDROCHLORIDE 25 MG/1
25 TABLET, FILM COATED ORAL NIGHTLY
Qty: 30 TABLET | Refills: 2 | Status: SHIPPED | OUTPATIENT
Start: 2020-10-09 | End: 2021-01-11 | Stop reason: SDUPTHER

## 2020-10-30 NOTE — PROGRESS NOTES
Supervising Psychiatry Staff:  I discussed Ms. Pollock's progress with Dr. Felipe KEBEDE) in regular caseload supervision. I agree with the above interval history, ROS, findings, and plan, which reflect my own.

## 2020-11-02 ENCOUNTER — SPECIALTY PHARMACY (OUTPATIENT)
Dept: PHARMACY | Facility: CLINIC | Age: 45
End: 2020-11-02

## 2020-11-02 NOTE — TELEPHONE ENCOUNTER
Specialty Pharmacy - Refill Coordination    Specialty Medication Orders Linked to Encounter      Most Recent Value   Medication #1  erenumab-aooe (AIMOVIG) 140 mg/mL autoinjector (Order#[id number], Rx#[id number])          Refill Questions - Documented Responses      Most Recent Value   Relationship to patient of person spoken to?  Self   HIPAA/medical authority confirmed?  Yes   Any changes in contact preferences or allowed representatives?  No   Has the patient had any insurance changes?  No   Has the patient had any changes to specialty medication, dose, or instructions?  No   Has the patient started taking any new medications, herbals, or supplements?  No   Has the patient been diagnosed with any new medical conditions?  No   Does the patient have any new allergies to medications or foods?  No   Does the patient have any concerns about side effects?  No   Can the patient store medication/sharps container properly (at the correct temperature, away from children/pets, etc.)?  Yes   Can the patient call emergency services (911) in the event of an emergency?  Yes   Does the patient have any concerns or questions about taking or administering this medication as prescribed?  No   How many doses did the patient miss in the past 4 weeks or since the last fill?  0   How many doses does the patient have on hand?  0   How many days does the patient report on hand quantity will last?  0   Does the number of doses/days supply remaining match pharmacy expected amounts?  Yes   How will the patient receive the medication?  Mail   When does the patient need to receive the medication?  11/05/20   Shipping Address  Home   Address in [facility] confirmed and updated if neccessary?  Yes   Expected Copay ($)  5   Is the patient able to afford the medication copay?  Yes   Payment Method  CC on file   Days supply of Refill  30   Do you want to trigger an intervention?  No   Do you want to trigger an additional referral task?  No    Refill activity completed?  Yes   Refill activity plan  Refill scheduled   Shipment/Pickup Date:  11/03/20          Current Outpatient Medications   Medication Sig    amitriptyline (ELAVIL) 25 MG tablet Take 1 tablet (25 mg total) by mouth every evening.    buPROPion (WELLBUTRIN XL) 150 MG TB24 tablet Take 1 tablet (150 mg total) by mouth once daily.    doxepin (SINEQUAN) 10 MG capsule Take 1 capsule (10 mg total) by mouth every evening.    DULoxetine (CYMBALTA) 60 MG capsule Take 1 capsule (60 mg total) by mouth once daily.    erenumab-aooe (AIMOVIG) 140 mg/mL autoinjector Inject 140 mg into the skin every 28 days.    estradiol 0.05 mg/24 hr td ptsw (VIVELLE-DOT) 0.05 mg/24 hr Place 1 patch onto the skin once a week.    hydrOXYzine pamoate (VISTARIL) 25 MG Cap TAKE 1 CAPSULE BY MOUTH EVERY 6 TO 8 HOURS AS NEEDED    promethazine (PHENERGAN) 25 MG tablet Take 1 tab by mouth every 4-6 hours as needed for nausea.    sumatriptan (IMITREX) 100 MG tablet Take 1 tablet (100 mg total) by mouth every 2 (two) hours as needed for Migraine. Max 2 tabs/day.    tiZANidine (ZANAFLEX) 4 MG tablet Take 1.5 tablets (6 mg total) by mouth every evening. ( NO ALCOHOL OR DRIVING WITH MEDICATION)    zolpidem (AMBIEN) 10 mg Tab Take once tab nightly   Last reviewed on 10/30/2020  4:19 PM by Abner Jin MD    Review of patient's allergies indicates:   Allergen Reactions    Iodine and iodide containing products Other (See Comments)    Penicillins Rash    Last reviewed on  11/2/2020 11:35 AM by Oma Buck      Tasks added this encounter   11/2/2020 - Refill Call   Tasks due within next 3 months   No tasks due.     Oma Buck  [facility]  [address] [place] [id number]  Phone: [phone]  Fax: [phone]

## 2020-11-03 ENCOUNTER — SPECIALTY PHARMACY (OUTPATIENT)
Dept: PHARMACY | Facility: CLINIC | Age: 45
End: 2020-11-03

## 2020-11-17 ENCOUNTER — PATIENT MESSAGE (OUTPATIENT)
Dept: PHYSICAL MEDICINE AND REHAB | Facility: CLINIC | Age: 45
End: 2020-11-17

## 2020-11-20 ENCOUNTER — OFFICE VISIT (OUTPATIENT)
Dept: PSYCHIATRY | Facility: CLINIC | Age: 45
End: 2020-11-20
Payer: COMMERCIAL

## 2020-11-20 DIAGNOSIS — M79.7 FIBROMYALGIA: Chronic | ICD-10-CM

## 2020-11-20 DIAGNOSIS — Q24.9 ADULT CONGENITAL HEART DISEASE: ICD-10-CM

## 2020-11-20 DIAGNOSIS — F41.1 GAD (GENERALIZED ANXIETY DISORDER): Primary | Chronic | ICD-10-CM

## 2020-11-20 DIAGNOSIS — E66.3 OVERWEIGHT (BMI 25.0-29.9): ICD-10-CM

## 2020-11-20 DIAGNOSIS — G43.009 MIGRAINE WITHOUT AURA AND WITHOUT STATUS MIGRAINOSUS, NOT INTRACTABLE: ICD-10-CM

## 2020-11-20 DIAGNOSIS — Z91.89 EXCESSIVE CONSUMPTION OF SODA POP: ICD-10-CM

## 2020-11-20 DIAGNOSIS — F40.10 SOCIAL ANXIETY DISORDER: Chronic | ICD-10-CM

## 2020-11-20 DIAGNOSIS — I10 ESSENTIAL HYPERTENSION: ICD-10-CM

## 2020-11-20 DIAGNOSIS — F33.41 RECURRENT MAJOR DEPRESSION IN PARTIAL REMISSION: ICD-10-CM

## 2020-11-20 PROCEDURE — 99214 OFFICE O/P EST MOD 30 MIN: CPT | Mod: 95,,, | Performed by: STUDENT IN AN ORGANIZED HEALTH CARE EDUCATION/TRAINING PROGRAM

## 2020-11-20 PROCEDURE — 99214 PR OFFICE/OUTPT VISIT, EST, LEVL IV, 30-39 MIN: ICD-10-PCS | Mod: 95,,, | Performed by: STUDENT IN AN ORGANIZED HEALTH CARE EDUCATION/TRAINING PROGRAM

## 2020-11-20 PROCEDURE — 90833 PSYTX W PT W E/M 30 MIN: CPT | Mod: 95,,, | Performed by: STUDENT IN AN ORGANIZED HEALTH CARE EDUCATION/TRAINING PROGRAM

## 2020-11-20 PROCEDURE — 90833 PR PSYCHOTHERAPY W/PATIENT W/E&M, 30 MIN (ADD ON): ICD-10-PCS | Mod: 95,,, | Performed by: STUDENT IN AN ORGANIZED HEALTH CARE EDUCATION/TRAINING PROGRAM

## 2020-11-20 RX ORDER — ZOLPIDEM TARTRATE 10 MG/1
TABLET ORAL
Qty: 30 TABLET | Refills: 2 | Status: SHIPPED | OUTPATIENT
Start: 2020-11-20 | End: 2021-01-19 | Stop reason: SDUPTHER

## 2020-11-20 RX ORDER — BUPROPION HYDROCHLORIDE 300 MG/1
300 TABLET ORAL DAILY
Qty: 30 TABLET | Refills: 2 | Status: SHIPPED | OUTPATIENT
Start: 2020-11-20 | End: 2021-06-15 | Stop reason: SDUPTHER

## 2020-11-20 RX ORDER — DULOXETIN HYDROCHLORIDE 60 MG/1
60 CAPSULE, DELAYED RELEASE ORAL DAILY
Qty: 90 CAPSULE | Refills: 2 | Status: SHIPPED | OUTPATIENT
Start: 2020-11-20 | End: 2021-06-15 | Stop reason: SDUPTHER

## 2020-11-20 NOTE — PROGRESS NOTES
11/20/2020 10:42 AM   Mirna Pollock   1975   [id number]           OUTPATIENT PSYCHIATRY FOLLOW- UP VISIT    TELE PSYCHIATRY Disclaimer   *The patient was informed despite using HIPPA compliant technology there may be risks including security breach, technological failure, inability to perform a comprehensive physical exam which could delay or prevent an accurate diagnosis, and potential complications from treatment decisions rendered over a telemedical platform. The patient understands and consented to the use of tele-health service as being a safe measure to mitigate during COVID 19 Pandemic .  The patient was also informed of the relationship between the physician and patient and the respective role of any other health care provider with respect to management of the patient; and notified that the pt may decline to receive medical services by telemedicine and may withdraw from such care at any time.     Patient's Current location:   [address] [place] [address]  In Case of Emergency pts next of kin  Name:   Phone number:  [phone]   Visit type: Virtual visit with synchronous audio and video      Reason for Encounter:  Mirna Pollock, a 44 y.o. female, with past psychiatric history of depression, anxiety, insomnia significant past medical history of migraines (requiring every 3 months botox; and monthly monoclonal antibodies); fibromyalgia; possible IBS . Patient has been involved with mental health providers for 10 years. Today, patient presents alone with for follow up of anxiety .      Life event tracker/ stressors/ relationships:  Applying for jobs: has worked as  for surgery at [employer] as well as at [employer]'s office mom passed away from COVID   is  which is stressful now  step kids stay every other 2 weekends    Diet: cutting down on coke intake; has cut down on junk food    Activity- walks on treadmill     Current psychotropic medication regimen-  Doxepin 10mg nightly  Ambien  5-10 mg nightly   Cymbalta 60 mg daily   Wellbutrin  mg daily    Zanaflex 4 mg nightly     Compliance: yes    Side effects: no    Patient's overall opinion of current regimen: working well      Interval History and Content of Current Session:  Over the past months since last follow-up patient has had continued mild worry associated with mild tiredness and migraines.   Overall, she feels she is doing well.   Looking for work; been out of work 2 years though feeling like she needs the social interaction at this point.   Depression is remitted  Anxiety is tolerable      Review of Systems     Key features of past history:  Ex- was an alcoholic - has 2 older kids   One lives in [place] and does not see much  Son , 22, lives in [facility] father was an alcoholic  Been with current  since 2010  No longer working- used to surgery [facility] social interaction      Past Medical and Family hx: The patient's past medical and family histories have been reviewed and updated as appropriate within the electronic medical record system.    Medical Review of Symptoms  History obtained from the patient    General : NO chills or fever   Respiratory: NO cough, shortness of breath   Cardiovascular: NO chest pain, palpitations or racing heart   Gastrointestinal: NO nausea, vomiting, + constipation    Neurological: NO confusion, dizziness, or tremors, + headaches    Psychiatric: please see HPI     Objective       ALLERGIES:  Review of patient's allergies indicates:   Allergen Reactions    Iodine and iodide containing products Other (See Comments)    Penicillins Rash       RELEVANT LABS/STUDIES:    Recent results reviewed and discussed with patient.     Significant Past labs/ labs to track:  none    Constitutional  Vitals:    There were no vitals filed for this visit.         PHYSICAL EXAM:   General: well developed, in no acute distress   Neurologic:   Gait: Normal   Psychomotor signs:  No involuntary  movements or tremor  AIMS: none    PSYCHIATRIC EXAM:   Level of Consciousness: awake and alert  Orientation: orientated to situation, person, place, and time  Grooming: non- disheveled, well- groomed   Psychomotor Behavior: no psychomotor agitation or retardation; fidgety    Speech: normal rate, rhythm, and tone  Language: fluent and appropriate  Mood: neutral   Affect: mood-congruent; euthymic  Thought Process: linear, good current  motivation for goals   Associations: fully intact   Thought Content: no SI HI AVH  Memory: intact  Attention: intact  Fund of Knowledge: appropriate for education level   Insight: fair to good  Judgment: fair to good    Assessment and Diagnosis   Status/Progress: Based on the examination today, the patient's problem(s) is/are improved and well controlled.  New problems have not been presented today.   Co-morbidities are complicating management of the primary condition.  There are no active rule-out diagnoses for this patient at this time.     General Impression:       ICD-10-CM ICD-9-CM   1. LALITA (generalized anxiety disorder)  F41.1 300.02   2. Recurrent major depression in partial remission  F33.41 296.35   3. Social anxiety disorder  F40.10 300.23   4. Migraine without aura and without status migrainosus, not intractable  G43.009 346.10   5. Essential hypertension  I10 401.9   6. Adult congenital heart disease  Q24.9 746.9   7. Overweight (BMI 25.0-29.9)  E66.3 278.02   8. Fibromyalgia  M79.7 729.1         Intervention/Counseling/Treatment Plan   · Medication Management: -    Continue:  Doxepin 10mg nightly (holding until patient answers mychart message about being started on another TCA elavil by neuro)    Ambien 5-10 mg nightly   Cymbalta 60 mg daily     longterm goal would be to consolidate sleeping medication     Increase wellbutrin XL to 300 mg daily     Long-term goal to continue cut down on coca-cola consumption- she drinks daily and believe this is major contributor to  patients inflammatory processes (migraines; IBS; fibromyalgia; and psychiatric symptoms)        · Labs: reviewed most recent  · The treatment plan and follow up plan were reviewed with the patient.  · Discussed with patient informed consent, risks vs. benefits, alternative treatments, side effect profile and the inherent unpredictability of individual responses to these treatments. The patient expresses understanding of the above and displays the capacity to agree with this current plan and had no other questions.  · Encouraged Patient to keep future appointments.   · Take medications as prescribed and abstain from substance abuse.   · In the event of an emergency patient was advised to go to the emergency room.    Return to Clinic: 6 months    30 total minutes spent with > than 50% of total time spend on coordination of care and counseling   (which included pts differential diagnosis and prognosis for psychiatric conditions, risks, benefits of treatments, instructions and adherence to treatment plan, risk reduction, reviewing current psychiatric medication regimen, medical problems and social stressors. In addtion to possible discussion with other healthcare provider/s)    PSYCHOTHERAPY ADD-ON:  17 total minutes spent with patient alone in session today on psychotherapeutic techniques including motivational interviewing, supportive psychotherapy, and introduction to concepts of Cognitive Behavioral Therapy. Patient responds well to these therapeutic techniques.     Primary focus today: MI for life-style modification     Target symptoms: anxiety , substance abuse, mood swings  Outcome monitoring methods: self-report  The patient's response to intervention is accepting.  The patient's progress toward goals is good .      Felipe Nair MD  [facility], PGY-3  11/20/2020 10:42 AM

## 2020-11-24 ENCOUNTER — PATIENT MESSAGE (OUTPATIENT)
Dept: PHARMACY | Facility: CLINIC | Age: 45
End: 2020-11-24

## 2020-11-24 NOTE — TELEPHONE ENCOUNTER
Specialty Pharmacy - Refill Coordination    Specialty Medication Orders Linked to Encounter      Most Recent Value   Medication #1  erenumab-aooe (AIMOVIG) 140 mg/mL autoinjector (Order#[id number], Rx#[id number])          Refill Questions - Documented Responses      Most Recent Value   Relationship to patient of person spoken to?  Self   HIPAA/medical authority confirmed?  Yes   Any changes in contact preferences or allowed representatives?  No   Has the patient had any insurance changes?  No   Has the patient had any changes to specialty medication, dose, or instructions?  No   Has the patient started taking any new medications, herbals, or supplements?  No   Has the patient been diagnosed with any new medical conditions?  No   Does the patient have any new allergies to medications or foods?  No   Does the patient have any concerns about side effects?  No   Can the patient store medication/sharps container properly (at the correct temperature, away from children/pets, etc.)?  Yes   Can the patient call emergency services (911) in the event of an emergency?  Yes   Does the patient have any concerns or questions about taking or administering this medication as prescribed?  No   How many doses did the patient miss in the past 4 weeks or since the last fill?  0   How many doses does the patient have on hand?  0   How many days does the patient report on hand quantity will last?  0   Does the number of doses/days supply remaining match pharmacy expected amounts?  Yes   Does the patient feel that this medication is effective?  Yes   During the past 4 weeks, has patient missed any activities due to condition or medication?  No   During the past 4 weeks, did patient have any of the following urgent care visits?  None   How will the patient receive the medication?  Mail   When does the patient need to receive the medication?  12/01/20   Shipping Address  Home   Address in [facility] confirmed and updated if  neccessary?  Yes   Expected Copay ($)  0   Is the patient able to afford the medication copay?  Yes   Payment Method  zero copay   Days supply of Refill  30   Would patient like to speak to a pharmacist?  No   Do you want to trigger an intervention?  No   Do you want to trigger an additional referral task?  No   Refill activity completed?  Yes   Refill activity plan  Refill scheduled   Shipment/Pickup Date:  11/30/20          Current Outpatient Medications   Medication Sig    amitriptyline (ELAVIL) 25 MG tablet Take 1 tablet (25 mg total) by mouth every evening.    buPROPion (WELLBUTRIN XL) 300 MG 24 hr tablet Take 1 tablet (300 mg total) by mouth once daily.    doxepin (SINEQUAN) 10 MG capsule Take 1 capsule (10 mg total) by mouth every evening.    DULoxetine (CYMBALTA) 60 MG capsule Take 1 capsule (60 mg total) by mouth once daily.    erenumab-aooe (AIMOVIG) 140 mg/mL autoinjector Inject 140 mg into the skin every 28 days.    estradiol 0.05 mg/24 hr td ptsw (VIVELLE-DOT) 0.05 mg/24 hr Place 1 patch onto the skin once a week.    hydrOXYzine pamoate (VISTARIL) 25 MG Cap TAKE 1 CAPSULE BY MOUTH EVERY 6 TO 8 HOURS AS NEEDED    promethazine (PHENERGAN) 25 MG tablet Take 1 tab by mouth every 4-6 hours as needed for nausea.    sumatriptan (IMITREX) 100 MG tablet Take 1 tablet (100 mg total) by mouth every 2 (two) hours as needed for Migraine. Max 2 tabs/day.    tiZANidine (ZANAFLEX) 4 MG tablet Take 1.5 tablets (6 mg total) by mouth every evening. ( NO ALCOHOL OR DRIVING WITH MEDICATION)    zolpidem (AMBIEN) 10 mg Tab Take once tab nightly   Last reviewed on 11/24/2020  9:51 AM by Abner Jin MD    Review of patient's allergies indicates:   Allergen Reactions    Iodine and iodide containing products Other (See Comments)    Penicillins Rash    Last reviewed on  11/24/2020 9:51 AM by Abner Jin      Tasks added this encounter   12/22/2020 - Refill Call (Auto Added)   Tasks due within next 3 months    No tasks due.     Jesús Lynn  [facility]  [address] [facility] [address]  Phone: [phone]  Fax: [phone]

## 2020-12-31 ENCOUNTER — TELEPHONE (OUTPATIENT)
Dept: NEUROLOGY | Facility: CLINIC | Age: 45
End: 2020-12-31
Payer: COMMERCIAL

## 2020-12-31 NOTE — TELEPHONE ENCOUNTER
----- Message from Evelina Osuna MA sent at 12/30/2020  5:21 PM CST -----  Contact: self @ [phone]    ----- Message -----  From: Malgorzata Mckeon  Sent: 12/30/2020   4:04 PM CST  To: [facility] Staff    Pt is calling to schedule a Botox appt for anytime other then this Monday.

## 2021-01-05 ENCOUNTER — PATIENT MESSAGE (OUTPATIENT)
Dept: PHYSICAL MEDICINE AND REHAB | Facility: CLINIC | Age: 46
End: 2021-01-05

## 2021-01-05 ENCOUNTER — TELEPHONE (OUTPATIENT)
Dept: NEUROLOGY | Facility: CLINIC | Age: 46
End: 2021-01-05

## 2021-01-06 ENCOUNTER — TELEPHONE (OUTPATIENT)
Dept: NEUROLOGY | Facility: CLINIC | Age: 46
End: 2021-01-06

## 2021-01-07 ENCOUNTER — TELEPHONE (OUTPATIENT)
Dept: TRANSPLANT | Facility: CLINIC | Age: 46
End: 2021-01-07

## 2021-01-07 ENCOUNTER — TELEPHONE (OUTPATIENT)
Dept: NEUROLOGY | Facility: CLINIC | Age: 46
End: 2021-01-07

## 2021-01-07 ENCOUNTER — TELEPHONE (OUTPATIENT)
Dept: NEUROLOGY | Facility: CLINIC | Age: 46
End: 2021-01-07
Payer: COMMERCIAL

## 2021-01-07 NOTE — TELEPHONE ENCOUNTER
----- Message from Dolly Cartwright sent at 1/7/2021  3:36 PM CST -----  Contact: Pt @ [phone]  Pt returning call     Pt @ [phone]

## 2021-01-11 DIAGNOSIS — G43.009 MIGRAINE WITHOUT AURA AND WITHOUT STATUS MIGRAINOSUS, NOT INTRACTABLE: ICD-10-CM

## 2021-01-11 RX ORDER — AMITRIPTYLINE HYDROCHLORIDE 25 MG/1
25 TABLET, FILM COATED ORAL NIGHTLY
Qty: 30 TABLET | Refills: 8 | Status: SHIPPED | OUTPATIENT
Start: 2021-01-11 | End: 2021-12-02

## 2021-01-14 ENCOUNTER — PATIENT MESSAGE (OUTPATIENT)
Dept: PSYCHIATRY | Facility: CLINIC | Age: 46
End: 2021-01-14

## 2021-01-19 ENCOUNTER — PATIENT MESSAGE (OUTPATIENT)
Dept: PSYCHIATRY | Facility: CLINIC | Age: 46
End: 2021-01-19

## 2021-01-19 DIAGNOSIS — F33.41 RECURRENT MAJOR DEPRESSION IN PARTIAL REMISSION: ICD-10-CM

## 2021-01-19 RX ORDER — ZOLPIDEM TARTRATE 10 MG/1
TABLET ORAL
Qty: 30 TABLET | Refills: 2 | Status: SHIPPED | OUTPATIENT
Start: 2021-01-19 | End: 2021-06-04 | Stop reason: SDUPTHER

## 2021-01-22 ENCOUNTER — PATIENT MESSAGE (OUTPATIENT)
Dept: PHARMACY | Facility: CLINIC | Age: 46
End: 2021-01-22

## 2021-01-28 ENCOUNTER — TELEPHONE (OUTPATIENT)
Dept: NEUROLOGY | Facility: CLINIC | Age: 46
End: 2021-01-28
Payer: COMMERCIAL

## 2021-01-28 ENCOUNTER — PATIENT MESSAGE (OUTPATIENT)
Dept: PHYSICAL MEDICINE AND REHAB | Facility: CLINIC | Age: 46
End: 2021-01-28

## 2021-01-28 NOTE — TELEPHONE ENCOUNTER
----- Message from Dmitry Bruno sent at 1/28/2021  1:25 PM CST -----  Contact: @ [phone]  Patient calling to r/s the 1-28th Botox appt,( Flat tire ) pls call

## 2021-02-19 ENCOUNTER — PATIENT MESSAGE (OUTPATIENT)
Dept: PHARMACY | Facility: CLINIC | Age: 46
End: 2021-02-19

## 2021-02-25 ENCOUNTER — OFFICE VISIT (OUTPATIENT)
Dept: PHYSICAL MEDICINE AND REHAB | Facility: CLINIC | Age: 46
End: 2021-02-25
Payer: COMMERCIAL

## 2021-02-25 DIAGNOSIS — F41.1 GAD (GENERALIZED ANXIETY DISORDER): Chronic | ICD-10-CM

## 2021-02-25 DIAGNOSIS — G43.009 MIGRAINE WITHOUT AURA AND WITHOUT STATUS MIGRAINOSUS, NOT INTRACTABLE: ICD-10-CM

## 2021-02-25 DIAGNOSIS — F33.41 RECURRENT MAJOR DEPRESSION IN PARTIAL REMISSION: ICD-10-CM

## 2021-02-25 PROCEDURE — 99999 PR PBB SHADOW E&M-EST. PATIENT-LVL II: CPT | Mod: PBBFAC,,, | Performed by: NURSE PRACTITIONER

## 2021-02-25 PROCEDURE — 99213 OFFICE O/P EST LOW 20 MIN: CPT | Mod: 25,S$GLB,, | Performed by: NURSE PRACTITIONER

## 2021-02-25 PROCEDURE — 64615 PR CHEMODENERVATION OF MUSCLE FOR CHRONIC MIGRAINE: ICD-10-PCS | Mod: S$GLB,,, | Performed by: NURSE PRACTITIONER

## 2021-02-25 PROCEDURE — 64615 CHEMODENERV MUSC MIGRAINE: CPT | Mod: S$GLB,,, | Performed by: NURSE PRACTITIONER

## 2021-02-25 PROCEDURE — 99999 PR PBB SHADOW E&M-EST. PATIENT-LVL II: ICD-10-PCS | Mod: PBBFAC,,, | Performed by: NURSE PRACTITIONER

## 2021-02-25 PROCEDURE — 99213 PR OFFICE/OUTPT VISIT, EST, LEVL III, 20-29 MIN: ICD-10-PCS | Mod: 25,S$GLB,, | Performed by: NURSE PRACTITIONER

## 2021-02-25 RX ORDER — PROMETHAZINE HYDROCHLORIDE 25 MG/1
TABLET ORAL
Qty: 20 TABLET | Refills: 5 | Status: SHIPPED | OUTPATIENT
Start: 2021-02-25 | End: 2021-09-20 | Stop reason: SDUPTHER

## 2021-03-23 ENCOUNTER — OFFICE VISIT (OUTPATIENT)
Dept: OPTOMETRY | Facility: CLINIC | Age: 46
End: 2021-03-23

## 2021-03-23 ENCOUNTER — OFFICE VISIT (OUTPATIENT)
Dept: OPTOMETRY | Facility: CLINIC | Age: 46
End: 2021-03-23
Payer: COMMERCIAL

## 2021-03-23 DIAGNOSIS — G45.3 AMAUROSIS FUGAX, BOTH EYES: ICD-10-CM

## 2021-03-23 DIAGNOSIS — H04.123 DRY EYE SYNDROME OF BOTH EYES: ICD-10-CM

## 2021-03-23 DIAGNOSIS — H52.13 MYOPIA WITH PRESBYOPIA OF BOTH EYES: ICD-10-CM

## 2021-03-23 DIAGNOSIS — Z46.0 FITTING AND ADJUSTMENT OF SPECTACLES AND CONTACT LENSES: Primary | ICD-10-CM

## 2021-03-23 DIAGNOSIS — G43.109 OCULAR MIGRAINE: Primary | ICD-10-CM

## 2021-03-23 DIAGNOSIS — H52.4 MYOPIA WITH PRESBYOPIA OF BOTH EYES: ICD-10-CM

## 2021-03-23 DIAGNOSIS — H43.393 VISUAL FLOATERS, BILATERAL: ICD-10-CM

## 2021-03-23 PROCEDURE — 92015 PR REFRACTION: ICD-10-PCS | Mod: S$GLB,,, | Performed by: OPTOMETRIST

## 2021-03-23 PROCEDURE — 99999 PR PBB SHADOW E&M-EST. PATIENT-LVL I: ICD-10-PCS | Mod: PBBFAC,,, | Performed by: OPTOMETRIST

## 2021-03-23 PROCEDURE — 1126F AMNT PAIN NOTED NONE PRSNT: CPT | Mod: S$GLB,,, | Performed by: OPTOMETRIST

## 2021-03-23 PROCEDURE — 92310 CONTACT LENS FITTING OU: CPT | Mod: S$GLB,,, | Performed by: OPTOMETRIST

## 2021-03-23 PROCEDURE — 99999 PR PBB SHADOW E&M-EST. PATIENT-LVL III: ICD-10-PCS | Mod: PBBFAC,,, | Performed by: OPTOMETRIST

## 2021-03-23 PROCEDURE — 92310 PR CONTACT LENS FITTING (NO CHANGE): ICD-10-PCS | Mod: S$GLB,,, | Performed by: OPTOMETRIST

## 2021-03-23 PROCEDURE — 92012 INTRM OPH EXAM EST PATIENT: CPT | Mod: S$GLB,,, | Performed by: OPTOMETRIST

## 2021-03-23 PROCEDURE — 92015 DETERMINE REFRACTIVE STATE: CPT | Mod: S$GLB,,, | Performed by: OPTOMETRIST

## 2021-03-23 PROCEDURE — 1126F PR PAIN SEVERITY QUANTIFIED, NO PAIN PRESENT: ICD-10-PCS | Mod: S$GLB,,, | Performed by: OPTOMETRIST

## 2021-03-23 PROCEDURE — 92012 PR EYE EXAM, EST PATIENT,INTERMED: ICD-10-PCS | Mod: S$GLB,,, | Performed by: OPTOMETRIST

## 2021-03-23 PROCEDURE — 99999 PR PBB SHADOW E&M-EST. PATIENT-LVL III: CPT | Mod: PBBFAC,,, | Performed by: OPTOMETRIST

## 2021-03-23 PROCEDURE — 99999 PR PBB SHADOW E&M-EST. PATIENT-LVL I: CPT | Mod: PBBFAC,,, | Performed by: OPTOMETRIST

## 2021-03-26 ENCOUNTER — TELEPHONE (OUTPATIENT)
Dept: OPTOMETRY | Facility: CLINIC | Age: 46
End: 2021-03-26

## 2021-04-01 ENCOUNTER — PATIENT MESSAGE (OUTPATIENT)
Dept: OPTOMETRY | Facility: CLINIC | Age: 46
End: 2021-04-01

## 2021-05-06 NOTE — PROGRESS NOTES
SUBJECTIVE:     Chief Complaint: annual, problems       History of Present Illness:  Annual Exam  Patient presents for annual exam.   She c/o hot flashes, mood swings, lump left breast, pelvic pain, occasional dyspareunia today. Sh ehas h/o hyst 12 years ago for endometriosis, still has ovaries. + occasional ADIS.   LMP:   She denies any vd, vb, vaginal dryness dysuria, depression, anxiety.  Last pap was in prior to hyst and was neg.  Birth Control: hyst  Declines STD testing.    GYN screening history: denies h/o abnl paps, stds  Mammogram history: mass with bx   Colonoscopy history: na  Dexa history: na    FH:   Breast cancer: unsure  Colon cancer: unsure  Ovarian cancer: unsure    Review of patient's allergies indicates:   Allergen Reactions    Iodine and iodide containing products Other (See Comments)    Penicillins Rash       Past Medical History:   Diagnosis Date    Anxiety     ASD (atrial septal defect)     Endometriosis     Headache(784.0)     Hypertension     Kidney stones fibomyalgia, migraine, heart closer, kidey stones     Past Surgical History:   Procedure Laterality Date    APPENDECTOMY      CARDIAC CATHETERIZATION  10/01/2010    ASD device closure     SECTION      x 2    CHOLECYSTECTOMY      HYSTERECTOMY      12 yrs ago, still has ovaries, due to endometriosis by Dr. Hernandez     OB History        2    Para   2    Term   2            AB        Living   2       SAB        TAB        Ectopic        Multiple        Live Births                   Family History   Adopted: Yes   Problem Relation Age of Onset    Migraines Son     No Known Problems Son      Social History     Tobacco Use    Smoking status: Never Smoker    Smokeless tobacco: Never Used   Substance Use Topics    Alcohol use: No     Alcohol/week: 0.0 standard drinks    Drug use: No       Current Outpatient Medications   Medication Sig    buPROPion (WELLBUTRIN XL) 150 MG TB24 tablet Take 1 tablet  (150 mg total) by mouth once daily.    doxepin (SINEQUAN) 10 MG capsule Take 1 capsule (10 mg total) by mouth every evening.    promethazine (PHENERGAN) 25 MG tablet Take 1 tab by mouth every 4-6 hours as needed for nausea.    sumatriptan (IMITREX) 100 MG tablet Take 1 tablet (100 mg total) by mouth every 2 (two) hours as needed for Migraine. Max 2 tabs/day. Max 3 days/week.    tiZANidine (ZANAFLEX) 4 MG tablet Take 1.5 tablets (6 mg total) by mouth every evening. ( NO ALCOHOL OR DRIVING WITH MEDICATION)    zolpidem (AMBIEN) 10 mg Tab Take once tab nightly    DULoxetine (CYMBALTA) 60 MG capsule Take 1 capsule (60 mg total) by mouth once daily. (Patient not taking: Reported on 8/4/2020)    erenumab-aooe 140 mg/mL AtIn Inject 140 mg into the skin every 28 days.    estradiol 0.05 mg/24 hr td ptsw (VIVELLE-DOT) 0.05 mg/24 hr Place 1 patch onto the skin once a week.    hydrOXYzine pamoate (VISTARIL) 25 MG Cap TAKE 1 CAPSULE BY MOUTH EVERY 6 TO 8 HOURS AS NEEDED     Current Facility-Administered Medications   Medication    onabotulinumtoxina injection 200 Units    onabotulinumtoxina injection 200 Units    onabotulinumtoxina injection 200 Units    onabotulinumtoxina injection 200 Units    onabotulinumtoxina injection 200 Units       Review of Systems:  GENERAL: No fever, chills, fatigability or weight loss.  CARDIOVASCULAR: No chest pain. No palpitations.  RESPIRATORY: No SOB, no wheezing.  BREAST: Denies pain. No lumps. No discharge.  VULVAR: No pain, no lesions and no itching.  VAGINAL: No relaxation, no itching, no discharge, no abnormal bleeding and no lesions.  ABDOMEN: + abdominal pain. Denies nausea. Denies vomiting. No diarrhea. No constipation  URINARY: Rare incontinence, no nocturia, no frequency and no dysuria.  NEUROLOGICAL: No headaches. No vision changes.       OBJECTIVE:     Vitals:    08/04/20 1340   BP: 128/68       Physical Exam:  Gen: NAD, well developed, well-nourished  HEENT:  Normocephalic, atraumatic  Eyes: EOM nl, conjuntivae normal  Neck: ROM normal, no thyromegaly  Respiratory: Effort normal   Abd: soft, nontender, no masses palpated  Breast: Normal bilaterally, no masses, lesions or tenderness. No nipple discharge on expression, no lymphadenopathy bilaterally.  SSE:  Vulva: no lesions or rashes  Cervix: surgically absent  BME:   Cervix: surgically absent  Adnexa: nl bilaterally, no masses or fullness palpated  Uterus: surgically absent  Musculoskeletal: normal ROM  Neuro: alert, AAOx3  Skin: warm and dry  Psych: mood/affect nl, behavior normal, judgement normal, thought content normal        ASSESSMENT:       ICD-10-CM ICD-9-CM    1. Breast cancer screening  Z12.39 V76.10 CANCELED: Mammo Digital Screening Bilat   2. Well woman exam with routine gynecological exam  Z01.419 V72.31 CANCELED: Liquid-Based Pap Smear, Screening      CANCELED: HPV High Risk Genotypes, PCR   3. Pelvic pain  R10.2 [id number] US Pelvis Comp with Transvag NON-OB (xpd      POCT URINE DIPSTICK WITHOUT MICROSCOPE      C. trachomatis/N. gonorrhoeae by AMP DNA      Vaginosis Screen by DNA Probe   4. ADIS (stress urinary incontinence, female)  N39.3 625.6    5. Hot flashes due to menopause  N95.1 627.2    6. Breast mass in female  N63.0 611.72           Plan:      Mirna was seen today for consult.    Diagnoses and all orders for this visit:    Breast cancer screening  -     Cancel: Mammo Digital Screening Bilat; Future    Well woman exam with routine gynecological exam  -     Cancel: Liquid-Based Pap Smear, Screening  -     Cancel: HPV High Risk Genotypes, PCR    Pelvic pain  -     US Pelvis Comp with Transvag NON-OB (xpd; Future  -     POCT URINE DIPSTICK WITHOUT MICROSCOPE  -     C. trachomatis/N. gonorrhoeae by AMP DNA  -     Vaginosis Screen by DNA Probe    ADIS (stress urinary incontinence, female)    Hot flashes due to menopause    Breast mass in female    Other orders  -     estradiol 0.05 mg/24 hr td ptsw  (VIVELLE-DOT) 0.05 mg/24 hr; Place 1 patch onto the skin once a week.        Orders Placed This Encounter   Procedures    C. trachomatis/N. gonorrhoeae by AMP DNA    Vaginosis Screen by DNA Probe    US Pelvis Comp with Transvag NON-OB (xpd    POCT URINE DIPSTICK WITHOUT MICROSCOPE     -Options for menopausal symptoms discussed including all R/B/A. Pt would like to try HRT patch. Advised to let us know if no improvement in the next few weeks and then can increase dose.  -Will consider referral to UroGyn for ADIS if symptoms worsen.  -TVUS and cultures today for pelvic pain. If negative, consider GI workup.  >30 min spent in room with patient and over 50% spent counseling.   Follow up in one year for annual, or prn.    Julie R Jeansonne         Relaxed

## 2021-05-20 ENCOUNTER — TELEPHONE (OUTPATIENT)
Dept: NEUROLOGY | Facility: CLINIC | Age: 46
End: 2021-05-20

## 2021-06-04 ENCOUNTER — PROCEDURE VISIT (OUTPATIENT)
Dept: NEUROLOGY | Facility: CLINIC | Age: 46
End: 2021-06-04
Payer: COMMERCIAL

## 2021-06-04 ENCOUNTER — LAB VISIT (OUTPATIENT)
Dept: LAB | Facility: HOSPITAL | Age: 46
End: 2021-06-04
Payer: COMMERCIAL

## 2021-06-04 VITALS
DIASTOLIC BLOOD PRESSURE: 78 MMHG | SYSTOLIC BLOOD PRESSURE: 148 MMHG | BODY MASS INDEX: 27.99 KG/M2 | WEIGHT: 168 LBS | HEIGHT: 65 IN

## 2021-06-04 DIAGNOSIS — G43.009 MIGRAINE WITHOUT AURA AND WITHOUT STATUS MIGRAINOSUS, NOT INTRACTABLE: ICD-10-CM

## 2021-06-04 DIAGNOSIS — I10 ESSENTIAL HYPERTENSION: ICD-10-CM

## 2021-06-04 LAB
ALBUMIN SERPL BCP-MCNC: 3.8 G/DL (ref 3.5–5.2)
ALP SERPL-CCNC: 90 U/L (ref 55–135)
ALT SERPL W/O P-5'-P-CCNC: 8 U/L (ref 10–44)
ANION GAP SERPL CALC-SCNC: 12 MMOL/L (ref 8–16)
AST SERPL-CCNC: 17 U/L (ref 10–40)
BASOPHILS # BLD AUTO: 0.21 K/UL (ref 0–0.2)
BASOPHILS NFR BLD: 1.9 % (ref 0–1.9)
BILIRUB SERPL-MCNC: 0.5 MG/DL (ref 0.1–1)
BUN SERPL-MCNC: 7 MG/DL (ref 6–20)
CALCIUM SERPL-MCNC: 9.9 MG/DL (ref 8.7–10.5)
CHLORIDE SERPL-SCNC: 103 MMOL/L (ref 95–110)
CO2 SERPL-SCNC: 27 MMOL/L (ref 23–29)
CREAT SERPL-MCNC: 0.8 MG/DL (ref 0.5–1.4)
DIFFERENTIAL METHOD: ABNORMAL
EOSINOPHIL # BLD AUTO: 0.5 K/UL (ref 0–0.5)
EOSINOPHIL NFR BLD: 4.4 % (ref 0–8)
ERYTHROCYTE [DISTWIDTH] IN BLOOD BY AUTOMATED COUNT: 13.3 % (ref 11.5–14.5)
EST. GFR  (AFRICAN AMERICAN): >60 ML/MIN/1.73 M^2
EST. GFR  (NON AFRICAN AMERICAN): >60 ML/MIN/1.73 M^2
ESTIMATED AVG GLUCOSE: 111 MG/DL (ref 68–131)
GLUCOSE SERPL-MCNC: 88 MG/DL (ref 70–110)
HBA1C MFR BLD: 5.5 % (ref 4–5.6)
HCT VFR BLD AUTO: 42.8 % (ref 37–48.5)
HGB BLD-MCNC: 14.5 G/DL (ref 12–16)
IMM GRANULOCYTES # BLD AUTO: 0.09 K/UL (ref 0–0.04)
IMM GRANULOCYTES NFR BLD AUTO: 0.8 % (ref 0–0.5)
LYMPHOCYTES # BLD AUTO: 2 K/UL (ref 1–4.8)
LYMPHOCYTES NFR BLD: 18.7 % (ref 18–48)
MCH RBC QN AUTO: 30.9 PG (ref 27–31)
MCHC RBC AUTO-ENTMCNC: 33.9 G/DL (ref 32–36)
MCV RBC AUTO: 91 FL (ref 82–98)
MONOCYTES # BLD AUTO: 0.8 K/UL (ref 0.3–1)
MONOCYTES NFR BLD: 7 % (ref 4–15)
NEUTROPHILS # BLD AUTO: 7.3 K/UL (ref 1.8–7.7)
NEUTROPHILS NFR BLD: 67.2 % (ref 38–73)
NRBC BLD-RTO: 0 /100 WBC
PLATELET # BLD AUTO: 490 K/UL (ref 150–450)
PMV BLD AUTO: 9.6 FL (ref 9.2–12.9)
POTASSIUM SERPL-SCNC: 3.2 MMOL/L (ref 3.5–5.1)
PROT SERPL-MCNC: 7.9 G/DL (ref 6–8.4)
RBC # BLD AUTO: 4.69 M/UL (ref 4–5.4)
SODIUM SERPL-SCNC: 142 MMOL/L (ref 136–145)
TSH SERPL DL<=0.005 MIU/L-ACNC: 2.3 UIU/ML (ref 0.4–4)
WBC # BLD AUTO: 10.86 K/UL (ref 3.9–12.7)

## 2021-06-04 PROCEDURE — 64615 PR CHEMODENERVATION OF MUSCLE FOR CHRONIC MIGRAINE: ICD-10-PCS | Mod: S$GLB,,, | Performed by: NURSE PRACTITIONER

## 2021-06-04 PROCEDURE — 64615 CHEMODENERV MUSC MIGRAINE: CPT | Mod: S$GLB,,, | Performed by: NURSE PRACTITIONER

## 2021-06-04 PROCEDURE — 85025 COMPLETE CBC W/AUTO DIFF WBC: CPT | Performed by: NURSE PRACTITIONER

## 2021-06-04 PROCEDURE — 99499 NO LOS: ICD-10-PCS | Mod: S$GLB,,, | Performed by: NURSE PRACTITIONER

## 2021-06-04 PROCEDURE — 83036 HEMOGLOBIN GLYCOSYLATED A1C: CPT | Performed by: NURSE PRACTITIONER

## 2021-06-04 PROCEDURE — 36415 COLL VENOUS BLD VENIPUNCTURE: CPT | Performed by: NURSE PRACTITIONER

## 2021-06-04 PROCEDURE — 84443 ASSAY THYROID STIM HORMONE: CPT | Performed by: NURSE PRACTITIONER

## 2021-06-04 PROCEDURE — 80053 COMPREHEN METABOLIC PANEL: CPT | Performed by: NURSE PRACTITIONER

## 2021-06-04 PROCEDURE — 99499 UNLISTED E&M SERVICE: CPT | Mod: S$GLB,,, | Performed by: NURSE PRACTITIONER

## 2021-06-04 RX ORDER — TIZANIDINE 4 MG/1
6 TABLET ORAL NIGHTLY
Qty: 45 TABLET | Refills: 5 | Status: SHIPPED | OUTPATIENT
Start: 2021-06-04 | End: 2021-11-06 | Stop reason: SDUPTHER

## 2021-06-07 RX ORDER — HYDROXYZINE PAMOATE 25 MG/1
CAPSULE ORAL
Qty: 12 CAPSULE | Refills: 2 | Status: SHIPPED | OUTPATIENT
Start: 2021-06-07 | End: 2021-08-04

## 2021-06-15 ENCOUNTER — OFFICE VISIT (OUTPATIENT)
Dept: PSYCHIATRY | Facility: CLINIC | Age: 46
End: 2021-06-15
Payer: COMMERCIAL

## 2021-06-15 VITALS — BODY MASS INDEX: 25.57 KG/M2 | WEIGHT: 153.69 LBS

## 2021-06-15 DIAGNOSIS — F33.41 RECURRENT MAJOR DEPRESSION IN PARTIAL REMISSION: ICD-10-CM

## 2021-06-15 DIAGNOSIS — F40.10 SOCIAL ANXIETY DISORDER: Chronic | ICD-10-CM

## 2021-06-15 DIAGNOSIS — M79.7 FIBROMYALGIA: Chronic | ICD-10-CM

## 2021-06-15 DIAGNOSIS — F33.42 RECURRENT MAJOR DEPRESSIVE DISORDER, IN FULL REMISSION: ICD-10-CM

## 2021-06-15 DIAGNOSIS — Z91.89 EXCESSIVE CONSUMPTION OF SODA POP: ICD-10-CM

## 2021-06-15 DIAGNOSIS — I10 ESSENTIAL HYPERTENSION: ICD-10-CM

## 2021-06-15 DIAGNOSIS — F41.1 GAD (GENERALIZED ANXIETY DISORDER): Primary | Chronic | ICD-10-CM

## 2021-06-15 PROCEDURE — 99215 PR OFFICE/OUTPT VISIT, EST, LEVL V, 40-54 MIN: ICD-10-PCS | Mod: S$GLB,,, | Performed by: STUDENT IN AN ORGANIZED HEALTH CARE EDUCATION/TRAINING PROGRAM

## 2021-06-15 PROCEDURE — 90833 PR PSYCHOTHERAPY W/PATIENT W/E&M, 30 MIN (ADD ON): ICD-10-PCS | Mod: S$GLB,,, | Performed by: STUDENT IN AN ORGANIZED HEALTH CARE EDUCATION/TRAINING PROGRAM

## 2021-06-15 PROCEDURE — 99215 OFFICE O/P EST HI 40 MIN: CPT | Mod: S$GLB,,, | Performed by: STUDENT IN AN ORGANIZED HEALTH CARE EDUCATION/TRAINING PROGRAM

## 2021-06-15 PROCEDURE — 99999 PR PBB SHADOW E&M-EST. PATIENT-LVL III: CPT | Mod: PBBFAC,,, | Performed by: STUDENT IN AN ORGANIZED HEALTH CARE EDUCATION/TRAINING PROGRAM

## 2021-06-15 PROCEDURE — 90833 PSYTX W PT W E/M 30 MIN: CPT | Mod: S$GLB,,, | Performed by: STUDENT IN AN ORGANIZED HEALTH CARE EDUCATION/TRAINING PROGRAM

## 2021-06-15 PROCEDURE — 99999 PR PBB SHADOW E&M-EST. PATIENT-LVL III: ICD-10-PCS | Mod: PBBFAC,,, | Performed by: STUDENT IN AN ORGANIZED HEALTH CARE EDUCATION/TRAINING PROGRAM

## 2021-06-15 RX ORDER — ZOLPIDEM TARTRATE 10 MG/1
10 TABLET ORAL NIGHTLY
Qty: 30 TABLET | Refills: 2 | Status: SHIPPED | OUTPATIENT
Start: 2021-06-15 | End: 2021-09-10

## 2021-06-15 RX ORDER — DULOXETIN HYDROCHLORIDE 60 MG/1
60 CAPSULE, DELAYED RELEASE ORAL DAILY
Qty: 90 CAPSULE | Refills: 1 | Status: SHIPPED | OUTPATIENT
Start: 2021-06-15 | End: 2021-08-25 | Stop reason: CLARIF

## 2021-06-15 RX ORDER — PROPRANOLOL HYDROCHLORIDE 20 MG/1
20 TABLET ORAL 3 TIMES DAILY
Qty: 90 TABLET | Refills: 2 | Status: SHIPPED | OUTPATIENT
Start: 2021-06-15 | End: 2021-12-02 | Stop reason: SINTOL

## 2021-06-15 RX ORDER — BUPROPION HYDROCHLORIDE 300 MG/1
300 TABLET ORAL DAILY
Qty: 90 TABLET | Refills: 1 | Status: SHIPPED | OUTPATIENT
Start: 2021-06-15 | End: 2021-09-10

## 2021-08-09 DIAGNOSIS — J02.9 SORE THROAT: Primary | ICD-10-CM

## 2021-08-09 DIAGNOSIS — R43.2 TASTE IMPAIRMENT: ICD-10-CM

## 2021-08-16 ENCOUNTER — PATIENT MESSAGE (OUTPATIENT)
Dept: NEUROLOGY | Facility: CLINIC | Age: 46
End: 2021-08-16

## 2021-08-17 ENCOUNTER — LAB VISIT (OUTPATIENT)
Dept: PRIMARY CARE CLINIC | Facility: OTHER | Age: 46
End: 2021-08-17
Payer: COMMERCIAL

## 2021-08-17 ENCOUNTER — PATIENT MESSAGE (OUTPATIENT)
Dept: NEUROLOGY | Facility: CLINIC | Age: 46
End: 2021-08-17

## 2021-08-17 DIAGNOSIS — R51.9 HEADACHE, UNSPECIFIED HEADACHE TYPE: ICD-10-CM

## 2021-08-17 DIAGNOSIS — Z20.822 ENCOUNTER FOR LABORATORY TESTING FOR COVID-19 VIRUS: ICD-10-CM

## 2021-08-17 DIAGNOSIS — R50.9 FEVER, UNSPECIFIED: Primary | ICD-10-CM

## 2021-08-17 DIAGNOSIS — J02.9 SORE THROAT: ICD-10-CM

## 2021-08-17 DIAGNOSIS — R68.83 CHILLS: ICD-10-CM

## 2021-08-17 PROCEDURE — U0003 INFECTIOUS AGENT DETECTION BY NUCLEIC ACID (DNA OR RNA); SEVERE ACUTE RESPIRATORY SYNDROME CORONAVIRUS 2 (SARS-COV-2) (CORONAVIRUS DISEASE [COVID-19]), AMPLIFIED PROBE TECHNIQUE, MAKING USE OF HIGH THROUGHPUT TECHNOLOGIES AS DESCRIBED BY CMS-2020-01-R: HCPCS | Performed by: INTERNAL MEDICINE

## 2021-08-18 ENCOUNTER — LAB VISIT (OUTPATIENT)
Dept: INTERNAL MEDICINE | Facility: CLINIC | Age: 46
End: 2021-08-18

## 2021-08-18 DIAGNOSIS — J02.9 SORE THROAT: ICD-10-CM

## 2021-08-18 DIAGNOSIS — R68.83 CHILLS: ICD-10-CM

## 2021-08-18 DIAGNOSIS — R51.9 HEADACHE, UNSPECIFIED HEADACHE TYPE: ICD-10-CM

## 2021-08-18 DIAGNOSIS — R50.9 FEVER, UNSPECIFIED: ICD-10-CM

## 2021-08-18 LAB — SARS-COV-2 RDRP RESP QL NAA+PROBE: NEGATIVE

## 2021-08-18 PROCEDURE — U0002 COVID-19 LAB TEST NON-CDC: HCPCS | Performed by: PREVENTIVE MEDICINE

## 2021-08-19 LAB
SARS-COV-2 RNA RESP QL NAA+PROBE: NOT DETECTED
SARS-COV-2- CYCLE NUMBER: -1

## 2021-08-25 ENCOUNTER — DOCUMENTATION ONLY (OUTPATIENT)
Dept: NEUROLOGY | Facility: CLINIC | Age: 46
End: 2021-08-25

## 2021-08-25 ENCOUNTER — PROCEDURE VISIT (OUTPATIENT)
Dept: NEUROLOGY | Facility: CLINIC | Age: 46
End: 2021-08-25
Payer: COMMERCIAL

## 2021-08-25 ENCOUNTER — HOSPITAL ENCOUNTER (EMERGENCY)
Facility: HOSPITAL | Age: 46
Discharge: HOME | End: 2021-08-25
Attending: EMERGENCY MEDICINE
Payer: COMMERCIAL

## 2021-08-25 VITALS
HEIGHT: 65 IN | WEIGHT: 147.69 LBS | HEART RATE: 101 BPM | DIASTOLIC BLOOD PRESSURE: 100 MMHG | BODY MASS INDEX: 24.61 KG/M2 | SYSTOLIC BLOOD PRESSURE: 190 MMHG

## 2021-08-25 VITALS
HEIGHT: 65 IN | DIASTOLIC BLOOD PRESSURE: 104 MMHG | TEMPERATURE: 99 F | WEIGHT: 155 LBS | BODY MASS INDEX: 25.83 KG/M2 | OXYGEN SATURATION: 98 % | SYSTOLIC BLOOD PRESSURE: 201 MMHG | RESPIRATION RATE: 18 BRPM | HEART RATE: 73 BPM

## 2021-08-25 DIAGNOSIS — G43.009 MIGRAINE WITHOUT AURA AND WITHOUT STATUS MIGRAINOSUS, NOT INTRACTABLE: ICD-10-CM

## 2021-08-25 DIAGNOSIS — R51.9 NONINTRACTABLE HEADACHE, UNSPECIFIED CHRONICITY PATTERN, UNSPECIFIED HEADACHE TYPE: ICD-10-CM

## 2021-08-25 DIAGNOSIS — I16.0 HYPERTENSIVE URGENCY: Primary | ICD-10-CM

## 2021-08-25 DIAGNOSIS — I10 HYPERTENSION, UNSPECIFIED TYPE: Primary | ICD-10-CM

## 2021-08-25 LAB
CTP QC/QA: YES
SARS-COV-2 RDRP RESP QL NAA+PROBE: NEGATIVE

## 2021-08-25 PROCEDURE — 99284 EMERGENCY DEPT VISIT MOD MDM: CPT | Mod: 25

## 2021-08-25 PROCEDURE — U0002 COVID-19 LAB TEST NON-CDC: HCPCS | Performed by: EMERGENCY MEDICINE

## 2021-08-25 PROCEDURE — 99284 PR EMERGENCY DEPT VISIT,LEVEL IV: ICD-10-PCS | Mod: CS,,, | Performed by: EMERGENCY MEDICINE

## 2021-08-25 PROCEDURE — 99284 EMERGENCY DEPT VISIT MOD MDM: CPT | Mod: CS,,, | Performed by: EMERGENCY MEDICINE

## 2021-08-25 PROCEDURE — 99499 NO LOS: ICD-10-PCS | Mod: S$GLB,,, | Performed by: NURSE PRACTITIONER

## 2021-08-25 PROCEDURE — 25000003 PHARM REV CODE 250: Performed by: EMERGENCY MEDICINE

## 2021-08-25 PROCEDURE — 96361 HYDRATE IV INFUSION ADD-ON: CPT

## 2021-08-25 PROCEDURE — 96375 TX/PRO/DX INJ NEW DRUG ADDON: CPT

## 2021-08-25 PROCEDURE — 96374 THER/PROPH/DIAG INJ IV PUSH: CPT

## 2021-08-25 PROCEDURE — 99499 UNLISTED E&M SERVICE: CPT | Mod: S$GLB,,, | Performed by: NURSE PRACTITIONER

## 2021-08-25 PROCEDURE — 63600175 PHARM REV CODE 636 W HCPCS: Performed by: EMERGENCY MEDICINE

## 2021-08-25 RX ORDER — METOCLOPRAMIDE HYDROCHLORIDE 5 MG/ML
10 INJECTION INTRAMUSCULAR; INTRAVENOUS
Status: COMPLETED | OUTPATIENT
Start: 2021-08-25 | End: 2021-08-25

## 2021-08-25 RX ORDER — KETOROLAC TROMETHAMINE 30 MG/ML
10 INJECTION, SOLUTION INTRAMUSCULAR; INTRAVENOUS
Status: COMPLETED | OUTPATIENT
Start: 2021-08-25 | End: 2021-08-25

## 2021-08-25 RX ADMIN — KETOROLAC TROMETHAMINE 10 MG: 30 INJECTION, SOLUTION INTRAMUSCULAR; INTRAVENOUS at 02:08

## 2021-08-25 RX ADMIN — SODIUM CHLORIDE 1000 ML: 0.9 INJECTION, SOLUTION INTRAVENOUS at 02:08

## 2021-08-25 RX ADMIN — METOCLOPRAMIDE 10 MG: 5 INJECTION, SOLUTION INTRAMUSCULAR; INTRAVENOUS at 02:08

## 2021-09-03 ENCOUNTER — PATIENT MESSAGE (OUTPATIENT)
Dept: NEUROLOGY | Facility: CLINIC | Age: 46
End: 2021-09-03

## 2021-09-08 ENCOUNTER — TELEPHONE (OUTPATIENT)
Dept: PHARMACY | Facility: CLINIC | Age: 46
End: 2021-09-08

## 2021-09-10 ENCOUNTER — OFFICE VISIT (OUTPATIENT)
Dept: PSYCHIATRY | Facility: CLINIC | Age: 46
End: 2021-09-10
Payer: COMMERCIAL

## 2021-09-10 DIAGNOSIS — F41.1 GAD (GENERALIZED ANXIETY DISORDER): Primary | ICD-10-CM

## 2021-09-10 DIAGNOSIS — F40.10 SOCIAL ANXIETY DISORDER: ICD-10-CM

## 2021-09-10 DIAGNOSIS — G43.009 MIGRAINE WITHOUT AURA AND WITHOUT STATUS MIGRAINOSUS, NOT INTRACTABLE: ICD-10-CM

## 2021-09-10 DIAGNOSIS — F33.41 RECURRENT MAJOR DEPRESSION IN PARTIAL REMISSION: ICD-10-CM

## 2021-09-10 DIAGNOSIS — F33.42 RECURRENT MAJOR DEPRESSIVE DISORDER, IN FULL REMISSION: ICD-10-CM

## 2021-09-10 DIAGNOSIS — Z91.89 EXCESSIVE CONSUMPTION OF SODA POP: ICD-10-CM

## 2021-09-10 PROCEDURE — 99214 OFFICE O/P EST MOD 30 MIN: CPT | Mod: 95,,, | Performed by: STUDENT IN AN ORGANIZED HEALTH CARE EDUCATION/TRAINING PROGRAM

## 2021-09-10 PROCEDURE — 99214 PR OFFICE/OUTPT VISIT, EST, LEVL IV, 30-39 MIN: ICD-10-PCS | Mod: 95,,, | Performed by: STUDENT IN AN ORGANIZED HEALTH CARE EDUCATION/TRAINING PROGRAM

## 2021-09-10 RX ORDER — ZOLPIDEM TARTRATE 10 MG/1
10 TABLET ORAL NIGHTLY
Qty: 30 TABLET | Refills: 1 | Status: SHIPPED | OUTPATIENT
Start: 2021-09-10 | End: 2021-11-06 | Stop reason: SDUPTHER

## 2021-09-10 RX ORDER — BUPROPION HYDROCHLORIDE 300 MG/1
300 TABLET ORAL DAILY
Qty: 90 TABLET | Refills: 1 | Status: SHIPPED | OUTPATIENT
Start: 2021-09-10 | End: 2022-01-14 | Stop reason: SDUPTHER

## 2021-09-10 RX ORDER — DULOXETIN HYDROCHLORIDE 60 MG/1
60 CAPSULE, DELAYED RELEASE ORAL DAILY
Qty: 30 CAPSULE | Refills: 1 | Status: SHIPPED | OUTPATIENT
Start: 2021-09-10 | End: 2022-01-14 | Stop reason: SDUPTHER

## 2021-09-10 RX ORDER — HYDROXYZINE PAMOATE 25 MG/1
25 CAPSULE ORAL EVERY 8 HOURS PRN
Qty: 90 CAPSULE | Refills: 1 | Status: SHIPPED | OUTPATIENT
Start: 2021-09-10 | End: 2021-12-09 | Stop reason: SDUPTHER

## 2021-09-14 ENCOUNTER — HOSPITAL ENCOUNTER (EMERGENCY)
Facility: HOSPITAL | Age: 46
Discharge: HOME | End: 2021-09-14
Attending: EMERGENCY MEDICINE
Payer: COMMERCIAL

## 2021-09-14 VITALS
OXYGEN SATURATION: 98 % | BODY MASS INDEX: 24.16 KG/M2 | DIASTOLIC BLOOD PRESSURE: 100 MMHG | WEIGHT: 145 LBS | RESPIRATION RATE: 18 BRPM | SYSTOLIC BLOOD PRESSURE: 180 MMHG | HEART RATE: 73 BPM | HEIGHT: 65 IN | TEMPERATURE: 98 F

## 2021-09-14 DIAGNOSIS — Z20.822 LAB TEST NEGATIVE FOR COVID-19 VIRUS: Primary | ICD-10-CM

## 2021-09-14 LAB
CTP QC/QA: YES
SARS-COV-2 RDRP RESP QL NAA+PROBE: NEGATIVE

## 2021-09-14 PROCEDURE — U0002 COVID-19 LAB TEST NON-CDC: HCPCS | Performed by: EMERGENCY MEDICINE

## 2021-09-14 PROCEDURE — 99282 EMERGENCY DEPT VISIT SF MDM: CPT

## 2021-09-14 PROCEDURE — 99282 EMERGENCY DEPT VISIT SF MDM: CPT | Mod: CS,,, | Performed by: EMERGENCY MEDICINE

## 2021-09-14 PROCEDURE — 99282 PR EMERGENCY DEPT VISIT,LEVEL II: ICD-10-PCS | Mod: CS,,, | Performed by: EMERGENCY MEDICINE

## 2021-09-20 DIAGNOSIS — G43.009 MIGRAINE WITHOUT AURA AND WITHOUT STATUS MIGRAINOSUS, NOT INTRACTABLE: ICD-10-CM

## 2021-09-20 RX ORDER — PROMETHAZINE HYDROCHLORIDE 25 MG/1
TABLET ORAL
Qty: 20 TABLET | Refills: 2 | Status: SHIPPED | OUTPATIENT
Start: 2021-09-20 | End: 2021-12-09 | Stop reason: SDUPTHER

## 2021-09-20 RX ORDER — TIZANIDINE 4 MG/1
6 TABLET ORAL NIGHTLY
Qty: 45 TABLET | Refills: 5 | Status: CANCELLED | OUTPATIENT
Start: 2021-09-20

## 2021-10-21 ENCOUNTER — HOSPITAL ENCOUNTER (EMERGENCY)
Facility: HOSPITAL | Age: 46
Discharge: HOME | End: 2021-10-21
Attending: EMERGENCY MEDICINE
Payer: COMMERCIAL

## 2021-10-21 VITALS
WEIGHT: 145 LBS | SYSTOLIC BLOOD PRESSURE: 180 MMHG | OXYGEN SATURATION: 99 % | TEMPERATURE: 99 F | HEART RATE: 95 BPM | HEIGHT: 65 IN | DIASTOLIC BLOOD PRESSURE: 100 MMHG | RESPIRATION RATE: 18 BRPM | BODY MASS INDEX: 24.16 KG/M2

## 2021-10-21 DIAGNOSIS — S60.042A CONTUSION OF LEFT RING FINGER WITHOUT DAMAGE TO NAIL, INITIAL ENCOUNTER: Primary | ICD-10-CM

## 2021-10-21 PROCEDURE — 99284 EMERGENCY DEPT VISIT MOD MDM: CPT | Mod: 25

## 2021-10-21 PROCEDURE — 63600175 PHARM REV CODE 636 W HCPCS: Performed by: EMERGENCY MEDICINE

## 2021-10-21 PROCEDURE — 90715 TDAP VACCINE 7 YRS/> IM: CPT | Performed by: EMERGENCY MEDICINE

## 2021-10-21 PROCEDURE — 99284 EMERGENCY DEPT VISIT MOD MDM: CPT | Mod: ,,, | Performed by: EMERGENCY MEDICINE

## 2021-10-21 PROCEDURE — 90471 IMMUNIZATION ADMIN: CPT | Performed by: EMERGENCY MEDICINE

## 2021-10-21 PROCEDURE — 25000003 PHARM REV CODE 250: Performed by: EMERGENCY MEDICINE

## 2021-10-21 PROCEDURE — 99284 PR EMERGENCY DEPT VISIT,LEVEL IV: ICD-10-PCS | Mod: ,,, | Performed by: EMERGENCY MEDICINE

## 2021-10-21 RX ORDER — LIDOCAINE HYDROCHLORIDE AND EPINEPHRINE 10; 10 MG/ML; UG/ML
1 INJECTION, SOLUTION INFILTRATION; PERINEURAL ONCE
Status: COMPLETED | OUTPATIENT
Start: 2021-10-21 | End: 2021-10-21

## 2021-10-21 RX ORDER — IBUPROFEN 600 MG/1
600 TABLET ORAL
Status: COMPLETED | OUTPATIENT
Start: 2021-10-21 | End: 2021-10-21

## 2021-10-21 RX ORDER — ACETAMINOPHEN 500 MG
1000 TABLET ORAL
Status: COMPLETED | OUTPATIENT
Start: 2021-10-21 | End: 2021-10-21

## 2021-10-21 RX ADMIN — LIDOCAINE HYDROCHLORIDE,EPINEPHRINE BITARTRATE 1 ML: 10; .01 INJECTION, SOLUTION INFILTRATION; PERINEURAL at 05:10

## 2021-10-21 RX ADMIN — CLOSTRIDIUM TETANI TOXOID ANTIGEN (FORMALDEHYDE INACTIVATED), CORYNEBACTERIUM DIPHTHERIAE TOXOID ANTIGEN (FORMALDEHYDE INACTIVATED), BORDETELLA PERTUSSIS TOXOID ANTIGEN (GLUTARALDEHYDE INACTIVATED), BORDETELLA PERTUSSIS FILAMENTOUS HEMAGGLUTININ ANTIGEN (FORMALDEHYDE INACTIVATED), BORDETELLA PERTUSSIS PERTACTIN ANTIGEN, AND BORDETELLA PERTUSSIS FIMBRIAE 2/3 ANTIGEN 0.5 ML: 5; 2; 2.5; 5; 3; 5 INJECTION, SUSPENSION INTRAMUSCULAR at 06:10

## 2021-10-21 RX ADMIN — ACETAMINOPHEN 1000 MG: 500 TABLET ORAL at 05:10

## 2021-10-21 RX ADMIN — IBUPROFEN 600 MG: 600 TABLET, FILM COATED ORAL at 05:10

## 2021-11-06 DIAGNOSIS — G43.019 MIGRAINE WITHOUT AURA, INTRACTABLE: ICD-10-CM

## 2021-11-06 DIAGNOSIS — G43.009 MIGRAINE WITHOUT AURA AND WITHOUT STATUS MIGRAINOSUS, NOT INTRACTABLE: ICD-10-CM

## 2021-11-06 DIAGNOSIS — F33.41 RECURRENT MAJOR DEPRESSION IN PARTIAL REMISSION: ICD-10-CM

## 2021-11-08 RX ORDER — ZOLPIDEM TARTRATE 10 MG/1
10 TABLET ORAL NIGHTLY
Qty: 30 TABLET | Refills: 1 | Status: SHIPPED | OUTPATIENT
Start: 2021-11-08 | End: 2022-01-14 | Stop reason: SDUPTHER

## 2021-11-10 RX ORDER — TIZANIDINE 4 MG/1
6 TABLET ORAL NIGHTLY
Qty: 45 TABLET | Refills: 5 | Status: SHIPPED | OUTPATIENT
Start: 2021-11-10 | End: 2022-01-03

## 2021-11-16 ENCOUNTER — PATIENT MESSAGE (OUTPATIENT)
Dept: NEUROLOGY | Facility: CLINIC | Age: 46
End: 2021-11-16
Payer: COMMERCIAL

## 2021-11-16 DIAGNOSIS — G43.009 MIGRAINE WITHOUT AURA AND WITHOUT STATUS MIGRAINOSUS, NOT INTRACTABLE: Primary | ICD-10-CM

## 2021-11-23 DIAGNOSIS — G43.009 MIGRAINE WITHOUT AURA AND WITHOUT STATUS MIGRAINOSUS, NOT INTRACTABLE: ICD-10-CM

## 2021-12-02 ENCOUNTER — OFFICE VISIT (OUTPATIENT)
Dept: PHYSICAL MEDICINE AND REHAB | Facility: CLINIC | Age: 46
End: 2021-12-02
Payer: COMMERCIAL

## 2021-12-02 ENCOUNTER — OFFICE VISIT (OUTPATIENT)
Dept: INTERNAL MEDICINE | Facility: CLINIC | Age: 46
End: 2021-12-02
Payer: COMMERCIAL

## 2021-12-02 ENCOUNTER — PATIENT MESSAGE (OUTPATIENT)
Dept: PHYSICAL MEDICINE AND REHAB | Facility: CLINIC | Age: 46
End: 2021-12-02

## 2021-12-02 VITALS
OXYGEN SATURATION: 96 % | WEIGHT: 149.5 LBS | BODY MASS INDEX: 24.91 KG/M2 | HEIGHT: 65 IN | DIASTOLIC BLOOD PRESSURE: 100 MMHG | HEART RATE: 94 BPM | SYSTOLIC BLOOD PRESSURE: 188 MMHG

## 2021-12-02 VITALS
DIASTOLIC BLOOD PRESSURE: 147 MMHG | HEIGHT: 65 IN | HEART RATE: 89 BPM | WEIGHT: 145 LBS | SYSTOLIC BLOOD PRESSURE: 221 MMHG | BODY MASS INDEX: 24.16 KG/M2

## 2021-12-02 DIAGNOSIS — F41.1 GAD (GENERALIZED ANXIETY DISORDER): Chronic | ICD-10-CM

## 2021-12-02 DIAGNOSIS — I10 ESSENTIAL HYPERTENSION: ICD-10-CM

## 2021-12-02 DIAGNOSIS — G43.009 MIGRAINE WITHOUT AURA AND WITHOUT STATUS MIGRAINOSUS, NOT INTRACTABLE: ICD-10-CM

## 2021-12-02 DIAGNOSIS — G43.719 INTRACTABLE CHRONIC MIGRAINE WITHOUT AURA AND WITHOUT STATUS MIGRAINOSUS: Primary | ICD-10-CM

## 2021-12-02 DIAGNOSIS — I10 ESSENTIAL HYPERTENSION: Primary | ICD-10-CM

## 2021-12-02 PROCEDURE — 99214 PR OFFICE/OUTPT VISIT, EST, LEVL IV, 30-39 MIN: ICD-10-PCS | Mod: S$GLB,,, | Performed by: FAMILY MEDICINE

## 2021-12-02 PROCEDURE — 99999 PR PBB SHADOW E&M-EST. PATIENT-LVL III: CPT | Mod: PBBFAC,,, | Performed by: NURSE PRACTITIONER

## 2021-12-02 PROCEDURE — 99999 PR PBB SHADOW E&M-EST. PATIENT-LVL III: ICD-10-PCS | Mod: PBBFAC,,, | Performed by: NURSE PRACTITIONER

## 2021-12-02 PROCEDURE — 99215 OFFICE O/P EST HI 40 MIN: CPT | Mod: S$GLB,,, | Performed by: NURSE PRACTITIONER

## 2021-12-02 PROCEDURE — 99999 PR PBB SHADOW E&M-EST. PATIENT-LVL III: ICD-10-PCS | Mod: PBBFAC,,, | Performed by: FAMILY MEDICINE

## 2021-12-02 PROCEDURE — 99214 OFFICE O/P EST MOD 30 MIN: CPT | Mod: S$GLB,,, | Performed by: FAMILY MEDICINE

## 2021-12-02 PROCEDURE — 99999 PR PBB SHADOW E&M-EST. PATIENT-LVL III: CPT | Mod: PBBFAC,,, | Performed by: FAMILY MEDICINE

## 2021-12-02 PROCEDURE — 99215 PR OFFICE/OUTPT VISIT, EST, LEVL V, 40-54 MIN: ICD-10-PCS | Mod: S$GLB,,, | Performed by: NURSE PRACTITIONER

## 2021-12-02 RX ORDER — AMITRIPTYLINE HYDROCHLORIDE 25 MG/1
25 TABLET, FILM COATED ORAL NIGHTLY
Qty: 30 TABLET | Refills: 8 | OUTPATIENT
Start: 2021-12-02

## 2021-12-02 RX ORDER — LABETALOL 100 MG/1
100 TABLET, FILM COATED ORAL 2 TIMES DAILY
Qty: 60 TABLET | Refills: 0 | Status: SHIPPED | OUTPATIENT
Start: 2021-12-02 | End: 2022-01-03

## 2021-12-08 ENCOUNTER — TELEPHONE (OUTPATIENT)
Dept: NEUROLOGY | Facility: CLINIC | Age: 46
End: 2021-12-08
Payer: COMMERCIAL

## 2021-12-09 ENCOUNTER — OFFICE VISIT (OUTPATIENT)
Dept: PHYSICAL MEDICINE AND REHAB | Facility: CLINIC | Age: 46
End: 2021-12-09
Payer: COMMERCIAL

## 2021-12-09 VITALS
DIASTOLIC BLOOD PRESSURE: 99 MMHG | BODY MASS INDEX: 24.99 KG/M2 | HEART RATE: 69 BPM | WEIGHT: 150 LBS | HEIGHT: 65 IN | SYSTOLIC BLOOD PRESSURE: 163 MMHG

## 2021-12-09 DIAGNOSIS — G43.009 MIGRAINE WITHOUT AURA AND WITHOUT STATUS MIGRAINOSUS, NOT INTRACTABLE: ICD-10-CM

## 2021-12-09 DIAGNOSIS — I10 ESSENTIAL HYPERTENSION: ICD-10-CM

## 2021-12-09 DIAGNOSIS — G43.719 INTRACTABLE CHRONIC MIGRAINE WITHOUT AURA AND WITHOUT STATUS MIGRAINOSUS: Primary | ICD-10-CM

## 2021-12-09 PROCEDURE — 99999 PR PBB SHADOW E&M-EST. PATIENT-LVL III: CPT | Mod: PBBFAC,,, | Performed by: NURSE PRACTITIONER

## 2021-12-09 PROCEDURE — 64615 CHEMODENERV MUSC MIGRAINE: CPT | Mod: S$GLB,,, | Performed by: NURSE PRACTITIONER

## 2021-12-09 PROCEDURE — 99499 NO LOS: ICD-10-PCS | Mod: S$GLB,,, | Performed by: NURSE PRACTITIONER

## 2021-12-09 PROCEDURE — 64615 PR CHEMODENERVATION OF MUSCLE FOR CHRONIC MIGRAINE: ICD-10-PCS | Mod: S$GLB,,, | Performed by: NURSE PRACTITIONER

## 2021-12-09 PROCEDURE — 99499 UNLISTED E&M SERVICE: CPT | Mod: S$GLB,,, | Performed by: NURSE PRACTITIONER

## 2021-12-09 PROCEDURE — 99999 PR PBB SHADOW E&M-EST. PATIENT-LVL III: ICD-10-PCS | Mod: PBBFAC,,, | Performed by: NURSE PRACTITIONER

## 2021-12-09 RX ORDER — HYDROXYZINE PAMOATE 25 MG/1
25 CAPSULE ORAL EVERY 8 HOURS PRN
Qty: 45 CAPSULE | Refills: 1 | Status: SHIPPED | OUTPATIENT
Start: 2021-12-09 | End: 2022-01-14 | Stop reason: SDUPTHER

## 2021-12-09 RX ORDER — PROMETHAZINE HYDROCHLORIDE 25 MG/1
TABLET ORAL
Qty: 20 TABLET | Refills: 2 | Status: SHIPPED | OUTPATIENT
Start: 2021-12-09 | End: 2022-03-07

## 2021-12-29 ENCOUNTER — LAB VISIT (OUTPATIENT)
Dept: PRIMARY CARE CLINIC | Facility: OTHER | Age: 46
End: 2021-12-29

## 2021-12-29 ENCOUNTER — PATIENT MESSAGE (OUTPATIENT)
Dept: ADMINISTRATIVE | Facility: OTHER | Age: 46
End: 2021-12-29
Payer: COMMERCIAL

## 2021-12-29 DIAGNOSIS — R53.83 FATIGUE, UNSPECIFIED TYPE: Primary | ICD-10-CM

## 2021-12-29 LAB
CTP QC/QA: YES
SARS-COV-2 AG RESP QL IA.RAPID: NEGATIVE

## 2022-01-03 DIAGNOSIS — I10 ESSENTIAL HYPERTENSION: ICD-10-CM

## 2022-01-03 RX ORDER — LABETALOL 100 MG/1
TABLET, FILM COATED ORAL
Qty: 60 TABLET | Refills: 0 | Status: SHIPPED | OUTPATIENT
Start: 2022-01-03 | End: 2022-02-14

## 2022-01-14 ENCOUNTER — OFFICE VISIT (OUTPATIENT)
Dept: PSYCHIATRY | Facility: CLINIC | Age: 47
End: 2022-01-14
Payer: COMMERCIAL

## 2022-01-14 DIAGNOSIS — G43.009 MIGRAINE WITHOUT AURA AND WITHOUT STATUS MIGRAINOSUS, NOT INTRACTABLE: ICD-10-CM

## 2022-01-14 DIAGNOSIS — F41.1 GAD (GENERALIZED ANXIETY DISORDER): Primary | ICD-10-CM

## 2022-01-14 DIAGNOSIS — Z91.89 EXCESSIVE CONSUMPTION OF SODA POP: ICD-10-CM

## 2022-01-14 DIAGNOSIS — F40.10 SOCIAL ANXIETY DISORDER: ICD-10-CM

## 2022-01-14 DIAGNOSIS — F33.41 RECURRENT MAJOR DEPRESSION IN PARTIAL REMISSION: ICD-10-CM

## 2022-01-14 DIAGNOSIS — G47.00 INSOMNIA, UNSPECIFIED TYPE: ICD-10-CM

## 2022-01-14 PROCEDURE — 99213 PR OFFICE/OUTPT VISIT, EST, LEVL III, 20-29 MIN: ICD-10-PCS | Mod: 95,,, | Performed by: STUDENT IN AN ORGANIZED HEALTH CARE EDUCATION/TRAINING PROGRAM

## 2022-01-14 PROCEDURE — 99213 OFFICE O/P EST LOW 20 MIN: CPT | Mod: 95,,, | Performed by: STUDENT IN AN ORGANIZED HEALTH CARE EDUCATION/TRAINING PROGRAM

## 2022-01-14 RX ORDER — ZOLPIDEM TARTRATE 10 MG/1
10 TABLET ORAL NIGHTLY
Qty: 30 TABLET | Refills: 2 | Status: SHIPPED | OUTPATIENT
Start: 2022-01-14 | End: 2022-04-08 | Stop reason: SDUPTHER

## 2022-01-14 RX ORDER — HYDROXYZINE PAMOATE 25 MG/1
25 CAPSULE ORAL EVERY 8 HOURS PRN
Qty: 45 CAPSULE | Refills: 3 | Status: SHIPPED | OUTPATIENT
Start: 2022-01-14 | End: 2022-03-09 | Stop reason: SDUPTHER

## 2022-01-14 RX ORDER — DULOXETIN HYDROCHLORIDE 60 MG/1
60 CAPSULE, DELAYED RELEASE ORAL DAILY
Qty: 30 CAPSULE | Refills: 3 | Status: SHIPPED | OUTPATIENT
Start: 2022-01-14 | End: 2022-04-08 | Stop reason: SDUPTHER

## 2022-01-14 RX ORDER — BUPROPION HYDROCHLORIDE 300 MG/1
300 TABLET ORAL DAILY
Qty: 90 TABLET | Refills: 1 | Status: SHIPPED | OUTPATIENT
Start: 2022-01-14 | End: 2022-04-08 | Stop reason: SDUPTHER

## 2022-01-14 NOTE — PROGRESS NOTES
Outpatient Psychiatry Follow-Up Visit (MD/NP)    1/14/2022    Clinical Status of Patient:  Outpatient (Ambulatory)    Chief Complaint:  Mirna Pollock is a 46 y.o. female who presents today for follow-up of depression, anxiety and insomnia. Significant past medical history of migraines (requiring every 3 months botox; and monthly monoclonal antibodies); fibromyalgia; possible IBS . Patient has been involved with mental health providers for 10 years.Patient last seen for follow-up on 9/10/21. Met with patient.      Interval History and Content of Current Session:  Interim Events/Subjective Report/Content of Current Session:   Patient said she's doing ok. Patient said her holiday was good. Sleep is erratic. Does start work at 3:15 pm and ends at 1:30 am. Patient said this has been shift has been going on since September. Likes the later shift and less people in the hospital. Patient said she got her botox shot for her migraines. Patient said migraines have been better and blood pressure has been less elevated. Likes the Vistaril for her anxiety.      Psychiatric Review of Systems-is patient experiencing or having changes in  sleep: no  appetite: no  weight: no  energy/anergy: no  anhedonia: no  libido: no  anxiety: no  guilty/hopelessness: no  concentration: no  Irritability: no  Paranoia/delusions: no  S.I.B.s/risky behavior: no    Review of Systems   · PSYCHIATRIC: Pertinant items are noted in the narrative.  · CONSTITUTIONAL: No weight gain or loss.  · MUSCULOSKELETAL: No pain or stiffness of the joints.  · NEUROLOGIC: No weakness, sensory changes, seizures, confusion, memory loss, tremor or other abnormal movements.  · RESPIRATORY: No shortness of breath.  · CARDIOVASCULAR: No tachycardia or chest pain.  · GASTROINTESTINAL: No nausea, vomiting, pain, constipation or diarrhea.    Past Medical, Family and Social History: The patient's past medical, family and social history have been reviewed and updated as appropriate  within the electronic medical record - see encounter notes.    Compliance: yes    Side effects: None    Risk Parameters:  Patient reports no suicidal ideation  Patient reports no homicidal ideation  Patient reports no self-injurious behavior  Patient reports no violent behavior    Exam (detailed: at least 9 elements; comprehensive: all 15 elements)   Constitutional  Vitals:  Most recent vital signs, dated greater than 90 days prior to this appointment, were not reviewed.   There were no vitals filed for this visit.     General:  unremarkable, age appropriate     Musculoskeletal  Muscle Strength/Tone:  not examined   Gait & Station:  non-ataxic     Psychiatric  Speech:  no latency; no press   Mood & Affect:  steady  congruent and appropriate   Thought Process:  normal and logical   Associations:  intact   Thought Content:  no suicidality, no homicidality, delusions, or paranoia   Insight:  intact   Judgement: behavior is adequate to circumstances   Orientation:  grossly intact   Memory: intact for content of interview   Language: grossly intact   Attention Span & Concentration:  able to focus   Fund of Knowledge:  intact and appropriate to age and level of education     Assessment and Diagnosis   Status/Progress: Based on the examination today, the patient's problem(s) is/are well controlled.  New problems have not been presented today.   Co-morbidities are not complicating management of the primary condition.  There are no active rule-out diagnoses for this patient at this time.     General Impression:    ICD-10-CM ICD-9-CM   1. LALITA (generalized anxiety disorder)  F41.1 300.02   2. Recurrent major depression in partial remission  F33.41 296.35   3. Insomnia, unspecified type  G47.00 780.52   4. Social anxiety disorder  F40.10 300.23       Intervention/Counseling/Treatment Plan   · Medication Management: Continue current medications. The risks and benefits of medication were discussed with the patient.  Cymbalta 60  mg daily   Wellbutrin XL 300 mg daily  ambien 10 mg nightly   -Continue Vistaril 25 mg TID PRN     Per neurology for migraines:  Zanaflex 4 mg nightly   Elavil 25 mg nightly     Hold propranolol 20 mg daily which will be helpful for anxiety, HTN, and migraines    Discussed with patient informed consent including diagnosis, risks and benefits of proposed treatment above vs. alternative treatments vs. no treatment, as well as serious and common side effects of these treatments, and the inherent unpredictability of individual responses to these treatments. The patient expresses understanding of the above and displays the capacity to agree with this current plan. Patient also agrees that, currently, the benefits outweigh the risks and would like to pursue treatment at this time, and had no other questions.    Instructions:  · Take all medications as prescribed.    · Abstain from recreational drugs and alcohol.  · Present to ED or call 911 for SI/HI plan or intent, psychosis, or medical emergency.    Return to Clinic: 3 months    Case to be discussed with supervising staff, Dr. Abner Jin MD.    Jacquie Flores MD  [facility], PGY-3

## 2022-01-18 NOTE — PROGRESS NOTES
Supervising Psychiatry Staff:  I discussed Ms. Pollock's progress with Dr. Jacquie KEBEDE) in regular caseload supervision. I agree with the above interval history, ROS, findings, and plan, which reflect my own.

## 2022-02-02 ENCOUNTER — LAB VISIT (OUTPATIENT)
Dept: PRIMARY CARE CLINIC | Facility: OTHER | Age: 47
End: 2022-02-02

## 2022-02-02 DIAGNOSIS — R52 ACHES: ICD-10-CM

## 2022-02-02 DIAGNOSIS — R52 ACHES: Primary | ICD-10-CM

## 2022-02-02 LAB
CTP QC/QA: YES
SARS-COV-2 AG RESP QL IA.RAPID: NEGATIVE

## 2022-02-02 PROCEDURE — 87811 SARS-COV-2 COVID19 W/OPTIC: CPT
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
## 2022-02-04 DIAGNOSIS — I10 ESSENTIAL HYPERTENSION: ICD-10-CM

## 2022-02-11 ENCOUNTER — PATIENT MESSAGE (OUTPATIENT)
Dept: NEUROLOGY | Facility: CLINIC | Age: 47
End: 2022-02-11
Payer: COMMERCIAL

## 2022-02-14 RX ORDER — LABETALOL 100 MG/1
TABLET, FILM COATED ORAL
Qty: 60 TABLET | Refills: 0 | Status: SHIPPED | OUTPATIENT
Start: 2022-02-14 | End: 2023-07-05

## 2022-02-14 NOTE — TELEPHONE ENCOUNTER
I sent a refill to pharmacy , but it looks like I haven't seen patient at [facility] or here in quite a while.  Please encourage her to schedule follow up.  GREYSON

## 2022-03-02 ENCOUNTER — PATIENT MESSAGE (OUTPATIENT)
Dept: NEUROLOGY | Facility: CLINIC | Age: 47
End: 2022-03-02
Payer: COMMERCIAL

## 2022-03-09 ENCOUNTER — PATIENT MESSAGE (OUTPATIENT)
Dept: NEUROLOGY | Facility: CLINIC | Age: 47
End: 2022-03-09

## 2022-03-09 DIAGNOSIS — G43.009 MIGRAINE WITHOUT AURA AND WITHOUT STATUS MIGRAINOSUS, NOT INTRACTABLE: ICD-10-CM

## 2022-03-09 DIAGNOSIS — G43.719 INTRACTABLE CHRONIC MIGRAINE WITHOUT AURA AND WITHOUT STATUS MIGRAINOSUS: ICD-10-CM

## 2022-03-10 RX ORDER — PROMETHAZINE HYDROCHLORIDE 25 MG/1
TABLET ORAL
Qty: 20 TABLET | Refills: 2 | Status: SHIPPED | OUTPATIENT
Start: 2022-03-10 | End: 2022-07-01 | Stop reason: SDUPTHER

## 2022-03-23 NOTE — PROCEDURES
"SUBJECTIVE:  Patient ID: Mirna Pollock  Chief Complaint: Botulinum Toxin Injection    History of Present Illness:  Mirna Pollock is a 43 y.o. female who presents to clinic alone for follow-up of headaches and Botox injections.     Recommendations made at last Office Visit on 2/14/2019:  - Botox administered in clinic for Chronic Migraine (see below)   - Continue Aimovig, tizanidine, cymbalta, and amitriptyline daily for migraine prevention   - For migraine abortive - has zomig NS available   - For rescue - has Sprix NS available   - No refills needed at this time   - HTN - blood pressure well controlled on clonidine, management per cardiology   - LALITA - well controlled on cymbalta and elavil, management per psychiatry who she sees regularly  - Fibromyalgia - well controlled on current regimen, management per PCP   - RTC in 12 weeks for repeat Botox injections or sooner if needed     05/22/2019- Interval History:  Botox continues to be effective for migraine prevention, for the first 8-10 weeks she experiences less than 3 migraines per month.  She has continued using Aimovig 140 mg SQ monthly injections.  She is very happy with the response of her migraines to Botox injections and wishes to continue with current treatment plan.       02/14/2019- Interval History:  Headaches and migraines continue to be "much better", "I am not getting them nearly as much", though she is keenly aware of when a cold front is coming through as she will always get a migraine.  She has continued using Aimovig monthly as well as taking tizanidine, cymbalta, and amitriptyline daily for migraine prevention.  She is very happy with the current state of her migraines and does not wish to make any adjustments to her treatment plan at this time.  Migraines continue to feel the same as they always have.     11/01/2018- Interval History:  Headaches have been significantly better, she has only had 3-4 migraines since her last round of " Botox and is very happy Botox is helping decrease her migraine frequency.  She has been using Aimovig monthly, which she also feels has been helping decrease her migraine frequency.  Migraines continue to feel the same as they always have, she denies any change in the quality or nature of her migraines.  Wishes to continue with Botox injections due to the pronounced effect it has had on her migraines.      08/08/2018- Interval History:  Finds her migraines are no better since last round of Botox, admits she was under increased stress at one point due to issues with her 's job.  She does know a lot of her migraines are triggered from stress.  She has been getting a lot of migraines above her eyes as well as throughout her occipitalis and neck.  Overall, she does wish to continue with Botox injections, but is open to discussing additional prevention options.      05/22/2018- Interval History:  Despite nerve blocks two weeks ago, she has been suffering with a headache nearly everyday since her last visit.  She did try Zomig nasal spray, however she took the first dose on day two of her migraine because she was nervous.  She does think it lessened the pain of her migraine, however did not abort it.  She did not repeat the dose.  Additionally, she continues to report poor sleep nightly.  She stopped drinking caffeine.  Headaches are the same as they have always been.  She denies any change in the nature or quality of her headaches. Risks, Benefits, and potential side effects of Botox discussed with patient.  Alternative treatments offered.  After thorough discussed regarding the procedure, patient has decided to move forward with initiating Botox injections for Chronic Migraine.  Patient understands we will complete 3 rounds of injections, if after 3 rounds, we do not see a 50% reduction in headaches, we will discontinue Botox injections.      05/11/2018 - Interval History:  This past week she has only been able  to work two days, has missed 3 days of work this week due to migraines.  She still is unsure about getting Botox injections for chronic migraine, she would like to try it but her  does not feel this is a good treatment option, she is trying to convince him.  Of note, blood pressure significantly lower in today's visit than in past visits.  She reports her psychiatrist has discontinued vyvanse until she is given clearance from Cardio to restart taking Vyvanse.  She was seen by a pediatric cardiologist who referred her to an adult cardiologist, however she has not seen this provider, had to cancel appointment due to having a headache.  She is requesting repeat nerve blocks today.      01/29/2018 - Interval History:  She has been having a terrible migraine for the last 3 days, she has tried numerous medications without any relief.  States she tried to go to work today, but she had to leave because her headache got so bad.  She is requesting to repeat the nerve blocks as those shots gave her relief form 6-8 weeks in the past.  She has done research on the Botox injections and states her  is very hesitant for her to try Botox injections for her migraines.  She has not been seen by her PCP nor cardiologist for further management of her blood pressure.  Discussed the challenges of treating her migraines with uncontrolled hypertension because I cannot prescribe her any triptan therapy until her blood pressure is under better control.  She has continued to take Cymbalta daily for her depression, she is unsure if this is giving her any relief with regards to her headaches or not.       01/15/2018 - Interval History:  She was approved for Botox injections, but was a no-show to her appointment last week.  States she spoke with her  about the injections and she was very nervous to try the Botox injections for her migraines.    She continues to experience headaches on 1-4 days per week with migraines  occurring 1-2 days per week.  Recently she has been experiencing more migraines and headaches than usually, but admits she has been going through a lot emotionally as well as she got sick with the flu.  She has continued to regularly follow-up with her psychiatrist who changed her anti-depressant, she is now taking Cymbalta.  Since change in medication, she feels she has been experiencing a mild headache each day.  She has had to miss a few days of work due to her migraines since last visit.  Additionally, she feels occasionally she begins to get dizzy, this occurs with or without headaches, she has an appointment with her eye doctor next week as her vision is also getting progressively worse.  She did not find vistaril to be useful with regards to her headaches.  She does feel Zanaflex helps with her neck as well as helping to relax her neck muscles.  When she gets a migraine she has been taking Vistaril and Ibuprofen together which does help the pain, she also has been trying Excedrin migraines which also gives her some relief.  Patient with known HTN on multiple anti-hypertensive agents, blood pressure significantly elevated in clinic today.  She is not symptomatic.  States she needs to follow-up with her primary care (who is outside the [facility] system) for better management.       10/19/2017 - Interval History:  - Headaches have persisted in similar frequency, which she is happy about as she has been under increased stress with regards to her 's health, he has been suffering with his Gout, just got out of the hospital last week and has had to take time off work etc   - Blood pressure continues to be elevated despite numerous anti-hypertensive, states today is good for her (currently 141/102), has been regularly following up with her PCP for management   - Is very excited because [place]   - Has continued to have a headache  nearly everyday, not necessarily a migraine, however migraine have been at least once per month    - Has noticed more recently that she feels like she has intense pressure behind her eyes and across the bridge of her nose that slightly feels like sinus pressure, this occurs 1-2 times per week   - Has been seeing a chiropractor as well as a masseuse regularly which she does feel has been helping   - Would still like to move forward with the Botox injections   - Does not feel adjustments to her medications are necessary at this time      08/17/2017 - Interval History:  - Had been seeing Iker Beth PA-C   - Was taking Topamax, Zanaflex, and Vistaril - however has been out of medications for about 3 months  - Since being off the medication, she had been okay, however recently noticed migraines were beginning to occur more frequently with increased intensity and duration   - She finds the nerve blocks had been helping a lot, as long as she is consistent with getting the nerve block, however hasn't had this procedure for over a year   - Has been getting 5-6 migraines per month, each lasting 1-4 days in length   - Current migraine has been going on since last Friday (6 days)  - Blood pressure significantly elevated in clinic today, states her PCP is managing her medications in order to get her blood pressure better controlled   - Is requesting refills of medication as well as repeat nerve occipital and trigeminal nerve blocks      Headache history:   Age of onset -  13  Nature of pain -  Throbbing   Location - bioccipital   Aura -  White floaters   Duration - hrs  Frequency -  2/week  Time of day - anytime   7 days of pain - jaw pain, L neck, L shoulder,  L side throbbing   Associated symptoms:   Meningeal symptoms - photophobia, phonophobia, exercise intolerance +   Nausea/vomitting +   Nasal drainage   Visual symptoms  Pallor/flushing  Vertigo  Stroke symptoms  Confusion  Impaired concentration +  Pain worsened with  physical activity +  Neck pain +  Whooshing sounds   Visual spots/blotches +  Triggers:   Stress +   Bright or flickering light  Strong smell  Vigorous exercise  Dietary factors   Visual strain   Motion intolerance as passenger:  No   Resolution of headache with sleep: yes      Therapies Tried & Response:  Tylenol, motrin, alleve - not help  excedrin - not help   imitrex - helped initially   topamax 50 mg - helped  Percocet - helps   Gabapentin - helps  Amlodipine - for HTN  Bystolic - for HTN  Lexapro - hasn't noticed change in HA, for depression   GONB - helps   Cymbalta - unsure, for depression   Zomig - helps   Maxalt - no   Botox - helping     Current Medications:    amitriptyline (ELAVIL) 25 MG tablet, Take 1 tablet (25 mg total) by mouth every evening., Disp: 90 tablet, Rfl: 1    azelastine (ASTELIN) 137 mcg (0.1 %) nasal spray, 2 sprays (274 mcg total) by Nasal route 2 (two) times daily., Disp: 30 mL, Rfl: 0    cloNIDine (CATAPRES) 0.2 MG tablet, 0.2 mg 3 (three) times daily. , Disp: , Rfl: 2    DULoxetine (CYMBALTA) 60 MG capsule, Take 1 capsule (60 mg total) by mouth once daily., Disp: 30 capsule, Rfl: 5    DULoxetine (CYMBALTA) 60 MG capsule, Take 1 capsule (60 mg total) by mouth once daily., Disp: 90 capsule, Rfl: 1    erenumab-aooe (AIMOVIG AUTOINJECTOR) 70 mg/mL AtIn, Inject 140 mg into the skin every 28 days., Disp: 2 mL, Rfl: 11    erenumab-aooe 140 mg/mL AtIn, Inject 140 mg into the skin every 28 days., Disp: 1 mL, Rfl: 3    fluticasone propionate (FLONASE) 50 mcg/actuation nasal spray, 2 sprays (100 mcg total) by Each Nare route 2 (two) times daily., Disp: 18.2 mL, Rfl: 0    hydrOXYzine pamoate (VISTARIL) 25 MG Cap, TAKE ONE CAPSULE BY MOUTH EVERY 6 TO 8 HOURS AS NEEDED, Disp: 12 capsule, Rfl: 0    ketorolac 15.75 mg/spray Spry, 15.75 mg by Nasal route every 6 to 8 hours as needed., Disp: 5 each, Rfl: 5    meloxicam (MOBIC) 15 MG tablet, Take 1 tablet (15 mg total) by mouth once daily.,  "Disp: 30 tablet, Rfl: 1    promethazine (PHENERGAN) 25 MG tablet, Take 1 tab by mouth every 4-6 hours as needed for nausea., Disp: 20 tablet, Rfl: 5    tiZANidine (ZANAFLEX) 2 MG tablet, Take 1 to 2 tablets by mouth at bedtime as needed.  ( No alcohol or driving with medication. ), Disp: 60 tablet, Rfl: 5    ZOLMitriptan (ZOMIG) 5 mg Spry, 1 spray in nostril at onset of migraine, can repeat in 1 hours if needed. Max 2 sprays/day. Max 3 days/week., Disp: 12 each, Rfl: 5    zolpidem (AMBIEN) 10 mg Tab, Take ½- to 1 tablet by mouth nightly as needed for  insomnia, Disp: 30 tablet, Rfl: 2    Current Facility-Administered Medications:     onabotulinumtoxina injection 200 Units, 200 Units, Intramuscular, Q90 Days, Luci Pérez, FNP, 200 Units at 02/14/19 1328    onabotulinumtoxina injection 200 Units, 200 Units, Intramuscular, Q90 Days, Luci Ortez, FNP    Review of Systems - as per HPI, otherwise a balanced 10 systems review is negative.    OBJECTIVE:  Vitals:  /62 (BP Location: Right arm, Patient Position: Sitting, BP Method: Large (Automatic))   Ht 5' 5" (1.651 m)   Wt 75.3 kg (166 lb 0.1 oz)   LMP 12/14/2000   BMI 27.62 kg/m²     Physical Exam:  Constitutional: she appears well-developed and well-nourished. she is well groomed. NAD     ASSESSMENT:  1. Chronic migraine      PLAN:  - Botox administered in clinic for Chronic Migraine (see below)   - Continue Aimovig, tizanidine, cymbalta, and amitriptyline daily for migraine prevention   - For migraine abortive - has zomig NS available   - For rescue - has Sprix NS available   - No refills needed at this time   - RTC in 12 weeks for repeat Botox injections or sooner if needed     All questions and concerns addressed.  Patient verbalizes understanding and is agreeable with the above stated treatment plan.      PROCEDURE NOTE:  BOTOX was performed as an indicated therapy for intractable chronic migraine headaches given that the patient failed " more than 2 headache medications    Two patient identifiers were confirmed with the patient prior to performing this procedure. A time out to determine correct patient and and agreement on procedure performed was conducted prior to the procedure.      Botulinum Toxin Injection Procedure   Procedure: Chemical neurolysis   After risks and benefits were explained including bleeding, infection, worsening of pain, damage to the areas being injected, weakness of muscles, loss of muscle control, dysphagia if injecting the head or neck, facial droop if injecting the facial area, painful injection, allergic or other reaction to the medications being injected, and the failure of the procedure to help the problem, a signed consent was obtained.   The patient was placed in a comfortable area and the sites to be treated were identified.The area to be treated was prepped three times with alcohol and the alcohol allowed to dry. Next, a 30 gauge needle was used to inject the medication in the area to be treated.      Total Botox used: 155 Units   Botox wastage: 45 Units     Injection sites:    muscle bilaterally ( a total of 10 units divided into 2 sites)   Procerus muscle (5 units)   Frontalis muscle bilaterally (a total of 20 units divided into 4 sites)   Temporalis muscle bilaterally (a total of 40 units divided into 8 sites)   Occipitalis muscle bilaterally (a total of 30 units divided into 6 sites)   Cervical paraspinal muscles (a total of 20 units divided into 4 sites)   Trapezius muscle bilaterally (a total of 30 units divided into 6 sites)   Complications: none   RTC for the next Botox injection: 12 weeks     CC: MD Luci Echols, BC-YAEL  [facility]   [phone]     KANIKA LAZO

## 2022-04-04 ENCOUNTER — PATIENT MESSAGE (OUTPATIENT)
Dept: NEUROLOGY | Facility: CLINIC | Age: 47
End: 2022-04-04

## 2022-04-07 ENCOUNTER — PATIENT MESSAGE (OUTPATIENT)
Dept: PSYCHIATRY | Facility: CLINIC | Age: 47
End: 2022-04-07
Payer: COMMERCIAL

## 2022-04-07 ENCOUNTER — PATIENT MESSAGE (OUTPATIENT)
Dept: NEUROLOGY | Facility: CLINIC | Age: 47
End: 2022-04-07
Payer: COMMERCIAL

## 2022-04-07 DIAGNOSIS — G43.009 MIGRAINE WITHOUT AURA AND WITHOUT STATUS MIGRAINOSUS, NOT INTRACTABLE: Primary | ICD-10-CM

## 2022-04-07 RX ORDER — PREDNISONE 20 MG/1
TABLET ORAL
Qty: 9 TABLET | Refills: 0 | Status: SHIPPED | OUTPATIENT
Start: 2022-04-07 | End: 2022-05-20

## 2022-04-08 ENCOUNTER — TELEPHONE (OUTPATIENT)
Dept: PSYCHIATRY | Facility: HOSPITAL | Age: 47
End: 2022-04-08
Payer: COMMERCIAL

## 2022-04-08 DIAGNOSIS — G43.009 MIGRAINE WITHOUT AURA AND WITHOUT STATUS MIGRAINOSUS, NOT INTRACTABLE: ICD-10-CM

## 2022-04-08 DIAGNOSIS — F33.41 RECURRENT MAJOR DEPRESSION IN PARTIAL REMISSION: ICD-10-CM

## 2022-04-08 DIAGNOSIS — F41.1 GAD (GENERALIZED ANXIETY DISORDER): ICD-10-CM

## 2022-04-08 DIAGNOSIS — Z91.89 EXCESSIVE CONSUMPTION OF SODA POP: ICD-10-CM

## 2022-04-08 RX ORDER — HYDROXYZINE PAMOATE 25 MG/1
25 CAPSULE ORAL EVERY 8 HOURS PRN
Qty: 45 CAPSULE | Refills: 0 | Status: SHIPPED | OUTPATIENT
Start: 2022-04-08 | End: 2022-05-06 | Stop reason: SDUPTHER

## 2022-04-08 RX ORDER — BUPROPION HYDROCHLORIDE 300 MG/1
300 TABLET ORAL DAILY
Qty: 90 TABLET | Refills: 0 | Status: SHIPPED | OUTPATIENT
Start: 2022-04-08 | End: 2022-05-06 | Stop reason: SDUPTHER

## 2022-04-08 RX ORDER — DULOXETIN HYDROCHLORIDE 60 MG/1
60 CAPSULE, DELAYED RELEASE ORAL DAILY
Qty: 30 CAPSULE | Refills: 0 | Status: SHIPPED | OUTPATIENT
Start: 2022-04-08 | End: 2022-05-06 | Stop reason: SDUPTHER

## 2022-04-08 RX ORDER — ZOLPIDEM TARTRATE 10 MG/1
10 TABLET ORAL NIGHTLY
Qty: 30 TABLET | Refills: 0 | Status: SHIPPED | OUTPATIENT
Start: 2022-04-08 | End: 2022-05-02 | Stop reason: SDUPTHER

## 2022-04-18 ENCOUNTER — PATIENT MESSAGE (OUTPATIENT)
Dept: OBSTETRICS AND GYNECOLOGY | Facility: CLINIC | Age: 47
End: 2022-04-18
Payer: COMMERCIAL

## 2022-04-20 ENCOUNTER — PATIENT MESSAGE (OUTPATIENT)
Dept: PSYCHIATRY | Facility: CLINIC | Age: 47
End: 2022-04-20
Payer: COMMERCIAL

## 2022-04-20 ENCOUNTER — TELEPHONE (OUTPATIENT)
Dept: PSYCHIATRY | Facility: CLINIC | Age: 47
End: 2022-04-20
Payer: COMMERCIAL

## 2022-05-02 ENCOUNTER — TELEPHONE (OUTPATIENT)
Dept: PSYCHIATRY | Facility: HOSPITAL | Age: 47
End: 2022-05-02
Payer: COMMERCIAL

## 2022-05-02 ENCOUNTER — PATIENT MESSAGE (OUTPATIENT)
Dept: PSYCHIATRY | Facility: CLINIC | Age: 47
End: 2022-05-02
Payer: COMMERCIAL

## 2022-05-02 DIAGNOSIS — F33.41 RECURRENT MAJOR DEPRESSION IN PARTIAL REMISSION: ICD-10-CM

## 2022-05-02 RX ORDER — ZOLPIDEM TARTRATE 10 MG/1
10 TABLET ORAL NIGHTLY
Qty: 30 TABLET | Refills: 0 | Status: SHIPPED | OUTPATIENT
Start: 2022-05-02 | End: 2022-06-10 | Stop reason: SDUPTHER

## 2022-05-04 ENCOUNTER — PATIENT MESSAGE (OUTPATIENT)
Dept: PSYCHIATRY | Facility: CLINIC | Age: 47
End: 2022-05-04
Payer: COMMERCIAL

## 2022-05-04 ENCOUNTER — PATIENT MESSAGE (OUTPATIENT)
Dept: NEUROLOGY | Facility: CLINIC | Age: 47
End: 2022-05-04
Payer: COMMERCIAL

## 2022-05-06 ENCOUNTER — TELEPHONE (OUTPATIENT)
Dept: PSYCHIATRY | Facility: HOSPITAL | Age: 47
End: 2022-05-06
Payer: COMMERCIAL

## 2022-05-06 DIAGNOSIS — G43.009 MIGRAINE WITHOUT AURA AND WITHOUT STATUS MIGRAINOSUS, NOT INTRACTABLE: ICD-10-CM

## 2022-05-06 DIAGNOSIS — F33.41 RECURRENT MAJOR DEPRESSION IN PARTIAL REMISSION: ICD-10-CM

## 2022-05-06 DIAGNOSIS — Z91.89 EXCESSIVE CONSUMPTION OF SODA POP: ICD-10-CM

## 2022-05-06 DIAGNOSIS — F41.1 GAD (GENERALIZED ANXIETY DISORDER): ICD-10-CM

## 2022-05-06 RX ORDER — BUPROPION HYDROCHLORIDE 300 MG/1
300 TABLET ORAL DAILY
Qty: 90 TABLET | Refills: 0 | Status: SHIPPED | OUTPATIENT
Start: 2022-05-06 | End: 2022-06-10 | Stop reason: SDUPTHER

## 2022-05-06 RX ORDER — DULOXETIN HYDROCHLORIDE 60 MG/1
60 CAPSULE, DELAYED RELEASE ORAL DAILY
Qty: 30 CAPSULE | Refills: 0 | Status: SHIPPED | OUTPATIENT
Start: 2022-05-06 | End: 2022-06-10 | Stop reason: SDUPTHER

## 2022-05-06 RX ORDER — HYDROXYZINE PAMOATE 25 MG/1
25 CAPSULE ORAL EVERY 8 HOURS PRN
Qty: 45 CAPSULE | Refills: 0 | Status: SHIPPED | OUTPATIENT
Start: 2022-05-06 | End: 2022-06-10 | Stop reason: SDUPTHER

## 2022-05-09 ENCOUNTER — PATIENT MESSAGE (OUTPATIENT)
Dept: NEUROLOGY | Facility: CLINIC | Age: 47
End: 2022-05-09

## 2022-05-19 ENCOUNTER — PATIENT MESSAGE (OUTPATIENT)
Dept: PSYCHIATRY | Facility: CLINIC | Age: 47
End: 2022-05-19
Payer: COMMERCIAL

## 2022-05-20 ENCOUNTER — OFFICE VISIT (OUTPATIENT)
Dept: PRIMARY CARE CLINIC | Facility: CLINIC | Age: 47
End: 2022-05-20
Payer: COMMERCIAL

## 2022-05-20 ENCOUNTER — PATIENT MESSAGE (OUTPATIENT)
Dept: PRIMARY CARE CLINIC | Facility: CLINIC | Age: 47
End: 2022-05-20

## 2022-05-20 ENCOUNTER — LAB VISIT (OUTPATIENT)
Dept: LAB | Facility: HOSPITAL | Age: 47
End: 2022-05-20
Attending: INTERNAL MEDICINE
Payer: COMMERCIAL

## 2022-05-20 VITALS
BODY MASS INDEX: 24.28 KG/M2 | HEART RATE: 82 BPM | HEIGHT: 65 IN | SYSTOLIC BLOOD PRESSURE: 180 MMHG | WEIGHT: 145.75 LBS | RESPIRATION RATE: 18 BRPM | OXYGEN SATURATION: 99 % | DIASTOLIC BLOOD PRESSURE: 120 MMHG | TEMPERATURE: 98 F

## 2022-05-20 DIAGNOSIS — N63.0 BREAST LUMP: ICD-10-CM

## 2022-05-20 DIAGNOSIS — R07.9 CHEST PAIN, UNSPECIFIED TYPE: ICD-10-CM

## 2022-05-20 DIAGNOSIS — I10 ESSENTIAL HYPERTENSION: ICD-10-CM

## 2022-05-20 DIAGNOSIS — R07.9 CHEST PAIN, UNSPECIFIED TYPE: Primary | ICD-10-CM

## 2022-05-20 DIAGNOSIS — D75.839 THROMBOCYTOSIS: ICD-10-CM

## 2022-05-20 DIAGNOSIS — D75.839 THROMBOCYTOSIS: Primary | ICD-10-CM

## 2022-05-20 LAB
ALBUMIN SERPL BCP-MCNC: 4 G/DL (ref 3.5–5.2)
ALP SERPL-CCNC: 92 U/L (ref 55–135)
ALT SERPL W/O P-5'-P-CCNC: 13 U/L (ref 10–44)
ANION GAP SERPL CALC-SCNC: 9 MMOL/L (ref 8–16)
AST SERPL-CCNC: 17 U/L (ref 10–40)
BASOPHILS # BLD AUTO: 0.21 K/UL (ref 0–0.2)
BASOPHILS NFR BLD: 1.9 % (ref 0–1.9)
BILIRUB SERPL-MCNC: 0.7 MG/DL (ref 0.1–1)
BUN SERPL-MCNC: 6 MG/DL (ref 6–20)
CALCIUM SERPL-MCNC: 9.4 MG/DL (ref 8.7–10.5)
CHLORIDE SERPL-SCNC: 103 MMOL/L (ref 95–110)
CHOLEST SERPL-MCNC: 203 MG/DL (ref 120–199)
CHOLEST/HDLC SERPL: 3.8 {RATIO} (ref 2–5)
CO2 SERPL-SCNC: 27 MMOL/L (ref 23–29)
CREAT SERPL-MCNC: 0.8 MG/DL (ref 0.5–1.4)
DIFFERENTIAL METHOD: ABNORMAL
EOSINOPHIL # BLD AUTO: 0.5 K/UL (ref 0–0.5)
EOSINOPHIL NFR BLD: 4.2 % (ref 0–8)
ERYTHROCYTE [DISTWIDTH] IN BLOOD BY AUTOMATED COUNT: 13 % (ref 11.5–14.5)
EST. GFR  (AFRICAN AMERICAN): >60 ML/MIN/1.73 M^2
EST. GFR  (NON AFRICAN AMERICAN): >60 ML/MIN/1.73 M^2
GLUCOSE SERPL-MCNC: 99 MG/DL (ref 70–110)
HCT VFR BLD AUTO: 45.7 % (ref 37–48.5)
HDLC SERPL-MCNC: 53 MG/DL (ref 40–75)
HDLC SERPL: 26.1 % (ref 20–50)
HGB BLD-MCNC: 15 G/DL (ref 12–16)
IMM GRANULOCYTES # BLD AUTO: 0.14 K/UL (ref 0–0.04)
IMM GRANULOCYTES NFR BLD AUTO: 1.3 % (ref 0–0.5)
LDLC SERPL CALC-MCNC: 127.6 MG/DL (ref 63–159)
LYMPHOCYTES # BLD AUTO: 2.2 K/UL (ref 1–4.8)
LYMPHOCYTES NFR BLD: 19.6 % (ref 18–48)
MCH RBC QN AUTO: 30.2 PG (ref 27–31)
MCHC RBC AUTO-ENTMCNC: 32.8 G/DL (ref 32–36)
MCV RBC AUTO: 92 FL (ref 82–98)
MONOCYTES # BLD AUTO: 1 K/UL (ref 0.3–1)
MONOCYTES NFR BLD: 9.1 % (ref 4–15)
NEUTROPHILS # BLD AUTO: 7.1 K/UL (ref 1.8–7.7)
NEUTROPHILS NFR BLD: 63.9 % (ref 38–73)
NONHDLC SERPL-MCNC: 150 MG/DL
NRBC BLD-RTO: 0 /100 WBC
PLATELET # BLD AUTO: 494 K/UL (ref 150–450)
PMV BLD AUTO: 10.1 FL (ref 9.2–12.9)
POTASSIUM SERPL-SCNC: 3.2 MMOL/L (ref 3.5–5.1)
PROT SERPL-MCNC: 7.8 G/DL (ref 6–8.4)
RBC # BLD AUTO: 4.97 M/UL (ref 4–5.4)
SODIUM SERPL-SCNC: 139 MMOL/L (ref 136–145)
TRIGL SERPL-MCNC: 112 MG/DL (ref 30–150)
TSH SERPL DL<=0.005 MIU/L-ACNC: 3.26 UIU/ML (ref 0.4–4)
WBC # BLD AUTO: 11.08 K/UL (ref 3.9–12.7)

## 2022-05-20 PROCEDURE — 3008F PR BODY MASS INDEX (BMI) DOCUMENTED: ICD-10-PCS | Mod: CPTII,S$GLB,, | Performed by: INTERNAL MEDICINE

## 2022-05-20 PROCEDURE — 3077F PR MOST RECENT SYSTOLIC BLOOD PRESSURE >= 140 MM HG: ICD-10-PCS | Mod: CPTII,S$GLB,, | Performed by: INTERNAL MEDICINE

## 2022-05-20 PROCEDURE — 80061 LIPID PANEL: CPT | Performed by: INTERNAL MEDICINE

## 2022-05-20 PROCEDURE — 3077F SYST BP >= 140 MM HG: CPT | Mod: CPTII,S$GLB,, | Performed by: INTERNAL MEDICINE

## 2022-05-20 PROCEDURE — 93010 EKG 12-LEAD: ICD-10-PCS | Mod: S$GLB,,, | Performed by: INTERNAL MEDICINE

## 2022-05-20 PROCEDURE — 85025 COMPLETE CBC W/AUTO DIFF WBC: CPT | Performed by: INTERNAL MEDICINE

## 2022-05-20 PROCEDURE — 99999 PR PBB SHADOW E&M-EST. PATIENT-LVL IV: ICD-10-PCS | Mod: PBBFAC,,, | Performed by: INTERNAL MEDICINE

## 2022-05-20 PROCEDURE — 4010F ACE/ARB THERAPY RXD/TAKEN: CPT | Mod: CPTII,S$GLB,, | Performed by: INTERNAL MEDICINE

## 2022-05-20 PROCEDURE — 99214 PR OFFICE/OUTPT VISIT, EST, LEVL IV, 30-39 MIN: ICD-10-PCS | Mod: S$GLB,,, | Performed by: INTERNAL MEDICINE

## 2022-05-20 PROCEDURE — 93005 EKG 12-LEAD: ICD-10-PCS | Mod: S$GLB,,, | Performed by: INTERNAL MEDICINE

## 2022-05-20 PROCEDURE — 3080F PR MOST RECENT DIASTOLIC BLOOD PRESSURE >= 90 MM HG: ICD-10-PCS | Mod: CPTII,S$GLB,, | Performed by: INTERNAL MEDICINE

## 2022-05-20 PROCEDURE — 36415 COLL VENOUS BLD VENIPUNCTURE: CPT | Mod: PN | Performed by: INTERNAL MEDICINE

## 2022-05-20 PROCEDURE — 99999 PR PBB SHADOW E&M-EST. PATIENT-LVL IV: CPT | Mod: PBBFAC,,, | Performed by: INTERNAL MEDICINE

## 2022-05-20 PROCEDURE — 93005 ELECTROCARDIOGRAM TRACING: CPT | Mod: S$GLB,,, | Performed by: INTERNAL MEDICINE

## 2022-05-20 PROCEDURE — 80053 COMPREHEN METABOLIC PANEL: CPT | Performed by: INTERNAL MEDICINE

## 2022-05-20 PROCEDURE — 84443 ASSAY THYROID STIM HORMONE: CPT | Performed by: INTERNAL MEDICINE

## 2022-05-20 PROCEDURE — 93010 ELECTROCARDIOGRAM REPORT: CPT | Mod: S$GLB,,, | Performed by: INTERNAL MEDICINE

## 2022-05-20 PROCEDURE — 4010F PR ACE/ARB THEARPY RXD/TAKEN: ICD-10-PCS | Mod: CPTII,S$GLB,, | Performed by: INTERNAL MEDICINE

## 2022-05-20 PROCEDURE — 3008F BODY MASS INDEX DOCD: CPT | Mod: CPTII,S$GLB,, | Performed by: INTERNAL MEDICINE

## 2022-05-20 PROCEDURE — 3080F DIAST BP >= 90 MM HG: CPT | Mod: CPTII,S$GLB,, | Performed by: INTERNAL MEDICINE

## 2022-05-20 PROCEDURE — 99214 OFFICE O/P EST MOD 30 MIN: CPT | Mod: S$GLB,,, | Performed by: INTERNAL MEDICINE

## 2022-05-20 RX ORDER — BENAZEPRIL/HYDROCHLOROTHIAZIDE 20 MG-25MG
1 TABLET ORAL DAILY
Qty: 90 TABLET | Refills: 3 | Status: SHIPPED | OUTPATIENT
Start: 2022-05-20 | End: 2023-05-14

## 2022-05-20 RX ORDER — POTASSIUM CHLORIDE 750 MG/1
10 CAPSULE, EXTENDED RELEASE ORAL DAILY
Qty: 30 CAPSULE | Refills: 2 | Status: SHIPPED | OUTPATIENT
Start: 2022-05-20 | End: 2022-11-13

## 2022-05-20 NOTE — PROGRESS NOTES
[facility] Progress Note  5/20/2022          Reason for Visit:      had concerns including Annual Exam (Pt would like to discuss her blood pressure,, pt states that she was prescribe a medication but its not working her blood pressure is high).       History of Present Illness:     Patient is here today to reestablish primary care.  She is known to me from my previous practice.    Patient presents mainly takes discuss problems with high blood pressure.  She has been getting Botox injections for her migraines and says that this on several occasions when she has go gone to have these done her blood pressures been elevated.  She saw Dr. Pabon last year for the blood pressure was started on labetalol but she says it has not really seem to have much of an effect on her blood pressure readings.  She is having more frequency of her headaches and thinks that that could be related to the blood pressure is well.  She has also had some swelling in her legs intermittently toward the end of each day.      Patient recently had some chest pains on the left side of her chest that were sharp.  They occurred at rest.  They were not associated with exertion.  She did not have shortness of breath at that time though she does sometimes have shortness of breath with exertion when going up the stairs.  She has not had any diaphoresis at that time.  The pain has not recurred since then.  She says that palpation of her chest wall can reproduce the pain that she was having.    In addition patient has recently if palpated a possible lump in the left breast.  She says it was mildly painful at the time.  She says she has had previous cyst in the breast that were evaluated the past.  She is due for mammogram and wants to get that set up.    We reviewed the patient's labs.  On her most recent labs she had an elevation of her platelets.  She has had a chronic mild elevation of her platelets on most the lab draws over the past few  years.  She has not have any family history of thrombocytosis to her knowledge.  She says she has occasionally feeling hot and flushed but denies any significant widespread lymphadenopathy.  She said she did have a an enlarged lymph node in her neck that has been intermittently bothering her on the left side of her neck without any recognizable cause but it is not bothering her today.  The size of the platelets was normal on the most recent labs.    Problem List:     Patient Active Problem List   Diagnosis    Occipital neuralgia    Migraine without aura and without status migrainosus, not intractable    CTS (carpal tunnel syndrome)    Essential hypertension    Cervical muscle pain    Cervical radiculopathy    H/O catheter-based closure of atrial septal defect    Adult congenital heart disease    Overweight (BMI 25.0-29.9)    LALITA (generalized anxiety disorder)    Fibromyalgia    Social anxiety disorder    Intractable chronic migraine without aura and without status migrainosus    Insomnia    Recurrent major depressive disorder, in full remission         Surgical History:     She has a past surgical history that includes Cholecystectomy; Appendectomy;  section; Hysterectomy; and Cardiac catheterization (10/01/2010).      Family History:      Her family history includes Migraines in her son; No Known Problems in her son. She was adopted.      Social History:     She reports that she has never smoked. She has never used smokeless tobacco. She reports that she does not drink alcohol and does not use drugs.      Medications:       Current Outpatient Medications:     buPROPion (WELLBUTRIN XL) 300 MG 24 hr tablet, Take 1 tablet (300 mg total) by mouth once daily., Disp: 90 tablet, Rfl: 0    DULoxetine (CYMBALTA) 60 MG capsule, Take 1 capsule (60 mg total) by mouth once daily., Disp: 30 capsule, Rfl: 0    erenumab-aooe (AIMOVIG) 140 mg/mL autoinjector, Inject 1 pen (140 mg total) into the skin every  28 days., Disp: 1 mL, Rfl: 11    hydrOXYzine pamoate (VISTARIL) 25 MG Cap, Take 1 capsule (25 mg total) by mouth every 8 (eight) hours as needed (anxiety)., Disp: 45 capsule, Rfl: 0    labetaloL (NORMODYNE) 100 MG tablet, TAKE 1 TABLET(100 MG) BY MOUTH TWICE DAILY, Disp: 60 tablet, Rfl: 0    promethazine (PHENERGAN) 25 MG tablet, TAKE 1 TABLET BY MOUTH EVERY 4 TO 6 HOURS AS NEEDED FOR NAUSEA, Disp: 20 tablet, Rfl: 2    tiZANidine (ZANAFLEX) 4 MG tablet, Take 1.5 tablets (6 mg total) by mouth every evening. No alcohol, no driving with medication., Disp: 45 tablet, Rfl: 5    UBROGEPANT 100 mg tablet, TAKE 1 TABLET BY MOUTH EVERY 2 HOURS AS NEEDED FOR MIGRAINE.  MG PER DAY, Disp: 10 tablet, Rfl: 2    zolpidem (AMBIEN) 10 mg Tab, Take 1 tablet (10 mg total) by mouth every evening. Take once tab nightly, Disp: 30 tablet, Rfl: 0    benazepril-hydrochlorthiazide (LOTENSIN HCT) 20-25 mg Tab, Take 1 tablet by mouth once daily., Disp: 90 tablet, Rfl: 3    estradiol 0.05 mg/24 hr td ptsw (VIVELLE-DOT) 0.05 mg/24 hr, Place 1 patch onto the skin once a week., Disp: 4 patch, Rfl: 11    Current Facility-Administered Medications:     onabotulinumtoxina injection 200 Units, 200 Units, Intramuscular, q12 weeks, BC Fleming, 200 Units at 12/09/21 1457        Allergies:   She is allergic to iodine and iodide containing products and penicillins.      Review of Systems:     Review of Systems   Constitutional: Negative for chills and fever.   HENT: Negative for ear pain and sore throat.    Respiratory: Positive for shortness of breath. Negative for cough and wheezing.    Cardiovascular: Positive for chest pain. Negative for palpitations.   Gastrointestinal: Negative for constipation, diarrhea, nausea and vomiting.   Genitourinary: Negative for dysuria and hematuria.   Musculoskeletal: Positive for neck pain. Negative for joint swelling and joint deformity.   Neurological: Positive for headaches. Negative for  "dizziness and weakness.           Physical Exam:     Temp:    98.3 °F (36.8 °C) (Oral)        Blood Pressure:  (!) 180/120        Pulse:   82     Respirations:  18  Weight:   66.1 kg (145 lb 11.6 oz)  Height:   5' 5" (1.651 m)  BMI:     Body mass index is 24.25 kg/m².    Physical Exam  Constitutional:       General: She is not in acute distress.  HENT:      Right Ear: Tympanic membrane normal.      Left Ear: Tympanic membrane normal.      Nose: No congestion or rhinorrhea.      Mouth/Throat:      Pharynx: No oropharyngeal exudate or posterior oropharyngeal erythema.   Cardiovascular:      Rate and Rhythm: Normal rate and regular rhythm.   Pulmonary:      Effort: Pulmonary effort is normal. No respiratory distress.      Breath sounds: No wheezing.      Comments: Palpation of the left side of chest wall reproduces her recent chest pains.  Chest:   Breasts:      Right: No inverted nipple, mass, nipple discharge, skin change or tenderness.      Left: Mass and tenderness present. No nipple discharge or skin change.            Comments: Breast tissue is hetergeneous.  There is a mildly tender lump in the 12' oclock position of left breast superior to nipple.  Abdominal:      Palpations: Abdomen is soft.      Tenderness: There is no abdominal tenderness.   Lymphadenopathy:      Cervical: No cervical adenopathy.   Skin:     General: Skin is warm.   Neurological:      General: No focal deficit present.      Mental Status: She is alert and oriented to person, place, and time.           Labs/Imaging/Testing:     Most recent CBC:  Lab Results   Component Value Date    WBC 10.86 06/04/2021    HGB 14.5 06/04/2021     (H) 06/04/2021    MCV 91 06/04/2021       Most recent Lipids:  No results found for: CHOL, HDL, LDLCALC, TRIG    Most recent Chemistry:  Lab Results   Component Value Date     06/04/2021    K 3.2 (L) 06/04/2021     06/04/2021    CO2 27 06/04/2021    BUN 7 06/04/2021    CREATININE 0.8 06/04/2021    " GLU 88 06/04/2021    CALCIUM 9.9 06/04/2021    ALT 8 (L) 06/04/2021    AST 17 06/04/2021    ALKPHOS 90 06/04/2021    PROT 7.9 06/04/2021    ALBUMIN 3.8 06/04/2021       Other tests:  Lab Results   Component Value Date    TSH 2.301 06/04/2021    FREET4 1.67 (H) 08/05/2010    INR 0.9 09/28/2017    HGBA1C 5.5 06/04/2021    [id number] 21 (L) 07/30/2013       EKG today reviewed- NSR, sinus arrhythmia,       Assessment and Plan:     1. Essential hypertension  Add benazepril-hctz 20/25 daily  Advisd to check bp at work and send us readings in 2 weeks.    - CBC Auto Differential; Future  - Comprehensive Metabolic Panel; Future  - Lipid Panel; Future  - TSH; Future    2. Chest pain, unspecified type  EKG today - will follow up cardiology reading.    Possibly related to bp, or msk?  Will treat bp and if pain persists advised to follow up    - IN OFFICE EKG 12-LEAD (to Muse)    3. Breast lump  - Mammo Digital Diagnostic Bilat; Future  - US Breast Left Complete; Future    4. Thrombocytosis  Will recheck cbc.  Consider hematology evaluation if plt still elevated.    RTC 6 mos or sooner mike Venegas MD  5/20/2022    This note was prepared using voice-recognition software.  Although efforts are made to proofread the note, some errors may persist in the final document.

## 2022-05-23 ENCOUNTER — PATIENT MESSAGE (OUTPATIENT)
Dept: ADMINISTRATIVE | Facility: HOSPITAL | Age: 47
End: 2022-05-23
Payer: COMMERCIAL

## 2022-05-26 ENCOUNTER — PATIENT MESSAGE (OUTPATIENT)
Dept: PHYSICAL MEDICINE AND REHAB | Facility: CLINIC | Age: 47
End: 2022-05-26
Payer: COMMERCIAL

## 2022-05-30 ENCOUNTER — PATIENT MESSAGE (OUTPATIENT)
Dept: ADMINISTRATIVE | Facility: HOSPITAL | Age: 47
End: 2022-05-30
Payer: COMMERCIAL

## 2022-05-30 ENCOUNTER — PATIENT OUTREACH (OUTPATIENT)
Dept: ADMINISTRATIVE | Facility: HOSPITAL | Age: 47
End: 2022-05-30
Payer: COMMERCIAL

## 2022-06-03 ENCOUNTER — PATIENT MESSAGE (OUTPATIENT)
Dept: PSYCHIATRY | Facility: CLINIC | Age: 47
End: 2022-06-03
Payer: COMMERCIAL

## 2022-06-09 ENCOUNTER — PATIENT MESSAGE (OUTPATIENT)
Dept: PSYCHIATRY | Facility: CLINIC | Age: 47
End: 2022-06-09
Payer: COMMERCIAL

## 2022-06-09 ENCOUNTER — OFFICE VISIT (OUTPATIENT)
Dept: PHYSICAL MEDICINE AND REHAB | Facility: CLINIC | Age: 47
End: 2022-06-09
Payer: COMMERCIAL

## 2022-06-09 VITALS
WEIGHT: 140 LBS | HEART RATE: 80 BPM | BODY MASS INDEX: 23.32 KG/M2 | SYSTOLIC BLOOD PRESSURE: 150 MMHG | DIASTOLIC BLOOD PRESSURE: 95 MMHG | HEIGHT: 65 IN

## 2022-06-09 DIAGNOSIS — G43.009 MIGRAINE WITHOUT AURA AND WITHOUT STATUS MIGRAINOSUS, NOT INTRACTABLE: ICD-10-CM

## 2022-06-09 DIAGNOSIS — G43.719 INTRACTABLE CHRONIC MIGRAINE WITHOUT AURA AND WITHOUT STATUS MIGRAINOSUS: Primary | ICD-10-CM

## 2022-06-09 PROCEDURE — 1160F RVW MEDS BY RX/DR IN RCRD: CPT | Mod: CPTII,S$GLB,, | Performed by: NURSE PRACTITIONER

## 2022-06-09 PROCEDURE — 1159F PR MEDICATION LIST DOCUMENTED IN MEDICAL RECORD: ICD-10-PCS | Mod: CPTII,S$GLB,, | Performed by: NURSE PRACTITIONER

## 2022-06-09 PROCEDURE — 99499 UNLISTED E&M SERVICE: CPT | Mod: S$GLB,,, | Performed by: NURSE PRACTITIONER

## 2022-06-09 PROCEDURE — 64615 PR CHEMODENERVATION OF MUSCLE FOR CHRONIC MIGRAINE: ICD-10-PCS | Mod: S$GLB,,, | Performed by: NURSE PRACTITIONER

## 2022-06-09 PROCEDURE — 3080F DIAST BP >= 90 MM HG: CPT | Mod: CPTII,S$GLB,, | Performed by: NURSE PRACTITIONER

## 2022-06-09 PROCEDURE — 99499 NO LOS: ICD-10-PCS | Mod: S$GLB,,, | Performed by: NURSE PRACTITIONER

## 2022-06-09 PROCEDURE — 3008F PR BODY MASS INDEX (BMI) DOCUMENTED: ICD-10-PCS | Mod: CPTII,S$GLB,, | Performed by: NURSE PRACTITIONER

## 2022-06-09 PROCEDURE — 4010F PR ACE/ARB THEARPY RXD/TAKEN: ICD-10-PCS | Mod: CPTII,S$GLB,, | Performed by: NURSE PRACTITIONER

## 2022-06-09 PROCEDURE — 99999 PR PBB SHADOW E&M-EST. PATIENT-LVL IV: CPT | Mod: PBBFAC,,, | Performed by: NURSE PRACTITIONER

## 2022-06-09 PROCEDURE — 1160F PR REVIEW ALL MEDS BY PRESCRIBER/CLIN PHARMACIST DOCUMENTED: ICD-10-PCS | Mod: CPTII,S$GLB,, | Performed by: NURSE PRACTITIONER

## 2022-06-09 PROCEDURE — 1159F MED LIST DOCD IN RCRD: CPT | Mod: CPTII,S$GLB,, | Performed by: NURSE PRACTITIONER

## 2022-06-09 PROCEDURE — 3008F BODY MASS INDEX DOCD: CPT | Mod: CPTII,S$GLB,, | Performed by: NURSE PRACTITIONER

## 2022-06-09 PROCEDURE — 3080F PR MOST RECENT DIASTOLIC BLOOD PRESSURE >= 90 MM HG: ICD-10-PCS | Mod: CPTII,S$GLB,, | Performed by: NURSE PRACTITIONER

## 2022-06-09 PROCEDURE — 99999 PR PBB SHADOW E&M-EST. PATIENT-LVL IV: ICD-10-PCS | Mod: PBBFAC,,, | Performed by: NURSE PRACTITIONER

## 2022-06-09 PROCEDURE — 3077F SYST BP >= 140 MM HG: CPT | Mod: CPTII,S$GLB,, | Performed by: NURSE PRACTITIONER

## 2022-06-09 PROCEDURE — 64615 CHEMODENERV MUSC MIGRAINE: CPT | Mod: S$GLB,,, | Performed by: NURSE PRACTITIONER

## 2022-06-09 PROCEDURE — 3077F PR MOST RECENT SYSTOLIC BLOOD PRESSURE >= 140 MM HG: ICD-10-PCS | Mod: CPTII,S$GLB,, | Performed by: NURSE PRACTITIONER

## 2022-06-09 PROCEDURE — 4010F ACE/ARB THERAPY RXD/TAKEN: CPT | Mod: CPTII,S$GLB,, | Performed by: NURSE PRACTITIONER

## 2022-06-09 NOTE — PROGRESS NOTES
PROCEDURE NOTE:  BOTOX was performed as an indicated therapy for intractable chronic migraine headaches given that the patient failed more than 2 headache medications    A time out was conducted just before the start of the procedure to verify the correct patient and procedure, procedure location, and all relevant critical information.     Botulinum Toxin Injection Procedure     PROCEDURE PERFORMED: Botulinum toxin injection (33659)  CLINICAL INDICATION: G43.719  After risks and benefits were explained including bleeding, infection, worsening of pain, damage to the areas being injected, weakness of muscles, loss of muscle control, dysphagia if injecting the head or neck, facial droop if injecting the facial area, painful injection, allergic or other reaction to the medications being injected, and the failure of the procedure to help the problem, a signed consent was obtained.   The patient was placed in a comfortable area and the sites to be treated were identified.The area to be treated was prepped three times with alcohol and the alcohol allowed to dry. Next, a 30 gauge needle was used to inject the medication in the area to be treated.      Total Botox used: 155 Units   Unavoidable waste: 45 Units     Injection sites:    muscle bilaterally ( a total of 10 units divided into 2 sites)   Procerus muscle (5 units)   Frontalis muscle bilaterally (a total of 20 units divided into 4 sites)   Temporalis muscle bilaterally (a total of 40 units divided into 8 sites)   Occipitalis muscle bilaterally (a total of 30 units divided into 6 sites)   Cervical paraspinal muscles (a total of 20 units divided into 4 sites)   Trapezius muscle bilaterally (a total of 30 units divided into 6 sites)   Complications: none   RTC for the next Botox injection: 12 weeks     CC: MD Luci Echols FNP-C  [facility] [place] Department of Neurology   [phone]

## 2022-06-10 ENCOUNTER — OFFICE VISIT (OUTPATIENT)
Dept: PSYCHIATRY | Facility: CLINIC | Age: 47
End: 2022-06-10
Payer: COMMERCIAL

## 2022-06-10 DIAGNOSIS — G47.00 INSOMNIA, UNSPECIFIED TYPE: ICD-10-CM

## 2022-06-10 DIAGNOSIS — M79.7 FIBROMYALGIA: ICD-10-CM

## 2022-06-10 DIAGNOSIS — F40.10 SOCIAL ANXIETY DISORDER: ICD-10-CM

## 2022-06-10 DIAGNOSIS — G43.009 MIGRAINE WITHOUT AURA AND WITHOUT STATUS MIGRAINOSUS, NOT INTRACTABLE: ICD-10-CM

## 2022-06-10 DIAGNOSIS — F33.41 RECURRENT MAJOR DEPRESSION IN PARTIAL REMISSION: ICD-10-CM

## 2022-06-10 DIAGNOSIS — Z91.89 EXCESSIVE CONSUMPTION OF SODA POP: ICD-10-CM

## 2022-06-10 DIAGNOSIS — F41.1 GAD (GENERALIZED ANXIETY DISORDER): Primary | ICD-10-CM

## 2022-06-10 PROCEDURE — 99214 PR OFFICE/OUTPT VISIT, EST, LEVL IV, 30-39 MIN: ICD-10-PCS | Mod: 95,,, | Performed by: STUDENT IN AN ORGANIZED HEALTH CARE EDUCATION/TRAINING PROGRAM

## 2022-06-10 PROCEDURE — 99214 OFFICE O/P EST MOD 30 MIN: CPT | Mod: 95,,, | Performed by: STUDENT IN AN ORGANIZED HEALTH CARE EDUCATION/TRAINING PROGRAM

## 2022-06-10 RX ORDER — HYDROXYZINE PAMOATE 25 MG/1
25 CAPSULE ORAL EVERY 8 HOURS PRN
Qty: 45 CAPSULE | Refills: 2 | Status: SHIPPED | OUTPATIENT
Start: 2022-06-10 | End: 2022-09-15 | Stop reason: SDUPTHER

## 2022-06-10 RX ORDER — BUPROPION HYDROCHLORIDE 300 MG/1
300 TABLET ORAL DAILY
Qty: 90 TABLET | Refills: 0 | Status: SHIPPED | OUTPATIENT
Start: 2022-06-10 | End: 2022-09-15 | Stop reason: SDUPTHER

## 2022-06-10 RX ORDER — DULOXETIN HYDROCHLORIDE 60 MG/1
60 CAPSULE, DELAYED RELEASE ORAL DAILY
Qty: 90 CAPSULE | Refills: 0 | Status: SHIPPED | OUTPATIENT
Start: 2022-06-10 | End: 2022-09-15 | Stop reason: SDUPTHER

## 2022-06-10 RX ORDER — ZOLPIDEM TARTRATE 10 MG/1
10 TABLET ORAL NIGHTLY
Qty: 30 TABLET | Refills: 2 | Status: SHIPPED | OUTPATIENT
Start: 2022-06-10 | End: 2022-09-23 | Stop reason: DRUGHIGH

## 2022-06-10 NOTE — PROGRESS NOTES
Outpatient Psychiatry Follow-Up Visit (MD/NP)    6/10/2022    Clinical Status of Patient:  Outpatient (Ambulatory)    Chief Complaint:  Mirna Pollock is a 46 y.o. female who presents today for follow-up of depression, anxiety and insomnia. Significant past medical history of migraines (requiring every 3 months botox; and monthly monoclonal antibodies); fibromyalgia; possible IBS . Patient has been involved with mental health providers for 10 years.Patient last seen for follow-up on 1/14/22. Met with patient.      Interval History and Content of Current Session:  Interim Events/Subjective Report/Content of Current Session:   Patient said she had a lot going on. Patient has been missing a lot of work do to her migraines. Patient is protected from being fired but was put on extended leave. Patient also has mother in law living with her now. Patient has to go to hematologist to check on an elevated platelet level which makes her feel worried. Patient has not been leaving house. Patient is excited to go to concert soon in [place]. Patient is nervous about the flight and travelling alone.  She is also nevous about being in a crowd. Patient has lost 20lbs in the last month and a half. Patient said Vistaril has not helped as much and patient anxiety has heightened. Patient believes it is her life events that are causing the anxiety. Patient has not been able to have Botox for migraines for while due to high BP. Overall, patient is stable and wants to continue medication regimen. No SI/HI/AVH.      Psychiatric Review of Systems-is patient experiencing or having changes in  sleep: no  appetite: yes  weight: yes  energy/anergy: no  anhedonia: no  libido: no  anxiety: yes  guilty/hopelessness: no  concentration: no  Irritability: no  Paranoia/delusions: no  S.I.B.s/risky behavior: no    Review of Systems   · PSYCHIATRIC: Pertinant items are noted in the narrative.  · CONSTITUTIONAL: No weight gain or loss.  · MUSCULOSKELETAL: No pain  or stiffness of the joints.  · NEUROLOGIC: No weakness, sensory changes, seizures, confusion, memory loss, tremor or other abnormal movements.  · RESPIRATORY: No shortness of breath.  · CARDIOVASCULAR: No tachycardia or chest pain.  · GASTROINTESTINAL: No nausea, vomiting, pain, constipation or diarrhea.    Past Medical, Family and Social History: The patient's past medical, family and social history have been reviewed and updated as appropriate within the electronic medical record - see encounter notes.    Compliance: yes    Side effects: None    Risk Parameters:  Patient reports no suicidal ideation  Patient reports no homicidal ideation  Patient reports no self-injurious behavior  Patient reports no violent behavior    Exam (detailed: at least 9 elements; comprehensive: all 15 elements)   Constitutional  Vitals:  Most recent vital signs, dated greater than 90 days prior to this appointment, were not reviewed.   There were no vitals filed for this visit.     General:  unremarkable, age appropriate     Musculoskeletal  Muscle Strength/Tone:  not examined   Gait & Station:  non-ataxic     Psychiatric  Speech:  no latency; no press   Mood & Affect:  steady  congruent and appropriate   Thought Process:  normal and logical   Associations:  intact   Thought Content:  no suicidality, no homicidality, delusions, or paranoia   Insight:  intact   Judgement: behavior is adequate to circumstances   Orientation:  grossly intact   Memory: intact for content of interview   Language: grossly intact   Attention Span & Concentration:  able to focus   Fund of Knowledge:  intact and appropriate to age and level of education     Assessment and Diagnosis   Status/Progress: Based on the examination today, the patient's problem(s) is/are well controlled.  New problems have not been presented today.   Co-morbidities are not complicating management of the primary condition.  There are no active rule-out diagnoses for this patient at this  time.     General Impression:    ICD-10-CM ICD-9-CM   1. LALITA (generalized anxiety disorder)  F41.1 300.02   2. Recurrent major depression in partial remission  F33.41 296.35   3. Social anxiety disorder  F40.10 300.23   4. Insomnia, unspecified type  G47.00 780.52   5. Fibromyalgia  M79.7 729.1       Intervention/Counseling/Treatment Plan   · Medication Management: Continue current medications. The risks and benefits of medication were discussed with the patient.  Cymbalta 60 mg daily   Wellbutrin XL 300 mg daily  ambien 10 mg nightly   -Continue Vistaril 25 mg TID PRN     Per neurology for migraines:  Zanaflex 4 mg nightly   Elavil 25 mg nightly     Hold propranolol 20 mg daily which will be helpful for anxiety, HTN, and migraines    Discussed with patient informed consent including diagnosis, risks and benefits of proposed treatment above vs. alternative treatments vs. no treatment, as well as serious and common side effects of these treatments, and the inherent unpredictability of individual responses to these treatments. The patient expresses understanding of the above and displays the capacity to agree with this current plan. Patient also agrees that, currently, the benefits outweigh the risks and would like to pursue treatment at this time, and had no other questions.    Instructions:  · Take all medications as prescribed.    · Abstain from recreational drugs and alcohol.  · Present to ED or call 911 for SI/HI plan or intent, psychosis, or medical emergency.    Return to Clinic: 3 months    Case to be discussed with supervising staff, Dr. Abner Jin MD.    Jacquie Flores MD  [facility], PGY-3

## 2022-06-24 ENCOUNTER — PATIENT MESSAGE (OUTPATIENT)
Dept: NEUROLOGY | Facility: CLINIC | Age: 47
End: 2022-06-24
Payer: COMMERCIAL

## 2022-06-24 DIAGNOSIS — G43.009 MIGRAINE WITHOUT AURA AND WITHOUT STATUS MIGRAINOSUS, NOT INTRACTABLE: ICD-10-CM

## 2022-06-24 DIAGNOSIS — G43.019 MIGRAINE WITHOUT AURA, INTRACTABLE: ICD-10-CM

## 2022-06-27 RX ORDER — TIZANIDINE 4 MG/1
6 TABLET ORAL NIGHTLY
Qty: 45 TABLET | Refills: 5 | Status: SHIPPED | OUTPATIENT
Start: 2022-06-27 | End: 2022-10-27 | Stop reason: SDUPTHER

## 2022-06-28 ENCOUNTER — OFFICE VISIT (OUTPATIENT)
Dept: HEMATOLOGY/ONCOLOGY | Facility: CLINIC | Age: 47
End: 2022-06-28
Payer: COMMERCIAL

## 2022-06-28 ENCOUNTER — LAB VISIT (OUTPATIENT)
Dept: LAB | Facility: HOSPITAL | Age: 47
End: 2022-06-28
Payer: COMMERCIAL

## 2022-06-28 VITALS
HEART RATE: 67 BPM | SYSTOLIC BLOOD PRESSURE: 213 MMHG | HEIGHT: 66 IN | WEIGHT: 145.5 LBS | BODY MASS INDEX: 23.38 KG/M2 | DIASTOLIC BLOOD PRESSURE: 125 MMHG | OXYGEN SATURATION: 99 % | RESPIRATION RATE: 16 BRPM | TEMPERATURE: 98 F

## 2022-06-28 DIAGNOSIS — D75.839 THROMBOCYTOSIS: Primary | ICD-10-CM

## 2022-06-28 DIAGNOSIS — I10 ESSENTIAL HYPERTENSION: ICD-10-CM

## 2022-06-28 DIAGNOSIS — G43.009 MIGRAINE WITHOUT AURA AND WITHOUT STATUS MIGRAINOSUS, NOT INTRACTABLE: ICD-10-CM

## 2022-06-28 DIAGNOSIS — N63.20 BREAST MASS, LEFT: ICD-10-CM

## 2022-06-28 DIAGNOSIS — D75.839 THROMBOCYTOSIS: ICD-10-CM

## 2022-06-28 DIAGNOSIS — F41.1 GAD (GENERALIZED ANXIETY DISORDER): ICD-10-CM

## 2022-06-28 LAB
ALBUMIN SERPL BCP-MCNC: 3.8 G/DL (ref 3.5–5.2)
ALP SERPL-CCNC: 84 U/L (ref 55–135)
ALT SERPL W/O P-5'-P-CCNC: 11 U/L (ref 10–44)
ANION GAP SERPL CALC-SCNC: 5 MMOL/L (ref 8–16)
AST SERPL-CCNC: 20 U/L (ref 10–40)
BASOPHILS # BLD AUTO: 0.14 K/UL (ref 0–0.2)
BASOPHILS NFR BLD: 1.5 % (ref 0–1.9)
BILIRUB SERPL-MCNC: 0.6 MG/DL (ref 0.1–1)
BUN SERPL-MCNC: 7 MG/DL (ref 6–20)
CALCIUM SERPL-MCNC: 9.9 MG/DL (ref 8.7–10.5)
CHLORIDE SERPL-SCNC: 104 MMOL/L (ref 95–110)
CO2 SERPL-SCNC: 30 MMOL/L (ref 23–29)
CREAT SERPL-MCNC: 0.8 MG/DL (ref 0.5–1.4)
DIFFERENTIAL METHOD: ABNORMAL
EOSINOPHIL # BLD AUTO: 0.5 K/UL (ref 0–0.5)
EOSINOPHIL NFR BLD: 5.6 % (ref 0–8)
ERYTHROCYTE [DISTWIDTH] IN BLOOD BY AUTOMATED COUNT: 12.9 % (ref 11.5–14.5)
EST. GFR  (AFRICAN AMERICAN): >60 ML/MIN/1.73 M^2
EST. GFR  (NON AFRICAN AMERICAN): >60 ML/MIN/1.73 M^2
FERRITIN SERPL-MCNC: 145 NG/ML (ref 20–300)
GLUCOSE SERPL-MCNC: 85 MG/DL (ref 70–110)
HCT VFR BLD AUTO: 41.8 % (ref 37–48.5)
HGB BLD-MCNC: 13.9 G/DL (ref 12–16)
IMM GRANULOCYTES # BLD AUTO: 0.11 K/UL (ref 0–0.04)
IMM GRANULOCYTES NFR BLD AUTO: 1.2 % (ref 0–0.5)
LDH SERPL L TO P-CCNC: 183 U/L (ref 110–260)
LYMPHOCYTES # BLD AUTO: 1.8 K/UL (ref 1–4.8)
LYMPHOCYTES NFR BLD: 19.4 % (ref 18–48)
MCH RBC QN AUTO: 30.9 PG (ref 27–31)
MCHC RBC AUTO-ENTMCNC: 33.3 G/DL (ref 32–36)
MCV RBC AUTO: 93 FL (ref 82–98)
MONOCYTES # BLD AUTO: 0.8 K/UL (ref 0.3–1)
MONOCYTES NFR BLD: 8.5 % (ref 4–15)
NEUTROPHILS # BLD AUTO: 6 K/UL (ref 1.8–7.7)
NEUTROPHILS NFR BLD: 63.8 % (ref 38–73)
NRBC BLD-RTO: 0 /100 WBC
PLATELET # BLD AUTO: 426 K/UL (ref 150–450)
PMV BLD AUTO: 10 FL (ref 9.2–12.9)
POTASSIUM SERPL-SCNC: 3.2 MMOL/L (ref 3.5–5.1)
PROT SERPL-MCNC: 7.4 G/DL (ref 6–8.4)
RBC # BLD AUTO: 4.5 M/UL (ref 4–5.4)
SODIUM SERPL-SCNC: 139 MMOL/L (ref 136–145)
URATE SERPL-MCNC: 4.6 MG/DL (ref 2.4–5.7)
WBC # BLD AUTO: 9.44 K/UL (ref 3.9–12.7)

## 2022-06-28 PROCEDURE — 81219 CALR GENE COM VARIANTS: CPT | Performed by: PHYSICIAN ASSISTANT

## 2022-06-28 PROCEDURE — 81339 MPL GENE SEQ ALYS EXON 10: CPT | Performed by: PHYSICIAN ASSISTANT

## 2022-06-28 PROCEDURE — 84550 ASSAY OF BLOOD/URIC ACID: CPT | Performed by: PHYSICIAN ASSISTANT

## 2022-06-28 PROCEDURE — 85025 COMPLETE CBC W/AUTO DIFF WBC: CPT | Performed by: PHYSICIAN ASSISTANT

## 2022-06-28 PROCEDURE — 1159F PR MEDICATION LIST DOCUMENTED IN MEDICAL RECORD: ICD-10-PCS | Mod: CPTII,S$GLB,, | Performed by: PHYSICIAN ASSISTANT

## 2022-06-28 PROCEDURE — 4010F PR ACE/ARB THEARPY RXD/TAKEN: ICD-10-PCS | Mod: CPTII,S$GLB,, | Performed by: PHYSICIAN ASSISTANT

## 2022-06-28 PROCEDURE — 1159F MED LIST DOCD IN RCRD: CPT | Mod: CPTII,S$GLB,, | Performed by: PHYSICIAN ASSISTANT

## 2022-06-28 PROCEDURE — 83615 LACTATE (LD) (LDH) ENZYME: CPT | Performed by: PHYSICIAN ASSISTANT

## 2022-06-28 PROCEDURE — 80053 COMPREHEN METABOLIC PANEL: CPT | Performed by: PHYSICIAN ASSISTANT

## 2022-06-28 PROCEDURE — 99999 PR PBB SHADOW E&M-EST. PATIENT-LVL IV: CPT | Mod: PBBFAC,,, | Performed by: PHYSICIAN ASSISTANT

## 2022-06-28 PROCEDURE — 81270 JAK2 GENE: CPT | Performed by: PHYSICIAN ASSISTANT

## 2022-06-28 PROCEDURE — 3077F SYST BP >= 140 MM HG: CPT | Mod: CPTII,S$GLB,, | Performed by: PHYSICIAN ASSISTANT

## 2022-06-28 PROCEDURE — 82728 ASSAY OF FERRITIN: CPT | Performed by: PHYSICIAN ASSISTANT

## 2022-06-28 PROCEDURE — 3080F DIAST BP >= 90 MM HG: CPT | Mod: CPTII,S$GLB,, | Performed by: PHYSICIAN ASSISTANT

## 2022-06-28 PROCEDURE — 36415 COLL VENOUS BLD VENIPUNCTURE: CPT | Performed by: PHYSICIAN ASSISTANT

## 2022-06-28 PROCEDURE — 3008F PR BODY MASS INDEX (BMI) DOCUMENTED: ICD-10-PCS | Mod: CPTII,S$GLB,, | Performed by: PHYSICIAN ASSISTANT

## 2022-06-28 PROCEDURE — 4010F ACE/ARB THERAPY RXD/TAKEN: CPT | Mod: CPTII,S$GLB,, | Performed by: PHYSICIAN ASSISTANT

## 2022-06-28 PROCEDURE — 99205 PR OFFICE/OUTPT VISIT, NEW, LEVL V, 60-74 MIN: ICD-10-PCS | Mod: S$GLB,,, | Performed by: PHYSICIAN ASSISTANT

## 2022-06-28 PROCEDURE — 99999 PR PBB SHADOW E&M-EST. PATIENT-LVL IV: ICD-10-PCS | Mod: PBBFAC,,, | Performed by: PHYSICIAN ASSISTANT

## 2022-06-28 PROCEDURE — 3080F PR MOST RECENT DIASTOLIC BLOOD PRESSURE >= 90 MM HG: ICD-10-PCS | Mod: CPTII,S$GLB,, | Performed by: PHYSICIAN ASSISTANT

## 2022-06-28 PROCEDURE — 99205 OFFICE O/P NEW HI 60 MIN: CPT | Mod: S$GLB,,, | Performed by: PHYSICIAN ASSISTANT

## 2022-06-28 PROCEDURE — 3008F BODY MASS INDEX DOCD: CPT | Mod: CPTII,S$GLB,, | Performed by: PHYSICIAN ASSISTANT

## 2022-06-28 PROCEDURE — 3077F PR MOST RECENT SYSTOLIC BLOOD PRESSURE >= 140 MM HG: ICD-10-PCS | Mod: CPTII,S$GLB,, | Performed by: PHYSICIAN ASSISTANT

## 2022-06-28 NOTE — PROGRESS NOTES
"  Section of Hematology and Stem Cell Transplantation  New Patient Consult     Referred by:  Dr. Luc Venegas  Date of visit: 6/28/22    Reason for visit: Thrombocytosis [D75.839]    History of Present Ilness:   Mirna Pollock (Mirna) is a pleasant 46 y.o.female with PMHx of HTN, migraine d/o, anxiety, referred by PCP for thrombocytosis, accompanied by her . She has notably had a mild thrombocytosis present dating back to at least 2017,  See below. She presented to PCP for annual exam after noticing a new L-breast mass, and she is currently awaiting mammogram appt which is scheduled in 2 weeks. Otherwise, the patient has noticed approximately 15lbs unintended weight loss over the last 3 months. She denies fevers, chills, night sweats. She does endorse a waxing/waning "lump" on the left side of her neck that is painful but always self-resolves, it is not present at current. She has never had this imaged and does not feel it is associated with other symptoms. Denies any bleeding/bruising. She does endorse chronic headaches/migraine d/o for which she follows with neurology. Denies hx of VTE/CVA/miscarraiges. She is not on any oral contraceptives, she had a hysterectomy ~10 years ago d/t endometriosis. Otherwise, she has not noticed any unusual symptoms. She is very anxious during today's visit and HTN with SBP >200, after visit this was rechecked and down to SBP of 180s, she notably did not take her anti-hypertensives this morning, she denies HA/vsision changes -- does not wish to go to ED and will take meds upon returning home.      We discussed repeating labs and pursuing ET work up with JAK2/reflex panel.   She has mammogram scheduled in two weeks for L breast mass. We will f/u after this study. Discussed s/sx that warrant emergent evaluation.           Latest Reference Range & Units 09/28/17 06:05 04/25/18 16:23 06/04/21 13:29 05/20/22 10:08   Platelets 150 - 450 K/uL 382 (H) 449 (H) 490 (H) 494 (H)   (H): " Data is abnormally high     PAST MEDICAL HISTORY:   Past Medical History:   Diagnosis Date    Anxiety     ASD (atrial septal defect)     Endometriosis     Headache(784.0)     Hypertension     Kidney stones fibomyalgia, migraine, heart closer, kidey stones   Answers for HPI/ROS submitted by the patient on 2022  appetite change : Yes  unexpected weight change: Yes  mouth sores: No  visual disturbance: No  adenopathy: Yes        PAST SURGICAL HISTORY:   Past Surgical History:   Procedure Laterality Date    APPENDECTOMY      CARDIAC CATHETERIZATION  10/01/2010    ASD device closure     SECTION      x 2    CHOLECYSTECTOMY      HYSTERECTOMY      12 yrs ago, still has ovaries, due to endometriosis by Dr. Hernandez       PAST SOCIAL HISTORY:  Social History     Tobacco Use    Smoking status: Never Smoker    Smokeless tobacco: Never Used   Substance Use Topics    Alcohol use: No     Alcohol/week: 0.0 standard drinks    Drug use: No       FAMILY HISTORY:  Family History   Adopted: Yes   Problem Relation Age of Onset    Migraines Son     No Known Problems Son        CURRENT MEDICATIONS:   Current Outpatient Medications   Medication Sig    benazepril-hydrochlorthiazide (LOTENSIN HCT) 20-25 mg Tab Take 1 tablet by mouth once daily.    buPROPion (WELLBUTRIN XL) 300 MG 24 hr tablet Take 1 tablet (300 mg total) by mouth once daily.    DULoxetine (CYMBALTA) 60 MG capsule Take 1 capsule (60 mg total) by mouth once daily.    erenumab-aooe (AIMOVIG) 140 mg/mL autoinjector Inject 1 pen (140 mg total) into the skin every 28 days.    hydrOXYzine pamoate (VISTARIL) 25 MG Cap Take 1 capsule (25 mg total) by mouth every 8 (eight) hours as needed (anxiety).    labetaloL (NORMODYNE) 100 MG tablet TAKE 1 TABLET(100 MG) BY MOUTH TWICE DAILY    promethazine (PHENERGAN) 25 MG tablet TAKE 1 TABLET BY MOUTH EVERY 4 TO 6 HOURS AS NEEDED FOR NAUSEA    tiZANidine (ZANAFLEX) 4 MG tablet Take 1.5 tablets (6 mg total) by  mouth every evening. No alcohol, no driving with medication.    UBROGEPANT 100 mg tablet TAKE 1 TABLET BY MOUTH EVERY 2 HOURS AS NEEDED FOR MIGRAINE.  MG PER DAY    zolpidem (AMBIEN) 10 mg Tab Take 1 tablet (10 mg total) by mouth every evening. Take once tab nightly    estradiol 0.05 mg/24 hr td ptsw (VIVELLE-DOT) 0.05 mg/24 hr Place 1 patch onto the skin once a week. (Patient not taking: Reported on 6/28/2022)    potassium chloride (MICRO-K) 10 MEQ CpSR Take 1 capsule (10 mEq total) by mouth once daily. (Patient not taking: Reported on 6/28/2022)     Current Facility-Administered Medications   Medication    onabotulinumtoxina injection 200 Units       ALLERGIES:   Review of patient's allergies indicates:   Allergen Reactions    Iodine and iodide containing products Other (See Comments)    Penicillins Rash       Review of Systems:     Review of Systems   Constitutional: Positive for malaise/fatigue and weight loss. Negative for chills, diaphoresis and fever.   HENT: Negative for congestion and nosebleeds.    Eyes: Negative for blurred vision.   Respiratory: Negative for cough and shortness of breath.    Cardiovascular: Negative for chest pain and leg swelling.   Gastrointestinal: Negative for abdominal pain, blood in stool, constipation, diarrhea and nausea.   Genitourinary: Negative for frequency.   Musculoskeletal: Positive for back pain. Negative for falls and joint pain.   Skin: Negative for rash.   Neurological: Positive for headaches. Negative for weakness.   Psychiatric/Behavioral: The patient is nervous/anxious.          Physical Exam:     Vitals:    06/28/22 0955   BP: (!) 213/125   Pulse: 67   Resp: 16   Temp: 98.2 °F (36.8 °C)       Physical Exam  Vitals reviewed.   Constitutional:       General: She is not in acute distress.     Appearance: Normal appearance.   HENT:      Head: Normocephalic.      Right Ear: External ear normal.      Nose: Nose normal.      Mouth/Throat:      Mouth: Mucous  membranes are moist.      Pharynx: Oropharynx is clear.   Eyes:      Extraocular Movements: Extraocular movements intact.      Pupils: Pupils are equal, round, and reactive to light.   Cardiovascular:      Rate and Rhythm: Regular rhythm.      Pulses: Normal pulses.   Pulmonary:      Effort: Pulmonary effort is normal.      Breath sounds: Normal breath sounds.   Chest:   Breasts:      Left: Mass present.        Comments: L breast mass  Abdominal:      General: Bowel sounds are normal. There is no distension.      Palpations: Abdomen is soft. There is no mass.   Musculoskeletal:         General: No swelling.      Cervical back: Neck supple. No rigidity.   Lymphadenopathy:      Cervical: No cervical adenopathy.   Skin:     General: Skin is warm.      Findings: No bruising or rash.   Neurological:      General: No focal deficit present.      Mental Status: She is alert.   Psychiatric:         Mood and Affect: Mood normal.         Behavior: Behavior normal.          Labs:   Lab Results   Component Value Date    WBC 11.08 05/20/2022    HGB 15.0 05/20/2022    HCT 45.7 05/20/2022    MCV 92 05/20/2022     (H) 05/20/2022       Lab Results   Component Value Date     05/20/2022    K 3.2 (L) 05/20/2022     05/20/2022    CO2 27 05/20/2022    BUN 6 05/20/2022    CREATININE 0.8 05/20/2022    ALBUMIN 4.0 05/20/2022    BILITOT 0.7 05/20/2022    ALKPHOS 92 05/20/2022    AST 17 05/20/2022    ALT 13 05/20/2022       Lab Results   Component Value Date    FERRITIN 145 06/28/2022       No components found for: RETICULOCYTES    Lab Results   Component Value Date     06/28/2022            Assessment and Plan:   Mirna ERIN Pollock (Mirna) is a pleasant 46 y.o.female referred for consultation of thrombocytosis.     1. Thrombocytosis  - Pt with mild, isolated thrombocytosis dating back to at least 2017, slowly increasing over recent years  - No hx of VTE/CVA/miscarraiges   - No s/sx concerning for infection/inflammation,  non-smoker  - Isolated thrombocytosis without other lab abnormalities concerning for ET which was d/w patient   - Will repeat CBC, CMP, review smear, and send JAK2 with MPN reflex    2. Breast Mass  - Pt with firm fixed L breast mass, recently evaluated by PCP  - Mammogram scheduled 7/14    3. Hypertension  - Pt with chronic HTN, BP elevated in clinic today. Very anxious and did not take her BP meds this AM  - Recheck in clinic 180/96, pt w/o symptoms  - Will take BP meds when returns to home and continue to f/u with PCP     4. Migraines  - Longstanding hx of migraine d/o, follows with neuro    5. Hypokalemia  - Mild hypokalemia today, chronic in setting of HCTZ  - Continue K+ supplementation    6. Anxiety  - Pt with LALITA, reports anxiety associated with medical visit  - Continue wellbutrin, cymbalta, vistaril  - Continue following with PCP    Orders/Follow Up:           BMT Chart Routing  Urgent    Follow up with physician    Follow up with HUSEYIN 2 weeks. F/u with michelle on 7/18, with labs   Infusion scheduling note    Injection scheduling note    Labs CBC and CMP   Lab interval:     Imaging    Pharmacy appointment    Other referrals           Thank you for allowing me to partake in the care of this patient. If there are any questions, please do not hesitate to reach out.    Michelle Meyers PA-C  Hematology, Oncology, and Stem Cell Transplantation  [facility]

## 2022-06-29 ENCOUNTER — PATIENT MESSAGE (OUTPATIENT)
Dept: HEMATOLOGY/ONCOLOGY | Facility: CLINIC | Age: 47
End: 2022-06-29
Payer: COMMERCIAL

## 2022-07-01 DIAGNOSIS — G43.719 INTRACTABLE CHRONIC MIGRAINE WITHOUT AURA AND WITHOUT STATUS MIGRAINOSUS: ICD-10-CM

## 2022-07-01 DIAGNOSIS — G43.009 MIGRAINE WITHOUT AURA AND WITHOUT STATUS MIGRAINOSUS, NOT INTRACTABLE: ICD-10-CM

## 2022-07-06 RX ORDER — PROMETHAZINE HYDROCHLORIDE 25 MG/1
TABLET ORAL
Qty: 20 TABLET | Refills: 1 | Status: SHIPPED | OUTPATIENT
Start: 2022-07-06 | End: 2023-03-16 | Stop reason: SDUPTHER

## 2022-07-07 LAB
MPNR  FINAL DIAGNOSIS: NORMAL
MPNR  SPECIMEN TYPE: 3
MPNR RESULT: NORMAL

## 2022-07-15 ENCOUNTER — PATIENT MESSAGE (OUTPATIENT)
Dept: INTERNAL MEDICINE | Facility: CLINIC | Age: 47
End: 2022-07-15
Payer: COMMERCIAL

## 2022-07-19 ENCOUNTER — PATIENT MESSAGE (OUTPATIENT)
Dept: HEMATOLOGY/ONCOLOGY | Facility: CLINIC | Age: 47
End: 2022-07-19
Payer: COMMERCIAL

## 2022-07-26 ENCOUNTER — TELEPHONE (OUTPATIENT)
Dept: HEMATOLOGY/ONCOLOGY | Facility: CLINIC | Age: 47
End: 2022-07-26
Payer: COMMERCIAL

## 2022-07-31 ENCOUNTER — PATIENT MESSAGE (OUTPATIENT)
Dept: NEUROLOGY | Facility: CLINIC | Age: 47
End: 2022-07-31
Payer: COMMERCIAL

## 2022-08-05 ENCOUNTER — OFFICE VISIT (OUTPATIENT)
Dept: HEMATOLOGY/ONCOLOGY | Facility: CLINIC | Age: 47
End: 2022-08-05
Payer: COMMERCIAL

## 2022-08-05 ENCOUNTER — LAB VISIT (OUTPATIENT)
Dept: LAB | Facility: HOSPITAL | Age: 47
End: 2022-08-05
Payer: COMMERCIAL

## 2022-08-05 VITALS
HEART RATE: 62 BPM | RESPIRATION RATE: 16 BRPM | BODY MASS INDEX: 23.76 KG/M2 | HEIGHT: 65 IN | TEMPERATURE: 98 F | OXYGEN SATURATION: 100 % | DIASTOLIC BLOOD PRESSURE: 75 MMHG | WEIGHT: 142.63 LBS | SYSTOLIC BLOOD PRESSURE: 188 MMHG

## 2022-08-05 DIAGNOSIS — J02.9 SORE THROAT: Primary | ICD-10-CM

## 2022-08-05 DIAGNOSIS — D75.839 THROMBOCYTOSIS: ICD-10-CM

## 2022-08-05 LAB
ALBUMIN SERPL BCP-MCNC: 4 G/DL (ref 3.5–5.2)
ALP SERPL-CCNC: 82 U/L (ref 55–135)
ALT SERPL W/O P-5'-P-CCNC: 5 U/L (ref 10–44)
ANION GAP SERPL CALC-SCNC: 10 MMOL/L (ref 8–16)
AST SERPL-CCNC: 13 U/L (ref 10–40)
BASOPHILS # BLD AUTO: 0.2 K/UL (ref 0–0.2)
BASOPHILS NFR BLD: 1.4 % (ref 0–1.9)
BILIRUB SERPL-MCNC: 0.5 MG/DL (ref 0.1–1)
BUN SERPL-MCNC: 5 MG/DL (ref 6–20)
CALCIUM SERPL-MCNC: 9.7 MG/DL (ref 8.7–10.5)
CHLORIDE SERPL-SCNC: 102 MMOL/L (ref 95–110)
CO2 SERPL-SCNC: 27 MMOL/L (ref 23–29)
CREAT SERPL-MCNC: 0.8 MG/DL (ref 0.5–1.4)
DIFFERENTIAL METHOD: ABNORMAL
EOSINOPHIL # BLD AUTO: 0.7 K/UL (ref 0–0.5)
EOSINOPHIL NFR BLD: 4.4 % (ref 0–8)
ERYTHROCYTE [DISTWIDTH] IN BLOOD BY AUTOMATED COUNT: 13 % (ref 11.5–14.5)
EST. GFR  (NO RACE VARIABLE): >60 ML/MIN/1.73 M^2
GLUCOSE SERPL-MCNC: 83 MG/DL (ref 70–110)
HCT VFR BLD AUTO: 42.1 % (ref 37–48.5)
HGB BLD-MCNC: 14.6 G/DL (ref 12–16)
IMM GRANULOCYTES # BLD AUTO: 0.16 K/UL (ref 0–0.04)
IMM GRANULOCYTES NFR BLD AUTO: 1.1 % (ref 0–0.5)
LYMPHOCYTES # BLD AUTO: 3.8 K/UL (ref 1–4.8)
LYMPHOCYTES NFR BLD: 25.8 % (ref 18–48)
MCH RBC QN AUTO: 31.4 PG (ref 27–31)
MCHC RBC AUTO-ENTMCNC: 34.7 G/DL (ref 32–36)
MCV RBC AUTO: 91 FL (ref 82–98)
MONOCYTES # BLD AUTO: 1.3 K/UL (ref 0.3–1)
MONOCYTES NFR BLD: 9 % (ref 4–15)
NEUTROPHILS # BLD AUTO: 8.6 K/UL (ref 1.8–7.7)
NEUTROPHILS NFR BLD: 58.3 % (ref 38–73)
NRBC BLD-RTO: 0 /100 WBC
PLATELET # BLD AUTO: 418 K/UL (ref 150–450)
PMV BLD AUTO: 9.9 FL (ref 9.2–12.9)
POTASSIUM SERPL-SCNC: 3.3 MMOL/L (ref 3.5–5.1)
PROT SERPL-MCNC: 7.7 G/DL (ref 6–8.4)
RBC # BLD AUTO: 4.65 M/UL (ref 4–5.4)
SARS-COV-2 RDRP RESP QL NAA+PROBE: NEGATIVE
SODIUM SERPL-SCNC: 139 MMOL/L (ref 136–145)
WBC # BLD AUTO: 14.72 K/UL (ref 3.9–12.7)

## 2022-08-05 PROCEDURE — 1159F PR MEDICATION LIST DOCUMENTED IN MEDICAL RECORD: ICD-10-PCS | Mod: CPTII,S$GLB,, | Performed by: PHYSICIAN ASSISTANT

## 2022-08-05 PROCEDURE — 99999 PR PBB SHADOW E&M-EST. PATIENT-LVL IV: CPT | Mod: PBBFAC,,, | Performed by: PHYSICIAN ASSISTANT

## 2022-08-05 PROCEDURE — 99999 PR PBB SHADOW E&M-EST. PATIENT-LVL IV: ICD-10-PCS | Mod: PBBFAC,,, | Performed by: PHYSICIAN ASSISTANT

## 2022-08-05 PROCEDURE — 3078F PR MOST RECENT DIASTOLIC BLOOD PRESSURE < 80 MM HG: ICD-10-PCS | Mod: CPTII,S$GLB,, | Performed by: PHYSICIAN ASSISTANT

## 2022-08-05 PROCEDURE — 1160F PR REVIEW ALL MEDS BY PRESCRIBER/CLIN PHARMACIST DOCUMENTED: ICD-10-PCS | Mod: CPTII,S$GLB,, | Performed by: PHYSICIAN ASSISTANT

## 2022-08-05 PROCEDURE — 3078F DIAST BP <80 MM HG: CPT | Mod: CPTII,S$GLB,, | Performed by: PHYSICIAN ASSISTANT

## 2022-08-05 PROCEDURE — 99215 OFFICE O/P EST HI 40 MIN: CPT | Mod: S$GLB,,, | Performed by: PHYSICIAN ASSISTANT

## 2022-08-05 PROCEDURE — 4010F ACE/ARB THERAPY RXD/TAKEN: CPT | Mod: CPTII,S$GLB,, | Performed by: PHYSICIAN ASSISTANT

## 2022-08-05 PROCEDURE — 3077F PR MOST RECENT SYSTOLIC BLOOD PRESSURE >= 140 MM HG: ICD-10-PCS | Mod: CPTII,S$GLB,, | Performed by: PHYSICIAN ASSISTANT

## 2022-08-05 PROCEDURE — 3077F SYST BP >= 140 MM HG: CPT | Mod: CPTII,S$GLB,, | Performed by: PHYSICIAN ASSISTANT

## 2022-08-05 PROCEDURE — 80053 COMPREHEN METABOLIC PANEL: CPT | Performed by: PHYSICIAN ASSISTANT

## 2022-08-05 PROCEDURE — 3008F PR BODY MASS INDEX (BMI) DOCUMENTED: ICD-10-PCS | Mod: CPTII,S$GLB,, | Performed by: PHYSICIAN ASSISTANT

## 2022-08-05 PROCEDURE — U0002 COVID-19 LAB TEST NON-CDC: HCPCS | Performed by: PHYSICIAN ASSISTANT

## 2022-08-05 PROCEDURE — 36415 COLL VENOUS BLD VENIPUNCTURE: CPT | Performed by: PHYSICIAN ASSISTANT

## 2022-08-05 PROCEDURE — 1160F RVW MEDS BY RX/DR IN RCRD: CPT | Mod: CPTII,S$GLB,, | Performed by: PHYSICIAN ASSISTANT

## 2022-08-05 PROCEDURE — 1159F MED LIST DOCD IN RCRD: CPT | Mod: CPTII,S$GLB,, | Performed by: PHYSICIAN ASSISTANT

## 2022-08-05 PROCEDURE — 3008F BODY MASS INDEX DOCD: CPT | Mod: CPTII,S$GLB,, | Performed by: PHYSICIAN ASSISTANT

## 2022-08-05 PROCEDURE — 4010F PR ACE/ARB THEARPY RXD/TAKEN: ICD-10-PCS | Mod: CPTII,S$GLB,, | Performed by: PHYSICIAN ASSISTANT

## 2022-08-05 PROCEDURE — 85025 COMPLETE CBC W/AUTO DIFF WBC: CPT | Performed by: PHYSICIAN ASSISTANT

## 2022-08-05 PROCEDURE — 99215 PR OFFICE/OUTPT VISIT, EST, LEVL V, 40-54 MIN: ICD-10-PCS | Mod: S$GLB,,, | Performed by: PHYSICIAN ASSISTANT

## 2022-08-05 NOTE — PROGRESS NOTES
"Section of Hematology and Stem Cell Transplantation  Follow Up Note     Visit Date: 08/05/2022    Primary Oncologic Diagnosis: Sore throat [J02.9]    History of Present Ilness: (Initial Consult, 6/28/2022)   Mirna Pollock (Mirna) is a pleasant 46 y.o.female with PMHx of HTN, migraine d/o, anxiety, referred by PCP for thrombocytosis, accompanied by her . She has notably had a mild thrombocytosis present dating back to at least 2017,  See below. She presented to PCP for annual exam after noticing a new L-breast mass, and she is currently awaiting mammogram appt which is scheduled in 2 weeks. Otherwise, the patient has noticed approximately 15lbs unintended weight loss over the last 3 months. She denies fevers, chills, night sweats. She does endorse a waxing/waning "lump" on the left side of her neck that is painful but always self-resolves, it is not present at current. She has never had this imaged and does not feel it is associated with other symptoms. Denies any bleeding/bruising. She does endorse chronic headaches/migraine d/o for which she follows with neurology. Denies hx of VTE/CVA/miscarraiges. She is not on any oral contraceptives, she had a hysterectomy ~10 years ago d/t endometriosis. Otherwise, she has not noticed any unusual symptoms. She is very anxious during today's visit and HTN with SBP >200, after visit this was rechecked and down to SBP of 180s, she notably did not take her anti-hypertensives this morning, she denies HA/vsision changes -- does not wish to go to ED and will take meds upon returning home.       We discussed repeating labs and pursuing ET work up with JAK2/reflex panel.   She has mammogram scheduled in two weeks for L breast mass. We will f/u after this study. Discussed s/sx that warrant emergent evaluation.       Interval History: (8/5/2022)   The patient presents today for follow up of evaluation of thrombocytosis. Labs at last visit with normal CBC, platelet count again normal " today. Jak2, CALR, MPL studies negative. Exon12 not sent, but given normal plt count x2, will not send at this time - d/w patient. 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 She has had sore throat, chills, runny nose, fatigue. She is finally starting to feel better today, requested COVID swab which we have obtained.     She unfortunately had to cancel her mammogram as she transitioned to night shift, I've emphasized the importance of her following up on this.    She is again HTN during today's visit, she reports her BP gets high at doctors, it's not this high at home. She has taken her meds. She has not followed up with her PCP after initiating new anti-HTN regimen in may, I've encouraged her to follow up as she likely requires medication adjustments for her degree of HTN. She ensures that she has no dizziness, headaches, CP/SOB associated with her HTN. Manual recheck slightly improved in clinic. She does not want to go to the ED, I discussed s/sx that warrant emergent eval.       Past Medical History, Social History, and Past Family History are unchanged since last evaluation except for HPI.     CURRENT MEDICATIONS:   Current Outpatient Medications   Medication Sig    benazepril-hydrochlorthiazide (LOTENSIN HCT) 20-25 mg Tab Take 1 tablet by mouth once daily.    buPROPion (WELLBUTRIN XL) 300 MG 24 hr tablet Take 1 tablet (300 mg total) by mouth once daily.    DULoxetine (CYMBALTA) 60 MG capsule Take 1 capsule (60 mg total) by mouth once daily.    erenumab-aooe (AIMOVIG) 140 mg/mL autoinjector Inject 1 pen (140 mg total) into the skin every 28 days.    hydrOXYzine pamoate (VISTARIL) 25 MG Cap Take 1 capsule (25 mg total) by mouth every 8 (eight) hours as needed (anxiety).    labetaloL (NORMODYNE) 100 MG tablet TAKE 1 TABLET(100  MG) BY MOUTH TWICE DAILY    potassium chloride (MICRO-K) 10 MEQ CpSR Take 1 capsule (10 mEq total) by mouth once daily.    promethazine (PHENERGAN) 25 MG tablet TAKE 1 TABLET BY MOUTH EVERY 4 TO 6 HOURS AS NEEDED FOR NAUSEA    tiZANidine (ZANAFLEX) 4 MG tablet Take 1.5 tablets (6 mg total) by mouth every evening. No alcohol, no driving with medication.    UBROGEPANT 100 mg tablet TAKE 1 TABLET BY MOUTH EVERY 2 HOURS AS NEEDED FOR MIGRAINE.  MG PER DAY    estradiol 0.05 mg/24 hr td ptsw (VIVELLE-DOT) 0.05 mg/24 hr Place 1 patch onto the skin once a week. (Patient not taking: Reported on 6/28/2022)    zolpidem (AMBIEN) 10 mg Tab Take 1 tablet (10 mg total) by mouth every evening. Take once tab nightly     Current Facility-Administered Medications   Medication    onabotulinumtoxina injection 200 Units       ALLERGIES:   Review of patient's allergies indicates:   Allergen Reactions    Iodine and iodide containing products Other (See Comments)    Penicillins Rash         Review of Systems:     Review of Systems   Constitutional: Positive for malaise/fatigue and weight loss. Negative for chills, diaphoresis and fever.   HENT: Positive for congestion and sore throat. Negative for nosebleeds.    Eyes: Negative for blurred vision.   Respiratory: Negative for cough and shortness of breath.    Cardiovascular: Negative for chest pain and leg swelling.   Gastrointestinal: Negative for abdominal pain, blood in stool, constipation, diarrhea and nausea.   Genitourinary: Negative for frequency.   Musculoskeletal: Positive for back pain. Negative for falls and joint pain.   Skin: Negative for rash.   Neurological: Positive for headaches. Negative for weakness.   Psychiatric/Behavioral: The patient is nervous/anxious.        Physical Exam:     Vitals:    08/05/22 1214   BP: (!) 188/75   Pulse: 62   Resp: 16   Temp: 98 °F (36.7 °C)       Physical Exam  Vitals reviewed.   Constitutional:       General: She is not in  acute distress.     Appearance: Normal appearance.   HENT:      Head: Normocephalic.      Right Ear: External ear normal.      Nose: Congestion and rhinorrhea present.      Mouth/Throat:      Mouth: Mucous membranes are moist.      Pharynx: Oropharynx is clear.   Eyes:      Extraocular Movements: Extraocular movements intact.      Pupils: Pupils are equal, round, and reactive to light.   Cardiovascular:      Rate and Rhythm: Regular rhythm.      Pulses: Normal pulses.   Pulmonary:      Effort: Pulmonary effort is normal.      Breath sounds: Normal breath sounds.   Chest:   Breasts:      Left: Mass present.        Comments: L breast mass  Abdominal:      General: Bowel sounds are normal. There is no distension.      Palpations: Abdomen is soft. There is no mass.   Musculoskeletal:         General: No swelling.      Cervical back: Neck supple. No rigidity.   Lymphadenopathy:      Cervical: No cervical adenopathy.   Skin:     General: Skin is warm.      Findings: No bruising or rash.   Neurological:      General: No focal deficit present.      Mental Status: She is alert.   Psychiatric:         Mood and Affect: Mood normal.         Behavior: Behavior normal.           Labs:   Lab Results   Component Value Date    WBC 14.72 (H) 08/05/2022    HGB 14.6 08/05/2022    HCT 42.1 08/05/2022    MCV 91 08/05/2022     08/05/2022       Lab Results   Component Value Date     08/05/2022    K 3.3 (L) 08/05/2022     08/05/2022    CO2 27 08/05/2022    BUN 5 (L) 08/05/2022    CREATININE 0.8 08/05/2022    ALBUMIN 4.0 08/05/2022    BILITOT 0.5 08/05/2022    ALKPHOS 82 08/05/2022    AST 13 08/05/2022    ALT 5 (L) 08/05/2022            Assessment and Plan:   Mirna Pollock (Mirna) is a pleasant 46 y.o.female referred for evaluation of thrombocytosis     1. Thrombocytosis  - Pt with mild, isolated waxing/waning thrombocytosis dating back to at least 2017  - No hx of VTE/CVA/miscarraiges   - No s/sx concerning for  infection/inflammation, non-smoker  - Given isolated thrombocytosis without other lab abnormalities sent JAK2 reflex panel which was negative. Exon 12 was not sent, but recent CBC x2 is with normal platelet count. Will not send at this time, pt agreeable     2. URI  3. Leukocytosis  - New at today's visit, mild & neutrophil predominant, in setting of recent URI like sx and covid exposure, sent covid swab  - Likely reactive  - Have advised her if taking OTC cold medicine to take BP safe products (ex Coricidin)     4. Breast Mass  - Pt with firm fixed L breast mass, recently evaluated by PCP  - Mammogram appt recent no show, rescheduled for 8/29. I'm emphasized the concerning findings and need for mammogram, pt understanding     3. Hypertension  - Pt with chronic HTN, BP elevated in clinic today. Very anxious.  - Recheck in clinic 188/75, pt w/o symptoms  - Will schedule f/u with her PCP w/in next month     4. Migraines  - Longstanding hx of migraine d/o, follows with neuro     5. Hypokalemia   - Mild hypokalemia today, chronic in setting of HCTZ  - Continue K+ supplementation     6. Anxiety  - Pt with LALITA, reports anxiety associated with medical visit  - Continue wellbutrin, cymbalta, vistaril   - Continue following with PCP       Orders Placed:      Orders Placed This Encounter    COVID-19 Rapid Screening         Follow Up:          BMT Chart Routing      Follow up with physician    Follow up with HUSEYIN No follow up needed.   Infusion scheduling note    Injection scheduling note    Labs    Imaging    Pharmacy appointment    Other referrals             Thank you for allowing me to partake in the care of this patient.       Michelle Meyers PA-C  Hematology, Oncology, and Stem Cell Transplantation  [facility]

## 2022-08-09 ENCOUNTER — PATIENT MESSAGE (OUTPATIENT)
Dept: ADMINISTRATIVE | Facility: HOSPITAL | Age: 47
End: 2022-08-09
Payer: COMMERCIAL

## 2022-08-09 DIAGNOSIS — R50.9 FEVER, UNSPECIFIED FEVER CAUSE: Primary | ICD-10-CM

## 2022-08-09 LAB
CTP QC/QA: YES
SARS-COV-2 AG RESP QL IA.RAPID: POSITIVE

## 2022-08-24 ENCOUNTER — PATIENT MESSAGE (OUTPATIENT)
Dept: ADMINISTRATIVE | Facility: HOSPITAL | Age: 47
End: 2022-08-24
Payer: COMMERCIAL

## 2022-09-09 ENCOUNTER — PATIENT MESSAGE (OUTPATIENT)
Dept: PSYCHIATRY | Facility: CLINIC | Age: 47
End: 2022-09-09
Payer: COMMERCIAL

## 2022-09-09 DIAGNOSIS — F33.41 RECURRENT MAJOR DEPRESSION IN PARTIAL REMISSION: ICD-10-CM

## 2022-09-09 DIAGNOSIS — F40.10 SOCIAL ANXIETY DISORDER: ICD-10-CM

## 2022-09-09 DIAGNOSIS — G47.00 INSOMNIA, UNSPECIFIED TYPE: Primary | ICD-10-CM

## 2022-09-09 DIAGNOSIS — F41.1 GAD (GENERALIZED ANXIETY DISORDER): ICD-10-CM

## 2022-09-09 DIAGNOSIS — Z91.89 EXCESSIVE CONSUMPTION OF SODA POP: ICD-10-CM

## 2022-09-09 DIAGNOSIS — G43.009 MIGRAINE WITHOUT AURA AND WITHOUT STATUS MIGRAINOSUS, NOT INTRACTABLE: ICD-10-CM

## 2022-09-15 RX ORDER — BUPROPION HYDROCHLORIDE 300 MG/1
300 TABLET ORAL DAILY
Qty: 90 TABLET | Refills: 0 | Status: SHIPPED | OUTPATIENT
Start: 2022-09-15 | End: 2022-09-23 | Stop reason: SDUPTHER

## 2022-09-15 RX ORDER — HYDROXYZINE PAMOATE 25 MG/1
25 CAPSULE ORAL EVERY 8 HOURS PRN
Qty: 45 CAPSULE | Refills: 2 | Status: SHIPPED | OUTPATIENT
Start: 2022-09-15 | End: 2023-04-14 | Stop reason: SDUPTHER

## 2022-09-15 RX ORDER — DULOXETIN HYDROCHLORIDE 60 MG/1
60 CAPSULE, DELAYED RELEASE ORAL DAILY
Qty: 90 CAPSULE | Refills: 0 | Status: SHIPPED | OUTPATIENT
Start: 2022-09-15 | End: 2022-09-23 | Stop reason: SDUPTHER

## 2022-09-15 NOTE — TELEPHONE ENCOUNTER
Ms. Pollock requests for refills on home medications, missed last appointment and has rescheduled for September 23th.    Refill sent September 15th at 1030AM.    Kristi Nation MD  Psychiatry  [facility]

## 2022-09-22 ENCOUNTER — PATIENT MESSAGE (OUTPATIENT)
Dept: NEUROLOGY | Facility: CLINIC | Age: 47
End: 2022-09-22
Payer: COMMERCIAL

## 2022-09-23 ENCOUNTER — OFFICE VISIT (OUTPATIENT)
Dept: PSYCHIATRY | Facility: CLINIC | Age: 47
End: 2022-09-23
Payer: COMMERCIAL

## 2022-09-23 DIAGNOSIS — F33.42 RECURRENT MAJOR DEPRESSIVE DISORDER, IN FULL REMISSION: ICD-10-CM

## 2022-09-23 DIAGNOSIS — G47.00 INSOMNIA, UNSPECIFIED TYPE: ICD-10-CM

## 2022-09-23 DIAGNOSIS — F41.1 GAD (GENERALIZED ANXIETY DISORDER): Primary | ICD-10-CM

## 2022-09-23 DIAGNOSIS — F33.41 RECURRENT MAJOR DEPRESSION IN PARTIAL REMISSION: ICD-10-CM

## 2022-09-23 DIAGNOSIS — Z91.89 EXCESSIVE CONSUMPTION OF SODA POP: ICD-10-CM

## 2022-09-23 PROCEDURE — 1159F MED LIST DOCD IN RCRD: CPT | Mod: CPTII,95,, | Performed by: STUDENT IN AN ORGANIZED HEALTH CARE EDUCATION/TRAINING PROGRAM

## 2022-09-23 PROCEDURE — 99215 PR OFFICE/OUTPT VISIT, EST, LEVL V, 40-54 MIN: ICD-10-PCS | Mod: 95,,, | Performed by: STUDENT IN AN ORGANIZED HEALTH CARE EDUCATION/TRAINING PROGRAM

## 2022-09-23 PROCEDURE — 1160F RVW MEDS BY RX/DR IN RCRD: CPT | Mod: CPTII,95,, | Performed by: STUDENT IN AN ORGANIZED HEALTH CARE EDUCATION/TRAINING PROGRAM

## 2022-09-23 PROCEDURE — 99215 OFFICE O/P EST HI 40 MIN: CPT | Mod: 95,,, | Performed by: STUDENT IN AN ORGANIZED HEALTH CARE EDUCATION/TRAINING PROGRAM

## 2022-09-23 PROCEDURE — 1160F PR REVIEW ALL MEDS BY PRESCRIBER/CLIN PHARMACIST DOCUMENTED: ICD-10-PCS | Mod: CPTII,95,, | Performed by: STUDENT IN AN ORGANIZED HEALTH CARE EDUCATION/TRAINING PROGRAM

## 2022-09-23 PROCEDURE — 1159F PR MEDICATION LIST DOCUMENTED IN MEDICAL RECORD: ICD-10-PCS | Mod: CPTII,95,, | Performed by: STUDENT IN AN ORGANIZED HEALTH CARE EDUCATION/TRAINING PROGRAM

## 2022-09-23 PROCEDURE — 4010F PR ACE/ARB THEARPY RXD/TAKEN: ICD-10-PCS | Mod: CPTII,95,, | Performed by: STUDENT IN AN ORGANIZED HEALTH CARE EDUCATION/TRAINING PROGRAM

## 2022-09-23 PROCEDURE — 4010F ACE/ARB THERAPY RXD/TAKEN: CPT | Mod: CPTII,95,, | Performed by: STUDENT IN AN ORGANIZED HEALTH CARE EDUCATION/TRAINING PROGRAM

## 2022-09-23 RX ORDER — BUPROPION HYDROCHLORIDE 300 MG/1
300 TABLET ORAL DAILY
Qty: 90 TABLET | Refills: 0 | Status: SHIPPED | OUTPATIENT
Start: 2022-09-23 | End: 2022-12-23 | Stop reason: SDUPTHER

## 2022-09-23 RX ORDER — ZOLPIDEM TARTRATE 5 MG/1
5 TABLET ORAL NIGHTLY
Qty: 30 TABLET | Refills: 0 | Status: SHIPPED | OUTPATIENT
Start: 2022-09-23 | End: 2022-10-26 | Stop reason: SDUPTHER

## 2022-09-23 RX ORDER — DULOXETIN HYDROCHLORIDE 60 MG/1
60 CAPSULE, DELAYED RELEASE ORAL DAILY
Qty: 90 CAPSULE | Refills: 0 | Status: SHIPPED | OUTPATIENT
Start: 2022-09-23 | End: 2022-12-23 | Stop reason: SDUPTHER

## 2022-09-23 NOTE — PROGRESS NOTES
Outpatient Psychiatry Follow-Up Visit (MD/NP)    The patient location is: At home in [place]  The chief complaint leading to consultation is: anxiety, depression, insomnia    Visit type: audiovisual    Face to Face time with patient: 30 minutes  60 minutes of total time spent on the encounter, which includes face to face time and non-face to face time preparing to see the patient (eg, review of tests), Obtaining and/or reviewing separately obtained history, Documenting clinical information in the electronic or other health record, Independently interpreting results (not separately reported) and communicating results to the patient/family/caregiver, or Care coordination (not separately reported).         Each patient to whom he or she provides medical services by telemedicine is:  (1) informed of the relationship between the physician and patient and the respective role of any other health care provider with respect to management of the patient; and (2) notified that he or she may decline to receive medical services by telemedicine and may withdraw from such care at any time.    Notes:       9/23/2022    Clinical Status of Patient:  Outpatient (Ambulatory)    Chief Complaint:  Mirna Pollock is a 46 y.o. female who presents today for follow-up of depression, anxiety and insomnia . Significant past medical history of migraines (requiring every 3 months botox; and monthly monoclonal antibodies); fibromyalgia; possible IBS . Patient has been involved with mental health providers for 10 years.Patient last seen for follow-up on 6/10/22. Met with patient.      Interval History and Content of Current Session:  Interim Events/Subjective Report/Content of Current Session:     Reports improvement in mood since changing jobs. Also endorses improvement in migraines as wells, reports decrease frequency and continues to follow with neurology for botox.  Lives at home with , who stays are work during the day.   Mother in law  lives with them as well for about 6 months, expresses difficulties at times with regards to privacy concerns.    Overall, patient is stable and wants to continue medication regimen. No SI/HI/AVH.      Psychiatric Review of Systems-is patient experiencing or having changes in  sleep: no  appetite: no  weight: no  energy/anergy: no  anhedonia: no  libido: no  anxiety: yes, improvement since changing job  guilty/hopelessness: no  concentration: no  Irritability: no  Paranoia/delusions: no  S.I.B.s/risky behavior: no    Review of Systems   PSYCHIATRIC: Pertinant items are noted in the narrative.  CONSTITUTIONAL: No weight gain or loss.  MUSCULOSKELETAL: No pain or stiffness of the joints.  NEUROLOGIC: No weakness, sensory changes, seizures, confusion, memory loss, tremor or other abnormal movements.  RESPIRATORY: No shortness of breath.  CARDIOVASCULAR: No tachycardia or chest pain.  GASTROINTESTINAL: No nausea, vomiting, pain, constipation or diarrhea.    Past Medical, Family and Social History: The patient's past medical, family and social history have been reviewed and updated as appropriate within the electronic medical record - see encounter notes.    Compliance: yes    Side effects: None    Risk Parameters:  Patient reports no suicidal ideation  Patient reports no homicidal ideation  Patient reports no self-injurious behavior  Patient reports no violent behavior    Exam (detailed: at least 9 elements; comprehensive: all 15 elements)   Constitutional  Vitals:  Most recent vital signs, dated greater than 90 days prior to this appointment, were not reviewed.   There were no vitals filed for this visit.     General:  unremarkable, age appropriate     Musculoskeletal  Muscle Strength/Tone:  not examined   Gait & Station:  non-ataxic     Psychiatric  Speech:  no latency; no press   Mood & Affect:  steady  congruent and appropriate   Thought Process:  normal and logical   Associations:  intact   Thought Content:  no  suicidality, no homicidality, delusions, or paranoia   Insight:  intact   Judgement: behavior is adequate to circumstances   Orientation:  grossly intact   Memory: intact for content of interview   Language: grossly intact   Attention Span & Concentration:  able to focus   Fund of Knowledge:  intact and appropriate to age and level of education     Assessment and Diagnosis   Status/Progress: Based on the examination today, the patient's problem(s) is/are well controlled.  New problems have not been presented today.   Co-morbidities are not complicating management of the primary condition.  There are no active rule-out diagnoses for this patient at this time.     General Impression:    ICD-10-CM ICD-9-CM   1. LALITA (generalized anxiety disorder)  F41.1 300.02   2. Recurrent major depressive disorder, in full remission  F33.42 296.36   3. Insomnia, unspecified type  G47.00 780.52         Intervention/Counseling/Treatment Plan   Medication Management: Continue current medications. The risks and benefits of medication were discussed with the patient.  Cymbalta 60 mg daily   Wellbutrin  mg daily  Trial of ambien 5 mg nightly (decrease from 10 mg)- pt has not had ambien for the last several weeks and notes that she has been able to sleep decently   -Continue Vistaril 25 mg TID PRN     Per neurology for migraines:  Zanaflex 4 mg nightly   Elavil 25 mg nightly     Hold propranolol 20 mg daily which will be helpful for anxiety, HTN, and migraines    Discussed with patient informed consent including diagnosis, risks and benefits of proposed treatment above vs. alternative treatments vs. no treatment, as well as serious and common side effects of these treatments, and the inherent unpredictability of individual responses to these treatments. The patient expresses understanding of the above and displays the capacity to agree with this current plan. Patient also agrees that, currently, the benefits outweigh the risks and  would like to pursue treatment at this time, and had no other questions.    Instructions:  Take all medications as prescribed.    Abstain from recreational drugs and alcohol.  Present to ED or call 911 for SI/HI plan or intent, psychosis, or medical emergency.    Return to Clinic: 3 months    Case to be discussed with supervising staff, Dr. Abner Jin MD.  Kristi Nation MD  Psychiatry  [facility]

## 2022-10-07 ENCOUNTER — PATIENT OUTREACH (OUTPATIENT)
Dept: ADMINISTRATIVE | Facility: HOSPITAL | Age: 47
End: 2022-10-07
Payer: COMMERCIAL

## 2022-10-07 ENCOUNTER — PATIENT MESSAGE (OUTPATIENT)
Dept: ADMINISTRATIVE | Facility: HOSPITAL | Age: 47
End: 2022-10-07
Payer: COMMERCIAL

## 2022-10-12 ENCOUNTER — PATIENT MESSAGE (OUTPATIENT)
Dept: ADMINISTRATIVE | Facility: HOSPITAL | Age: 47
End: 2022-10-12
Payer: COMMERCIAL

## 2022-10-13 ENCOUNTER — PATIENT MESSAGE (OUTPATIENT)
Dept: NEUROLOGY | Facility: CLINIC | Age: 47
End: 2022-10-13
Payer: COMMERCIAL

## 2022-10-23 ENCOUNTER — PATIENT MESSAGE (OUTPATIENT)
Dept: PSYCHIATRY | Facility: CLINIC | Age: 47
End: 2022-10-23
Payer: COMMERCIAL

## 2022-10-23 DIAGNOSIS — G47.00 INSOMNIA, UNSPECIFIED TYPE: Primary | Chronic | ICD-10-CM

## 2022-10-26 RX ORDER — ZOLPIDEM TARTRATE 5 MG/1
5 TABLET ORAL NIGHTLY
Qty: 30 TABLET | Refills: 0 | Status: SHIPPED | OUTPATIENT
Start: 2022-10-26 | End: 2022-11-24 | Stop reason: SDUPTHER

## 2022-10-27 ENCOUNTER — PROCEDURE VISIT (OUTPATIENT)
Dept: NEUROLOGY | Facility: CLINIC | Age: 47
End: 2022-10-27
Payer: COMMERCIAL

## 2022-10-27 VITALS
HEART RATE: 70 BPM | WEIGHT: 140.13 LBS | HEIGHT: 65 IN | SYSTOLIC BLOOD PRESSURE: 136 MMHG | DIASTOLIC BLOOD PRESSURE: 97 MMHG | BODY MASS INDEX: 23.35 KG/M2

## 2022-10-27 DIAGNOSIS — I10 ESSENTIAL HYPERTENSION: ICD-10-CM

## 2022-10-27 DIAGNOSIS — G43.709 CHRONIC MIGRAINE WITHOUT AURA WITHOUT STATUS MIGRAINOSUS, NOT INTRACTABLE: Primary | ICD-10-CM

## 2022-10-27 DIAGNOSIS — G43.019 MIGRAINE WITHOUT AURA, INTRACTABLE: ICD-10-CM

## 2022-10-27 DIAGNOSIS — G43.009 MIGRAINE WITHOUT AURA AND WITHOUT STATUS MIGRAINOSUS, NOT INTRACTABLE: ICD-10-CM

## 2022-10-27 PROCEDURE — 64615 CHEMODENERV MUSC MIGRAINE: CPT | Mod: S$GLB,,, | Performed by: NURSE PRACTITIONER

## 2022-10-27 PROCEDURE — 64615 PR CHEMODENERVATION OF MUSCLE FOR CHRONIC MIGRAINE: ICD-10-PCS | Mod: S$GLB,,, | Performed by: NURSE PRACTITIONER

## 2022-10-27 PROCEDURE — 99499 NO LOS: ICD-10-PCS | Mod: S$GLB,,, | Performed by: NURSE PRACTITIONER

## 2022-10-27 PROCEDURE — 99499 UNLISTED E&M SERVICE: CPT | Mod: S$GLB,,, | Performed by: NURSE PRACTITIONER

## 2022-10-27 RX ORDER — UBROGEPANT 100 MG/1
TABLET ORAL
Qty: 10 TABLET | Refills: 2 | Status: SHIPPED | OUTPATIENT
Start: 2022-10-27 | End: 2023-03-16 | Stop reason: SDUPTHER

## 2022-10-27 RX ORDER — TIZANIDINE 4 MG/1
6 TABLET ORAL NIGHTLY
Qty: 45 TABLET | Refills: 5 | Status: SHIPPED | OUTPATIENT
Start: 2022-10-27 | End: 2023-03-16 | Stop reason: SDUPTHER

## 2022-10-27 NOTE — PROCEDURES
SUBJECTIVE:  Patient ID: Mirna Pollock  Chief Complaint: Botulinum Toxin Injection and Follow-up    History of Present Illness:  Mirna Pollock is a 46 y.o. female who presents to clinic alone for follow-up of headaches and Botox injections.     10/27/2022- Interval History:  Migraines have been very well controlled recently, got a new job which has been better for her, less stress.  Blood pressure controlled.  Patient has continued to achieve a greater than 50% reduction in the frequency of their migraines with continued Botox injections.  Wishes to proceed with today's injections as scheduled.     Otherwise, information below is still accurate and current.     12/09/2021- Interval History:  Seen by internal medicine on 12/2, started on labetalol 100 mg BID and told to follow-up with her primary care provider (affiliated with [facility]) as soon as possible for continued management and monitoring.  Unfortunately she has not scheduled an appointment with her PCP yet.  Blood pressure improved today, though still markedly elevated.    Continues to experience frequent migraines, last round of Botox over 6 months ago.  She has tried ubrelvy 100 mg tabs which is effective for treatment of acute migraines.  Migraines historically have responded very well to Botox injections and wishes to proceed with today's injections as scheduled.      Otherwise, information below is still accurate and current.      12/02/2021 - Interval History:  Patient presents to clinic today for Botox injections.  BP initially 221/147 mmHg automatically.  Rechecked manually at 218/140 mmHg.  She denies weakness, confusion, vision changes, dizziness, chest pain, SOB.  In August, she presented for Botox, blood pressure was elevated, she was referred to ED and instructed to schedule f/up appointment with her primary care provider.  She does not want to return to ED today as she does not feel they do anything for her.  Discussed I do not feel  comfortable injecting her with blood pressure as high as it is in clinic today.  I have scheduled her an appointment with primary care here at [facility] later this morning and have instructed her to call her primary care provider's office today to schedule an appointment as soon as possible.    She does admit she has been under increased stress, mother in law is moving in to her home in two weeks.  She has not been sleeping well despite taking tizanidine 6 mg and ambien nightly.  She has continued seeing her psychiatrist regularly, last seen in September.    Feels she has been suffering with frequent migraines likely because her last round of Botox was nearly 6 months ago.  She has been accumulating occurrences at work due to missed days for migraines.  She is not eligible for intermittent FMLA until next summer.  She has continued using Aimovig 140 mg SQ monthly, though she uses it inconsistently as pharmacy does not call to remind her she is due for her next injection.  Treats acute migraines with vistaril 25 mg capsule, initially was helping her sleep however feels it is losing its effectiveness.      Otherwise, information below is still accurate and current.      06/04/2021- Interval History:  Migraines have been under good control, reports she has not had many migraines what so ever over the last 3 months.  Blood pressure elevated in clinic today, still has not seen PCP in quite some time.  Would like to proceed with next round of botox as scheduled today.      Otherwise, information below is still accurate and current.      02/25/2021- Interval History:  Migraines have gradually increased in frequency over the last few months, of note, she is currently 3 months overdue for her Botox injections.  For migraine prevention, she has continued with Aimovig 140 mg SQ monthly, tizanidine 6 mg qhs, amitriptyline 25 mg qhs and cymbalta 60 mg daily.  Treats acute migraines with sumatriptan 100 mg tabs, has vistaril 25 mg  capsule available for rescue treatment.  She feels once she gets back on track with Botox injections, migraines are likely to be optimally controlled.       Otherwise, information below is still accurate and current.      09/24/2020- Interval History:  She continues to feels migraines are well controlled on her current regimen. She continues to experience a greater than 50% reduction in the frequency of her migraines since starting Botox injections and wishes to continue with Botox for migraine control.       Otherwise, information below is still accurate and current.      06/09/2020- Interval History:  Migraines continue to be well controlled with dual Aimovig and Botox injections, she is very pleased with the current state of her migraines and wishes to continue with current treatment plan.       Otherwise, information below is still accurate and current.      03/25/2020- Interval History:  She is about a month overdue for her Botox injections and has noticed her migraines have gradually become more frequent and more intense.  Prior to one month ago, migraines had been under good control.  She does admit she has been under a significant amount of stress related to Covid-19 outbreak.       Otherwise, information below is still accurate and current.      12/04/2019- Interval History:  Reports she has been experiencing 5-6 migraines per month, admits she has been under increased stress found her birth mother who was not interested in meeting her, is in contact with twin brothers.   She has continued taking aimovig 140 mg, which she feels has greatly improved her.  She typically wakes up with a migraine if she is going to get one, typically is resolved with one dose of sumatriptan 100 mg, will feel back to normal by the afternoon.   She is pleased with the current state of her migraines and attributes the increased frequency of migraines over the last 12 weeks to fluctuating weather and increased stress.  Does not  "wish to make adjustments to her treatment plan at this time.      Otherwise, information below is still accurate and current.      08/16/2019- Interval History:  Migraines and headaches continue to respond well to Botox and Aimovig, gets one severe migraine per month, and 1-2 moderate migraines.  She has been treating her migraines with ibuprofen, which is not very effective.  In the past, she was using triptans, however she does not have any abortive medication available at this time.  Migraines continue to feel the same as they always have, she denies any change in the quality or nature of her migraines.  She is happy with her current regimen and does not feel adjustments to her treatment plan are needed at this time.    She has additional concerns including frequently feeling as if she is more off balance than usual, is occurring more frequently as time progresses.  She is not dizzy, just feels as if she is going to fall over, occurs 8-10 times per month.  She is also dropping things more frequently.       05/22/2019- Interval History:  Botox continues to be effective for migraine prevention, for the first 8-10 weeks she experiences less than 3 migraines per month.  She has continued using Aimovig 140 mg SQ monthly injections.  She is very happy with the response of her migraines to Botox injections and wishes to continue with current treatment plan.        02/14/2019- Interval History:  Headaches and migraines continue to be "much better", "I am not getting them nearly as much", though she is keenly aware of when a cold front is coming through as she will always get a migraine.  She has continued using Aimovig monthly as well as taking tizanidine, cymbalta, and amitriptyline daily for migraine prevention.  She is very happy with the current state of her migraines and does not wish to make any adjustments to her treatment plan at this time.  Migraines continue to feel the same as they always have.     11/01/2018- " Interval History:  Headaches have been significantly better, she has only had 3-4 migraines since her last round of Botox and is very happy Botox is helping decrease her migraine frequency.  She has been using Aimovig monthly, which she also feels has been helping decrease her migraine frequency.  Migraines continue to feel the same as they always have, she denies any change in the quality or nature of her migraines.  Wishes to continue with Botox injections due to the pronounced effect it has had on her migraines.      08/08/2018- Interval History:  Finds her migraines are no better since last round of Botox, admits she was under increased stress at one point due to issues with her 's job.  She does know a lot of her migraines are triggered from stress.  She has been getting a lot of migraines above her eyes as well as throughout her occipitalis and neck.  Overall, she does wish to continue with Botox injections, but is open to discussing additional prevention options.      05/22/2018- Interval History:  Despite nerve blocks two weeks ago, she has been suffering with a headache nearly everyday since her last visit.  She did try Zomig nasal spray, however she took the first dose on day two of her migraine because she was nervous.  She does think it lessened the pain of her migraine, however did not abort it.  She did not repeat the dose.  Additionally, she continues to report poor sleep nightly.  She stopped drinking caffeine.  Headaches are the same as they have always been.  She denies any change in the nature or quality of her headaches. Risks, Benefits, and potential side effects of Botox discussed with patient.  Alternative treatments offered.  After thorough discussed regarding the procedure, patient has decided to move forward with initiating Botox injections for Chronic Migraine.  Patient understands we will complete 3 rounds of injections, if after 3 rounds, we do not see a 50% reduction in  headaches, we will discontinue Botox injections.      05/11/2018 - Interval History:  This past week she has only been able to work two days, has missed 3 days of work this week due to migraines.  She still is unsure about getting Botox injections for chronic migraine, she would like to try it but her  does not feel this is a good treatment option, she is trying to convince him.  Of note, blood pressure significantly lower in today's visit than in past visits.  She reports her psychiatrist has discontinued vyvanse until she is given clearance from Cardio to restart taking Vyvanse.  She was seen by a pediatric cardiologist who referred her to an adult cardiologist, however she has not seen this provider, had to cancel appointment due to having a headache.  She is requesting repeat nerve blocks today.      01/29/2018 - Interval History:  She has been having a terrible migraine for the last 3 days, she has tried numerous medications without any relief.  States she tried to go to work today, but she had to leave because her headache got so bad.  She is requesting to repeat the nerve blocks as those shots gave her relief form 6-8 weeks in the past.  She has done research on the Botox injections and states her  is very hesitant for her to try Botox injections for her migraines.  She has not been seen by her PCP nor cardiologist for further management of her blood pressure.  Discussed the challenges of treating her migraines with uncontrolled hypertension because I cannot prescribe her any triptan therapy until her blood pressure is under better control.  She has continued to take Cymbalta daily for her depression, she is unsure if this is giving her any relief with regards to her headaches or not.       01/15/2018 - Interval History:  She was approved for Botox injections, but was a no-show to her appointment last week.  States she spoke with her  about the injections and she was very nervous to try  the Botox injections for her migraines.    She continues to experience headaches on 1-4 days per week with migraines occurring 1-2 days per week.  Recently she has been experiencing more migraines and headaches than usually, but admits she has been going through a lot emotionally as well as she got sick with the flu.  She has continued to regularly follow-up with her psychiatrist who changed her anti-depressant, she is now taking Cymbalta.  Since change in medication, she feels she has been experiencing a mild headache each day.  She has had to miss a few days of work due to her migraines since last visit.  Additionally, she feels occasionally she begins to get dizzy, this occurs with or without headaches, she has an appointment with her eye doctor next week as her vision is also getting progressively worse.  She did not find vistaril to be useful with regards to her headaches.  She does feel Zanaflex helps with her neck as well as helping to relax her neck muscles.  When she gets a migraine she has been taking Vistaril and Ibuprofen together which does help the pain, she also has been trying Excedrin migraines which also gives her some relief.  Patient with known HTN on multiple anti-hypertensive agents, blood pressure significantly elevated in clinic today.  She is not symptomatic.  States she needs to follow-up with her primary care (who is outside the [facility] system) for better management.       10/19/2017 - Interval History:  - Headaches have persisted in similar frequency, which she is happy about as she has been under increased stress with regards to her 's health, he has been suffering with his Gout, just got out of the hospital last week and has had to take time off work etc   - Blood pressure continues to be elevated despite numerous anti-hypertensive, states today is good for her (currently 141/102), has been regularly following up with her PCP for management   - Is very excited because she is  going on a cruise with her  to [place] at the end of November/early December   - Has continued to have a headache nearly everyday, not necessarily a migraine, however migraine have been at least once per month    - Has noticed more recently that she feels like she has intense pressure behind her eyes and across the bridge of her nose that slightly feels like sinus pressure, this occurs 1-2 times per week   - Has been seeing a chiropractor as well as a masseuse regularly which she does feel has been helping   - Would still like to move forward with the Botox injections   - Does not feel adjustments to her medications are necessary at this time      08/17/2017 - Interval History:  - Had been seeing Iker Beth PA-C   - Was taking Topamax, Zanaflex, and Vistaril - however has been out of medications for about 3 months  - Since being off the medication, she had been okay, however recently noticed migraines were beginning to occur more frequently with increased intensity and duration   - She finds the nerve blocks had been helping a lot, as long as she is consistent with getting the nerve block, however hasn't had this procedure for over a year   - Has been getting 5-6 migraines per month, each lasting 1-4 days in length   - Current migraine has been going on since last Friday (6 days)  - Blood pressure significantly elevated in clinic today, states her PCP is managing her medications in order to get her blood pressure better controlled   - Is requesting refills of medication as well as repeat nerve occipital and trigeminal nerve blocks      Headache history:   Age of onset -  13  Nature of pain -  Throbbing   Location - bioccipital   Aura -  White floaters   Duration - hrs  Frequency -  2/week  Time of day - anytime   7 days of pain - jaw pain, L neck, L shoulder,  L side throbbing   Associated symptoms:   Meningeal symptoms - photophobia, phonophobia, exercise intolerance +   Nausea/vomitting +   Nasal drainage    Visual symptoms  Pallor/flushing  Vertigo  Stroke symptoms  Confusion  Impaired concentration +  Pain worsened with physical activity +  Neck pain +  Whooshing sounds   Visual spots/blotches +  Triggers:   Stress +   Bright or flickering light  Strong smell  Vigorous exercise  Dietary factors   Visual strain   Motion intolerance as passenger:  No   Resolution of headache with sleep: yes      Therapies Tried & Response:  Tylenol, motrin, alleve - not help  excedrin - not help   imitrex - helped initially   topamax 50 mg - helped  Percocet - helps   Gabapentin - helps  Amlodipine - for HTN  Bystolic - for HTN  Lexapro - hasn't noticed change in HA, for depression   GONB - helps   Cymbalta - unsure, for depression   Zomig - helps    Maxalt - no   Botox - helping   Sumatriptan -  Vistrail - effective   Aimovig - helping     Current Medications:    benazepril-hydrochlorthiazide (LOTENSIN HCT) 20-25 mg Tab, Take 1 tablet by mouth once daily., Disp: 90 tablet, Rfl: 3    buPROPion (WELLBUTRIN XL) 300 MG 24 hr tablet, Take 1 tablet (300 mg total) by mouth once daily., Disp: 90 tablet, Rfl: 0    DULoxetine (CYMBALTA) 60 MG capsule, Take 1 capsule (60 mg total) by mouth once daily., Disp: 90 capsule, Rfl: 0    erenumab-aooe (AIMOVIG) 140 mg/mL autoinjector, Inject 1 pen (140 mg total) into the skin every 28 days., Disp: 1 mL, Rfl: 11    hydrOXYzine pamoate (VISTARIL) 25 MG Cap, Take 1 capsule (25 mg total) by mouth every 8 (eight) hours as needed (anxiety)., Disp: 45 capsule, Rfl: 2    labetaloL (NORMODYNE) 100 MG tablet, TAKE 1 TABLET(100 MG) BY MOUTH TWICE DAILY, Disp: 60 tablet, Rfl: 0    potassium chloride (MICRO-K) 10 MEQ CpSR, Take 1 capsule (10 mEq total) by mouth once daily., Disp: 30 capsule, Rfl: 2    promethazine (PHENERGAN) 25 MG tablet, TAKE 1 TABLET BY MOUTH EVERY 4 TO 6 HOURS AS NEEDED FOR NAUSEA, Disp: 20 tablet, Rfl: 1    zolpidem (AMBIEN) 5 MG Tab, Take 1 tablet (5 mg total) by mouth every evening., Disp:  "30 tablet, Rfl: 0    estradiol 0.05 mg/24 hr td ptsw (VIVELLE-DOT) 0.05 mg/24 hr, Place 1 patch onto the skin once a week. (Patient not taking: Reported on 6/28/2022), Disp: 4 patch, Rfl: 11    tiZANidine (ZANAFLEX) 4 MG tablet, Take 1.5 tablets (6 mg total) by mouth every evening. No alcohol, no driving with medication., Disp: 45 tablet, Rfl: 5    ubrogepant (UBRELVY) 100 mg tablet, TAKE 1 TABLET BY MOUTH EVERY 2 HOURS AS NEEDED FOR MIGRAINE.  MG PER DAY Strength: 100 mg, Disp: 10 tablet, Rfl: 2    Current Facility-Administered Medications:     onabotulinumtoxina injection 200 Units, 200 Units, Intramuscular, q12 weeks, Luci Ortez, FNP, 200 Units at 10/27/22 1043    Review of Systems - A review of 10+ systems was conducted with pertinent positive and negative findings documented in HPI with all other systems reviewed and negative.    PFSH: Past medical, family, and social history reviewed as documented in chart with pertinent positive medical, family, and social history detailed in HPI.    OBJECTIVE:  Vitals:  BP (!) 136/97   Pulse 70   Ht 5' 5" (1.651 m)   Wt 63.6 kg (140 lb 2 oz)   LMP 12/14/2000   BMI 23.32 kg/m²     Physical Exam:  Constitutional: she appears well-developed and well-nourished. she is well groomed. NAD     Review of Data:   Notes from psychiatry, Hem/Onc reviewed.   Labs:  Orders Only on 08/09/2022   Component Date Value Ref Range Status    SARS Coronavirus 2 Antigen 08/09/2022 Positive (A)  Negative Final     Acceptable 08/09/2022 Yes   Final   Office Visit on 08/05/2022   Component Date Value Ref Range Status    SARS-CoV-2 RNA, Amplification, Qual 08/05/2022 Negative  Negative Final   Lab Visit on 08/05/2022   Component Date Value Ref Range Status    WBC 08/05/2022 14.72 (H)  3.90 - 12.70 K/uL Final    RBC 08/05/2022 4.65  4.00 - 5.40 M/uL Final    Hemoglobin 08/05/2022 14.6  12.0 - 16.0 g/dL Final    Hematocrit 08/05/2022 42.1  37.0 - 48.5 % Final    MCV " 08/05/2022 91  82 - 98 fL Final    MCH 08/05/2022 31.4 (H)  27.0 - 31.0 pg Final    MCHC 08/05/2022 34.7  32.0 - 36.0 g/dL Final    RDW 08/05/2022 13.0  11.5 - 14.5 % Final    Platelets 08/05/2022 418  150 - 450 K/uL Final    MPV 08/05/2022 9.9  9.2 - 12.9 fL Final    Immature Granulocytes 08/05/2022 1.1 (H)  0.0 - 0.5 % Final    Gran # (ANC) 08/05/2022 8.6 (H)  1.8 - 7.7 K/uL Final    Immature Grans (Abs) 08/05/2022 0.16 (H)  0.00 - 0.04 K/uL Final    Lymph # 08/05/2022 3.8  1.0 - 4.8 K/uL Final    Mono # 08/05/2022 1.3 (H)  0.3 - 1.0 K/uL Final    Eos # 08/05/2022 0.7 (H)  0.0 - 0.5 K/uL Final    Baso # 08/05/2022 0.20  0.00 - 0.20 K/uL Final    nRBC 08/05/2022 0  0 /100 WBC Final    Gran % 08/05/2022 58.3  38.0 - 73.0 % Final    Lymph % 08/05/2022 25.8  18.0 - 48.0 % Final    Mono % 08/05/2022 9.0  4.0 - 15.0 % Final    Eosinophil % 08/05/2022 4.4  0.0 - 8.0 % Final    Basophil % 08/05/2022 1.4  0.0 - 1.9 % Final    Differential Method 08/05/2022 Automated   Final    Sodium 08/05/2022 139  136 - 145 mmol/L Final    Potassium 08/05/2022 3.3 (L)  3.5 - 5.1 mmol/L Final    Chloride 08/05/2022 102  95 - 110 mmol/L Final    CO2 08/05/2022 27  23 - 29 mmol/L Final    Glucose 08/05/2022 83  70 - 110 mg/dL Final    BUN 08/05/2022 5 (L)  6 - 20 mg/dL Final    Creatinine 08/05/2022 0.8  0.5 - 1.4 mg/dL Final    Calcium 08/05/2022 9.7  8.7 - 10.5 mg/dL Final    Total Protein 08/05/2022 7.7  6.0 - 8.4 g/dL Final    Albumin 08/05/2022 4.0  3.5 - 5.2 g/dL Final    Total Bilirubin 08/05/2022 0.5  0.1 - 1.0 mg/dL Final    Alkaline Phosphatase 08/05/2022 82  55 - 135 U/L Final    AST 08/05/2022 13  10 - 40 U/L Final    ALT 08/05/2022 5 (L)  10 - 44 U/L Final    Anion Gap 08/05/2022 10  8 - 16 mmol/L Final    eGFR 08/05/2022 >60.0  >60 mL/min/1.73 m^2 Final   Lab Visit on 06/28/2022   Component Date Value Ref Range Status    WBC 06/28/2022 9.44  3.90 - 12.70 K/uL Final    RBC 06/28/2022 4.50  4.00 - 5.40 M/uL Final    Hemoglobin  06/28/2022 13.9  12.0 - 16.0 g/dL Final    Hematocrit 06/28/2022 41.8  37.0 - 48.5 % Final    MCV 06/28/2022 93  82 - 98 fL Final    MCH 06/28/2022 30.9  27.0 - 31.0 pg Final    MCHC 06/28/2022 33.3  32.0 - 36.0 g/dL Final    RDW 06/28/2022 12.9  11.5 - 14.5 % Final    Platelets 06/28/2022 426  150 - 450 K/uL Final    MPV 06/28/2022 10.0  9.2 - 12.9 fL Final    Immature Granulocytes 06/28/2022 1.2 (H)  0.0 - 0.5 % Final    Gran # (ANC) 06/28/2022 6.0  1.8 - 7.7 K/uL Final    Immature Grans (Abs) 06/28/2022 0.11 (H)  0.00 - 0.04 K/uL Final    Lymph # 06/28/2022 1.8  1.0 - 4.8 K/uL Final    Mono # 06/28/2022 0.8  0.3 - 1.0 K/uL Final    Eos # 06/28/2022 0.5  0.0 - 0.5 K/uL Final    Baso # 06/28/2022 0.14  0.00 - 0.20 K/uL Final    nRBC 06/28/2022 0  0 /100 WBC Final    Gran % 06/28/2022 63.8  38.0 - 73.0 % Final    Lymph % 06/28/2022 19.4  18.0 - 48.0 % Final    Mono % 06/28/2022 8.5  4.0 - 15.0 % Final    Eosinophil % 06/28/2022 5.6  0.0 - 8.0 % Final    Basophil % 06/28/2022 1.5  0.0 - 1.9 % Final    Differential Method 06/28/2022 Automated   Final    Sodium 06/28/2022 139  136 - 145 mmol/L Final    Potassium 06/28/2022 3.2 (L)  3.5 - 5.1 mmol/L Final    Chloride 06/28/2022 104  95 - 110 mmol/L Final    CO2 06/28/2022 30 (H)  23 - 29 mmol/L Final    Glucose 06/28/2022 85  70 - 110 mg/dL Final    BUN 06/28/2022 7  6 - 20 mg/dL Final    Creatinine 06/28/2022 0.8  0.5 - 1.4 mg/dL Final    Calcium 06/28/2022 9.9  8.7 - 10.5 mg/dL Final    Total Protein 06/28/2022 7.4  6.0 - 8.4 g/dL Final    Albumin 06/28/2022 3.8  3.5 - 5.2 g/dL Final    Total Bilirubin 06/28/2022 0.6  0.1 - 1.0 mg/dL Final    Alkaline Phosphatase 06/28/2022 84  55 - 135 U/L Final    AST 06/28/2022 20  10 - 40 U/L Final    ALT 06/28/2022 11  10 - 44 U/L Final    Anion Gap 06/28/2022 5 (L)  8 - 16 mmol/L Final    eGFR if African American 06/28/2022 >60.0  >60 mL/min/1.73 m^2 Final    eGFR if non African American 06/28/2022 >60.0  >60 mL/min/1.73 m^2  Final    MPNR  Specimen type 06/28/2022 3.0   Final    MPNR Result 06/28/2022 see interpretation   Final    MPNR  Final Diagnosis: 06/28/2022 SEE BELOW   Final    Ferritin 06/28/2022 145  20.0 - 300.0 ng/mL Final    LD 06/28/2022 183  110 - 260 U/L Final    Uric Acid 06/28/2022 4.6  2.4 - 5.7 mg/dL Final   Lab Visit on 05/20/2022   Component Date Value Ref Range Status    WBC 05/20/2022 11.08  3.90 - 12.70 K/uL Final    RBC 05/20/2022 4.97  4.00 - 5.40 M/uL Final    Hemoglobin 05/20/2022 15.0  12.0 - 16.0 g/dL Final    Hematocrit 05/20/2022 45.7  37.0 - 48.5 % Final    MCV 05/20/2022 92  82 - 98 fL Final    MCH 05/20/2022 30.2  27.0 - 31.0 pg Final    MCHC 05/20/2022 32.8  32.0 - 36.0 g/dL Final    RDW 05/20/2022 13.0  11.5 - 14.5 % Final    Platelets 05/20/2022 494 (H)  150 - 450 K/uL Final    MPV 05/20/2022 10.1  9.2 - 12.9 fL Final    Immature Granulocytes 05/20/2022 1.3 (H)  0.0 - 0.5 % Final    Gran # (ANC) 05/20/2022 7.1  1.8 - 7.7 K/uL Final    Immature Grans (Abs) 05/20/2022 0.14 (H)  0.00 - 0.04 K/uL Final    Lymph # 05/20/2022 2.2  1.0 - 4.8 K/uL Final    Mono # 05/20/2022 1.0  0.3 - 1.0 K/uL Final    Eos # 05/20/2022 0.5  0.0 - 0.5 K/uL Final    Baso # 05/20/2022 0.21 (H)  0.00 - 0.20 K/uL Final    nRBC 05/20/2022 0  0 /100 WBC Final    Gran % 05/20/2022 63.9  38.0 - 73.0 % Final    Lymph % 05/20/2022 19.6  18.0 - 48.0 % Final    Mono % 05/20/2022 9.1  4.0 - 15.0 % Final    Eosinophil % 05/20/2022 4.2  0.0 - 8.0 % Final    Basophil % 05/20/2022 1.9  0.0 - 1.9 % Final    Differential Method 05/20/2022 Automated   Final    Sodium 05/20/2022 139  136 - 145 mmol/L Final    Potassium 05/20/2022 3.2 (L)  3.5 - 5.1 mmol/L Final    Chloride 05/20/2022 103  95 - 110 mmol/L Final    CO2 05/20/2022 27  23 - 29 mmol/L Final    Glucose 05/20/2022 99  70 - 110 mg/dL Final    BUN 05/20/2022 6  6 - 20 mg/dL Final    Creatinine 05/20/2022 0.8  0.5 - 1.4 mg/dL Final    Calcium 05/20/2022 9.4  8.7 - 10.5 mg/dL Final     Total Protein 05/20/2022 7.8  6.0 - 8.4 g/dL Final    Albumin 05/20/2022 4.0  3.5 - 5.2 g/dL Final    Total Bilirubin 05/20/2022 0.7  0.1 - 1.0 mg/dL Final    Alkaline Phosphatase 05/20/2022 92  55 - 135 U/L Final    AST 05/20/2022 17  10 - 40 U/L Final    ALT 05/20/2022 13  10 - 44 U/L Final    Anion Gap 05/20/2022 9  8 - 16 mmol/L Final    eGFR if African American 05/20/2022 >60.0  >60 mL/min/1.73 m^2 Final    eGFR if non African American 05/20/2022 >60.0  >60 mL/min/1.73 m^2 Final    Cholesterol 05/20/2022 203 (H)  120 - 199 mg/dL Final    Triglycerides 05/20/2022 112  30 - 150 mg/dL Final    HDL 05/20/2022 53  40 - 75 mg/dL Final    LDL Cholesterol 05/20/2022 127.6  63.0 - 159.0 mg/dL Final    HDL/Cholesterol Ratio 05/20/2022 26.1  20.0 - 50.0 % Final    Total Cholesterol/HDL Ratio 05/20/2022 3.8  2.0 - 5.0 Final    Non-HDL Cholesterol 05/20/2022 150  mg/dL Final    TSH 05/20/2022 3.256  0.400 - 4.000 uIU/mL Final     Imaging:  Results for orders placed or performed during the hospital encounter of 09/28/17   MRV Brain Without Contrast    Narrative    Procedure: MRV of the head without contrast    Technique: Noncontrast 2-D time-of-flight MRV of the head with coronal and sagittal source imaging and 3-dimensional mip projection views.      Comparison: None    Results:The superior sagittal sinus is patent.  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  The paired internal cerebral veins and basal veins of Jay are patent.  The vein of Zac and torcula Herophili are patent.  The straight sinus is patent.  The bilateral transverse and sigmoid dural venous sinuses are patent to the jugular foramen.    Impression     Unremarkable noncontrast MRV specifically without evidence for venous sinus thrombosis..      Electronically signed by: VIOLETTE CARO DO  Date:     09/28/17  Time:    10:43    MRI Brain W WO Contrast    Narrative    Procedure: MRI the brain with andwithout  contrast.    Technique: Sagittal and axial T1, axial T2, axial FLAIR, axial gradient, axial diffusion imaging of the whole brain pre-contrast with postcontrast axial T1 and axial spoiled gradient imaging reformatted in the coronal and sagittal planes.. 10 ml of Gadavist injected intravenously.         Comparison: None    Findings: The brain parenchyma is normal in contour. There is a small focus of T2 flair signal hyperintensity in the right lateral putamen suggestive for remote lacunar type infarct with diffusion hyperintensity without restriction compatible with T2 shine through. There are no abnormal areas of parenchymal enhancement. There are no areas are restricted diffusion to suggest acute infarction. A few additional punctate foci of T2 flair signal hyperintensity supratentorial white matter which are nonspecific and may represent sequela migraine headaches in light of history. The ventricles are normal in size and configuration without evidence for hydrocephalus. There are no abnormal areas of the gradient susceptibility to suggest parenchymal hemorrhage. No abnormal intra or extra axial fluid collections. The major intracranial T2  flow-voids are present.    Impression     Small T2 flair hyperintense focus right lateral putamen with diffusion hyperintensity. 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    A few scattered punctate regions of T2 flair signal hyperintensity elsewhere supratentorial parenchyma while overall nonspecific in light of the given history this may be sequela migraine headaches. Alternative differential including prior demyelination to be considered.    otherwise unremarkable MRI brain specifically without evidence for acute infarction or enhancing lesion.          Electronically signed by: VIOLETTE CARO DO  Date:     09/28/17  Time:    10:49    MRA Brain without contrast    Narrative    Procedure: MRA of the head and neck    Technique: noncontrast 3-D time of flight  MRA head and postcontrast 3-D time-of-flight MRA head and neck. 10 ml of Gadavist injected intravenously.    Comparison: None    Findings:    MRA head:    Anterior circulation:  The bilateral distal cervical, Gabby, cavernous and supraclinoid segments of the ICA's and the visualized bilateral anterior and middle cerebral arteries are patent without evidence for significant focal stenosis or aneurysm. There is a left PCOM.    Posterior circulation: The distal vertebral arteries, basilar artery and posterior cerebral arteries are patent without evidence for significant focal stenosis or aneurysm.    MRA neck: The origins of the right brachycephalic,  left common carotid and left subclavian arteries from the arch are within normal limits. The origins of the vertebral arteries from the subclavian arteries are within normal limits. The vertebral arteries are unremarkable throughout their course without evidence for focal stenosis or occlusion.    Right carotid: The right common carotid artery, carotid bifurcation and extracranial portions of the internal carotid artery are patent without evidence for focal stenosis or occlusion.    Left carotid: The left common carotid artery, carotid bifurcation and extra cranial portions of the internal carotid artery are patent without evidence for focal stenosis or occlusion.    There is less than 50% proximal ICA stenosis by NASCET criteria.    Impression     Unremarkable MRA of the head specifically without evidence for focal stenosis or intracranial aneurysm.    Unremarkable MRA of the neck without evidence for significant focal stenosis or occlusion.      Electronically signed by: VIOLETTE CARO DO  Date:     09/28/17  Time:    10:40        Note: I have independently reviewed any/all imaging/labs/tests and agree with the report (s) as documented.  Any discrepancies will be as noted/demarcated by free text.  ARA HDZ 10/27/2022    ASSESSMENT:  1. Chronic migraine without aura  without status migrainosus, not intractable    2. Migraine without aura and without status migrainosus, not intractable    3. Migraine without aura, intractable    4. Essential hypertension      PLAN:  - Botox administered in clinic for Chronic Migraine (see below)   - Continue tizanidine 4 mg qhs and aimovig 140 mg SQ monthly   - For acute migraines - uberlvy 100 mg   - For nausea - phenergan 25 mg   - refills provided   - HTN - BP stable, management per PCP   - RTC in 12 weeks for repeat Botox injections or sooner if needed     Orders Placed This Encounter    tiZANidine (ZANAFLEX) 4 MG tablet    ubrogepant (UBRELVY) 100 mg tablet       All questions and concerns addressed.  Patient verbalizes understanding and is agreeable with the above stated treatment plan.      PROCEDURE NOTE:  BOTOX was performed as an indicated therapy for intractable chronic migraine headaches given that the patient failed more than 2 headache medications    A time out was conducted just before the start of the procedure to verify the correct patient and procedure, procedure location, and all relevant critical information.     Botulinum Toxin Injection Procedure     PROCEDURE PERFORMED: Botulinum toxin injection (05675)  CLINICAL INDICATION: G43.719  After risks and benefits were explained including bleeding, infection, worsening of pain, damage to the areas being injected, weakness of muscles, loss of muscle control, dysphagia if injecting the head or neck, facial droop if injecting the facial area, painful injection, allergic or other reaction to the medications being injected, and the failure of the procedure to help the problem, a signed consent was obtained.   The patient was placed in a comfortable area and the sites to be treated were identified.The area to be treated was prepped three times with alcohol and the alcohol allowed to dry. Next, a 30 gauge needle was used to inject the medication in the area to be treated.      Total  Botox used: 155 Units   Unavoidable waste: 45 Units     Injection sites:    muscle bilaterally ( a total of 10 units divided into 2 sites)   Procerus muscle (5 units)   Frontalis muscle bilaterally (a total of 20 units divided into 4 sites)   Temporalis muscle bilaterally (a total of 40 units divided into 8 sites)   Occipitalis muscle bilaterally (a total of 30 units divided into 6 sites)   Cervical paraspinal muscles (a total of 20 units divided into 4 sites)   Trapezius muscle bilaterally (a total of 30 units divided into 6 sites)   Complications: none   RTC for the next Botox injection: 12 weeks     CC: MD Luci Echols, BC-YAEL  [facility]rology   [phone]

## 2022-10-28 NOTE — PROGRESS NOTES
Supervising Psychiatry Staff:  I discussed Ms. Pollock's progress with Dr.Sara Chapis KEBEDE) in regular caseload supervision. I agree with the above interval history, ROS, findings, and plan, which reflect my own.

## 2022-11-21 ENCOUNTER — PATIENT MESSAGE (OUTPATIENT)
Dept: PSYCHIATRY | Facility: CLINIC | Age: 47
End: 2022-11-21
Payer: COMMERCIAL

## 2022-11-21 DIAGNOSIS — G47.00 INSOMNIA, UNSPECIFIED TYPE: Primary | ICD-10-CM

## 2022-11-24 RX ORDER — ZOLPIDEM TARTRATE 5 MG/1
5 TABLET ORAL NIGHTLY
Qty: 30 TABLET | Refills: 0 | Status: SHIPPED | OUTPATIENT
Start: 2022-11-24 | End: 2022-12-23

## 2022-12-05 ENCOUNTER — PATIENT OUTREACH (OUTPATIENT)
Dept: ADMINISTRATIVE | Facility: HOSPITAL | Age: 47
End: 2022-12-05
Payer: COMMERCIAL

## 2022-12-05 ENCOUNTER — PATIENT MESSAGE (OUTPATIENT)
Dept: ADMINISTRATIVE | Facility: HOSPITAL | Age: 47
End: 2022-12-05
Payer: COMMERCIAL

## 2022-12-16 ENCOUNTER — PATIENT MESSAGE (OUTPATIENT)
Dept: PRIMARY CARE CLINIC | Facility: CLINIC | Age: 47
End: 2022-12-16
Payer: COMMERCIAL

## 2022-12-23 ENCOUNTER — OFFICE VISIT (OUTPATIENT)
Dept: PSYCHIATRY | Facility: CLINIC | Age: 47
End: 2022-12-23
Payer: COMMERCIAL

## 2022-12-23 DIAGNOSIS — F33.41 RECURRENT MAJOR DEPRESSION IN PARTIAL REMISSION: ICD-10-CM

## 2022-12-23 DIAGNOSIS — Z91.89 EXCESSIVE CONSUMPTION OF SODA POP: ICD-10-CM

## 2022-12-23 DIAGNOSIS — G47.00 INSOMNIA, UNSPECIFIED TYPE: Primary | ICD-10-CM

## 2022-12-23 DIAGNOSIS — F41.1 GAD (GENERALIZED ANXIETY DISORDER): ICD-10-CM

## 2022-12-23 DIAGNOSIS — F33.42 RECURRENT MAJOR DEPRESSIVE DISORDER, IN FULL REMISSION: ICD-10-CM

## 2022-12-23 DIAGNOSIS — G43.009 MIGRAINE WITHOUT AURA AND WITHOUT STATUS MIGRAINOSUS, NOT INTRACTABLE: ICD-10-CM

## 2022-12-23 PROCEDURE — 4010F ACE/ARB THERAPY RXD/TAKEN: CPT | Mod: CPTII,95,, | Performed by: STUDENT IN AN ORGANIZED HEALTH CARE EDUCATION/TRAINING PROGRAM

## 2022-12-23 PROCEDURE — 99214 OFFICE O/P EST MOD 30 MIN: CPT | Mod: 95,,, | Performed by: STUDENT IN AN ORGANIZED HEALTH CARE EDUCATION/TRAINING PROGRAM

## 2022-12-23 PROCEDURE — 4010F PR ACE/ARB THEARPY RXD/TAKEN: ICD-10-PCS | Mod: CPTII,95,, | Performed by: STUDENT IN AN ORGANIZED HEALTH CARE EDUCATION/TRAINING PROGRAM

## 2022-12-23 PROCEDURE — 99214 PR OFFICE/OUTPT VISIT, EST, LEVL IV, 30-39 MIN: ICD-10-PCS | Mod: 95,,, | Performed by: STUDENT IN AN ORGANIZED HEALTH CARE EDUCATION/TRAINING PROGRAM

## 2022-12-23 RX ORDER — BUPROPION HYDROCHLORIDE 300 MG/1
300 TABLET ORAL DAILY
Qty: 90 TABLET | Refills: 0 | Status: SHIPPED | OUTPATIENT
Start: 2022-12-23 | End: 2023-01-27 | Stop reason: SDUPTHER

## 2022-12-23 RX ORDER — ZOLPIDEM TARTRATE 10 MG/1
10 TABLET ORAL NIGHTLY
Qty: 30 TABLET | Refills: 0 | Status: SHIPPED | OUTPATIENT
Start: 2022-12-23 | End: 2023-01-18 | Stop reason: SDUPTHER

## 2022-12-23 RX ORDER — DULOXETIN HYDROCHLORIDE 60 MG/1
60 CAPSULE, DELAYED RELEASE ORAL DAILY
Qty: 90 CAPSULE | Refills: 0 | Status: SHIPPED | OUTPATIENT
Start: 2022-12-23 | End: 2023-01-27 | Stop reason: SDUPTHER

## 2022-12-23 NOTE — PROGRESS NOTES
Outpatient Psychiatry Follow-Up Visit (MD/NP)    The patient location is: At home in [place]  The chief complaint leading to consultation is: anxiety, depression, insomnia    Visit type: audiovisual    Face to Face time with patient: 30 minutes  60 minutes of total time spent on the encounter, which includes face to face time and non-face to face time preparing to see the patient (eg, review of tests), Obtaining and/or reviewing separately obtained history, Documenting clinical information in the electronic or other health record, Independently interpreting results (not separately reported) and communicating results to the patient/family/caregiver, or Care coordination (not separately reported).         Each patient to whom he or she provides medical services by telemedicine is:  (1) informed of the relationship between the physician and patient and the respective role of any other health care provider with respect to management of the patient; and (2) notified that he or she may decline to receive medical services by telemedicine and may withdraw from such care at any time.    Notes:       12/23/2022    Clinical Status of Patient:  Outpatient (Ambulatory)    Chief Complaint:  Mirna Pollock is a 47 y.o. female who presents today for follow-up of depression, anxiety and insomnia . Significant past medical history of migraines (requiring every 3 months botox; and monthly monoclonal antibodies); fibromyalgia; possible IBS . Patient has been involved with mental health providers for 10 years. Met with patient.      Interval History and Content of Current Session:  Interim Events/Subjective Report/Content of Current Session:     Reports changes in sleep since trial of lower dose ambien. Notes that on 5 mg she is waking up at 2 in the morning and is unable to go back to sleep over the past month. Also notices decrease in mood, denies SI, but states she is feeling down several days a week; unsure if it is due to poor sleep,  work stress, weather changing, which is leading to more frequent migraines. States she is doing well on wellbutrin the morning. Would like to return to ambien 10 mg at night for sleep. Discussed option to increase cymbalta if depressed mood is persistent.         Psychiatric Review of Systems-is patient experiencing or having changes in  sleep: yes, decrease  appetite: no  weight: no  energy/anergy: no  anhedonia: no  libido: no  anxiety: yes, improvement since changing job  guilty/hopelessness: no  concentration: no  Irritability: no  Paranoia/delusions: no  S.I.B.s/risky behavior: no    Review of Systems   PSYCHIATRIC: Pertinant items are noted in the narrative.  CONSTITUTIONAL: No weight gain or loss.  MUSCULOSKELETAL: No pain or stiffness of the joints.  NEUROLOGIC: endorses more frequent migraines since weather changing, No weakness, sensory changes, seizures, confusion, memory loss, tremor or other abnormal movements.  RESPIRATORY: No shortness of breath.  CARDIOVASCULAR: No tachycardia or chest pain.  GASTROINTESTINAL: No nausea, vomiting, pain, constipation or diarrhea.    Past Medical, Family and Social History: The patient's past medical, family and social history have been reviewed and updated as appropriate within the electronic medical record - see encounter notes.    Compliance: yes    Side effects: None    Risk Parameters:  Patient reports no suicidal ideation  Patient reports no homicidal ideation  Patient reports no self-injurious behavior  Patient reports no violent behavior    Exam (detailed: at least 9 elements; comprehensive: all 15 elements)   Constitutional  Vitals:  Most recent vital signs, dated greater than 90 days prior to this appointment, were not reviewed.   There were no vitals filed for this visit.     General:  unremarkable, age appropriate     Musculoskeletal  Muscle Strength/Tone:  not examined   Gait & Station:  non-ataxic     Psychiatric  Speech:  no latency; no press   Mood &  Affect:  steady  congruent and appropriate   Thought Process:  normal and logical   Associations:  intact   Thought Content:  no suicidality, no homicidality, delusions, or paranoia   Insight:  intact   Judgement: behavior is adequate to circumstances   Orientation:  grossly intact   Memory: intact for content of interview   Language: grossly intact   Attention Span & Concentration:  able to focus   Fund of Knowledge:  intact and appropriate to age and level of education     Assessment and Diagnosis   Status/Progress: Based on the examination today, the patient's problem(s) is/are well controlled.  New problems have not been presented today.   Co-morbidities are not complicating management of the primary condition.  There are no active rule-out diagnoses for this patient at this time.     General Impression:    ICD-10-CM ICD-9-CM   1. Insomnia, unspecified type  G47.00 780.52   2. LALITA (generalized anxiety disorder)  F41.1 300.02   3. Recurrent major depressive disorder, in full remission  F33.42 296.36   4. Migraine without aura and without status migrainosus, not intractable  G43.009 346.10           Intervention/Counseling/Treatment Plan   Medication Management: Continue current medications. The risks and benefits of medication were discussed with the patient.  Cymbalta 60 mg daily - discuss increase further at next appointment   Wellbutrin  mg daily  Return to ambien 10 mg nightly   -Continue Vistaril 25 mg TID PRN     Per neurology for migraines:  Zanaflex 4 mg nightly   Elavil 25 mg nightly     Hold propranolol 20 mg daily which will be helpful for anxiety, HTN, and migraines    Discussed with patient informed consent including diagnosis, risks and benefits of proposed treatment above vs. alternative treatments vs. no treatment, as well as serious and common side effects of these treatments, and the inherent unpredictability of individual responses to these treatments. The patient expresses understanding  of the above and displays the capacity to agree with this current plan. Patient also agrees that, currently, the benefits outweigh the risks and would like to pursue treatment at this time, and had no other questions.    Instructions:  Take all medications as prescribed.    Abstain from recreational drugs and alcohol.  Present to ED or call 911 for SI/HI plan or intent, psychosis, or medical emergency.    Return to Clinic: 4-6 weeks    Case to be discussed with supervising staff, Dr. Abner Jin MD.    Kristi Nation MD  Psychiatry  [facility]

## 2023-01-10 ENCOUNTER — PATIENT MESSAGE (OUTPATIENT)
Dept: PSYCHIATRY | Facility: CLINIC | Age: 48
End: 2023-01-10
Payer: COMMERCIAL

## 2023-01-19 ENCOUNTER — PATIENT MESSAGE (OUTPATIENT)
Dept: NEUROLOGY | Facility: CLINIC | Age: 48
End: 2023-01-19
Payer: COMMERCIAL

## 2023-01-19 ENCOUNTER — PATIENT MESSAGE (OUTPATIENT)
Dept: PSYCHIATRY | Facility: CLINIC | Age: 48
End: 2023-01-19
Payer: COMMERCIAL

## 2023-01-27 ENCOUNTER — OFFICE VISIT (OUTPATIENT)
Dept: PSYCHIATRY | Facility: CLINIC | Age: 48
End: 2023-01-27
Payer: COMMERCIAL

## 2023-01-27 DIAGNOSIS — F41.1 GAD (GENERALIZED ANXIETY DISORDER): ICD-10-CM

## 2023-01-27 DIAGNOSIS — Z91.89 EXCESSIVE CONSUMPTION OF SODA POP: ICD-10-CM

## 2023-01-27 DIAGNOSIS — M79.7 FIBROMYALGIA: ICD-10-CM

## 2023-01-27 DIAGNOSIS — F33.42 RECURRENT MAJOR DEPRESSIVE DISORDER, IN FULL REMISSION: ICD-10-CM

## 2023-01-27 DIAGNOSIS — G47.00 INSOMNIA, UNSPECIFIED TYPE: Primary | ICD-10-CM

## 2023-01-27 DIAGNOSIS — F33.41 RECURRENT MAJOR DEPRESSION IN PARTIAL REMISSION: ICD-10-CM

## 2023-01-27 PROCEDURE — 99214 PR OFFICE/OUTPT VISIT, EST, LEVL IV, 30-39 MIN: ICD-10-PCS | Mod: 95,,, | Performed by: STUDENT IN AN ORGANIZED HEALTH CARE EDUCATION/TRAINING PROGRAM

## 2023-01-27 PROCEDURE — 99214 OFFICE O/P EST MOD 30 MIN: CPT | Mod: 95,,, | Performed by: STUDENT IN AN ORGANIZED HEALTH CARE EDUCATION/TRAINING PROGRAM

## 2023-01-27 RX ORDER — ZOLPIDEM TARTRATE 10 MG/1
10 TABLET ORAL NIGHTLY
Qty: 30 TABLET | Refills: 0 | Status: SHIPPED | OUTPATIENT
Start: 2023-02-15 | End: 2023-07-05

## 2023-01-27 RX ORDER — ZOLPIDEM TARTRATE 10 MG/1
10 TABLET ORAL NIGHTLY
Qty: 30 TABLET | Refills: 0 | Status: SHIPPED | OUTPATIENT
Start: 2023-03-14 | End: 2023-04-13

## 2023-01-27 RX ORDER — BUPROPION HYDROCHLORIDE 300 MG/1
300 TABLET ORAL DAILY
Qty: 90 TABLET | Refills: 1 | Status: SHIPPED | OUTPATIENT
Start: 2023-01-27 | End: 2023-04-14 | Stop reason: SDUPTHER

## 2023-01-27 RX ORDER — DULOXETIN HYDROCHLORIDE 60 MG/1
60 CAPSULE, DELAYED RELEASE ORAL DAILY
Qty: 90 CAPSULE | Refills: 1 | Status: SHIPPED | OUTPATIENT
Start: 2023-01-27 | End: 2023-04-14 | Stop reason: SDUPTHER

## 2023-01-27 NOTE — PROGRESS NOTES
Outpatient Psychiatry Follow-Up Visit (MD/NP)    The patient location is: At home in [place]  The chief complaint leading to consultation is: anxiety, depression, insomnia    Visit type: audiovisual    Face to Face time with patient: 30 minutes  60 minutes of total time spent on the encounter, which includes face to face time and non-face to face time preparing to see the patient (eg, review of tests), Obtaining and/or reviewing separately obtained history, Documenting clinical information in the electronic or other health record, Independently interpreting results (not separately reported) and communicating results to the patient/family/caregiver, or Care coordination (not separately reported).         Each patient to whom he or she provides medical services by telemedicine is:  (1) informed of the relationship between the physician and patient and the respective role of any other health care provider with respect to management of the patient; and (2) notified that he or she may decline to receive medical services by telemedicine and may withdraw from such care at any time.    Notes:       1/27/2023    Clinical Status of Patient:  Outpatient (Ambulatory)    Chief Complaint:  Mirna Pollock is a 47 y.o. female who presents today for follow-up of depression, anxiety and insomnia . Significant past medical history of migraines (requiring every 3 months botox; and monthly monoclonal antibodies); fibromyalgia; possible IBS . Patient has been involved with mental health providers for 10 years. Met with patient.      Interval History and Content of Current Session:  Interim Events/Subjective Report/Content of Current Session:     Notes improvement in sleep since increasing dose of ambien back to 10 mg (from 5 mg nightly). Shares that she is working three 12 hr shifts in a row. Notes mood has been improved as well  Moderate anxiety about changing work schedule, which will implemented in March. Overall, looking forward to  schedule adjustments, as it will allow her to have three days off in a row, allowing her to see her  more frequently.  States she is doing well on wellbutrin the morning. Discussed option to increase cymbalta if depressed mood is persistent.   Migraines- missed last botox appointment due to migraine, next appointment available is in March; she is on waitlist in case open occurs.      Psychiatric Review of Systems-is patient experiencing or having changes in  sleep: yes, improving with ambien 10 mg at night  appetite: no  weight: no  energy/anergy: no  anhedonia: no  libido: no  anxiety: yes, increase due to changing work schedule  guilty/hopelessness: no  concentration: no  Irritability: no  Paranoia/delusions: no  S.I.B.s/risky behavior: no    Review of Systems   PSYCHIATRIC: Pertinant items are noted in the narrative.  CONSTITUTIONAL: No weight gain or loss.  MUSCULOSKELETAL: No pain or stiffness of the joints.  NEUROLOGIC: endorses more frequent migraines since weather changing, No weakness, sensory changes, seizures, confusion, memory loss, tremor or other abnormal movements.  RESPIRATORY: No shortness of breath.  CARDIOVASCULAR: No tachycardia or chest pain.  GASTROINTESTINAL: No nausea, vomiting, pain, constipation or diarrhea.    Past Medical, Family and Social History: The patient's past medical, family and social history have been reviewed and updated as appropriate within the electronic medical record - see encounter notes.    Compliance: yes    Side effects: None    Risk Parameters:  Patient reports no suicidal ideation  Patient reports no homicidal ideation  Patient reports no self-injurious behavior  Patient reports no violent behavior    Exam (detailed: at least 9 elements; comprehensive: all 15 elements)   Constitutional  Vitals:  Most recent vital signs, dated greater than 90 days prior to this appointment, were not reviewed.   There were no vitals filed for this visit.     General:   unremarkable, age appropriate     Musculoskeletal  Muscle Strength/Tone:  not examined   Gait & Station:  Not examined     Psychiatric  Speech:  no latency; no press   Mood & Affect:  steady  congruent and appropriate   Thought Process:  normal and logical   Associations:  intact   Thought Content:  no suicidality, no homicidality, delusions, or paranoia   Insight:  intact   Judgement: behavior is adequate to circumstances   Orientation:  grossly intact   Memory: intact for content of interview   Language: grossly intact   Attention Span & Concentration:  able to focus   Fund of Knowledge:  intact and appropriate to age and level of education     Assessment and Diagnosis   Status/Progress: Based on the examination today, the patient's problem(s) is/are well controlled.  New problems have not been presented today.   Co-morbidities are not complicating management of the primary condition.  There are no active rule-out diagnoses for this patient at this time.     General Impression:    ICD-10-CM ICD-9-CM   1. Insomnia, unspecified type  G47.00 780.52   2. LALITA (generalized anxiety disorder)  F41.1 300.02   3. Recurrent major depressive disorder, in full remission  F33.42 296.36   4. Fibromyalgia  M79.7 729.1       Intervention/Counseling/Treatment Plan   Medication Management: Continue current medications. The risks and benefits of medication were discussed with the patient.  -Cymbalta 60 mg daily   -Wellbutrin  mg daily  -Ambien 10 mg nightly   -Continue Vistaril 25 mg TID PRN     Per neurology for migraines:  Zanaflex 4 mg nightly   Elavil 25 mg nightly     Hold propranolol 20 mg daily     Discussed with patient informed consent including diagnosis, risks and benefits of proposed treatment above vs. alternative treatments vs. no treatment, as well as serious and common side effects of these treatments, and the inherent unpredictability of individual responses to these treatments. The patient expresses  understanding of the above and displays the capacity to agree with this current plan. Patient also agrees that, currently, the benefits outweigh the risks and would like to pursue treatment at this time, and had no other questions.    Instructions:  Take all medications as prescribed.    Abstain from recreational drugs and alcohol.  Present to ED or call 911 for SI/HI plan or intent, psychosis, or medical emergency.    Return to Clinic: 2-3 months    Case to be discussed with supervising staff, Dr. Abner Jin MD.    Kristi Nation MD  Psychiatry  [facility]

## 2023-02-03 ENCOUNTER — PATIENT MESSAGE (OUTPATIENT)
Dept: ADMINISTRATIVE | Facility: OTHER | Age: 48
End: 2023-02-03
Payer: COMMERCIAL

## 2023-02-27 ENCOUNTER — PATIENT OUTREACH (OUTPATIENT)
Dept: ADMINISTRATIVE | Facility: HOSPITAL | Age: 48
End: 2023-02-27
Payer: COMMERCIAL

## 2023-02-27 ENCOUNTER — PATIENT MESSAGE (OUTPATIENT)
Dept: ADMINISTRATIVE | Facility: HOSPITAL | Age: 48
End: 2023-02-27
Payer: COMMERCIAL

## 2023-03-16 ENCOUNTER — PROCEDURE VISIT (OUTPATIENT)
Dept: NEUROLOGY | Facility: CLINIC | Age: 48
End: 2023-03-16
Payer: COMMERCIAL

## 2023-03-16 VITALS
BODY MASS INDEX: 23.11 KG/M2 | SYSTOLIC BLOOD PRESSURE: 140 MMHG | DIASTOLIC BLOOD PRESSURE: 96 MMHG | WEIGHT: 138.88 LBS | HEART RATE: 77 BPM

## 2023-03-16 DIAGNOSIS — I10 PRIMARY HYPERTENSION: ICD-10-CM

## 2023-03-16 DIAGNOSIS — F41.1 GAD (GENERALIZED ANXIETY DISORDER): Chronic | ICD-10-CM

## 2023-03-16 DIAGNOSIS — G43.009 MIGRAINE WITHOUT AURA AND WITHOUT STATUS MIGRAINOSUS, NOT INTRACTABLE: ICD-10-CM

## 2023-03-16 DIAGNOSIS — G43.709 CHRONIC MIGRAINE WITHOUT AURA WITHOUT STATUS MIGRAINOSUS, NOT INTRACTABLE: Primary | ICD-10-CM

## 2023-03-16 DIAGNOSIS — F34.89 OTHER SPECIFIED PERSISTENT MOOD DISORDERS: ICD-10-CM

## 2023-03-16 DIAGNOSIS — F33.42 RECURRENT MAJOR DEPRESSIVE DISORDER, IN FULL REMISSION: ICD-10-CM

## 2023-03-16 PROCEDURE — 64615 PR CHEMODENERVATION OF MUSCLE FOR CHRONIC MIGRAINE: ICD-10-PCS | Mod: S$GLB,,, | Performed by: NURSE PRACTITIONER

## 2023-03-16 PROCEDURE — 64615 CHEMODENERV MUSC MIGRAINE: CPT | Mod: S$GLB,,, | Performed by: NURSE PRACTITIONER

## 2023-03-16 PROCEDURE — 99499 NO LOS: ICD-10-PCS | Mod: S$GLB,,, | Performed by: NURSE PRACTITIONER

## 2023-03-16 PROCEDURE — 99499 UNLISTED E&M SERVICE: CPT | Mod: S$GLB,,, | Performed by: NURSE PRACTITIONER

## 2023-03-16 RX ORDER — IBUPROFEN 800 MG/1
TABLET ORAL
COMMUNITY
Start: 2023-02-04 | End: 2023-07-05

## 2023-03-16 RX ORDER — ONDANSETRON 4 MG/1
TABLET, ORALLY DISINTEGRATING ORAL
COMMUNITY
Start: 2023-02-04 | End: 2023-03-16 | Stop reason: SDUPTHER

## 2023-03-16 RX ORDER — ONDANSETRON 4 MG/1
4 TABLET, FILM COATED ORAL EVERY 8 HOURS PRN
Qty: 30 TABLET | Refills: 2 | Status: SHIPPED | OUTPATIENT
Start: 2023-03-16 | End: 2023-07-05

## 2023-03-16 RX ORDER — UBROGEPANT 100 MG/1
TABLET ORAL
Qty: 10 TABLET | Refills: 5 | Status: SHIPPED | OUTPATIENT
Start: 2023-03-16 | End: 2023-07-31 | Stop reason: SDUPTHER

## 2023-03-16 RX ORDER — PROMETHAZINE HYDROCHLORIDE 25 MG/1
TABLET ORAL
Qty: 20 TABLET | Refills: 1 | Status: SHIPPED | OUTPATIENT
Start: 2023-03-16

## 2023-03-16 RX ORDER — TIZANIDINE 4 MG/1
6 TABLET ORAL NIGHTLY
Qty: 45 TABLET | Refills: 5 | Status: SHIPPED | OUTPATIENT
Start: 2023-03-16 | End: 2023-06-08 | Stop reason: SDUPTHER

## 2023-03-16 NOTE — PROCEDURES
SUBJECTIVE:  Patient ID: Mirna Pollock  Chief Complaint: Botulinum Toxin Injection and Follow-up    History of Present Illness:  Mirna Pollock is a 47 y.o. female who presents to clinic alone for follow-up of headaches and Botox injections.     Recommendations made at last Office Visit on 10/27/22:  - Botox administered in clinic for Chronic Migraine (see below)   - Continue tizanidine 4 mg qhs and aimovig 140 mg SQ monthly   - For acute migraines - uberlvy 100 mg   - For nausea - phenergan 25 mg   - refills provided   - HTN - BP stable, management per PCP   - RTC in 12 weeks for repeat Botox injections or sooner if needed     03/16/2023- Interval History:  Currently nearly 3 months overdue for her Botox injections, has noticed an uptick in migraine frequency over the last 2 months or so.  Prior to this, migraines had remained stable.  She is no longer taking aimovig, did not realize she had to contact pharmacy to get prescription sent to her.  She has continued tizanidine 6 mg nightly.  Treats acute migraines with ubrelvy 100 mg.  Blood pressure remains above goal, though lower than readings at previous visits.      Otherwise, information below is still accurate and current.     10/27/2022- Interval History:  Migraines have been very well controlled recently, got a new job which has been better for her, less stress.  Blood pressure controlled.  Patient has continued to achieve a greater than 50% reduction in the frequency of their migraines with continued Botox injections.  Wishes to proceed with today's injections as scheduled.      Otherwise, information below is still accurate and current.      12/09/2021- Interval History:  Seen by internal medicine on 12/2, started on labetalol 100 mg BID and told to follow-up with her primary care provider (affiliated with [facility]) as soon as possible for continued management and monitoring.  Unfortunately she has not scheduled an appointment with her PCP yet.  Blood  pressure improved today, though still markedly elevated.    Continues to experience frequent migraines, last round of Botox over 6 months ago.  She has tried ubrelvy 100 mg tabs which is effective for treatment of acute migraines.  Migraines historically have responded very well to Botox injections and wishes to proceed with today's injections as scheduled.      Otherwise, information below is still accurate and current.      12/02/2021 - Interval History:  Patient presents to clinic today for Botox injections.  BP initially 221/147 mmHg automatically.  Rechecked manually at 218/140 mmHg.  She denies weakness, confusion, vision changes, dizziness, chest pain, SOB.  In August, she presented for Botox, blood pressure was elevated, she was referred to ED and instructed to schedule f/up appointment with her primary care provider.  She does not want to return to ED today as she does not feel they do anything for her.  Discussed I do not feel comfortable injecting her with blood pressure as high as it is in clinic today.  I have scheduled her an appointment with primary care here at [facility] later this morning and have instructed her to call her primary care provider's office today to schedule an appointment as soon as possible.    She does admit she has been under increased stress, mother in law is moving in to her home in two weeks.  She has not been sleeping well despite taking tizanidine 6 mg and ambien nightly.  She has continued seeing her psychiatrist regularly, last seen in September.    Feels she has been suffering with frequent migraines likely because her last round of Botox was nearly 6 months ago.  She has been accumulating occurrences at work due to missed days for migraines.  She is not eligible for intermittent FMLA until next summer.  She has continued using Aimovig 140 mg SQ monthly, though she uses it inconsistently as pharmacy does not call to remind her she is due for her next injection.  Treats acute  migraines with vistaril 25 mg capsule, initially was helping her sleep however feels it is losing its effectiveness.      Otherwise, information below is still accurate and current.      06/04/2021- Interval History:  Migraines have been under good control, reports she has not had many migraines what so ever over the last 3 months.  Blood pressure elevated in clinic today, still has not seen PCP in quite some time.  Would like to proceed with next round of botox as scheduled today.      Otherwise, information below is still accurate and current.      02/25/2021- Interval History:  Migraines have gradually increased in frequency over the last few months, of note, she is currently 3 months overdue for her Botox injections.  For migraine prevention, she has continued with Aimovig 140 mg SQ monthly, tizanidine 6 mg qhs, amitriptyline 25 mg qhs and cymbalta 60 mg daily.  Treats acute migraines with sumatriptan 100 mg tabs, has vistaril 25 mg capsule available for rescue treatment.  She feels once she gets back on track with Botox injections, migraines are likely to be optimally controlled.       Otherwise, information below is still accurate and current.      09/24/2020- Interval History:  She continues to feels migraines are well controlled on her current regimen. She continues to experience a greater than 50% reduction in the frequency of her migraines since starting Botox injections and wishes to continue with Botox for migraine control.       Otherwise, information below is still accurate and current.      06/09/2020- Interval History:  Migraines continue to be well controlled with dual Aimovig and Botox injections, she is very pleased with the current state of her migraines and wishes to continue with current treatment plan.       Otherwise, information below is still accurate and current.      03/25/2020- Interval History:  She is about a month overdue for her Botox injections and has noticed her migraines have  gradually become more frequent and more intense.  Prior to one month ago, migraines had been under good control.  She does admit she has been under a significant amount of stress related to Covid-19 outbreak.       Otherwise, information below is still accurate and current.      12/04/2019- Interval History:  Reports she has been experiencing 5-6 migraines per month, admits she has been under increased stress found her birth mother who was not interested in meeting her, is in contact with twin brothers.   She has continued taking aimovig 140 mg, which she feels has greatly improved her.  She typically wakes up with a migraine if she is going to get one, typically is resolved with one dose of sumatriptan 100 mg, will feel back to normal by the afternoon.   She is pleased with the current state of her migraines and attributes the increased frequency of migraines over the last 12 weeks to fluctuating weather and increased stress.  Does not wish to make adjustments to her treatment plan at this time.      Otherwise, information below is still accurate and current.      08/16/2019- Interval History:  Migraines and headaches continue to respond well to Botox and Aimovig, gets one severe migraine per month, and 1-2 moderate migraines.  She has been treating her migraines with ibuprofen, which is not very effective.  In the past, she was using triptans, however she does not have any abortive medication available at this time.  Migraines continue to feel the same as they always have, she denies any change in the quality or nature of her migraines.  She is happy with her current regimen and does not feel adjustments to her treatment plan are needed at this time.    She has additional concerns including frequently feeling as if she is more off balance than usual, is occurring more frequently as time progresses.  She is not dizzy, just feels as if she is going to fall over, occurs 8-10 times per month.  She is also dropping  "things more frequently.       05/22/2019- Interval History:  Botox continues to be effective for migraine prevention, for the first 8-10 weeks she experiences less than 3 migraines per month.  She has continued using Aimovig 140 mg SQ monthly injections.  She is very happy with the response of her migraines to Botox injections and wishes to continue with current treatment plan.        02/14/2019- Interval History:  Headaches and migraines continue to be "much better", "I am not getting them nearly as much", though she is keenly aware of when a cold front is coming through as she will always get a migraine.  She has continued using Aimovig monthly as well as taking tizanidine, cymbalta, and amitriptyline daily for migraine prevention.  She is very happy with the current state of her migraines and does not wish to make any adjustments to her treatment plan at this time.  Migraines continue to feel the same as they always have.     11/01/2018- Interval History:  Headaches have been significantly better, she has only had 3-4 migraines since her last round of Botox and is very happy Botox is helping decrease her migraine frequency.  She has been using Aimovig monthly, which she also feels has been helping decrease her migraine frequency.  Migraines continue to feel the same as they always have, she denies any change in the quality or nature of her migraines.  Wishes to continue with Botox injections due to the pronounced effect it has had on her migraines.      08/08/2018- Interval History:  Finds her migraines are no better since last round of Botox, admits she was under increased stress at one point due to issues with her 's job.  She does know a lot of her migraines are triggered from stress.  She has been getting a lot of migraines above her eyes as well as throughout her occipitalis and neck.  Overall, she does wish to continue with Botox injections, but is open to discussing additional prevention options.    "   05/22/2018- Interval History:  Despite nerve blocks two weeks ago, she has been suffering with a headache nearly everyday since her last visit.  She did try Zomig nasal spray, however she took the first dose on day two of her migraine because she was nervous.  She does think it lessened the pain of her migraine, however did not abort it.  She did not repeat the dose.  Additionally, she continues to report poor sleep nightly.  She stopped drinking caffeine.  Headaches are the same as they have always been.  She denies any change in the nature or quality of her headaches. Risks, Benefits, and potential side effects of Botox discussed with patient.  Alternative treatments offered.  After thorough discussed regarding the procedure, patient has decided to move forward with initiating Botox injections for Chronic Migraine.  Patient understands we will complete 3 rounds of injections, if after 3 rounds, we do not see a 50% reduction in headaches, we will discontinue Botox injections.      05/11/2018 - Interval History:  This past week she has only been able to work two days, has missed 3 days of work this week due to migraines.  She still is unsure about getting Botox injections for chronic migraine, she would like to try it but her  does not feel this is a good treatment option, she is trying to convince him.  Of note, blood pressure significantly lower in today's visit than in past visits.  She reports her psychiatrist has discontinued vyvanse until she is given clearance from Cardio to restart taking Vyvanse.  She was seen by a pediatric cardiologist who referred her to an adult cardiologist, however she has not seen this provider, had to cancel appointment due to having a headache.  She is requesting repeat nerve blocks today.      01/29/2018 - Interval History:  She has been having a terrible migraine for the last 3 days, she has tried numerous medications without any relief.  States she tried to go to work  today, but she had to leave because her headache got so bad.  She is requesting to repeat the nerve blocks as those shots gave her relief form 6-8 weeks in the past.  She has done research on the Botox injections and states her  is very hesitant for her to try Botox injections for her migraines.  She has not been seen by her PCP nor cardiologist for further management of her blood pressure.  Discussed the challenges of treating her migraines with uncontrolled hypertension because I cannot prescribe her any triptan therapy until her blood pressure is under better control.  She has continued to take Cymbalta daily for her depression, she is unsure if this is giving her any relief with regards to her headaches or not.       01/15/2018 - Interval History:  She was approved for Botox injections, but was a no-show to her appointment last week.  States she spoke with her  about the injections and she was very nervous to try the Botox injections for her migraines.    She continues to experience headaches on 1-4 days per week with migraines occurring 1-2 days per week.  Recently she has been experiencing more migraines and headaches than usually, but admits she has been going through a lot emotionally as well as she got sick with the flu.  She has continued to regularly follow-up with her psychiatrist who changed her anti-depressant, she is now taking Cymbalta.  Since change in medication, she feels she has been experiencing a mild headache each day.  She has had to miss a few days of work due to her migraines since last visit.  Additionally, she feels occasionally she begins to get dizzy, this occurs with or without headaches, she has an appointment with her eye doctor next week as her vision is also getting progressively worse.  She did not find vistaril to be useful with regards to her headaches.  She does feel Zanaflex helps with her neck as well as helping to relax her neck muscles.  When she gets a  migraine she has been taking Vistaril and Ibuprofen together which does help the pain, she also has been trying Excedrin migraines which also gives her some relief.  Patient with known HTN on multiple anti-hypertensive agents, blood pressure significantly elevated in clinic today.  She is not symptomatic.  States she needs to follow-up with her primary care (who is outside the [facility] system) for better management.       10/19/2017 - Interval History:  - Headaches have persisted in similar frequency, which she is happy about as she has been under increased stress with regards to her 's health, he has been suffering with his Gout, just got out of the hospital last week and has had to take time off work etc   - Blood pressure continues to be elevated despite numerous anti-hypertensive, states today is good for her (currently 141/102), has been regularly following up with her PCP for management   - Is very excited because she is going on a cruise with her  to [place] at the end of November/early December   - Has continued to have a headache nearly everyday, not necessarily a migraine, however migraine have been at least once per month    - Has noticed more recently that she feels like she has intense pressure behind her eyes and across the bridge of her nose that slightly feels like sinus pressure, this occurs 1-2 times per week   - Has been seeing a chiropractor as well as a masseuse regularly which she does feel has been helping   - Would still like to move forward with the Botox injections   - Does not feel adjustments to her medications are necessary at this time      08/17/2017 - Interval History:  - Had been seeing Iker Beth PA-C   - Was taking Topamax, Zanaflex, and Vistaril - however has been out of medications for about 3 months  - Since being off the medication, she had been okay, however recently noticed migraines were beginning to occur more frequently with increased intensity and duration    - She finds the nerve blocks had been helping a lot, as long as she is consistent with getting the nerve block, however hasn't had this procedure for over a year   - Has been getting 5-6 migraines per month, each lasting 1-4 days in length   - Current migraine has been going on since last Friday (6 days)  - Blood pressure significantly elevated in clinic today, states her PCP is managing her medications in order to get her blood pressure better controlled   - Is requesting refills of medication as well as repeat nerve occipital and trigeminal nerve blocks      Headache history:   Age of onset -  13  Nature of pain -  Throbbing   Location - bioccipital   Aura -  White floaters   Duration - hrs  Frequency -  2/week  Time of day - anytime   7 days of pain - jaw pain, L neck, L shoulder,  L side throbbing   Associated symptoms:   Meningeal symptoms - photophobia, phonophobia, exercise intolerance +   Nausea/vomitting +   Nasal drainage   Visual symptoms  Pallor/flushing  Vertigo  Stroke symptoms  Confusion  Impaired concentration +  Pain worsened with physical activity +  Neck pain +  Whooshing sounds   Visual spots/blotches +  Triggers:   Stress +   Bright or flickering light  Strong smell  Vigorous exercise  Dietary factors   Visual strain   Motion intolerance as passenger:  No   Resolution of headache with sleep: yes      Therapies Tried & Response:  Tylenol, motrin, alleve - not help  excedrin - not help   imitrex - helped initially   topamax 50 mg - helped  Percocet - helps   Gabapentin - helps  Amlodipine - for HTN  Bystolic - for HTN  Lexapro - hasn't noticed change in HA, for depression   GONB - helps   Cymbalta - unsure, for depression   Zomig - helps    Maxalt - no   Botox - helping   Sumatriptan -  Vistrail - effective   Aimovig - helping     Current Medications:    benazepril-hydrochlorthiazide (LOTENSIN HCT) 20-25 mg Tab, Take 1 tablet by mouth once daily., Disp: 90 tablet, Rfl: 3    buPROPion (WELLBUTRIN  XL) 300 MG 24 hr tablet, Take 1 tablet (300 mg total) by mouth once daily., Disp: 90 tablet, Rfl: 1    DULoxetine (CYMBALTA) 60 MG capsule, Take 1 capsule (60 mg total) by mouth once daily., Disp: 90 capsule, Rfl: 1    hydrOXYzine pamoate (VISTARIL) 25 MG Cap, Take 1 capsule (25 mg total) by mouth every 8 (eight) hours as needed (anxiety)., Disp: 45 capsule, Rfl: 2    ibuprofen (ADVIL,MOTRIN) 800 MG tablet, TAKE 1 TABLET BY MOUTH EVERY 8 HOURS FOR UP TO 10 DAYS AS NEEDED FOR PAIN, Disp: , Rfl:     labetaloL (NORMODYNE) 100 MG tablet, TAKE 1 TABLET(100 MG) BY MOUTH TWICE DAILY, Disp: 60 tablet, Rfl: 0    potassium chloride (MICRO-K) 10 MEQ CpSR, TAKE 1 CAPSULE(10 MEQ) BY MOUTH EVERY DAY, Disp: 30 capsule, Rfl: 2    zolpidem (AMBIEN) 10 mg Tab, Take 1 tablet (10 mg total) by mouth every evening., Disp: 30 tablet, Rfl: 0    zolpidem (AMBIEN) 10 mg Tab, Take 1 tablet (10 mg total) by mouth every evening., Disp: 30 tablet, Rfl: 0    erenumab-aooe (AIMOVIG) 140 mg/mL autoinjector, Inject 1 pen (140 mg total) into the skin every 28 days., Disp: 1 mL, Rfl: 11    estradiol 0.05 mg/24 hr td ptsw (VIVELLE-DOT) 0.05 mg/24 hr, Place 1 patch onto the skin once a week. (Patient not taking: Reported on 6/28/2022), Disp: 4 patch, Rfl: 11    ondansetron (ZOFRAN) 4 MG tablet, Take 1 tablet (4 mg total) by mouth every 8 (eight) hours as needed for Nausea., Disp: 30 tablet, Rfl: 2    promethazine (PHENERGAN) 25 MG tablet, TAKE 1 TABLET BY MOUTH EVERY 4 TO 6 HOURS AS NEEDED FOR NAUSEA, Disp: 20 tablet, Rfl: 1    tiZANidine (ZANAFLEX) 4 MG tablet, Take 1.5 tablets (6 mg total) by mouth every evening. No alcohol, no driving with medication., Disp: 45 tablet, Rfl: 5    ubrogepant (UBRELVY) 100 mg tablet, TAKE 1 TABLET BY MOUTH EVERY 2 HOURS AS NEEDED FOR MIGRAINE.  MG PER DAY Strength: 100 mg, Disp: 10 tablet, Rfl: 5    Current Facility-Administered Medications:     onabotulinumtoxina injection 200 Units, 200 Units, Intramuscular,  q12 weeks, Luci Ortez, FNP, 200 Units at 03/16/23 1249    Review of Systems - A review of 10+ systems was conducted with pertinent positive and negative findings documented in HPI with all other systems reviewed and negative.    PFSH: Past medical, family, and social history reviewed as documented in chart with pertinent positive medical, family, and social history detailed in HPI.    OBJECTIVE:  Vitals:  BP (!) 140/96   Pulse 77   Wt 63 kg (138 lb 14.2 oz)   LMP 12/14/2000   BMI 23.11 kg/m²     Physical Exam:  Constitutional: she appears well-developed and well-nourished. she is well groomed. NAD     Review of Data:   Notes from Hem/onc, psychiatry reviewed.   Labs:  No visits with results within 6 Month(s) from this visit.   Latest known visit with results is:   Orders Only on 08/09/2022   Component Date Value Ref Range Status    SARS Coronavirus 2 Antigen 08/09/2022 Positive (A)  Negative Final     Acceptable 08/09/2022 Yes   Final      Latest Reference Range & Units 08/05/22 11:36   WBC 3.90 - 12.70 K/uL 14.72 (H)   RBC 4.00 - 5.40 M/uL 4.65   Hemoglobin 12.0 - 16.0 g/dL 14.6   Hematocrit 37.0 - 48.5 % 42.1   MCV 82 - 98 fL 91   MCH 27.0 - 31.0 pg 31.4 (H)   MCHC 32.0 - 36.0 g/dL 34.7   RDW 11.5 - 14.5 % 13.0   Platelets 150 - 450 K/uL 418   MPV 9.2 - 12.9 fL 9.9   Gran % 38.0 - 73.0 % 58.3   Lymph % 18.0 - 48.0 % 25.8   Mono % 4.0 - 15.0 % 9.0   Eosinophil % 0.0 - 8.0 % 4.4   Basophil % 0.0 - 1.9 % 1.4   Immature Granulocytes 0.0 - 0.5 % 1.1 (H)   Gran # (ANC) 1.8 - 7.7 K/uL 8.6 (H)   Lymph # 1.0 - 4.8 K/uL 3.8   Mono # 0.3 - 1.0 K/uL 1.3 (H)   Eos # 0.0 - 0.5 K/uL 0.7 (H)   Baso # 0.00 - 0.20 K/uL 0.20   Immature Grans (Abs) 0.00 - 0.04 K/uL 0.16 (H)   nRBC 0 /100 WBC 0   Differential Method  Automated   Sodium 136 - 145 mmol/L 139   Potassium 3.5 - 5.1 mmol/L 3.3 (L)   Chloride 95 - 110 mmol/L 102   CO2 23 - 29 mmol/L 27   Anion Gap 8 - 16 mmol/L 10   BUN 6 - 20 mg/dL 5 (L)    Creatinine 0.5 - 1.4 mg/dL 0.8   eGFR >60 mL/min/1.73 m^2 >60.0   Glucose 70 - 110 mg/dL 83   Calcium 8.7 - 10.5 mg/dL 9.7   Alkaline Phosphatase 55 - 135 U/L 82   PROTEIN TOTAL 6.0 - 8.4 g/dL 7.7   Albumin 3.5 - 5.2 g/dL 4.0   BILIRUBIN TOTAL 0.1 - 1.0 mg/dL 0.5   AST 10 - 40 U/L 13   ALT 10 - 44 U/L 5 (L)   (H): Data is abnormally high  (L): Data is abnormally low  Imaging:  Results for orders placed or performed during the hospital encounter of 09/28/17   MRV Brain Without Contrast    Narrative    Procedure: MRV of the head without contrast    Technique: Noncontrast 2-D time-of-flight MRV of the head with coronal and sagittal source imaging and 3-dimensional mip projection views.      Comparison: None    Results:The superior sagittal sinus is patent.  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  The paired internal cerebral veins and basal veins of Jay are patent.  The vein of Zac and torcula Herophili are patent.  The straight sinus is patent.  The bilateral transverse and sigmoid dural venous sinuses are patent to the jugular foramen.    Impression     Unremarkable noncontrast MRV specifically without evidence for venous sinus thrombosis..      Electronically signed by: VIOLETTE CARO DO  Date:     09/28/17  Time:    10:43    MRI Brain W WO Contrast    Narrative    Procedure: MRI the brain with andwithout contrast.    Technique: Sagittal and axial T1, axial T2, axial FLAIR, axial gradient, axial diffusion imaging of the whole brain pre-contrast with postcontrast axial T1 and axial spoiled gradient imaging reformatted in the coronal and sagittal planes.. 10 ml of Gadavist injected intravenously.         Comparison: None    Findings: The brain parenchyma is normal in contour. There is a small focus of T2 flair signal hyperintensity in the right lateral putamen suggestive for remote lacunar type infarct with diffusion hyperintensity without restriction compatible with T2  shine through. There are no abnormal areas of parenchymal enhancement. There are no areas are restricted diffusion to suggest acute infarction. A few additional punctate foci of T2 flair signal hyperintensity supratentorial white matter which are nonspecific and may represent sequela migraine headaches in light of history. The ventricles are normal in size and configuration without evidence for hydrocephalus. There are no abnormal areas of the gradient susceptibility to suggest parenchymal hemorrhage. No abnormal intra or extra axial fluid collections. The major intracranial T2  flow-voids are present.    Impression     Small T2 flair hyperintense focus right lateral putamen with diffusion hyperintensity. 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    A few scattered punctate regions of T2 flair signal hyperintensity elsewhere supratentorial parenchyma while overall nonspecific in light of the given history this may be sequela migraine headaches. Alternative differential including prior demyelination to be considered.    otherwise unremarkable MRI brain specifically without evidence for acute infarction or enhancing lesion.          Electronically signed by: VIOLETTE CARO DO  Date:     09/28/17  Time:    10:49    MRA Brain without contrast    Narrative    Procedure: MRA of the head and neck    Technique: noncontrast 3-D time of flight MRA head and postcontrast 3-D time-of-flight MRA head and neck. 10 ml of Gadavist injected intravenously.    Comparison: None    Findings:    MRA head:    Anterior circulation:  The bilateral distal cervical, Gabby, cavernous and supraclinoid segments of the ICA's and the visualized bilateral anterior and middle cerebral arteries are patent without evidence for significant focal stenosis or aneurysm. There is a left PCOM.    Posterior circulation: The distal vertebral arteries, basilar artery and posterior cerebral arteries are patent without evidence for significant  focal stenosis or aneurysm.    MRA neck: The origins of the right brachycephalic,  left common carotid and left subclavian arteries from the arch are within normal limits. The origins of the vertebral arteries from the subclavian arteries are within normal limits. The vertebral arteries are unremarkable throughout their course without evidence for focal stenosis or occlusion.    Right carotid: The right common carotid artery, carotid bifurcation and extracranial portions of the internal carotid artery are patent without evidence for focal stenosis or occlusion.    Left carotid: The left common carotid artery, carotid bifurcation and extra cranial portions of the internal carotid artery are patent without evidence for focal stenosis or occlusion.    There is less than 50% proximal ICA stenosis by NASCET criteria.    Impression     Unremarkable MRA of the head specifically without evidence for focal stenosis or intracranial aneurysm.    Unremarkable MRA of the neck without evidence for significant focal stenosis or occlusion.      Electronically signed by: VIOLETTE CARO DO  Date:     09/28/17  Time:    10:40        Note: I have independently reviewed any/all imaging/labs/tests and agree with the report (s) as documented.  Any discrepancies will be as noted/demarcated by free text.  ECV, FNP-C 3/16/2023    ASSESSMENT:  1. Chronic migraine without aura without status migrainosus, not intractable    2. Migraine without aura and without status migrainosus, not intractable    3. Primary hypertension    4. LALITA (generalized anxiety disorder)    5. Recurrent major depressive disorder, in full remission    6. Other specified persistent mood disorders      PLAN:  - Botox administered in clinic for Chronic Migraine (see below)   - Restart aimovig 140 mg SQ monthly   - Continue tizanidine 6 mg qhs   - For acute migraine - ubrelvy 100 mg   - will continue to avoid triptans given elevated BP   - For nausea - zofran 4 mg tab and/or  phenergan 25 mg   - HTN - BP remains above goal, needs f/up appt with PCP for continued management and monitoring   - LALITA/Depression - management per psychiatry, will avoid use of anti-depressants for migraine prevention   - RTC in 12 weeks for repeat Botox injections or sooner if needed     Orders Placed This Encounter    erenumab-aooe (AIMOVIG) 140 mg/mL autoinjector    tiZANidine (ZANAFLEX) 4 MG tablet    promethazine (PHENERGAN) 25 MG tablet    ondansetron (ZOFRAN) 4 MG tablet    ubrogepant (UBRELVY) 100 mg tablet       All questions and concerns addressed.  Patient verbalizes understanding and is agreeable with the above stated treatment plan.      PROCEDURE NOTE:  BOTOX was performed as an indicated therapy for intractable chronic migraine headaches given that the patient failed more than 2 headache medications    A time out was conducted just before the start of the procedure to verify the correct patient and procedure, procedure location, and all relevant critical information.     The Botox injections have achieved well over 50%  improvement in the patient's symptoms. Migraines have been reduced at least 7 days per month and the number of cumulative hours suffering with headaches has been reduced at least 100 total hours per month. Today she does have a headache indicating that the Botox has worn off. Frequency of treatment is every 12 weeks unless no response to the treatments, at which time we will discontinue the injections.    PROCEDURE PERFORMED: Botulinum toxin injection (22507)  CLINICAL INDICATION: G43.719  After risks and benefits were explained including bleeding, infection, worsening of pain, damage to the areas being injected, weakness of muscles, loss of muscle control, dysphagia if injecting the head or neck, facial droop if injecting the facial area, painful injection, allergic or other reaction to the medications being injected, and the failure of the procedure to help the problem, a signed  consent was obtained.   The patient was placed in a comfortable area and the sites to be treated were identified.The area to be treated was prepped three times with alcohol and the alcohol allowed to dry. Next, a 30 gauge needle was used to inject the medication in the area to be treated.      Total Botox used: 155 Units   Unavoidable waste: 45 Units     Injection sites:    muscle bilaterally ( a total of 10 units divided into 2 sites)   Procerus muscle (5 units)   Frontalis muscle bilaterally (a total of 20 units divided into 4 sites)   Temporalis muscle bilaterally (a total of 40 units divided into 8 sites)   Occipitalis muscle bilaterally (a total of 30 units divided into 6 sites)   Cervical paraspinal muscles (a total of 20 units divided into 4 sites)   Trapezius muscle bilaterally (a total of 30 units divided into 6 sites)   Complications: none   RTC for the next Botox injection: 12 weeks     CC: MD Luci Echols, BC-YAEL  [facility] Department of Neurology   [phone]

## 2023-03-20 ENCOUNTER — TELEPHONE (OUTPATIENT)
Dept: PHARMACY | Facility: CLINIC | Age: 48
End: 2023-03-20
Payer: COMMERCIAL

## 2023-03-31 ENCOUNTER — PATIENT MESSAGE (OUTPATIENT)
Dept: ADMINISTRATIVE | Facility: HOSPITAL | Age: 48
End: 2023-03-31
Payer: COMMERCIAL

## 2023-03-31 ENCOUNTER — PATIENT OUTREACH (OUTPATIENT)
Dept: ADMINISTRATIVE | Facility: HOSPITAL | Age: 48
End: 2023-03-31
Payer: COMMERCIAL

## 2023-04-14 ENCOUNTER — OFFICE VISIT (OUTPATIENT)
Dept: PSYCHIATRY | Facility: CLINIC | Age: 48
End: 2023-04-14
Payer: COMMERCIAL

## 2023-04-14 DIAGNOSIS — F33.41 RECURRENT MAJOR DEPRESSION IN PARTIAL REMISSION: ICD-10-CM

## 2023-04-14 DIAGNOSIS — Z91.89 EXCESSIVE CONSUMPTION OF SODA POP: ICD-10-CM

## 2023-04-14 DIAGNOSIS — G43.009 MIGRAINE WITHOUT AURA AND WITHOUT STATUS MIGRAINOSUS, NOT INTRACTABLE: ICD-10-CM

## 2023-04-14 DIAGNOSIS — F33.42 RECURRENT MAJOR DEPRESSIVE DISORDER, IN FULL REMISSION: ICD-10-CM

## 2023-04-14 DIAGNOSIS — G47.00 INSOMNIA, UNSPECIFIED TYPE: Primary | ICD-10-CM

## 2023-04-14 DIAGNOSIS — F41.1 GAD (GENERALIZED ANXIETY DISORDER): ICD-10-CM

## 2023-04-14 DIAGNOSIS — M79.7 FIBROMYALGIA: ICD-10-CM

## 2023-04-14 PROCEDURE — 99214 PR OFFICE/OUTPT VISIT, EST, LEVL IV, 30-39 MIN: ICD-10-PCS | Mod: 95,,, | Performed by: STUDENT IN AN ORGANIZED HEALTH CARE EDUCATION/TRAINING PROGRAM

## 2023-04-14 PROCEDURE — 99214 OFFICE O/P EST MOD 30 MIN: CPT | Mod: 95,,, | Performed by: STUDENT IN AN ORGANIZED HEALTH CARE EDUCATION/TRAINING PROGRAM

## 2023-04-14 RX ORDER — DULOXETIN HYDROCHLORIDE 60 MG/1
60 CAPSULE, DELAYED RELEASE ORAL DAILY
Qty: 90 CAPSULE | Refills: 1 | Status: SHIPPED | OUTPATIENT
Start: 2023-04-14 | End: 2023-08-25 | Stop reason: SDUPTHER

## 2023-04-14 RX ORDER — BUPROPION HYDROCHLORIDE 300 MG/1
300 TABLET ORAL DAILY
Qty: 90 TABLET | Refills: 1 | Status: SHIPPED | OUTPATIENT
Start: 2023-04-14 | End: 2023-08-25 | Stop reason: SDUPTHER

## 2023-04-14 RX ORDER — HYDROXYZINE PAMOATE 25 MG/1
25 CAPSULE ORAL EVERY 8 HOURS PRN
Qty: 45 CAPSULE | Refills: 2 | Status: SHIPPED | OUTPATIENT
Start: 2023-04-14 | End: 2023-08-25 | Stop reason: SDUPTHER

## 2023-04-14 RX ORDER — ZOLPIDEM TARTRATE 10 MG/1
10 TABLET ORAL NIGHTLY
Qty: 30 TABLET | Refills: 2 | Status: SHIPPED | OUTPATIENT
Start: 2023-04-14 | End: 2023-07-20 | Stop reason: SDUPTHER

## 2023-04-14 NOTE — PROGRESS NOTES
Outpatient Psychiatry Follow-Up Visit (MD/NP)    The patient location is: At home in [place]  The chief complaint leading to consultation is: anxiety, depression, insomnia    Visit type: audiovisual    Face to Face time with patient: 25 minutes  30 minutes of total time spent on the encounter, which includes face to face time and non-face to face time preparing to see the patient (eg, review of tests), Obtaining and/or reviewing separately obtained history, Documenting clinical information in the electronic or other health record, Independently interpreting results (not separately reported) and communicating results to the patient/family/caregiver, or Care coordination (not separately reported).         Each patient to whom he or she provides medical services by telemedicine is:  (1) informed of the relationship between the physician and patient and the respective role of any other health care provider with respect to management of the patient; and (2) notified that he or she may decline to receive medical services by telemedicine and may withdraw from such care at any time.    Notes:       4/14/2023    Clinical Status of Patient:  Outpatient (Ambulatory)    Chief Complaint:  Mirna Pollock is a 47 y.o. female who presents today for follow-up of depression, anxiety and insomnia . Significant past medical history of migraines (requiring every 3 months botox; and monthly monoclonal antibodies); fibromyalgia; possible IBS . Patient has been involved with mental health providers for 10 years. Met with patient.      Interval History and Content of Current Session:  Interim Events/Subjective Report/Content of Current Session:     Ms. Pollock shares that she is doing well, has had some improvement in work schedule change.  Adherent with current medication regimen, no changes at this time.  Denies changes in mood, anxiety, sleep.      Psychiatric Review of Systems-is patient experiencing or having changes in  sleep: yes,  improving with ambien 10 mg at night  appetite: no  weight: no  energy/anergy: no  anhedonia: no  libido: no  anxiety: no change  guilty/hopelessness: no  concentration: no  Irritability: no  Paranoia/delusions: no  S.I.B.s/risky behavior: no    Review of Systems   PSYCHIATRIC: Pertinant items are noted in the narrative.  CONSTITUTIONAL: No weight gain or loss.  MUSCULOSKELETAL: No pain or stiffness of the joints.  NEUROLOGIC: endorses more frequent migraines since weather changing, No weakness, sensory changes, seizures, confusion, memory loss, tremor or other abnormal movements.  RESPIRATORY: No shortness of breath.  CARDIOVASCULAR: No tachycardia or chest pain.  GASTROINTESTINAL: No nausea, vomiting, pain, constipation or diarrhea.    Past Medical, Family and Social History: The patient's past medical, family and social history have been reviewed and updated as appropriate within the electronic medical record - see encounter notes.    Compliance: yes    Side effects: None    Risk Parameters:  Patient reports no suicidal ideation  Patient reports no homicidal ideation  Patient reports no self-injurious behavior  Patient reports no violent behavior    Exam (detailed: at least 9 elements; comprehensive: all 15 elements)   Constitutional  Vitals:  Most recent vital signs, dated greater than 90 days prior to this appointment, were not reviewed.   There were no vitals filed for this visit.     General:  unremarkable, age appropriate     Musculoskeletal  Muscle Strength/Tone:  not examined   Gait & Station:  Not examined     Psychiatric  Speech:  no latency; no press   Mood & Affect:  steady  congruent and appropriate   Thought Process:  normal and logical   Associations:  intact   Thought Content:  no suicidality, no homicidality, delusions, or paranoia   Insight:  intact   Judgement: behavior is adequate to circumstances   Orientation:  grossly intact   Memory: intact for content of interview   Language: grossly  intact   Attention Span & Concentration:  able to focus   Fund of Knowledge:  intact and appropriate to age and level of education     Assessment and Diagnosis   Status/Progress: Based on the examination today, the patient's problem(s) is/are well controlled.  New problems have not been presented today.   Co-morbidities are not complicating management of the primary condition.  There are no active rule-out diagnoses for this patient at this time.     General Impression:    ICD-10-CM ICD-9-CM   1. Insomnia, unspecified type  G47.00 780.52   2. LALITA (generalized anxiety disorder)  F41.1 300.02   3. Recurrent major depressive disorder, in full remission  F33.42 296.36   4. Fibromyalgia  M79.7 729.1       Intervention/Counseling/Treatment Plan   Medication Management: Continue current medications. The risks and benefits of medication were discussed with the patient.  -Cymbalta 60 mg daily   -Wellbutrin  mg daily  -Ambien 10 mg nightly   -Continue Vistaril 25 mg TID PRN     Per neurology for migraines:  Zanaflex 4 mg nightly   Elavil 25 mg nightly     Hold propranolol 20 mg daily     Discussed with patient informed consent including diagnosis, risks and benefits of proposed treatment above vs. alternative treatments vs. no treatment, as well as serious and common side effects of these treatments, and the inherent unpredictability of individual responses to these treatments. The patient expresses understanding of the above and displays the capacity to agree with this current plan. Patient also agrees that, currently, the benefits outweigh the risks and would like to pursue treatment at this time, and had no other questions.    Instructions:  Take all medications as prescribed.    Abstain from recreational drugs and alcohol.  Present to ED or call 911 for SI/HI plan or intent, psychosis, or medical emergency.    Return to Clinic: 2-3 months    Case to be discussed with supervising staff, Dr. Abner Jin,  MD.    Kristi Nation MD  Psychiatry  [facility]

## 2023-04-18 ENCOUNTER — PATIENT MESSAGE (OUTPATIENT)
Dept: PSYCHIATRY | Facility: CLINIC | Age: 48
End: 2023-04-18
Payer: COMMERCIAL

## 2023-04-21 ENCOUNTER — PATIENT MESSAGE (OUTPATIENT)
Dept: ADMINISTRATIVE | Facility: HOSPITAL | Age: 48
End: 2023-04-21
Payer: COMMERCIAL

## 2023-04-21 ENCOUNTER — PATIENT OUTREACH (OUTPATIENT)
Dept: ADMINISTRATIVE | Facility: HOSPITAL | Age: 48
End: 2023-04-21
Payer: COMMERCIAL

## 2023-05-13 NOTE — TELEPHONE ENCOUNTER
Refill Routing Note   Refill Routing Note   Medication(s) are not appropriate for processing by [facility] for the following reason(s):      Required labs abnormal  Required vitals abnormal    [facility] action(s):  Defer None identified        Medication reconciliation completed: No     Appointments  past 12m or future 3m with PCP    Date Provider   Last Visit   5/20/2022 Luc Venegas MD   Next Visit   Visit date not found Luc Venegas MD   ED visits in past 90 days: 0        Note composed:10:20 AM 05/13/2023

## 2023-05-13 NOTE — TELEPHONE ENCOUNTER
No care due was identified.  [facility] Embedded Care Due Messages. Reference number: [id number].   5/13/2023 3:28:02 AM CDT

## 2023-05-14 RX ORDER — BENAZEPRIL/HYDROCHLOROTHIAZIDE 20 MG-25MG
TABLET ORAL
Qty: 90 TABLET | Refills: 3 | Status: ON HOLD | OUTPATIENT
Start: 2023-05-14 | End: 2023-07-08 | Stop reason: SDUPTHER

## 2023-06-08 ENCOUNTER — PROCEDURE VISIT (OUTPATIENT)
Dept: NEUROLOGY | Facility: CLINIC | Age: 48
End: 2023-06-08
Payer: COMMERCIAL

## 2023-06-08 VITALS
SYSTOLIC BLOOD PRESSURE: 156 MMHG | WEIGHT: 147.69 LBS | HEIGHT: 65 IN | BODY MASS INDEX: 24.61 KG/M2 | HEART RATE: 61 BPM | DIASTOLIC BLOOD PRESSURE: 94 MMHG

## 2023-06-08 DIAGNOSIS — G43.009 MIGRAINE WITHOUT AURA AND WITHOUT STATUS MIGRAINOSUS, NOT INTRACTABLE: ICD-10-CM

## 2023-06-08 DIAGNOSIS — G43.709 CHRONIC MIGRAINE WITHOUT AURA WITHOUT STATUS MIGRAINOSUS, NOT INTRACTABLE: Primary | ICD-10-CM

## 2023-06-08 PROCEDURE — 64615 PR CHEMODENERVATION OF MUSCLE FOR CHRONIC MIGRAINE: ICD-10-PCS | Mod: S$GLB,,, | Performed by: NURSE PRACTITIONER

## 2023-06-08 PROCEDURE — 64615 CHEMODENERV MUSC MIGRAINE: CPT | Mod: S$GLB,,, | Performed by: NURSE PRACTITIONER

## 2023-06-08 RX ORDER — TIZANIDINE 4 MG/1
6 TABLET ORAL NIGHTLY
Qty: 45 TABLET | Refills: 5 | Status: SHIPPED | OUTPATIENT
Start: 2023-06-08 | End: 2023-10-16 | Stop reason: SDUPTHER

## 2023-06-08 RX ORDER — ONDANSETRON 4 MG/1
TABLET, ORALLY DISINTEGRATING ORAL
COMMUNITY
Start: 2023-05-18

## 2023-06-08 NOTE — PROCEDURES
PROCEDURE NOTE:  BOTOX was performed as an indicated therapy for intractable chronic migraine headaches given that the patient failed more than 2 headache medications    A time out was conducted just before the start of the procedure to verify the correct patient and procedure, procedure location, and all relevant critical information.     The Botox injections have achieved well over 50%  improvement in the patient's symptoms. Migraines have been reduced at least 7 days per month and the number of cumulative hours suffering with headaches has been reduced at least 100 total hours per month. Today she does have a headache indicating that the Botox has worn off. Frequency of treatment is every 12 weeks unless no response to the treatments, at which time we will discontinue the injections.    PROCEDURE PERFORMED: Botulinum toxin injection (55569)  CLINICAL INDICATION: G43.719  After risks and benefits were explained including bleeding, infection, worsening of pain, damage to the areas being injected, weakness of muscles, loss of muscle control, dysphagia if injecting the head or neck, facial droop if injecting the facial area, painful injection, allergic or other reaction to the medications being injected, and the failure of the procedure to help the problem, a signed consent was obtained.   The patient was placed in a comfortable area and the sites to be treated were identified.The area to be treated was prepped three times with alcohol and the alcohol allowed to dry. Next, a 30 gauge needle was used to inject the medication in the area to be treated.      Total Botox used: 155 Units   Unavoidable waste: 45 Units     Injection sites:    muscle bilaterally ( a total of 10 units divided into 2 sites)   Procerus muscle (5 units)   Frontalis muscle bilaterally (a total of 20 units divided into 4 sites)   Temporalis muscle bilaterally (a total of 40 units divided into 8 sites)   Occipitalis muscle bilaterally (a  total of 30 units divided into 6 sites)   Cervical paraspinal muscles (a total of 20 units divided into 4 sites)   Trapezius muscle bilaterally (a total of 30 units divided into 6 sites)   Complications: none   RTC for the next Botox injection: 12 weeks     CC: MD Luci Echols, AVERYP-C  [facility] Department of Neurology   [phone]

## 2023-07-05 ENCOUNTER — HOSPITAL ENCOUNTER (INPATIENT)
Facility: HOSPITAL | Age: 48
LOS: 3 days | Discharge: HOME | DRG: 103 | End: 2023-07-08
Attending: EMERGENCY MEDICINE | Admitting: PSYCHIATRY & NEUROLOGY
Payer: COMMERCIAL

## 2023-07-05 DIAGNOSIS — G43.809 OTHER MIGRAINE WITHOUT STATUS MIGRAINOSUS, NOT INTRACTABLE: ICD-10-CM

## 2023-07-05 DIAGNOSIS — I63.9 STROKE: ICD-10-CM

## 2023-07-05 DIAGNOSIS — Q24.9 ADULT CONGENITAL HEART DISEASE: ICD-10-CM

## 2023-07-05 DIAGNOSIS — I63.531 STROKE DUE TO OCCLUSION OF RIGHT POSTERIOR CEREBRAL ARTERY: Primary | ICD-10-CM

## 2023-07-05 LAB
ALBUMIN SERPL BCP-MCNC: 3.8 G/DL (ref 3.5–5.2)
ALP SERPL-CCNC: 82 U/L (ref 55–135)
ALT SERPL W/O P-5'-P-CCNC: 18 U/L (ref 10–44)
ANION GAP SERPL CALC-SCNC: 8 MMOL/L (ref 8–16)
AST SERPL-CCNC: 19 U/L (ref 10–40)
BASOPHILS # BLD AUTO: 0.17 K/UL (ref 0–0.2)
BASOPHILS NFR BLD: 1.3 % (ref 0–1.9)
BILIRUB SERPL-MCNC: 0.6 MG/DL (ref 0.1–1)
BUN SERPL-MCNC: 19 MG/DL (ref 6–20)
BUN SERPL-MCNC: 20 MG/DL (ref 6–30)
CALCIUM SERPL-MCNC: 9.2 MG/DL (ref 8.7–10.5)
CHLORIDE SERPL-SCNC: 105 MMOL/L (ref 95–110)
CHLORIDE SERPL-SCNC: 99 MMOL/L (ref 95–110)
CHOLEST SERPL-MCNC: 223 MG/DL (ref 120–199)
CHOLEST/HDLC SERPL: 4.5 {RATIO} (ref 2–5)
CO2 SERPL-SCNC: 28 MMOL/L (ref 23–29)
CREAT SERPL-MCNC: 1.2 MG/DL (ref 0.5–1.4)
CREAT SERPL-MCNC: 1.3 MG/DL (ref 0.5–1.4)
DIFFERENTIAL METHOD: ABNORMAL
EOSINOPHIL # BLD AUTO: 0.6 K/UL (ref 0–0.5)
EOSINOPHIL NFR BLD: 4.4 % (ref 0–8)
ERYTHROCYTE [DISTWIDTH] IN BLOOD BY AUTOMATED COUNT: 12.1 % (ref 11.5–14.5)
EST. GFR  (NO RACE VARIABLE): 55.5 ML/MIN/1.73 M^2
ESTIMATED AVG GLUCOSE: 100 MG/DL (ref 68–131)
GLUCOSE SERPL-MCNC: 116 MG/DL (ref 70–110)
GLUCOSE SERPL-MCNC: 92 MG/DL (ref 70–110)
HBA1C MFR BLD: 5.1 % (ref 4–5.6)
HCT VFR BLD AUTO: 43.2 % (ref 37–48.5)
HCT VFR BLD CALC: 46 %PCV (ref 36–54)
HCV AB SERPL QL IA: NORMAL
HDLC SERPL-MCNC: 50 MG/DL (ref 40–75)
HDLC SERPL: 22.4 % (ref 20–50)
HGB BLD-MCNC: 14.6 G/DL (ref 12–16)
HIV 1+2 AB+HIV1 P24 AG SERPL QL IA: NORMAL
IMM GRANULOCYTES # BLD AUTO: 0.12 K/UL (ref 0–0.04)
IMM GRANULOCYTES NFR BLD AUTO: 0.9 % (ref 0–0.5)
LDLC SERPL CALC-MCNC: 156.6 MG/DL (ref 63–159)
LYMPHOCYTES # BLD AUTO: 2.4 K/UL (ref 1–4.8)
LYMPHOCYTES NFR BLD: 17.9 % (ref 18–48)
MCH RBC QN AUTO: 31 PG (ref 27–31)
MCHC RBC AUTO-ENTMCNC: 33.8 G/DL (ref 32–36)
MCV RBC AUTO: 92 FL (ref 82–98)
MONOCYTES # BLD AUTO: 1.3 K/UL (ref 0.3–1)
MONOCYTES NFR BLD: 9.7 % (ref 4–15)
NEUTROPHILS # BLD AUTO: 8.8 K/UL (ref 1.8–7.7)
NEUTROPHILS NFR BLD: 65.8 % (ref 38–73)
NONHDLC SERPL-MCNC: 173 MG/DL
NRBC BLD-RTO: 0 /100 WBC
PLATELET # BLD AUTO: 437 K/UL (ref 150–450)
PMV BLD AUTO: 9.7 FL (ref 9.2–12.9)
POC IONIZED CALCIUM: 1.17 MMOL/L (ref 1.06–1.42)
POC TCO2 (MEASURED): 27 MMOL/L (ref 23–29)
POTASSIUM BLD-SCNC: 3 MMOL/L (ref 3.5–5.1)
POTASSIUM SERPL-SCNC: 3.1 MMOL/L (ref 3.5–5.1)
PROT SERPL-MCNC: 7.3 G/DL (ref 6–8.4)
RBC # BLD AUTO: 4.71 M/UL (ref 4–5.4)
SAMPLE: ABNORMAL
SODIUM BLD-SCNC: 140 MMOL/L (ref 136–145)
SODIUM SERPL-SCNC: 141 MMOL/L (ref 136–145)
TRIGL SERPL-MCNC: 82 MG/DL (ref 30–150)
TSH SERPL DL<=0.005 MIU/L-ACNC: 1.05 UIU/ML (ref 0.4–4)
WBC # BLD AUTO: 13.32 K/UL (ref 3.9–12.7)

## 2023-07-05 PROCEDURE — 25000003 PHARM REV CODE 250: Performed by: PHYSICIAN ASSISTANT

## 2023-07-05 PROCEDURE — 96375 TX/PRO/DX INJ NEW DRUG ADDON: CPT

## 2023-07-05 PROCEDURE — 63600175 PHARM REV CODE 636 W HCPCS: Performed by: EMERGENCY MEDICINE

## 2023-07-05 PROCEDURE — 93005 ELECTROCARDIOGRAM TRACING: CPT

## 2023-07-05 PROCEDURE — 80061 LIPID PANEL: CPT | Performed by: PHYSICIAN ASSISTANT

## 2023-07-05 PROCEDURE — 63600175 PHARM REV CODE 636 W HCPCS: Performed by: PHYSICIAN ASSISTANT

## 2023-07-05 PROCEDURE — 99285 EMERGENCY DEPT VISIT HI MDM: CPT | Mod: 25

## 2023-07-05 PROCEDURE — 80053 COMPREHEN METABOLIC PANEL: CPT | Performed by: EMERGENCY MEDICINE

## 2023-07-05 PROCEDURE — 86803 HEPATITIS C AB TEST: CPT | Performed by: PHYSICIAN ASSISTANT

## 2023-07-05 PROCEDURE — 11000001 HC ACUTE MED/SURG PRIVATE ROOM

## 2023-07-05 PROCEDURE — 87389 HIV-1 AG W/HIV-1&-2 AB AG IA: CPT | Performed by: PHYSICIAN ASSISTANT

## 2023-07-05 PROCEDURE — 99223 1ST HOSP IP/OBS HIGH 75: CPT | Mod: ,,, | Performed by: PSYCHIATRY & NEUROLOGY

## 2023-07-05 PROCEDURE — 25000003 PHARM REV CODE 250: Performed by: EMERGENCY MEDICINE

## 2023-07-05 PROCEDURE — 84443 ASSAY THYROID STIM HORMONE: CPT | Performed by: PHYSICIAN ASSISTANT

## 2023-07-05 PROCEDURE — 25500020 PHARM REV CODE 255: Performed by: PSYCHIATRY & NEUROLOGY

## 2023-07-05 PROCEDURE — 83036 HEMOGLOBIN GLYCOSYLATED A1C: CPT | Performed by: PHYSICIAN ASSISTANT

## 2023-07-05 PROCEDURE — 99223 PR INITIAL HOSPITAL CARE,LEVL III: ICD-10-PCS | Mod: ,,, | Performed by: PSYCHIATRY & NEUROLOGY

## 2023-07-05 PROCEDURE — 25500020 PHARM REV CODE 255: Performed by: EMERGENCY MEDICINE

## 2023-07-05 PROCEDURE — 85025 COMPLETE CBC W/AUTO DIFF WBC: CPT | Performed by: EMERGENCY MEDICINE

## 2023-07-05 PROCEDURE — 96365 THER/PROPH/DIAG IV INF INIT: CPT

## 2023-07-05 PROCEDURE — A9585 GADOBUTROL INJECTION: HCPCS | Performed by: PSYCHIATRY & NEUROLOGY

## 2023-07-05 RX ORDER — PROPARACAINE HYDROCHLORIDE 5 MG/ML
1 SOLUTION/ DROPS OPHTHALMIC
Status: COMPLETED | OUTPATIENT
Start: 2023-07-05 | End: 2023-07-05

## 2023-07-05 RX ORDER — ATORVASTATIN CALCIUM 40 MG/1
40 TABLET, FILM COATED ORAL DAILY
Status: DISCONTINUED | OUTPATIENT
Start: 2023-07-05 | End: 2023-07-08 | Stop reason: HOSPADM

## 2023-07-05 RX ORDER — KETOROLAC TROMETHAMINE 30 MG/ML
15 INJECTION, SOLUTION INTRAMUSCULAR; INTRAVENOUS
Status: COMPLETED | OUTPATIENT
Start: 2023-07-05 | End: 2023-07-05

## 2023-07-05 RX ORDER — SODIUM CHLORIDE 0.9 % (FLUSH) 0.9 %
10 SYRINGE (ML) INJECTION
Status: DISCONTINUED | OUTPATIENT
Start: 2023-07-05 | End: 2023-07-08 | Stop reason: HOSPADM

## 2023-07-05 RX ORDER — CLOPIDOGREL BISULFATE 75 MG/1
75 TABLET ORAL DAILY
Status: DISCONTINUED | OUTPATIENT
Start: 2023-07-06 | End: 2023-07-08 | Stop reason: HOSPADM

## 2023-07-05 RX ORDER — LABETALOL 100 MG/1
100 TABLET, FILM COATED ORAL
Status: DISCONTINUED | OUTPATIENT
Start: 2023-07-05 | End: 2023-07-05

## 2023-07-05 RX ORDER — MAGNESIUM SULFATE HEPTAHYDRATE 40 MG/ML
2 INJECTION, SOLUTION INTRAVENOUS ONCE
Status: COMPLETED | OUTPATIENT
Start: 2023-07-05 | End: 2023-07-05

## 2023-07-05 RX ORDER — CLOPIDOGREL 300 MG/1
300 TABLET, FILM COATED ORAL ONCE
Status: COMPLETED | OUTPATIENT
Start: 2023-07-05 | End: 2023-07-05

## 2023-07-05 RX ORDER — ONDANSETRON 2 MG/ML
4 INJECTION INTRAMUSCULAR; INTRAVENOUS EVERY 12 HOURS PRN
Status: DISCONTINUED | OUTPATIENT
Start: 2023-07-05 | End: 2023-07-07

## 2023-07-05 RX ORDER — LABETALOL HCL 20 MG/4 ML
10 SYRINGE (ML) INTRAVENOUS
Status: DISCONTINUED | OUTPATIENT
Start: 2023-07-05 | End: 2023-07-06

## 2023-07-05 RX ORDER — ASPIRIN 325 MG
325 TABLET, DELAYED RELEASE (ENTERIC COATED) ORAL ONCE
Status: COMPLETED | OUTPATIENT
Start: 2023-07-05 | End: 2023-07-05

## 2023-07-05 RX ORDER — SODIUM CHLORIDE 9 MG/ML
INJECTION, SOLUTION INTRAVENOUS CONTINUOUS
Status: DISCONTINUED | OUTPATIENT
Start: 2023-07-05 | End: 2023-07-06

## 2023-07-05 RX ORDER — ACETAMINOPHEN 325 MG/1
650 TABLET ORAL EVERY 6 HOURS PRN
Status: DISCONTINUED | OUTPATIENT
Start: 2023-07-05 | End: 2023-07-08 | Stop reason: HOSPADM

## 2023-07-05 RX ORDER — DULOXETIN HYDROCHLORIDE 30 MG/1
60 CAPSULE, DELAYED RELEASE ORAL DAILY
Status: DISCONTINUED | OUTPATIENT
Start: 2023-07-05 | End: 2023-07-08 | Stop reason: HOSPADM

## 2023-07-05 RX ORDER — ASPIRIN 81 MG/1
81 TABLET ORAL DAILY
Status: DISCONTINUED | OUTPATIENT
Start: 2023-07-06 | End: 2023-07-08 | Stop reason: HOSPADM

## 2023-07-05 RX ORDER — BUPROPION HYDROCHLORIDE 300 MG/1
300 TABLET ORAL DAILY
Status: DISCONTINUED | OUTPATIENT
Start: 2023-07-05 | End: 2023-07-08 | Stop reason: HOSPADM

## 2023-07-05 RX ORDER — ASPIRIN 325 MG
325 TABLET ORAL
Status: COMPLETED | OUTPATIENT
Start: 2023-07-05 | End: 2023-07-05

## 2023-07-05 RX ORDER — TALC
6 POWDER (GRAM) TOPICAL NIGHTLY
Status: DISCONTINUED | OUTPATIENT
Start: 2023-07-05 | End: 2023-07-06

## 2023-07-05 RX ORDER — VERAPAMIL HYDROCHLORIDE 40 MG/1
40 TABLET ORAL 2 TIMES DAILY
Status: DISCONTINUED | OUTPATIENT
Start: 2023-07-05 | End: 2023-07-08 | Stop reason: HOSPADM

## 2023-07-05 RX ORDER — METHYLPREDNISOLONE SOD SUCC 125 MG
125 VIAL (EA) INJECTION
Status: COMPLETED | OUTPATIENT
Start: 2023-07-05 | End: 2023-07-05

## 2023-07-05 RX ORDER — GADOBUTROL 604.72 MG/ML
7 INJECTION INTRAVENOUS
Status: COMPLETED | OUTPATIENT
Start: 2023-07-05 | End: 2023-07-05

## 2023-07-05 RX ORDER — HYDROCHLOROTHIAZIDE 25 MG/1
25 TABLET ORAL
Status: DISCONTINUED | OUTPATIENT
Start: 2023-07-05 | End: 2023-07-05

## 2023-07-05 RX ORDER — LISINOPRIL 20 MG/1
20 TABLET ORAL DAILY
Status: DISCONTINUED | OUTPATIENT
Start: 2023-07-05 | End: 2023-07-05

## 2023-07-05 RX ORDER — DIPHENHYDRAMINE HYDROCHLORIDE 50 MG/ML
25 INJECTION INTRAMUSCULAR; INTRAVENOUS
Status: COMPLETED | OUTPATIENT
Start: 2023-07-05 | End: 2023-07-05

## 2023-07-05 RX ADMIN — ASPIRIN 325 MG ORAL TABLET 325 MG: 325 PILL ORAL at 10:07

## 2023-07-05 RX ADMIN — ASPIRIN 325 MG: 325 TABLET, COATED ORAL at 03:07

## 2023-07-05 RX ADMIN — ATORVASTATIN CALCIUM 40 MG: 40 TABLET, FILM COATED ORAL at 03:07

## 2023-07-05 RX ADMIN — IOHEXOL 75 ML: 350 INJECTION, SOLUTION INTRAVENOUS at 12:07

## 2023-07-05 RX ADMIN — SODIUM CHLORIDE: 9 INJECTION, SOLUTION INTRAVENOUS at 04:07

## 2023-07-05 RX ADMIN — CLOPIDOGREL BISULFATE 300 MG: 300 TABLET, FILM COATED ORAL at 03:07

## 2023-07-05 RX ADMIN — MAGNESIUM SULFATE 2 G: 2 INJECTION INTRAVENOUS at 01:07

## 2023-07-05 RX ADMIN — DIPHENHYDRAMINE HYDROCHLORIDE 25 MG: 50 INJECTION, SOLUTION INTRAMUSCULAR; INTRAVENOUS at 10:07

## 2023-07-05 RX ADMIN — PROPARACAINE HYDROCHLORIDE 1 DROP: 5 SOLUTION/ DROPS OPHTHALMIC at 07:07

## 2023-07-05 RX ADMIN — VERAPAMIL HYDROCHLORIDE 40 MG: 40 TABLET, FILM COATED ORAL at 09:07

## 2023-07-05 RX ADMIN — GADOBUTROL 7 ML: 604.72 INJECTION INTRAVENOUS at 07:07

## 2023-07-05 RX ADMIN — KETOROLAC TROMETHAMINE 15 MG: 30 INJECTION INTRAMUSCULAR; INTRAVENOUS at 07:07

## 2023-07-05 RX ADMIN — DEXTROSE MONOHYDRATE 875 MG: 50 INJECTION, SOLUTION INTRAVENOUS at 04:07

## 2023-07-05 RX ADMIN — DEXTROSE MONOHYDRATE 1000 MG: 50 INJECTION, SOLUTION INTRAVENOUS at 06:07

## 2023-07-05 RX ADMIN — DULOXETINE HYDROCHLORIDE 60 MG: 30 CAPSULE, DELAYED RELEASE ORAL at 03:07

## 2023-07-05 RX ADMIN — BUPROPION HYDROCHLORIDE 300 MG: 300 TABLET, FILM COATED, EXTENDED RELEASE ORAL at 03:07

## 2023-07-05 RX ADMIN — METHYLPREDNISOLONE SODIUM SUCCINATE 125 MG: 125 INJECTION, POWDER, FOR SOLUTION INTRAMUSCULAR; INTRAVENOUS at 10:07

## 2023-07-05 RX ADMIN — ACETAMINOPHEN 650 MG: 325 TABLET ORAL at 09:07

## 2023-07-05 RX ADMIN — Medication 6 MG: at 09:07

## 2023-07-05 NOTE — ED TRIAGE NOTES
"Pt reports 7/10 frontal HA for 5 days. Pt states pain is throbbing behind eyes but also at the base of her neck. Pt reports hx of migraines, but states that she is now seeing "spots" in her field of vision that make her so disoriented she has had 3 falls in the last 5 days. Denies hitting head or LOC. Pt also states she's noticed her R arm get numb frequently when laying down. Pt states she took tizanidine with no relief. Denies CP, N/V/D, SOB, fever.   "

## 2023-07-05 NOTE — ED PROVIDER NOTES
Encounter Date: 2023       History     Chief Complaint   Patient presents with    Headache     Pt c/o constant HA since Friday and seeing spots since saturday, states took tizanidine with no relief.  Reports Hx of migraines.      47-year-old female with history of hypertension and migraine headaches presents with 5 days of headache.  She states it has features of her migraine headache in that it is throbbing and intense.  She tends to get bad migraines like this when low-pressure systems come through.  She is confused why she is getting this headache now.  She feels that normally her headaches are in the left temporal area and this is more in the occipital it.  She is also having more scotomata.  She sees spots that are wandering around her vision in both eyes.  She is able to see [place]te[place].  There is no vision loss.    Review of patient's allergies indicates:   Allergen Reactions    Iodine and iodide containing products Other (See Comments)    Penicillins Rash     Past Medical History:   Diagnosis Date    Anxiety     ASD (atrial septal defect)     Endometriosis     Headache(784.0)     Hypertension     Kidney stones fibomyalgia, migraine, heart closer, kidey stones     Past Surgical History:   Procedure Laterality Date    APPENDECTOMY      CARDIAC CATHETERIZATION  10/01/2010    ASD device closure     SECTION      x 2    CHOLECYSTECTOMY      HYSTERECTOMY      12 yrs ago, still has ovaries, due to endometriosis by Dr. Hernandez     Family History   Adopted: Yes   Problem Relation Age of Onset    Migraines Son     No Known Problems Son      Social History     Tobacco Use    Smoking status: Never    Smokeless tobacco: Never   Substance Use Topics    Alcohol use: No     Alcohol/week: 0.0 standard drinks    Drug use: No     Review of Systems    Physical Exam     Initial Vitals [23 0626]   BP Pulse Resp Temp SpO2   138/87 87 18 98.4 °F (36.9 °C) 98 %      MAP       --         Physical Exam    Constitutional: She  appears well-developed and well-nourished. She is not diaphoretic. No distress.   HENT:   Head: Normocephalic and atraumatic.   Eyes: Conjunctivae are normal.     Pressures  OS 12  OD 12    Full visual fields.   Neck: Neck supple. No tracheal deviation present. No JVD present.   Normal range of motion.  Cardiovascular:  Normal rate, regular rhythm, normal heart sounds and intact distal pulses.           Pulmonary/Chest: Breath sounds normal. No respiratory distress. She has no wheezes. She has no rhonchi. She has no rales.   Abdominal: Abdomen is soft. Bowel sounds are normal. She exhibits no distension. There is no abdominal tenderness. There is no rebound.   Musculoskeletal:         General: No edema.      Cervical back: Normal range of motion and neck supple.     Neurological: She is alert. She has normal strength. No sensory deficit. GCS score is 15. GCS eye subscore is 4. GCS verbal subscore is 5. GCS motor subscore is 6.   Normal gait.  Normal speech   Skin: Skin is warm. No rash noted.   Psychiatric: She has a normal mood and affect.       ED Course   Procedures  Labs Reviewed   CBC W/ AUTO DIFFERENTIAL - Abnormal; Notable for the following components:       Result Value    WBC 13.32 (*)     Immature Granulocytes 0.9 (*)     Gran # (ANC) 8.8 (*)     Immature Grans (Abs) 0.12 (*)     Mono # 1.3 (*)     Eos # 0.6 (*)     Lymph % 17.9 (*)     All other components within normal limits   COMPREHENSIVE METABOLIC PANEL - Abnormal; Notable for the following components:    Potassium 3.1 (*)     eGFR 55.5 (*)     All other components within normal limits   ISTAT PROCEDURE - Abnormal; Notable for the following components:    POC Glucose 116 (*)     POC Potassium 3.0 (*)     All other components within normal limits   HIV 1 / 2 ANTIBODY    Narrative:     Release to patient->Immediate   HEPATITIS C ANTIBODY    Narrative:     Release to patient->Immediate   ISTAT CHEM8          Imaging Results              CTA Head and  Neck (xpd) (Final result)  Result time 07/05/23 12:56:13      Final result by Christiano Post MD (07/05/23 12:56:13)                   Impression:      Small recent infarcts noted on the recent MR imaging study are too small to characterize.  No intracranial hemorrhage mass effect or acute territorial infarct.    Prominent chronic ischemic changes again noted unless there is clinical suspicion for prior demyelinating disease.    No significant stenosis at the carotid bifurcations by NASCET criteria.  The vertebral arteries are patent.    Stenosis of the midportion of the posterior cerebral arteries left greater than right new from 2017.  It is unclear whether this is atherosclerotic or represents vasoconstriction/vasospasm spasm or other vasculopathy.    Sclerotic foci within the cervical vertebral bodies may represent bone islands as they are potentially visualized dating back to 2016 however they are difficult to definitively confirm.  Follow-up imaging for stability as clinically warranted.      Electronically signed by: Christiano Post  Date:    07/05/2023  Time:    12:56               Narrative:    EXAMINATION:  CTA HEAD AND NECK (XPD)    CLINICAL HISTORY:  Neuro deficit, acute, stroke suspected;    TECHNIQUE:  Non contrast low dose axial images were obtained through the head.  CT angiogram was performed from the level of the vicki to the top of the head following the IV administration of 75mL of Omnipaque 350.   Sagittal and coronal reconstructions and maximum intensity projection reconstructions were performed. Arterial stenosis percentages are based on NASCET measurement criteria.    COMPARISON:  MRA 09/28/2017, MRI 07/05/2023    FINDINGS:  The small infarcts noted within the right occipital lobe are too small to visualize on the current CT study.    No hydrocephalus mass effect intracranial hemorrhage or acute territorial infarct is identified.  There is volume loss again noted.    Multiple lesions within  the deep white matter again identified.  Chronic presumed infarcts within the left thalamus and right kriss are again noted.    No enhancing intracranial lesion.    The visualized sinuses and mastoid air cells are clear    CTA:    There is no significant stenosis at the origin of the vessels from the aortic arch.  The vertebral arteries are patent with the left dominant.    There is mild soft plaque at the carotid bifurcations but no significant stenosis by NASCET criteria.    Intracranially, there is focal narrowing of the left greater than right mid posterior cerebral arteries that is new from 2017.  No other major branch stenosis/occlusion is identified at the [id number]-au-[id number].    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    No evidence of aneurysm.  The venous sinuses appear patent.    There are nonspecific small sclerotic lesions identified within the left side of the C6 vertebral body and on the right at C7 and T1.  These are potentially visualized on an MRI of 2016, and may represent bone islands, but are difficult to fully confirmed.  There is a nonspecific 10 mm right thyroid nodule not warranting further radiographic evaluation at this time.    These findings were communicated to Dr. Ricks at 12:49 on 07/05/2023.                                        MRI Brain Without Contrast (Final result)  Result time 07/05/23 10:23:44      Final result by Rodriguez Corral MD (07/05/23 10:23:44)                   Impression:      Small scattered areas of acute infarction in the right PCA territory.    Age advanced small vessel ischemic change with multiple remote lacunar type infarcts.  These findings notably progressed since the prior MRI of 09/28/2017.    Abnormal appearance of the expected flow void of the distal basilar tip concerning for slow flow or occlusion at this site.  Recommend a CTA head and neck for further  assessment.    This report was flagged in Epic as abnormal.      Electronically signed by: Rodriguez Corral MD  Date:    07/05/2023  Time:    10:23               Narrative:    EXAMINATION:  MRI BRAIN WITHOUT CONTRAST    CLINICAL HISTORY:  Headache, chronic, new features or increased frequency;    TECHNIQUE:  Multiplanar multisequence MR imaging of the brain was performed without intravenous contrast.    COMPARISON:  MRI 09/28/2017    FINDINGS:  Intracranial Compartment:    Several scattered small subcentimeter foci of diffusion restriction in the right occipital lobe in keeping with areas of acute infarction within the PCA distribution.  There is no corresponding mass effect or hemorrhage.    No additional areas of acute infarct elsewhere.  There is chronic small vessel ischemic change throughout the supratentorial white matter with multiple remote appearing lacunar type infarcts in the corona radiata and centrum semiovale.  This is notably progressed since prior study of 09/28/2017.  There is also remote lacunar type infarct in the right kriss and left thalamus, which are from prior study.    No evidence of recent or remote hemorrhage.  No significant intracranial mass effect or midline shift.    No extra-axial blood or fluid collections.    Loss expected flow void at the level of the basilar tip.    Skull/Extracranial Contents (limited evaluation):    Bone marrow signal intensity is unremarkable.    Findings were relayed to the ordering provider (Millicent) via the epic secure chat system at approximately 10:20.                                       Medications   magnesium sulfate 2g in water 50mL IVPB (premix) (2 g Intravenous New Bag 7/5/23 7226)   ketorolac injection 15 mg (15 mg Intravenous Given 7/5/23 9075)   proparacaine 0.5 % ophthalmic solution 1 drop (1 drop Both Eyes Given by Other 7/5/23 5122)   diphenhydrAMINE injection 25 mg (25 mg Intravenous Given 7/5/23 8447)   methylPREDNISolone sodium succinate  injection 125 mg (125 mg Intravenous Given 7/5/23 1047)   aspirin tablet 325 mg (325 mg Oral Given 7/5/23 1047)   iohexoL (OMNIPAQUE 350) injection 75 mL (75 mLs Intravenous Given 7/5/23 1202)     Medical Decision Making:   Initial Assessment:   Patient with headaches.  This is likely her migraine headaches.  Find no neurologic deficit on exam.  Eye exam is reassuring.  Will do MRI brain.  Will give dose of Toradol.  Offered Compazine and Benadryl but she would like to try to drive home.  ED Management:  MRI revealed acute infarcts along the PCA territory.  This was unexpected results.  We ordered a CTA head and neck.  She required Benadryl and Solu-Medrol since she would had shortness of breath before prior contrast use.  She tolerated the procedure well.  She was seen by vascular neurology.  They wondered if this was ischemia from migraine.  They ordered magnesium.  I sign case out to Dr. Coronado at 2 p.m. with completion of vascular Neurology consult pending.                        Clinical Impression:   Final diagnoses:  [I63.9] Stroke  [G43.809] Other migraine without status migrainosus, not intractable (Primary)               Rafy Ricks MD  07/05/23 8674

## 2023-07-05 NOTE — ED NOTES
"SPIRITUAL HEALTH SERVICES Progress Note  [facility] ([facility]) [address], Adolescents  REFERRAL SOURCE: Pt request to speak with     Tamra has come to several of my groups. We met in her room and had quiet conversation in which she stated she was struggling with being tired. When asked \"what brings a smile to your face, Tamra states with a big smile - Puppies. She states that she does not have a background with [facility].  PLAN: Will continue to follow and follow-up next week.     Rev. Mandy Haque MDiv, [facility]   Staff    pager [phone]                                                                                                                                          " Assumed care for pt after recieving report from KAEL Phipps. Pt resting in bed in NAD. RR e/u. VS being monitored per frequency of MD orders. Vital signs stable at this time of assessment. Pt. offered bathroom assistance and denies need at this time. Explanation of care/wait provided. Bed in low, locked position with rails up and call bell in reach. Will continue to monitor.     Pt's visitor at bedside updated on plan of care, white board updated with today's care team and plan.        Patient identifiers for Mirna Pollock 47 y.o. female checked and correct.  Chief Complaint   Patient presents with    Headache     Pt c/o constant HA since Friday and seeing spots since saturday, states took tizanidine with no relief.  Reports Hx of migraines.      Past Medical History:   Diagnosis Date    Anxiety     ASD (atrial septal defect)     Endometriosis     Headache(784.0)     Hypertension     Kidney stones fibomyalgia, migraine, heart closer, kidey stones     Allergies reported:   Review of patient's allergies indicates:   Allergen Reactions    Iodine and iodide containing products Other (See Comments)    Penicillins Rash     Worsening CHENG and intermittent right arm numbness since Friday. Pt endorses peripheral visual aura bilaterally. Pt has hx of migraine HA's; no relief @ home with usual medication regimen. Denies chest pain, SOB, dizziness, NV.     LOC: Patient is awake, alert, and aware of environment with an appropriate affect. Patient is oriented x 4 and speaking appropriately.  APPEARANCE: Patient resting comfortably and in no acute distress. Patient is clean and well groomed, patient's clothing is properly fastened.  HEENT: WDL  SKIN: The skin is warm and dry. Patient has normal skin turgor and moist mucus membranes.   MUSKULOSKELETAL: Patient is moving all extremities well, no obvious deformities noted. Pulses intact.   RESPIRATORY: Airway is open and patent. Respirations are spontaneous and non-labored with normal  effort and rate.  CARDIAC: Patient has a normal rate and rhythm 74 on cardiac monitor. No peripheral edema noted.   ABDOMEN: No distention noted. Soft and non-tender upon palpation.  NEUROLOGICAL: pupils 3 mm, PERRL. Facial expression is symmetrical. Hand grasps are equal bilaterally. Normal sensation in all extremities when touched with finger.

## 2023-07-05 NOTE — PHARMACY MED REC
"Admission Medication History     The home medication history was taken by Ashley Palma.    You may go to "Admission" then "Reconcile Home Medications" tabs to review and/or act upon these items.     The home medication list has been updated by the Pharmacy department.   Please read ALL comments highlighted in yellow.   Please address this information as you see fit.    Feel free to contact us if you have any questions or require assistance.      The medications listed below were removed from the home medication list. Please reorder if appropriate:  Patient reports no longer taking the following medication(s):  IBUPROFEN 800 MG TABLET  LABETALOL 100 MG TABLET  POTASSIUM CHLORIDE 10 MEQ TABLET      Current Outpatient Medications on File Prior to Encounter   Medication Sig    benazepril-hydrochlorthiazide (LOTENSIN HCT) 20-25 mg Tab   TAKE 1 TABLET BY MOUTH EVERY DAY    buPROPion (WELLBUTRIN XL) 300 MG 24 hr tablet   Take 1 tablet (300 mg total) by mouth once daily.    DULoxetine (CYMBALTA) 60 MG capsule   Take 1 capsule (60 mg total) by mouth once daily.    erenumab-aooe (AIMOVIG) 140 mg/mL autoinjector   Inject 1 pen (140 mg total) into the skin every 28 days.    ondansetron (ZOFRAN-ODT) 4 MG TbDL   DISSOLVE 1 TABLET ON THE TONGUE EVERY 6 HOURS AS NEEDED FOR NAUSEA    promethazine (PHENERGAN) 25 MG tablet   TAKE 1 TABLET BY MOUTH EVERY 4 TO 6 HOURS AS NEEDED FOR NAUSEA    tiZANidine (ZANAFLEX) 4 MG tablet   Take 1.5 tablets (6 mg total) by mouth every evening. No alcohol, no driving with medication.    ubrogepant (UBRELVY) 100 mg tablet     TAKE 1 TABLET BY MOUTH EVERY 2 HOURS AS NEEDED FOR MIGRAINE.  MG PER DAY Strength: 100 mg    hydrOXYzine pamoate (VISTARIL) 25 MG Cap   Take 1 capsule (25 mg total) by mouth every 8 (eight) hours as needed (anxiety).    zolpidem (AMBIEN) 10 mg Tab   Take 1 tablet (10 mg total) by mouth every evening.         Potential issues to be addressed PRIOR TO DISCHARGE  Please " discuss with the patient barriers to adherence with medication treatment plans  Patient requires education regarding drug therapies     Ashley Palma  EXT [phone]                  .

## 2023-07-05 NOTE — H&P
[facility] Dept  Vascular Neurology  [facility]  History & Physical    Inpatient consult to Vascular (Stroke) Neurology  Consult performed by: Marylin Corona PA-C  Consult ordered by: Rafy Ricks MD        Assessment/Plan:     Patient is a 47 y.o. year old female with:    * Stroke due to occlusion of right posterior cerebral artery  Mirna Pollock is a 47 y.o. female with PMHx of anxiety, ASD (s/p closure), HA, mitral valve prolapse (repaired), and HTN (difficult to control in past) who presented to ED with intermittent migraine with visual aura since Friday (6/30/23). Patient has never had aura with migraines in the past. HA pain is located in L temporal area and BL occipital area. Aura is described as black dots floating on both sides of her vision. MRI in ED with R PCA infarct. Stroke team consulted. CTA with BL PCA stenosis. Concern for migrainous infarcts.     Patient started on verapamil and treated for migraine. TTE with bubble ordered due to history of ASD s/p closure.    Of note, in December 2021, patient had an episode of complete vision with a HA. Episode lasted about 5 seconds and HA started prior to loss of vision.    Antithrombotics for secondary stroke prevention: aspirin and plavix load followed by daily DAPT    Statins for secondary stroke prevention and hyperlipidemia, if present:   Statins: Atorvastatin- 40 mg daily    Aggressive risk factor modification: HTN, migraine with aura     Rehab efforts: The patient has been evaluated by a stroke team provider and the therapy needs have been fully considered based off the presenting complaints and exam findings. The following therapy evaluations are needed: PT evaluate and treat, OT evaluate and treat, SLP evaluate and treat, PM&R evaluate for appropriate placement    Diagnostics ordered/pending: MRA head to assess vasculature, TTE to assess cardiac function/status     VTE prophylaxis: Heparin 5000 units SQ every 8  hours  Mechanical prophylaxis: Place SCDs    BP parameters: Infarct: No intervention, SBP <220        Fibromyalgia  Continuing home bupropion and duloxetine    LALITA (generalized anxiety disorder)  Continuing home bupropion and duloxetine    Essential hypertension  Stroke risk factor  Difficult to control in the past  SBP <220  Holding home BP meds at this time, starting verapamil for migraine and infarct  Prn labetalol    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 Level of Consciousness: 0-->Alert, keenly responsive  1b. LOC Questions: 0-->Answers both questions correctly  1c. LOC Commands: 0-->Performs both tasks correctly  2. Best Gaze: 0-->Normal  3. Visual: 0-->No visual loss  4. Facial Palsy: 0-->Normal symmetrical movements  5a. Motor Arm, Left: 0-->No drift, limb holds 90 (or 45) degrees for full 10 secs  5b. Motor Arm, Right: 0-->No drift, limb holds 90 (or 45) degrees for full 10 secs  6a. Motor Leg, Left: 0-->No drift, leg holds 30 degree position for full 5 secs  6b. Motor Leg, Right: 0-->No drift, leg holds 30 degree position for full 5 secs  7. Limb Ataxia: 0-->Absent  8. Sensory: 1-->Mild-to-moderate sensory loss, patient feels pinprick is less sharp or is dull on the affected side, or there is a loss of superficial pain with pinprick, but patient is aware of being touched  9. Best Language: 0-->No aphasia, normal  10. Dysarthria: 0-->Normal  11. Extinction and Inattention (formerly Neglect): 0-->No abnormality  Total (NIH Stroke Scale): 1     Modified [place] Score: 0  [place] Coma Scale:    ABCD2 Score:    [id number] Score:   HAS -BLED Score:   ICH Score:   Hunt & Guthrie Classification:      Thrombolysis  Candidate? No, Out of window - Symptom onset > 4.5 hours, Non-disabling symptoms - Low NIHSS     Delays to Thrombolysis?  Not Applicable    Interventional Revascularization Candidate?   Is the patient eligible for mechanical endovascular reperfusion (MANNY)?  No; No large vessel occlusion identified on imaging , No; no significant neurologic deficit (NIHSS <6)  and No; at this time symptoms not suggestive of large vessel occlusion    Delays to Thrombectomy? Not Applicable    Hemorrhagic change of an Ischemic Stroke: Does this patient have an ischemic stroke with hemorrhagic changes? No         Subjective:     History of Present Illness:  Mirna Pollock is a 47 y.o. female with PMHx of anxiety, ASD (s/p closure), HA, and HTN (difficult to control in past) who presented to ED with HA x 5 days. Since Friday (6/30), patient has been having intermittent migraine with visual aura and intermittent diminished sensation of the RUE. In the past, patient has never had aura with her migraine. Her aura is described as black dots floating on both sides of her vision. HA pain is located in L temporal area and BL occipital area. Patient has been taking all of her home migraine medications and has had no relief. Yesterday morning, patient felt dizzy and had a fall then her migraine symptoms returned about an hour later. She states her HA pain on Friday and Saturday was the worst HA pain in her life. On arrival, her HA was 9/10. She received a migraine cocktail and her HA is now a 6-7/10. Her aura is still persisting. MRI was ordered by ED and stroke team was consulted for DWI changes in the R PCA territory.    Of note, in December 2021, patient had an episode of complete vision with a HA. Episode lasted about 5 seconds and HA started prior to loss of vision.                Past Medical History:   Diagnosis Date    Anxiety     ASD (atrial septal defect)     Endometriosis     Headache(784.0)     Hypertension     Kidney stones  fibomyalgia, migraine, heart closer, kidey stones     Past Surgical History:   Procedure Laterality Date    APPENDECTOMY      CARDIAC CATHETERIZATION  10/01/2010    ASD device closure     SECTION      x 2    CHOLECYSTECTOMY      HYSTERECTOMY      12 yrs ago, still has ovaries, due to endometriosis by Dr. Hernandez     Family History   Adopted: Yes   Problem Relation Age of Onset    Migraines Son     No Known Problems Son      Social History     Tobacco Use    Smoking status: Never    Smokeless tobacco: Never   Substance Use Topics    Alcohol use: No     Alcohol/week: 0.0 standard drinks    Drug use: No     Review of patient's allergies indicates:   Allergen Reactions    Iodine and iodide containing products Other (See Comments)    Penicillins Rash       Medications: I have reviewed the current medication administration record.    (Not in a hospital admission)      Review of Systems   Constitutional:  Negative for fever.   HENT:  Negative for drooling and trouble swallowing.    Eyes:  Positive for visual disturbance.   Respiratory:  Negative for cough.    Cardiovascular:  Negative for chest pain.   Gastrointestinal:  Negative for nausea and vomiting.   Genitourinary:  Negative for dysuria.   Musculoskeletal:  Negative for arthralgias and myalgias.   Skin:  Negative for rash.   Neurological:  Positive for dizziness, numbness and headaches. Negative for facial asymmetry, speech difficulty and weakness.   Psychiatric/Behavioral:  Negative for agitation.    Objective:     Vital Signs (Most Recent):  Temp: 97.8 °F (36.6 °C) (23 0840)  Pulse: 78 (23 0840)  Resp: 18 (23 0840)  BP: (!) 145/77 (23 0840)  SpO2: 99 % (23 0840)    Vital Signs Range (Last 24H):  Temp:  [97.8 °F (36.6 °C)-98.4 °F (36.9 °C)]   Pulse:  [78-87]   Resp:  [18]   BP: (138-145)/(77-87)   SpO2:  [98 %-99 %]        Physical Exam  Vitals reviewed.   Constitutional:       General: She is not in acute  distress.     Appearance: She is well-developed.   HENT:      Head: Normocephalic and atraumatic.      Right Ear: External ear normal.      Left Ear: External ear normal.      Nose: Nose normal.   Eyes:      General: No scleral icterus.  Cardiovascular:      Rate and Rhythm: Normal rate.   Pulmonary:      Effort: Pulmonary effort is normal. No respiratory distress.   Abdominal:      General: There is no distension.   Musculoskeletal:      Cervical back: Normal range of motion.      Right lower leg: No edema.      Left lower leg: No edema.   Skin:     General: Skin is warm and dry.   Neurological:      Mental Status: She is alert.   Psychiatric:         Mood and Affect: Mood normal.         Behavior: Behavior normal.            Neurological Exam:   LOC: alert  Attention Span: Good   Language: No aphasia  Articulation: No dysarthria  Orientation: Person, Place, Time   Visual Fields: Full  EOM (CN III, IV, VI): Full/intact  Facial Movement (CN VII): Symmetric facial expression    Motor: Arm left  Normal 5/5  Leg left  Normal 5/5  Arm right  Normal 5/5  Leg right Normal 5/5  Cerebellum: No evidence of appendicular or axial ataxia  Sensation: decreased sensation in RUE  Tone: Normal tone throughout      Laboratory:  CMP: No results for input(s): GLUCOSE, CALCIUM, ALBUMIN, PROT, NA, K, CO2, CL, BUN, CREATININE, ALKPHOS, ALT, AST, BILITOT in the last 24 hours.  CBC:   Recent Labs   Lab 07/05/23  0746   HCT 46     Lipid Panel: No results for input(s): CHOL, LDLCALC, HDL, TRIG in the last 168 hours.  Coagulation: No results for input(s): PT, INR, APTT in the last 168 hours.  Hgb A1C: No results for input(s): HGBA1C in the last 168 hours.  TSH: No results for input(s): TSH in the last 168 hours.    Diagnostic Results:      Brain imaging:  MRI Brain. Date: 07/05/23  Small scattered areas of acute infarction in the right PCA territory.     Age advanced small vessel ischemic change with multiple remote lacunar type infarcts.   These findings notably progressed since the prior MRI of 09/28/2017.     Abnormal appearance of the expected flow void of the distal basilar tip concerning for slow flow or occlusion at this site.  Recommend a CTA head and neck for further assessment.    Vessel Imaging:  CTA Head and Neck. Date: 07/05/23  Impression:     Small recent infarcts noted on the recent MR imaging study are too small to characterize.  No intracranial hemorrhage mass effect or acute territorial infarct.     Prominent chronic ischemic changes again noted unless there is clinical suspicion for prior demyelinating disease.     No significant stenosis at the carotid bifurcations by NASCET criteria.  The vertebral arteries are patent.     Stenosis of the midportion of the posterior cerebral arteries left greater than right new from 2017.  It is unclear whether this is atherosclerotic or represents vasoconstriction/vasospasm spasm or other vasculopathy.     Sclerotic foci within the cervical vertebral bodies may represent bone islands as they are potentially visualized dating back to 2016 however they are difficult to definitively confirm.  Follow-up imaging for stability as clinically warranted.    Cardiac Evaluation:   Pending          Marylin Corona PA-C  [facility]  Department of Vascular Neurology   [facility]

## 2023-07-05 NOTE — LETTER
July 8, 2023         [address]  [place] [address]  Phone: [phone]  Fax: [phone]       Patient: Mirna Pollock   YOB: 1975  Date of Visit: 07/08/2023    To Whom It May Concern:    Luis A Pollock  has been at [facility] starting on 07/05/2023, requiring close monitoring. The patient may return to work on 7/17/2023 with no restrictions. If you have any questions or concerns, or if I can be of further assistance, please do not hesitate to contact me.      Sincerely,      Robert Carter MD

## 2023-07-05 NOTE — ASSESSMENT & PLAN NOTE
Some improvement and HA pain with ED migraine cocktail, further improvement with addition of magnesium    Verapamil 40 mg BID  IVF  2 g IV Magnesium  1 g IV solumedrol and 1 g IV depakote daily x 5 days

## 2023-07-05 NOTE — LETTER
July 13, 2023         [address]  [place] [address]  Phone: [phone]  Fax: [phone]       Patient: Mirna Pollock   YOB: 1975  Date of Visit: 07/13/2023    To Whom It May Concern:    Luis A Pollock  was at [facility] on 07/13/2023 by for a hospital follow up after getting recently admitted to [facility] July 5, 2023 for a stroke. While the patient is under my care for ongoing workup concerning for worsening stroke, and cannot return to work indefinitely, until released by me. Additionally, patient must refrain from driving due to visual field impairment.  If you have any questions or concerns, or if I can be of further assistance, please do not hesitate to contact me.    Sincerely,          Marcus Osman MD  [facility]

## 2023-07-05 NOTE — SUBJECTIVE & OBJECTIVE
Past Medical History:   Diagnosis Date    Anxiety     ASD (atrial septal defect)     Endometriosis     Headache(784.0)     Hypertension     Kidney stones fibomyalgia, migraine, heart closer, kidey stones     Past Surgical History:   Procedure Laterality Date    APPENDECTOMY      CARDIAC CATHETERIZATION  10/01/2010    ASD device closure     SECTION      x 2    CHOLECYSTECTOMY      HYSTERECTOMY      12 yrs ago, still has ovaries, due to endometriosis by Dr. Hernandez     Family History   Adopted: Yes   Problem Relation Age of Onset    Migraines Son     No Known Problems Son      Social History     Tobacco Use    Smoking status: Never    Smokeless tobacco: Never   Substance Use Topics    Alcohol use: No     Alcohol/week: 0.0 standard drinks    Drug use: No     Review of patient's allergies indicates:   Allergen Reactions    Iodine and iodide containing products Other (See Comments)    Penicillins Rash       Medications: I have reviewed the current medication administration record.    (Not in a hospital admission)      Review of Systems   Constitutional:  Negative for fever.   HENT:  Negative for drooling and trouble swallowing.    Eyes:  Positive for visual disturbance.   Respiratory:  Negative for cough.    Cardiovascular:  Negative for chest pain.   Gastrointestinal:  Negative for nausea and vomiting.   Genitourinary:  Negative for dysuria.   Musculoskeletal:  Negative for arthralgias and myalgias.   Skin:  Negative for rash.   Neurological:  Positive for dizziness, numbness and headaches. Negative for facial asymmetry, speech difficulty and weakness.   Psychiatric/Behavioral:  Negative for agitation.    Objective:     Vital Signs (Most Recent):  Temp: 97.8 °F (36.6 °C) (23 0840)  Pulse: 78 (23 0840)  Resp: 18 (23 0840)  BP: (!) 145/77 (23 0840)  SpO2: 99 % (23 0840)    Vital Signs Range (Last 24H):  Temp:  [97.8 °F (36.6 °C)-98.4 °F (36.9 °C)]   Pulse:  [78-87]   Resp:  [18]    BP: (138-145)/(77-87)   SpO2:  [98 %-99 %]        Physical Exam  Vitals reviewed.   Constitutional:       General: She is not in acute distress.     Appearance: She is well-developed.   HENT:      Head: Normocephalic and atraumatic.      Right Ear: External ear normal.      Left Ear: External ear normal.      Nose: Nose normal.   Eyes:      General: No scleral icterus.  Cardiovascular:      Rate and Rhythm: Normal rate.   Pulmonary:      Effort: Pulmonary effort is normal. No respiratory distress.   Abdominal:      General: There is no distension.   Musculoskeletal:      Cervical back: Normal range of motion.      Right lower leg: No edema.      Left lower leg: No edema.   Skin:     General: Skin is warm and dry.   Neurological:      Mental Status: She is alert.   Psychiatric:         Mood and Affect: Mood normal.         Behavior: Behavior normal.            Neurological Exam:   LOC: alert  Attention Span: Good   Language: No aphasia  Articulation: No dysarthria  Orientation: Person, Place, Time   Visual Fields: Full  EOM (CN III, IV, VI): Full/intact  Facial Movement (CN VII): Symmetric facial expression    Motor: Arm left  Normal 5/5  Leg left  Normal 5/5  Arm right  Normal 5/5  Leg right Normal 5/5  Cerebellum: No evidence of appendicular or axial ataxia  Sensation: decreased sensation in RUE  Tone: Normal tone throughout      Laboratory:  CMP: No results for input(s): GLUCOSE, CALCIUM, ALBUMIN, PROT, NA, K, CO2, CL, BUN, CREATININE, ALKPHOS, ALT, AST, BILITOT in the last 24 hours.  CBC:   Recent Labs   Lab 07/05/23  0746   HCT 46     Lipid Panel: No results for input(s): CHOL, LDLCALC, HDL, TRIG in the last 168 hours.  Coagulation: No results for input(s): PT, INR, APTT in the last 168 hours.  Hgb A1C: No results for input(s): HGBA1C in the last 168 hours.  TSH: No results for input(s): TSH in the last 168 hours.    Diagnostic Results:      Brain imaging:  MRI Brain. Date: 07/05/23  Small scattered areas of  acute infarction in the right PCA territory.     Age advanced small vessel ischemic change with multiple remote lacunar type infarcts.  These findings notably progressed since the prior MRI of 09/28/2017.     Abnormal appearance of the expected flow void of the distal basilar tip concerning for slow flow or occlusion at this site.  Recommend a CTA head and neck for further assessment.    Vessel Imaging:  CTA Head and Neck. Date: 07/05/23  Impression:     Small recent infarcts noted on the recent MR imaging study are too small to characterize.  No intracranial hemorrhage mass effect or acute territorial infarct.     Prominent chronic ischemic changes again noted unless there is clinical suspicion for prior demyelinating disease.     No significant stenosis at the carotid bifurcations by NASCET criteria.  The vertebral arteries are patent.     Stenosis of the midportion of the posterior cerebral arteries left greater than right new from 2017.  It is unclear whether this is atherosclerotic or represents vasoconstriction/vasospasm spasm or other vasculopathy.     Sclerotic foci within the cervical vertebral bodies may represent bone islands as they are potentially visualized dating back to 2016 however they are difficult to definitively confirm.  Follow-up imaging for stability as clinically warranted.    Cardiac Evaluation:   Pending

## 2023-07-05 NOTE — PROVIDER PROGRESS NOTES - EMERGENCY DEPT.
Encounter Date: 7/5/2023    ED Physician Progress Notes        Physician Note:   Received patient in sign-out  Patient was seen and evaluated by vascular neurology  They changed antihypertensive management plan.  They will admit the patient

## 2023-07-05 NOTE — ASSESSMENT & PLAN NOTE
Stroke risk factor  Difficult to control in the past  SBP <220  Holding home BP meds at this time, starting verapamil for migraine and infarct  Prn labetalol

## 2023-07-05 NOTE — ASSESSMENT & PLAN NOTE
Mirna Pollock is a 47 y.o. female with PMHx of anxiety, ASD (s/p closure), HA, mitral valve prolapse (repaired), and HTN (difficult to control in past) who presented to ED with intermittent migraine with visual aura since Friday (6/30/23). Patient has never had aura with migraines in the past. HA pain is located in L temporal area and BL occipital area. Aura is described as black dots floating on both sides of her vision. MRI in ED with R PCA infarct. Stroke team consulted. CTA with BL PCA stenosis. Concern for migrainous infarcts.     Patient started on verapamil and treated for migraine. TTE with bubble ordered due to history of ASD s/p closure.    Of note, in December 2021, patient had an episode of complete vision with a HA. Episode lasted about 5 seconds and HA started prior to loss of vision.    Antithrombotics for secondary stroke prevention: aspirin and plavix load followed by daily DAPT    Statins for secondary stroke prevention and hyperlipidemia, if present:   Statins: Atorvastatin- 40 mg daily    Aggressive risk factor modification: HTN, migraine with aura     Rehab efforts: The patient has been evaluated by a stroke team provider and the therapy needs have been fully considered based off the presenting complaints and exam findings. The following therapy evaluations are needed: PT evaluate and treat, OT evaluate and treat, SLP evaluate and treat, PM&R evaluate for appropriate placement    Diagnostics ordered/pending: MRA head to assess vasculature, TTE to assess cardiac function/status     VTE prophylaxis: Heparin 5000 units SQ every 8 hours  Mechanical prophylaxis: Place SCDs    BP parameters: Infarct: No intervention, SBP <220

## 2023-07-05 NOTE — HPI
Mirna Pollock is a 47 y.o. female with PMHx of anxiety, ASD (s/p closure), HA, and HTN (difficult to control in past) who presented to ED with HA x 5 days. Since Friday (6/30), patient has been having intermittent migraine with visual aura and intermittent diminished sensation of the RUE. In the past, patient has never had aura with her migraine. Her aura is described as black dots floating on both sides of her vision. HA pain is located in L temporal area and BL occipital area. Patient has been taking all of her home migraine medications and has had no relief. Yesterday morning, patient felt dizzy and had a fall then her migraine symptoms returned about an hour later. She states her HA pain on Friday and Saturday was the worst HA pain in her life. On arrival, her HA was 9/10. She received a migraine cocktail and her HA is now a 6-7/10. Her aura is still persisting. MRI was ordered by ED and stroke team was consulted for DWI changes in the R PCA territory.    Of note, in December 2021, patient had an episode of complete vision with a HA. Episode lasted about 5 seconds and HA started prior to loss of vision.

## 2023-07-06 PROBLEM — N18.31 STAGE 3A CHRONIC KIDNEY DISEASE: Status: ACTIVE | Noted: 2023-07-06

## 2023-07-06 PROBLEM — E87.6 HYPOKALEMIA: Status: ACTIVE | Noted: 2023-07-06

## 2023-07-06 LAB
ALBUMIN SERPL BCP-MCNC: 3.9 G/DL (ref 3.5–5.2)
ALP SERPL-CCNC: 87 U/L (ref 55–135)
ALT SERPL W/O P-5'-P-CCNC: 17 U/L (ref 10–44)
ANION GAP SERPL CALC-SCNC: 17 MMOL/L (ref 8–16)
APTT PPP: 25.3 SEC (ref 21–32)
ASCENDING AORTA: 3.24 CM
AST SERPL-CCNC: 17 U/L (ref 10–40)
AV INDEX (PROSTH): 0.99
AV MEAN GRADIENT: 4 MMHG
AV PEAK GRADIENT: 6 MMHG
AV VALVE AREA: 3.07 CM2
AV VELOCITY RATIO: 0.89
BASOPHILS # BLD AUTO: 0.05 K/UL (ref 0–0.2)
BASOPHILS NFR BLD: 0.2 % (ref 0–1.9)
BILIRUB SERPL-MCNC: 1.3 MG/DL (ref 0.1–1)
BSA FOR ECHO PROCEDURE: 1.73 M2
BUN SERPL-MCNC: 24 MG/DL (ref 6–20)
CALCIUM SERPL-MCNC: 9.7 MG/DL (ref 8.7–10.5)
CHLORIDE SERPL-SCNC: 101 MMOL/L (ref 95–110)
CO2 SERPL-SCNC: 21 MMOL/L (ref 23–29)
CREAT SERPL-MCNC: 1.2 MG/DL (ref 0.5–1.4)
CV ECHO LV RWT: 0.32 CM
DIFFERENTIAL METHOD: ABNORMAL
DOP CALC AO PEAK VEL: 1.27 M/S
DOP CALC AO VTI: 23.73 CM
DOP CALC LVOT AREA: 3.1 CM2
DOP CALC LVOT DIAMETER: 1.99 CM
DOP CALC LVOT PEAK VEL: 1.13 M/S
DOP CALC LVOT STROKE VOLUME: 72.93 CM3
DOP CALCLVOT PEAK VEL VTI: 23.46 CM
E WAVE DECELERATION TIME: 152.78 MSEC
E/A RATIO: 0.79
E/E' RATIO: 12.63 M/S
ECHO LV POSTERIOR WALL: 0.74 CM (ref 0.6–1.1)
EJECTION FRACTION: 65 %
EOSINOPHIL # BLD AUTO: 0 K/UL (ref 0–0.5)
EOSINOPHIL NFR BLD: 0 % (ref 0–8)
ERYTHROCYTE [DISTWIDTH] IN BLOOD BY AUTOMATED COUNT: 11.7 % (ref 11.5–14.5)
EST. GFR  (NO RACE VARIABLE): 55.5 ML/MIN/1.73 M^2
FRACTIONAL SHORTENING: 35 % (ref 28–44)
GLUCOSE SERPL-MCNC: 161 MG/DL (ref 70–110)
HCT VFR BLD AUTO: 44 % (ref 37–48.5)
HGB BLD-MCNC: 14.9 G/DL (ref 12–16)
IMM GRANULOCYTES # BLD AUTO: 0.25 K/UL (ref 0–0.04)
IMM GRANULOCYTES NFR BLD AUTO: 1.2 % (ref 0–0.5)
INR PPP: 0.9 (ref 0.8–1.2)
INTERVENTRICULAR SEPTUM: 0.67 CM (ref 0.6–1.1)
LA MAJOR: 5 CM
LA MINOR: 4.22 CM
LA WIDTH: 3.5 CM
LEFT ATRIUM SIZE: 2.4 CM
LEFT ATRIUM VOLUME INDEX MOD: 21.9 ML/M2
LEFT ATRIUM VOLUME INDEX: 19 ML/M2
LEFT ATRIUM VOLUME MOD: 37.62 CM3
LEFT ATRIUM VOLUME: 32.68 CM3
LEFT INTERNAL DIMENSION IN SYSTOLE: 2.97 CM (ref 2.1–4)
LEFT VENTRICLE DIASTOLIC VOLUME INDEX: 55.77 ML/M2
LEFT VENTRICLE DIASTOLIC VOLUME: 95.92 ML
LEFT VENTRICLE MASS INDEX: 58 G/M2
LEFT VENTRICLE SYSTOLIC VOLUME INDEX: 19.9 ML/M2
LEFT VENTRICLE SYSTOLIC VOLUME: 34.16 ML
LEFT VENTRICULAR INTERNAL DIMENSION IN DIASTOLE: 4.57 CM (ref 3.5–6)
LEFT VENTRICULAR MASS: 99.11 G
LV LATERAL E/E' RATIO: 11.22 M/S
LV SEPTAL E/E' RATIO: 14.43 M/S
LYMPHOCYTES # BLD AUTO: 1.3 K/UL (ref 1–4.8)
LYMPHOCYTES NFR BLD: 6 % (ref 18–48)
MAGNESIUM SERPL-MCNC: 2.3 MG/DL (ref 1.6–2.6)
MCH RBC QN AUTO: 30.2 PG (ref 27–31)
MCHC RBC AUTO-ENTMCNC: 33.9 G/DL (ref 32–36)
MCV RBC AUTO: 89 FL (ref 82–98)
MONOCYTES # BLD AUTO: 0.2 K/UL (ref 0.3–1)
MONOCYTES NFR BLD: 0.7 % (ref 4–15)
MV A" WAVE DURATION": 3.43 MSEC
MV PEAK A VEL: 1.28 M/S
MV PEAK E VEL: 1.01 M/S
MV STENOSIS PRESSURE HALF TIME: 44.31 MS
MV VALVE AREA P 1/2 METHOD: 4.97 CM2
NEUTROPHILS # BLD AUTO: 19.8 K/UL (ref 1.8–7.7)
NEUTROPHILS NFR BLD: 91.9 % (ref 38–73)
NRBC BLD-RTO: 0 /100 WBC
PHOSPHATE SERPL-MCNC: 3.1 MG/DL (ref 2.7–4.5)
PLATELET # BLD AUTO: 526 K/UL (ref 150–450)
PMV BLD AUTO: 9.7 FL (ref 9.2–12.9)
POTASSIUM SERPL-SCNC: 2.9 MMOL/L (ref 3.5–5.1)
PROT SERPL-MCNC: 7.8 G/DL (ref 6–8.4)
PROTHROMBIN TIME: 10.2 SEC (ref 9–12.5)
PULM VEIN S/D RATIO: 1.62
PV PEAK D VEL: 0.52 M/S
PV PEAK S VEL: 0.84 M/S
RA MAJOR: 4.49 CM
RA PRESSURE: 8 MMHG
RA WIDTH: 3.18 CM
RBC # BLD AUTO: 4.94 M/UL (ref 4–5.4)
RIGHT VENTRICULAR END-DIASTOLIC DIMENSION: 1.92 CM
RV TISSUE DOPPLER FREE WALL SYSTOLIC VELOCITY 1 (APICAL 4 CHAMBER VIEW): 17.7 CM/S
SINUS: 3.17 CM
SODIUM SERPL-SCNC: 139 MMOL/L (ref 136–145)
STJ: 2.86 CM
TDI LATERAL: 0.09 M/S
TDI SEPTAL: 0.07 M/S
TDI: 0.08 M/S
TRICUSPID ANNULAR PLANE SYSTOLIC EXCURSION: 1.7 CM
TROPONIN I SERPL DL<=0.01 NG/ML-MCNC: 0.01 NG/ML (ref 0–0.03)
WBC # BLD AUTO: 21.58 K/UL (ref 3.9–12.7)

## 2023-07-06 PROCEDURE — 85610 PROTHROMBIN TIME: CPT | Performed by: PHYSICIAN ASSISTANT

## 2023-07-06 PROCEDURE — 63600175 PHARM REV CODE 636 W HCPCS

## 2023-07-06 PROCEDURE — 84484 ASSAY OF TROPONIN QUANT: CPT | Performed by: PHYSICIAN ASSISTANT

## 2023-07-06 PROCEDURE — 81003 URINALYSIS AUTO W/O SCOPE: CPT | Performed by: HOSPITALIST

## 2023-07-06 PROCEDURE — 11000001 HC ACUTE MED/SURG PRIVATE ROOM

## 2023-07-06 PROCEDURE — 80053 COMPREHEN METABOLIC PANEL: CPT | Performed by: PHYSICIAN ASSISTANT

## 2023-07-06 PROCEDURE — 25000003 PHARM REV CODE 250

## 2023-07-06 PROCEDURE — 99233 PR SUBSEQUENT HOSPITAL CARE,LEVL III: ICD-10-PCS | Mod: ,,, | Performed by: PSYCHIATRY & NEUROLOGY

## 2023-07-06 PROCEDURE — 83735 ASSAY OF MAGNESIUM: CPT | Performed by: PHYSICIAN ASSISTANT

## 2023-07-06 PROCEDURE — 84100 ASSAY OF PHOSPHORUS: CPT | Performed by: PHYSICIAN ASSISTANT

## 2023-07-06 PROCEDURE — 99233 SBSQ HOSP IP/OBS HIGH 50: CPT | Mod: ,,, | Performed by: PSYCHIATRY & NEUROLOGY

## 2023-07-06 PROCEDURE — 99223 PR INITIAL HOSPITAL CARE,LEVL III: ICD-10-PCS | Mod: ,,, | Performed by: HOSPITALIST

## 2023-07-06 PROCEDURE — 63600175 PHARM REV CODE 636 W HCPCS: Performed by: PHYSICIAN ASSISTANT

## 2023-07-06 PROCEDURE — 85730 THROMBOPLASTIN TIME PARTIAL: CPT | Performed by: PHYSICIAN ASSISTANT

## 2023-07-06 PROCEDURE — 25000003 PHARM REV CODE 250: Performed by: PHYSICIAN ASSISTANT

## 2023-07-06 PROCEDURE — 94761 N-INVAS EAR/PLS OXIMETRY MLT: CPT

## 2023-07-06 PROCEDURE — 85025 COMPLETE CBC W/AUTO DIFF WBC: CPT | Performed by: PHYSICIAN ASSISTANT

## 2023-07-06 PROCEDURE — 99223 1ST HOSP IP/OBS HIGH 75: CPT | Mod: ,,, | Performed by: HOSPITALIST

## 2023-07-06 PROCEDURE — 36415 COLL VENOUS BLD VENIPUNCTURE: CPT | Performed by: PHYSICIAN ASSISTANT

## 2023-07-06 RX ORDER — LABETALOL HCL 20 MG/4 ML
10 SYRINGE (ML) INTRAVENOUS
Status: DISCONTINUED | OUTPATIENT
Start: 2023-07-06 | End: 2023-07-06

## 2023-07-06 RX ORDER — LABETALOL HCL 20 MG/4 ML
10 SYRINGE (ML) INTRAVENOUS
Status: DISCONTINUED | OUTPATIENT
Start: 2023-07-06 | End: 2023-07-08 | Stop reason: HOSPADM

## 2023-07-06 RX ORDER — PROMETHAZINE HYDROCHLORIDE 25 MG/ML
25 INJECTION, SOLUTION INTRAMUSCULAR; INTRAVENOUS EVERY 8 HOURS
Status: DISPENSED | OUTPATIENT
Start: 2023-07-06 | End: 2023-07-07

## 2023-07-06 RX ORDER — ZOLPIDEM TARTRATE 5 MG/1
10 TABLET ORAL NIGHTLY
Status: DISCONTINUED | OUTPATIENT
Start: 2023-07-06 | End: 2023-07-08 | Stop reason: HOSPADM

## 2023-07-06 RX ORDER — LOSARTAN POTASSIUM 25 MG/1
25 TABLET ORAL DAILY
Status: DISCONTINUED | OUTPATIENT
Start: 2023-07-06 | End: 2023-07-08 | Stop reason: HOSPADM

## 2023-07-06 RX ORDER — MAGNESIUM SULFATE HEPTAHYDRATE 40 MG/ML
2 INJECTION, SOLUTION INTRAVENOUS ONCE
Status: COMPLETED | OUTPATIENT
Start: 2023-07-06 | End: 2023-07-06

## 2023-07-06 RX ADMIN — LOSARTAN POTASSIUM 25 MG: 25 TABLET, FILM COATED ORAL at 02:07

## 2023-07-06 RX ADMIN — ZOLPIDEM TARTRATE 10 MG: 5 TABLET ORAL at 08:07

## 2023-07-06 RX ADMIN — POTASSIUM BICARBONATE 50 MEQ: 978 TABLET, EFFERVESCENT ORAL at 11:07

## 2023-07-06 RX ADMIN — LABETALOL HYDROCHLORIDE 10 MG: 5 INJECTION, SOLUTION INTRAVENOUS at 04:07

## 2023-07-06 RX ADMIN — ATORVASTATIN CALCIUM 40 MG: 40 TABLET, FILM COATED ORAL at 08:07

## 2023-07-06 RX ADMIN — SODIUM CHLORIDE: 9 INJECTION, SOLUTION INTRAVENOUS at 03:07

## 2023-07-06 RX ADMIN — ONDANSETRON 4 MG: 2 INJECTION INTRAMUSCULAR; INTRAVENOUS at 03:07

## 2023-07-06 RX ADMIN — DEXTROSE 1000 MG: 50 INJECTION, SOLUTION INTRAVENOUS at 09:07

## 2023-07-06 RX ADMIN — VERAPAMIL HYDROCHLORIDE 40 MG: 40 TABLET, FILM COATED ORAL at 08:07

## 2023-07-06 RX ADMIN — CLOPIDOGREL BISULFATE 75 MG: 75 TABLET ORAL at 08:07

## 2023-07-06 RX ADMIN — DEXTROSE MONOHYDRATE 1000 MG: 50 INJECTION, SOLUTION INTRAVENOUS at 09:07

## 2023-07-06 RX ADMIN — PROMETHAZINE HYDROCHLORIDE 25 MG: 25 INJECTION, SOLUTION INTRAMUSCULAR; INTRAVENOUS at 11:07

## 2023-07-06 RX ADMIN — MAGNESIUM SULFATE 2 G: 2 INJECTION INTRAVENOUS at 11:07

## 2023-07-06 RX ADMIN — ACETAMINOPHEN 650 MG: 325 TABLET ORAL at 03:07

## 2023-07-06 RX ADMIN — ACETAMINOPHEN 650 MG: 325 TABLET ORAL at 11:07

## 2023-07-06 RX ADMIN — Medication 6 MG: at 08:07

## 2023-07-06 RX ADMIN — ASPIRIN 81 MG: 81 TABLET, COATED ORAL at 08:07

## 2023-07-06 RX ADMIN — DULOXETINE HYDROCHLORIDE 60 MG: 30 CAPSULE, DELAYED RELEASE ORAL at 08:07

## 2023-07-06 RX ADMIN — TIZANIDINE 6 MG: 2 TABLET ORAL at 08:07

## 2023-07-06 RX ADMIN — BUPROPION HYDROCHLORIDE 300 MG: 300 TABLET, FILM COATED, EXTENDED RELEASE ORAL at 08:07

## 2023-07-06 RX ADMIN — PROMETHAZINE HYDROCHLORIDE 25 MG: 25 INJECTION, SOLUTION INTRAMUSCULAR; INTRAVENOUS at 10:07

## 2023-07-06 NOTE — HOSPITAL COURSE
07/06/2023 Patient with continued headaches, and elevated BPs. Patient to restart her home BP meds. She has had historically high blood pressures for over a decade per historic readings on chart. Continuing headache cocktail (except for toradol which is not recommended in setting of stroke).  7/7/2023 Headaches resolved. TTE unremarkable but questionable late bubbles, will follow up with TCD with bubble; BP well managed and responsive to losartan; No renal artery stenosis noted; superficial LUE thrombosis that is resolving and asymptomatic; otherwise medical ready for discharge once work up complete; likely to discharge today or tomorrow.  07/08/2023 Headaches returned overnight, appropriate meds given. Patient to be discharged on aspirin, clopidogrel, magnesium, riboflavin, verapamil, follow up with stroke outpatient as well as neurology for tight migraine management.

## 2023-07-06 NOTE — ED NOTES
Received report from KAEL Jiang. Assumed care of pt.   Pt is currently in MRI when care assumed, awaiting return of pt at this time.  Spoke with MRI and stroke regarding pt being on tele; stroke provider, CHERRY Corona, approves pt to be off tele for MRI since MRI nurse is leaving at this time while pt is in scanner, nursing communication placed under verbal per CHERRY Corona approval.

## 2023-07-06 NOTE — ED NOTES
Telemetry Verification   Patient placed on Telemetry Box  Verified with War Room  Box # [id number]   Monitor Tech Jennifer   Rate 102   Rhythm Sinus Tach

## 2023-07-06 NOTE — CONSULTS
"Food & Nutrition  Education     Diet Education: Cardiac diet   Time Spent: 5 minutes  Learners: Pt     Nutrition Education provided with handouts: "Cardiac Nutrition Therapy"     Comments: RD consulted for stroke pathway. Spoke with pt at bedside. Endorses good appetite PTA with 2-3 meals/day on general diet. Intake of breakfast this morning 25% 2/2 decreased appetite. RD to add Boost Plus TID. # and #. No s/s of malnutrition, appears well-nourished. Educational handout provided. LBM 7/4.     All questions and concerns answered. Dietitian's contact information provided.         Follow-Up: Yes     Please Re-consult as needed           Thanks!   Judy Hoffmann RDN,LDN    "

## 2023-07-06 NOTE — ASSESSMENT & PLAN NOTE
Mirna Pollock is a 47 y.o. female with PMHx of anxiety, ASD (s/p closure), HA, mitral valve prolapse (repaired), and HTN (difficult to control in past) who presented to ED with intermittent migraine with visual aura since Friday (6/30/23). Patient has never had aura with migraines in the past. HA pain is located in L temporal area and BL occipital area. Aura is described as black dots floating on both sides of her vision. MRI in ED with R PCA infarct. Stroke team consulted. CTA with BL PCA stenosis. Concern for migrainous infarcts.     Patient started on verapamil and treated for migraine.Headaches resolved. TTE unremarkable but questionable late bubbles, will follow up with TCD with bubble; BP well managed and responsive to losartan; No renal artery stenosis noted; superficial LUE thrombosis that is resolving and asymptomatic; otherwise medical ready for discharge once work up complete; likely to discharge today or tomorrow. Patient's  reports some confusion, spot EEG ordered, though suspicion is low.    Of note, in December 2021, patient had an episode of complete vision with a HA. Episode lasted about 5 seconds and HA started prior to loss of vision.    Antithrombotics for secondary stroke prevention: aspirin and plavix load followed by daily DAPT    Statins for secondary stroke prevention and hyperlipidemia, if present:   Statins: Atorvastatin- 40 mg daily    Aggressive risk factor modification: HTN, migraine with aura     Rehab efforts: The patient has been evaluated by a stroke team provider and the therapy needs have been fully considered based off the presenting complaints and exam findings. The following therapy evaluations are needed: PT evaluate and treat, OT evaluate and treat, SLP evaluate and treat, PM&R evaluate for appropriate placement    Diagnostics ordered/pending: MRA head to assess vasculature, TTE to assess cardiac function/status     VTE prophylaxis: Heparin 5000 units SQ every 8  hours  Mechanical prophylaxis: Place SCDs    BP parameters: Infarct: No intervention, SBP <200

## 2023-07-06 NOTE — ASSESSMENT & PLAN NOTE
Mirna Pollock is a 47 y.o. female with PMHx of anxiety, ASD (s/p closure), HA, mitral valve prolapse (repaired), and HTN (difficult to control in past) who presented to ED with intermittent migraine with visual aura since Friday (6/30/23). Patient has never had aura with migraines in the past. HA pain is located in L temporal area and BL occipital area. Aura is described as black dots floating on both sides of her vision. MRI in ED with R PCA infarct. Stroke team consulted. CTA with BL PCA stenosis. Concern for migrainous infarcts.     Patient started on verapamil and treated for migraine. Patient with continued headaches, and elevated BPs. Patient to restart her home BP meds. She has had historically high blood pressures for over a decade per historic readings on chart. Continuing headache cocktail (except for toradol which is not recommended in setting of stroke). TTE with bubble pending due to history of ASD s/p closure.    Of note, in December 2021, patient had an episode of complete vision with a HA. Episode lasted about 5 seconds and HA started prior to loss of vision.    Antithrombotics for secondary stroke prevention: aspirin and plavix load followed by daily DAPT    Statins for secondary stroke prevention and hyperlipidemia, if present:   Statins: Atorvastatin- 40 mg daily    Aggressive risk factor modification: HTN, migraine with aura     Rehab efforts: The patient has been evaluated by a stroke team provider and the therapy needs have been fully considered based off the presenting complaints and exam findings. The following therapy evaluations are needed: PT evaluate and treat, OT evaluate and treat, SLP evaluate and treat, PM&R evaluate for appropriate placement    Diagnostics ordered/pending: MRA head to assess vasculature, TTE to assess cardiac function/status     VTE prophylaxis: Heparin 5000 units SQ every 8 hours  Mechanical prophylaxis: Place SCDs    BP parameters: Infarct: No intervention, SBP  <220

## 2023-07-06 NOTE — ASSESSMENT & PLAN NOTE
Stroke risk factor  Difficult to control in the past  SBP <180  Home BP meds held on admission, continued verapamil for migraine and infarct; will consider restarting home meds; hospital medicine consulted, appreciate recommendations  Prn labetalol

## 2023-07-06 NOTE — ASSESSMENT & PLAN NOTE
Some improvement and HA pain with ED migraine cocktail, further improvement with addition of magnesium    Verapamil 40 mg BID  IVF, paused 7/6 d/t HTN  2 g IV Magnesium  1 g IV solumedrol and 1 g IV depakote daily x 3 days

## 2023-07-06 NOTE — SUBJECTIVE & OBJECTIVE
Neurologic Chief Complaint: Central vision aura and RUE numbness    Subjective:     Interval History: Patient is seen for follow-up neurological assessment and treatment recommendations:     Patient with continued headaches, and elevated BPs. Patient to restart her home BP meds. She has had historically high blood pressures for over a decade per historic readings on chart. Continuing headache cocktail (except for toradol which is not recommended in setting of stroke).    HPI, Past Medical, Family, and Social History remains the same as documented in the initial encounter.    Review of Systems   Constitutional:  Negative for fever.   HENT:  Negative for drooling and trouble swallowing.    Eyes:  Positive for visual disturbance.   Respiratory:  Negative for cough.    Cardiovascular:  Negative for chest pain.   Gastrointestinal:  Negative for nausea and vomiting.   Genitourinary:  Negative for dysuria.   Musculoskeletal:  Negative for arthralgias and myalgias.   Skin:  Negative for rash.   Neurological:  Positive for dizziness, numbness and headaches. Negative for facial asymmetry, speech difficulty and weakness.   Psychiatric/Behavioral:  Negative for agitation.    Objective:     Vital Signs (Most Recent):  Temp: 96.9 °F (36.1 °C) (07/06/23 1048)  Pulse: 99 (07/06/23 1110)  Resp: 20 (07/06/23 1048)  BP: (!) 198/90 (07/06/23 1048)  SpO2: 98 % (07/06/23 1048)    Vital Signs Range (Last 24H):  Temp:  [96.1 °F (35.6 °C)-98.2 °F (36.8 °C)]   Pulse:  []   Resp:  [16-20]   BP: (167-198)/()   SpO2:  [94 %-99 %]        Physical Exam  Vitals reviewed.   Constitutional:       General: She is not in acute distress.     Appearance: She is well-developed.   HENT:      Head: Normocephalic and atraumatic.      Right Ear: External ear normal.      Left Ear: External ear normal.      Nose: Nose normal.   Eyes:      General: No scleral icterus.  Cardiovascular:      Rate and Rhythm: Normal rate.   Pulmonary:      Effort:  Pulmonary effort is normal. No respiratory distress.   Abdominal:      General: There is no distension.   Musculoskeletal:      Cervical back: Normal range of motion.      Right lower leg: No edema.      Left lower leg: No edema.   Skin:     General: Skin is warm and dry.   Neurological:      Mental Status: She is alert.   Psychiatric:         Mood and Affect: Mood normal.         Behavior: Behavior normal.            Neurological Exam:   LOC: alert  Attention Span: Good   Language: No aphasia  Articulation: No dysarthria  Orientation: Person, Place, Time   Visual Fields: Full  EOM (CN III, IV, VI): Full/intact  Facial Movement (CN VII): Symmetric facial expression    Motor: Arm left  Normal 5/5  Leg left  Normal 5/5  Arm right  Normal 5/5  Leg right Normal 5/5  Cerebellum: No evidence of appendicular or axial ataxia  Sensation: decreased sensation in RUE  Tone: Normal tone throughout      Laboratory:  CMP:   Recent Labs   Lab 07/06/23  0456   CALCIUM 9.7   ALBUMIN 3.9   PROT 7.8      K 2.9*   CO2 21*      BUN 24*   CREATININE 1.2   ALKPHOS 87   ALT 17   AST 17   BILITOT 1.3*       CBC:   Recent Labs   Lab 07/06/23  0456   WBC 21.58*   RBC 4.94   HGB 14.9   HCT 44.0   *   MCV 89   MCH 30.2   MCHC 33.9       Lipid Panel:   Recent Labs   Lab 07/05/23  1518   CHOL 223*   LDLCALC 156.6   HDL 50   TRIG 82       Coagulation:   Recent Labs   Lab 07/06/23  0456   INR 0.9   APTT 25.3       Hgb A1C:   Recent Labs   Lab 07/05/23  1518   HGBA1C 5.1       TSH:   Recent Labs   Lab 07/05/23  1518   TSH 1.055         Diagnostic Results:      Brain imaging:  MRI Brain. Date: 07/05/23  Small scattered areas of acute infarction in the right PCA territory.     Age advanced small vessel ischemic change with multiple remote lacunar type infarcts.  These findings notably progressed since the prior MRI of 09/28/2017.     Abnormal appearance of the expected flow void of the distal basilar tip concerning for slow flow or  occlusion at this site.  Recommend a CTA head and neck for further assessment.    Vessel Imaging:  CTA Head and Neck. Date: 07/05/23  Impression:     Small recent infarcts noted on the recent MR imaging study are too small to characterize.  No intracranial hemorrhage mass effect or acute territorial infarct.     Prominent chronic ischemic changes again noted unless there is clinical suspicion for prior demyelinating disease.     No significant stenosis at the carotid bifurcations by NASCET criteria.  The vertebral arteries are patent.     Stenosis of the midportion of the posterior cerebral arteries left greater than right new from 2017.  It is unclear whether this is atherosclerotic or represents vasoconstriction/vasospasm spasm or other vasculopathy.     Sclerotic foci within the cervical vertebral bodies may represent bone islands as they are potentially visualized dating back to 2016 however they are difficult to definitively confirm.  Follow-up imaging for stability as clinically warranted.    Cardiac Evaluation:   Pending

## 2023-07-06 NOTE — CONSULTS
[facility] - Neurosurgery ([facility])  [facility] Medicine  Consult Note    Patient Name: Mirna Pollock  MRN: [id number]  Admission Date: 7/5/2023  Hospital Length of Stay: 1 days  Attending Physician: Marcus Osman MD   Primary Care Provider: Luc Venegas MD           Patient information was obtained from patient and ER records.     Inpatient consult to [facility] Medicine - Stroke Comanagement  Consult performed by: Michael Singh MD  Consult ordered by: Robert Carter MD        Subjective:     Principal Problem: Stroke due to occlusion of right posterior cerebral artery    Chief Complaint:   Chief Complaint   Patient presents with    Headache     Pt c/o constant HA since Friday and seeing spots since saturday, states took tizanidine with no relief.  Reports Hx of migraines.         HPI: 48 y/o F w/ Anxiety, Migraines, MVP (repaired in 2010), essential HTN, and hx of stimulant use who is being treated by vascular neuro for a R PCA occlusion. Pt presented to the ED on 7/5 w/ a migraine positive for aura (migraine present since 6/30). She described the headache as being the worst she has ever had in her life and explained she never experienced auras before. MRI in Ed revealed R PCA infarct for which stroke was consulted. F/u CTA showing BL PCA stenosis in the setting of migrainous infarcts. Pt was out of window for thrombolysis and not a candidate for interventional revascularization. She was stared on verapamil, ASA.Plavix, atorvastatin, Mag, and depakote. Pt.s BP was elevated to SBP nearly 200 and DBP 90s/100s. Medicine consulted for what was thought to be resistant hypertension.     Per the patient, her BP issues started after she underwent MVP repair over a decade ago. She describes being put on multiple different medications but is not sure which ones she was prescribed and was never on more than one medication at a time. She currently only takes a ACE-I/Hydralazine med which she admits she is not fully compliant  with. She does not regularly taker her BP pill and states her last dose was . She does not measure her BP at home. She states that her migraines (for which she has up to 15 monthly) are often associated w/ hot flashes, palpitations, and occasional sweating. Pt was adopted but was able to obtain a family history that is significant for HTN and heart disease. Pt has a surgical history significant for hysterectomy and is not on OCP. She underwent a sleep study in  and was not found to have BENJIE. Pts calcium and TSH levels are within normal limits. To her knowledge, no other diagnostic measures have been taken to address her persistently high blood pressure.        Past Medical History:   Diagnosis Date    Anxiety     ASD (atrial septal defect)     Endometriosis     Headache(784.0)     Hypertension     Kidney stones fibomyalgia, migraine, heart closer, kidey stones       Past Surgical History:   Procedure Laterality Date    APPENDECTOMY      CARDIAC CATHETERIZATION  10/01/2010    ASD device closure     SECTION      x 2    CHOLECYSTECTOMY      HYSTERECTOMY      12 yrs ago, still has ovaries, due to endometriosis by Dr. Hernandez       Review of patient's allergies indicates:   Allergen Reactions    Iodine and iodide containing products Other (See Comments)    Penicillins Rash       No current facility-administered medications on file prior to encounter.     Current Outpatient Medications on File Prior to Encounter   Medication Sig    benazepril-hydrochlorthiazide (LOTENSIN HCT) 20-25 mg Tab TAKE 1 TABLET BY MOUTH EVERY DAY    buPROPion (WELLBUTRIN XL) 300 MG 24 hr tablet Take 1 tablet (300 mg total) by mouth once daily.    DULoxetine (CYMBALTA) 60 MG capsule Take 1 capsule (60 mg total) by mouth once daily.    erenumab-aooe (AIMOVIG) 140 mg/mL autoinjector Inject 1 pen (140 mg total) into the skin every 28 days.    ondansetron (ZOFRAN-ODT) 4 MG TbDL DISSOLVE 1 TABLET ON THE TONGUE EVERY 6  HOURS AS NEEDED FOR NAUSEA    promethazine (PHENERGAN) 25 MG tablet TAKE 1 TABLET BY MOUTH EVERY 4 TO 6 HOURS AS NEEDED FOR NAUSEA    tiZANidine (ZANAFLEX) 4 MG tablet Take 1.5 tablets (6 mg total) by mouth every evening. No alcohol, no driving with medication.    ubrogepant (UBRELVY) 100 mg tablet TAKE 1 TABLET BY MOUTH EVERY 2 HOURS AS NEEDED FOR MIGRAINE.  MG PER DAY Strength: 100 mg    hydrOXYzine pamoate (VISTARIL) 25 MG Cap Take 1 capsule (25 mg total) by mouth every 8 (eight) hours as needed (anxiety).    zolpidem (AMBIEN) 10 mg Tab Take 1 tablet (10 mg total) by mouth every evening.     Family History       Problem Relation (Age of Onset)    Migraines Son    No Known Problems Son          Tobacco Use    Smoking status: Never    Smokeless tobacco: Never   Substance and Sexual Activity    Alcohol use: No     Alcohol/week: 0.0 standard drinks    Drug use: No    Sexual activity: Yes     Partners: Male     Birth control/protection: Coitus interruptus     Review of Systems   Constitutional:  Negative for fever and unexpected weight change.   HENT:  Negative for rhinorrhea and sore throat.    Eyes:  Negative for redness and visual disturbance.   Respiratory:  Negative for cough, chest tightness and shortness of breath.    Cardiovascular:  Negative for chest pain and palpitations.   Gastrointestinal:  Negative for abdominal distention, abdominal pain, constipation, diarrhea and vomiting.   Genitourinary:  Negative for flank pain and pelvic pain.   Musculoskeletal:  Negative for arthralgias and back pain.   Skin:  Negative for color change and pallor.   Neurological:  Positive for weakness and headaches. Negative for syncope.   Psychiatric/Behavioral:  Negative for agitation, behavioral problems and confusion.    Objective:     Vital Signs (Most Recent):  Temp: 96.9 °F (36.1 °C) (07/06/23 1048)  Pulse: 94 (07/06/23 1515)  Resp: 20 (07/06/23 1048)  BP: (!) 164/86 (07/06/23 1440)  SpO2: 98 % (07/06/23  1048) Vital Signs (24h Range):  Temp:  [96.1 °F (35.6 °C)-98.2 °F (36.8 °C)] 96.9 °F (36.1 °C)  Pulse:  [] 94  Resp:  [16-20] 20  SpO2:  [94 %-99 %] 98 %  BP: (164-198)/() 164/86     Weight: 65.6 kg (144 lb 10 oz)  Body mass index is 24.07 kg/m².     Physical Exam  Constitutional:       General: She is not in acute distress.     Appearance: Normal appearance.   HENT:      Head: Normocephalic and atraumatic.   Eyes:      Extraocular Movements: Extraocular movements intact.      Conjunctiva/sclera: Conjunctivae normal.   Cardiovascular:      Rate and Rhythm: Normal rate and regular rhythm.      Pulses: Normal pulses.      Heart sounds: Normal heart sounds. No murmur heard.    No friction rub. No gallop.   Pulmonary:      Effort: Pulmonary effort is normal.      Breath sounds: Normal breath sounds. No wheezing or rales.   Abdominal:      General: Abdomen is flat. Bowel sounds are normal. There is no distension.      Palpations: Abdomen is soft.      Tenderness: There is no abdominal tenderness.   Musculoskeletal:         General: Swelling (swelling in L and R upper extremities) present. No tenderness.   Skin:     General: Skin is warm and dry.   Neurological:      Mental Status: She is alert and oriented to person, place, and time.      Sensory: No sensory deficit.   Psychiatric:         Mood and Affect: Mood normal.         Behavior: Behavior normal.         Thought Content: Thought content normal.        Significant Labs: All pertinent labs within the past 24 hours have been reviewed.  CBC:   Recent Labs   Lab 07/05/23  0746 07/05/23  1041 07/06/23  0456   WBC  --  13.32* 21.58*   HGB  --  14.6 14.9   HCT 46 43.2 44.0   PLT  --  437 526*     CMP:   Recent Labs   Lab 07/05/23  1041 07/06/23  0456    139   K 3.1* 2.9*    101   CO2 28 21*   GLU 92 161*   BUN 19 24*   CREATININE 1.2 1.2   CALCIUM 9.2 9.7   PROT 7.3 7.8   ALBUMIN 3.8 3.9   BILITOT 0.6 1.3*   ALKPHOS 82 87   AST 19 17   ALT 18 17    ANIONGAP 8 17*     Recent Lab Results         07/06/23  1442   07/06/23  0456        Albumin   3.9       Alkaline Phosphatase   87       ALT   17       Anion Gap   17       Ascending aorta 3.24         Ao peak heber 1.27         Ao VTI 23.73         aPTT   25.3  Comment: Refer to local heparin nomogram for intensity/dose specific   therapeutic   range.         AST   17       AV valve area 3.07         AV mean gradient 4         AV index (prosthetic) 0.99         AV peak gradient 6         AV Velocity Ratio 0.89         Baso #   0.05       Basophil %   0.2       BILIRUBIN TOTAL   1.3  Comment: For infants and newborns, interpretation of results should be based  on gestational age, weight and in agreement with clinical  observations.    Premature Infant recommended reference ranges:  Up to 24 hours.............<8.0 mg/dL  Up to 48 hours............<12.0 mg/dL  3-5 days..................<15.0 mg/dL  6-29 days.................<15.0 mg/dL         BSA 1.73         BUN   24       Calcium   9.7       Chloride   101       CO2   21       Creatinine   1.2       Left Ventricle Relative Wall Thickness 0.32         Differential Method   Automated       E/A ratio 0.79         E/E' ratio 12.63         eGFR   55.5       EF 65         Eos #   0.0       Eosinophil %   0.0       E wave deceleration time 152.78         FS 35         Glucose   161       Gran # (ANC)   19.8       Gran %   91.9       Hematocrit   44.0       Hemoglobin   14.9       Immature Grans (Abs)   0.25  Comment: Mild elevation in immature granulocytes is non specific and   can be seen in a variety of conditions including stress response,   acute inflammation, trauma and pregnancy. Correlation with other   laboratory and clinical findings is essential.         Immature Granulocytes   1.2       INR   0.9  Comment: Coumadin Therapy:  2.0 - 3.0 for INR for all indicators except mechanical heart valves  and antiphospholipid syndromes which should use 2.5 - 3.5.          "IVSd 0.67         LA WIDTH 3.50         Left Atrium Major Axis 5.00         Left Atrium Minor Axis 4.22         LA size 2.40         LA volume 32.68          37.62         LA vol index 19.0         LA Volume Index (Mod) 21.9         LVOT area 3.1         LV LATERAL E/E' RATIO 11.22         LV SEPTAL E/E' RATIO 14.43         LV EDV BP 95.92         LV Diastolic Volume Index 55.77         LVIDd 4.57         LVIDs 2.97         LV mass 99.11         LV Mass Index 58         LVOT diameter 1.99         LVOT peak gerardo 1.13         LVOT stroke volume 72.93         LVOT peak VTI 23.46         LV ESV BP 34.16         LV Systolic Volume Index 19.9         Lymph #   1.3       Lymph %   6.0       Magnesium   2.3       MCH   30.2       MCHC   33.9       MCV   89       Mean e' 0.08         Mono #   0.2       Mono %   0.7       MPV   9.7       MV valve area p 1/2 method 4.97         MV "A" wave duration 3.43         MV Peak A Gerardo 1.28         MV Peak E Gerardo 1.01         MV stenosis pressure 1/2 time 44.31         nRBC   0       Phosphorus   3.1       Platelets   526       Potassium   2.9       PROTEIN TOTAL   7.8       Protime   10.2       PV Peak D Gerardo 0.52         PV Peak S Gerardo 0.84         Pulm vein S/D ratio 1.62         Posterior Wall 0.74         Right Atrial Pressure (from IVC) 8         RA Major Axis 4.49         RA Width 3.18         RBC   4.94       RDW   11.7       RV S' 17.70         RVDD 1.92         Sinus 3.17         Sodium   139       STJ 2.86         TAPSE 1.70         TDI SEPTAL 0.07         TDI LATERAL 0.09         Troponin I   0.008  Comment: The reference interval for Troponin I represents the 99th percentile   cutoff   for our facility and is consistent with 3rd generation assay   performance.         WBC   21.58               Significant Imaging: I have reviewed all pertinent imaging results/findings within the past 24 hours.    Assessment/Plan:     * Stroke due to occlusion of right posterior cerebral " artery  Pt admitted for R PCA occlusion  -mx per stroke team      Fibromyalgia  See LALITA      LALITA (generalized anxiety disorder)  Cont. home bupropion and duloxetine      Essential hypertension  Medicine consulted for uncontrolled and accelerated HTN. Pt has never been on more than one BP med at a time and is not regularly compliant with existing BP medication. Cannot determine if she has resistant HTN until appropriate tx has been initiated and adhered to.     DDx for persistent elevated BP:  1. Uncontrolled essential HTN given pt has not adequately treated her past high BP with regular and diligent medication use  2. Hyperaldosteronism given pts electrolyte profile (low K). 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 KADE given pts Kidney function (pt has long hx of kidney stones)   5. HTN 2/2 stimulant use given pts hx of taking adderrall/vyvanse, though pt denies currently using these medications    Pt has normal TSH and calcium levels making HTN in the setting of hypercalcemia or hyperthyroidism less likely.   HTN less likely in the setting of an undiagnosed aortic coarctation given lack of evidence in prior CXRs/Echo, BP reading in both arms inconclusive at this point.   BENJIE as a cause for this pts HTN is not likely considering insignificant prior sleep study     Plan:  -Recs to start pt on Losartan 25 and monitor response. 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 I will follow-up with patient. Please contact us if you have any additional questions.    Michael Singh MD  Department of Hospital Medicine   [facility] ([facility])

## 2023-07-06 NOTE — HPI
46 y/o F w/ Anxiety, Migraines, MVP (repaired in 2010), essential HTN, and hx of stimulant use who is being treated by vascular neuro for a R PCA occlusion. Pt presented to the ED on 7/5 w/ a migraine positive for aura (migraine present since 6/30). She described the headache as being the worst she has ever had in her life and explained she never experienced auras before. MRI in Ed revealed R PCA infarct for which stroke was consulted. F/u CTA showing BL PCA stenosis in the setting of migrainous infarcts. Pt was out of window for thrombolysis and not a candidate for interventional revascularization. She was stared on verapamil, ASA.Plavix, atorvastatin, Mag, and depakote. Pt.s BP was elevated to SBP nearly 200 and DBP 90s/100s. Medicine consulted for what was thought to be resistant hypertension.     Per the patient, her BP issues started after she underwent MVP repair over a decade ago. She describes being put on multiple different medications but is not sure which ones she was prescribed and was never on more than one medication at a time. She currently only takes a ACE-I/Hydralazine med which she admits she is not fully compliant with. She does not regularly taker her BP pill and states her last dose was 6/29. She does not measure her BP at home. She states that her migraines (for which she has up to 15 monthly) are often associated w/ hot flashes, palpitations, and occasional sweating. Pt was adopted but was able to obtain a family history that is significant for HTN and heart disease. Pt has a surgical history significant for hysterectomy and is not on OCP. She underwent a sleep study in 2014 and was not found to have BENJIE. Pts calcium and TSH levels are within normal limits. To her knowledge, no other diagnostic measures have been taken to address her persistently high blood pressure.

## 2023-07-06 NOTE — PROGRESS NOTES
[facility] - Neurosurgery ([facility])  Vascular Neurology  Comprehensive Stroke Center  Progress Note    Assessment/Plan:     * Stroke due to occlusion of right posterior cerebral artery  Mirna Pollock is a 47 y.o. female with PMHx of anxiety, ASD (s/p closure), HA, mitral valve prolapse (repaired), and HTN (difficult to control in past) who presented to ED with intermittent migraine with visual aura since Friday (6/30/23). Patient has never had aura with migraines in the past. HA pain is located in L temporal area and BL occipital area. Aura is described as black dots floating on both sides of her vision. MRI in ED with R PCA infarct. Stroke team consulted. CTA with BL PCA stenosis. Concern for migrainous infarcts.     Patient started on verapamil and treated for migraine. Patient with continued headaches, and elevated BPs. Patient to restart her home BP meds. She has had historically high blood pressures for over a decade per historic readings on chart. Continuing headache cocktail (except for toradol which is not recommended in setting of stroke). TTE with bubble pending due to history of ASD s/p closure.    Of note, in December 2021, patient had an episode of complete vision with a HA. Episode lasted about 5 seconds and HA started prior to loss of vision.    Antithrombotics for secondary stroke prevention: aspirin and plavix load followed by daily DAPT    Statins for secondary stroke prevention and hyperlipidemia, if present:   Statins: Atorvastatin- 40 mg daily    Aggressive risk factor modification: HTN, migraine with aura     Rehab efforts: The patient has been evaluated by a stroke team provider and the therapy needs have been fully considered based off the presenting complaints and exam findings. The following therapy evaluations are needed: PT evaluate and treat, OT evaluate and treat, SLP evaluate and treat, PM&R evaluate for appropriate placement    Diagnostics ordered/pending: MRA head to assess vasculature,  TTE to assess cardiac function/status     VTE prophylaxis: Heparin 5000 units SQ every 8 hours  Mechanical prophylaxis: Place SCDs    BP parameters: Infarct: No intervention, SBP <200      Fibromyalgia  Continuing home bupropion and duloxetine      LALITA (generalized anxiety disorder)  Continuing home bupropion and duloxetine      Essential hypertension  Stroke risk factor  Difficult to control in the past  SBP <200  Home BP meds held on admission, continued verapamil for migraine and infarct; will consider restarting home meds; hospital medicine consulted, appreciate recommendations  Prn labetalol    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 Patient to restart her home BP meds. She has had historically high blood pressures for over a decade per historic readings on chart. Continuing headache cocktail (except for toradol which is not recommended in setting of stroke).      STROKE DOCUMENTATION        NIH Scale:  1a. Level of Consciousness: 0-->Alert, keenly responsive  1b. LOC Questions: 0-->Answers both questions correctly  1c. LOC Commands: 0-->Performs both tasks correctly  2. Best Gaze: 0-->Normal  3. Visual: 0-->No visual loss  4. Facial Palsy: 0-->Normal symmetrical movements  5a. Motor Arm, Left: 0-->No drift, limb holds 90 (or 45) degrees for full 10 secs  5b. Motor Arm, Right: 0-->No drift, limb holds 90 (or 45) degrees for full 10 secs  6a. Motor Leg, Left: 0-->No drift, leg holds 30 degree position for full 5 secs  6b. Motor Leg, Right: 0-->No drift, leg holds 30 degree position for full 5 secs  7. Limb Ataxia: 0-->Absent  8. Sensory: 1-->Mild-to-moderate sensory loss,  patient feels pinprick is less sharp or is dull on the affected side, or there is a loss of superficial pain with pinprick, but patient is aware of being touched  9. Best Language: 0-->No aphasia, normal  10. Dysarthria: 0-->Normal  11. Extinction and Inattention (formerly Neglect): 0-->No abnormality  Total (NIH Stroke Scale): 1       Modified Arroyo Score: 0  Berkeley Coma Scale:    ABCD2 Score:    [id number] Score:   HAS -BLED Score:   ICH Score:   Hunt & Guthrie Classification:      Hemorrhagic change of an Ischemic Stroke: Does this patient have an ischemic stroke with hemorrhagic changes? No     Neurologic Chief Complaint: Central vision aura and RUE numbness    Subjective:     Interval History: Patient is seen for follow-up neurological assessment and treatment recommendations:     Patient with continued headaches, and elevated BPs. Patient to restart her home BP meds. She has had historically high blood pressures for over a decade per historic readings on chart. Continuing headache cocktail (except for toradol which is not recommended in setting of stroke).    HPI, Past Medical, Family, and Social History remains the same as documented in the initial encounter.    Review of Systems   Constitutional:  Negative for fever.   HENT:  Negative for drooling and trouble swallowing.    Eyes:  Positive for visual disturbance.   Respiratory:  Negative for cough.    Cardiovascular:  Negative for chest pain.   Gastrointestinal:  Negative for nausea and vomiting.   Genitourinary:  Negative for dysuria.   Musculoskeletal:  Negative for arthralgias and myalgias.   Skin:  Negative for rash.   Neurological:  Positive for dizziness, numbness and headaches. Negative for facial asymmetry, speech difficulty and weakness.   Psychiatric/Behavioral:  Negative for agitation.    Objective:     Vital Signs (Most Recent):  Temp: 96.9 °F (36.1 °C) (07/06/23 1048)  Pulse: 99 (07/06/23 1110)  Resp: 20 (07/06/23 1048)  BP: (!) 198/90  (07/06/23 1048)  SpO2: 98 % (07/06/23 1048)    Vital Signs Range (Last 24H):  Temp:  [96.1 °F (35.6 °C)-98.2 °F (36.8 °C)]   Pulse:  []   Resp:  [16-20]   BP: (167-198)/()   SpO2:  [94 %-99 %]       Physical Exam  Vitals reviewed.   Constitutional:       General: She is not in acute distress.     Appearance: She is well-developed.   HENT:      Head: Normocephalic and atraumatic.      Right Ear: External ear normal.      Left Ear: External ear normal.      Nose: Nose normal.   Eyes:      General: No scleral icterus.  Cardiovascular:      Rate and Rhythm: Normal rate.   Pulmonary:      Effort: Pulmonary effort is normal. No respiratory distress.   Abdominal:      General: There is no distension.   Musculoskeletal:      Cervical back: Normal range of motion.      Right lower leg: No edema.      Left lower leg: No edema.   Skin:     General: Skin is warm and dry.   Neurological:      Mental Status: She is alert.   Psychiatric:         Mood and Affect: Mood normal.         Behavior: Behavior normal.            Neurological Exam:   LOC: alert  Attention Span: Good   Language: No aphasia  Articulation: No dysarthria  Orientation: Person, Place, Time   Visual Fields: Full  EOM (CN III, IV, VI): Full/intact  Facial Movement (CN VII): Symmetric facial expression    Motor: Arm left  Normal 5/5  Leg left  Normal 5/5  Arm right  Normal 5/5  Leg right Normal 5/5  Cerebellum: No evidence of appendicular or axial ataxia  Sensation: decreased sensation in RUE  Tone: Normal tone throughout      Laboratory:  CMP:   Recent Labs   Lab 07/06/23  0456   CALCIUM 9.7   ALBUMIN 3.9   PROT 7.8      K 2.9*   CO2 21*      BUN 24*   CREATININE 1.2   ALKPHOS 87   ALT 17   AST 17   BILITOT 1.3*       CBC:   Recent Labs   Lab 07/06/23  0456   WBC 21.58*   RBC 4.94   HGB 14.9   HCT 44.0   *   MCV 89   MCH 30.2   MCHC 33.9       Lipid Panel:   Recent Labs   Lab 07/05/23  1518   CHOL 223*   LDLCALC 156.6   HDL 50   TRIG  82       Coagulation:   Recent Labs   Lab 07/06/23  0456   INR 0.9   APTT 25.3       Hgb A1C:   Recent Labs   Lab 07/05/23  1518   HGBA1C 5.1       TSH:   Recent Labs   Lab 07/05/23  1518   TSH 1.055         Diagnostic Results:      Brain imaging:  MRI Brain. Date: 07/05/23  Small scattered areas of acute infarction in the right PCA territory.     Age advanced small vessel ischemic change with multiple remote lacunar type infarcts.  These findings notably progressed since the prior MRI of 09/28/2017.     Abnormal appearance of the expected flow void of the distal basilar tip concerning for slow flow or occlusion at this site.  Recommend a CTA head and neck for further assessment.    Vessel Imaging:  CTA Head and Neck. Date: 07/05/23  Impression:     Small recent infarcts noted on the recent MR imaging study are too small to characterize.  No intracranial hemorrhage mass effect or acute territorial infarct.     Prominent chronic ischemic changes again noted unless there is clinical suspicion for prior demyelinating disease.     No significant stenosis at the carotid bifurcations by NASCET criteria.  The vertebral arteries are patent.     Stenosis of the midportion of the posterior cerebral arteries left greater than right new from 2017.  It is unclear whether this is atherosclerotic or represents vasoconstriction/vasospasm spasm or other vasculopathy.     Sclerotic foci within the cervical vertebral bodies may represent bone islands as they are potentially visualized dating back to 2016 however they are difficult to definitively confirm.  Follow-up imaging for stability as clinically warranted.    Cardiac Evaluation:   Pending        Robert Carter MD  Comprehensive Stroke Center  Department of Vascular Neurology   [facility] - Neurosurgery ([facility])

## 2023-07-06 NOTE — NURSING
Nurses Note -- 4 Eyes      7/6/2023   6:12 AM      Skin assessed during: Admit      [x] No Altered Skin Integrity Present    []Prevention Measures Documented      [] Yes- Altered Skin Integrity Present or Discovered   [] LDA Added if Not in Epic (Describe Wound)   [] New Altered Skin Integrity was Present on Admit and Documented in LDA   [] Wound Image Taken    Wound Care Consulted? No    Attending Nurse:  Namrata Daily RN     Second RN/Staff Member:  Jessenia Lyles RN

## 2023-07-06 NOTE — PLAN OF CARE
[facility] - Neurosurgery (Hospital)  Initial Discharge Assessment       Primary Care Provider: Luc Venegas MD    Admission Diagnosis: Stroke [I63.9]  Other migraine without status migrainosus, not intractable [G43.809]  Stroke due to occlusion of right posterior cerebral artery [I63.531]    Admission Date: 7/5/2023  Expected Discharge Date:     Transition of Care Barriers: (P) None    Payor: BLUE CROSS BLUE SHIELD / Plan: [facility] / Product Type: Commercial /     Extended Emergency Contact Information  Primary Emergency Contact: Jeremias Pollock  Address: [address] [place] [address] [facility]  Home Phone: [phone]  Mobile Phone: [phone]  Relation: Spouse    Discharge Plan A: (P) Home with family  Discharge Plan B: (P) Home Health      [id number] [place] - [address] AT [facility] [address]  [address]  [place] [address]  Phone: [phone] Fax: [phone]    [facility]  [address] [facility] [address]  Phone: [phone] Fax: [phone]    [facility] [place] - [address]   [address]   [place] [address]  Phone: [phone] Fax: [phone]      Initial Assessment (most recent)       Adult Discharge Assessment - 07/06/23 1123          Discharge Assessment    Assessment Type Discharge Planning Assessment (P)      Confirmed/corrected address, phone number and insurance Yes (P)      Confirmed Demographics Correct on Facesheet (P)      Source of Information patient;family (P)      When was your last doctors appointment? 06/30/22 (P)      Communicated FERNANDO with patient/caregiver Date not available/Unable to determine (P)      Reason For Admission stroke (P)      People in Home significant other;parent(s) (P)      Do you expect to return to your current living situation? Yes (P)      Do you have help at home  or someone to help you manage your care at home? Yes (P)      Who are your caregiver(s) and their phone number(s)?  Jeremias [phone] (P)      Prior to hospitilization cognitive status: Unable to Assess (P)      Current cognitive status: Alert/Oriented (P)      Equipment Currently Used at Home none (P)      Readmission within 30 days? No (P)      Patient currently being followed by outpatient case management? No (P)      Do you take prescription medications? No (P)      Do you have prescription coverage? Yes (P)      Coverage BCBS (P)      Do you have any problems affording any of your prescribed medications? No (P)      Is the patient taking medications as prescribed? yes (P)      Who is going to help you get home at discharge?  Jeremias (P)      How do you get to doctors appointments? car, drives self;family or friend will provide (P)      Are you on dialysis? No (P)      Do you take coumadin? No (P)      Discharge Plan A Home with family (P)      Discharge Plan B Home Health (P)      DME Needed Upon Discharge  none (P)      Discharge Plan discussed with: Spouse/sig other;Patient (P)      Transition of Care Barriers None (P)         Physical Activity    On average, how many days per week do you engage in moderate to strenuous exercise (like a brisk walk)? 5 days (P)      On average, how many minutes do you engage in exercise at this level? 30 min (P)         Financial Resource Strain    How hard is it for you to pay for the very basics like food, housing, medical care, and heating? Not hard at all (P)         Housing Stability    In the last 12 months, was there a time when you were not able to pay the mortgage or rent on time? No (P)      In the last 12 months, was there a time when you did not have a steady place to sleep or slept in a shelter (including now)? No (P)         Transportation Needs    In the past 12 months, has lack of transportation kept you from medical appointments or from getting  medications? No (P)      In the past 12 months, has lack of transportation kept you from meetings, work, or from getting things needed for daily living? No (P)         Food Insecurity    Within the past 12 months, you worried that your food would run out before you got the money to buy more. Never true (P)      Within the past 12 months, the food you bought just didn't last and you didn't have money to get more. Never true (P)         Stress    Do you feel stress - tense, restless, nervous, or anxious, or unable to sleep at night because your mind is troubled all the time - these days? To some extent (P)         Social Connections    In a typical week, how many times do you talk on the phone with family, friends, or neighbors? More than three times a week (P)      How often do you get together with friends or relatives? Three times a week (P)      How often do you attend Confucianist or Nondenominational services? More than 4 times per year (P)      Do you belong to any clubs or organizations such as Confucianist groups, unions, fraternal or athletic groups, or school groups? No (P)      How often do you attend meetings of the clubs or organizations you belong to? More than 4 times per year (P)      Are you , , , , never , or living with a partner?  (P)         Alcohol Use    Q1: How often do you have a drink containing alcohol? Monthly or less (P)      Q2: How many drinks containing alcohol do you have on a typical day when you are drinking? 1 or 2 (P)      Q3: How often do you have six or more drinks on one occasion? Less than monthly (P)         OTHER    Name(s) of People in Home  Jeremias and mother in law (P)                    SW met with patient and her  Jeremias at bedside.  Patient lives with spouse and MIL in a 2 story home with several steps.  Patient has no HH or DME and is completely independent with ambulation and ADL's.  Patient is not on HD or Coumadin.  Patient has  no post acute needs at this time and will d/c home with her  and he will transport.      Daysi Sloan, DU  [facility]  [phone]

## 2023-07-06 NOTE — ASSESSMENT & PLAN NOTE
Medicine consulted for uncontrolled and accelerated HTN. Pt has never been on more than one BP med at a time and is not regularly compliant with existing BP medication. Cannot determine if she has resistant HTN until appropriate tx has been initiated and adhered to.     DDx for persistent elevated BP:  1. Uncontrolled essential HTN given pt has not adequately treated her past high BP with regular and diligent medication use  2. Hyperaldosteronism given pts electrolyte profile (low K). 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 KADE given pts Kidney function (pt has long hx of kidney stones)   5. HTN 2/2 stimulant use given pts hx of taking adderrall/vyvanse, though pt denies currently using these medications    Pt has normal TSH and calcium levels making HTN in the setting of hypercalcemia or hyperthyroidism less likely.   HTN less likely in the setting of an undiagnosed aortic coarctation given lack of evidence in prior CXRs/Echo, BP reading in both arms inconclusive at this point.   BENJIE as a cause for this pts HTN is not likely considering insignificant prior sleep study     Plan:  -Recs to start pt on Losartan 25 and monitor response. If BP still poorly controlled, will escalate therapy and consider adding spironolactone as next drug of choice  -Obtain UA to assess for protein in urine  -Obtain bilateral retroperitoneal US to assess kidneys/adrenals and look for possible KADE  -Recommend f/u with outpt PCP for lab work to evaluate differentials that include Hyperaldosteronism/Pheocrhromocytoma

## 2023-07-06 NOTE — SUBJECTIVE & OBJECTIVE
Past Medical History:   Diagnosis Date    Anxiety     ASD (atrial septal defect)     Endometriosis     Headache(784.0)     Hypertension     Kidney stones fibomyalgia, migraine, heart closer, kidey stones       Past Surgical History:   Procedure Laterality Date    APPENDECTOMY      CARDIAC CATHETERIZATION  10/01/2010    ASD device closure     SECTION      x 2    CHOLECYSTECTOMY      HYSTERECTOMY      12 yrs ago, still has ovaries, due to endometriosis by Dr. Hernandez       Review of patient's allergies indicates:   Allergen Reactions    Iodine and iodide containing products Other (See Comments)    Penicillins Rash       No current facility-administered medications on file prior to encounter.     Current Outpatient Medications on File Prior to Encounter   Medication Sig    benazepril-hydrochlorthiazide (LOTENSIN HCT) 20-25 mg Tab TAKE 1 TABLET BY MOUTH EVERY DAY    buPROPion (WELLBUTRIN XL) 300 MG 24 hr tablet Take 1 tablet (300 mg total) by mouth once daily.    DULoxetine (CYMBALTA) 60 MG capsule Take 1 capsule (60 mg total) by mouth once daily.    erenumab-aooe (AIMOVIG) 140 mg/mL autoinjector Inject 1 pen (140 mg total) into the skin every 28 days.    ondansetron (ZOFRAN-ODT) 4 MG TbDL DISSOLVE 1 TABLET ON THE TONGUE EVERY 6 HOURS AS NEEDED FOR NAUSEA    promethazine (PHENERGAN) 25 MG tablet TAKE 1 TABLET BY MOUTH EVERY 4 TO 6 HOURS AS NEEDED FOR NAUSEA    tiZANidine (ZANAFLEX) 4 MG tablet Take 1.5 tablets (6 mg total) by mouth every evening. No alcohol, no driving with medication.    ubrogepant (UBRELVY) 100 mg tablet TAKE 1 TABLET BY MOUTH EVERY 2 HOURS AS NEEDED FOR MIGRAINE.  MG PER DAY Strength: 100 mg    hydrOXYzine pamoate (VISTARIL) 25 MG Cap Take 1 capsule (25 mg total) by mouth every 8 (eight) hours as needed (anxiety).    zolpidem (AMBIEN) 10 mg Tab Take 1 tablet (10 mg total) by mouth every evening.     Family History       Problem Relation (Age of Onset)    Migraines Son    No Known  Problems Son          Tobacco Use    Smoking status: Never    Smokeless tobacco: Never   Substance and Sexual Activity    Alcohol use: No     Alcohol/week: 0.0 standard drinks    Drug use: No    Sexual activity: Yes     Partners: Male     Birth control/protection: Coitus interruptus     Review of Systems   Constitutional:  Negative for fever and unexpected weight change.   HENT:  Negative for rhinorrhea and sore throat.    Eyes:  Negative for redness and visual disturbance.   Respiratory:  Negative for cough, chest tightness and shortness of breath.    Cardiovascular:  Negative for chest pain and palpitations.   Gastrointestinal:  Negative for abdominal distention, abdominal pain, constipation, diarrhea and vomiting.   Genitourinary:  Negative for flank pain and pelvic pain.   Musculoskeletal:  Negative for arthralgias and back pain.   Skin:  Negative for color change and pallor.   Neurological:  Positive for weakness and headaches. Negative for syncope.   Psychiatric/Behavioral:  Negative for agitation, behavioral problems and confusion.    Objective:     Vital Signs (Most Recent):  Temp: 96.9 °F (36.1 °C) (07/06/23 1048)  Pulse: 94 (07/06/23 1515)  Resp: 20 (07/06/23 1048)  BP: (!) 164/86 (07/06/23 1440)  SpO2: 98 % (07/06/23 1048) Vital Signs (24h Range):  Temp:  [96.1 °F (35.6 °C)-98.2 °F (36.8 °C)] 96.9 °F (36.1 °C)  Pulse:  [] 94  Resp:  [16-20] 20  SpO2:  [94 %-99 %] 98 %  BP: (164-198)/() 164/86     Weight: 65.6 kg (144 lb 10 oz)  Body mass index is 24.07 kg/m².     Physical Exam  Constitutional:       General: She is not in acute distress.     Appearance: Normal appearance.   HENT:      Head: Normocephalic and atraumatic.   Eyes:      Extraocular Movements: Extraocular movements intact.      Conjunctiva/sclera: Conjunctivae normal.   Cardiovascular:      Rate and Rhythm: Normal rate and regular rhythm.      Pulses: Normal pulses.      Heart sounds: Normal heart sounds. No murmur heard.    No  friction rub. No gallop.   Pulmonary:      Effort: Pulmonary effort is normal.      Breath sounds: Normal breath sounds. No wheezing or rales.   Abdominal:      General: Abdomen is flat. Bowel sounds are normal. There is no distension.      Palpations: Abdomen is soft.      Tenderness: There is no abdominal tenderness.   Musculoskeletal:         General: Swelling (swelling in L and R upper extremities) present. No tenderness.   Skin:     General: Skin is warm and dry.   Neurological:      Mental Status: She is alert and oriented to person, place, and time.      Sensory: No sensory deficit.   Psychiatric:         Mood and Affect: Mood normal.         Behavior: Behavior normal.         Thought Content: Thought content normal.        Significant Labs: All pertinent labs within the past 24 hours have been reviewed.  CBC:   Recent Labs   Lab 07/05/23  0746 07/05/23  1041 07/06/23  0456   WBC  --  13.32* 21.58*   HGB  --  14.6 14.9   HCT 46 43.2 44.0   PLT  --  437 526*     CMP:   Recent Labs   Lab 07/05/23  1041 07/06/23  0456    139   K 3.1* 2.9*    101   CO2 28 21*   GLU 92 161*   BUN 19 24*   CREATININE 1.2 1.2   CALCIUM 9.2 9.7   PROT 7.3 7.8   ALBUMIN 3.8 3.9   BILITOT 0.6 1.3*   ALKPHOS 82 87   AST 19 17   ALT 18 17   ANIONGAP 8 17*     Recent Lab Results         07/06/23  1442   07/06/23  0456        Albumin   3.9       Alkaline Phosphatase   87       ALT   17       Anion Gap   17       Ascending aorta 3.24         Ao peak heber 1.27         Ao VTI 23.73         aPTT   25.3  Comment: Refer to local heparin nomogram for intensity/dose specific   therapeutic   range.         AST   17       AV valve area 3.07         AV mean gradient 4         AV index (prosthetic) 0.99         AV peak gradient 6         AV Velocity Ratio 0.89         Baso #   0.05       Basophil %   0.2       BILIRUBIN TOTAL   1.3  Comment: For infants and newborns, interpretation of results should be based  on gestational age, weight and  in agreement with clinical  observations.    Premature Infant recommended reference ranges:  Up to 24 hours.............<8.0 mg/dL  Up to 48 hours............<12.0 mg/dL  3-5 days..................<15.0 mg/dL  6-29 days.................<15.0 mg/dL         BSA 1.73         BUN   24       Calcium   9.7       Chloride   101       CO2   21       Creatinine   1.2       Left Ventricle Relative Wall Thickness 0.32         Differential Method   Automated       E/A ratio 0.79         E/E' ratio 12.63         eGFR   55.5       EF 65         Eos #   0.0       Eosinophil %   0.0       E wave deceleration time 152.78         FS 35         Glucose   161       Gran # (ANC)   19.8       Gran %   91.9       Hematocrit   44.0       Hemoglobin   14.9       Immature Grans (Abs)   0.25  Comment: Mild elevation in immature granulocytes is non specific and   can be seen in a variety of conditions including stress response,   acute inflammation, trauma and pregnancy. Correlation with other   laboratory and clinical findings is essential.         Immature Granulocytes   1.2       INR   0.9  Comment: Coumadin Therapy:  2.0 - 3.0 for INR for all indicators except mechanical heart valves  and antiphospholipid syndromes which should use 2.5 - 3.5.         IVSd 0.67         LA WIDTH 3.50         Left Atrium Major Axis 5.00         Left Atrium Minor Axis 4.22         LA size 2.40         LA volume 32.68          37.62         LA vol index 19.0         LA Volume Index (Mod) 21.9         LVOT area 3.1         LV LATERAL E/E' RATIO 11.22         LV SEPTAL E/E' RATIO 14.43         LV EDV BP 95.92         LV Diastolic Volume Index 55.77         LVIDd 4.57         LVIDs 2.97         LV mass 99.11         LV Mass Index 58         LVOT diameter 1.99         LVOT peak heber 1.13         LVOT stroke volume 72.93         LVOT peak VTI 23.46         LV ESV BP 34.16         LV Systolic Volume Index 19.9         Lymph #   1.3       Lymph %   6.0       Magnesium   " 2.3       MCH   30.2       MCHC   33.9       MCV   89       Mean e' 0.08         Mono #   0.2       Mono %   0.7       MPV   9.7       MV valve area p 1/2 method 4.97         MV "A" wave duration 3.43         MV Peak A Gerardo 1.28         MV Peak E Gerardo 1.01         MV stenosis pressure 1/2 time 44.31         nRBC   0       Phosphorus   3.1       Platelets   526       Potassium   2.9       PROTEIN TOTAL   7.8       Protime   10.2       PV Peak D Gerardo 0.52         PV Peak S Gerardo 0.84         Pulm vein S/D ratio 1.62         Posterior Wall 0.74         Right Atrial Pressure (from IVC) 8         RA Major Axis 4.49         RA Width 3.18         RBC   4.94       RDW   11.7       RV S' 17.70         RVDD 1.92         Sinus 3.17         Sodium   139       STJ 2.86         TAPSE 1.70         TDI SEPTAL 0.07         TDI LATERAL 0.09         Troponin I   0.008  Comment: The reference interval for Troponin I represents the 99th percentile   cutoff   for our facility and is consistent with 3rd generation assay   performance.         WBC   21.58               Significant Imaging: I have reviewed all pertinent imaging results/findings within the past 24 hours.  "

## 2023-07-06 NOTE — PLAN OF CARE
Problem: Adjustment to Illness (Stroke, Ischemic/Transient Ischemic Attack)  Goal: Optimal Coping  Outcome: Ongoing, Progressing     Problem: Cerebral Tissue Perfusion (Stroke, Ischemic/Transient Ischemic Attack)  Goal: Optimal Cerebral Tissue Perfusion  Outcome: Ongoing, Progressing     Problem: Functional Ability Impaired (Stroke, Ischemic/Transient Ischemic Attack)  Goal: Optimal Functional Ability  Outcome: Ongoing, Progressing     Problem: Fall Injury Risk  Goal: Absence of Fall and Fall-Related Injury  Outcome: Ongoing, Progressing     Problem: Adult Inpatient Plan of Care  Goal: Plan of Care Review  Outcome: Ongoing, Progressing  Goal: Patient-Specific Goal (Individualized)  Outcome: Ongoing, Progressing  Goal: Absence of Hospital-Acquired Illness or Injury  Outcome: Ongoing, Progressing  Goal: Optimal Comfort and Wellbeing  Outcome: Ongoing, Progressing  Goal: Readiness for Transition of Care  Outcome: Ongoing, Progressing     Problem: Pain Chronic (Persistent)  Goal: Acceptable Pain Control and Functional Ability  Outcome: Ongoing, Progressing   Continue to experience her migraine headaches

## 2023-07-07 PROBLEM — R80.9 PROTEINURIA: Status: ACTIVE | Noted: 2023-07-07

## 2023-07-07 LAB
ALBUMIN SERPL BCP-MCNC: 3.4 G/DL (ref 3.5–5.2)
ALP SERPL-CCNC: 68 U/L (ref 55–135)
ALT SERPL W/O P-5'-P-CCNC: 16 U/L (ref 10–44)
ANION GAP SERPL CALC-SCNC: 14 MMOL/L (ref 8–16)
ANISOCYTOSIS BLD QL SMEAR: SLIGHT
AST SERPL-CCNC: 24 U/L (ref 10–40)
BASOPHILS # BLD AUTO: 0.07 K/UL (ref 0–0.2)
BASOPHILS NFR BLD: 0.2 % (ref 0–1.9)
BILIRUB SERPL-MCNC: 1 MG/DL (ref 0.1–1)
BILIRUB UR QL STRIP: NEGATIVE
BUN SERPL-MCNC: 34 MG/DL (ref 6–20)
CALCIUM SERPL-MCNC: 9.4 MG/DL (ref 8.7–10.5)
CHLORIDE SERPL-SCNC: 102 MMOL/L (ref 95–110)
CLARITY UR REFRACT.AUTO: CLEAR
CO2 SERPL-SCNC: 23 MMOL/L (ref 23–29)
COLOR UR AUTO: YELLOW
CREAT SERPL-MCNC: 1.1 MG/DL (ref 0.5–1.4)
DIFFERENTIAL METHOD: ABNORMAL
EOSINOPHIL # BLD AUTO: 0 K/UL (ref 0–0.5)
EOSINOPHIL NFR BLD: 0 % (ref 0–8)
ERYTHROCYTE [DISTWIDTH] IN BLOOD BY AUTOMATED COUNT: 12.2 % (ref 11.5–14.5)
EST. GFR  (NO RACE VARIABLE): >60 ML/MIN/1.73 M^2
GLUCOSE SERPL-MCNC: 143 MG/DL (ref 70–110)
GLUCOSE UR QL STRIP: ABNORMAL
HCG INTACT+B SERPL-ACNC: <2.4 MIU/ML
HCT VFR BLD AUTO: 38 % (ref 37–48.5)
HGB BLD-MCNC: 12.7 G/DL (ref 12–16)
HGB UR QL STRIP: ABNORMAL
HYPOCHROMIA BLD QL SMEAR: ABNORMAL
IMM GRANULOCYTES # BLD AUTO: 0.33 K/UL (ref 0–0.04)
IMM GRANULOCYTES NFR BLD AUTO: 1.1 % (ref 0–0.5)
KETONES UR QL STRIP: ABNORMAL
LEUKOCYTE ESTERASE UR QL STRIP: NEGATIVE
LYMPHOCYTES # BLD AUTO: 0.8 K/UL (ref 1–4.8)
LYMPHOCYTES NFR BLD: 2.7 % (ref 18–48)
MCH RBC QN AUTO: 31.2 PG (ref 27–31)
MCHC RBC AUTO-ENTMCNC: 33.4 G/DL (ref 32–36)
MCV RBC AUTO: 93 FL (ref 82–98)
MONOCYTES # BLD AUTO: 0.9 K/UL (ref 0.3–1)
MONOCYTES NFR BLD: 2.9 % (ref 4–15)
NEUTROPHILS # BLD AUTO: 28.3 K/UL (ref 1.8–7.7)
NEUTROPHILS NFR BLD: 93.1 % (ref 38–73)
NITRITE UR QL STRIP: NEGATIVE
NRBC BLD-RTO: 0 /100 WBC
OVALOCYTES BLD QL SMEAR: ABNORMAL
PH UR STRIP: 6 [PH] (ref 5–8)
PLATELET # BLD AUTO: 432 K/UL (ref 150–450)
PMV BLD AUTO: 10.3 FL (ref 9.2–12.9)
POIKILOCYTOSIS BLD QL SMEAR: SLIGHT
POLYCHROMASIA BLD QL SMEAR: ABNORMAL
POTASSIUM SERPL-SCNC: 3.3 MMOL/L (ref 3.5–5.1)
PROT SERPL-MCNC: 6.5 G/DL (ref 6–8.4)
PROT UR QL STRIP: ABNORMAL
RBC # BLD AUTO: 4.07 M/UL (ref 4–5.4)
SODIUM SERPL-SCNC: 139 MMOL/L (ref 136–145)
SP GR UR STRIP: 1.02 (ref 1–1.03)
SPHEROCYTES BLD QL SMEAR: ABNORMAL
URN SPEC COLLECT METH UR: ABNORMAL
WBC # BLD AUTO: 30.41 K/UL (ref 3.9–12.7)

## 2023-07-07 PROCEDURE — 84702 CHORIONIC GONADOTROPIN TEST: CPT

## 2023-07-07 PROCEDURE — 99233 PR SUBSEQUENT HOSPITAL CARE,LEVL III: ICD-10-PCS | Mod: ,,, | Performed by: PSYCHIATRY & NEUROLOGY

## 2023-07-07 PROCEDURE — 97165 OT EVAL LOW COMPLEX 30 MIN: CPT

## 2023-07-07 PROCEDURE — 25000003 PHARM REV CODE 250: Performed by: PHYSICIAN ASSISTANT

## 2023-07-07 PROCEDURE — 11000001 HC ACUTE MED/SURG PRIVATE ROOM

## 2023-07-07 PROCEDURE — 25000003 PHARM REV CODE 250

## 2023-07-07 PROCEDURE — 63600175 PHARM REV CODE 636 W HCPCS

## 2023-07-07 PROCEDURE — 85025 COMPLETE CBC W/AUTO DIFF WBC: CPT | Performed by: PHYSICIAN ASSISTANT

## 2023-07-07 PROCEDURE — 99233 PR SUBSEQUENT HOSPITAL CARE,LEVL III: ICD-10-PCS | Mod: ,,, | Performed by: HOSPITALIST

## 2023-07-07 PROCEDURE — 97530 THERAPEUTIC ACTIVITIES: CPT

## 2023-07-07 PROCEDURE — 99233 SBSQ HOSP IP/OBS HIGH 50: CPT | Mod: ,,, | Performed by: PSYCHIATRY & NEUROLOGY

## 2023-07-07 PROCEDURE — 80053 COMPREHEN METABOLIC PANEL: CPT | Performed by: PHYSICIAN ASSISTANT

## 2023-07-07 PROCEDURE — 36415 COLL VENOUS BLD VENIPUNCTURE: CPT

## 2023-07-07 PROCEDURE — 36415 COLL VENOUS BLD VENIPUNCTURE: CPT | Performed by: PHYSICIAN ASSISTANT

## 2023-07-07 PROCEDURE — 99233 SBSQ HOSP IP/OBS HIGH 50: CPT | Mod: ,,, | Performed by: HOSPITALIST

## 2023-07-07 RX ORDER — ONDANSETRON 2 MG/ML
4 INJECTION INTRAMUSCULAR; INTRAVENOUS EVERY 6 HOURS PRN
Status: DISCONTINUED | OUTPATIENT
Start: 2023-07-07 | End: 2023-07-08 | Stop reason: HOSPADM

## 2023-07-07 RX ORDER — MAGNESIUM SULFATE HEPTAHYDRATE 40 MG/ML
2 INJECTION, SOLUTION INTRAVENOUS ONCE
Status: COMPLETED | OUTPATIENT
Start: 2023-07-07 | End: 2023-07-07

## 2023-07-07 RX ADMIN — CLOPIDOGREL BISULFATE 75 MG: 75 TABLET ORAL at 08:07

## 2023-07-07 RX ADMIN — MAGNESIUM SULFATE 2 G: 2 INJECTION INTRAVENOUS at 08:07

## 2023-07-07 RX ADMIN — ATORVASTATIN CALCIUM 40 MG: 40 TABLET, FILM COATED ORAL at 08:07

## 2023-07-07 RX ADMIN — TIZANIDINE 6 MG: 2 TABLET ORAL at 08:07

## 2023-07-07 RX ADMIN — LOSARTAN POTASSIUM 25 MG: 25 TABLET, FILM COATED ORAL at 08:07

## 2023-07-07 RX ADMIN — DEXTROSE 1000 MG: 50 INJECTION, SOLUTION INTRAVENOUS at 08:07

## 2023-07-07 RX ADMIN — POTASSIUM BICARBONATE 50 MEQ: 978 TABLET, EFFERVESCENT ORAL at 08:07

## 2023-07-07 RX ADMIN — ASPIRIN 81 MG: 81 TABLET, COATED ORAL at 08:07

## 2023-07-07 RX ADMIN — VERAPAMIL HYDROCHLORIDE 40 MG: 40 TABLET, FILM COATED ORAL at 08:07

## 2023-07-07 RX ADMIN — BUPROPION HYDROCHLORIDE 300 MG: 300 TABLET, FILM COATED, EXTENDED RELEASE ORAL at 08:07

## 2023-07-07 RX ADMIN — ONDANSETRON 4 MG: 2 INJECTION INTRAMUSCULAR; INTRAVENOUS at 12:07

## 2023-07-07 RX ADMIN — ZOLPIDEM TARTRATE 10 MG: 5 TABLET ORAL at 08:07

## 2023-07-07 RX ADMIN — ACETAMINOPHEN 650 MG: 325 TABLET ORAL at 08:07

## 2023-07-07 RX ADMIN — DULOXETINE HYDROCHLORIDE 60 MG: 30 CAPSULE, DELAYED RELEASE ORAL at 08:07

## 2023-07-07 RX ADMIN — DEXTROSE MONOHYDRATE 1000 MG: 50 INJECTION, SOLUTION INTRAVENOUS at 10:07

## 2023-07-07 NOTE — PROGRESS NOTES
[facility] - Neurosurgery ([facility])  [facility] Medicine  Progress Note    Patient Name: Mirna Pollock  MRN: [id number]  Patient Class: IP- Inpatient   Admission Date: 7/5/2023  Length of Stay: 2 days  Attending Physician: Loco Lake MD  Primary Care Provider: Luc Venegas MD        Subjective:     Principal Problem:Stroke due to occlusion of right posterior cerebral artery        HPI:  46 y/o F w/ Anxiety, Migraines, MVP (repaired in 2010), essential HTN, and hx of stimulant use who is being treated by vascular neuro for a R PCA occlusion. Pt presented to the ED on 7/5 w/ a migraine positive for aura (migraine present since 6/30). She described the headache as being the worst she has ever had in her life and explained she never experienced auras before. MRI in Ed revealed R PCA infarct for which stroke was consulted. F/u CTA showing BL PCA stenosis in the setting of migrainous infarcts. Pt was out of window for thrombolysis and not a candidate for interventional revascularization. She was stared on verapamil, ASA.Plavix, atorvastatin, Mag, and depakote. Pt.s BP was elevated to SBP nearly 200 and DBP 90s/100s. Medicine consulted for what was thought to be resistant hypertension.     Per the patient, her BP issues started after she underwent MVP repair over a decade ago. She describes being put on multiple different medications but is not sure which ones she was prescribed and was never on more than one medication at a time. She currently only takes a ACE-I/Hydralazine med which she admits she is not fully compliant with. She does not regularly taker her BP pill and states her last dose was 6/29. She does not measure her BP at home. She states that her migraines (for which she has up to 15 monthly) are often associated w/ hot flashes, palpitations, and occasional sweating. Pt was adopted but was able to obtain a family history that is significant for HTN and heart disease. Pt has a surgical history significant for  hysterectomy and is not on OCP. She underwent a sleep study in 2014 and was not found to have BENJIE. Pts calcium and TSH levels are within normal limits. To her knowledge, no other diagnostic measures have been taken to address her persistently high blood pressure.        Overview/Hospital Course:  No notes on file    Interval History    NAEO. Pt not c/o of headache this morning, though aura still present. BP responded well to low dose losartan. US did not show evidence of KADE, no clear impression of adrenals on imaging. US of upper extremities revealed superficial venous thrombosis of L cephalic vein, though swelling of left arm resolved today. No patient complaints.    Review of Systems   Constitutional:  Negative for fever and unexpected weight change.   HENT:  Negative for rhinorrhea and sore throat.    Eyes:  Negative for redness and visual disturbance.   Respiratory:  Negative for cough, chest tightness and shortness of breath.    Cardiovascular:  Negative for chest pain and palpitations.   Gastrointestinal:  Negative for abdominal distention, abdominal pain, constipation, diarrhea and vomiting.   Genitourinary:  Negative for flank pain and pelvic pain.   Musculoskeletal:  Negative for arthralgias and back pain.   Skin:  Negative for color change and pallor.   Neurological:  Positive for weakness and light-headedness. Negative for syncope and headaches (persistant aura but no HA).   Psychiatric/Behavioral:  Negative for agitation, behavioral problems and confusion.    Objective:     Vital Signs (Most Recent):  Temp: 97.2 °F (36.2 °C) (07/07/23 1125)  Pulse: 100 (07/07/23 1455)  Resp: 18 (07/07/23 1125)  BP: (!) 150/75 (07/07/23 1125)  SpO2: (!) 93 % (07/07/23 1125) Vital Signs (24h Range):  Temp:  [97 °F (36.1 °C)-98.3 °F (36.8 °C)] 97.2 °F (36.2 °C)  Pulse:  [] 100  Resp:  [16-21] 18  SpO2:  [93 %-97 %] 93 %  BP: (105-186)/() 150/75     Weight: 65.6 kg (144 lb 10 oz)  Body mass index is 24.07  kg/m².     Physical Exam  Constitutional:       General: She is not in acute distress.     Appearance: Normal appearance.   HENT:      Head: Normocephalic and atraumatic.   Eyes:      Extraocular Movements: Extraocular movements intact.      Conjunctiva/sclera: Conjunctivae normal.   Cardiovascular:      Rate and Rhythm: Normal rate and regular rhythm.      Pulses: Normal pulses.      Heart sounds: Normal heart sounds. No murmur heard.    No friction rub. No gallop.   Pulmonary:      Effort: Pulmonary effort is normal.      Breath sounds: Normal breath sounds. No wheezing or rales.   Abdominal:      General: Abdomen is flat. Bowel sounds are normal. There is no distension.      Palpations: Abdomen is soft.      Tenderness: There is no abdominal tenderness.   Musculoskeletal:         General: No swelling (L upper extremity swelling resolved) or tenderness.   Skin:     General: Skin is warm and dry.   Neurological:      Mental Status: She is alert and oriented to person, place, and time.      Sensory: No sensory deficit.   Psychiatric:         Mood and Affect: Mood normal.         Behavior: Behavior normal.         Thought Content: Thought content normal.        Significant Labs: All pertinent labs within the past 24 hours have been reviewed.  CBC:   Recent Labs   Lab 07/06/23  0456 07/07/23  0401   WBC 21.58* 30.41*   HGB 14.9 12.7   HCT 44.0 38.0   * 432       CMP:   Recent Labs   Lab 07/06/23  0456 07/07/23  0401    139   K 2.9* 3.3*    102   CO2 21* 23   * 143*   BUN 24* 34*   CREATININE 1.2 1.1   CALCIUM 9.7 9.4   PROT 7.8 6.5   ALBUMIN 3.9 3.4*   BILITOT 1.3* 1.0   ALKPHOS 87 68   AST 17 24   ALT 17 16   ANIONGAP 17* 14       Recent Lab Results         07/07/23  1015   07/07/23  0401   07/06/23  2220        Albumin   3.4         Alkaline Phosphatase   68         ALT   16         Anion Gap   14         Aniso   Slight         Appearance, UA     Clear       AST   24         Baso #   0.07          Basophil %   0.2         Bilirubin (UA)     Negative       BILIRUBIN TOTAL   1.0  Comment: For infants and newborns, interpretation of results should be based  on gestational age, weight and in agreement with clinical  observations.    Premature Infant recommended reference ranges:  Up to 24 hours.............<8.0 mg/dL  Up to 48 hours............<12.0 mg/dL  3-5 days..................<15.0 mg/dL  6-29 days.................<15.0 mg/dL           BUN   34         Calcium   9.4         Chloride   102         CO2   23         Color, UA     Yellow       Creatinine   1.1         Differential Method   Automated         eGFR   >60.0         Eos #   0.0         Eosinophil %   0.0         Glucose   143         Glucose, UA     1+       Gran # (ANC)   28.3         Gran %   93.1         HCG Quant <2.4  Comment: Quantitative Total HCG Reference Ranges:  Males.....................<5.0 mIU/mL  Non-Pregnant Females:  18Y-41Y pre-menopausal....<2.4 mIU/mL  42Y-55Y bijal-menopausal...<=4.9 mIU/mL  55Y post-menopausal.......<=7.6 mIU/mL  Pregnancy:  Weeks post-LMP..........Range (mIU/mL)  1-10  weeks................202-231,000  11-15 weeks.............22,536-234,990  16-22 weeks...............8,007-50,064  23-40 weeks...............1,600-49,413    NOTE:  This assay is not FDA approved for tumor screening,   diagnosis, or monitoring.             Hematocrit   38.0         Hemoglobin   12.7         Hypo   Occasional         Immature Grans (Abs)   0.33  Comment: Mild elevation in immature granulocytes is non specific and   can be seen in a variety of conditions including stress response,   acute inflammation, trauma and pregnancy. Correlation with other   laboratory and clinical findings is essential.           Immature Granulocytes   1.1         Ketones, UA     1+       Leukocytes, UA     Negative       Lymph #   0.8         Lymph %   2.7         MCH   31.2         MCHC   33.4         MCV   93         Mono #   0.9         Mono %    2.9         MPV   10.3         NITRITE UA     Negative       nRBC   0         Occult Blood UA     Trace       Ovalocytes   Occasional         pH, UA     6.0       Platelets   432         Poikilocytosis   Slight         Poly   Occasional         Potassium   3.3         PROTEIN TOTAL   6.5         Protein, UA     Trace  Comment: Recommend a 24 hour urine protein or a urine   protein/creatinine ratio if globulin induced proteinuria is  clinically suspected.         RBC   4.07         RDW   12.2         Sodium   139         Specific Gravity, UA     1.020       Specimen UA     Urine, Clean Catch       Spherocytes   Occasional         WBC   30.41                 Significant Imaging: I have reviewed all pertinent imaging results/findings within the past 24 hours.      Assessment/Plan:      * Stroke due to occlusion of right posterior cerebral artery  Pt admitted for R PCA occlusion  -mx per stroke team      Proteinuria  -pt started on losartan  -see HTN      Stage 3a chronic kidney disease  Likely in the setting of HTN  -see HTN note      Hypokalemia  -started on potassium bicarb 50 mEq  -potassium repletion being managed per primary team      Fibromyalgia  See LALITA      LALITA (generalized anxiety disorder)  Cont. home bupropion and duloxetine      Accelerated hypertension  Medicine consulted for uncontrolled and accelerated HTN. Pt has never been on more than one BP med at a time and is not regularly compliant with existing BP medication. Cannot determine if she has resistant HTN until appropriate tx has been initiated and adhered to.     DDx for persistent elevated BP:  1. Uncontrolled essential HTN given pt has not adequately treated her past high BP with regular and diligent medication use  2. Hyperaldosteronism given pts electrolyte profile (low K). Pt has never tried BP control with spironolactone  3. Pheocromocytoma given nature of patients symptoms during hypertensive episodes (headaches, hot flashes,  palpitations)  4. KADE given pts Kidney function (pt has long hx of kidney stones)   5. HTN 2/2 stimulant use given pts hx of taking adderrall/vyvanse, though pt denies currently using these medications    Pt has normal TSH and calcium levels making HTN in the setting of hypercalcemia or hyperthyroidism less likely.   HTN less likely in the setting of an undiagnosed aortic coarctation given lack of evidence in prior CXRs/Echo, BP reading in both arms inconclusive at this point.   BENJIE as a cause for this pts HTN is not likely considering insignificant prior sleep study     Plan:  -Pt started on Losartan 25 (7/6) and responded well w/ BP dropping to normal range  -BP continued to rise into the afternoon (7/7) - orthostatic vitals obtained -> no evidence of postural hypotension  -Will continue to monitor response. 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

## 2023-07-07 NOTE — PLAN OF CARE
Problem: Adjustment to Illness (Stroke, Ischemic/Transient Ischemic Attack)  Goal: Optimal Coping  Outcome: Ongoing, Progressing     Problem: Bowel Elimination Impaired (Stroke, Ischemic/Transient Ischemic Attack)  Goal: Effective Bowel Elimination  Outcome: Ongoing, Progressing     Problem: Cerebral Tissue Perfusion (Stroke, Ischemic/Transient Ischemic Attack)  Goal: Optimal Cerebral Tissue Perfusion  Outcome: Ongoing, Progressing     Problem: Fall Injury Risk  Goal: Absence of Fall and Fall-Related Injury  Outcome: Ongoing, Progressing     Problem: Adult Inpatient Plan of Care  Goal: Plan of Care Review  Outcome: Ongoing, Progressing  Goal: Patient-Specific Goal (Individualized)  Outcome: Ongoing, Progressing  Goal: Absence of Hospital-Acquired Illness or Injury  Outcome: Ongoing, Progressing  Goal: Optimal Comfort and Wellbeing  Outcome: Ongoing, Progressing  Goal: Readiness for Transition of Care  Outcome: Ongoing, Progressing   No pain this shift. US completed waiting for results. Slept well

## 2023-07-07 NOTE — SUBJECTIVE & OBJECTIVE
Neurologic Chief Complaint: Central vision aura and RUE numbness    Subjective:     Interval History: Patient is seen for follow-up neurological assessment and treatment recommendations:     Headaches resolved. TTE unremarkable but questionable late bubbles, will follow up with TCD with bubble; BP well managed and responsive to losartan; No renal artery stenosis noted; superficial LUE thrombosis that is resolving and asymptomatic; otherwise medical ready for discharge once work up complete; likely to discharge today or tomorrow. Patient's  reports some confusion, spot EEG ordered, though suspicion is low.    HPI, Past Medical, Family, and Social History remains the same as documented in the initial encounter.    Review of Systems   Constitutional:  Negative for fever.   HENT:  Negative for drooling and trouble swallowing.    Eyes:  Positive for visual disturbance.   Respiratory:  Negative for cough.    Cardiovascular:  Negative for chest pain.   Gastrointestinal:  Negative for nausea and vomiting.   Genitourinary:  Negative for dysuria.   Musculoskeletal:  Negative for arthralgias and myalgias.   Skin:  Negative for rash.   Neurological:  Positive for dizziness, numbness and headaches. Negative for facial asymmetry, speech difficulty and weakness.   Psychiatric/Behavioral:  Negative for agitation.    Objective:     Vital Signs (Most Recent):  Temp: 98.3 °F (36.8 °C) (07/07/23 0737)  Pulse: 92 (07/07/23 0737)  Resp: 18 (07/07/23 0737)  BP: 125/62 (07/07/23 0737)  SpO2: (!) 94 % (07/07/23 0737)    Vital Signs Range (Last 24H):  Temp:  [96.9 °F (36.1 °C)-98.3 °F (36.8 °C)]   Pulse:  []   Resp:  [16-21]   BP: (105-198)/()   SpO2:  [94 %-98 %]        Physical Exam  Vitals reviewed.   Constitutional:       General: She is not in acute distress.     Appearance: She is well-developed.   HENT:      Head: Normocephalic and atraumatic.      Right Ear: External ear normal.      Left Ear: External ear normal.  "     Nose: Nose normal.   Eyes:      General: No scleral icterus.  Cardiovascular:      Rate and Rhythm: Normal rate.   Pulmonary:      Effort: Pulmonary effort is normal. No respiratory distress.   Abdominal:      General: There is no distension.   Musculoskeletal:      Cervical back: Normal range of motion.      Right lower leg: No edema.      Left lower leg: No edema.   Skin:     General: Skin is warm and dry.   Neurological:      Mental Status: She is alert.   Psychiatric:         Mood and Affect: Mood normal.         Behavior: Behavior normal.            Neurological Exam:   LOC: alert  Attention Span: Good   Language: No aphasia  Articulation: No dysarthria  Orientation: Person, Place, Time   Visual Fields: left field of view visual deficits described as "black circles"  EOM (CN III, IV, VI): Full/intact  Facial Movement (CN VII): Symmetric facial expression    Motor: Arm left  Normal 5/5  Leg left  Normal 5/5  Arm right  Normal 5/5  Leg right Normal 5/5  Cerebellum: No evidence of appendicular or axial ataxia  Sensation: decreased sensation in RUE  Tone: Normal tone throughout      Laboratory:  CMP:   Recent Labs   Lab 07/07/23  0401   CALCIUM 9.4   ALBUMIN 3.4*   PROT 6.5      K 3.3*   CO2 23      BUN 34*   CREATININE 1.1   ALKPHOS 68   ALT 16   AST 24   BILITOT 1.0     CBC:   Recent Labs   Lab 07/07/23  0401   WBC 30.41*   RBC 4.07   HGB 12.7   HCT 38.0      MCV 93   MCH 31.2*   MCHC 33.4     Lipid Panel:   Recent Labs   Lab 07/05/23  1518   CHOL 223*   LDLCALC 156.6   HDL 50   TRIG 82     Coagulation:   Recent Labs   Lab 07/06/23  0456   INR 0.9   APTT 25.3     Hgb A1C:   Recent Labs   Lab 07/05/23  1518   HGBA1C 5.1     TSH:   Recent Labs   Lab 07/05/23  1518   TSH 1.055       Diagnostic Results:      Brain imaging:  MRI Brain. Date: 07/05/23  Small scattered areas of acute infarction in the right PCA territory.     Age advanced small vessel ischemic change with multiple remote lacunar " type infarcts.  These findings notably progressed since the prior MRI of 09/28/2017.     Abnormal appearance of the expected flow void of the distal basilar tip concerning for slow flow or occlusion at this site.  Recommend a CTA head and neck for further assessment.    Vessel Imaging:  CTA Head and Neck. Date: 07/05/23  Impression:     Small recent infarcts noted on the recent MR imaging study are too small to characterize.  No intracranial hemorrhage mass effect or acute territorial infarct.     Prominent chronic ischemic changes again noted unless there is clinical suspicion for prior demyelinating disease.     No significant stenosis at the carotid bifurcations by NASCET criteria.  The vertebral arteries are patent.     Stenosis of the midportion of the posterior cerebral arteries left greater than right new from 2017.  It is unclear whether this is atherosclerotic or represents vasoconstriction/vasospasm spasm or other vasculopathy.     Sclerotic foci within the cervical vertebral bodies may represent bone islands as they are potentially visualized dating back to 2016 however they are difficult to definitively confirm.  Follow-up imaging for stability as clinically warranted.    Cardiac Evaluation:   7/6/2023 Echo with saline bubble  The left ventricle is normal in size with normal systolic function.  The estimated ejection fraction is 65%.  Normal left ventricular diastolic function.  Normal right ventricular size with normal right ventricular systolic function.  Intermediate central venous pressure (8 mmHg).  Very late bubbles present after injection with agitated saline, unable to tell when bubbles are present in relationship with initial injection, would recommended NARDA.

## 2023-07-07 NOTE — PT/OT/SLP EVAL
"Occupational Therapy   Evaluation    Name: Mirna Pollock  MRN: [id number]  Admitting Diagnosis: Stroke due to occlusion of right posterior cerebral artery  Recent Surgery: * No surgery found *      Recommendations:     Discharge Recommendations: home  Discharge Equipment Recommendations:  none  Barriers to discharge:  None    Assessment:     Miran Pollock is a 47 y.o. female with a medical diagnosis of Stroke due to occlusion of right posterior cerebral artery.  She presents with performance deficits affecting function: visual deficits.      Rehab Prognosis: Good; patient would benefit from acute skilled OT services to address these deficits and reach maximum level of function.       Plan:     Patient to be seen 3 x/week to address the above listed problems via self-care/home management, therapeutic activities, therapeutic exercises, neuromuscular re-education  Plan of Care Expires: 08/05/23  Plan of Care Reviewed with: patient, spouse    Subjective     Patient: "I had a headache so bad on Friday. It lasted all weekend.  I ended up going to the ER on Wednesday and the MRI showed multiple strokes."    Occupational Profile:  Patient resides in [place] with her , mother in law and 2 children ages 17 and 18 in 2 story home with bedroom on the 2nd floor, one step to enter.  Patient is right handed.  PTA patient independent with ADLs including driving.  Works: unit secretary.  Hobbies: TV, reading.      Pain/Comfort:  Pain Rating 1: 0/10  Pain Rating Post-Intervention 1: 0/10    Patients cultural, spiritual, [employer] conflicts given the current situation: no    Objective:     Communicated with: Nurse prior to session.  Patient found supine with telemetry, SCD upon OT entry to room.    General Precautions: Standard, aspiration, fall  Orthopedic Precautions: N/A  Braces: N/A  Respiratory Status: Room air    Occupational Performance:    Bed Mobility:    Patient completed Rolling/Turning to Left with  independence  Patient " completed Rolling/Turning to Right with independence  Patient completed Scooting/Bridging with independence  Patient completed Supine to Sit with independence  Patient completed Sit to Supine with independence    Functional Mobility/Transfers:  Patient completed Sit <> Stand Transfer with supervision  with  no assistive device   Patient completed Bed <> Chair Transfer using Stand Pivot technique with supervision with no assistive device  Patient completed Toilet Transfer Stand Pivot technique with supervision with  no AD    Activities of Daily Living:  Grooming: supervision    Upper Body Dressing: supervision    Lower Body Dressing: supervision      Cognitive/Visual Perceptual:  Cognitive/Psychosocial Skills:     -       Oriented to: Person, Place, Time, and Situation   -       Follows Commands/attention:Follows two-step commands  -       Communication: clear/fluent  Able to identify 20/20 items on visual scanning task.    Physical Exam:  Postural examination/scapula alignment:    -       Rounded shoulders  Upper Extremity Range of Motion:     -       Right Upper Extremity: WNL  -       Left Upper Extremity: WNL  Upper Extremity Strength:    -       Right Upper Extremity: WNL  -       Left Upper Extremity: WNL    AMPAC 6 Click ADL:  AMPAC Total Score: 19    Treatment & Education:  Patient alert and oriented x 3; able to follow 4/4 one step commands.  Patient attentive and interactive throughout the session.  Min assist with unilateral stance, bilaterally.     Patient left supine with all lines intact, call button in reach, and bed alarm on    GOALS:   Multidisciplinary Problems       Occupational Therapy Goals          Problem: Occupational Therapy    Goal Priority Disciplines Outcome Interventions   Occupational Therapy Goal     OT, PT/OT Ongoing, Progressing    Description: Goals set 7/7 to be addressed for 14 days with expiration date, 7/21:  Patient will increase functional independence with ADLs by  performing:  Patient will demonstrate lower body dressing with independence while seated EOB.   Not met  Patient will perform horizontal / vertical VOR exercises at 80 head turns/ minute.  Not met                               History:     Past Medical History:   Diagnosis Date    Anxiety     ASD (atrial septal defect)     Endometriosis     Headache(784.0)     Hypertension     Kidney stones fibomyalgia, migraine, heart closer, kidey stones         Past Surgical History:   Procedure Laterality Date    APPENDECTOMY      CARDIAC CATHETERIZATION  10/01/2010    ASD device closure     SECTION      x 2    CHOLECYSTECTOMY      HYSTERECTOMY      12 yrs ago, still has ovaries, due to endometriosis by Dr. Hernandez       Time Tracking:     OT Date of Treatment: 23  OT Start Time: 937  OT Stop Time: 1001  OT Total Time (min): 24 min    Billable Minutes:Evaluation 16  Therapeutic Activity 8    2023

## 2023-07-07 NOTE — SUBJECTIVE & OBJECTIVE
Interval History    NAEO. Pt not c/o of headache this morning, though aura still present. BP responded well to low dose losartan. US did not show evidence of KADE, no clear impression of adrenals on imaging. US of upper extremities revealed superficial venous thrombosis of L cephalic vein, though swelling of left arm resolved today. No patient complaints.    Review of Systems   Constitutional:  Negative for fever and unexpected weight change.   HENT:  Negative for rhinorrhea and sore throat.    Eyes:  Negative for redness and visual disturbance.   Respiratory:  Negative for cough, chest tightness and shortness of breath.    Cardiovascular:  Negative for chest pain and palpitations.   Gastrointestinal:  Negative for abdominal distention, abdominal pain, constipation, diarrhea and vomiting.   Genitourinary:  Negative for flank pain and pelvic pain.   Musculoskeletal:  Negative for arthralgias and back pain.   Skin:  Negative for color change and pallor.   Neurological:  Positive for weakness and light-headedness. Negative for syncope and headaches (persistant aura but no HA).   Psychiatric/Behavioral:  Negative for agitation, behavioral problems and confusion.    Objective:     Vital Signs (Most Recent):  Temp: 97.2 °F (36.2 °C) (07/07/23 1125)  Pulse: 100 (07/07/23 1455)  Resp: 18 (07/07/23 1125)  BP: (!) 150/75 (07/07/23 1125)  SpO2: (!) 93 % (07/07/23 1125) Vital Signs (24h Range):  Temp:  [97 °F (36.1 °C)-98.3 °F (36.8 °C)] 97.2 °F (36.2 °C)  Pulse:  [] 100  Resp:  [16-21] 18  SpO2:  [93 %-97 %] 93 %  BP: (105-186)/() 150/75     Weight: 65.6 kg (144 lb 10 oz)  Body mass index is 24.07 kg/m².     Physical Exam  Constitutional:       General: She is not in acute distress.     Appearance: Normal appearance.   HENT:      Head: Normocephalic and atraumatic.   Eyes:      Extraocular Movements: Extraocular movements intact.      Conjunctiva/sclera: Conjunctivae normal.   Cardiovascular:      Rate and Rhythm:  Normal rate and regular rhythm.      Pulses: Normal pulses.      Heart sounds: Normal heart sounds. No murmur heard.    No friction rub. No gallop.   Pulmonary:      Effort: Pulmonary effort is normal.      Breath sounds: Normal breath sounds. No wheezing or rales.   Abdominal:      General: Abdomen is flat. Bowel sounds are normal. There is no distension.      Palpations: Abdomen is soft.      Tenderness: There is no abdominal tenderness.   Musculoskeletal:         General: No swelling (L upper extremity swelling resolved) or tenderness.   Skin:     General: Skin is warm and dry.   Neurological:      Mental Status: She is alert and oriented to person, place, and time.      Sensory: No sensory deficit.   Psychiatric:         Mood and Affect: Mood normal.         Behavior: Behavior normal.         Thought Content: Thought content normal.        Significant Labs: All pertinent labs within the past 24 hours have been reviewed.  CBC:   Recent Labs   Lab 07/06/23  0456 07/07/23  0401   WBC 21.58* 30.41*   HGB 14.9 12.7   HCT 44.0 38.0   * 432       CMP:   Recent Labs   Lab 07/06/23  0456 07/07/23  0401    139   K 2.9* 3.3*    102   CO2 21* 23   * 143*   BUN 24* 34*   CREATININE 1.2 1.1   CALCIUM 9.7 9.4   PROT 7.8 6.5   ALBUMIN 3.9 3.4*   BILITOT 1.3* 1.0   ALKPHOS 87 68   AST 17 24   ALT 17 16   ANIONGAP 17* 14       Recent Lab Results         07/07/23  1015   07/07/23  0401   07/06/23  2220        Albumin   3.4         Alkaline Phosphatase   68         ALT   16         Anion Gap   14         Aniso   Slight         Appearance, UA     Clear       AST   24         Baso #   0.07         Basophil %   0.2         Bilirubin (UA)     Negative       BILIRUBIN TOTAL   1.0  Comment: For infants and newborns, interpretation of results should be based  on gestational age, weight and in agreement with clinical  observations.    Premature Infant recommended reference ranges:  Up to 24 hours.............<8.0  mg/dL  Up to 48 hours............<12.0 mg/dL  3-5 days..................<15.0 mg/dL  6-29 days.................<15.0 mg/dL           BUN   34         Calcium   9.4         Chloride   102         CO2   23         Color, UA     Yellow       Creatinine   1.1         Differential Method   Automated         eGFR   >60.0         Eos #   0.0         Eosinophil %   0.0         Glucose   143         Glucose, UA     1+       Gran # (ANC)   28.3         Gran %   93.1         HCG Quant <2.4  Comment: Quantitative Total HCG Reference Ranges:  Males.....................<5.0 mIU/mL  Non-Pregnant Females:  18Y-41Y pre-menopausal....<2.4 mIU/mL  42Y-55Y bijal-menopausal...<=4.9 mIU/mL  55Y post-menopausal.......<=7.6 mIU/mL  Pregnancy:  Weeks post-LMP..........Range (mIU/mL)  1-10  weeks................202-231,000  11-15 weeks.............22,536-234,990  16-22 weeks...............8,007-50,064  23-40 weeks...............1,600-49,413    NOTE:  This assay is not FDA approved for tumor screening,   diagnosis, or monitoring.             Hematocrit   38.0         Hemoglobin   12.7         Hypo   Occasional         Immature Grans (Abs)   0.33  Comment: Mild elevation in immature granulocytes is non specific and   can be seen in a variety of conditions including stress response,   acute inflammation, trauma and pregnancy. Correlation with other   laboratory and clinical findings is essential.           Immature Granulocytes   1.1         Ketones, UA     1+       Leukocytes, UA     Negative       Lymph #   0.8         Lymph %   2.7         MCH   31.2         MCHC   33.4         MCV   93         Mono #   0.9         Mono %   2.9         MPV   10.3         NITRITE UA     Negative       nRBC   0         Occult Blood UA     Trace       Ovalocytes   Occasional         pH, UA     6.0       Platelets   432         Poikilocytosis   Slight         Poly   Occasional         Potassium   3.3         PROTEIN TOTAL   6.5         Protein, UA      Trace  Comment: Recommend a 24 hour urine protein or a urine   protein/creatinine ratio if globulin induced proteinuria is  clinically suspected.         RBC   4.07         RDW   12.2         Sodium   139         Specific Gravity, UA     1.020       Specimen UA     Urine, Clean Catch       Spherocytes   Occasional         WBC   30.41                 Significant Imaging: I have reviewed all pertinent imaging results/findings within the past 24 hours.

## 2023-07-07 NOTE — ASSESSMENT & PLAN NOTE
Medicine consulted for uncontrolled and accelerated HTN. Pt has never been on more than one BP med at a time and is not regularly compliant with existing BP medication. Cannot determine if she has resistant HTN until appropriate tx has been initiated and adhered to.     DDx for persistent elevated BP:  1. Uncontrolled essential HTN given pt has not adequately treated her past high BP with regular and diligent medication use  2. Hyperaldosteronism given pts electrolyte profile (low K). 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 KADE given pts Kidney function (pt has long hx of kidney stones)   5. HTN 2/2 stimulant use given pts hx of taking adderrall/vyvanse, though pt denies currently using these medications    Pt has normal TSH and calcium levels making HTN in the setting of hypercalcemia or hyperthyroidism less likely.   HTN less likely in the setting of an undiagnosed aortic coarctation given lack of evidence in prior CXRs/Echo, BP reading in both arms inconclusive at this point.   BENJIE as a cause for this pts HTN is not likely considering insignificant prior sleep study     Plan:  -Pt started on Losartan 25 (7/6) and responded well w/ BP dropping to normal range  -BP continued to rise into the afternoon (7/7) - orthostatic vitals obtained -> no evidence of postural hypotension  -Will continue to monitor response. If BP still poorly controlled, will escalate therapy and consider adding spironolactone as next drug of choice  -UA positive for trace protein in urine  -Bilateral retroperitoneal US did not show evidence of KADE, adrenals poorly assessed via U/S, may need additional imaging with CT to determine if the adrenals have a lesion and/or is enlargedkimelissaeys  -Recommend f/u with outpt PCP for lab work to evaluate differentials that include Hyperaldosteronism/Pheocrhromocytoma   -Recs  to d/c pts home HCTZ when she leaves the hospital

## 2023-07-07 NOTE — PLAN OF CARE
Goals remain appropriate.  Problem: Occupational Therapy  Goal: Occupational Therapy Goal  Description: Goals set 7/7 to be addressed for 14 days with expiration date, 7/21:  Patient will increase functional independence with ADLs by performing:  Patient will demonstrate lower body dressing with independence while seated EOB.   Not met  Patient will perform horizontal / vertical VOR exercises at 80 head turns/ minute.  Not met          Outcome: Ongoing, Progressing

## 2023-07-07 NOTE — PROGRESS NOTES
[facility] - Neurosurgery ([facility])  Vascular Neurology  Comprehensive Stroke Center  Progress Note    Assessment/Plan:     * Stroke due to occlusion of right posterior cerebral artery  Mirna Pollock is a 47 y.o. female with PMHx of anxiety, ASD (s/p closure), HA, mitral valve prolapse (repaired), and HTN (difficult to control in past) who presented to ED with intermittent migraine with visual aura since Friday (6/30/23). Patient has never had aura with migraines in the past. HA pain is located in L temporal area and BL occipital area. Aura is described as black dots floating on both sides of her vision. MRI in ED with R PCA infarct. Stroke team consulted. CTA with BL PCA stenosis. Concern for migrainous infarcts.     Patient started on verapamil and treated for migraine.Headaches resolved. TTE unremarkable but questionable late bubbles, will follow up with TCD with bubble; BP well managed and responsive to losartan; No renal artery stenosis noted; superficial LUE thrombosis that is resolving and asymptomatic; otherwise medical ready for discharge once work up complete; likely to discharge today or tomorrow. Patient's  reports some confusion, spot EEG ordered, though suspicion is low.    Of note, in December 2021, patient had an episode of complete vision with a HA. Episode lasted about 5 seconds and HA started prior to loss of vision.    Antithrombotics for secondary stroke prevention: aspirin and plavix load followed by daily DAPT    Statins for secondary stroke prevention and hyperlipidemia, if present:   Statins: Atorvastatin- 40 mg daily    Aggressive risk factor modification: HTN, migraine with aura     Rehab efforts: The patient has been evaluated by a stroke team provider and the therapy needs have been fully considered based off the presenting complaints and exam findings. The following therapy evaluations are needed: PT evaluate and treat, OT evaluate and treat, SLP evaluate and treat, PM&R evaluate  for appropriate placement    Diagnostics ordered/pending: MRA head to assess vasculature, TTE to assess cardiac function/status     VTE prophylaxis: Heparin 5000 units SQ every 8 hours  Mechanical prophylaxis: Place SCDs    BP parameters: Infarct: No intervention, SBP <200      Stage 3a chronic kidney disease  Likely related to chronic HTN; no renal artery stenosis noted on US    Hypokalemia  Replenish as needed    Fibromyalgia  Continuing home bupropion and duloxetine      LALITA (generalized anxiety disorder)  Continuing home bupropion and duloxetine      Accelerated hypertension  Stroke risk factor  Difficult to control in the past  SBP <200  Losartan 25mg QD    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 Patient to restart her home BP meds. She has had historically high blood pressures for over a decade per historic readings on chart. Continuing headache cocktail (except for toradol which is not recommended in setting of stroke).  7/7/2023 Headaches resolved. TTE unremarkable but questionable late bubbles, will follow up with TCD with bubble; BP well managed and responsive to losartan; No renal artery stenosis noted; superficial LUE thrombosis that is resolving and asymptomatic; otherwise medical ready for discharge once work up complete; likely to discharge today or tomorrow. Patient's  reports some confusion, spot EEG ordered, though suspicion is low.      STROKE DOCUMENTATION        NIH Scale:          Modified Letart Score: 0  Connor Coma Scale:    ABCD2 Score:    [id number] Score:   HAS -BLED Score:   ICH Score:   Hunt & Guthrie Classification:      Hemorrhagic change of  an Ischemic Stroke: Does this patient have an ischemic stroke with hemorrhagic changes? No     Neurologic Chief Complaint: Central vision aura and RUE numbness    Subjective:     Interval History: Patient is seen for follow-up neurological assessment and treatment recommendations:     Headaches resolved. TTE unremarkable but questionable late bubbles, will follow up with TCD with bubble; BP well managed and responsive to losartan; No renal artery stenosis noted; superficial LUE thrombosis that is resolving and asymptomatic; otherwise medical ready for discharge once work up complete; likely to discharge today or tomorrow. Patient's  reports some confusion, spot EEG ordered, though suspicion is low.    HPI, Past Medical, Family, and Social History remains the same as documented in the initial encounter.    Review of Systems   Constitutional:  Negative for fever.   HENT:  Negative for drooling and trouble swallowing.    Eyes:  Positive for visual disturbance.   Respiratory:  Negative for cough.    Cardiovascular:  Negative for chest pain.   Gastrointestinal:  Negative for nausea and vomiting.   Genitourinary:  Negative for dysuria.   Musculoskeletal:  Negative for arthralgias and myalgias.   Skin:  Negative for rash.   Neurological:  Positive for dizziness, numbness and headaches. Negative for facial asymmetry, speech difficulty and weakness.   Psychiatric/Behavioral:  Negative for agitation.    Objective:     Vital Signs (Most Recent):  Temp: 98.3 °F (36.8 °C) (07/07/23 0737)  Pulse: 92 (07/07/23 0737)  Resp: 18 (07/07/23 0737)  BP: 125/62 (07/07/23 0737)  SpO2: (!) 94 % (07/07/23 0737)    Vital Signs Range (Last 24H):  Temp:  [96.9 °F (36.1 °C)-98.3 °F (36.8 °C)]   Pulse:  []   Resp:  [16-21]   BP: (105-198)/()   SpO2:  [94 %-98 %]        Physical Exam  Vitals reviewed.   Constitutional:       General: She is not in acute distress.     Appearance: She is well-developed.   HENT:      Head:  "Normocephalic and atraumatic.      Right Ear: External ear normal.      Left Ear: External ear normal.      Nose: Nose normal.   Eyes:      General: No scleral icterus.  Cardiovascular:      Rate and Rhythm: Normal rate.   Pulmonary:      Effort: Pulmonary effort is normal. No respiratory distress.   Abdominal:      General: There is no distension.   Musculoskeletal:      Cervical back: Normal range of motion.      Right lower leg: No edema.      Left lower leg: No edema.   Skin:     General: Skin is warm and dry.   Neurological:      Mental Status: She is alert.   Psychiatric:         Mood and Affect: Mood normal.         Behavior: Behavior normal.            Neurological Exam:   LOC: alert  Attention Span: Good   Language: No aphasia  Articulation: No dysarthria  Orientation: Person, Place, Time   Visual Fields: left field of view visual deficits described as "black circles"  EOM (CN III, IV, VI): Full/intact  Facial Movement (CN VII): Symmetric facial expression    Motor: Arm left  Normal 5/5  Leg left  Normal 5/5  Arm right  Normal 5/5  Leg right Normal 5/5  Cerebellum: No evidence of appendicular or axial ataxia  Sensation: decreased sensation in RUE  Tone: Normal tone throughout      Laboratory:  CMP:   Recent Labs   Lab 07/07/23  0401   CALCIUM 9.4   ALBUMIN 3.4*   PROT 6.5      K 3.3*   CO2 23      BUN 34*   CREATININE 1.1   ALKPHOS 68   ALT 16   AST 24   BILITOT 1.0     CBC:   Recent Labs   Lab 07/07/23  0401   WBC 30.41*   RBC 4.07   HGB 12.7   HCT 38.0      MCV 93   MCH 31.2*   MCHC 33.4     Lipid Panel:   Recent Labs   Lab 07/05/23  1518   CHOL 223*   LDLCALC 156.6   HDL 50   TRIG 82     Coagulation:   Recent Labs   Lab 07/06/23  0456   INR 0.9   APTT 25.3     Hgb A1C:   Recent Labs   Lab 07/05/23  1518   HGBA1C 5.1     TSH:   Recent Labs   Lab 07/05/23  1518   TSH 1.055       Diagnostic Results:      Brain imaging:  MRI Brain. Date: 07/05/23  Small scattered areas of acute infarction " in the right PCA territory.     Age advanced small vessel ischemic change with multiple remote lacunar type infarcts.  These findings notably progressed since the prior MRI of 09/28/2017.     Abnormal appearance of the expected flow void of the distal basilar tip concerning for slow flow or occlusion at this site.  Recommend a CTA head and neck for further assessment.    Vessel Imaging:  CTA Head and Neck. Date: 07/05/23  Impression:     Small recent infarcts noted on the recent MR imaging study are too small to characterize.  No intracranial hemorrhage mass effect or acute territorial infarct.     Prominent chronic ischemic changes again noted unless there is clinical suspicion for prior demyelinating disease.     No significant stenosis at the carotid bifurcations by NASCET criteria.  The vertebral arteries are patent.     Stenosis of the midportion of the posterior cerebral arteries left greater than right new from 2017.  It is unclear whether this is atherosclerotic or represents vasoconstriction/vasospasm spasm or other vasculopathy.     Sclerotic foci within the cervical vertebral bodies may represent bone islands as they are potentially visualized dating back to 2016 however they are difficult to definitively confirm.  Follow-up imaging for stability as clinically warranted.    Cardiac Evaluation:   7/6/2023 Echo with saline bubble   The left ventricle is normal in size with normal systolic function.   The estimated ejection fraction is 65%.   Normal left ventricular diastolic function.   Normal right ventricular size with normal right ventricular systolic function.   Intermediate central venous pressure (8 mmHg).   Very late bubbles present after injection with agitated saline, unable to tell when bubbles are present in relationship with initial injection, would recommended NARDA.        Robert Carter MD  [facility]  Department of Vascular Neurology   [facility]  ([facility])

## 2023-07-08 VITALS
HEART RATE: 85 BPM | SYSTOLIC BLOOD PRESSURE: 162 MMHG | OXYGEN SATURATION: 96 % | RESPIRATION RATE: 18 BRPM | WEIGHT: 144.63 LBS | HEIGHT: 65 IN | BODY MASS INDEX: 24.1 KG/M2 | DIASTOLIC BLOOD PRESSURE: 90 MMHG | TEMPERATURE: 98 F

## 2023-07-08 PROCEDURE — 25000003 PHARM REV CODE 250

## 2023-07-08 PROCEDURE — 25000003 PHARM REV CODE 250: Performed by: PHYSICIAN ASSISTANT

## 2023-07-08 PROCEDURE — 99233 PR SUBSEQUENT HOSPITAL CARE,LEVL III: ICD-10-PCS | Mod: ,,, | Performed by: STUDENT IN AN ORGANIZED HEALTH CARE EDUCATION/TRAINING PROGRAM

## 2023-07-08 PROCEDURE — 99233 SBSQ HOSP IP/OBS HIGH 50: CPT | Mod: ,,, | Performed by: STUDENT IN AN ORGANIZED HEALTH CARE EDUCATION/TRAINING PROGRAM

## 2023-07-08 PROCEDURE — 63600175 PHARM REV CODE 636 W HCPCS

## 2023-07-08 RX ORDER — ASPIRIN 81 MG/1
81 TABLET ORAL DAILY
Qty: 30 TABLET | Refills: 11 | Status: SHIPPED | OUTPATIENT
Start: 2023-07-09

## 2023-07-08 RX ORDER — ATORVASTATIN CALCIUM 40 MG/1
40 TABLET, FILM COATED ORAL DAILY
Qty: 90 TABLET | Refills: 3 | Status: SHIPPED | OUTPATIENT
Start: 2023-07-09 | End: 2024-07-08

## 2023-07-08 RX ORDER — BENAZEPRIL/HYDROCHLOROTHIAZIDE 20 MG-25MG
1 TABLET ORAL DAILY
Qty: 90 TABLET | Refills: 3 | Status: SHIPPED | OUTPATIENT
Start: 2023-07-08 | End: 2023-07-31 | Stop reason: SDUPTHER

## 2023-07-08 RX ORDER — MAGNESIUM SULFATE HEPTAHYDRATE 40 MG/ML
2 INJECTION, SOLUTION INTRAVENOUS ONCE
Status: COMPLETED | OUTPATIENT
Start: 2023-07-08 | End: 2023-07-08

## 2023-07-08 RX ORDER — CLOPIDOGREL BISULFATE 75 MG/1
75 TABLET ORAL DAILY
Qty: 30 TABLET | Refills: 0 | Status: SHIPPED | OUTPATIENT
Start: 2023-07-09 | End: 2023-11-13 | Stop reason: SDUPTHER

## 2023-07-08 RX ORDER — LOSARTAN POTASSIUM 25 MG/1
25 TABLET ORAL DAILY
Qty: 90 TABLET | Refills: 3 | Status: SHIPPED | OUTPATIENT
Start: 2023-07-09 | End: 2023-07-14

## 2023-07-08 RX ORDER — VERAPAMIL HYDROCHLORIDE 40 MG/1
40 TABLET ORAL 2 TIMES DAILY
Qty: 60 TABLET | Refills: 11 | Status: SHIPPED | OUTPATIENT
Start: 2023-07-08 | End: 2024-07-07

## 2023-07-08 RX ORDER — LANOLIN ALCOHOL/MO/W.PET/CERES
400 CREAM (GRAM) TOPICAL DAILY
Qty: 30 TABLET | Refills: 1 | Status: SHIPPED | OUTPATIENT
Start: 2023-07-08

## 2023-07-08 RX ORDER — RIBOFLAVIN (VITAMIN B2) 400 MG
400 TABLET ORAL DAILY
Qty: 30 TABLET | Refills: 11 | Status: SHIPPED | OUTPATIENT
Start: 2023-07-08

## 2023-07-08 RX ORDER — DIPHENHYDRAMINE HYDROCHLORIDE 50 MG/ML
25 INJECTION INTRAMUSCULAR; INTRAVENOUS ONCE
Status: COMPLETED | OUTPATIENT
Start: 2023-07-08 | End: 2023-07-08

## 2023-07-08 RX ORDER — BENAZEPRIL/HYDROCHLOROTHIAZIDE 20 MG-25MG
1 TABLET ORAL DAILY
Qty: 90 TABLET | Refills: 3 | Status: SHIPPED | OUTPATIENT
Start: 2023-07-08 | End: 2023-07-08 | Stop reason: SDUPTHER

## 2023-07-08 RX ADMIN — DIPHENHYDRAMINE HYDROCHLORIDE 25 MG: 50 INJECTION, SOLUTION INTRAMUSCULAR; INTRAVENOUS at 02:07

## 2023-07-08 RX ADMIN — DULOXETINE HYDROCHLORIDE 60 MG: 30 CAPSULE, DELAYED RELEASE ORAL at 08:07

## 2023-07-08 RX ADMIN — LOSARTAN POTASSIUM 25 MG: 25 TABLET, FILM COATED ORAL at 08:07

## 2023-07-08 RX ADMIN — DEXTROSE MONOHYDRATE 1000 MG: 50 INJECTION, SOLUTION INTRAVENOUS at 10:07

## 2023-07-08 RX ADMIN — CLOPIDOGREL BISULFATE 75 MG: 75 TABLET ORAL at 08:07

## 2023-07-08 RX ADMIN — ATORVASTATIN CALCIUM 40 MG: 40 TABLET, FILM COATED ORAL at 08:07

## 2023-07-08 RX ADMIN — DEXTROSE 1000 MG: 50 INJECTION, SOLUTION INTRAVENOUS at 08:07

## 2023-07-08 RX ADMIN — BUPROPION HYDROCHLORIDE 300 MG: 300 TABLET, FILM COATED, EXTENDED RELEASE ORAL at 08:07

## 2023-07-08 RX ADMIN — ASPIRIN 81 MG: 81 TABLET, COATED ORAL at 08:07

## 2023-07-08 RX ADMIN — VERAPAMIL HYDROCHLORIDE 40 MG: 40 TABLET, FILM COATED ORAL at 08:07

## 2023-07-08 RX ADMIN — MAGNESIUM SULFATE 2 G: 2 INJECTION INTRAVENOUS at 02:07

## 2023-07-08 NOTE — DISCHARGE SUMMARY
Allen Fried - Neurosurgery ([facility])  Vascular Neurology  Comprehensive Stroke Center  Discharge Summary     Summary:     Admit Date: 7/5/2023  6:35 AM    Discharge Date and Time:  07/08/2023 12:47 PM    Attending Physician: Carrie Maya MD     Discharge Provider: Robert Carter MD    History of Present Illness: Mirna Pollock is a 47 y.o. female with PMHx of anxiety, ASD (s/p closure), HA, and HTN (difficult to control in past) who presented to ED with HA x 5 days. Since Friday (6/30), patient has been having intermittent migraine with visual aura and intermittent diminished sensation of the RUE. In the past, patient has never had aura with her migraine. Her aura is described as black dots floating on both sides of her vision. HA pain is located in L temporal area and BL occipital area. Patient has been taking all of her home migraine medications and has had no relief. Yesterday morning, patient felt dizzy and had a fall then her migraine symptoms returned about an hour later. She states her HA pain on Friday and Saturday was the worst HA pain in her life. On arrival, her HA was 9/10. She received a migraine cocktail and her HA is now a 6-7/10. Her aura is still persisting. MRI was ordered by ED and stroke team was consulted for DWI changes in the R PCA territory.    Of note, in December 2021, patient had an episode of complete vision with a HA. Episode lasted about 5 seconds and HA started prior to loss of vision.          At discharge, patient was advised to avoid triptans in the future (note added to allergy list to ensure it appears as a warning for prescribers).    She was strongly advised and agreed to follow up with:    Primary care physician for:  Blood pressure control and medications; patient advised and agreed to keep a BP journal for the next 3 weeks with twice daily readings  Evaluation of cause of chronic HTN in a 47 year old; renal ultrasound was negative for renal artery stenosis, but other  causes of secondary hypertension remain  Neurology - Headache specialist  For migraine management; patient understands migraines may increase her risk of stroke and follow ups are planned with neurology  Vascular neurology hospital follow up  For further stroke evaluation post-hospital visit    Patient was advised to seek emergent care and call 911 if she has any worsening neurological changes, or stroke like symptoms. Patient expressed understanding of the above. I also sent a message to her PCP staff to ensure she has urgent follow up and explained the above concerns.    Hospital Course (synopsis of major diagnoses, care, treatment, and services provided during the course of the hospital stay): 07/06/2023 Patient with continued headaches, and elevated BPs. Patient to restart her home BP meds. She has had historically high blood pressures for over a decade per historic readings on chart. Continuing headache cocktail (except for toradol which is not recommended in setting of stroke).  7/7/2023 Headaches resolved. TTE unremarkable but questionable late bubbles, will follow up with TCD with bubble; BP well managed and responsive to losartan; No renal artery stenosis noted; superficial LUE thrombosis that is resolving and asymptomatic; otherwise medical ready for discharge once work up complete; likely to discharge today or tomorrow.  07/08/2023 Headaches returned overnight, appropriate meds given. Patient to be discharged on aspirin, clopidogrel, magnesium, riboflavin, verapamil, follow up with stroke outpatient as well as neurology for tight migraine management.       Goals of Care Treatment Preferences:  Code Status: Full Code      Stroke Etiology: Ischemic Undetermined Cause Cryptogenic Embolism / ESUS (Embolic Stroke of Undetermined Source), probable migrainous stroke    STROKE DOCUMENTATION         NIH Scale:  1a. Level of Consciousness: 0-->Alert, keenly responsive  1b. LOC Questions: 0-->Answers both questions  correctly  1c. LOC Commands: 0-->Performs both tasks correctly  2. Best Gaze: 0-->Normal  3. Visual: 0-->No visual loss  4. Facial Palsy: 0-->Normal symmetrical movements  5a. Motor Arm, Left: 0-->No drift, limb holds 90 (or 45) degrees for full 10 secs  5b. Motor Arm, Right: 0-->No drift, limb holds 90 (or 45) degrees for full 10 secs  6a. Motor Leg, Left: 0-->No drift, leg holds 30 degree position for full 5 secs  6b. Motor Leg, Right: 0-->No drift, leg holds 30 degree position for full 5 secs  7. Limb Ataxia: 0-->Absent  8. Sensory: 1-->Mild-to-moderate sensory loss, patient feels pinprick is less sharp or is dull on the affected side, or there is a loss of superficial pain with pinprick, but patient is aware of being touched  9. Best Language: 0-->No aphasia, normal  10. Dysarthria: 0-->Normal  11. Extinction and Inattention (formerly Neglect): 0-->No abnormality  Total (NIH Stroke Scale): 1        Modified [place] Score: 1  Connor Coma Scale:    ABCD2 Score:    [id number] Score:   HAS -BLED Score:   ICH Score:   Hunt & Guthrie Classification:       Assessment/Plan:     Diagnostic Results:      Brain imaging:  MRI Brain. Date: 07/05/23  Small scattered areas of acute infarction in the right PCA territory.     Age advanced small vessel ischemic change with multiple remote lacunar type infarcts.  These findings notably progressed since the prior MRI of 09/28/2017.     Abnormal appearance of the expected flow void of the distal basilar tip concerning for slow flow or occlusion at this site.  Recommend a CTA head and neck for further assessment.      Vessel Imaging:  CTA Head and Neck. Date: 07/05/23  Impression:     Small recent infarcts noted on the recent MR imaging study are too small to characterize.  No intracranial hemorrhage mass effect or acute territorial infarct.     Prominent chronic ischemic changes again noted unless there is clinical suspicion for prior demyelinating disease.     No significant  stenosis at the carotid bifurcations by NASCET criteria.  The vertebral arteries are patent.     Stenosis of the midportion of the posterior cerebral arteries left greater than right new from 2017.  It is unclear whether this is atherosclerotic or represents vasoconstriction/vasospasm spasm or other vasculopathy.     Sclerotic foci within the cervical vertebral bodies may represent bone islands as they are potentially visualized dating back to 2016 however they are difficult to definitively confirm.  Follow-up imaging for stability as clinically warranted.     Cardiac Evaluation:   7/6/2023 Echo with saline bubble  The left ventricle is normal in size with normal systolic function.  The estimated ejection fraction is 65%.  Normal left ventricular diastolic function.  Normal right ventricular size with normal right ventricular systolic function.  Intermediate central venous pressure (8 mmHg).      Interventions: None    Complications: None    Disposition:     Final Active Diagnoses:    Diagnosis Date Noted POA    PRINCIPAL PROBLEM:  Stroke due to occlusion of right posterior cerebral artery [I63.531] 07/05/2023 Yes    Proteinuria [R80.9] 07/07/2023 Unknown    Hypokalemia [E87.6] 07/06/2023 Yes    Stage 3a chronic kidney disease [N18.31] 07/06/2023 Yes    LALITA (generalized anxiety disorder) [F41.1] 09/26/2018 Yes     Chronic    Fibromyalgia [M79.7] 09/26/2018 Yes     Chronic    Accelerated hypertension [I10] 08/02/2014 Yes    Intractable persistent migraine aura with cerebral infarction and status migrainosus [G43.611, I63.9] 07/30/2013 Yes      Problems Resolved During this Admission:     Neuro  * Stroke due to occlusion of right posterior cerebral artery  Mirna Pollock is a 47 y.o. female with PMHx of anxiety, ASD (s/p closure), HA, mitral valve prolapse (repaired), and HTN (difficult to control in past) who presented to ED with intermittent migraine with visual aura since Friday (6/30/23). Patient has never had aura  with migraines in the past. HA pain is located in L temporal area and BL occipital area. Aura is described as black dots floating on both sides of her vision. MRI in ED with R PCA infarct. Stroke team consulted. CTA with BL PCA stenosis. Concern for migrainous infarcts.     Patient started on verapamil and treated for migraine.Headaches resolved. TTE unremarkable but questionable late bubbles, followed up with TCD with bubble which was negative; BP well managed and responsive to losartan; No renal artery stenosis noted; superficial LUE thrombosis that is resolving and asymptomatic; otherwise medical ready for discharge once work up complete; likely to discharge today or tomorrow. Patient's  reports some confusion, spot EEG ordered, though suspicion is low.    Headaches returned overnight, appropriate meds given. Patient to be discharged on aspirin, clopidogrel, magnesium, riboflavin, verapamil, follow up with stroke outpatient as well as neurology for tight migraine management.    Of note, in December 2021, patient had an episode of complete vision with a HA. Episode lasted about 5 seconds and HA started prior to loss of vision.    Patient should avoid triptans in the future.    Antithrombotics for secondary stroke prevention: aspirin and plavix load followed by daily DAPT    Statins for secondary stroke prevention and hyperlipidemia, if present:   Statins: Atorvastatin- 40 mg daily    Aggressive risk factor modification: HTN, migraine with aura     Rehab efforts: The patient has been evaluated by a stroke team provider and the therapy needs have been fully considered based off the presenting complaints and exam findings. The following therapy evaluations are needed: PT evaluate and treat, OT evaluate and treat, SLP evaluate and treat, PM&R evaluate for appropriate placement    Diagnostics ordered/pending: MRA head to assess vasculature, TTE to assess cardiac function/status     VTE prophylaxis: Heparin 5000  units SQ every 8 hours  Mechanical prophylaxis: Place SCDs    BP parameters: Infarct: No intervention, SBP <200      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    Referal to neurology on discharge for close migraine management      Psychiatric  LALITA (generalized anxiety disorder)  Continuing home bupropion and duloxetine    Cardiac/Vascular  Accelerated hypertension  Stroke risk factor  Difficult to control in the past  SBP <200  Losartan 25mg QD    Renal/  Stage 3a chronic kidney disease  Likely related to chronic HTN; no renal artery stenosis noted on US    Hypokalemia  Replenish as needed    Orthopedic  Fibromyalgia  Continuing home bupropion and duloxetine          Recommendations:     Post-discharge complication risks: None    Stroke Education given to: patient and family    Follow-up in Stroke Clinic in 4-6 weeks.     Discharge Plan:  Antithrombotics: Aspirin 81mg, Clopidogrel 75mg    Follow Up:   Follow-up Information       Luc Venegas MD Follow up.    Specialty: Internal Medicine  Contact information:  [address] [facility] [id number] [phone]               Madie Lee NP Follow up.    Specialties: Neurology, Vascular Neurology  Contact information:  [address] [facility]  [address]TH FLO[address]  [facility] [id number] [phone]               Vladislav Jarquin III, MD Follow up.    Specialty: Neurology  Contact information:  [address] [facility]  [address]th [address] [facility] [id number] [phone]                             Patient Instructions:      Ambulatory referral/consult to Vascular Neurology   Standing Status: Future   Referral Priority: Routine Referral Type: Consultation   Referral  Reason: Specialty Services Required   Requested Specialty: Vascular Neurology   Number of Visits Requested: 1     Ambulatory referral/consult to Neurology   Standing Status: Future   Referral Priority: Routine Referral Type: Consultation   Referral Reason: Specialty Services Required   Requested Specialty: Neurology   Number of Visits Requested: 1       Medications:  Reconciled Home Medications:      Medication List        START taking these medications      aspirin 81 MG EC tablet  Commonly known as: ECOTRIN  Take 1 tablet (81 mg total) by mouth once daily.  Start taking on: July 9, 2023     atorvastatin 40 MG tablet  Commonly known as: LIPITOR  Take 1 tablet (40 mg total) by mouth once daily.  Start taking on: July 9, 2023     clopidogreL 75 mg tablet  Commonly known as: PLAVIX  Take 1 tablet (75 mg total) by mouth once daily.  Start taking on: July 9, 2023     losartan 25 MG tablet  Commonly known as: COZAAR  Take 1 tablet (25 mg total) by mouth once daily.  Start taking on: July 9, 2023     magnesium oxide 400 mg (241.3 mg magnesium) tablet  Commonly known as: MAG-OX  Take 1 tablet (400 mg total) by mouth once daily.     riboflavin (vitamin B2) 400 mg Tab  Take 400 mg by mouth once daily.     verapamiL 40 MG Tab  Commonly known as: CALAN  Take 1 tablet (40 mg total) by mouth 2 (two) times daily.            CHANGE how you take these medications      benazepril-hydrochlorthiazide 20-25 mg Tab  Commonly known as: LOTENSIN HCT  Take 1 tablet by mouth once daily. Please check with your primary care provider. You've been started on Losartan, and so it's unclear if you'll need additional anti-hypertensive medications.  What changed:   when to take this  additional instructions  Notes to patient: Please check with your primary care provider. You've been started on Losartan, and so it's unclear if you'll need additional anti-hypertensive medications.            CONTINUE taking these medications      AIMOVIG AUTOINJECTOR  140 mg/mL autoinjector  Generic drug: erenumab-aooe  Inject 1 pen (140 mg total) into the skin every 28 days.     buPROPion 300 MG 24 hr tablet  Commonly known as: WELLBUTRIN XL  Take 1 tablet (300 mg total) by mouth once daily.     DULoxetine 60 MG capsule  Commonly known as: CYMBALTA  Take 1 capsule (60 mg total) by mouth once daily.     hydrOXYzine pamoate 25 MG Cap  Commonly known as: VISTARIL  Take 1 capsule (25 mg total) by mouth every 8 (eight) hours as needed (anxiety).     ondansetron 4 MG Tbdl  Commonly known as: ZOFRAN-ODT  DISSOLVE 1 TABLET ON THE TONGUE EVERY 6 HOURS AS NEEDED FOR NAUSEA     promethazine 25 MG tablet  Commonly known as: PHENERGAN  TAKE 1 TABLET BY MOUTH EVERY 4 TO 6 HOURS AS NEEDED FOR NAUSEA     tiZANidine 4 MG tablet  Commonly known as: ZANAFLEX  Take 1.5 tablets (6 mg total) by mouth every evening. No alcohol, no driving with medication.     UBRELVY 100 mg tablet  Generic drug: ubrogepant  TAKE 1 TABLET BY MOUTH EVERY 2 HOURS AS NEEDED FOR MIGRAINE.  MG PER DAY Strength: 100 mg     zolpidem 10 mg Tab  Commonly known as: AMBIEN  Take 1 tablet (10 mg total) by mouth every evening.              Robert Carter MD  Comprehensive Stroke Center  Department of Vascular Neurology   [facility]

## 2023-07-08 NOTE — ASSESSMENT & PLAN NOTE
Some improvement and HA pain with ED migraine cocktail, further improvement with addition of magnesium    Verapamil 40 mg BID  IVF, paused 7/6 d/t HTN  2 g IV Magnesium  1 g IV solumedrol and 1 g IV depakote daily x 3 days    Patient should avoid triptans.    Referal to neurology on discharge for close migraine management

## 2023-07-08 NOTE — PT/OT/SLP PROGRESS
Physical Therapy      Patient Name:  Mirna Pollock   MRN:  [id number]    PT attempted to evaluate pt at 1113. On PT entry, pt's BP noted to be 191/92. RN notified of hypertension. PT unable to return for additional attempt. Will follow up as scheduled.

## 2023-07-08 NOTE — SUBJECTIVE & OBJECTIVE
Neurologic Chief Complaint: Central vision aura and RUE numbness    Subjective:     Interval History: Patient is seen for follow-up neurological assessment and treatment recommendations:     Headaches returned overnight, appropriate meds given. Patient to be discharged on aspirin, clopidogrel, magnesium, riboflavin, verapamil, follow up with stroke outpatient as well as neurology for tight migraine management.    HPI, Past Medical, Family, and Social History remains the same as documented in the initial encounter.    Review of Systems   Constitutional:  Negative for fever.   HENT:  Negative for drooling and trouble swallowing.    Eyes:  Positive for visual disturbance.   Respiratory:  Negative for cough.    Cardiovascular:  Negative for chest pain.   Gastrointestinal:  Negative for nausea and vomiting.   Genitourinary:  Negative for dysuria.   Musculoskeletal:  Negative for arthralgias and myalgias.   Skin:  Negative for rash.   Neurological:  Positive for dizziness, numbness and headaches. Negative for facial asymmetry, speech difficulty and weakness.   Psychiatric/Behavioral:  Negative for agitation.    Objective:     Vital Signs (Most Recent):  Temp: 97.8 °F (36.6 °C) (07/08/23 1116)  Pulse: 85 (07/08/23 1139)  Resp: 18 (07/08/23 1116)  BP: (!) 162/90 (07/08/23 1131)  SpO2: 96 % (07/08/23 1116)    Vital Signs Range (Last 24H):  Temp:  [97.5 °F (36.4 °C)-98.3 °F (36.8 °C)]   Pulse:  []   Resp:  [16-18]   BP: (125-191)/(62-94)   SpO2:  [92 %-96 %]        Physical Exam  Vitals reviewed.   Constitutional:       General: She is not in acute distress.     Appearance: She is well-developed.   HENT:      Head: Normocephalic and atraumatic.      Right Ear: External ear normal.      Left Ear: External ear normal.      Nose: Nose normal.   Eyes:      General: No scleral icterus.  Cardiovascular:      Rate and Rhythm: Normal rate.   Pulmonary:      Effort: Pulmonary effort is normal. No respiratory distress.  "  Abdominal:      General: There is no distension.   Musculoskeletal:      Cervical back: Normal range of motion.      Right lower leg: No edema.      Left lower leg: No edema.   Skin:     General: Skin is warm and dry.   Neurological:      Mental Status: She is alert.   Psychiatric:         Mood and Affect: Mood normal.         Behavior: Behavior normal.            Neurological Exam:   LOC: alert  Attention Span: Good   Language: No aphasia  Articulation: No dysarthria  Orientation: Person, Place, Time   Visual Fields: left field of view visual deficits described as "black circles"  EOM (CN III, IV, VI): Full/intact  Facial Movement (CN VII): Symmetric facial expression    Motor: Arm left  Normal 5/5  Leg left  Normal 5/5  Arm right  Normal 5/5  Leg right Normal 5/5  Cerebellum: No evidence of appendicular or axial ataxia  Sensation: decreased sensation in RUE  Tone: Normal tone throughout      Laboratory:  CMP:   No results for input(s): GLUCOSE, CALCIUM, ALBUMIN, PROT, NA, K, CO2, CL, BUN, CREATININE, ALKPHOS, ALT, AST, BILITOT in the last 24 hours.    CBC:   Recent Labs   Lab 07/07/23  0401   WBC 30.41*   RBC 4.07   HGB 12.7   HCT 38.0      MCV 93   MCH 31.2*   MCHC 33.4     Lipid Panel:   Recent Labs   Lab 07/05/23  1518   CHOL 223*   LDLCALC 156.6   HDL 50   TRIG 82     Coagulation:   Recent Labs   Lab 07/06/23  0456   INR 0.9   APTT 25.3     Hgb A1C:   Recent Labs   Lab 07/05/23  1518   HGBA1C 5.1     TSH:   Recent Labs   Lab 07/05/23  1518   TSH 1.055       Diagnostic Results:      Brain imaging:  MRI Brain. Date: 07/05/23  Small scattered areas of acute infarction in the right PCA territory.     Age advanced small vessel ischemic change with multiple remote lacunar type infarcts.  These findings notably progressed since the prior MRI of 09/28/2017.     Abnormal appearance of the expected flow void of the distal basilar tip concerning for slow flow or occlusion at this site.  Recommend a CTA head and " neck for further assessment.    Vessel Imaging:  CTA Head and Neck. Date: 07/05/23  Impression:     Small recent infarcts noted on the recent MR imaging study are too small to characterize.  No intracranial hemorrhage mass effect or acute territorial infarct.     Prominent chronic ischemic changes again noted unless there is clinical suspicion for prior demyelinating disease.     No significant stenosis at the carotid bifurcations by NASCET criteria.  The vertebral arteries are patent.     Stenosis of the midportion of the posterior cerebral arteries left greater than right new from 2017.  It is unclear whether this is atherosclerotic or represents vasoconstriction/vasospasm spasm or other vasculopathy.     Sclerotic foci within the cervical vertebral bodies may represent bone islands as they are potentially visualized dating back to 2016 however they are difficult to definitively confirm.  Follow-up imaging for stability as clinically warranted.    Cardiac Evaluation:   7/6/2023 Echo with saline bubble  The left ventricle is normal in size with normal systolic function.  The estimated ejection fraction is 65%.  Normal left ventricular diastolic function.  Normal right ventricular size with normal right ventricular systolic function.  Intermediate central venous pressure (8 mmHg).  Very late bubbles present after injection with agitated saline, unable to tell when bubbles are present in relationship with initial injection, would recommended NARDA.

## 2023-07-08 NOTE — PLAN OF CARE
Problem: Adjustment to Illness (Stroke, Ischemic/Transient Ischemic Attack)  Goal: Optimal Coping  Outcome: Ongoing, Progressing     Problem: Cerebral Tissue Perfusion (Stroke, Ischemic/Transient Ischemic Attack)  Goal: Optimal Cerebral Tissue Perfusion  Outcome: Ongoing, Progressing  Intervention: Protect and Optimize Cerebral Perfusion  Flowsheets (Taken 7/8/2023 0435)  Sensory Stimulation Regulation:   quiet environment promoted   lighting decreased   care clustered  Cerebral Perfusion Promotion: blood pressure monitored     Problem: Fall Injury Risk  Goal: Absence of Fall and Fall-Related Injury  Outcome: Ongoing, Progressing  Intervention: Identify and Manage Contributors  Flowsheets (Taken 7/8/2023 0435)  Self-Care Promotion:   independence encouraged   BADL personal objects within reach   BADL personal routines maintained  Medication Review/Management:   medications reviewed   high-risk medications identified  Intervention: Promote Injury-Free Environment  Flowsheets (Taken 7/8/2023 0435)  Safety Promotion/Fall Prevention:   assistive device/personal item within reach   bed alarm refused   instructed to call staff for mobility     Problem: Pain Chronic (Persistent)  Goal: Acceptable Pain Control and Functional Ability  Outcome: Ongoing, Progressing  Intervention: Develop Pain Management Plan  Flowsheets (Taken 7/8/2023 0435)  Pain Management Interventions:   pain management plan reviewed with patient/caregiver   pillow support provided   position adjusted

## 2023-07-08 NOTE — ASSESSMENT & PLAN NOTE
Mirna Pollock is a 47 y.o. female with PMHx of anxiety, ASD (s/p closure), HA, mitral valve prolapse (repaired), and HTN (difficult to control in past) who presented to ED with intermittent migraine with visual aura since Friday (6/30/23). Patient has never had aura with migraines in the past. HA pain is located in L temporal area and BL occipital area. Aura is described as black dots floating on both sides of her vision. MRI in ED with R PCA infarct. Stroke team consulted. CTA with BL PCA stenosis. Concern for migrainous infarcts.     Patient started on verapamil and treated for migraine.Headaches resolved. TTE unremarkable but questionable late bubbles, followed up with TCD with bubble which was negative; BP well managed and responsive to losartan; No renal artery stenosis noted; superficial LUE thrombosis that is resolving and asymptomatic; otherwise medical ready for discharge once work up complete; likely to discharge today or tomorrow. Patient's  reports some confusion, spot EEG ordered, though suspicion is low.    Headaches returned overnight, appropriate meds given. Patient to be discharged on aspirin, clopidogrel, magnesium, riboflavin, verapamil, follow up with stroke outpatient as well as neurology for tight migraine management.    Of note, in December 2021, patient had an episode of complete vision with a HA. Episode lasted about 5 seconds and HA started prior to loss of vision.    Patient should avoid triptans in the future.    Antithrombotics for secondary stroke prevention: aspirin and plavix load followed by daily DAPT    Statins for secondary stroke prevention and hyperlipidemia, if present:   Statins: Atorvastatin- 40 mg daily    Aggressive risk factor modification: HTN, migraine with aura     Rehab efforts: The patient has been evaluated by a stroke team provider and the therapy needs have been fully considered based off the presenting complaints and exam findings. The following therapy  evaluations are needed: PT evaluate and treat, OT evaluate and treat, SLP evaluate and treat, PM&R evaluate for appropriate placement    Diagnostics ordered/pending: MRA head to assess vasculature, TTE to assess cardiac function/status     VTE prophylaxis: Heparin 5000 units SQ every 8 hours  Mechanical prophylaxis: Place SCDs    BP parameters: Infarct: No intervention, SBP <200

## 2023-07-08 NOTE — PROGRESS NOTES
[facility] - Neurosurgery ([facility])  Vascular Neurology  Comprehensive Stroke Center  Progress Note    Assessment/Plan:     * Stroke due to occlusion of right posterior cerebral artery  Mirna Pollock is a 47 y.o. female with PMHx of anxiety, ASD (s/p closure), HA, mitral valve prolapse (repaired), and HTN (difficult to control in past) who presented to ED with intermittent migraine with visual aura since Friday (6/30/23). Patient has never had aura with migraines in the past. HA pain is located in L temporal area and BL occipital area. Aura is described as black dots floating on both sides of her vision. MRI in ED with R PCA infarct. Stroke team consulted. CTA with BL PCA stenosis. Concern for migrainous infarcts.     Patient started on verapamil and treated for migraine.Headaches resolved. TTE unremarkable but questionable late bubbles, followed up with TCD with bubble which was negative; BP well managed and responsive to losartan; No renal artery stenosis noted; superficial LUE thrombosis that is resolving and asymptomatic; otherwise medical ready for discharge once work up complete; likely to discharge today or tomorrow. Patient's  reports some confusion, spot EEG ordered, though suspicion is low.    Headaches returned overnight, appropriate meds given. Patient to be discharged on aspirin, clopidogrel, magnesium, riboflavin, verapamil, follow up with stroke outpatient as well as neurology for tight migraine management.    Of note, in December 2021, patient had an episode of complete vision with a HA. Episode lasted about 5 seconds and HA started prior to loss of vision.    Patient should avoid triptans in the future.    Antithrombotics for secondary stroke prevention: aspirin and plavix load followed by daily DAPT    Statins for secondary stroke prevention and hyperlipidemia, if present:   Statins: Atorvastatin- 40 mg daily    Aggressive risk factor modification: HTN, migraine with aura     Rehab efforts: The  patient has been evaluated by a stroke team provider and the therapy needs have been fully considered based off the presenting complaints and exam findings. The following therapy evaluations are needed: PT evaluate and treat, OT evaluate and treat, SLP evaluate and treat, PM&R evaluate for appropriate placement    Diagnostics ordered/pending: MRA head to assess vasculature, TTE to assess cardiac function/status     VTE prophylaxis: Heparin 5000 units SQ every 8 hours  Mechanical prophylaxis: Place SCDs    BP parameters: Infarct: No intervention, SBP <200      Stage 3a chronic kidney disease  Likely related to chronic HTN; no renal artery stenosis noted on US    Hypokalemia  Replenish as needed    Fibromyalgia  Continuing home bupropion and duloxetine      LALITA (generalized anxiety disorder)  Continuing home bupropion and duloxetine    Accelerated hypertension  Stroke risk factor  Difficult to control in the past  SBP <200  Losartan 25mg QD    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    Referal to neurology on discharge for close migraine management         07/06/2023 Patient with continued headaches, and elevated BPs. Patient to restart her home BP meds. She has had historically high blood pressures for over a decade per historic readings on chart. Continuing headache cocktail (except for toradol which is not recommended in setting of stroke).  7/7/2023 Headaches resolved. TTE unremarkable but questionable late bubbles, will follow up with TCD with bubble; BP well managed and responsive to losartan; No renal artery stenosis noted; superficial LUE thrombosis that is resolving and asymptomatic; otherwise medical ready for  discharge once work up complete; likely to discharge today or tomorrow.  07/08/2023 Headaches returned overnight, appropriate meds given. Patient to be discharged on aspirin, clopidogrel, magnesium, riboflavin, verapamil, follow up with stroke outpatient as well as neurology for tight migraine management.       STROKE DOCUMENTATION        NIH Scale:  1a. Level of Consciousness: 0-->Alert, keenly responsive  1b. LOC Questions: 0-->Answers both questions correctly  1c. LOC Commands: 0-->Performs both tasks correctly  2. Best Gaze: 0-->Normal  3. Visual: 0-->No visual loss  4. Facial Palsy: 0-->Normal symmetrical movements  5a. Motor Arm, Left: 0-->No drift, limb holds 90 (or 45) degrees for full 10 secs  5b. Motor Arm, Right: 0-->No drift, limb holds 90 (or 45) degrees for full 10 secs  6a. Motor Leg, Left: 0-->No drift, leg holds 30 degree position for full 5 secs  6b. Motor Leg, Right: 0-->No drift, leg holds 30 degree position for full 5 secs  7. Limb Ataxia: 0-->Absent  8. Sensory: 1-->Mild-to-moderate sensory loss, patient feels pinprick is less sharp or is dull on the affected side, or there is a loss of superficial pain with pinprick, but patient is aware of being touched  9. Best Language: 0-->No aphasia, normal  10. Dysarthria: 0-->Normal  11. Extinction and Inattention (formerly Neglect): 0-->No abnormality  Total (NIH Stroke Scale): 1       Modified Richie Score: 1  [place] Coma Scale:    ABCD2 Score:    [id number] Score:   HAS -BLED Score:   ICH Score:   Hunt & Guthrie Classification:      Hemorrhagic change of an Ischemic Stroke: Does this patient have an ischemic stroke with hemorrhagic changes? No     Neurologic Chief Complaint: Central vision aura and RUE numbness    Subjective:     Interval History: Patient is seen for follow-up neurological assessment and treatment recommendations:     Headaches returned overnight, appropriate meds given. Patient to be discharged on aspirin, clopidogrel, magnesium,  riboflavin, verapamil, follow up with stroke outpatient as well as neurology for tight migraine management.    HPI, Past Medical, Family, and Social History remains the same as documented in the initial encounter.    Review of Systems   Constitutional:  Negative for fever.   HENT:  Negative for drooling and trouble swallowing.    Eyes:  Positive for visual disturbance.   Respiratory:  Negative for cough.    Cardiovascular:  Negative for chest pain.   Gastrointestinal:  Negative for nausea and vomiting.   Genitourinary:  Negative for dysuria.   Musculoskeletal:  Negative for arthralgias and myalgias.   Skin:  Negative for rash.   Neurological:  Positive for dizziness, numbness and headaches. Negative for facial asymmetry, speech difficulty and weakness.   Psychiatric/Behavioral:  Negative for agitation.    Objective:     Vital Signs (Most Recent):  Temp: 97.8 °F (36.6 °C) (07/08/23 1116)  Pulse: 85 (07/08/23 1139)  Resp: 18 (07/08/23 1116)  BP: (!) 162/90 (07/08/23 1131)  SpO2: 96 % (07/08/23 1116)    Vital Signs Range (Last 24H):  Temp:  [97.5 °F (36.4 °C)-98.3 °F (36.8 °C)]   Pulse:  []   Resp:  [16-18]   BP: (125-191)/(62-94)   SpO2:  [92 %-96 %]        Physical Exam  Vitals reviewed.   Constitutional:       General: She is not in acute distress.     Appearance: She is well-developed.   HENT:      Head: Normocephalic and atraumatic.      Right Ear: External ear normal.      Left Ear: External ear normal.      Nose: Nose normal.   Eyes:      General: No scleral icterus.  Cardiovascular:      Rate and Rhythm: Normal rate.   Pulmonary:      Effort: Pulmonary effort is normal. No respiratory distress.   Abdominal:      General: There is no distension.   Musculoskeletal:      Cervical back: Normal range of motion.      Right lower leg: No edema.      Left lower leg: No edema.   Skin:     General: Skin is warm and dry.   Neurological:      Mental Status: She is alert.   Psychiatric:         Mood and Affect: Mood  "normal.         Behavior: Behavior normal.            Neurological Exam:   LOC: alert  Attention Span: Good   Language: No aphasia  Articulation: No dysarthria  Orientation: Person, Place, Time   Visual Fields: left field of view visual deficits described as "black circles"  EOM (CN III, IV, VI): Full/intact  Facial Movement (CN VII): Symmetric facial expression    Motor: Arm left  Normal 5/5  Leg left  Normal 5/5  Arm right  Normal 5/5  Leg right Normal 5/5  Cerebellum: No evidence of appendicular or axial ataxia  Sensation: decreased sensation in RUE  Tone: Normal tone throughout      Laboratory:  CMP:   No results for input(s): GLUCOSE, CALCIUM, ALBUMIN, PROT, NA, K, CO2, CL, BUN, CREATININE, ALKPHOS, ALT, AST, BILITOT in the last 24 hours.    CBC:   Recent Labs   Lab 07/07/23  0401   WBC 30.41*   RBC 4.07   HGB 12.7   HCT 38.0      MCV 93   MCH 31.2*   MCHC 33.4     Lipid Panel:   Recent Labs   Lab 07/05/23  1518   CHOL 223*   LDLCALC 156.6   HDL 50   TRIG 82     Coagulation:   Recent Labs   Lab 07/06/23  0456   INR 0.9   APTT 25.3     Hgb A1C:   Recent Labs   Lab 07/05/23  1518   HGBA1C 5.1     TSH:   Recent Labs   Lab 07/05/23  1518   TSH 1.055       Diagnostic Results:      Brain imaging:  MRI Brain. Date: 07/05/23  Small scattered areas of acute infarction in the right PCA territory.     Age advanced small vessel ischemic change with multiple remote lacunar type infarcts.  These findings notably progressed since the prior MRI of 09/28/2017.     Abnormal appearance of the expected flow void of the distal basilar tip concerning for slow flow or occlusion at this site.  Recommend a CTA head and neck for further assessment.    Vessel Imaging:  CTA Head and Neck. Date: 07/05/23  Impression:     Small recent infarcts noted on the recent MR imaging study are too small to characterize.  No intracranial hemorrhage mass effect or acute territorial infarct.     Prominent chronic ischemic changes again noted " unless there is clinical suspicion for prior demyelinating disease.     No significant stenosis at the carotid bifurcations by NASCET criteria.  The vertebral arteries are patent.     Stenosis of the midportion of the posterior cerebral arteries left greater than right new from 2017.  It is unclear whether this is atherosclerotic or represents vasoconstriction/vasospasm spasm or other vasculopathy.     Sclerotic foci within the cervical vertebral bodies may represent bone islands as they are potentially visualized dating back to 2016 however they are difficult to definitively confirm.  Follow-up imaging for stability as clinically warranted.    Cardiac Evaluation:   7/6/2023 Echo with saline bubble   The left ventricle is normal in size with normal systolic function.   The estimated ejection fraction is 65%.   Normal left ventricular diastolic function.   Normal right ventricular size with normal right ventricular systolic function.   Intermediate central venous pressure (8 mmHg).   Very late bubbles present after injection with agitated saline, unable to tell when bubbles are present in relationship with initial injection, would recommended NARDA.        Robert Carter MD  Comprehensive Stroke Center  Department of Vascular Neurology   [facility] Neurosurgery [facility])

## 2023-07-08 NOTE — PLAN OF CARE
Inbox message sent to PCP office for seven day hospital follow up appointment. Referrals for Neurology and Vascular neurology noted on AVS and clinics will notify patient with appointments date and time. Family to provide transportation home.    07/08/23 1319   Final Note   Assessment Type Final Discharge Note   Anticipated Discharge Disposition Home   Hospital Resources/Appts/Education Provided Provided patient/caregiver with written discharge plan information   Post-Acute Status   Discharge Delays None known at this time     [facility] - Neurosurgery (Hospital)  Discharge Final Note    Primary Care Provider: Luc Venegas MD    Expected Discharge Date: 7/8/2023    Final Discharge Note (most recent)       Final Note - 07/08/23 1319          Final Note    Assessment Type Final Discharge Note (P)      Anticipated Discharge Disposition Home or Self Care (P)      Hospital Resources/Appts/Education Provided Provided patient/caregiver with written discharge plan information (P)         Post-Acute Status    Discharge Delays None known at this time (P)                      Important Message from Medicare             Contact Info       Luc Venegas MD   Specialty: Internal Medicine   Relationship: PCP - General    [address] [place] [address]  [facility]  [place] [address]   Phone: [phone]       Next Steps: Follow up    Madie Lee NP   Specialty: Neurology, Vascular Neurology    [address] [facility] [facility]  [address] FLO[address]  [facility] [address]   Phone: [phone]       Next Steps: Follow up

## 2023-07-10 ENCOUNTER — TELEPHONE (OUTPATIENT)
Dept: PRIMARY CARE CLINIC | Facility: CLINIC | Age: 48
End: 2023-07-10
Payer: COMMERCIAL

## 2023-07-10 ENCOUNTER — TELEPHONE (OUTPATIENT)
Dept: NEUROLOGY | Facility: HOSPITAL | Age: 48
End: 2023-07-10
Payer: COMMERCIAL

## 2023-07-10 NOTE — PROGRESS NOTES
[facility] Progress Note  7/14/2023          Reason for Visit:      had concerns including Hospital Follow Up, Stroke (Still have blurred vision/Feel dizzy ), and Rash (Rash started a couple of days ago/Think that it can possible be from medication/Rash itches /Think that it may be a side effect of the potassium that she is taking).       History of Present Illness:     Admit Date: 7/5/2023  6:35 AM  Discharge Date and Time:  07/08/2023 12:47 PM    Ms Pollock was seen at [facility] for a stroke likely related to her migraines (likely migrainous stroke).  Presented with headache with visual aura and decreased sensation in RUE.  Never had aura with migraine in past.  Pt felt dizzy and had a fall the day prior to admission.  MRI in ER showed acute infarction in the Right PCA territory.  TTE ok other than questionable late bubbles  CTA head/neck - no carotid stenosis,   MRA brain - stenosis of PCA    History of CVA (Migrainous Stroke)  Clopidogrel  Aspirin  Atorvastatin 40  Followed up with vascular neurology yesterday  Pt is having problems with peripheral vision -   Neurology  notes indicate left inferior quadranopsia.    Migraines  Verapamil  Magnesium  Riboflavin  Advised to avoid tripans in future.  Pt says her headache has been better since home from hospital.    HTN  Benazepirl-hctz 20/25 (prior at admit)  Started losartan 25 during admit ?  Verapamil 40 bid (started for migraine prophylaxis)  Renal artery ultrasound was normal during admit    The benazepril-hctz was held due to hypokalemia.  She has had pretty resistant HTN -   Renal artery us ok in hospital.  The blood pressure still high today, just on losartan 25 now and verapamil now  Will get additional workup for secondary htn (check renin/burton, ua, urine protein, plasma metanephrines).  The hypokalemia could be related to hyperaldo - if that is found could add spironolactone to regimen for blood pressure.  Pt feels bp was doing  better on previous regimen - will change back to benazepril-hctz, stop losartan - especailly because of rash that has occurred after hospital - unclear what may be triggering it -     Rash  Started after hospital.  Pt presumes it is due to one of the new meds.  Benadryl helps the rash temporarily, but comes back after a couple hours.  Very itchy.    Losartan, aspirin, plavix, atorvastatin, magneisum all new after hospital  Will try steroid pack, may elevated blood pressure more- need to watch closely - keeping home logs, will send us readings next week.        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    CTS (carpal tunnel syndrome)    Accelerated hypertension    Cervical muscle pain    Cervical radiculopathy    H/O catheter-based closure of atrial septal defect    Adult congenital heart disease    Overweight (BMI 25.0-29.9)    LALITA (generalized anxiety disorder)    Fibromyalgia    Social anxiety disorder    Chronic migraine without aura without status migrainosus, not intractable    Insomnia    Recurrent major depressive disorder, in full remission    Stroke due to occlusion of right posterior cerebral artery    Hypokalemia    Stage 3a chronic kidney disease    Proteinuria         Medications:       Current Outpatient Medications:     aspirin (ECOTRIN) 81 MG EC tablet, Take 1 tablet (81 mg total) by mouth once daily., Disp: 30 tablet, Rfl: 11    atorvastatin (LIPITOR) 40 MG tablet, Take 1 tablet (40 mg total) by mouth once daily., Disp: 90 tablet, Rfl: 3    benazepril-hydrochlorthiazide (LOTENSIN HCT) 20-25 mg Tab, Take 1 tablet by mouth once daily. Please check with your primary care provider. You've been started on Losartan, and so it's unclear if you'll need additional anti-hypertensive medications., Disp: 90 tablet, Rfl: 3    clopidogreL (PLAVIX) 75 mg tablet, Take 1 tablet (75 mg total)  by mouth once daily., Disp: 30 tablet, Rfl: 0    erenumab-aooe (AIMOVIG) 140 mg/mL autoinjector, Inject 1 pen (140 mg total) into the skin every 28 days., Disp: 1 mL, Rfl: 11    hydrOXYzine pamoate (VISTARIL) 25 MG Cap, Take 1 capsule (25 mg total) by mouth every 8 (eight) hours as needed (anxiety)., Disp: 45 capsule, Rfl: 2    magnesium oxide (MAG-OX) 400 mg (241.3 mg magnesium) tablet, Take 1 tablet (400 mg total) by mouth once daily., Disp: 30 tablet, Rfl: 1    ondansetron (ZOFRAN-ODT) 4 MG TbDL, DISSOLVE 1 TABLET ON THE TONGUE EVERY 6 HOURS AS NEEDED FOR NAUSEA, Disp: , Rfl:     promethazine (PHENERGAN) 25 MG tablet, TAKE 1 TABLET BY MOUTH EVERY 4 TO 6 HOURS AS NEEDED FOR NAUSEA, Disp: 20 tablet, Rfl: 1    riboflavin, vitamin B2, 400 mg Tab, Take 400 mg by mouth once daily., Disp: 30 tablet, Rfl: 11    tiZANidine (ZANAFLEX) 4 MG tablet, Take 1.5 tablets (6 mg total) by mouth every evening. No alcohol, no driving with medication., Disp: 45 tablet, Rfl: 5    ubrogepant (UBRELVY) 100 mg tablet, TAKE 1 TABLET BY MOUTH EVERY 2 HOURS AS NEEDED FOR MIGRAINE.  MG PER DAY Strength: 100 mg, Disp: 10 tablet, Rfl: 5    verapamiL (CALAN) 40 MG Tab, Take 1 tablet (40 mg total) by mouth 2 (two) times daily., Disp: 60 tablet, Rfl: 11    zolpidem (AMBIEN) 10 mg Tab, Take 1 tablet (10 mg total) by mouth every evening., Disp: 30 tablet, Rfl: 2    buPROPion (WELLBUTRIN XL) 300 MG 24 hr tablet, Take 1 tablet (300 mg total) by mouth once daily., Disp: 90 tablet, Rfl: 1    DULoxetine (CYMBALTA) 60 MG capsule, Take 1 capsule (60 mg total) by mouth once daily., Disp: 90 capsule, Rfl: 1    methylPREDNISolone (MEDROL DOSEPACK) 4 mg tablet, use as directed, Disp: 21 each, Rfl: 0    Current Facility-Administered Medications:     onabotulinumtoxina injection 200 Units, 200 Units, Intramuscular, q12 weeks, Luci Ortez, BC, 200 Units at 06/08/23 1200        Review of Systems:     Review of Systems   Constitutional:  Negative for  "chills and fever.   HENT:  Negative for ear pain and sore throat.    Respiratory:  Negative for cough, shortness of breath and wheezing.    Cardiovascular:  Negative for chest pain and palpitations.   Gastrointestinal:  Negative for constipation, diarrhea, nausea and vomiting.   Genitourinary:  Negative for dysuria and hematuria.   Musculoskeletal:  Negative for joint swelling and joint deformity.   Neurological:  Negative for dizziness and weakness.         Physical Exam:     Temp:    98.8 °F (37.1 °C) (Oral)        Blood Pressure:  (!) 160/98        Pulse:   95     Respirations:  18  Weight:   65.7 kg (144 lb 13.5 oz)  Height:   5' 5" (1.651 m)  BMI:     Body mass index is 24.1 kg/m².    Physical Exam  Constitutional:       General: She is not in acute distress.  HENT:      Right Ear: Tympanic membrane normal.      Left Ear: Tympanic membrane normal.      Nose: No congestion or rhinorrhea.      Mouth/Throat:      Pharynx: No oropharyngeal exudate or posterior oropharyngeal erythema.   Cardiovascular:      Rate and Rhythm: Normal rate and regular rhythm.   Pulmonary:      Effort: Pulmonary effort is normal. No respiratory distress.      Breath sounds: No wheezing.   Abdominal:      Palpations: Abdomen is soft.      Tenderness: There is no abdominal tenderness.   Skin:     General: Skin is warm.   Neurological:      General: No focal deficit present.      Mental Status: She is alert and oriented to person, place, and time.         Labs/Imaging/Testing:     Most recent CBC:  Lab Results   Component Value Date    WBC 23.20 (H) 07/13/2023    HGB 14.1 07/13/2023     (H) 07/13/2023    MCV 90 07/13/2023       Most recent Lipids:  Lab Results   Component Value Date    CHOL 223 (H) 07/05/2023    HDL 50 07/05/2023    LDLCALC 156.6 07/05/2023    TRIG 82 07/05/2023       Most recent Chemistry:  Lab Results   Component Value Date     07/13/2023    K 2.9 (L) 07/13/2023     07/13/2023    CO2 27 07/13/2023    " BUN 16 07/13/2023    CREATININE 0.9 07/13/2023     (H) 07/13/2023    CALCIUM 9.3 07/13/2023    ALT 17 07/13/2023    AST 21 07/13/2023    ALKPHOS 81 07/13/2023    PROT 7.1 07/13/2023    ALBUMIN 3.3 (L) 07/13/2023       Other tests:  Lab Results   Component Value Date    TSH 1.055 07/05/2023    FREET4 1.67 (H) 08/05/2010    INR 0.9 07/06/2023    HGBA1C 5.1 07/05/2023    FERRITIN 145 06/28/2022    PFCDGZMK43 333 07/30/2013    UFWDOZUY02GI 21 (L) 07/30/2013    MG 2.3 07/06/2023    SEDRATE 8 03/31/2014    LIPASE 9 09/28/2017    AMYLASE 63 12/13/2012             Assessment and Plan:     1. History of CVA (cerebrovascular accident)  Seeing vascular neurology - thought to be migranous stroke  Avoid triptans.  Continue plavix, aspirin, statin,     2. Migraine with aura and without status migrainosus, not intractable  Neurology is manging meds    3. Hypertension, unspecified type  - Protein/Creatinine Ratio, Urine; Future  - Urinalysis; Future  - Metanephrines, Plasma Free; Future  - Aldosterone/Renin Activity Ratio; Future  If Carlito elevated, add spironolactone    4. Urticaria  Medrol dosepack, change losartan to benazepril-hctz at previous dose  May need serial stopping of other new meds if rash continues to try to determine trigger    5. Hypokalemia  Will plan recheck labs after restarting benazepirl-hctz -   Hyperaldo could be potential explanation if found on labs.       Pt will send us bp logs in 1 week  Plan follow up 1 month or sooner mike Venegas MD  7/14/2023    This note was prepared using voice-recognition software.  Although efforts are made to proofread the note, some errors may persist in the final document.

## 2023-07-10 NOTE — TELEPHONE ENCOUNTER
----- Message from Robert Carter MD sent at 7/8/2023  2:58 PM CDT -----  Regarding: Urgent hospital follow up appointment  Dear Dr. Venegas,    Ms Pollock was seen at [facility] for a stroke likely related to her migraines (likely migrainous stroke). However, she has fairly uncontrolled HTN, partly related to medication non adherence. I was hoping she can get an appointment in the coming week to address several concerns.    1. Anti-HTN medication management: she was discharged on losartan 25mg. She had fluctuating BP, possibly related to steroids received for migraines. However, she has had chronic HTN for the past decade and may benefit from further titration. I've asked her to keep a BP diary for the next 2 weeks to help assist in medication choices when she sees you. We didn't escalate her anti-HTN meds further for fear of dropping her BP too much. The hospital medicine team who was consulted decided to hold her home meds 2/2 low potassium.    2. Diagnostic work up of the cause for her HTN. We tried to rule out renal artery stenosis with US. There may be other causes such as hyperaldosteronism or something else. In someone her age, it's unusual to have this level of persistent HTN.    3. We sent referrals for vascular neurology (for hospital post-stroke follow up) as well as general neurology follow up (for migraine work up). We want to avoid her having additional migraine attacks in case they contribute to another stroke.    If you have any questions or concerns, please reach out to us. I probably won't be following up personally as her neurologist, but I'm happy to address any questions or concerns.    Thanks,  Robert    07/08/2023  Robert Carter MD, [facility] [place]  Neurology Resident, PGY-3  Department of Neurology  [facility]

## 2023-07-11 ENCOUNTER — NURSE TRIAGE (OUTPATIENT)
Dept: ADMINISTRATIVE | Facility: CLINIC | Age: 48
End: 2023-07-11
Payer: COMMERCIAL

## 2023-07-11 ENCOUNTER — TELEPHONE (OUTPATIENT)
Dept: PRIMARY CARE CLINIC | Facility: CLINIC | Age: 48
End: 2023-07-11
Payer: COMMERCIAL

## 2023-07-11 ENCOUNTER — TELEPHONE (OUTPATIENT)
Dept: NEUROLOGY | Facility: CLINIC | Age: 48
End: 2023-07-11
Payer: COMMERCIAL

## 2023-07-11 NOTE — TELEPHONE ENCOUNTER
Pt transferred to nurse; pt not on line. Call placed to pt; no answer.       Pt states that she had a stroke over the weekend and started new stroke and BP meds. Now c/o itchy hives/rash all over and Benadryl not helping. Pt states that hives/rash started within 2 hours of 1st dose. Advised to go to ED/UC if haven't heard from provider within 30 mins for appt. Verbalized understanding. Encounter routed to provider for f/u.       Reason for Disposition   Widespread hives and onset < 2 hours of exposure to 1st dose of drug    Additional Information   Negative: Difficulty breathing or wheezing   Negative: Hoarseness or cough that started soon after 1st dose of drug   Negative: Swollen tongue that started soon after first dose of drug   Negative: Fever and purple or blood-colored spots or dots   Negative: Too weak or sick to stand   Negative: Sounds like a life-threatening emergency to the triager   Negative: Swollen tongue    Protocols used: Rash - Widespread On Drugs-A-OH

## 2023-07-11 NOTE — TELEPHONE ENCOUNTER
----- Message from Robert Carter MD sent at 7/8/2023  2:58 PM CDT -----  Regarding: Urgent hospital follow up appointment  Dear Dr. Venegas,    Ms Pollock was seen at [facility] for a stroke likely related to her migraines (likely migrainous stroke). However, she has fairly uncontrolled HTN, partly related to medication non adherence. I was hoping she can get an appointment in the coming week to address several concerns.    1. Anti-HTN medication management: she was discharged on losartan 25mg. She had fluctuating BP, possibly related to steroids received for migraines. However, she has had chronic HTN for the past decade and may benefit from further titration. I've asked her to keep a BP diary for the next 2 weeks to help assist in medication choices when she sees you. We didn't escalate her anti-HTN meds further for fear of dropping her BP too much. The hospital medicine team who was consulted decided to hold her home meds 2/2 low potassium.    2. Diagnostic work up of the cause for her HTN. We tried to rule out renal artery stenosis with US. There may be other causes such as hyperaldosteronism or something else. In someone her age, it's unusual to have this level of persistent HTN.    3. We sent referrals for vascular neurology (for hospital post-stroke follow up) as well as general neurology follow up (for migraine work up). We want to avoid her having additional migraine attacks in case they contribute to another stroke.    If you have any questions or concerns, please reach out to us. I probably won't be following up personally as her neurologist, but I'm happy to address any questions or concerns.    Thanks,  Robert    07/08/2023  Robert Carter MD, [facility] [place]  Neurology Resident, PGY-3  Department of Neurology  [facility]

## 2023-07-11 NOTE — TELEPHONE ENCOUNTER
----- Message from Alejandra Payan sent at 7/11/2023  9:16 AM CDT -----  Type:  Needs Medical Advice    Who Called: pt  Symptoms (please be specific): pt had a stroke over the weekend and the pt stated that the medication has caused her to get a rash and she would like to discuss       Would the patient rather a call back or a response via [facility]? Call  Best Call Back Number: [phone]  Additional Information:

## 2023-07-11 NOTE — TELEPHONE ENCOUNTER
----- Message from Magdaleno Crhistine sent at 7/10/2023  9:34 AM CDT -----  Regarding: Carline np appt from referral for stroke  Contact: @ [phone]  Pt called in wanting to carline a np appt from referral but the referral is saying Vascular Neurology. Pts dx is I63.531 (ICD-10-CM) - Stroke due to occlusion of right posterior cerebral artery. Please call pt to discuss further.

## 2023-07-11 NOTE — TELEPHONE ENCOUNTER
Called to schedule [facility]FO for stroke and spoke w pt. As pt picked up, I noticed an appt was already scheduled w Dr. Osman. Pt was aware of appt but stated she still needed to speak with someone as one of the medications given at hospital is giving her hives. I stated that if she feels as though she needs immediate attention she should go to ED, otherwise her appt w Dr. Osman is on Thursday. Pt verbalized understanding.

## 2023-07-11 NOTE — TELEPHONE ENCOUNTER
Rash Started yesterday  Had a stroke over the weekend   Was given some medication  States that she think that it may be the magnesium   No trouble with breathing   No shortness of breathe

## 2023-07-12 ENCOUNTER — CLINICAL SUPPORT (OUTPATIENT)
Dept: CARDIOLOGY | Facility: HOSPITAL | Age: 48
End: 2023-07-12
Payer: COMMERCIAL

## 2023-07-12 ENCOUNTER — TELEPHONE (OUTPATIENT)
Dept: NEUROLOGY | Facility: HOSPITAL | Age: 48
End: 2023-07-12
Payer: COMMERCIAL

## 2023-07-12 ENCOUNTER — TELEPHONE (OUTPATIENT)
Dept: NEUROLOGY | Facility: CLINIC | Age: 48
End: 2023-07-12
Payer: COMMERCIAL

## 2023-07-12 DIAGNOSIS — Q24.9 ADULT CONGENITAL HEART DISEASE: ICD-10-CM

## 2023-07-12 DIAGNOSIS — I63.531 STROKE DUE TO OCCLUSION OF RIGHT POSTERIOR CEREBRAL ARTERY: ICD-10-CM

## 2023-07-12 PROCEDURE — 93272 ECG/REVIEW INTERPRET ONLY: CPT | Mod: ,,, | Performed by: STUDENT IN AN ORGANIZED HEALTH CARE EDUCATION/TRAINING PROGRAM

## 2023-07-12 PROCEDURE — 93270 REMOTE 30 DAY ECG REV/REPORT: CPT

## 2023-07-12 PROCEDURE — 93271 ECG/MONITORING AND ANALYSIS: CPT

## 2023-07-12 PROCEDURE — 93272 CARDIAC EVENT MONITOR (CUPID ONLY): ICD-10-PCS | Mod: ,,, | Performed by: STUDENT IN AN ORGANIZED HEALTH CARE EDUCATION/TRAINING PROGRAM

## 2023-07-12 NOTE — TELEPHONE ENCOUNTER
Secure chat sent to discharging physician Dr. Carter due to adverse reaction from medication patient was dsicharged with 7/8. Patient is scheduled to see him tomorrow (7/13).    no

## 2023-07-12 NOTE — TELEPHONE ENCOUNTER
I received a message detailing development of full body pruritic rash in Ms Mirna Pollock who was recently discharged on 7/8/2023 after suffering what is suspected to be migrainous stroke with scattered small subcentimeter foci in the right occipital lobe.    Patient has a PMHx of anxiety, ASD (s/p closure), HA, and HTN (difficult to control in past). She had presented with severe headaches along with visual symptoms.    She was discharged on several new medications including: ASA, plavix, Atorvastatin, Losartan, Magnesium oxide, riboflavin, Verapamil.    I made two calls to the patient lasting a total of 30 minutes.    Patient believes one of these medications, specifically magnesium oxide resulted in her developing a rash. This is not based on any temporal relationship, but based on what she read on the side of the bottle as a possible side effect.    After being discharged 7/8 (Saturday), she took first dose of her meds on 7/9 @ 8-9am with onset of rash 2 hours later described as full body pruritic raised rash. She denies any associated respiratory symptoms. She does endorse a heartburn that has resolved since she stopped the medication (last dose 7/10/2023). She has also regained her appetite since stopping the medication and reports that the rash is less raised, though still causing her some discomfort.    She denies SOB, CP, abdominal pain, urinary symptoms. Current symptoms apart from rash include a mild headache (which is significantly improved from her initial headache that she presented when she was admitted).    She states she's able to ambulate and has left her home to visit a restaurant. She says the rash is a little less raised than before since she has stopped that medication. I explained that, if this is a drug induced reaction, it can take some time to resolve after stopping the medication.     I further instructed that she should seek emergent medical attention and call 911 or visit the ED if she has  worsening symptoms including but not limited to difficulty breathing, chest pain, and of course any worsening neurological symptoms like headache, weakness, visual changes, and worsening dizziness.    She expressed understanding and all questions were answered to the patient's satisfaction.    I had previously reached out to her primary care physician who kindly scheduled an appointment to see her for 7/14/2023.    07/12/2023  Robert Carter MD, [facility] [place]  Neurology Resident, PGY-3  Department of Neurology  [facility]

## 2023-07-12 NOTE — TELEPHONE ENCOUNTER
Is patient able to send us a picture of the rash?  It looks like she has appt Friday with us.  Is the rash itching or painful?

## 2023-07-13 ENCOUNTER — LAB VISIT (OUTPATIENT)
Dept: LAB | Facility: HOSPITAL | Age: 48
End: 2023-07-13
Attending: PSYCHIATRY & NEUROLOGY
Payer: COMMERCIAL

## 2023-07-13 ENCOUNTER — OFFICE VISIT (OUTPATIENT)
Dept: NEUROLOGY | Facility: CLINIC | Age: 48
End: 2023-07-13
Payer: COMMERCIAL

## 2023-07-13 VITALS
BODY MASS INDEX: 24.1 KG/M2 | WEIGHT: 144.63 LBS | HEIGHT: 65 IN | HEART RATE: 105 BPM | DIASTOLIC BLOOD PRESSURE: 99 MMHG | SYSTOLIC BLOOD PRESSURE: 154 MMHG

## 2023-07-13 DIAGNOSIS — G43.901 STATUS MIGRAINOSUS: ICD-10-CM

## 2023-07-13 DIAGNOSIS — I63.531 STROKE DUE TO OCCLUSION OF RIGHT POSTERIOR CEREBRAL ARTERY: ICD-10-CM

## 2023-07-13 DIAGNOSIS — L27.0 DRUG RASH: ICD-10-CM

## 2023-07-13 DIAGNOSIS — I63.89 OTHER CEREBRAL INFARCTION: Primary | ICD-10-CM

## 2023-07-13 DIAGNOSIS — I67.9 CEREBROVASCULAR SMALL VESSEL DISEASE: ICD-10-CM

## 2023-07-13 LAB
ALBUMIN SERPL BCP-MCNC: 3.3 G/DL (ref 3.5–5.2)
ALP SERPL-CCNC: 81 U/L (ref 55–135)
ALT SERPL W/O P-5'-P-CCNC: 17 U/L (ref 10–44)
ANION GAP SERPL CALC-SCNC: 10 MMOL/L (ref 8–16)
ANISOCYTOSIS BLD QL SMEAR: SLIGHT
AST SERPL-CCNC: 21 U/L (ref 10–40)
BASOPHILS NFR BLD: 0 % (ref 0–1.9)
BILIRUB SERPL-MCNC: 0.5 MG/DL (ref 0.1–1)
BUN SERPL-MCNC: 16 MG/DL (ref 6–20)
CALCIUM SERPL-MCNC: 9.3 MG/DL (ref 8.7–10.5)
CHLORIDE SERPL-SCNC: 104 MMOL/L (ref 95–110)
CO2 SERPL-SCNC: 27 MMOL/L (ref 23–29)
CREAT SERPL-MCNC: 0.9 MG/DL (ref 0.5–1.4)
DACRYOCYTES BLD QL SMEAR: ABNORMAL
DIFFERENTIAL METHOD: ABNORMAL
EOSINOPHIL NFR BLD: 12 % (ref 0–8)
ERYTHROCYTE [DISTWIDTH] IN BLOOD BY AUTOMATED COUNT: 11.8 % (ref 11.5–14.5)
EST. GFR  (NO RACE VARIABLE): >60 ML/MIN/1.73 M^2
GLUCOSE SERPL-MCNC: 132 MG/DL (ref 70–110)
HCT VFR BLD AUTO: 41.6 % (ref 37–48.5)
HGB BLD-MCNC: 14.1 G/DL (ref 12–16)
IMM GRANULOCYTES # BLD AUTO: ABNORMAL K/UL (ref 0–0.04)
IMM GRANULOCYTES NFR BLD AUTO: ABNORMAL % (ref 0–0.5)
LYMPHOCYTES NFR BLD: 9 % (ref 18–48)
MCH RBC QN AUTO: 30.5 PG (ref 27–31)
MCHC RBC AUTO-ENTMCNC: 33.9 G/DL (ref 32–36)
MCV RBC AUTO: 90 FL (ref 82–98)
METAMYELOCYTES NFR BLD MANUAL: 4 %
MONOCYTES NFR BLD: 5 % (ref 4–15)
MYELOCYTES NFR BLD MANUAL: 1 %
NEUTROPHILS NFR BLD: 67 % (ref 38–73)
NEUTS BAND NFR BLD MANUAL: 2 %
NRBC BLD-RTO: 0 /100 WBC
PLATELET # BLD AUTO: 505 K/UL (ref 150–450)
PLATELET BLD QL SMEAR: ABNORMAL
PMV BLD AUTO: 10 FL (ref 9.2–12.9)
POIKILOCYTOSIS BLD QL SMEAR: SLIGHT
POTASSIUM SERPL-SCNC: 2.9 MMOL/L (ref 3.5–5.1)
PROT SERPL-MCNC: 7.1 G/DL (ref 6–8.4)
RBC # BLD AUTO: 4.62 M/UL (ref 4–5.4)
SODIUM SERPL-SCNC: 141 MMOL/L (ref 136–145)
WBC # BLD AUTO: 23.2 K/UL (ref 3.9–12.7)

## 2023-07-13 PROCEDURE — 3077F PR MOST RECENT SYSTOLIC BLOOD PRESSURE >= 140 MM HG: ICD-10-PCS | Mod: CPTII,S$GLB,, | Performed by: PSYCHIATRY & NEUROLOGY

## 2023-07-13 PROCEDURE — 85027 COMPLETE CBC AUTOMATED: CPT | Performed by: PSYCHIATRY & NEUROLOGY

## 2023-07-13 PROCEDURE — 1111F PR DISCHARGE MEDS RECONCILED W/ CURRENT OUTPATIENT MED LIST: ICD-10-PCS | Mod: CPTII,S$GLB,, | Performed by: PSYCHIATRY & NEUROLOGY

## 2023-07-13 PROCEDURE — 99215 OFFICE O/P EST HI 40 MIN: CPT | Mod: S$GLB,,, | Performed by: PSYCHIATRY & NEUROLOGY

## 2023-07-13 PROCEDURE — 4010F PR ACE/ARB THEARPY RXD/TAKEN: ICD-10-PCS | Mod: CPTII,S$GLB,, | Performed by: PSYCHIATRY & NEUROLOGY

## 2023-07-13 PROCEDURE — 3008F BODY MASS INDEX DOCD: CPT | Mod: CPTII,S$GLB,, | Performed by: PSYCHIATRY & NEUROLOGY

## 2023-07-13 PROCEDURE — 3077F SYST BP >= 140 MM HG: CPT | Mod: CPTII,S$GLB,, | Performed by: PSYCHIATRY & NEUROLOGY

## 2023-07-13 PROCEDURE — 99999 PR PBB SHADOW E&M-EST. PATIENT-LVL V: CPT | Mod: PBBFAC,,, | Performed by: PSYCHIATRY & NEUROLOGY

## 2023-07-13 PROCEDURE — 36415 COLL VENOUS BLD VENIPUNCTURE: CPT | Performed by: PSYCHIATRY & NEUROLOGY

## 2023-07-13 PROCEDURE — 3044F HG A1C LEVEL LT 7.0%: CPT | Mod: CPTII,S$GLB,, | Performed by: PSYCHIATRY & NEUROLOGY

## 2023-07-13 PROCEDURE — 3080F PR MOST RECENT DIASTOLIC BLOOD PRESSURE >= 90 MM HG: ICD-10-PCS | Mod: CPTII,S$GLB,, | Performed by: PSYCHIATRY & NEUROLOGY

## 2023-07-13 PROCEDURE — 3008F PR BODY MASS INDEX (BMI) DOCUMENTED: ICD-10-PCS | Mod: CPTII,S$GLB,, | Performed by: PSYCHIATRY & NEUROLOGY

## 2023-07-13 PROCEDURE — 80053 COMPREHEN METABOLIC PANEL: CPT | Performed by: PSYCHIATRY & NEUROLOGY

## 2023-07-13 PROCEDURE — 99999 PR PBB SHADOW E&M-EST. PATIENT-LVL V: ICD-10-PCS | Mod: PBBFAC,,, | Performed by: PSYCHIATRY & NEUROLOGY

## 2023-07-13 PROCEDURE — 85007 BL SMEAR W/DIFF WBC COUNT: CPT | Performed by: PSYCHIATRY & NEUROLOGY

## 2023-07-13 PROCEDURE — 3044F PR MOST RECENT HEMOGLOBIN A1C LEVEL <7.0%: ICD-10-PCS | Mod: CPTII,S$GLB,, | Performed by: PSYCHIATRY & NEUROLOGY

## 2023-07-13 PROCEDURE — 1111F DSCHRG MED/CURRENT MED MERGE: CPT | Mod: CPTII,S$GLB,, | Performed by: PSYCHIATRY & NEUROLOGY

## 2023-07-13 PROCEDURE — 99215 PR OFFICE/OUTPT VISIT, EST, LEVL V, 40-54 MIN: ICD-10-PCS | Mod: S$GLB,,, | Performed by: PSYCHIATRY & NEUROLOGY

## 2023-07-13 PROCEDURE — 3080F DIAST BP >= 90 MM HG: CPT | Mod: CPTII,S$GLB,, | Performed by: PSYCHIATRY & NEUROLOGY

## 2023-07-13 PROCEDURE — 4010F ACE/ARB THERAPY RXD/TAKEN: CPT | Mod: CPTII,S$GLB,, | Performed by: PSYCHIATRY & NEUROLOGY

## 2023-07-13 NOTE — PROGRESS NOTES
Vascular Neurology  Clinic Note    ___________________  ASSESSMENT & PLAN    Problem List Items Addressed This Visit          1 - High    Stroke due to occlusion of right posterior cerebral artery    Current Assessment & Plan     Etiology: Large Artery Atherosclerosis Probable  DINORAH Major: ICAD noted in bilateral PCA  -- CE Absent --  Absent -- Migrainous component, not meeting criteria for migrainous infarct  It is likely she has underlying vasculopathy d/t uncontrolled HTN given the progression on her MRI over last 6 years. However migrainous component cannot be entirely ruled out. She was aggressively treated inpatient with depakote, verapamil,  magnesium, 3 days of IV solumedrol.    Diagnostic Orders: MRI repeated given the new inferior quadrantanopsia, though stable since discharge, does in fact demonstrate extension of this infarct within the R PCA w/o change in the repeated MRA  Secondary stroke prevention: Continue asa / plavix --> consider  asa / cilostazol long term  Continue current statin therapy w/ LDL goal < 70  Blood pressure goal < 130/80 mmHg ; this is a clear lingering vascular risk factor that needs to be addressed - not candidate for digital HTN, PCP aware  Stroke Risk Factors Addressed: HTN, intracranial atherostenosis   Stroke education administered              Unprioritized    Status migrainosus    Current Assessment & Plan     Still on verapamil. S/p depakote and IV solumedrol. Magnesium stopped by patient d/t fear of diffuse rash. However losartan most likely the trigger.  Will f/u w/ Elisa Ortez  for management. Headaches have improved substantially since discharge at this point.  Continue amovig and ubrelvy for abortive Rx         Cerebrovascular small vessel disease    Current Assessment & Plan     Substantial progression when compared to 2017 MRI for subcortical white matter disease and multiple subcortical lacunar infarcts. During this time she has had well documented markedly  uncontrolled blood pressure that still needs to be better controlled. We discussed a long term goal of < 130/80 mmHg           Drug rash    Current Assessment & Plan     Plateau with improvement now off of Losartan.  CBC/CMP checked, WBC markedly elevated (some effect from inpatient steroid) w/ + eosinophilia, low grade temperature reported by patient. Not all clinical criteria/severity met for DRES syndrome. Will continue to monitor for stability.         Relevant Orders    COMPREHENSIVE METABOLIC PANEL (Completed)    CBC W/ AUTO DIFFERENTIAL (Completed)     Other Visit Diagnoses       Other cerebral infarction    -  Primary    Relevant Orders    MRI Brain Without Contrast (Completed)    MRA Brain without contrast (Completed)            Reason For Visit (Chief Complaint): Recent R PCA stroke    HPI: 47 y.o. right handed female with recent admission to [facility] for R PCA stroke and in setting of status migrainosus.    Continues to have aura of floating dots everywhere.  Has a left inferior quadrantanopsia.  Having a hard time walking because she is off balance and needs to hold onto the wall.    Discussed HA/migraine w/ Vulevich - will set up infusions at outpatient center.    Brain Imaging:  MRI  Impression:     Small scattered areas of acute infarction in the right PCA territory.     Age advanced small vessel ischemic change with multiple remote lacunar type infarcts.  These findings notably progressed since the prior MRI of 09/28/2017.     Vessel Imaging:  CTA   Impression:     Small recent infarcts noted on the recent MR imaging study are too small to characterize.  No intracranial hemorrhage mass effect or acute territorial infarct.     Prominent chronic ischemic changes again noted unless there is clinical suspicion for prior demyelinating disease.     No significant stenosis at the carotid bifurcations by NASCET criteria.  The vertebral arteries are patent.    VWI MRA     Impression:     1. Redemonstration of  stenosis of the mid portions of the bilateral posterior cerebral arteries, left greater than right.  No vessel wall enhancement.  2. Minimal narrowing of the basilar artery.  This report was flagged in Epic as abnormal.     Cardiac Evaluation:  The left ventricle is normal in size with normal systolic function.  The estimated ejection fraction is 65%.  Normal left ventricular diastolic function.  Normal right ventricular size with normal right ventricular systolic function.  Intermediate central venous pressure (8 mmHg).  Very late bubbles present after injection with agitated saline, unable to tell when bubbles are present in relationship with initial injection, would recommended NARDA.      TCD  Impression:     Negative TCD shunt study.  Other:   REnal artery US  Impression:     No evidence of renal artery stenosis.     Small left renal cysts.  Relevant Labwork:  Recent Labs   Lab 07/05/23  1518   Hemoglobin A1C 5.1   LDL Cholesterol 156.6   HDL 50   Triglycerides 82   Cholesterol 223 H       I independently viewed the above imaging studies in addition to reviewing the report.  I reviewed the above labwork.    Review of Systems  Msk: negative for muscle pain  Skin: negative for pruritis  Neuro: negative for headache  All others negative    Past Medical History  Past Medical History:   Diagnosis Date    Anxiety     ASD (atrial septal defect)     Endometriosis     Headache(784.0)     Hypertension     Kidney stones fibomyalgia, migraine, heart closer, kidey stones     Family History  No relevant history   Social History  Social History     Socioeconomic History    Marital status:    Occupational History     Employer: [id number]   Tobacco Use    Smoking status: Never    Smokeless tobacco: Never   Substance and Sexual Activity    Alcohol use: No     Alcohol/week: 0.0 standard drinks    Drug use: No    Sexual activity: Yes     Partners: Male     Birth control/protection: Coitus interruptus      Social Determinants of Health     Financial Resource Strain: Low Risk     Difficulty of Paying Living Expenses: Not hard at all   Food Insecurity: No Food Insecurity    Worried About Running Out of Food in the Last Year: Never true    Ran Out of Food in the Last Year: Never true   Transportation Needs: No Transportation Needs    Lack of Transportation (Medical): No    Lack of Transportation (Non-Medical): No   Physical Activity: Sufficiently Active    Days of Exercise per Week: 5 days    Minutes of Exercise per Session: 30 min   Stress: Stress Concern Present    Feeling of Stress : To some extent   Social Connections: Socially Integrated    Frequency of Communication with Friends and Family: More than three times a week    Frequency of Social Gatherings with Friends and Family: Three times a week    Attends Jewish Services: More than 4 times per year    Active Member of Clubs or Organizations: No    Attends Club or Organization Meetings: More than 4 times per year    Marital Status:    Housing Stability: Unknown    Unable to Pay for Housing in the Last Year: No    Unstable Housing in the Last Year: No        Medication List with Changes/Refills   New Medications    METHYLPREDNISOLONE (MEDROL DOSEPACK) 4 MG TABLET    use as directed   Current Medications    ASPIRIN (ECOTRIN) 81 MG EC TABLET    Take 1 tablet (81 mg total) by mouth once daily.    ATORVASTATIN (LIPITOR) 40 MG TABLET    Take 1 tablet (40 mg total) by mouth once daily.    BENAZEPRIL-HYDROCHLORTHIAZIDE (LOTENSIN HCT) 20-25 MG TAB    Take 1 tablet by mouth once daily. Please check with your primary care provider. You've been started on Losartan, and so it's unclear if you'll need additional anti-hypertensive medications.    BUPROPION (WELLBUTRIN XL) 300 MG 24 HR TABLET    Take 1 tablet (300 mg total) by mouth once daily.    CLOPIDOGREL (PLAVIX) 75 MG TABLET    Take 1 tablet (75 mg total) by mouth once daily.    DULOXETINE (CYMBALTA) 60 MG  CAPSULE    Take 1 capsule (60 mg total) by mouth once daily.    ERENUMAB-AOOE (AIMOVIG) 140 MG/ML AUTOINJECTOR    Inject 1 pen (140 mg total) into the skin every 28 days.    HYDROXYZINE PAMOATE (VISTARIL) 25 MG CAP    Take 1 capsule (25 mg total) by mouth every 8 (eight) hours as needed (anxiety).    MAGNESIUM OXIDE (MAG-OX) 400 MG (241.3 MG MAGNESIUM) TABLET    Take 1 tablet (400 mg total) by mouth once daily.    ONDANSETRON (ZOFRAN-ODT) 4 MG TBDL    DISSOLVE 1 TABLET ON THE TONGUE EVERY 6 HOURS AS NEEDED FOR NAUSEA    PROMETHAZINE (PHENERGAN) 25 MG TABLET    TAKE 1 TABLET BY MOUTH EVERY 4 TO 6 HOURS AS NEEDED FOR NAUSEA    RIBOFLAVIN, VITAMIN B2, 400 MG TAB    Take 400 mg by mouth once daily.    TIZANIDINE (ZANAFLEX) 4 MG TABLET    Take 1.5 tablets (6 mg total) by mouth every evening. No alcohol, no driving with medication.    UBROGEPANT (UBRELVY) 100 MG TABLET    TAKE 1 TABLET BY MOUTH EVERY 2 HOURS AS NEEDED FOR MIGRAINE.  MG PER DAY Strength: 100 mg    VERAPAMIL (CALAN) 40 MG TAB    Take 1 tablet (40 mg total) by mouth 2 (two) times daily.    ZOLPIDEM (AMBIEN) 10 MG TAB    Take 1 tablet (10 mg total) by mouth every evening.   Discontinued Medications    LOSARTAN (COZAAR) 25 MG TABLET    Take 1 tablet (25 mg total) by mouth once daily.       EXAM  Vital Signs:  Vitals - 1 value per visit 7/8/2023 7/8/2023 7/8/2023 7/13/2023 7/13/2023 7/14/2023 7/14/2023   SYSTOLIC 191 162 - - 154 - 160   DIASTOLIC 92 90 - - 99 - 98   Pulse 88 - 85 - 105 - 95   Temp 97.8 - - - - - 98.8   Resp 18 - - - - - 18   SPO2 96 - - - - - 98   Weight (lb) - - - - 144.62 - 144.84   Weight (kg) - - - - 65.6 - 65.7   Height - - - - 65 - 65   BMI (Calculated) - - - - 24.1 - 24.1   VISIT REPORT - - - - - - -   Pain Score  - - - 8 - 0 -   Some recent data might be hidden       General: diffuse rash with pruritis  Mental Status:alert, oriented to person - place - age - month   Language: able to name, repeat, comprehend commands   Cranial  Nerves: EOMI, left lower quadrantanopsia, no facial asymmetry  Motor: 5/5 power in all extremities, normal tone  Sensory: intact light touch bilaterally, intact proprioception bilaterally  Coordination: no ataxia on finger-to-nose  no truncal ataxia  Gait & Stance: normal    Stroke Scales      MD Giselle  Vascular Neurology  Office [phone]  Fax [phone]

## 2023-07-13 NOTE — PATIENT INSTRUCTIONS
Plan to re-image with MRI and MRA because of your symptoms of persistent visual field loss in the left lower quadrant.    Continue to hold losartan as this seems to have improved your rash or at least halted it for now    Blood pressure control long term goal < 130/80 mmhg, to be addressed by PCP.     Will set up urgent appointment with SHAMA JAY.

## 2023-07-14 ENCOUNTER — HOSPITAL ENCOUNTER (OUTPATIENT)
Dept: RADIOLOGY | Facility: HOSPITAL | Age: 48
Discharge: HOME | End: 2023-07-14
Attending: PSYCHIATRY & NEUROLOGY
Payer: COMMERCIAL

## 2023-07-14 ENCOUNTER — TELEPHONE (OUTPATIENT)
Dept: NEUROLOGY | Facility: CLINIC | Age: 48
End: 2023-07-14
Payer: COMMERCIAL

## 2023-07-14 ENCOUNTER — OFFICE VISIT (OUTPATIENT)
Dept: PRIMARY CARE CLINIC | Facility: CLINIC | Age: 48
End: 2023-07-14
Payer: COMMERCIAL

## 2023-07-14 VITALS
SYSTOLIC BLOOD PRESSURE: 160 MMHG | BODY MASS INDEX: 24.12 KG/M2 | DIASTOLIC BLOOD PRESSURE: 98 MMHG | HEART RATE: 95 BPM | WEIGHT: 144.81 LBS | HEIGHT: 65 IN | TEMPERATURE: 99 F | OXYGEN SATURATION: 98 % | RESPIRATION RATE: 18 BRPM

## 2023-07-14 DIAGNOSIS — L50.9 URTICARIA: ICD-10-CM

## 2023-07-14 DIAGNOSIS — E87.6 HYPOKALEMIA: ICD-10-CM

## 2023-07-14 DIAGNOSIS — Z86.73 HISTORY OF CVA (CEREBROVASCULAR ACCIDENT): Primary | ICD-10-CM

## 2023-07-14 DIAGNOSIS — I63.89 OTHER CEREBRAL INFARCTION: ICD-10-CM

## 2023-07-14 DIAGNOSIS — I10 HYPERTENSION, UNSPECIFIED TYPE: ICD-10-CM

## 2023-07-14 DIAGNOSIS — G43.109 MIGRAINE WITH AURA AND WITHOUT STATUS MIGRAINOSUS, NOT INTRACTABLE: ICD-10-CM

## 2023-07-14 PROCEDURE — 3077F PR MOST RECENT SYSTOLIC BLOOD PRESSURE >= 140 MM HG: ICD-10-PCS | Mod: CPTII,S$GLB,, | Performed by: INTERNAL MEDICINE

## 2023-07-14 PROCEDURE — 99214 OFFICE O/P EST MOD 30 MIN: CPT | Mod: S$GLB,,, | Performed by: INTERNAL MEDICINE

## 2023-07-14 PROCEDURE — 4010F ACE/ARB THERAPY RXD/TAKEN: CPT | Mod: CPTII,S$GLB,, | Performed by: INTERNAL MEDICINE

## 2023-07-14 PROCEDURE — 1159F MED LIST DOCD IN RCRD: CPT | Mod: CPTII,S$GLB,, | Performed by: INTERNAL MEDICINE

## 2023-07-14 PROCEDURE — 1159F PR MEDICATION LIST DOCUMENTED IN MEDICAL RECORD: ICD-10-PCS | Mod: CPTII,S$GLB,, | Performed by: INTERNAL MEDICINE

## 2023-07-14 PROCEDURE — 3077F SYST BP >= 140 MM HG: CPT | Mod: CPTII,S$GLB,, | Performed by: INTERNAL MEDICINE

## 2023-07-14 PROCEDURE — 3080F DIAST BP >= 90 MM HG: CPT | Mod: CPTII,S$GLB,, | Performed by: INTERNAL MEDICINE

## 2023-07-14 PROCEDURE — 70551 MRI BRAIN WITHOUT CONTRAST: ICD-10-PCS | Mod: 26,,, | Performed by: RADIOLOGY

## 2023-07-14 PROCEDURE — 3080F PR MOST RECENT DIASTOLIC BLOOD PRESSURE >= 90 MM HG: ICD-10-PCS | Mod: CPTII,S$GLB,, | Performed by: INTERNAL MEDICINE

## 2023-07-14 PROCEDURE — 70551 MRI BRAIN STEM W/O DYE: CPT | Mod: 26,,, | Performed by: RADIOLOGY

## 2023-07-14 PROCEDURE — 99214 PR OFFICE/OUTPT VISIT, EST, LEVL IV, 30-39 MIN: ICD-10-PCS | Mod: S$GLB,,, | Performed by: INTERNAL MEDICINE

## 2023-07-14 PROCEDURE — 3008F PR BODY MASS INDEX (BMI) DOCUMENTED: ICD-10-PCS | Mod: CPTII,S$GLB,, | Performed by: INTERNAL MEDICINE

## 2023-07-14 PROCEDURE — 99999 PR PBB SHADOW E&M-EST. PATIENT-LVL V: ICD-10-PCS | Mod: PBBFAC,,, | Performed by: INTERNAL MEDICINE

## 2023-07-14 PROCEDURE — 3044F PR MOST RECENT HEMOGLOBIN A1C LEVEL <7.0%: ICD-10-PCS | Mod: CPTII,S$GLB,, | Performed by: INTERNAL MEDICINE

## 2023-07-14 PROCEDURE — 3044F HG A1C LEVEL LT 7.0%: CPT | Mod: CPTII,S$GLB,, | Performed by: INTERNAL MEDICINE

## 2023-07-14 PROCEDURE — 70544 MR ANGIOGRAPHY HEAD W/O DYE: CPT | Mod: TC

## 2023-07-14 PROCEDURE — 1111F DSCHRG MED/CURRENT MED MERGE: CPT | Mod: CPTII,S$GLB,, | Performed by: INTERNAL MEDICINE

## 2023-07-14 PROCEDURE — 4010F PR ACE/ARB THEARPY RXD/TAKEN: ICD-10-PCS | Mod: CPTII,S$GLB,, | Performed by: INTERNAL MEDICINE

## 2023-07-14 PROCEDURE — 70551 MRI BRAIN STEM W/O DYE: CPT | Mod: TC

## 2023-07-14 PROCEDURE — 1111F PR DISCHARGE MEDS RECONCILED W/ CURRENT OUTPATIENT MED LIST: ICD-10-PCS | Mod: CPTII,S$GLB,, | Performed by: INTERNAL MEDICINE

## 2023-07-14 PROCEDURE — 99999 PR PBB SHADOW E&M-EST. PATIENT-LVL V: CPT | Mod: PBBFAC,,, | Performed by: INTERNAL MEDICINE

## 2023-07-14 PROCEDURE — 3008F BODY MASS INDEX DOCD: CPT | Mod: CPTII,S$GLB,, | Performed by: INTERNAL MEDICINE

## 2023-07-14 RX ORDER — METHYLPREDNISOLONE 4 MG/1
TABLET ORAL
Qty: 21 EACH | Refills: 0 | Status: SHIPPED | OUTPATIENT
Start: 2023-07-14 | End: 2023-07-31

## 2023-07-17 ENCOUNTER — TELEPHONE (OUTPATIENT)
Dept: PRIMARY CARE CLINIC | Facility: CLINIC | Age: 48
End: 2023-07-17
Payer: COMMERCIAL

## 2023-07-18 ENCOUNTER — TELEPHONE (OUTPATIENT)
Dept: NEUROLOGY | Facility: CLINIC | Age: 48
End: 2023-07-18
Payer: COMMERCIAL

## 2023-07-18 DIAGNOSIS — I63.531 STROKE DUE TO OCCLUSION OF RIGHT POSTERIOR CEREBRAL ARTERY: Primary | ICD-10-CM

## 2023-07-18 NOTE — TELEPHONE ENCOUNTER
Reviewed MRI findings of extended infarct since last MRI during admission. Difficult to determine when this occurred. Given the left lower quadrant loss was there on discharge per patient, and has not gotten worse, would suggest that the MRI findings are late subacute and have not demonstrated significant progression since discharge,    Discussed driving cessation at this time, will order PT/OT and discussed long term possible driving test regarding getting back on the road.    Headache has improved. Apt w/ HA on 7/31    Rash has improved, currently on Medrol dose pack per PCP    Neuro-ophthalmology f/u for formal field testing and f/u

## 2023-07-19 ENCOUNTER — CLINICAL SUPPORT (OUTPATIENT)
Dept: REHABILITATION | Facility: HOSPITAL | Age: 48
End: 2023-07-19
Attending: PSYCHIATRY & NEUROLOGY
Payer: COMMERCIAL

## 2023-07-19 DIAGNOSIS — I63.531 STROKE DUE TO OCCLUSION OF RIGHT POSTERIOR CEREBRAL ARTERY: ICD-10-CM

## 2023-07-19 DIAGNOSIS — Z74.09 IMPAIRED FUNCTIONAL MOBILITY, BALANCE, GAIT, AND ENDURANCE: ICD-10-CM

## 2023-07-19 PROBLEM — I67.9 CEREBROVASCULAR SMALL VESSEL DISEASE: Status: ACTIVE | Noted: 2023-07-19

## 2023-07-19 PROBLEM — L27.0 DRUG RASH: Status: ACTIVE | Noted: 2023-07-19

## 2023-07-19 PROBLEM — G43.901 STATUS MIGRAINOSUS: Status: ACTIVE | Noted: 2019-02-14

## 2023-07-19 PROCEDURE — 97161 PT EVAL LOW COMPLEX 20 MIN: CPT | Mod: PN

## 2023-07-19 NOTE — ASSESSMENT & PLAN NOTE
Still on verapamil. S/p depakote and IV solumedrol. Magnesium stopped by patient d/t fear of diffuse rash. However losartan most likely the trigger.  Will f/u w/ Elisa Ortez 7/31 for management. Headaches have improved substantially since discharge at this point.  Continue amovig and ubrelvy for abortive Rx

## 2023-07-19 NOTE — ASSESSMENT & PLAN NOTE
Etiology: Large Artery Atherosclerosis Probable  DINORAH Major: ICAD noted in bilateral PCA  -- CE Absent --  Absent -- Migrainous component, not meeting criteria for migrainous infarct  It is likely she has underlying vasculopathy d/t uncontrolled HTN given the progression on her MRI over last 6 years. However migrainous component cannot be entirely ruled out. She was aggressively treated inpatient with depakote, verapamil,  magnesium, 3 days of IV solumedrol.    · Diagnostic Orders: MRI repeated given the new inferior quadrantanopsia, though stable since discharge, does in fact demonstrate extension of this infarct within the R PCA w/o change in the repeated MRA  · Secondary stroke prevention: Continue asa / plavix --> consider  asa / cilostazol long term  · Continue current statin therapy w/ LDL goal < 70  · Blood pressure goal < 130/80 mmHg ; this is a clear lingering vascular risk factor that needs to be addressed - not candidate for digital HTN, PCP aware  · Stroke Risk Factors Addressed: HTN, intracranial atherostenosis   · Stroke education administered

## 2023-07-19 NOTE — ASSESSMENT & PLAN NOTE
Substantial progression when compared to 2017 MRI for subcortical white matter disease and multiple subcortical lacunar infarcts. During this time she has had well documented markedly uncontrolled blood pressure that still needs to be better controlled. We discussed a long term goal of < 130/80 mmHg

## 2023-07-19 NOTE — ASSESSMENT & PLAN NOTE
Plateau with improvement now off of Losartan.  CBC/CMP checked, WBC markedly elevated (some effect from inpatient steroid) w/ + eosinophilia, low grade temperature reported by patient. Not all clinical criteria/severity met for DRES syndrome. Will continue to monitor for stability.

## 2023-07-20 ENCOUNTER — PATIENT MESSAGE (OUTPATIENT)
Dept: PSYCHIATRY | Facility: CLINIC | Age: 48
End: 2023-07-20
Payer: COMMERCIAL

## 2023-07-20 DIAGNOSIS — G47.00 INSOMNIA, UNSPECIFIED TYPE: ICD-10-CM

## 2023-07-20 NOTE — TELEPHONE ENCOUNTER
Former pt of rsident Dr. Nation requesting Ambien refill. L/s in April when plan made to continue meds unchanged. Has f/u with new provider Dr. Bourne 9/11/23. [place] PDMP reviewed- no irregularities noted. Will confer with Dr. Medina for review/approval bridge supply of requested med.

## 2023-07-21 ENCOUNTER — PATIENT MESSAGE (OUTPATIENT)
Dept: PRIMARY CARE CLINIC | Facility: CLINIC | Age: 48
End: 2023-07-21
Payer: COMMERCIAL

## 2023-07-21 DIAGNOSIS — I10 HYPERTENSION, UNSPECIFIED TYPE: Primary | ICD-10-CM

## 2023-07-21 RX ORDER — ZOLPIDEM TARTRATE 10 MG/1
10 TABLET ORAL NIGHTLY
Qty: 30 TABLET | Refills: 1 | Status: SHIPPED | OUTPATIENT
Start: 2023-07-23 | End: 2023-08-25 | Stop reason: SDUPTHER

## 2023-07-24 ENCOUNTER — PATIENT MESSAGE (OUTPATIENT)
Dept: PRIMARY CARE CLINIC | Facility: CLINIC | Age: 48
End: 2023-07-24
Payer: COMMERCIAL

## 2023-07-24 ENCOUNTER — TELEPHONE (OUTPATIENT)
Dept: NEUROLOGY | Facility: CLINIC | Age: 48
End: 2023-07-24
Payer: COMMERCIAL

## 2023-07-24 ENCOUNTER — PATIENT OUTREACH (OUTPATIENT)
Dept: ADMINISTRATIVE | Facility: HOSPITAL | Age: 48
End: 2023-07-24
Payer: COMMERCIAL

## 2023-07-24 ENCOUNTER — PATIENT MESSAGE (OUTPATIENT)
Dept: ADMINISTRATIVE | Facility: HOSPITAL | Age: 48
End: 2023-07-24
Payer: COMMERCIAL

## 2023-07-24 ENCOUNTER — PATIENT MESSAGE (OUTPATIENT)
Dept: NEUROLOGY | Facility: CLINIC | Age: 48
End: 2023-07-24
Payer: COMMERCIAL

## 2023-07-24 NOTE — TELEPHONE ENCOUNTER
Received an incoming call from patient. Patient reports following her visit with Dr. Osman (7/13/23) she had no new episodes of CHENG. However, today patient c/o a sharp pain in the back of her neck located in the same area she had her stroke that was mild yesterday evening but worsened this morning, waking her up. Patient inquired what she should do - discussed Dr. Osman is not in clinic this week and considering her stroke history and similar symptoms highly encouraged patient report to the ED to be evaluated. Informed her a message will be sent to Dr. Osman's attention and discussed the outpatient clinic does not have the necessary equipment or medications if patient were having a stroke. Patient in agreement to proceed to the ED.

## 2023-07-24 NOTE — TELEPHONE ENCOUNTER
----- Message from Hodan Neal sent at 7/24/2023 11:23 AM CDT -----  Regarding: [employer]/Advisory  Contact: [phone]  CONSULT/ADVISORY    Name of Caller: Mirna Pollock    Contact Preference: [phone]    Nature of Call: Pt is stating she had a stroke around the week of 07/04/2023.  Pt is now experiencing pain in the back of her head and shoulder area hurts.  Pt BP was 120/114 just before she took her BP medication. Please call.

## 2023-07-25 ENCOUNTER — PATIENT MESSAGE (OUTPATIENT)
Dept: NEUROLOGY | Facility: CLINIC | Age: 48
End: 2023-07-25
Payer: COMMERCIAL

## 2023-07-27 ENCOUNTER — CLINICAL SUPPORT (OUTPATIENT)
Dept: REHABILITATION | Facility: HOSPITAL | Age: 48
End: 2023-07-27
Attending: PSYCHIATRY & NEUROLOGY
Payer: COMMERCIAL

## 2023-07-27 DIAGNOSIS — Z74.09 IMPAIRED FUNCTIONAL MOBILITY, BALANCE, GAIT, AND ENDURANCE: Primary | ICD-10-CM

## 2023-07-27 PROCEDURE — 97530 THERAPEUTIC ACTIVITIES: CPT | Mod: PN

## 2023-07-27 NOTE — PROGRESS NOTES
[facility]   Physical Therapy Treatment Note     Name: Mirna Pollock  Clinic Number: [id number]    Therapy Diagnosis:   Encounter Diagnosis   Name Primary?    Impaired functional mobility, balance, gait, and endurance Yes     Physician: Marcus Osman MD    Visit Date: 7/27/2023    Physician Orders: PT Eval and Treat   Medical Diagnosis from Referral: I63.531 (ICD-10-CM) - Stroke due to occlusion of right posterior cerebral artery  Evaluation Date: 7/19/2023  Authorization Period Expiration: 12/31/23  Plan of Care Expiration: 10/19/23  Visit # / Visits authorized: 1/ 20     Progress Note Due: 8/19/23      Precautions: Standard, Fall, and Monitor BP    Time In: 7:20 AM  Time Out: 7:35 AM  Total Billable Time: 15 minutes      SUBJECTIVE     Pt reports: Headache a few days ago similar to when she had the stroke, was told to go to ER by physician over message. Pt reports that the headache subsided over the last few days. However, reports new onset of L UE weakness that she noticed yesterday after her conversation with Neurologist. BP has fluctuated between good and high. Took BP medication about 45 minutes ago.     She was not compliant with home exercise program as HEP not yet provided yet.  Response to previous treatment: No change   Functional change: None    Pain: 0/10  Location: no headache today    OBJECTIVE     BP initially = 135/96    TREATMENT     Mirna received the treatments listed below:      therapeutic activities to improve functional performance for 15 minutes, including:    BP monitoring.     Symptom observation, no changes noted from evaluation. However, pt reporting some new onset L UE weakness.    Education on at home BP monitoring,      PATIENT EDUCATION AND HOME EXERCISES     Home Exercises Provided and Patient Education Provided     Education provided:   - importance of presenting to the ER with new onset weakness and headache present two days ago  - following up with  Neurology and PCP regarding symptoms/change in status    Written Home Exercises Provided: none provided this date.     ASSESSMENT     Pt presented to session today with no reports of headache. However, pt reporting headache (similar to CVA) a few days ago, contacted Neurologist and was told to go to ER. Pt reports she did not present to the ER at this time. Since then, also started to notice new onset L UE weakness. BP noted to be high in clinic today, slightly lower than at evaluation. Secondary to headache episode a few days ago and newly reported L weakness, pt highly encouraged to present to ER again and rule out ongoing CVA. Pt in agreement. Pt also encouraged to contact Neurologist regarding ongoing care. Pt acknowledged understanding at this time. Plan to resume PT when PT follows up with Neurologist/PCP.     Mirna Is progressing well towards her goals.   Pt prognosis is Fair.     Pt will continue to benefit from skilled outpatient physical therapy to address the deficits listed in the problem list box on initial evaluation, provide pt/family education and to maximize pt's level of independence in the home and community environment.     Pt's spiritual, cultural and educational needs considered and pt agreeable to plan of care and goals.     Anticipated barriers to physical therapy: ongoing medical management    Goals:    Short Term Goals- 4 Weeks  Pt to demonstrate independence in performance of HEP in order to improve daily level of physical activity and improve carry over session to session.  Pt to improve B LE strength by > / = 1/2 MMT score in order to improve strength for daily activities.  Further goals to be established as appropriate next week - as BP allows.     Long Term Goals - 12 Weeks  Pt to demonstrate compliance with HEP > / = 3x per week in order to improve daily level of physical activity and improve carry over session to session.  Pt to improve B LE strength by > / = 1 MMT score in order to  improve strength for daily activities.  Further goals to be established as appropriate next week - as BP allows.    PLAN     Plan of care Certification: 7/19/2023 to 10/19/2023.     Outpatient Physical Therapy 2 times weekly for 12 weeks    Continue to monitor BP. Pt encouraged to present to YONIS Schwartz, PT, DPT  7/27/2023

## 2023-07-30 ENCOUNTER — HOSPITAL ENCOUNTER (EMERGENCY)
Facility: HOSPITAL | Age: 48
Discharge: HOME | End: 2023-07-30
Attending: EMERGENCY MEDICINE
Payer: COMMERCIAL

## 2023-07-30 VITALS
HEART RATE: 92 BPM | BODY MASS INDEX: 24.16 KG/M2 | RESPIRATION RATE: 16 BRPM | HEIGHT: 65 IN | TEMPERATURE: 98 F | OXYGEN SATURATION: 96 % | SYSTOLIC BLOOD PRESSURE: 124 MMHG | WEIGHT: 145 LBS | DIASTOLIC BLOOD PRESSURE: 71 MMHG

## 2023-07-30 DIAGNOSIS — R51.9 NONINTRACTABLE HEADACHE, UNSPECIFIED CHRONICITY PATTERN, UNSPECIFIED HEADACHE TYPE: Primary | ICD-10-CM

## 2023-07-30 DIAGNOSIS — I63.9 STROKE: ICD-10-CM

## 2023-07-30 PROBLEM — R20.2 FACIAL TINGLING: Status: ACTIVE | Noted: 2023-07-30

## 2023-07-30 LAB
ALBUMIN SERPL BCP-MCNC: 4.2 G/DL (ref 3.5–5.2)
ALP SERPL-CCNC: 98 U/L (ref 55–135)
ALT SERPL W/O P-5'-P-CCNC: 36 U/L (ref 10–44)
ANION GAP SERPL CALC-SCNC: 13 MMOL/L (ref 8–16)
AST SERPL-CCNC: 29 U/L (ref 10–40)
BASOPHILS # BLD AUTO: 0.4 K/UL (ref 0–0.2)
BASOPHILS NFR BLD: 2.9 % (ref 0–1.9)
BILIRUB SERPL-MCNC: 1.1 MG/DL (ref 0.1–1)
BUN SERPL-MCNC: 14 MG/DL (ref 6–20)
CALCIUM SERPL-MCNC: 10.2 MG/DL (ref 8.7–10.5)
CHLORIDE SERPL-SCNC: 100 MMOL/L (ref 95–110)
CHOLEST SERPL-MCNC: 160 MG/DL (ref 120–199)
CHOLEST/HDLC SERPL: 3.4 {RATIO} (ref 2–5)
CO2 SERPL-SCNC: 29 MMOL/L (ref 23–29)
CREAT SERPL-MCNC: 0.8 MG/DL (ref 0.5–1.4)
CREAT SERPL-MCNC: 0.8 MG/DL (ref 0.5–1.4)
DIFFERENTIAL METHOD: ABNORMAL
EOSINOPHIL # BLD AUTO: 0.7 K/UL (ref 0–0.5)
EOSINOPHIL NFR BLD: 5.2 % (ref 0–8)
ERYTHROCYTE [DISTWIDTH] IN BLOOD BY AUTOMATED COUNT: 12.5 % (ref 11.5–14.5)
EST. GFR  (NO RACE VARIABLE): >60 ML/MIN/1.73 M^2
GLUCOSE SERPL-MCNC: 107 MG/DL (ref 70–110)
HCT VFR BLD AUTO: 42.5 % (ref 37–48.5)
HDLC SERPL-MCNC: 47 MG/DL (ref 40–75)
HDLC SERPL: 29.4 % (ref 20–50)
HGB BLD-MCNC: 14 G/DL (ref 12–16)
IMM GRANULOCYTES # BLD AUTO: 0.26 K/UL (ref 0–0.04)
IMM GRANULOCYTES NFR BLD AUTO: 1.9 % (ref 0–0.5)
INR PPP: 1 (ref 0.8–1.2)
LDLC SERPL CALC-MCNC: 80.2 MG/DL (ref 63–159)
LYMPHOCYTES # BLD AUTO: 2.4 K/UL (ref 1–4.8)
LYMPHOCYTES NFR BLD: 17.1 % (ref 18–48)
MCH RBC QN AUTO: 30 PG (ref 27–31)
MCHC RBC AUTO-ENTMCNC: 32.9 G/DL (ref 32–36)
MCV RBC AUTO: 91 FL (ref 82–98)
MONOCYTES # BLD AUTO: 1.3 K/UL (ref 0.3–1)
MONOCYTES NFR BLD: 9.5 % (ref 4–15)
NEUTROPHILS # BLD AUTO: 8.7 K/UL (ref 1.8–7.7)
NEUTROPHILS NFR BLD: 63.4 % (ref 38–73)
NONHDLC SERPL-MCNC: 113 MG/DL
NRBC BLD-RTO: 0 /100 WBC
PLATELET # BLD AUTO: 556 K/UL (ref 150–450)
PMV BLD AUTO: 10.1 FL (ref 9.2–12.9)
POC PTINR: 1.1 (ref 0.9–1.2)
POC PTWBT: 13.6 SEC (ref 9.7–14.3)
POCT GLUCOSE: 110 MG/DL (ref 70–110)
POTASSIUM SERPL-SCNC: 2.8 MMOL/L (ref 3.5–5.1)
PROT SERPL-MCNC: 8.1 G/DL (ref 6–8.4)
PROTHROMBIN TIME: 10.7 SEC (ref 9–12.5)
RBC # BLD AUTO: 4.67 M/UL (ref 4–5.4)
SAMPLE: NORMAL
SAMPLE: NORMAL
SODIUM SERPL-SCNC: 142 MMOL/L (ref 136–145)
TRIGL SERPL-MCNC: 164 MG/DL (ref 30–150)
TSH SERPL DL<=0.005 MIU/L-ACNC: 1.33 UIU/ML (ref 0.4–4)
WBC # BLD AUTO: 13.73 K/UL (ref 3.9–12.7)

## 2023-07-30 PROCEDURE — 80061 LIPID PANEL: CPT | Performed by: EMERGENCY MEDICINE

## 2023-07-30 PROCEDURE — 93010 EKG 12-LEAD: ICD-10-PCS | Mod: ,,, | Performed by: INTERNAL MEDICINE

## 2023-07-30 PROCEDURE — 99284 EMERGENCY DEPT VISIT MOD MDM: CPT | Mod: ,,, | Performed by: PSYCHIATRY & NEUROLOGY

## 2023-07-30 PROCEDURE — 85025 COMPLETE CBC W/AUTO DIFF WBC: CPT | Performed by: EMERGENCY MEDICINE

## 2023-07-30 PROCEDURE — 84443 ASSAY THYROID STIM HORMONE: CPT | Performed by: EMERGENCY MEDICINE

## 2023-07-30 PROCEDURE — 63600175 PHARM REV CODE 636 W HCPCS: Performed by: EMERGENCY MEDICINE

## 2023-07-30 PROCEDURE — 82962 GLUCOSE BLOOD TEST: CPT

## 2023-07-30 PROCEDURE — 85610 PROTHROMBIN TIME: CPT

## 2023-07-30 PROCEDURE — 96374 THER/PROPH/DIAG INJ IV PUSH: CPT | Mod: 59

## 2023-07-30 PROCEDURE — 85610 PROTHROMBIN TIME: CPT | Performed by: EMERGENCY MEDICINE

## 2023-07-30 PROCEDURE — 96361 HYDRATE IV INFUSION ADD-ON: CPT

## 2023-07-30 PROCEDURE — 93010 ELECTROCARDIOGRAM REPORT: CPT | Mod: ,,, | Performed by: INTERNAL MEDICINE

## 2023-07-30 PROCEDURE — 99284 PR EMERGENCY DEPT VISIT,LEVEL IV: ICD-10-PCS | Mod: ,,, | Performed by: PSYCHIATRY & NEUROLOGY

## 2023-07-30 PROCEDURE — 80053 COMPREHEN METABOLIC PANEL: CPT | Performed by: EMERGENCY MEDICINE

## 2023-07-30 PROCEDURE — 96375 TX/PRO/DX INJ NEW DRUG ADDON: CPT | Mod: 59

## 2023-07-30 PROCEDURE — 99285 EMERGENCY DEPT VISIT HI MDM: CPT | Mod: 25

## 2023-07-30 PROCEDURE — 25500020 PHARM REV CODE 255: Performed by: EMERGENCY MEDICINE

## 2023-07-30 PROCEDURE — 94761 N-INVAS EAR/PLS OXIMETRY MLT: CPT

## 2023-07-30 PROCEDURE — 82565 ASSAY OF CREATININE: CPT

## 2023-07-30 PROCEDURE — 99900035 HC TECH TIME PER 15 MIN (STAT)

## 2023-07-30 RX ORDER — DIPHENHYDRAMINE HYDROCHLORIDE 50 MG/ML
25 INJECTION INTRAMUSCULAR; INTRAVENOUS
Status: COMPLETED | OUTPATIENT
Start: 2023-07-30 | End: 2023-07-30

## 2023-07-30 RX ORDER — METHYLPREDNISOLONE SOD SUCC 125 MG
125 VIAL (EA) INJECTION
Status: COMPLETED | OUTPATIENT
Start: 2023-07-30 | End: 2023-07-30

## 2023-07-30 RX ORDER — DIPHENHYDRAMINE HCL 25 MG
25 CAPSULE ORAL
Status: DISCONTINUED | OUTPATIENT
Start: 2023-07-30 | End: 2023-07-30

## 2023-07-30 RX ADMIN — IOHEXOL 100 ML: 350 INJECTION, SOLUTION INTRAVENOUS at 11:07

## 2023-07-30 RX ADMIN — DIPHENHYDRAMINE HYDROCHLORIDE 25 MG: 50 INJECTION, SOLUTION INTRAMUSCULAR; INTRAVENOUS at 11:07

## 2023-07-30 RX ADMIN — METHYLPREDNISOLONE SODIUM SUCCINATE 125 MG: 125 INJECTION, POWDER, FOR SOLUTION INTRAMUSCULAR; INTRAVENOUS at 11:07

## 2023-07-30 NOTE — HPI
47F presented to ED for R head tingling, headache and R arm tingling. Initially called as stroke code by ED due to her recent stroke, and code stroke cancelled by myself d/t her symptoms not correlating with stroke and no signs of LVO. She received CTA per ED concern and developed a severe reaction with hypotension down to 70's/50's (baseline -180's), diffuse flushing, difficulty breathing. She received solumedrol and benadryl and went to MRI while hypotensive. MRI was obtained and showed small focus of ischemia in subcortical white matter. Upon my arrival to bedside well after the MRI she remained hypotensive 90's/60's until several repeated examinations over the course of an hour until her BP reached 120'/80's. Her flushing and tremulousness and e/o reynaud's phenomena improved over that time period as well.    She had a recent R PCA territory infarct earlier in the month which has led to a persistent left lower quadrantanopsia. She had also had migraine symptoms at that time, although likely associated with the stroke and not the cause as she has pretty extensive atherosclerotic disease bilaterally within PCA and basilar arteries. She also has historically very poorly controlled hypertension with averages close to 190's/200's and has an extensive subcortical white matter progression and multiple chronic lacunar infarcts. After discharge she developed a significant drug rash diffusely throughout her entire body with eosinophilia and low grade fever which improved on its own with cessation of losartan. On 7/24 she reached out to my MA regarding sharp neck pains in back of neck but did not seek Ed at that time.

## 2023-07-30 NOTE — ASSESSMENT & PLAN NOTE
This is a complicated patient well known to me who I took care of in the hospital in early July and saw her as a clinic follow-up. Her HPI below is quite relevant and outlines her recent PCA stroke and drug rash and migrainous symptoms.    Re: CTA which was ordered by ED after stroke code cancelled - I spoke directly with Dr. Post and reviewed multiple prior studies including the normal MRI vessel wall imaging performed during her hospitalization. Overall stability and lack of progression does not suggest a vasculitis at this time.    Re: MRI+ DWI lesion found on today's MRI - this is an incidental, asymptomatic lesion. This is not in the prior region of PCA infarct. The highest thing on the ddx is her obtaining an MRI with a blood pressure approximately 100 mmHg lower than her baseline which led to selective ischemia in an area of her brain that has clear demonstration of chronic microvascular disease. There was nothing per history or on examination that reflected this to be a symptomatic lesion.     I did spend a very long period of time with patient and her  discussing these feelings and that I didn't feel strongly at this time to pursue any workup for a vasculitis or other disease process. Given the fact that she was actually symptomatically improved (no longer had headache or tingling) and back to her neurological baseline, and the aforementioned reasoning from the studies above, I gave her the option of returning home which she did agree to.  She has an appointment tomorrow with Elisa Ortez. She was told to call 911 if any sudden change in neurological status as these could represent stroke.

## 2023-07-30 NOTE — ED PROVIDER NOTES
Encounter Date: 7/30/2023       History     Chief Complaint   Patient presents with    Tingling     Hx stroke July 4, tingling to r side of head, no droop noted, pain to r shoulder and arm     HPI  47-year-old female who presents with headaches and tingling.  She was admitted in early July with similar symptoms however she had 5 days of headaches in the left temporal and bilateral occipital areas as well as some vision changes with floating dots on the left greater than the right.  She also had some intermittent diminished sensation in her right upper extremity.  She was evaluated with CTA and MRI.  MRI showed infarcts in the right PCA territory.  CTA showed bilateral PCA stenosis.  There was concern from migrainous infarcts.  She did not get TNK.  She was treated for her headaches and medically managed.  She was continued on aspirin, clopidogrel, magnesium, riboflavin, verapamil and reportedly follows up with Neurology outpatient.  She actually has an appointment tomorrow.  She had some right-sided headaches a few days ago but these went away so she did not call her neurologist.  She woke up this morning and at around 8:00 a.m. started having tingling and headaches on the right side of her head.  Other than that she does not have any new symptoms other than pain going down her right arm.  She denies any new vision changes, speech changes, facial tingling or numbness, extremity weakness or numbness.  No chest pain or shortness of breath.  Came in just because headaches are similar to her prior admission which was worrisome for her.    Review of patient's allergies indicates:   Allergen Reactions    Losartan Rash     Fever, eosinophilia, diffuse rash    Iodine and iodide containing products Other (See Comments)     Patient becomes hypotensive with DRESS-like syndrome even with pre-medication    [id number] antimigraine agents Other (See Comments)     Patient has a history of probable migrainous stroke. Please  proceed with caution with triptan medications specifically, given effects on vasculature. Patient may tolerate this medication, but added to chart simply to ensure it's brought to the attention of prescribers.    Penicillins Rash     Past Medical History:   Diagnosis Date    Anxiety     ASD (atrial septal defect)     Endometriosis     Headache(784.0)     Hypertension     Kidney stones fibomyalgia, migraine, heart closer, kidey stones     Past Surgical History:   Procedure Laterality Date    APPENDECTOMY      CARDIAC CATHETERIZATION  10/01/2010    ASD device closure     SECTION      x 2    CHOLECYSTECTOMY      HYSTERECTOMY      12 yrs ago, still has ovaries, due to endometriosis by Dr. Hernandez     Family History   Adopted: Yes   Problem Relation Age of Onset    Migraines Son     No Known Problems Son      Social History     Tobacco Use    Smoking status: Never    Smokeless tobacco: Never   Substance Use Topics    Alcohol use: No     Alcohol/week: 0.0 standard drinks of alcohol    Drug use: No     Review of Systems   Constitutional:  Negative for fever.   HENT:  Negative for sore throat.    Respiratory:  Negative for shortness of breath.    Cardiovascular:  Negative for chest pain.   Gastrointestinal:  Negative for nausea.   Genitourinary:  Negative for dysuria.   Musculoskeletal:  Positive for arthralgias. Negative for back pain.   Skin:  Negative for rash.   Neurological:  Positive for headaches. Negative for weakness.   Hematological:  Does not bruise/bleed easily.   All other systems reviewed and are negative.      Physical Exam     Initial Vitals [23 1109]   BP Pulse Resp Temp SpO2   (!) 141/79 92 18 98.9 °F (37.2 °C) 98 %      MAP       --         Physical Exam    Nursing note and vitals reviewed.  Constitutional: She appears well-developed and well-nourished.   HENT:   Head: Normocephalic and atraumatic.   Nose: Nose normal.   Eyes: Conjunctivae and EOM are normal. Pupils are equal, round, and  reactive to light.   Neck: Neck supple.   Normal range of motion.  Cardiovascular:  Normal rate, regular rhythm, normal heart sounds and intact distal pulses.           Pulmonary/Chest: Breath sounds normal. No respiratory distress. She has no wheezes. She has no rhonchi. She has no rales.   Abdominal: Abdomen is soft. Bowel sounds are normal. She exhibits no distension. There is no abdominal tenderness.   Musculoskeletal:         General: No tenderness or edema. Normal range of motion.      Cervical back: Normal range of motion and neck supple.     Neurological: She is alert and oriented to person, place, and time.   NIHSS:  0 = alert  0 = answers both questions correctly  0 = follows both commands  0 = normal gaze  0 = no visual loss  0 = no facial palsy  0 = no drift in b/l upper extremities  0 = no drift in b/l lower extremities  0 = no limb ataxia  0 = no sensory loss  0 = no aphasia  0 = no dysarthria  0 = no extinction           ED Course   Procedures  Labs Reviewed   CBC W/ AUTO DIFFERENTIAL - Abnormal; Notable for the following components:       Result Value    WBC 13.73 (*)     Platelets 556 (*)     Immature Granulocytes 1.9 (*)     Gran # (ANC) 8.7 (*)     Immature Grans (Abs) 0.26 (*)     Mono # 1.3 (*)     Eos # 0.7 (*)     Baso # 0.40 (*)     Lymph % 17.1 (*)     Basophil % 2.9 (*)     All other components within normal limits   COMPREHENSIVE METABOLIC PANEL - Abnormal; Notable for the following components:    Potassium 2.8 (*)     Total Bilirubin 1.1 (*)     All other components within normal limits   LIPID PANEL - Abnormal; Notable for the following components:    Triglycerides 164 (*)     All other components within normal limits   PROTIME-INR   TSH   ISTAT PROCEDURE   ISTAT CREATININE   POCT GLUCOSE          Imaging Results               MRI Brain Without Contrast (Final result)  Result time 07/30/23 12:38:08      Final result by Gaurav Layton MD (07/30/23 12:38:08)                   Impression:       1. Right centrum semiovale small, acute lacunar type infarct, new from 07/14/2023 study.  2. Nearly completely resolved evolving late subacute aged infarction in the right PCA territory.  3. Grossly stable additional findings as above.  This report was flagged in Epic as abnormal.      Electronically signed by: Gaurav Layton MD  Date:    07/30/2023  Time:    12:38               Narrative:    EXAMINATION:  MRI BRAIN WITHOUT CONTRAST    CLINICAL HISTORY:  Headache, chronic, new features or increased frequency;.    TECHNIQUE:  Multiplanar multisequence MR imaging of the brain was performed without contrast.    COMPARISON:  CTA stroke multiphase earlier same day, MRI brain 07/14/2023    FINDINGS:  Intracranial compartment:    Evolving region of signal abnormality in the right PCA territory involving the right occipital lobe primarily in the white matter and right aspect spinal corpus callosum, now barely visible most compatible with evolving late subacute aged infarction.  No hemorrhagic conversion.    Interval small focus of restricted diffusion with corresponding edema signal within the midportion of the right centrum semiovale.    No extra-axial blood or fluid collections.  The ventricles are midline and stable in overall size and configuration without distortion by mass effect or acute hydrocephalus.    Remainder of the brain parenchyma appears unchanged including scattered T2/FLAIR signal hyperintensity within the supratentorial white matter concerning for age advanced chronic ischemic change with scattered cystic encephalomalacia at the right more than left centrum semiovale, left thalamus and right kriss suggestive for remote lacunar-type infarcts.  No mass lesion, acute hemorrhage, new major vascular territory infarct or midline shift.  Unchanged punctate foci of susceptibility within the cerebral hemispheres specifically at the left parietal cortex, right inferior parietal white matter and left posterior  temporal lobe suggestive for remote microhemorrhages.    Normal vascular flow voids are preserved.  Sella and parasellar regions are within normal limits.    Skull/extracranial contents (limited evaluation): Bone marrow signal intensity is normal.  Craniocervical junction is within normal limits.                                       CTA STROKE MULTI-PHASE (Final result)  Result time 07/30/23 12:03:19      Final result by Christiano Post MD (07/30/23 12:03:19)                   Impression:      No new acute intracranial process with prior ischemic changes including a recent right occipital infarct.  No intracranial hemorrhage.  Lesions in the white matter may reflect chronic infarcts unless there is clinical suspicion of underlying demyelinating disease.    No significant stenosis at the carotid bifurcations by NASCET criteria.  The vertebral arteries are patent.    Mild to moderate atherosclerotic narrowing of the mid basilar is questionably mildly increased from the prior recent study.  Moderate narrowing of the bilateral PCA again identified.  No new major branch occlusion.    MR imaging to evaluate for acute infarct to be considered if clinically warranted.    Sclerotic lesions within the cervical spine are again identified nonspecific.  Follow-up imaging in 6 months to be considered if clinically warranted.      Electronically signed by: Christiano Post  Date:    07/30/2023  Time:    12:03               Narrative:    EXAMINATION:  CTA STROKE MULTI-PHASE    CLINICAL HISTORY:  recent stroke, headache/facial tingling, no new deficits;    TECHNIQUE:  Non contrast low dose axial images were obtained thought the head. CT angiogram was performed from the level of the vicki to the top of the head following the IV administration of 100mL of Omnipaque 350.   Sagittal and coronal reconstructions and maximum intensity projection reconstructions were performed. Arterial stenosis percentages are based on NASCET measurement  criteria.  Two additional phases of immediate post-contrast CTA images were performed through the head alone.    3D reformats were created on an independent workstation to evaluate the [id number].    COMPARISON:  CTA 7-5-2023, MRI 07/14/2020    FINDINGS:  There is no evidence of hydrocephalus mass effect intracranial hemorrhage or acute territorial infarct.  The recent right occipital infarct is vaguely identified.  No hemorrhagic conversion.    Chronic left thalamic right pontine and deep white matter infarcts again noted.  Please note the white matter lesions are nonspecific but can also be seen with demyelinating disease if there is appropriate clinical history.    CTA:    There is no advanced stenosis at the origin of the vessels from the aortic arch or at the origin of the vertebral arteries from the subclavian arteries with the left vertebral artery dominant.    Soft plaque without significant stenosis by NASCET criteria at the left greater than right carotid bifurcations.    Intracranially no major branch occlusion is identified.  There is mild to moderate narrowing of the mid basilar artery that is questionably mildly increased from the recent prior study.  There is moderate atherosclerotic narrowing of the PCA bilaterally again noted.    Developmentally the right A1 segment is mildly hypoplastic.  A prominent posterior communicating artery on the left is noted.  The venous sinuses are patent.    No soft tissue mass is identified in the neck other than for nonspecific heterogeneity of the thyroid gland with a nonspecific 10 mm nodule.  There are nonspecific sclerotic lesions within the cervical spine again identified as described on the prior study.    These findings were communicated to Dr. Kilgore and Dr. Sandoval at 11:48 a.m. on 07/30/2023.                                       Medications   methylPREDNISolone sodium succinate injection 125 mg (125 mg Intravenous Given 7/30/23 1131)   diphenhydrAMINE  injection 25 mg (25 mg Intravenous Given 7/30/23 1131)   iohexoL (OMNIPAQUE 350) injection 100 mL (100 mLs Intravenous Given 7/30/23 1128)   lactated ringers bolus 500 mL (0 mLs Intravenous Stopped 7/30/23 1250)     Medical Decision Making:   Initial Assessment:   47-year-old female with a significant past medical history of hypertension, prior ASD with closure, MVP, and a recent admission for bilateral PCA stenosis and migrainous infarct who presents with headaches.  They started around 8:00 a.m. this morning.  She came in just because this was a primary symptom from her prior infarcts.  Code stroke was called from triage.  On our exam here, no focal neuro deficits.  Vital signs stable.  Clinical Tests:   Lab Tests: Ordered and Reviewed  Radiological Study: Ordered and Reviewed  Medical Tests: Ordered and Reviewed  ED Management:  EKG, glucose, basic labs, CT head and neck ordered  Stroke team paged from triage and will be down to see the patient  Dispo per stroke team    Pt became hypotensive and flushed following administration of contrast dye for CTA. Symptoms improved spontaneously. Concern for severe contrast allergy. Have updated pt's allergy list to reflect this allergy. CTA does not demonstrate any acute abnormalities. MRI concerning for acute lacunar infarct. Discussed case with stroke team who believe MRI findings are a result of recent hypotensive episode and do not reflect acute cause of pt's presentation. Stroke team are very familiar with the pt and after extensive shared decision making have decided on the plan to discharge the pt to follow up as an outpatient. Pt well appearing and in no acute distress. Return precautions advised            Attending Attestation:   Physician Attestation Statement for Resident:  As the supervising MD   Physician Attestation Statement: I have personally seen and examined this patient.   I agree with the above history.  -:   As the supervising MD I agree with the above  PE.     As the supervising MD I agree with the above treatment, course, plan, and disposition.                                 Clinical Impression:   Final diagnoses:  [I63.9] Stroke  [R51.9] Nonintractable headache, unspecified chronicity pattern, unspecified headache type (Primary)        ED Disposition Condition    Discharge Stable        Critical care time spent on the evaluation and treatment of severe organ dysfunction, review of pertinent labs and imaging studies, discussions with consulting providers and discussions with patient/family: 30 minutes.    ED Prescriptions    None       Follow-up Information       Follow up With Specialties Details Why Contact Info    Luc Venegas MD Internal Medicine In 1 week  [address] [place] [address]  [address]  [place] [address]  [phone]               Yan Sandoval Jr., MD  Resident  07/30/23 2401       Jenniffer Kilgore MD  07/31/23 [id number]

## 2023-07-30 NOTE — SUBJECTIVE & OBJECTIVE
Past Medical History:   Diagnosis Date    Anxiety     ASD (atrial septal defect)     Endometriosis     Headache(784.0)     Hypertension     Kidney stones fibomyalgia, migraine, heart closer, kidey stones     Past Surgical History:   Procedure Laterality Date    APPENDECTOMY      CARDIAC CATHETERIZATION  10/01/2010    ASD device closure     SECTION      x 2    CHOLECYSTECTOMY      HYSTERECTOMY      12 yrs ago, still has ovaries, due to endometriosis by Dr. Hernandez     Family History   Adopted: Yes   Problem Relation Age of Onset    Migraines Son     No Known Problems Son      Social History     Tobacco Use    Smoking status: Never    Smokeless tobacco: Never   Substance Use Topics    Alcohol use: No     Alcohol/week: 0.0 standard drinks of alcohol    Drug use: No     Review of patient's allergies indicates:   Allergen Reactions    Losartan Rash     Fever, eosinophilia, diffuse rash    Iodine and iodide containing products Other (See Comments)     Patient becomes hypotensive with DRESS-like syndrome even with pre-medication    [id number] antimigraine agents Other (See Comments)     Patient has a history of probable migrainous stroke. Please proceed with caution with triptan medications specifically, given effects on vasculature. Patient may tolerate this medication, but added to chart simply to ensure it's brought to the attention of prescribers.    Penicillins Rash       Medications: I have reviewed the current medication administration record.    (Not in a hospital admission)      Review of Systems  Objective:     Vital Signs (Most Recent):  Temp: 98.4 °F (36.9 °C) (23 1443)  Pulse: 92 (23 1444)  Resp: 16 (23 1444)  BP: 124/71 (23 1444)  SpO2: 96 % (23 1444)    Vital Signs Range (Last 24H):  Temp:  [97.9 °F (36.6 °C)-98.9 °F (37.2 °C)]   Pulse:  []   Resp:  [15-25]   BP: ()/(51-79)   SpO2:  [93 %-98 %]        Physical Exam         Neurological Exam:  "  AAOx4  No facial asymmetry  Symmetrical strength noted in upper and lower extremity  No difficulty with independent ambulation and pivoting  No sensory asymmetry      Laboratory:  CMP:   Recent Labs   Lab 07/30/23  1136   CALCIUM 10.2   ALBUMIN 4.2   PROT 8.1      K 2.8*   CO2 29      BUN 14   CREATININE 0.8   ALKPHOS 98   ALT 36   AST 29   BILITOT 1.1*     CBC:   Recent Labs   Lab 07/30/23  1136   WBC 13.73*   RBC 4.67   HGB 14.0   HCT 42.5   *   MCV 91   MCH 30.0   MCHC 32.9     Lipid Panel:   Recent Labs   Lab 07/30/23  1136   CHOL 160   LDLCALC 80.2   HDL 47   TRIG 164*     Coagulation:   Recent Labs   Lab 07/30/23  1137   INR 1.1     Hgb A1C: No results for input(s): "HGBA1C" in the last 168 hours.  TSH:   Recent Labs   Lab 07/30/23  1136   TSH 1.332       Diagnostic Results:      Brain imaging:  MRI brain - new area of small infarct in subcortical white matter    Vessel Imaging:  CTA - stable findings of posterior predominant atherosclerosis    "

## 2023-07-31 ENCOUNTER — PATIENT MESSAGE (OUTPATIENT)
Dept: PRIMARY CARE CLINIC | Facility: CLINIC | Age: 48
End: 2023-07-31
Payer: COMMERCIAL

## 2023-07-31 ENCOUNTER — OFFICE VISIT (OUTPATIENT)
Dept: NEUROLOGY | Facility: CLINIC | Age: 48
End: 2023-07-31
Payer: COMMERCIAL

## 2023-07-31 VITALS
WEIGHT: 146.81 LBS | BODY MASS INDEX: 24.43 KG/M2 | HEART RATE: 103 BPM | DIASTOLIC BLOOD PRESSURE: 96 MMHG | SYSTOLIC BLOOD PRESSURE: 140 MMHG

## 2023-07-31 DIAGNOSIS — F41.1 GAD (GENERALIZED ANXIETY DISORDER): ICD-10-CM

## 2023-07-31 DIAGNOSIS — G43.009 MIGRAINE WITHOUT AURA AND WITHOUT STATUS MIGRAINOSUS, NOT INTRACTABLE: ICD-10-CM

## 2023-07-31 DIAGNOSIS — I63.531 STROKE DUE TO OCCLUSION OF RIGHT POSTERIOR CEREBRAL ARTERY: ICD-10-CM

## 2023-07-31 DIAGNOSIS — I63.89 OTHER CEREBRAL INFARCTION: ICD-10-CM

## 2023-07-31 DIAGNOSIS — F34.89 OTHER SPECIFIED PERSISTENT MOOD DISORDERS: ICD-10-CM

## 2023-07-31 DIAGNOSIS — G43.709 CHRONIC MIGRAINE WITHOUT AURA WITHOUT STATUS MIGRAINOSUS, NOT INTRACTABLE: Primary | ICD-10-CM

## 2023-07-31 DIAGNOSIS — F33.42 RECURRENT MAJOR DEPRESSIVE DISORDER, IN FULL REMISSION: ICD-10-CM

## 2023-07-31 DIAGNOSIS — I10 PRIMARY HYPERTENSION: ICD-10-CM

## 2023-07-31 PROCEDURE — 99999 PR PBB SHADOW E&M-EST. PATIENT-LVL IV: ICD-10-PCS | Mod: PBBFAC,,, | Performed by: NURSE PRACTITIONER

## 2023-07-31 PROCEDURE — 4010F ACE/ARB THERAPY RXD/TAKEN: CPT | Mod: CPTII,S$GLB,, | Performed by: NURSE PRACTITIONER

## 2023-07-31 PROCEDURE — 3044F HG A1C LEVEL LT 7.0%: CPT | Mod: CPTII,S$GLB,, | Performed by: NURSE PRACTITIONER

## 2023-07-31 PROCEDURE — 99999 PR PBB SHADOW E&M-EST. PATIENT-LVL IV: CPT | Mod: PBBFAC,,, | Performed by: NURSE PRACTITIONER

## 2023-07-31 PROCEDURE — 1111F PR DISCHARGE MEDS RECONCILED W/ CURRENT OUTPATIENT MED LIST: ICD-10-PCS | Mod: CPTII,S$GLB,, | Performed by: NURSE PRACTITIONER

## 2023-07-31 PROCEDURE — 99215 PR OFFICE/OUTPT VISIT, EST, LEVL V, 40-54 MIN: ICD-10-PCS | Mod: S$GLB,,, | Performed by: NURSE PRACTITIONER

## 2023-07-31 PROCEDURE — 99215 OFFICE O/P EST HI 40 MIN: CPT | Mod: S$GLB,,, | Performed by: NURSE PRACTITIONER

## 2023-07-31 PROCEDURE — 3077F PR MOST RECENT SYSTOLIC BLOOD PRESSURE >= 140 MM HG: ICD-10-PCS | Mod: CPTII,S$GLB,, | Performed by: NURSE PRACTITIONER

## 2023-07-31 PROCEDURE — 3008F BODY MASS INDEX DOCD: CPT | Mod: CPTII,S$GLB,, | Performed by: NURSE PRACTITIONER

## 2023-07-31 PROCEDURE — 3080F PR MOST RECENT DIASTOLIC BLOOD PRESSURE >= 90 MM HG: ICD-10-PCS | Mod: CPTII,S$GLB,, | Performed by: NURSE PRACTITIONER

## 2023-07-31 PROCEDURE — 3080F DIAST BP >= 90 MM HG: CPT | Mod: CPTII,S$GLB,, | Performed by: NURSE PRACTITIONER

## 2023-07-31 PROCEDURE — 4010F PR ACE/ARB THEARPY RXD/TAKEN: ICD-10-PCS | Mod: CPTII,S$GLB,, | Performed by: NURSE PRACTITIONER

## 2023-07-31 PROCEDURE — 3008F PR BODY MASS INDEX (BMI) DOCUMENTED: ICD-10-PCS | Mod: CPTII,S$GLB,, | Performed by: NURSE PRACTITIONER

## 2023-07-31 PROCEDURE — 3077F SYST BP >= 140 MM HG: CPT | Mod: CPTII,S$GLB,, | Performed by: NURSE PRACTITIONER

## 2023-07-31 PROCEDURE — 1111F DSCHRG MED/CURRENT MED MERGE: CPT | Mod: CPTII,S$GLB,, | Performed by: NURSE PRACTITIONER

## 2023-07-31 PROCEDURE — 3044F PR MOST RECENT HEMOGLOBIN A1C LEVEL <7.0%: ICD-10-PCS | Mod: CPTII,S$GLB,, | Performed by: NURSE PRACTITIONER

## 2023-07-31 RX ORDER — BENAZEPRIL/HYDROCHLOROTHIAZIDE 20 MG-25MG
1 TABLET ORAL DAILY
Qty: 90 TABLET | Refills: 3 | Status: SHIPPED | OUTPATIENT
Start: 2023-07-31

## 2023-07-31 RX ORDER — UBROGEPANT 100 MG/1
TABLET ORAL
Qty: 16 TABLET | Refills: 5 | Status: SHIPPED | OUTPATIENT
Start: 2023-07-31

## 2023-07-31 NOTE — TELEPHONE ENCOUNTER
No care due was identified.  [facility] Embedded Care Due Messages. Reference number: [id number].   7/31/2023 10:00:59 AM CDT

## 2023-07-31 NOTE — PROGRESS NOTES
Established Patient   SUBJECTIVE:  Patient ID: Mirna Pollock   Chief Complaint: Follow-up    History of Present Illness:  Mirna Pollock is a 47 y.o. female who presents to clinic alone for follow-up of headaches.     07/31/2023 - Interval History:  Significant changes in medical history since last visit 6/8.  Patient presented to ED on 7/5 with complaints of intermittent migraines with visual migraines x 5 days.  Aura was described as black dots floating on both sides of her vision.  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  She was started on verapamil and treated for migraine, blood pressure optimized.   She has established care with Dr. Osman who she has followed in outpatient vascular neurology clinic.  Still experiencing visual symptoms, has appt with neuro-ophthalmology late August.  She has continued experiencing frequent migraines since this hospitalization.      Otherwise, information below is still accurate and current.     03/16/2023- Interval History:  Currently nearly 3 months overdue for her Botox injections, has noticed an uptick in migraine frequency over the last 2 months or so.  Prior to this, migraines had remained stable.  She is no longer taking aimovig, did not realize she had to contact pharmacy to get prescription sent to her.  She has continued tizanidine 6 mg nightly.  Treats acute migraines with ubrelvy 100 mg.  Blood pressure remains above goal, though lower than readings at previous visits.       Otherwise, information below is still accurate and current.      10/27/2022- Interval History:  Migraines have been very well controlled recently, got a new job which has been better for her, less stress.  Blood pressure controlled.  Patient has continued to achieve a greater than 50% reduction in the frequency of their migraines with continued Botox injections.  Wishes to proceed with today's injections as scheduled.      Otherwise, information below is still  accurate and current.      12/09/2021- Interval History:  Seen by internal medicine on 12/2, started on labetalol 100 mg BID and told to follow-up with her primary care provider (affiliated with [facility]) as soon as possible for continued management and monitoring.  Unfortunately she has not scheduled an appointment with her PCP yet.  Blood pressure improved today, though still markedly elevated.    Continues to experience frequent migraines, last round of Botox over 6 months ago.  She has tried ubrelvy 100 mg tabs which is effective for treatment of acute migraines.  Migraines historically have responded very well to Botox injections and wishes to proceed with today's injections as scheduled.      Otherwise, information below is still accurate and current.      12/02/2021 - Interval History:  Patient presents to clinic today for Botox injections.  BP initially 221/147 mmHg automatically.  Rechecked manually at 218/140 mmHg.  She denies weakness, confusion, vision changes, dizziness, chest pain, SOB.  In August, she presented for Botox, blood pressure was elevated, she was referred to ED and instructed to schedule f/up appointment with her primary care provider.  She does not want to return to ED today as she does not feel they do anything for her.  Discussed I do not feel comfortable injecting her with blood pressure as high as it is in clinic today.  I have scheduled her an appointment with primary care here at [facility] later this morning and have instructed her to call her primary care provider's office today to schedule an appointment as soon as possible.    She does admit she has been under increased stress, mother in law is moving in to her home in two weeks.  She has not been sleeping well despite taking tizanidine 6 mg and ambien nightly.  She has continued seeing her psychiatrist regularly, last seen in September.    Feels she has been suffering with frequent migraines likely because her last round of  Botox was nearly 6 months ago.  She has been accumulating occurrences at work due to missed days for migraines.  She is not eligible for intermittent FMLA until next summer.  She has continued using Aimovig 140 mg SQ monthly, though she uses it inconsistently as pharmacy does not call to remind her she is due for her next injection.  Treats acute migraines with vistaril 25 mg capsule, initially was helping her sleep however feels it is losing its effectiveness.      Otherwise, information below is still accurate and current.      06/04/2021- Interval History:  Migraines have been under good control, reports she has not had many migraines what so ever over the last 3 months.  Blood pressure elevated in clinic today, still has not seen PCP in quite some time.  Would like to proceed with next round of botox as scheduled today.      Otherwise, information below is still accurate and current.      02/25/2021- Interval History:  Migraines have gradually increased in frequency over the last few months, of note, she is currently 3 months overdue for her Botox injections.  For migraine prevention, she has continued with Aimovig 140 mg SQ monthly, tizanidine 6 mg qhs, amitriptyline 25 mg qhs and cymbalta 60 mg daily.  Treats acute migraines with sumatriptan 100 mg tabs, has vistaril 25 mg capsule available for rescue treatment.  She feels once she gets back on track with Botox injections, migraines are likely to be optimally controlled.       Otherwise, information below is still accurate and current.      09/24/2020- Interval History:  She continues to feels migraines are well controlled on her current regimen. She continues to experience a greater than 50% reduction in the frequency of her migraines since starting Botox injections and wishes to continue with Botox for migraine control.       Otherwise, information below is still accurate and current.      06/09/2020- Interval History:  Migraines continue to be well  controlled with dual Aimovig and Botox injections, she is very pleased with the current state of her migraines and wishes to continue with current treatment plan.       Otherwise, information below is still accurate and current.      03/25/2020- Interval History:  She is about a month overdue for her Botox injections and has noticed her migraines have gradually become more frequent and more intense.  Prior to one month ago, migraines had been under good control.  She does admit she has been under a significant amount of stress related to Covid-19 outbreak.       Otherwise, information below is still accurate and current.      12/04/2019- Interval History:  Reports she has been experiencing 5-6 migraines per month, admits she has been under increased stress found her birth mother who was not interested in meeting her, is in contact with twin brothers.   She has continued taking aimovig 140 mg, which she feels has greatly improved her.  She typically wakes up with a migraine if she is going to get one, typically is resolved with one dose of sumatriptan 100 mg, will feel back to normal by the afternoon.   She is pleased with the current state of her migraines and attributes the increased frequency of migraines over the last 12 weeks to fluctuating weather and increased stress.  Does not wish to make adjustments to her treatment plan at this time.      Otherwise, information below is still accurate and current.      08/16/2019- Interval History:  Migraines and headaches continue to respond well to Botox and Aimovig, gets one severe migraine per month, and 1-2 moderate migraines.  She has been treating her migraines with ibuprofen, which is not very effective.  In the past, she was using triptans, however she does not have any abortive medication available at this time.  Migraines continue to feel the same as they always have, she denies any change in the quality or nature of her migraines.  She is happy with her current  "regimen and does not feel adjustments to her treatment plan are needed at this time.    She has additional concerns including frequently feeling as if she is more off balance than usual, is occurring more frequently as time progresses.  She is not dizzy, just feels as if she is going to fall over, occurs 8-10 times per month.  She is also dropping things more frequently.       05/22/2019- Interval History:  Botox continues to be effective for migraine prevention, for the first 8-10 weeks she experiences less than 3 migraines per month.  She has continued using Aimovig 140 mg SQ monthly injections.  She is very happy with the response of her migraines to Botox injections and wishes to continue with current treatment plan.        02/14/2019- Interval History:  Headaches and migraines continue to be "much better", "I am not getting them nearly as much", though she is keenly aware of when a cold front is coming through as she will always get a migraine.  She has continued using Aimovig monthly as well as taking tizanidine, cymbalta, and amitriptyline daily for migraine prevention.  She is very happy with the current state of her migraines and does not wish to make any adjustments to her treatment plan at this time.  Migraines continue to feel the same as they always have.     11/01/2018- Interval History:  Headaches have been significantly better, she has only had 3-4 migraines since her last round of Botox and is very happy Botox is helping decrease her migraine frequency.  She has been using Aimovig monthly, which she also feels has been helping decrease her migraine frequency.  Migraines continue to feel the same as they always have, she denies any change in the quality or nature of her migraines.  Wishes to continue with Botox injections due to the pronounced effect it has had on her migraines.      08/08/2018- Interval History:  Finds her migraines are no better since last round of Botox, admits she was under " increased stress at one point due to issues with her 's job.  She does know a lot of her migraines are triggered from stress.  She has been getting a lot of migraines above her eyes as well as throughout her occipitalis and neck.  Overall, she does wish to continue with Botox injections, but is open to discussing additional prevention options.      05/22/2018- Interval History:  Despite nerve blocks two weeks ago, she has been suffering with a headache nearly everyday since her last visit.  She did try Zomig nasal spray, however she took the first dose on day two of her migraine because she was nervous.  She does think it lessened the pain of her migraine, however did not abort it.  She did not repeat the dose.  Additionally, she continues to report poor sleep nightly.  She stopped drinking caffeine.  Headaches are the same as they have always been.  She denies any change in the nature or quality of her headaches. Risks, Benefits, and potential side effects of Botox discussed with patient.  Alternative treatments offered.  After thorough discussed regarding the procedure, patient has decided to move forward with initiating Botox injections for Chronic Migraine.  Patient understands we will complete 3 rounds of injections, if after 3 rounds, we do not see a 50% reduction in headaches, we will discontinue Botox injections.      05/11/2018 - Interval History:  This past week she has only been able to work two days, has missed 3 days of work this week due to migraines.  She still is unsure about getting Botox injections for chronic migraine, she would like to try it but her  does not feel this is a good treatment option, she is trying to convince him.  Of note, blood pressure significantly lower in today's visit than in past visits.  She reports her psychiatrist has discontinued vyvanse until she is given clearance from Cardio to restart taking Vyvanse.  She was seen by a pediatric cardiologist who  referred her to an adult cardiologist, however she has not seen this provider, had to cancel appointment due to having a headache.  She is requesting repeat nerve blocks today.      01/29/2018 - Interval History:  She has been having a terrible migraine for the last 3 days, she has tried numerous medications without any relief.  States she tried to go to work today, but she had to leave because her headache got so bad.  She is requesting to repeat the nerve blocks as those shots gave her relief form 6-8 weeks in the past.  She has done research on the Botox injections and states her  is very hesitant for her to try Botox injections for her migraines.  She has not been seen by her PCP nor cardiologist for further management of her blood pressure.  Discussed the challenges of treating her migraines with uncontrolled hypertension because I cannot prescribe her any triptan therapy until her blood pressure is under better control.  She has continued to take Cymbalta daily for her depression, she is unsure if this is giving her any relief with regards to her headaches or not.       01/15/2018 - Interval History:  She was approved for Botox injections, but was a no-show to her appointment last week.  States she spoke with her  about the injections and she was very nervous to try the Botox injections for her migraines.    She continues to experience headaches on 1-4 days per week with migraines occurring 1-2 days per week.  Recently she has been experiencing more migraines and headaches than usually, but admits she has been going through a lot emotionally as well as she got sick with the flu.  She has continued to regularly follow-up with her psychiatrist who changed her anti-depressant, she is now taking Cymbalta.  Since change in medication, she feels she has been experiencing a mild headache each day.  She has had to miss a few days of work due to her migraines since last visit.  Additionally, she feels  occasionally she begins to get dizzy, this occurs with or without headaches, she has an appointment with her eye doctor next week as her vision is also getting progressively worse.  She did not find vistaril to be useful with regards to her headaches.  She does feel Zanaflex helps with her neck as well as helping to relax her neck muscles.  When she gets a migraine she has been taking Vistaril and Ibuprofen together which does help the pain, she also has been trying Excedrin migraines which also gives her some relief.  Patient with known HTN on multiple anti-hypertensive agents, blood pressure significantly elevated in clinic today.  She is not symptomatic.  States she needs to follow-up with her primary care (who is outside the [facility] system) for better management.       10/19/2017 - Interval History:  - Headaches have persisted in similar frequency, which she is happy about as she has been under increased stress with regards to her 's health, he has been suffering with his Gout, just got out of the hospital last week and has had to take time off work etc   - Blood pressure continues to be elevated despite numerous anti-hypertensive, states today is good for her (currently 141/102), has been regularly following up with her PCP for management   - Is very excited because she is going on a cruise with her  to [place] at the end of November/early December   - Has continued to have a headache nearly everyday, not necessarily a migraine, however migraine have been at least once per month    - Has noticed more recently that she feels like she has intense pressure behind her eyes and across the bridge of her nose that slightly feels like sinus pressure, this occurs 1-2 times per week   - Has been seeing a chiropractor as well as a masseuse regularly which she does feel has been helping   - Would still like to move forward with the Botox injections   - Does not feel adjustments to her medications are  necessary at this time      08/17/2017 - Interval History:  - Had been seeing Iker Beth PA-C   - Was taking Topamax, Zanaflex, and Vistaril - however has been out of medications for about 3 months  - Since being off the medication, she had been okay, however recently noticed migraines were beginning to occur more frequently with increased intensity and duration   - She finds the nerve blocks had been helping a lot, as long as she is consistent with getting the nerve block, however hasn't had this procedure for over a year   - Has been getting 5-6 migraines per month, each lasting 1-4 days in length   - Current migraine has been going on since last Friday (6 days)  - Blood pressure significantly elevated in clinic today, states her PCP is managing her medications in order to get her blood pressure better controlled   - Is requesting refills of medication as well as repeat nerve occipital and trigeminal nerve blocks      Headache history:   Age of onset -  13  Nature of pain -  Throbbing   Location - bioccipital   Aura -  White floaters   Duration - hrs  Frequency -  2/week  Time of day - anytime   7 days of pain - jaw pain, L neck, L shoulder,  L side throbbing   Associated symptoms:   Meningeal symptoms - photophobia, phonophobia, exercise intolerance +   Nausea/vomitting +   Nasal drainage   Visual symptoms  Pallor/flushing  Vertigo  Stroke symptoms  Confusion  Impaired concentration +  Pain worsened with physical activity +  Neck pain +  Whooshing sounds   Visual spots/blotches +  Triggers:   Stress +   Bright or flickering light  Strong smell  Vigorous exercise  Dietary factors   Visual strain   Motion intolerance as passenger:  No   Resolution of headache with sleep: yes      Therapies Tried & Response:  Tylenol, motrin, alleve - not help  excedrin - not help   imitrex - helped initially   topamax 50 mg - helped  Percocet - helps   Gabapentin - helps  Amlodipine - for HTN  Bystolic - for HTN  Lexapro - hasn't  noticed change in HA, for depression   GONB - helps   Cymbalta - unsure, for depression   Zomig - helps    Maxalt - no   Botox - helping   Sumatriptan -  Vistrail - effective   Aimovig - helping   Emgality - given today     Current Medications:    aspirin (ECOTRIN) 81 MG EC tablet, Take 1 tablet (81 mg total) by mouth once daily., Disp: 30 tablet, Rfl: 11    atorvastatin (LIPITOR) 40 MG tablet, Take 1 tablet (40 mg total) by mouth once daily., Disp: 90 tablet, Rfl: 3    clopidogreL (PLAVIX) 75 mg tablet, Take 1 tablet (75 mg total) by mouth once daily., Disp: 30 tablet, Rfl: 0    magnesium oxide (MAG-OX) 400 mg (241.3 mg magnesium) tablet, Take 1 tablet (400 mg total) by mouth once daily., Disp: 30 tablet, Rfl: 1    ondansetron (ZOFRAN-ODT) 4 MG TbDL, DISSOLVE 1 TABLET ON THE TONGUE EVERY 6 HOURS AS NEEDED FOR NAUSEA, Disp: , Rfl:     promethazine (PHENERGAN) 25 MG tablet, TAKE 1 TABLET BY MOUTH EVERY 4 TO 6 HOURS AS NEEDED FOR NAUSEA, Disp: 20 tablet, Rfl: 1    riboflavin, vitamin B2, 400 mg Tab, Take 400 mg by mouth once daily., Disp: 30 tablet, Rfl: 11    tiZANidine (ZANAFLEX) 4 MG tablet, Take 1.5 tablets (6 mg total) by mouth every evening. No alcohol, no driving with medication., Disp: 45 tablet, Rfl: 5    verapamiL (CALAN) 40 MG Tab, Take 1 tablet (40 mg total) by mouth 2 (two) times daily., Disp: 60 tablet, Rfl: 11    benazepril-hydrochlorthiazide (LOTENSIN HCT) 20-25 mg Tab, Take 1 tablet by mouth once daily. Please check with your primary care provider. You've been started on Losartan, and so it's unclear if you'll need additional anti-hypertensive medications., Disp: 90 tablet, Rfl: 3    buPROPion (WELLBUTRIN XL) 300 MG 24 hr tablet, Take 1 tablet (300 mg total) by mouth once daily., Disp: 30 tablet, Rfl: 3    DULoxetine (CYMBALTA) 60 MG capsule, Take 1 capsule (60 mg total) by mouth once daily., Disp: 30 capsule, Rfl: 3    galcanezumab-gnlm 120 mg/mL Sid, Inject 120 mg (one pen) into the skin every 28  days. Begin 28 days after loading dose., Disp: 1 mL, Rfl: 10    hydrOXYzine pamoate (VISTARIL) 25 MG Cap, Take 1 capsule (25 mg total) by mouth every 8 (eight) hours as needed (anxiety)., Disp: 45 capsule, Rfl: 3    ubrogepant (UBRELVY) 100 mg tablet, TAKE 1 TABLET BY MOUTH EVERY 2 HOURS AS NEEDED FOR MIGRAINE. MAX 2 TABLETS (200 MG) PER DAY., Disp: 16 tablet, Rfl: 5    [START ON 9/7/2023] zolpidem (AMBIEN) 10 mg Tab, Take 1 tablet (10 mg total) by mouth every evening., Disp: 30 tablet, Rfl: 3    Current Facility-Administered Medications:     onabotulinumtoxina injection 200 Units, 200 Units, Intramuscular, q12 weeks, VulevLuci umanzor, FNP, 200 Units at 06/08/23 1200    Review of Systems - A review of 10+ systems was conducted with pertinent positive and negative findings documented in HPI with all other systems reviewed and negative.    PFSH: Past medical, family, and social history reviewed as documented in chart with pertinent positive medical, family, and social history detailed in HPI.    OBJECTIVE:  Vitals:  BP (!) 140/96   Pulse 103   Wt 66.6 kg (146 lb 13.2 oz)   LMP 12/14/2000   BMI 24.43 kg/m²      Physical Exam:  Constitutional: she appears well-developed and well-nourished. she is well groomed. NAD    Review of Data:   Notes from inpatient hospitalization, ED visits, Dr. Osman, internal medicine reviewed   Labs:  Admission on 07/30/2023, Discharged on 07/30/2023   Component Date Value Ref Range Status    WBC 07/30/2023 13.73 (H)  3.90 - 12.70 K/uL Final    RBC 07/30/2023 4.67  4.00 - 5.40 M/uL Final    Hemoglobin 07/30/2023 14.0  12.0 - 16.0 g/dL Final    Hematocrit 07/30/2023 42.5  37.0 - 48.5 % Final    MCV 07/30/2023 91  82 - 98 fL Final    MCH 07/30/2023 30.0  27.0 - 31.0 pg Final    MCHC 07/30/2023 32.9  32.0 - 36.0 g/dL Final    RDW 07/30/2023 12.5  11.5 - 14.5 % Final    Platelets 07/30/2023 556 (H)  150 - 450 K/uL Final    MPV 07/30/2023 10.1  9.2 - 12.9 fL Final    Immature Granulocytes  07/30/2023 1.9 (H)  0.0 - 0.5 % Final    Gran # (ANC) 07/30/2023 8.7 (H)  1.8 - 7.7 K/uL Final    Immature Grans (Abs) 07/30/2023 0.26 (H)  0.00 - 0.04 K/uL Final    Lymph # 07/30/2023 2.4  1.0 - 4.8 K/uL Final    Mono # 07/30/2023 1.3 (H)  0.3 - 1.0 K/uL Final    Eos # 07/30/2023 0.7 (H)  0.0 - 0.5 K/uL Final    Baso # 07/30/2023 0.40 (H)  0.00 - 0.20 K/uL Final    nRBC 07/30/2023 0  0 /100 WBC Final    Gran % 07/30/2023 63.4  38.0 - 73.0 % Final    Lymph % 07/30/2023 17.1 (L)  18.0 - 48.0 % Final    Mono % 07/30/2023 9.5  4.0 - 15.0 % Final    Eosinophil % 07/30/2023 5.2  0.0 - 8.0 % Final    Basophil % 07/30/2023 2.9 (H)  0.0 - 1.9 % Final    Differential Method 07/30/2023 Automated   Final    Sodium 07/30/2023 142  136 - 145 mmol/L Final    Potassium 07/30/2023 2.8 (L)  3.5 - 5.1 mmol/L Final    Chloride 07/30/2023 100  95 - 110 mmol/L Final    CO2 07/30/2023 29  23 - 29 mmol/L Final    Glucose 07/30/2023 107  70 - 110 mg/dL Final    BUN 07/30/2023 14  6 - 20 mg/dL Final    Creatinine 07/30/2023 0.8  0.5 - 1.4 mg/dL Final    Calcium 07/30/2023 10.2  8.7 - 10.5 mg/dL Final    Total Protein 07/30/2023 8.1  6.0 - 8.4 g/dL Final    Albumin 07/30/2023 4.2  3.5 - 5.2 g/dL Final    Total Bilirubin 07/30/2023 1.1 (H)  0.1 - 1.0 mg/dL Final    Alkaline Phosphatase 07/30/2023 98  55 - 135 U/L Final    AST 07/30/2023 29  10 - 40 U/L Final    ALT 07/30/2023 36  10 - 44 U/L Final    eGFR 07/30/2023 >60.0  >60 mL/min/1.73 m^2 Final    Anion Gap 07/30/2023 13  8 - 16 mmol/L Final    Prothrombin Time 07/30/2023 10.7  9.0 - 12.5 sec Final    INR 07/30/2023 1.0  0.8 - 1.2 Final    TSH 07/30/2023 1.332  0.400 - 4.000 uIU/mL Final    Cholesterol 07/30/2023 160  120 - 199 mg/dL Final    Triglycerides 07/30/2023 164 (H)  30 - 150 mg/dL Final    HDL 07/30/2023 47  40 - 75 mg/dL Final    LDL Cholesterol 07/30/2023 80.2  63.0 - 159.0 mg/dL Final    HDL/Cholesterol Ratio 07/30/2023 29.4  20.0 - 50.0 % Final    Total Cholesterol/HDL Ratio  07/30/2023 3.4  2.0 - 5.0 Final    Non-HDL Cholesterol 07/30/2023 113  mg/dL Final    POC PTWBT 07/30/2023 13.6  9.7 - 14.3 sec Final    POC PTINR 07/30/2023 1.1  0.9 - 1.2 Final    Sample 07/30/2023 unknown   Final    POC Creatinine 07/30/2023 0.8  0.5 - 1.4 mg/dL Final    Sample 07/30/2023 unknown   Final    POCT Glucose 07/30/2023 110  70 - 110 mg/dL Final   Lab Visit on 07/14/2023   Component Date Value Ref Range Status    Metanephrine, Free 07/14/2023 <25  < OR = 57 pg/mL Final    Metanephrine, Total, Plasma 07/14/2023 127  < OR = 205 pg/mL Final    Normetanephrine, Free 07/14/2023 127  < OR = 148 pg/mL Final    Aldosterone 07/14/2023 17.0  ng/dL Final    Renin 07/14/2023 6.6  ng/mL/hr Final    Aldosterone/Renin Activity Calcula* 07/14/2023 2.6  <=25.0 ratio Final   Lab Visit on 07/14/2023   Component Date Value Ref Range Status    Protein, Urine Random 07/14/2023 31 (H)  0 - 15 mg/dL Final    Creatinine, Urine 07/14/2023 277.0  15.0 - 325.0 mg/dL Final    Prot/Creat Ratio, Urine 07/14/2023 0.11  0.00 - 0.20 Final    Specimen UA 07/14/2023 Urine, Clean Catch   Final    Color, UA 07/14/2023 Orange (A)  Yellow, Straw, Janie Final    Appearance, UA 07/14/2023 Ex.Turbid  Clear Final    pH, UA 07/14/2023 6.0  5.0 - 8.0 Final    Specific Gravity, UA 07/14/2023 1.025  1.005 - 1.030 Final    Protein, UA 07/14/2023 Trace (A)  Negative Final    Glucose, UA 07/14/2023 Negative  Negative Final    Ketones, UA 07/14/2023 Negative  Negative Final    Bilirubin (UA) 07/14/2023 Negative  Negative Final    Occult Blood UA 07/14/2023 Negative  Negative Final    Nitrite, UA 07/14/2023 Negative  Negative Final    Leukocytes, UA 07/14/2023 Negative  Negative Final   Lab Visit on 07/13/2023   Component Date Value Ref Range Status    Sodium 07/13/2023 141  136 - 145 mmol/L Final    Potassium 07/13/2023 2.9 (L)  3.5 - 5.1 mmol/L Final    Chloride 07/13/2023 104  95 - 110 mmol/L Final    CO2 07/13/2023 27  23 - 29 mmol/L Final    Glucose  07/13/2023 132 (H)  70 - 110 mg/dL Final    BUN 07/13/2023 16  6 - 20 mg/dL Final    Creatinine 07/13/2023 0.9  0.5 - 1.4 mg/dL Final    Calcium 07/13/2023 9.3  8.7 - 10.5 mg/dL Final    Total Protein 07/13/2023 7.1  6.0 - 8.4 g/dL Final    Albumin 07/13/2023 3.3 (L)  3.5 - 5.2 g/dL Final    Total Bilirubin 07/13/2023 0.5  0.1 - 1.0 mg/dL Final    Alkaline Phosphatase 07/13/2023 81  55 - 135 U/L Final    AST 07/13/2023 21  10 - 40 U/L Final    ALT 07/13/2023 17  10 - 44 U/L Final    eGFR 07/13/2023 >60.0  >60 mL/min/1.73 m^2 Final    Anion Gap 07/13/2023 10  8 - 16 mmol/L Final    WBC 07/13/2023 23.20 (H)  3.90 - 12.70 K/uL Final    RBC 07/13/2023 4.62  4.00 - 5.40 M/uL Final    Hemoglobin 07/13/2023 14.1  12.0 - 16.0 g/dL Final    Hematocrit 07/13/2023 41.6  37.0 - 48.5 % Final    MCV 07/13/2023 90  82 - 98 fL Final    MCH 07/13/2023 30.5  27.0 - 31.0 pg Final    MCHC 07/13/2023 33.9  32.0 - 36.0 g/dL Final    RDW 07/13/2023 11.8  11.5 - 14.5 % Final    Platelets 07/13/2023 505 (H)  150 - 450 K/uL Final    MPV 07/13/2023 10.0  9.2 - 12.9 fL Final    Immature Granulocytes 07/13/2023 CANCELED  0.0 - 0.5 % Final    Immature Grans (Abs) 07/13/2023 CANCELED  0.00 - 0.04 K/uL Final    nRBC 07/13/2023 0  0 /100 WBC Final    Gran % 07/13/2023 67.0  38.0 - 73.0 % Final    Lymph % 07/13/2023 9.0 (L)  18.0 - 48.0 % Final    Mono % 07/13/2023 5.0  4.0 - 15.0 % Final    Eosinophil % 07/13/2023 12.0 (H)  0.0 - 8.0 % Final    Basophil % 07/13/2023 0.0  0.0 - 1.9 % Final    Bands 07/13/2023 2.0  % Final    Metamyelocytes 07/13/2023 4.0  % Final    Myelocytes 07/13/2023 1.0  % Final    Platelet Estimate 07/13/2023 Increased (A)   Final    Aniso 07/13/2023 Slight   Final    Poik 07/13/2023 Slight   Final    Tear Drop Cells 07/13/2023 Occasional   Final    Differential Method 07/13/2023 Manual   Final   No results displayed because visit has over 200 results.        Imaging:  Results for orders placed or performed during the hospital  encounter of 07/30/23   MRI Brain Without Contrast    Narrative    EXAMINATION:  MRI BRAIN WITHOUT CONTRAST    CLINICAL HISTORY:  Headache, chronic, new features or increased frequency;.    TECHNIQUE:  Multiplanar multisequence MR imaging of the brain was performed without contrast.    COMPARISON:  CTA stroke multiphase earlier same day, MRI brain 07/14/2023    FINDINGS:  Intracranial compartment:    Evolving region of signal abnormality in the right PCA territory involving the right occipital lobe primarily in the white matter and right aspect spinal corpus callosum, now barely visible most compatible with evolving late subacute aged infarction.  No hemorrhagic conversion.    Interval small focus of restricted diffusion with corresponding edema signal within the midportion of the right centrum semiovale.    No extra-axial blood or fluid collections.  The ventricles are midline and stable in overall size and configuration without distortion by mass effect or acute hydrocephalus.    Remainder of the brain parenchyma appears unchanged including scattered T2/FLAIR signal hyperintensity within the supratentorial white matter concerning for age advanced chronic ischemic change with scattered cystic encephalomalacia at the right more than left centrum semiovale, left thalamus and right kriss suggestive for remote lacunar-type infarcts.  No mass lesion, acute hemorrhage, new major vascular territory infarct or midline shift.  Unchanged punctate foci of susceptibility within the cerebral hemispheres specifically at the left parietal cortex, right inferior parietal white matter and left posterior temporal lobe suggestive for remote microhemorrhages.    Normal vascular flow voids are preserved.  Sella and parasellar regions are within normal limits.    Skull/extracranial contents (limited evaluation): Bone marrow signal intensity is normal.  Craniocervical junction is within normal limits.      Impression    1. Right centrum  semiovale small, acute lacunar type infarct, new from 07/14/2023 study.  2. Nearly completely resolved evolving late subacute aged infarction in the right PCA territory.  3. Grossly stable additional findings as above.  This report was flagged in Epic as abnormal.      Electronically signed by: Gaurav Layton MD  Date:    07/30/2023  Time:    12:38   Results for orders placed or performed during the hospital encounter of 07/14/23   MRA Brain without contrast    Narrative    EXAMINATION:  MRI BRAIN WITHOUT CONTRAST; MRA BRAIN WITHOUT CONTRAST    CLINICAL HISTORY:  Stroke, follow up;  Other cerebral infarction    TECHNIQUE:  Sagittal and axial T1, axial T2, axial FLAIR, axial gradient and axial diffusion imaging of the whole brain without contrast.    COMPARISON:  MRI and MRA 07/05/2023    FINDINGS:  MRI brain: Evolving region of signal abnormality right PCA territory involving the right occipital lobe primarily in the white matter and right aspect splenium corpus callosum most compatible with evolving subacute aged infarction more pronounced diffusion signal abnormality which is partially restricting in this region.  In addition there is increased T2 FLAIR signal hyperintensity without mass effect or midline shift.  There is no definite hemorrhagic conversion.  There continued punctate foci of susceptibility within the cerebral hemispheres specifically left parietal cortex right inferior parietal white matter and left posterior temporal lobe suggestive for possible remote microhemorrhages.    Scattered T2 FLAIR signal hyperintensity supratentorial white matter concerning for possible age advanced chronic ischemic change with scattered cystic encephalomalacia bilateral centrum semiovale, left thalamus and right kriss suggestive for remote lacunar-type infarcts.    MRA head: Anterior circulation: Bilateral distal cervical, petrous, cavernous and supraclinoid segments of the ICAs are patent without significant focal  stenosis.  The visualized anterior middle cerebral arteries are patent without focal stenosis or aneurysm.    Posterior circulation: Distal left vertebral artery is dominant.  Slight hypoplasia distal right vertebral artery.  The basilar artery is patent.  Left posterior cerebral artery is patent.  There is high-grade stenosis or short segment occlusion in the right P3 segment of the PCA which is stable.    This report was flagged in Epic as abnormal.      Impression    MRI brain: Increased edema signal abnormality and diffusion partial restriction right occipital white matter compatible with evolving recent to subacute right PCA infarction.  No significant mass effect or definite hemorrhagic conversion    Superimposed patchy and confluent T2 FLAIR signal abnormality elsewhere supratentorial white matter while nonspecific concerning for advanced chronic ischemic change with scattered cystic encephalomalacia centrum semiovale, left thalamus and right kriss suggestive for remote lacunar-type infarcts.    Few punctate scattered foci of susceptibility left posterior temporal lobe right inferior parietal lobe and left parietal lobe suggestive for remote microhemorrhages    MRA head: Stable high-grade stenosis versus short-segment inclusion right P3 PCA.    No additional significant focal stenosis or proximal large vessel occlusion.      Electronically signed by: Nahum Francois DO  Date:    07/15/2023  Time:    14:21   Results for orders placed or performed during the hospital encounter of 09/28/17   MRV Brain Without Contrast    Narrative    Procedure: MRV of the head without contrast    Technique: Noncontrast 2-D time-of-flight MRV of the head with coronal and sagittal source imaging and 3-dimensional mip projection views.      Comparison: None    Results:The superior sagittal sinus is patent.  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  The paired internal cerebral veins and basal veins  of Jay are patent.  The vein of Zac and torcula Herophili are patent.  The straight sinus is patent.  The bilateral transverse and sigmoid dural venous sinuses are patent to the jugular foramen.    Impression     Unremarkable noncontrast MRV specifically without evidence for venous sinus thrombosis..      Electronically signed by: VIOLETTE GORDO CUELLAR  Date:     09/28/17  Time:    10:43    MRI Brain W WO Contrast    Narrative    Procedure: MRI the brain with andwithout contrast.    Technique: Sagittal and axial T1, axial T2, axial FLAIR, axial gradient, axial diffusion imaging of the whole brain pre-contrast with postcontrast axial T1 and axial spoiled gradient imaging reformatted in the coronal and sagittal planes.. 10 ml of Gadavist injected intravenously.         Comparison: None    Findings: The brain parenchyma is normal in contour. There is a small focus of T2 flair signal hyperintensity in the right lateral putamen suggestive for remote lacunar type infarct with diffusion hyperintensity without restriction compatible with T2 shine through. There are no abnormal areas of parenchymal enhancement. There are no areas are restricted diffusion to suggest acute infarction. A few additional punctate foci of T2 flair signal hyperintensity supratentorial white matter which are nonspecific and may represent sequela migraine headaches in light of history. The ventricles are normal in size and configuration without evidence for hydrocephalus. There are no abnormal areas of the gradient susceptibility to suggest parenchymal hemorrhage. No abnormal intra or extra axial fluid collections. The major intracranial T2  flow-voids are present.    Impression     Small T2 flair hyperintense focus right lateral putamen with diffusion hyperintensity. 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    A few scattered punctate regions of T2 flair signal hyperintensity elsewhere supratentorial parenchyma while overall  nonspecific in light of the given history this may be sequela migraine headaches. Alternative differential including prior demyelination to be considered.    otherwise unremarkable MRI brain specifically without evidence for acute infarction or enhancing lesion.          Electronically signed by: VIOLETTE CARO DO  Date:     09/28/17  Time:    10:49      Note: I have independently reviewed any/all imaging/labs/tests and agree with the report (s) as documented.  Any discrepancies will be as noted/demarcated by free text.  ARA HDZ 7/31/2023    ASSESSMENT:  No diagnosis found.    PLAN:  - For migraine prevention - continue with Botox injections every 12 weeks   - Start Emgality 240 mg SQ loading dose followed by 120 mg SQ monthly injections.   - Continue verapamil 40 mg bid and cymbalta 60 mg daily   - For acute migraines - triptans contraindicated given hx stroke   - For acute migraines - ubrelvy 100 mg    - Continue tracking headaches   - Hx CVA - continue regular f/up with Dr. Osman for management/monitoring   - Mood disorder - continue wellbutrin, cymbalta daily, management per psych - will avoid use of anti-depressants for migraine prevention   - HTN - BP improved, management per PCP   - RTC in 2 months for next round of Botox      Orders Placed This Encounter    galcanezumab-gnlm 120 mg/mL PnIj    galcanezumab-gnlm 120 mg/mL PnIj    ubrogepant (UBRELVY) 100 mg tablet     42 minutes of total time spent on the encounter, which includes 24 minutes face to face time and 18 minutes non face to face time preparing to see the patient (eg, review of tests), obtaining and/or reviewing separately obtained history, documenting clinical information in the electronic or other health record, independently interpreting results (not separately reported) and communicating results to the patient/family/caregiver or care coordination (not separately reported).     Questions and concerns were sought and answered to the patient's  stated verbal satisfaction.  The patient verbalizes understanding and agreement with the above stated treatment plan.     CC: MD Luci Echols, FNP-C  [facility]   [phone]    Dr. Farfan was available during today's encounter.

## 2023-07-31 NOTE — TELEPHONE ENCOUNTER
Pt states the rash is gone but now she's having pain in her neck where the rash previously was.    Do you rec scheduling appt? Looks like pt went to ER yesterday. Confirmed she has another stroke

## 2023-08-01 ENCOUNTER — PATIENT MESSAGE (OUTPATIENT)
Dept: NEUROLOGY | Facility: CLINIC | Age: 48
End: 2023-08-01
Payer: COMMERCIAL

## 2023-08-01 ENCOUNTER — TELEPHONE (OUTPATIENT)
Dept: PHARMACY | Facility: CLINIC | Age: 48
End: 2023-08-01
Payer: COMMERCIAL

## 2023-08-01 PROBLEM — Z74.09 IMPAIRED FUNCTIONAL MOBILITY, BALANCE, GAIT, AND ENDURANCE: Status: ACTIVE | Noted: 2023-08-01

## 2023-08-01 NOTE — PLAN OF CARE
[facility]  Physical Therapy Neurological Rehabilitation Initial Evaluation    Name: Mirna Pollock  Clinic Number: [id number]    Therapy Diagnosis:   Encounter Diagnoses   Name Primary?    Stroke due to occlusion of right posterior cerebral artery     Impaired functional mobility, balance, gait, and endurance      Physician: Marcus Osman MD    Physician Orders: PT Eval and Treat   Medical Diagnosis from Referral: I63.531 (ICD-10-CM) - Stroke due to occlusion of right posterior cerebral artery  Evaluation Date: 2023  Authorization Period Expiration: 23  Plan of Care Expiration: 10/19/23  Visit # / Visits authorized:     PN due: 23    Time In: 8:50 AM  Time Out: 9:30 AM  Total Billable Time: 40 minutes    Precautions: Standard, Fall, and monitor BP    Subjective   Date of onset: 23  History of current condition - Mirna reports: stroke in early July. Continues with difficulty with L peripheral vision. Neuro-opthalmology referral placed, waiting to hear to set up appointments. States sometimes she feels like she needs to hold onto the walls to maintain her balance. States she has frequent, intermittent episodes of spinning. Typically happens when changing positions, standing after sitting prolonged periods. States she had a fall prior to going to the hospital (when walking/stepping up to area where bedroom is). Strength in her legs is fine, states her strength in her arm is affected. Took BP meds this morning around 8 AM     Medical History:   Past Medical History:   Diagnosis Date    Anxiety     ASD (atrial septal defect)     Endometriosis     Headache(784.0)     Hypertension     Kidney stones fibomyalgia, migraine, heart closer, kidey stones       Surgical History:   Mirna Pollock  has a past surgical history that includes Cholecystectomy; Appendectomy;  section; Hysterectomy; and Cardiac catheterization (10/01/2010).    Medications:   Mirna has a current medication  list which includes the following prescription(s): aspirin, atorvastatin, benazepril-hydrochlorthiazide, bupropion, clopidogrel, duloxetine, galcanezumab-gnlm, galcanezumab-gnlm, hydroxyzine pamoate, magnesium oxide, ondansetron, promethazine, riboflavin (vitamin b2), tizanidine, ubrelvy, verapamil, and zolpidem, and the following Facility-Administered Medications: onabotulinumtoxina.    Allergies:   Review of patient's allergies indicates:   Allergen Reactions    Losartan Rash     Fever, eosinophilia, diffuse rash    Iodine and iodide containing products Other (See Comments)     Patient becomes hypotensive with DRESS-like syndrome even with pre-medication    [id number] antimigraine agents Other (See Comments)     Patient has a history of probable migrainous stroke. Please proceed with caution with triptan medications specifically, given effects on vasculature. Patient may tolerate this medication, but added to chart simply to ensure it's brought to the attention of prescribers.    Penicillins Rash        Imaging, MRI studies: 7/5/23 as read by Rodriguez Corral MD          Prior Therapy: none for CVA  Social History: two story house, 1 step to enter, lives with their spouse  Falls: none reported, some almost falls   DME: tub shower, difficulty in the beginning, holds onto the walls    Occupation: [employer], Ortho surgery   Prior Level of Function: independent in all tasks, working full time, migraines  Current Level of Function: dizziness, migraines, visual impairment, imbalance    Pain:  No pain reported this AM, ongoing headaches    Pts goals: return to normal, return to work    Objective     BP after MMT: 155/113  BP after 5 min rest: 155/106    - Follows commands: yes   - Speech: no deficits noted, some slowing in processing per pt report    Mental status: alert, oriented to person, place, and time, normal mood, behavior, speech, dress, motor activity, and thought processes  Appearance: Well  groomed  Behavior:  calm, cooperative, and adequate rapport can be established  Attention Span and Concentration:  Normal    Posture Alignment :slouched posture    Skin integrity:  Intact    Visual/Auditory:   Impaired peripheral vision to L side (per Neurology chart left inferior quadrantanopsia)   -upon testing, significantly limited L peripheral vision (~50% limited ROM)    Coordination:   - fine motor: impaired  - UE coordination: finger to nose: impaired - speed                  - LE coordination:  alternating toe taps: impaired- speed                                Heel to shin: intact    ROM:   UPPER EXTREMITY--AROM/PROM  (R) UE: WNLs  (L) UE: WNLs           RANGE OF MOTION--LOWER EXTREMITIES  (R) LE Hip: normal   Knee: normal   Ankle: normal    (L) LE: Hip: normal   Knee: normal   Ankle: normal    Strength: manual muscle test grades below     Upper Extremity Strength  Grossly 4/5    Lower Extremity Strength   RLE LLE   Hip Flexion: 5/5 5/5   Hip Extension:  4/5 4/5   Hip Abduction: 3+/5 4/5   Knee Extension: 4/5 5/5   Knee Flexion: NT NT   Ankle Dorsiflexion: 4-/5 5/5   Ankle Plantarflexion:  seated 4/5 5/5        Evaluation   5 times sit-stand 15 seconds       5 times sit<>stand Cutoff scores:  >12 sec= fall risk  PD: >16 seconds= fall risk  Vestibular/balance: 15 seconds over 65 years  CVA: 12 seconds    Gait Assessment:   - AD used: none  - Assistance: S  - Distance: 100 ft (in and out of clinic)    GAIT DEVIATIONS:  Gait component performance:   Slowed gait speed  Decreased step length  Intermittent LE weaving throughout gait  * Above impairments indicate decreased gait safety and increased fall risk.     Evaluation   Timed Up and Go NT - BP   6 meter walk test NT- BP   6 min walk test NT - BP     Timed Up and Go fall risk:   Community dwelling older adults >13.5 sec   Chronic CVA >14 sec   Geriatric with h/o falls >15 sec   Frail elderly >32.6 sec    LE amputees >19 sec   PD >7.95- 11.5 sec   Hip OA >10  sec   Vestibular >11.1 sec      *FOTO not captured this session*    TREATMENT   Treatment Time In: 00  Treatment Time Out: 00  Total Treatment time separate from Evaluation: 00 minutes       Home Exercises and Patient Education Provided    Education provided:   - role of PT, plan of care, goals, scheduling limitations, cancellation policies  - monitoring BP    Written Home Exercises Provided: HEP to be provided next visit.    Assessment   Mirna is a 47 y.o. F referred to outpatient Physical Therapy with a medical diagnosis of stroke due to occlusion of R PCA. Pt presents with signs and symptoms consistent with medical diagnoses and reports of dizziness and imbalance.  Pt tests and measures listed above indicative of limited R LE strength, impaired functional strength/mobility, increased risk for falls, and limited functional independence. However, some outcome measures unable to be performed today secondary to BP above therapeutic range. Pt encouraged to touch base with Neurologist and PCP regarding elevated BP. Pt also educated on importance of monitoring BP daily for updated measures to provide physicians. Pt would benefit from skilled PT services in order to address listed deficits, decrease fall risk, and maximize functional independence. Pt is motivated to participate in therapy and is in agreement with POC.     Pt prognosis is Good.   Pt will benefit from skilled outpatient Physical Therapy to address the deficits stated above and in the chart below, provide pt/family education, and to maximize pt's level of independence.     Plan of care discussed with patient: Yes  Pt's spiritual, cultural and educational needs considered and patient is agreeable to the plan of care and goals as stated below:     Anticipated Barriers for therapy: elevated BP    Medical Necessity is demonstrated by the following  History  Co-morbidities and personal factors that may impact the plan of care Co-morbidities:   See as above.      Personal Factors:   no deficits     low   Examination  Body Structures and Functions, activity limitations and participation restrictions that may impact the plan of care Body Regions:   lower extremities  upper extremities  trunk    Body Systems:    ROM  strength  balance  gait  transfers  transitions  motor control    Participation Restrictions:   Ability to safely ambulate in community    Activity limitations:   Learning and applying knowledge  no deficits    General Tasks and Commands  no deficits    Communication  no deficits    Mobility  lifting and carrying objects  fine hand use (grasping/picking up)  walking    Self care  no deficits    Domestic Life  shopping  cooking  doing house work (cleaning house, washing dishes, laundry)  assisting others    Interactions/Relationships  no deficits    Life Areas  no deficits    Community and Social Life  no deficits         low   Clinical Presentation stable and uncomplicated low   Decision Making/ Complexity Score: low     Short Term Goals- 4 Weeks  Pt to demonstrate independence in performance of HEP in order to improve daily level of physical activity and improve carry over session to session.  Pt to improve B LE strength by > / = 1/2 MMT score in order to improve strength for daily activities.  Further goals to be established as appropriate next week - as BP allows.    Long Term Goals - 12 Weeks  Pt to demonstrate compliance with HEP > / = 3x per week in order to improve daily level of physical activity and improve carry over session to session.  Pt to improve B LE strength by > / = 1 MMT score in order to improve strength for daily activities.  Further goals to be established as appropriate next week - as BP allows.    Plan   Plan of care Certification: 7/19/2023 to 10/19/2023.    Outpatient Physical Therapy 2 times weekly for 12 weeks to include the following interventions: Gait Training, Manual Therapy, Moist Heat/ Ice, Neuromuscular Re-ed, Patient  Education, Self Care, Therapeutic Activities, and Therapeutic Exercise.     Kristan Schwartz, PT, DPT  7/19/2023

## 2023-08-02 NOTE — PROGRESS NOTES
[facility] Progress Note  8/3/2023          Reason for Visit:      had concerns including Hospital Follow Up.       History of Present Illness:     CVA/Migraine  Pt had recent visit to ED after she developed a new headache similar to the one she experienced at the time of stroke.  Seen by vascular neurology -   Code stroke was cancelled  d/t her symptoms not correlating with stroke and no signs of LVO.   She received CTA and developed a severe reaction with hypotension down to 70's/50's (baseline -180's), diffuse flushing, difficulty breathing (she had had similar but milder epsiodes with contrast in past). She received solumedrol and benadryl and went to MRI while hypotensive.   MRI was obtained and showed small focus of ischemia in subcortical white matter.   She remained hypotensive 90's/60's until several repeated examinations over the course of an hour until her BP reached 120'/80's. Her flushing and tremulousness and e/o reynaud's phenomena improved over that time period as well.     She had a recent R PCA territory infarct earlier in the month which has led to a persistent left lower quadrantanopsia. She had also had migraine symptoms at that time, although likely associated with the stroke and not the cause as she has pretty extensive atherosclerotic disease bilaterally within PCA and basilar arteries.     After discharge she developed a significant drug rash diffusely throughout her entire body with eosinophilia and low grade fever which improved on its own with cessation of losartan..  We discussed today whether this maybe could have been a delayed reaction to the contrast from the CT she had during last admission since she had such a severe reaction this time.    Blood pressures at home since discharge have been looking pretty good.  No hypotensive symptoms.  She is still on the benazepril-hctz.  The workup for secondary causes of HTN was all unremarkable.    She is having lots  of stress regarding not being able to work.  She says neurology has recommended she not drive or work until cleared by ophthalmology, but she is not able to get an appt to see them until October.  Still having the Left lower quandrantanopsia which limited her peripheral vision.  She says she sometimes has to hold onto wall when she walks.    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    CTS (carpal tunnel syndrome)    Accelerated hypertension    Cervical muscle pain    Cervical radiculopathy    H/O catheter-based closure of atrial septal defect    Adult congenital heart disease    Overweight (BMI 25.0-29.9)    LALITA (generalized anxiety disorder)    Fibromyalgia    Social anxiety disorder    Status migrainosus    Insomnia    Recurrent major depressive disorder, in full remission    Stroke due to occlusion of right posterior cerebral artery    Hypokalemia    Stage 3a chronic kidney disease    Proteinuria    Cerebrovascular small vessel disease    Drug rash    Head Tingling, Right Sided    Impaired functional mobility, balance, gait, and endurance         Medications:       Current Outpatient Medications:     aspirin (ECOTRIN) 81 MG EC tablet, Take 1 tablet (81 mg total) by mouth once daily., Disp: 30 tablet, Rfl: 11    atorvastatin (LIPITOR) 40 MG tablet, Take 1 tablet (40 mg total) by mouth once daily., Disp: 90 tablet, Rfl: 3    benazepril-hydrochlorthiazide (LOTENSIN HCT) 20-25 mg Tab, Take 1 tablet by mouth once daily. Please check with your primary care provider. You've been started on Losartan, and so it's unclear if you'll need additional anti-hypertensive medications., Disp: 90 tablet, Rfl: 3    clopidogreL (PLAVIX) 75 mg tablet, Take 1 tablet (75 mg total) by mouth once daily., Disp: 30 tablet, Rfl: 0    galcanezumab-gnlm 120 mg/mL PnIj, Inject 120 mg (one pen) into the skin every 28  days. Begin 28 days after loading dose., Disp: 1 mL, Rfl: 10    hydrOXYzine pamoate (VISTARIL) 25 MG Cap, Take 1 capsule (25 mg total) by mouth every 8 (eight) hours as needed (anxiety)., Disp: 45 capsule, Rfl: 2    magnesium oxide (MAG-OX) 400 mg (241.3 mg magnesium) tablet, Take 1 tablet (400 mg total) by mouth once daily., Disp: 30 tablet, Rfl: 1    ondansetron (ZOFRAN-ODT) 4 MG TbDL, DISSOLVE 1 TABLET ON THE TONGUE EVERY 6 HOURS AS NEEDED FOR NAUSEA, Disp: , Rfl:     promethazine (PHENERGAN) 25 MG tablet, TAKE 1 TABLET BY MOUTH EVERY 4 TO 6 HOURS AS NEEDED FOR NAUSEA, Disp: 20 tablet, Rfl: 1    riboflavin, vitamin B2, 400 mg Tab, Take 400 mg by mouth once daily., Disp: 30 tablet, Rfl: 11    tiZANidine (ZANAFLEX) 4 MG tablet, Take 1.5 tablets (6 mg total) by mouth every evening. No alcohol, no driving with medication., Disp: 45 tablet, Rfl: 5    ubrogepant (UBRELVY) 100 mg tablet, TAKE 1 TABLET BY MOUTH EVERY 2 HOURS AS NEEDED FOR MIGRAINE. MAX 2 TABLETS (200 MG) PER DAY., Disp: 16 tablet, Rfl: 5    verapamiL (CALAN) 40 MG Tab, Take 1 tablet (40 mg total) by mouth 2 (two) times daily., Disp: 60 tablet, Rfl: 11    zolpidem (AMBIEN) 10 mg Tab, Take 1 tablet (10 mg total) by mouth every evening., Disp: 30 tablet, Rfl: 1    buPROPion (WELLBUTRIN XL) 300 MG 24 hr tablet, Take 1 tablet (300 mg total) by mouth once daily., Disp: 90 tablet, Rfl: 1    DULoxetine (CYMBALTA) 60 MG capsule, Take 1 capsule (60 mg total) by mouth once daily., Disp: 90 capsule, Rfl: 1    galcanezumab-gnlm 120 mg/mL PnIj, Inject 240 mg (2 consecutive 120-mg doses) into the skin once as a loading dose, Disp: 2 mL, Rfl: 0    Current Facility-Administered Medications:     onabotulinumtoxina injection 200 Units, 200 Units, Intramuscular, q12 weeks, Luci Ortez FNP, 200 Units at 06/08/23 1200        Review of Systems:     Review of Systems   Constitutional:  Negative for chills and fever.   HENT:  Negative for ear pain and sore throat.   "  Respiratory:  Negative for cough, shortness of breath and wheezing.    Cardiovascular:  Negative for chest pain and palpitations.   Gastrointestinal:  Negative for constipation, diarrhea, nausea and vomiting.   Genitourinary:  Negative for dysuria and hematuria.   Musculoskeletal:  Negative for joint swelling and joint deformity.   Neurological:  Positive for headaches. Negative for dizziness and weakness.           Physical Exam:     Temp:             Blood Pressure:  138/76        Pulse:   88     Respirations:  18  Weight:   64.5 kg (142 lb 3.2 oz)  Height:   5' 5" (1.651 m)  BMI:     Body mass index is 23.66 kg/m².    Physical Exam  Constitutional:       General: She is not in acute distress.  Cardiovascular:      Rate and Rhythm: Normal rate and regular rhythm.   Pulmonary:      Effort: Pulmonary effort is normal. No respiratory distress.      Breath sounds: No wheezing.   Abdominal:      Palpations: Abdomen is soft.      Tenderness: There is no abdominal tenderness.   Skin:     General: Skin is warm.   Neurological:      General: No focal deficit present.      Mental Status: She is alert and oriented to person, place, and time.           Labs/Imaging/Testing:     Most recent CBC:  Lab Results   Component Value Date    WBC 13.73 (H) 07/30/2023    HGB 14.0 07/30/2023     (H) 07/30/2023    MCV 91 07/30/2023       Most recent Lipids:  Lab Results   Component Value Date    CHOL 160 07/30/2023    HDL 47 07/30/2023    LDLCALC 80.2 07/30/2023    TRIG 164 (H) 07/30/2023       Most recent Chemistry:  Lab Results   Component Value Date     07/30/2023    K 2.8 (L) 07/30/2023     07/30/2023    CO2 29 07/30/2023    BUN 14 07/30/2023    CREATININE 0.8 07/30/2023     07/30/2023    CALCIUM 10.2 07/30/2023    ALT 36 07/30/2023    AST 29 07/30/2023    ALKPHOS 98 07/30/2023    PROT 8.1 07/30/2023    ALBUMIN 4.2 07/30/2023       Other tests:  Lab Results   Component Value Date    TSH 1.332 07/30/2023    " FREET4 1.67 (H) 08/05/2010    INR 1.1 07/30/2023    HGBA1C 5.1 07/05/2023    FERRITIN 145 06/28/2022    QUWHIIYH85 333 07/30/2013    JDWUOPNV29VN 21 (L) 07/30/2013    MG 2.3 07/06/2023    SEDRATE 8 03/31/2014    LIPASE 9 09/28/2017    AMYLASE 63 12/13/2012             Assessment and Plan:     1. Stroke due to occlusion of right posterior cerebral artery  Unchanged on recent imaging    2. Hypertension, unspecified type  Seems to be stable on current therapy but will monitor closely    3. Migraine without status migrainosus, not intractable, unspecified migraine type  Seeing neurology - had recent appt and she says they are going to try a new injection    4. Quadrantanopia of left eye  Has appt with ophthalmology in October- on waiting list to see if she can be seen sooner.    Will help patient by completing [employer] and FMLA paperwork       Luc Venegas MD  8/3/2023    This note was prepared using voice-recognition software.  Although efforts are made to proofread the note, some errors may persist in the final document.

## 2023-08-02 NOTE — TELEPHONE ENCOUNTER
Outgoing call to [facility] (Ph: [phone]) regarding rx for both emgality and ubrelvy and explained that pt reports she was told they were still waiting on a PA for both rx. Pharmacy states that the PA for both meds was approved and that the meds are ready for pickup. Pt notified of this update via [employer] message.

## 2023-08-03 ENCOUNTER — OFFICE VISIT (OUTPATIENT)
Dept: PRIMARY CARE CLINIC | Facility: CLINIC | Age: 48
End: 2023-08-03
Payer: COMMERCIAL

## 2023-08-03 ENCOUNTER — PATIENT MESSAGE (OUTPATIENT)
Dept: PRIMARY CARE CLINIC | Facility: CLINIC | Age: 48
End: 2023-08-03

## 2023-08-03 ENCOUNTER — HOME CARE VISIT (OUTPATIENT)
Dept: NEUROLOGY | Facility: HOSPITAL | Age: 48
End: 2023-08-03
Payer: COMMERCIAL

## 2023-08-03 ENCOUNTER — PATIENT MESSAGE (OUTPATIENT)
Dept: INTERNAL MEDICINE | Facility: CLINIC | Age: 48
End: 2023-08-03
Payer: COMMERCIAL

## 2023-08-03 VITALS
DIASTOLIC BLOOD PRESSURE: 76 MMHG | WEIGHT: 142.19 LBS | SYSTOLIC BLOOD PRESSURE: 138 MMHG | HEART RATE: 88 BPM | OXYGEN SATURATION: 97 % | HEIGHT: 65 IN | RESPIRATION RATE: 18 BRPM | BODY MASS INDEX: 23.69 KG/M2

## 2023-08-03 DIAGNOSIS — G43.909 MIGRAINE WITHOUT STATUS MIGRAINOSUS, NOT INTRACTABLE, UNSPECIFIED MIGRAINE TYPE: ICD-10-CM

## 2023-08-03 DIAGNOSIS — I63.531 STROKE DUE TO OCCLUSION OF RIGHT POSTERIOR CEREBRAL ARTERY: Primary | ICD-10-CM

## 2023-08-03 DIAGNOSIS — I10 HYPERTENSION, UNSPECIFIED TYPE: ICD-10-CM

## 2023-08-03 DIAGNOSIS — H53.462 QUADRANTANOPIA OF LEFT EYE: ICD-10-CM

## 2023-08-03 PROCEDURE — 3078F DIAST BP <80 MM HG: CPT | Mod: CPTII,S$GLB,, | Performed by: INTERNAL MEDICINE

## 2023-08-03 PROCEDURE — 3075F PR MOST RECENT SYSTOLIC BLOOD PRESS GE 130-139MM HG: ICD-10-PCS | Mod: CPTII,S$GLB,, | Performed by: INTERNAL MEDICINE

## 2023-08-03 PROCEDURE — 1111F PR DISCHARGE MEDS RECONCILED W/ CURRENT OUTPATIENT MED LIST: ICD-10-PCS | Mod: CPTII,S$GLB,, | Performed by: INTERNAL MEDICINE

## 2023-08-03 PROCEDURE — 1159F PR MEDICATION LIST DOCUMENTED IN MEDICAL RECORD: ICD-10-PCS | Mod: CPTII,S$GLB,, | Performed by: INTERNAL MEDICINE

## 2023-08-03 PROCEDURE — 3008F BODY MASS INDEX DOCD: CPT | Mod: CPTII,S$GLB,, | Performed by: INTERNAL MEDICINE

## 2023-08-03 PROCEDURE — 99214 PR OFFICE/OUTPT VISIT, EST, LEVL IV, 30-39 MIN: ICD-10-PCS | Mod: S$GLB,,, | Performed by: INTERNAL MEDICINE

## 2023-08-03 PROCEDURE — 3044F PR MOST RECENT HEMOGLOBIN A1C LEVEL <7.0%: ICD-10-PCS | Mod: CPTII,S$GLB,, | Performed by: INTERNAL MEDICINE

## 2023-08-03 PROCEDURE — 3075F SYST BP GE 130 - 139MM HG: CPT | Mod: CPTII,S$GLB,, | Performed by: INTERNAL MEDICINE

## 2023-08-03 PROCEDURE — 3078F PR MOST RECENT DIASTOLIC BLOOD PRESSURE < 80 MM HG: ICD-10-PCS | Mod: CPTII,S$GLB,, | Performed by: INTERNAL MEDICINE

## 2023-08-03 PROCEDURE — 3044F HG A1C LEVEL LT 7.0%: CPT | Mod: CPTII,S$GLB,, | Performed by: INTERNAL MEDICINE

## 2023-08-03 PROCEDURE — 99999 PR PBB SHADOW E&M-EST. PATIENT-LVL IV: ICD-10-PCS | Mod: PBBFAC,,, | Performed by: INTERNAL MEDICINE

## 2023-08-03 PROCEDURE — 1159F MED LIST DOCD IN RCRD: CPT | Mod: CPTII,S$GLB,, | Performed by: INTERNAL MEDICINE

## 2023-08-03 PROCEDURE — 3008F PR BODY MASS INDEX (BMI) DOCUMENTED: ICD-10-PCS | Mod: CPTII,S$GLB,, | Performed by: INTERNAL MEDICINE

## 2023-08-03 PROCEDURE — 4010F ACE/ARB THERAPY RXD/TAKEN: CPT | Mod: CPTII,S$GLB,, | Performed by: INTERNAL MEDICINE

## 2023-08-03 PROCEDURE — 1111F DSCHRG MED/CURRENT MED MERGE: CPT | Mod: CPTII,S$GLB,, | Performed by: INTERNAL MEDICINE

## 2023-08-03 PROCEDURE — 99999 PR PBB SHADOW E&M-EST. PATIENT-LVL IV: CPT | Mod: PBBFAC,,, | Performed by: INTERNAL MEDICINE

## 2023-08-03 PROCEDURE — 4010F PR ACE/ARB THEARPY RXD/TAKEN: ICD-10-PCS | Mod: CPTII,S$GLB,, | Performed by: INTERNAL MEDICINE

## 2023-08-03 PROCEDURE — 99214 OFFICE O/P EST MOD 30 MIN: CPT | Mod: S$GLB,,, | Performed by: INTERNAL MEDICINE

## 2023-08-03 NOTE — TELEPHONE ENCOUNTER
Letter for short term disability: letter stating pt is disable. So she can receive short term disability funds.

## 2023-08-03 NOTE — LETTER
August 3, 2023    Mirna Pollock  [address]  [facility] [id number]             [facility]  [address], [address]  [place] [address] To whom it may concern:    Ms. Mirna Pollock is currently disabled due to a recent stroke that has left her with a visual disturbance.  She has been advised not to drive or work.  This period of disability is estimated to last through October of 2023 at this time.    If you have any questions or concerns, please don't hesitate to call.    Sincerely,      Luc Venegas MD

## 2023-08-04 ENCOUNTER — PATIENT MESSAGE (OUTPATIENT)
Dept: PRIMARY CARE CLINIC | Facility: CLINIC | Age: 48
End: 2023-08-04
Payer: COMMERCIAL

## 2023-08-07 ENCOUNTER — CLINICAL SUPPORT (OUTPATIENT)
Dept: REHABILITATION | Facility: HOSPITAL | Age: 48
End: 2023-08-07
Attending: PSYCHIATRY & NEUROLOGY
Payer: COMMERCIAL

## 2023-08-07 DIAGNOSIS — Z74.09 IMPAIRED FUNCTIONAL MOBILITY, BALANCE, GAIT, AND ENDURANCE: Primary | ICD-10-CM

## 2023-08-07 PROCEDURE — 97530 THERAPEUTIC ACTIVITIES: CPT | Mod: PN

## 2023-08-07 PROCEDURE — 97112 NEUROMUSCULAR REEDUCATION: CPT | Mod: PN

## 2023-08-07 NOTE — PROGRESS NOTES
[facility]   Physical Therapy Treatment Note     Name: Mirna Pollock  [facility] Number: [id number]    Therapy Diagnosis:   Encounter Diagnosis   Name Primary?    Impaired functional mobility, balance, gait, and endurance Yes       Physician: Marcus Osman MD    Visit Date: 8/7/2023    Physician Orders: PT Eval and Treat   Medical Diagnosis from Referral: I63.531 (ICD-10-CM) - Stroke due to occlusion of right posterior cerebral artery  Evaluation Date: 7/19/2023  Authorization Period Expiration: 12/31/23  Plan of Care Expiration: 10/19/23  Visit # / Visits authorized: 2/ 20     Progress Note Due: 8/19/23      Precautions: Standard, Fall, and Monitor BP    Time In: 11:20 AM  Time Out: 11:50 AM  Total Billable Time: 30 minutes      SUBJECTIVE     Pt reports: No headache this AM. Went to primary care and was cleared to return to therapy. States that her BP has been good recently except for this morning as she had a saltier meal last night. States she is still having some trouble gripping on R hand/UE.     She was not compliant with home exercise program as HEP not yet provided yet.  Response to previous treatment: No change   Functional change: None    Pain: 0/10  Location: no headache today    OBJECTIVE     BP initially = 131/99  BP after 5 min rest = 122/102  BP after FGA: 140/104  BP after seated for 5 min: 139/107  BP after seated for 7 minutes: 135/105    MCTSIB  Condition 1: 30 seconds  Condition 2: 30 seconds, min sway  Condition 3: 30 seconds, min sway  Condition 4: 5 seconds    Functional Gait Assessment:   1. Gait on level surface =  2   (3) Normal: less than 5.5 sec, no A.D., no imbalance, normal gait pattern, deviates< 6in   (2) Mild impairment: 7-5.6 sec, uses A.D., mild gait deviations, or deviates 6-10 in   (1) Moderate impairment: > 7 sec, slow speed, imbalance, deviates 10-15 in.   (0) Severe impairment: needs assist, deviates >15 in, reach/touch wall  2. Change in Gait Speed =  3   (3) Normal: smooth change w/o loss of balance or gait deviation, deviates < 6 in, significant difference between speeds   (2) Mild impairment: changes speed, but demonstrates mild gait deviations, deviates 6-10 in, OR no deviations but unable to significantly speed, OR uses A.D.   (1) Moderate impairment: minor changes to speed, OR changes speed w/ significant deviations, deviates 10-15 in, OR  Changes speed , but loses balance & recovers   (0) Severe impairment: cannot change speed, deviates >15 in, or loses balance & needs assist  3. Gait with horizontal head turns  = 2   (3) Normal: no change in gait, deviates <6 in   (2) Mild impairment: slight change in speed, deviates 6-10 in, OR uses A.D.   (1) Moderate impairment: moderate change in speed, deviates 10-15 in   (0) Severe impairment: severe disruption of gait, deviates >15in  4. Gait with vertical head turns = 3   (3) Normal: no change in gait, deviates <6 in   (2) Mild impairment: slight change in speed, deviates 6-10 in OR uses A.D.   (1) Moderate impairment: moderate change in speed, deviates 10-15 in   (0) Severe impairment: severe disruption of gait, deviates >15 in  5. Gait with pivot turns = 3   (3) Normal: performs safely in 3 sec, no LOB   (2) Mild impairment: performs in >3 sec & no LOB, OR turns safely & requires several steps to regain LOB   (1) Moderate impairment: turns slow, OR requires several small steps for balance following turn & stop   (0) Severe impairment: cannot turn safely, needs assist  6. Step over obstacle = 3   (3) Normal: steps over 2 stacked boxes w/o change in speed or LOB   (2) Mild impairment: able to step over 1 box w/o change in speed or LOB   (1) Moderate impairment: steps over 1 box but must slow down, may require VC   (0) Severe impairment: cannot perform w/o assist  7. Gait with Narrow CALLIE = 1   (3) Normal: 10 steps no staggering   (2) Mild impairment: 7-9 steps   (1) Moderate impairment: 4-7 steps   (0) Severe  impairment: < 4 steps or cannot perform w/o assist  8. Gait with eyes closed = 2   (3) Normal: < 7 sec, no A.D., no LOB, normal gait pattern, deviates <6 in   (2) Mild impairment: 7.1-9 sec, mild gait deviations, deviates 6-10 in   (1) Moderate impairment: > 9 sec, abnormal pattern, LOB, deviates 10-15 in   (0) Severe impairment: cannot perform w/o assist, LOB, deviates >15in  9. Ambulating Backwards = 2   (3) Normal: no A.D., no LOB, normal gait pattern, deviates <6in   (2) Mild impairment: uses A.D., slower speed, mild gait deviations, deviates 6-10 in   (1) Moderate impairment: slow speed, abnormal gait pattern, LOB, deviates 10-15 in   (0) Severe impairment: severe gait deviations or LOB, deviates >15in  10. Steps = 3   (3) Normal: alternating feet, no rail   (2) Mild Impairment: alternating feet, uses rail   (1) Moderate impairment: step-to, uses rail   (0) Severe impairment: cannot perform safely    Score 24/30     Score:   <22/30 fall risk   <20/30 fall risk in older adults   <18/30 fall risk in Parkinsons       TREATMENT     Mirna received the treatments listed below:      therapeutic activities to improve functional performance for 10 minutes, including:    BP monitoring.     neuromuscular re-education activities to improve: Balance for 20 minutes. The following activities were included:    Testing listed above.     Education regarding systems of balance, errors in balance     PATIENT EDUCATION AND HOME EXERCISES     Home Exercises Provided and Patient Education Provided     Education provided:   - following up with PCP regarding BP  - safety/home modifications secondary to limited peripheral vision    Written Home Exercises Provided: none provided this date.     ASSESSMENT     Pt presented to session today with no reports of headache. Pt with elevated BP, however improved from previous sessions on initial measurement. Pt PCP cleared pt to return to therapy following recent ER visit. However, no specific  parameters given for therapeutic BP range during exercise at this time. Balance assessed today. FGA demonstrating impairments in dynamic balance, particularly with vision eliminated and with vestibular challenges. Pt also with failure of condition 4 of MCTSIB indicating poor vestibular response. Overall, pt testing indicative of increased risk for falls. Pt BP with noted elevation following standing balance testing this session. Session ended early secondary to BP measurements outside of therapeutic range with prolonged rest. PT to follow up with physician regarding specific therapeutic range for this pt. Pt would continue to benefit from therapy for R LE strengthening, balance training, vestibular challenges, and safety education. New goals added this session to reflect addition of balance testing.     Mirna Is progressing well towards her goals.   Pt prognosis is Fair.     Pt will continue to benefit from skilled outpatient physical therapy to address the deficits listed in the problem list box on initial evaluation, provide pt/family education and to maximize pt's level of independence in the home and community environment.     Pt's spiritual, cultural and educational needs considered and pt agreeable to plan of care and goals.     Anticipated barriers to physical therapy: ongoing medical management    Goals:    Short Term Goals- 4 Weeks  Pt to demonstrate independence in performance of HEP in order to improve daily level of physical activity and improve carry over session to session.  Pt to improve B LE strength by > / = 1/2 MMT score in order to improve strength for daily activities.  NEW: Pt to improve FGA score to > / = 26/30 in order to demonstrate improved dynamic balance and safety.  NEW: Pt to demonstrate improvement in condition 4 on MCTSIB to > / = 15 seconds in order to improve safety.   NEW: Pt to improve SLS time B to > / = 5 seconds in order to improve gait stability for community mobility.     Long  Term Goals - 12 Weeks  Pt to demonstrate compliance with HEP > / = 3x per week in order to improve daily level of physical activity and improve carry over session to session.  Pt to improve B LE strength by > / = 1 MMT score in order to improve strength for daily activities.  NEW: Pt to improve FGA score to > / = 28/30 in order to demonstrate improved dynamic balance and safety.  NEW: Pt to demonstrate improvement in condition 4 on MCTSIB to > / = 30 seconds with min sway in order to improve safety.   NEW: Pt to improve SLS time B to > / = 10 seocnds in order to improve gait stability for community mobility.    PLAN     Plan of care Certification: 7/19/2023 to 10/19/2023.     Outpatient Physical Therapy 2 times weekly for 12 weeks    Continue to monitor BP. PT to follow up with PCP regarding therapeutic BP range. Vestibular challenges and R LE strengthening    Kristan Schwartz, PT, DPT  8/7/2023

## 2023-08-09 ENCOUNTER — CLINICAL SUPPORT (OUTPATIENT)
Dept: REHABILITATION | Facility: HOSPITAL | Age: 48
End: 2023-08-09
Attending: PSYCHIATRY & NEUROLOGY
Payer: COMMERCIAL

## 2023-08-09 DIAGNOSIS — Z74.09 IMPAIRED FUNCTIONAL MOBILITY, BALANCE, GAIT, AND ENDURANCE: Primary | ICD-10-CM

## 2023-08-09 PROCEDURE — 97112 NEUROMUSCULAR REEDUCATION: CPT | Mod: PN

## 2023-08-09 PROCEDURE — 97110 THERAPEUTIC EXERCISES: CPT | Mod: PN

## 2023-08-09 NOTE — PROGRESS NOTES
[facility]   Physical Therapy Treatment Note     Name: Mirna Pollock  Clinic Number: [id number]    Therapy Diagnosis:   Encounter Diagnosis   Name Primary?    Impaired functional mobility, balance, gait, and endurance Yes     Physician: Marcus Osman MD    Visit Date: 8/9/2023    Physician Orders: PT Eval and Treat   Medical Diagnosis from Referral: I63.531 (ICD-10-CM) - Stroke due to occlusion of right posterior cerebral artery  Evaluation Date: 7/19/2023  Authorization Period Expiration: 12/31/23  Plan of Care Expiration: 10/19/23  Visit # / Visits authorized: 3/ 20     Progress Note Due: 8/19/23      Precautions: Standard, Fall, and Monitor BP    Time In: 11:15 AM  Time Out: 11:55 AM  Total Billable Time: 40 minutes      SUBJECTIVE     Pt reports: Headache yesterday for the first time in a while, took her BP and it was normal. No headache this AM.    She was not compliant with home exercise program as HEP not yet provided yet.  Response to previous treatment: No change   Functional change: None    Pain: 0/10  Location: no headache today    OBJECTIVE     BP initially = 119/91  NP after NuStwp = 117/90  BP in standing = 117/96    TREATMENT     Mirna received the treatments listed below:      therapeutic activities to improve functional performance for 5 minutes, including:    BP monitoring.     therapeutic exercises to develop strength, endurance, and ROM for 15 minutes including:    Nu-Step x 5 minutes - L2    Standing hip extension RTB 2x10  Standing hip abduction RTB 2x10  Standing heel raises 2x10 - minimal UE support     neuromuscular re-education activities to improve: Balance for 20 minutes. The following activities were included:    NBOS, firm, eyes closed 2x30 seconds  NBOS, firm, head turns 2x30 seconds   Semi Tandem x 30 seconds ea LE  Semi Tandem, eyes closed x 30 seconds ea LE  SLS 2x30 seconds ea LE - one UE support  NBOS, foam, eyes closed 2x30 seconds    PATIENT EDUCATION AND  "HOME EXERCISES     Home Exercises Provided and Patient Education Provided     Education provided:   - following up with PCP regarding BP  - safety/home modifications secondary to limited peripheral vision    Written Home Exercises Provided: none provided this date.     ASSESSMENT     Pt presented to session today with no reports of headache. Pt BP within therapeutic range this session, however, elevated compared to "normal" measurements. Session focused on administering LE strengthening and balance HEP today. Pt able to demo standing hip girdle strengthening with appropriate challenge. Pt with most balance difficulties with vision eliminated and when reliant on SLS. Pt able to return demo safely of HEP. Pt would benefit from additional order placed for OT to address R UE weakness. Pt remains appropriate for PT at this time.     Mirna Is progressing well towards her goals.   Pt prognosis is Fair.     Pt will continue to benefit from skilled outpatient physical therapy to address the deficits listed in the problem list box on initial evaluation, provide pt/family education and to maximize pt's level of independence in the home and community environment.     Pt's spiritual, cultural and educational needs considered and pt agreeable to plan of care and goals.     Anticipated barriers to physical therapy: ongoing medical management    Goals:    Short Term Goals- 4 Weeks  Pt to demonstrate independence in performance of HEP in order to improve daily level of physical activity and improve carry over session to session.  Pt to improve B LE strength by > / = 1/2 MMT score in order to improve strength for daily activities.  Pt to improve FGA score to > / = 26/30 in order to demonstrate improved dynamic balance and safety.  Pt to demonstrate improvement in condition 4 on MCTSIB to > / = 15 seconds in order to improve safety.   Pt to improve SLS time B to > / = 5 seconds in order to improve gait stability for community " mobility.     Long Term Goals - 12 Weeks  Pt to demonstrate compliance with HEP > / = 3x per week in order to improve daily level of physical activity and improve carry over session to session.  Pt to improve B LE strength by > / = 1 MMT score in order to improve strength for daily activities.  Pt to improve FGA score to > / = 28/30 in order to demonstrate improved dynamic balance and safety.  Pt to demonstrate improvement in condition 4 on MCTSIB to > / = 30 seconds with min sway in order to improve safety.   Pt to improve SLS time B to > / = 10 seocnds in order to improve gait stability for community mobility.    PLAN     Plan of care Certification: 7/19/2023 to 10/19/2023.     Outpatient Physical Therapy 2 times weekly for 12 weeks    Continue to monitor BP. PT to follow up with PCP regarding therapeutic BP range. Vestibular challenges and R LE strengthening. Message PCP for OT order placement    Kristan Schwartz, PT, DPT  8/9/2023

## 2023-08-13 ENCOUNTER — PATIENT MESSAGE (OUTPATIENT)
Dept: PRIMARY CARE CLINIC | Facility: CLINIC | Age: 48
End: 2023-08-13
Payer: COMMERCIAL

## 2023-08-13 DIAGNOSIS — H53.9 VISUAL DISTURBANCE AS LATE EFFECT OF CEREBROVASCULAR ACCIDENT (CVA): Primary | ICD-10-CM

## 2023-08-13 DIAGNOSIS — I69.398 VISUAL DISTURBANCE AS LATE EFFECT OF CEREBROVASCULAR ACCIDENT (CVA): Primary | ICD-10-CM

## 2023-08-14 ENCOUNTER — PATIENT MESSAGE (OUTPATIENT)
Dept: REHABILITATION | Facility: HOSPITAL | Age: 48
End: 2023-08-14

## 2023-08-14 NOTE — TELEPHONE ENCOUNTER
Pt asking what's the name of the DR you referred her to at [facility]?

## 2023-08-16 ENCOUNTER — PATIENT MESSAGE (OUTPATIENT)
Dept: PRIMARY CARE CLINIC | Facility: CLINIC | Age: 48
End: 2023-08-16
Payer: COMMERCIAL

## 2023-08-17 ENCOUNTER — PATIENT MESSAGE (OUTPATIENT)
Dept: REHABILITATION | Facility: HOSPITAL | Age: 48
End: 2023-08-17
Payer: COMMERCIAL

## 2023-08-18 ENCOUNTER — PATIENT MESSAGE (OUTPATIENT)
Dept: PRIMARY CARE CLINIC | Facility: CLINIC | Age: 48
End: 2023-08-18
Payer: COMMERCIAL

## 2023-08-18 RX ORDER — AZITHROMYCIN 250 MG/1
TABLET, FILM COATED ORAL
Qty: 6 TABLET | Refills: 0 | Status: SHIPPED | OUTPATIENT
Start: 2023-08-18 | End: 2023-08-23

## 2023-08-21 ENCOUNTER — CLINICAL SUPPORT (OUTPATIENT)
Dept: REHABILITATION | Facility: HOSPITAL | Age: 48
End: 2023-08-21
Attending: PSYCHIATRY & NEUROLOGY
Payer: COMMERCIAL

## 2023-08-21 DIAGNOSIS — Z74.09 IMPAIRED FUNCTIONAL MOBILITY, BALANCE, GAIT, AND ENDURANCE: Primary | ICD-10-CM

## 2023-08-21 PROCEDURE — 97530 THERAPEUTIC ACTIVITIES: CPT | Mod: PN

## 2023-08-21 NOTE — PROGRESS NOTES
[facility]   Physical Therapy Treatment Note     Name: Mirna Pollock  Clinic Number: [id number]    Therapy Diagnosis:   Encounter Diagnosis   Name Primary?    Impaired functional mobility, balance, gait, and endurance Yes     Physician: Marcus Osman MD    Visit Date: 8/21/2023    Physician Orders: PT Eval and Treat   Medical Diagnosis from Referral: I63.531 (ICD-10-CM) - Stroke due to occlusion of right posterior cerebral artery  Evaluation Date: 7/19/2023  Authorization Period Expiration: 12/31/23  Plan of Care Expiration: 10/19/23  Visit # / Visits authorized: 3/ 20     Progress Note Due: 8/19/23      Precautions: Standard, Fall, and Monitor BP    Time In: 11:20 AM  Time Out: 11:35 AM  Total Billable Time: 15 minutes      SUBJECTIVE     Pt reports: She is feeling slightly better since last week. However, still not feeling 100%. Reports she took medications at 845 this morning.     She was compliant with home exercise program.  Response to previous treatment: No change   Functional change: None    Pain: 0/10  Location: no headache today    OBJECTIVE     BP initially = 139/106  BP after seated 5 minutes = 144/107  BP after seated 10 minutes = 142/107    TREATMENT     Mirna received the treatments listed below:      therapeutic activities to improve functional performance for 15 minutes, including:    BP monitoring listed above.  Discussion regarding plans for therapy, upcoming appointments with Neuro-opthalmology.    PATIENT EDUCATION AND HOME EXERCISES     Home Exercises Provided and Patient Education Provided     Education provided:   - following up with PCP regarding BP  - safety/home modifications secondary to limited peripheral vision    Written Home Exercises Provided: none provided this date.     ASSESSMENT     Pt presented to session today with no reports of headache. However, pt reporting she was sick over the last week, currently on antibiotics. Pt with elevated BP upon arrival to  session, reporting she took medication at 845 this AM. After monitoring BP for 10 minutes in seated, PT BP remained above therapeutic range for therapy. Held therapy this session secondary to BP elevated and pt still not feeling 100% post viral illness last week. Pt remains appropriate for therapy at this time, plan to resume activities next session as able.     Mirna Is progressing well towards her goals.   Pt prognosis is Fair.     Pt will continue to benefit from skilled outpatient physical therapy to address the deficits listed in the problem list box on initial evaluation, provide pt/family education and to maximize pt's level of independence in the home and community environment.     Pt's spiritual, cultural and educational needs considered and pt agreeable to plan of care and goals.     Anticipated barriers to physical therapy: ongoing medical management    Goals:    Short Term Goals- 4 Weeks  Pt to demonstrate independence in performance of HEP in order to improve daily level of physical activity and improve carry over session to session.  Pt to improve B LE strength by > / = 1/2 MMT score in order to improve strength for daily activities.  Pt to improve FGA score to > / = 26/30 in order to demonstrate improved dynamic balance and safety.  Pt to demonstrate improvement in condition 4 on MCTSIB to > / = 15 seconds in order to improve safety.   Pt to improve SLS time B to > / = 5 seconds in order to improve gait stability for community mobility.     Long Term Goals - 12 Weeks  Pt to demonstrate compliance with HEP > / = 3x per week in order to improve daily level of physical activity and improve carry over session to session.  Pt to improve B LE strength by > / = 1 MMT score in order to improve strength for daily activities.  Pt to improve FGA score to > / = 28/30 in order to demonstrate improved dynamic balance and safety.  Pt to demonstrate improvement in condition 4 on MCTSIB to > / = 30 seconds with min sway  in order to improve safety.   Pt to improve SLS time B to > / = 10 seocnds in order to improve gait stability for community mobility.    PLAN     Plan of care Certification: 7/19/2023 to 10/19/2023.     Outpatient Physical Therapy 2 times weekly for 12 weeks    Continue to monitor BP.  Vestibular challenges and R LE strengthening. Message PCP for OT order placement    Kristan Schwartz, PT, DPT  8/21/2023

## 2023-08-22 ENCOUNTER — CLINICAL SUPPORT (OUTPATIENT)
Dept: OPHTHALMOLOGY | Facility: CLINIC | Age: 48
End: 2023-08-22
Payer: COMMERCIAL

## 2023-08-22 ENCOUNTER — PATIENT MESSAGE (OUTPATIENT)
Dept: INTERNAL MEDICINE | Facility: CLINIC | Age: 48
End: 2023-08-22
Payer: COMMERCIAL

## 2023-08-24 ENCOUNTER — PATIENT MESSAGE (OUTPATIENT)
Dept: REHABILITATION | Facility: HOSPITAL | Age: 48
End: 2023-08-24

## 2023-08-25 ENCOUNTER — OFFICE VISIT (OUTPATIENT)
Dept: PSYCHIATRY | Facility: CLINIC | Age: 48
End: 2023-08-25
Payer: COMMERCIAL

## 2023-08-25 VITALS
BODY MASS INDEX: 23.81 KG/M2 | DIASTOLIC BLOOD PRESSURE: 65 MMHG | WEIGHT: 143.06 LBS | SYSTOLIC BLOOD PRESSURE: 104 MMHG | HEART RATE: 82 BPM

## 2023-08-25 DIAGNOSIS — F33.41 RECURRENT MAJOR DEPRESSION IN PARTIAL REMISSION: ICD-10-CM

## 2023-08-25 DIAGNOSIS — G47.00 INSOMNIA, UNSPECIFIED TYPE: ICD-10-CM

## 2023-08-25 DIAGNOSIS — F41.1 GAD (GENERALIZED ANXIETY DISORDER): ICD-10-CM

## 2023-08-25 DIAGNOSIS — G43.009 MIGRAINE WITHOUT AURA AND WITHOUT STATUS MIGRAINOSUS, NOT INTRACTABLE: ICD-10-CM

## 2023-08-25 PROCEDURE — 1160F PR REVIEW ALL MEDS BY PRESCRIBER/CLIN PHARMACIST DOCUMENTED: ICD-10-PCS | Mod: CPTII,S$GLB,, | Performed by: STUDENT IN AN ORGANIZED HEALTH CARE EDUCATION/TRAINING PROGRAM

## 2023-08-25 PROCEDURE — 99999 PR PBB SHADOW E&M-EST. PATIENT-LVL III: ICD-10-PCS | Mod: PBBFAC,,, | Performed by: STUDENT IN AN ORGANIZED HEALTH CARE EDUCATION/TRAINING PROGRAM

## 2023-08-25 PROCEDURE — 3074F SYST BP LT 130 MM HG: CPT | Mod: CPTII,S$GLB,, | Performed by: STUDENT IN AN ORGANIZED HEALTH CARE EDUCATION/TRAINING PROGRAM

## 2023-08-25 PROCEDURE — 99999 PR PBB SHADOW E&M-EST. PATIENT-LVL III: CPT | Mod: PBBFAC,,, | Performed by: STUDENT IN AN ORGANIZED HEALTH CARE EDUCATION/TRAINING PROGRAM

## 2023-08-25 PROCEDURE — 1159F MED LIST DOCD IN RCRD: CPT | Mod: CPTII,S$GLB,, | Performed by: STUDENT IN AN ORGANIZED HEALTH CARE EDUCATION/TRAINING PROGRAM

## 2023-08-25 PROCEDURE — 3044F PR MOST RECENT HEMOGLOBIN A1C LEVEL <7.0%: ICD-10-PCS | Mod: CPTII,S$GLB,, | Performed by: STUDENT IN AN ORGANIZED HEALTH CARE EDUCATION/TRAINING PROGRAM

## 2023-08-25 PROCEDURE — 3008F BODY MASS INDEX DOCD: CPT | Mod: CPTII,S$GLB,, | Performed by: STUDENT IN AN ORGANIZED HEALTH CARE EDUCATION/TRAINING PROGRAM

## 2023-08-25 PROCEDURE — 3074F PR MOST RECENT SYSTOLIC BLOOD PRESSURE < 130 MM HG: ICD-10-PCS | Mod: CPTII,S$GLB,, | Performed by: STUDENT IN AN ORGANIZED HEALTH CARE EDUCATION/TRAINING PROGRAM

## 2023-08-25 PROCEDURE — 3044F HG A1C LEVEL LT 7.0%: CPT | Mod: CPTII,S$GLB,, | Performed by: STUDENT IN AN ORGANIZED HEALTH CARE EDUCATION/TRAINING PROGRAM

## 2023-08-25 PROCEDURE — 99214 PR OFFICE/OUTPT VISIT, EST, LEVL IV, 30-39 MIN: ICD-10-PCS | Mod: S$GLB,,, | Performed by: STUDENT IN AN ORGANIZED HEALTH CARE EDUCATION/TRAINING PROGRAM

## 2023-08-25 PROCEDURE — 1160F RVW MEDS BY RX/DR IN RCRD: CPT | Mod: CPTII,S$GLB,, | Performed by: STUDENT IN AN ORGANIZED HEALTH CARE EDUCATION/TRAINING PROGRAM

## 2023-08-25 PROCEDURE — 4010F ACE/ARB THERAPY RXD/TAKEN: CPT | Mod: CPTII,S$GLB,, | Performed by: STUDENT IN AN ORGANIZED HEALTH CARE EDUCATION/TRAINING PROGRAM

## 2023-08-25 PROCEDURE — 3008F PR BODY MASS INDEX (BMI) DOCUMENTED: ICD-10-PCS | Mod: CPTII,S$GLB,, | Performed by: STUDENT IN AN ORGANIZED HEALTH CARE EDUCATION/TRAINING PROGRAM

## 2023-08-25 PROCEDURE — 99214 OFFICE O/P EST MOD 30 MIN: CPT | Mod: S$GLB,,, | Performed by: STUDENT IN AN ORGANIZED HEALTH CARE EDUCATION/TRAINING PROGRAM

## 2023-08-25 PROCEDURE — 3078F PR MOST RECENT DIASTOLIC BLOOD PRESSURE < 80 MM HG: ICD-10-PCS | Mod: CPTII,S$GLB,, | Performed by: STUDENT IN AN ORGANIZED HEALTH CARE EDUCATION/TRAINING PROGRAM

## 2023-08-25 PROCEDURE — 1159F PR MEDICATION LIST DOCUMENTED IN MEDICAL RECORD: ICD-10-PCS | Mod: CPTII,S$GLB,, | Performed by: STUDENT IN AN ORGANIZED HEALTH CARE EDUCATION/TRAINING PROGRAM

## 2023-08-25 PROCEDURE — 3078F DIAST BP <80 MM HG: CPT | Mod: CPTII,S$GLB,, | Performed by: STUDENT IN AN ORGANIZED HEALTH CARE EDUCATION/TRAINING PROGRAM

## 2023-08-25 PROCEDURE — 4010F PR ACE/ARB THEARPY RXD/TAKEN: ICD-10-PCS | Mod: CPTII,S$GLB,, | Performed by: STUDENT IN AN ORGANIZED HEALTH CARE EDUCATION/TRAINING PROGRAM

## 2023-08-25 RX ORDER — BUPROPION HYDROCHLORIDE 300 MG/1
300 TABLET ORAL DAILY
Qty: 30 TABLET | Refills: 3 | Status: SHIPPED | OUTPATIENT
Start: 2023-08-25 | End: 2024-01-12 | Stop reason: SDUPTHER

## 2023-08-25 RX ORDER — ZOLPIDEM TARTRATE 10 MG/1
10 TABLET ORAL NIGHTLY
Qty: 30 TABLET | Refills: 3 | Status: SHIPPED | OUTPATIENT
Start: 2023-09-07 | End: 2024-01-17 | Stop reason: SDUPTHER

## 2023-08-25 RX ORDER — DULOXETIN HYDROCHLORIDE 60 MG/1
60 CAPSULE, DELAYED RELEASE ORAL DAILY
Qty: 30 CAPSULE | Refills: 3 | Status: SHIPPED | OUTPATIENT
Start: 2023-08-25 | End: 2024-01-12 | Stop reason: SDUPTHER

## 2023-08-25 RX ORDER — HYDROXYZINE PAMOATE 25 MG/1
25 CAPSULE ORAL EVERY 8 HOURS PRN
Qty: 45 CAPSULE | Refills: 3 | Status: SHIPPED | OUTPATIENT
Start: 2023-08-25 | End: 2023-12-08 | Stop reason: SDUPTHER

## 2023-08-25 NOTE — PROGRESS NOTES
I have reviewed the notes, assessments, and/or procedures performed by Dr. Freeman, I concur with her/his documentation of Mirna Pollock.

## 2023-08-25 NOTE — PROGRESS NOTES
ESTABLISHED VISIT: PSYCHIATRY     ASSESSMENT AND PLAN:     DIAGNOSES & PROBLEMS:  1. Insomnia, unspecified type    2. Migraine without aura and without status migrainosus, not intractable    3. LLAITA (generalized anxiety disorder)    4. Recurrent major depression in partial remission      In Summary:  - 47yoF with psych history of anxiety, depression, insomnia presenting for follow up. Significant past medical history of recent stroke in July 2023 (PCA and lacunar-type with left field vision loss), chronic migraines, fibromyalgia, IBS. Resident transition today from Dr. Nation to this provider, last seen in April.     Plan:  - Reviewed psychotropic medications for increased risk of stroke. None found. Continue current regimen as pt finds it helpful with managing stress, sleep, decreasing anxiety and depression.  - Continue Cymbalta 60mg daily for anxiety, depression, fibromyalgia  - Continue Wellbutrin XL 300mg daily for depression, motivation, concentration  - Continue Ambien 10mg nightly for insomnia   - Pt reported issues with prior auth. Will monitor inbox.  - Continue PRN Vistaril 25mg TID for anxiety     Per neurology for migraines:  Zanaflex 4 mg nightly     RTC 3 months, pt requested virtual as she is not allowed to drive 2/2 vision loss    INTERVAL HISTORY AND REVIEW OF SYSTEMS:     Mirna Pollock is a 47 y.o. patient seen for a follow up psychiatric evaluation.  An interval history of the presenting illness (HPI) was obtained, and a pertinent psychiatric and medical review of systems was performed.    Global Subjective Rating: good    [] Y  [x] N  sleep: **    [] Y  [x] N  fatigue: **    [] Y  [x] N  appetite/weight: **    [] Y  [x] N  concentration: **     [] Y  [x] N  depression: **     [x] Y  [] N  anxiety: **     [] Y  [x] N  dysregulated mood/behavior: **     [] Y  [x] N  vidya: **     [] Y  [x] N  psychosis: **     [] Y  [x] N  substance: **  current  "use    [] Y  [x] N  pain: **    [] Y  [x] N  cardiac: **    [] Y  [x] N  abnormal movements: **            Pt had a stroke just after the 4th of July resulting in left vision field loss. She can not drive or go to work and feels frustrated with her loss of independence. She is coping well, has good social support from  and mother-in-law. She is performing ADLs, eating and sleeping well, attending physical therapy, has good motivation. Denies SI. Denies feeling depressed. Notes feeling anxious but feels this is normal in regards to recent major stressor.    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   and living with  in their home      EXAMINATION:     Vitals:  /65   Pulse 82   Wt 64.9 kg (143 lb 1.3 oz)   LMP 12/14/2000   BMI 23.81 kg/m²     Psychiatric Mental Status Exam:  General Appearance: good grooming, casually dressed, NAD, appears stated age   Behavior/Cooperation:  calm, cooperative, pleasant, engaged  Abnormal Involuntary Movements: none  Level of Consciousness: awake, alert   Orientation: oriented to person, place, time, situation, president  Psychomotor Behavior: no psychomotor agitation or retardation  Speech: normal tone, normal rate, normal pitch, normal volume  Language: fluent English, uses words appropriately; No aphasia or dysarthria  Mood: "good"  Affect: mood-congruent, full, reactive, appropriate   Thought Process: linear, logical, goal oriented  Thought Content: denies SI/HI/paranoia/delusions; no objective evidence of paranoia or delusions.  Perception: denies AVH. No objective evidence of AVH   Memory: intact to conversation, remote intact, recent intact.  Attention: intact to conversation, not easily distracted.  Fund of Knowledge: appropriate for education level  Insight: Intact, as evidenced by understanding of illness and appropriate risk/benefit analysis while discussing treatment " plan  Judgment: Intact, as evidenced by appropriate behavior throughout interview  Reliability: Good and reliable historian      Case to be discussed with staff psychiatrist: MD Micheline Fagan,   [facility], PGY-4  [facility]         MANAGEMENT:     I[]I Y = Yes / Present / Endorses.  I[]I N = No / Absent / Denies.  I[]I U = Unknown / Unable to Assess / Unwilling to Participate.  I[]I A = Ambiguity Exists / Accuracy Uncertain.  I[]I D = Denial or Minimization is Suspected/Evident.  I[]I N/A = Non-Applicable.    Complexity (level) of medical decision making employed in the encounter: MODERATE  The total time spent on the date of the encounter: 45 minutes    Chart Review: Available documentation has been reviewed, and pertinent elements of the chart have been incorporated into this evaluation where appropriate.  Last [facility] encounter with me: Visit date not found.    [x] In cases of emergencies (e.g. SI/HI resulting in danger to self or others, functioning deteriorates to the level of grave disability), call 911 or [phone], or present to the emergency department for immediate assistance.  [x] Patient should not operate a motor vehicle or heavy machinery if effects of medications or underlying symptoms/condition impair the ability to safely do so.  [x] Comply with ANY/ALL medication fully as prescribed/instructed and report ANY/ALL suspected adverse effects to appropriate health care providers.    Written material has been provided to supplement, augment, and reinforce any discussions and interventions, via the AVS and/or other pre-printed handouts.  Alcohol, Tobacco, and Drug Counseling, as well as applicable resources, has been provided, as warranted.  Shared medical decision making and informed consent are the hallmark and bedrock of good clinical care, and as such have been employed and obtained, respectively, to the degree possible.  Risk Mitigation  Strategies, Harm Reduction Techniques, and Safety Netting are important interventions that can reduce acute and chronic risk, and as such have been employed to the degree possible.  Prescription Drug Management entails the review, recommendation, or consideration without recommendation of medications, and as such was employed during the encounter.  Additional Psychoeducation has been provided, as warranted.      -- Discussed, to the extent possible, diagnosis, risks and benefits of proposed treatment vs alternative treatments vs no treatment, potential side effects of these treatments and the inherent unpredictability of treatment. The patient's ability to understand, participate and engage in a conversation surrounding this was deemed to be: intact. The patient expresses understanding of the above and displays the capacity to agree with this treatment given said understanding. Patient also agrees that, currently, the benefits outweigh the risks and consents to treatment at this time.      [place] Prescription Monitoring Program was reviewed with no evidence of inconsistencies either in the patient's account or healthcare provider continuity.      DIAGNOSTIC TESTING:     Wt Readings from Last 3 Encounters:   08/25/23 64.9 kg (143 lb 1.3 oz)   08/03/23 64.5 kg (142 lb 3.2 oz)   07/31/23 66.6 kg (146 lb 13.2 oz)     BP Readings from Last 1 Encounters:   08/25/23 104/65     Pulse Readings from Last 1 Encounters:   08/25/23 82        Blood Counts, Electrolytes & Glucose:   Lab Results   Component Value Date    WBC 13.73 (H) 07/30/2023    GRAN 8.7 (H) 07/30/2023    GRAN 63.4 07/30/2023    HGB 14.0 07/30/2023    HCT 42.5 07/30/2023    MCV 91 07/30/2023     (H) 07/30/2023     07/30/2023    K 2.8 (L) 07/30/2023    CALCIUM 10.2 07/30/2023    PHOS 3.1 07/06/2023    MG 2.3 07/06/2023    CO2 29 07/30/2023    ANIONGAP 13 07/30/2023     07/30/2023    HGBA1C 5.1 07/05/2023       Renal, Liver, Pancreas,  "Thyroid, Parathyroid, Prolactin, CPK, Lipids & Vitamin Levels:   Lab Results   Component Value Date    CREATININE 0.8 07/30/2023    BUN 14 07/30/2023    EGFRNORACEVR >60.0 07/30/2023    SPECGRAV 1.025 07/14/2023    PROTEINUA Trace (A) 07/14/2023    AST 29 07/30/2023    ALT 36 07/30/2023    ALKPHOS 98 07/30/2023    BILITOT 1.1 (H) 07/30/2023    LABPROT 10.7 07/30/2023    ALBUMIN 4.2 07/30/2023    INR 1.1 07/30/2023    AMYLASE 63 12/13/2012    LIPASE 9 09/28/2017    TSH 1.332 07/30/2023    E5GKWMD 6.8 07/30/2013    FREET4 1.67 (H) 08/05/2010    CHOL 160 07/30/2023    TRIG 164 (H) 07/30/2023    LDLCALC 80.2 07/30/2023    HDL 47 07/30/2023    SKLQSHDV75 333 07/30/2013    THIAMINEBLOO 46 07/30/2013    LYHJBXIV78KG 21 (L) 07/30/2013       Therapeutic Drug Levels:   No results found for: "LITHIUM", "VALPROATE", "CBMZ", "LAMOTRIGINE", "CLOZAPINE", "NORCLOZAP", "CLOZNORCLOZ"    Addiction:   No results found for: "PCDSOALCOHOL", "PCDSOBENZOD", "BARBITURATES", "PCDSCOMETHA", "OPIATESCREEN", "COCAINEMETAB", "AMPHETAMINES", "MARIJUANATHC", "PCDSOPHENCYN", "PCDSUBUPRE", "PCDSUFENTANY", "PCDSUOXYCOD", "PCDSUTRAMA", "[id number]", "PETH", "ALCOHOLMEDIC", "THEYLGLUCU", "ETHYLSULF", "BUPRENORPH", "NORBUPRENOR"    Results for orders placed or performed during the hospital encounter of 07/05/23   EKG 12-lead    Collection Time: 07/30/23 11:41 AM    Narrative    Test Reason : I63.9,    Vent. Rate : 094 BPM     Atrial Rate : 094 BPM     P-R Int : 126 ms          QRS Dur : 086 ms      QT Int : 418 ms       P-R-T Axes : 065 018 066 degrees     QTc Int : 522 ms    Normal sinus rhythm  Nonspecific ST abnormality  Prolonged QT  Abnormal ECG  When compared with ECG of 20-MAY-2022 09:48,  QT has lengthened  Confirmed by Marcus Piper MD ([id number]) on 7/31/2023 7:49:53 AM    Referred By: [id number]   SELF           Confirmed By:Marcus Piper MD             "

## 2023-08-28 ENCOUNTER — CLINICAL SUPPORT (OUTPATIENT)
Dept: REHABILITATION | Facility: HOSPITAL | Age: 48
End: 2023-08-28
Attending: PSYCHIATRY & NEUROLOGY
Payer: COMMERCIAL

## 2023-08-28 ENCOUNTER — OFFICE VISIT (OUTPATIENT)
Dept: OPHTHALMOLOGY | Facility: CLINIC | Age: 48
End: 2023-08-28
Payer: COMMERCIAL

## 2023-08-28 DIAGNOSIS — H53.462 HEMIANOPIA, HOMONYMOUS, LEFT: Primary | ICD-10-CM

## 2023-08-28 DIAGNOSIS — Z74.09 IMPAIRED FUNCTIONAL MOBILITY, BALANCE, GAIT, AND ENDURANCE: Primary | ICD-10-CM

## 2023-08-28 DIAGNOSIS — I63.531 STROKE DUE TO OCCLUSION OF RIGHT POSTERIOR CEREBRAL ARTERY: ICD-10-CM

## 2023-08-28 PROCEDURE — 97112 NEUROMUSCULAR REEDUCATION: CPT | Mod: PN

## 2023-08-28 PROCEDURE — 3044F PR MOST RECENT HEMOGLOBIN A1C LEVEL <7.0%: ICD-10-PCS | Mod: CPTII,S$GLB,, | Performed by: OPHTHALMOLOGY

## 2023-08-28 PROCEDURE — 4010F ACE/ARB THERAPY RXD/TAKEN: CPT | Mod: CPTII,S$GLB,, | Performed by: OPHTHALMOLOGY

## 2023-08-28 PROCEDURE — 99999 PR PBB SHADOW E&M-EST. PATIENT-LVL III: ICD-10-PCS | Mod: PBBFAC,,, | Performed by: OPHTHALMOLOGY

## 2023-08-28 PROCEDURE — 99999 PR PBB SHADOW E&M-EST. PATIENT-LVL III: CPT | Mod: PBBFAC,,, | Performed by: OPHTHALMOLOGY

## 2023-08-28 PROCEDURE — 1159F MED LIST DOCD IN RCRD: CPT | Mod: CPTII,S$GLB,, | Performed by: OPHTHALMOLOGY

## 2023-08-28 PROCEDURE — 4010F PR ACE/ARB THEARPY RXD/TAKEN: ICD-10-PCS | Mod: CPTII,S$GLB,, | Performed by: OPHTHALMOLOGY

## 2023-08-28 PROCEDURE — 3044F HG A1C LEVEL LT 7.0%: CPT | Mod: CPTII,S$GLB,, | Performed by: OPHTHALMOLOGY

## 2023-08-28 PROCEDURE — 1159F PR MEDICATION LIST DOCUMENTED IN MEDICAL RECORD: ICD-10-PCS | Mod: CPTII,S$GLB,, | Performed by: OPHTHALMOLOGY

## 2023-08-28 PROCEDURE — 92083 HUMPHREY VISUAL FIELD - OU - BOTH EYES: ICD-10-PCS | Mod: S$GLB,,, | Performed by: OPHTHALMOLOGY

## 2023-08-28 PROCEDURE — 99203 OFFICE O/P NEW LOW 30 MIN: CPT | Mod: S$GLB,,, | Performed by: OPHTHALMOLOGY

## 2023-08-28 PROCEDURE — 99203 PR OFFICE/OUTPT VISIT, NEW, LEVL III, 30-44 MIN: ICD-10-PCS | Mod: S$GLB,,, | Performed by: OPHTHALMOLOGY

## 2023-08-28 PROCEDURE — 92083 EXTENDED VISUAL FIELD XM: CPT | Mod: S$GLB,,, | Performed by: OPHTHALMOLOGY

## 2023-08-28 PROCEDURE — 97110 THERAPEUTIC EXERCISES: CPT | Mod: PN

## 2023-08-28 PROCEDURE — 1160F RVW MEDS BY RX/DR IN RCRD: CPT | Mod: CPTII,S$GLB,, | Performed by: OPHTHALMOLOGY

## 2023-08-28 PROCEDURE — 1160F PR REVIEW ALL MEDS BY PRESCRIBER/CLIN PHARMACIST DOCUMENTED: ICD-10-PCS | Mod: CPTII,S$GLB,, | Performed by: OPHTHALMOLOGY

## 2023-08-28 NOTE — PROGRESS NOTES
Ms. Pollock educated on stroke mobile program, stroke education guide discussed and review as to what applies to her.

## 2023-08-28 NOTE — LETTER
[facility] - [address] [place] [address]  [address]  [facility] [address]  Phone: [phone]  Fax: [phone]   August 28, 2023    Marcus Osman MD  [address]  [facility] [address]    Patient: Mirna Pollock   MR Number: [id number]   YOB: 1975   Date of Visit: 8/28/2023       Dear Dr. Osman:    Thank you for referring Mirna Pollock to me for evaluation. Here is my assessment and plan of care:    Assessment/Plan    For exam results, see Encounter Report.    Hemianopia, homonymous, left  -     Goff Visual Field - OU - Extended - Both Eyes; Future    Stroke due to occlusion of right posterior cerebral artery      No abnormalities noted to indicate an etiology for her stroke. Repeat exam and HVF in 6 months.          Below you will find my full exam findings. If you have questions, please do not hesitate to call me. I look forward to following Ms. Mirna Pollock along with you.    Sincerely,          Rodriguez Kenyon MD       CC  No Recipients             Base Eye Exam       Visual Acuity (Snellen - Linear)         Right Left    Dist cc 20/30 20/40 -1    Dist ph cc NI NI      Correction: Glasses              Tonometry (Tonopen, 3:55 PM)         Right Left    Pressure 18 19              Pupils         Dark Light Shape React APD    Right 4 2 Round Brisk None    Left 4 2 Round Brisk None              Visual Fields    See HVF report             Extraocular Movement         Right Left     Full, Ortho Full, Ortho              Neuro/Psych       Oriented x3: Yes    Mood/Affect: Normal              Dilation       Both eyes: 2.5% Phenylephrine, 1% Mydriacyl @ 3:54 PM                  Slit Lamp and Fundus Exam       External Exam         Right Left    External Normal Normal              Slit Lamp Exam         Right Left    Lids/Lashes Normal Normal    Conjunctiva/Sclera White and quiet White and quiet    Cornea Clear Clear    Anterior Chamber Deep and quiet Deep and quiet    Iris Round and reactive Round and  reactive    Lens Clear Clear    Vitreous Normal Normal              Fundus Exam         Right Left    Disc Normal Normal    C/D Ratio 0.3 0.3    Macula Normal Normal    Vessels Normal Normal    Periphery Normal Normal

## 2023-08-28 NOTE — PROGRESS NOTES
[facility] OUTPATIENT THERAPY AND [facility]LLNESS   Physical Therapy Treatment Note     Name: Mirna Pollock  Clinic Number: [id number]    Therapy Diagnosis:   Encounter Diagnosis   Name Primary?    Impaired functional mobility, balance, gait, and endurance Yes       Physician: Marcus Osman MD    Visit Date: 8/28/2023    Physician Orders: PT Eval and Treat   Medical Diagnosis from Referral: I63.531 (ICD-10-CM) - Stroke due to occlusion of right posterior cerebral artery  Evaluation Date: 7/19/2023  Authorization Period Expiration: 12/31/23  Plan of Care Expiration: 10/19/23  Visit # / Visits authorized: 3/ 20     Progress Note Due: 8/19/23      Precautions: Standard, Fall, and Monitor BP    Time In: 11:15 AM  Time Out: 11:55 AM  Total Billable Time: 40 minutes      SUBJECTIVE     Pt reports: balance has been ok lately, just bumping into things on L side. Dizziness over the weekend when going out to eat. Still feels her L LE is weaker than her R.     She was not compliant with home exercise program as HEP not yet provided yet.  Response to previous treatment: No change   Functional change: None    Pain: 0/10  Location: no headache today    OBJECTIVE     BP initially = 130/82  NP after NuStep = 130/94  BP in standing = 142/107    TREATMENT     Mirna received the treatments listed below:      therapeutic activities to improve functional performance for 5 minutes, including:    BP monitoring.     therapeutic exercises to develop strength, endurance, and ROM for 12 minutes including:    Nu-Step x 5 minutes - L2    Standing hip extension RTB 2x10  Standing hip abduction RTB 2x10  Standing heel raises 2x10 - minimal UE support     neuromuscular re-education activities to improve: Balance for 23 minutes. The following activities were included:    NBOS, firm, eyes closed 2x30 seconds  NBOS, foam, eyes open 2x30 seconds   Semi Tandem x 30 seconds ea LE  Semi Tandem, eyes closed x 30 seconds ea LE  SLS 2x30 seconds ea LE - one UE  support  NBOS, foam, eyes closed 2x30 seconds    PATIENT EDUCATION AND HOME EXERCISES     Home Exercises Provided and Patient Education Provided     Education provided:   - following up with PCP regarding BP  - safety/home modifications secondary to limited peripheral vision    Written Home Exercises Provided: none provided this date.     ASSESSMENT     Pt presented to session today with no reports of headache. Pt BP within therapeutic range this session, however, some elevation noted with standing activities. Pt able to demo standing hip girdle strengthening with appropriate challenge. Pt with most balance difficulties with vision eliminated and when reliant on SLS. Pt would benefit from additional order placed for OT to address R UE weakness. Pt remains appropriate for PT at this time.     Mirna Is progressing well towards her goals.   Pt prognosis is Fair.     Pt will continue to benefit from skilled outpatient physical therapy to address the deficits listed in the problem list box on initial evaluation, provide pt/family education and to maximize pt's level of independence in the home and community environment.     Pt's spiritual, cultural and educational needs considered and pt agreeable to plan of care and goals.     Anticipated barriers to physical therapy: ongoing medical management    Goals:    Short Term Goals- 4 Weeks  Pt to demonstrate independence in performance of HEP in order to improve daily level of physical activity and improve carry over session to session.  Pt to improve B LE strength by > / = 1/2 MMT score in order to improve strength for daily activities.  Pt to improve FGA score to > / = 26/30 in order to demonstrate improved dynamic balance and safety.  Pt to demonstrate improvement in condition 4 on MCTSIB to > / = 15 seconds in order to improve safety.   Pt to improve SLS time B to > / = 5 seconds in order to improve gait stability for community mobility.     Long Term Goals - 12 Weeks  Pt to  demonstrate compliance with HEP > / = 3x per week in order to improve daily level of physical activity and improve carry over session to session.  Pt to improve B LE strength by > / = 1 MMT score in order to improve strength for daily activities.  Pt to improve FGA score to > / = 28/30 in order to demonstrate improved dynamic balance and safety.  Pt to demonstrate improvement in condition 4 on MCTSIB to > / = 30 seconds with min sway in order to improve safety.   Pt to improve SLS time B to > / = 10 seocnds in order to improve gait stability for community mobility.    PLAN     Plan of care Certification: 7/19/2023 to 10/19/2023.     Outpatient Physical Therapy 2 times weekly for 12 weeks    Continue to monitor BP. Vestibular challenges and R LE strengthening. Message PCP for OT order placement    Kristan Schwartz, PT, DPT  8/28/2023

## 2023-08-28 NOTE — PROGRESS NOTES
HPI    Referred by Marcus Weston MD  Hx of stroke due to to occlusion of right posterior cerebral artery.  (July 5,2023)  Pt states OS peripheral vision loss since stroke.  Left eye orbit feeling swollen and bruised.  No eye pain.  VF done    I have personally interviewed the patient, reviewed the history and   examined the patient and agree with the technician's exam.   Last edited by Rodriguez Kenyon MD on 8/28/2023  3:36 PM.            Assessment /Plan     For exam results, see Encounter Report.    Hemianopia, homonymous, left  -     Goff Visual Field - OU - Extended - Both Eyes; Future    Stroke due to occlusion of right posterior cerebral artery      No abnormalities noted to indicate an etiology for her stroke. Repeat exam and HVF in 6 months.

## 2023-08-30 ENCOUNTER — PATIENT MESSAGE (OUTPATIENT)
Dept: NEUROLOGY | Facility: CLINIC | Age: 48
End: 2023-08-30
Payer: COMMERCIAL

## 2023-08-30 ENCOUNTER — CLINICAL SUPPORT (OUTPATIENT)
Dept: REHABILITATION | Facility: HOSPITAL | Age: 48
End: 2023-08-30
Attending: PSYCHIATRY & NEUROLOGY
Payer: COMMERCIAL

## 2023-08-30 DIAGNOSIS — Z74.09 IMPAIRED FUNCTIONAL MOBILITY, BALANCE, GAIT, AND ENDURANCE: Primary | ICD-10-CM

## 2023-08-30 PROCEDURE — 97530 THERAPEUTIC ACTIVITIES: CPT | Mod: PN

## 2023-08-30 NOTE — PROGRESS NOTES
[facility]   Physical Therapy Treatment Note     Name: Mirna Pollock  Clinic Number: [id number]    Therapy Diagnosis:   Encounter Diagnosis   Name Primary?    Impaired functional mobility, balance, gait, and endurance Yes       Physician: Marcus Osman MD    Visit Date: 8/30/2023    Physician Orders: PT Eval and Treat   Medical Diagnosis from Referral: I63.531 (ICD-10-CM) - Stroke due to occlusion of right posterior cerebral artery  Evaluation Date: 7/19/2023  Authorization Period Expiration: 12/31/23  Plan of Care Expiration: 10/19/23  Visit # / Visits authorized: 6/ 20     Progress Note Due: 8/19/23      Precautions: Standard, Fall, and Monitor BP    Time In: 11:15 AM  Time Out: 11:35 AM  Total Billable Time: 20 minutes      SUBJECTIVE     Pt reports: Visit with Neuro- Opthamologist recently. No clearance to return to work. States she is unsure when she will be able to return to driving.     She was not compliant with home exercise program as HEP not yet provided yet.  Response to previous treatment: No change   Functional change: None    Pain: 0/10  Location: no headache today    OBJECTIVE     BP initially = 143/120  BP after 5 min rest = 140/110    TREATMENT     Mirna received the treatments listed below:      therapeutic activities to improve functional performance for 15 minutes, including:    BP monitoring.   Education regarding driving evaluations by Occupational Therapy.    PATIENT EDUCATION AND HOME EXERCISES     Home Exercises Provided and Patient Education Provided     Education provided:   - following up with PCP regarding BP  - safety/home modifications secondary to limited peripheral vision    Written Home Exercises Provided: none provided this date.     ASSESSMENT     Pt presented to session today with no reports of headache. Pt BP very high at start of session. Pt reporting compliance with medication this AM but high levels of stress secondary to work and health related  concerns. Discussed process for driving evaluations with pt this date. Pt BP with noted decrease following seated rest break, however, continuing to measure outside of therapeutic range. Pt encouraged to message PCP regarding elevated pressure and present to ER if BP elevates. Pt also encouraged to monitor for symptoms and present to ER with symptoms of stroke.     Mirna Is progressing well towards her goals.   Pt prognosis is Fair.     Pt will continue to benefit from skilled outpatient physical therapy to address the deficits listed in the problem list box on initial evaluation, provide pt/family education and to maximize pt's level of independence in the home and community environment.     Pt's spiritual, cultural and educational needs considered and pt agreeable to plan of care and goals.     Anticipated barriers to physical therapy: ongoing medical management    Goals:    Short Term Goals- 4 Weeks  Pt to demonstrate independence in performance of HEP in order to improve daily level of physical activity and improve carry over session to session.  Pt to improve B LE strength by > / = 1/2 MMT score in order to improve strength for daily activities.  Pt to improve FGA score to > / = 26/30 in order to demonstrate improved dynamic balance and safety.  Pt to demonstrate improvement in condition 4 on MCTSIB to > / = 15 seconds in order to improve safety.   Pt to improve SLS time B to > / = 5 seconds in order to improve gait stability for community mobility.     Long Term Goals - 12 Weeks  Pt to demonstrate compliance with HEP > / = 3x per week in order to improve daily level of physical activity and improve carry over session to session.  Pt to improve B LE strength by > / = 1 MMT score in order to improve strength for daily activities.  Pt to improve FGA score to > / = 28/30 in order to demonstrate improved dynamic balance and safety.  Pt to demonstrate improvement in condition 4 on MCTSIB to > / = 30 seconds with min  sway in order to improve safety.   Pt to improve SLS time B to > / = 10 seocnds in order to improve gait stability for community mobility.    PLAN     Plan of care Certification: 7/19/2023 to 10/19/2023.     Outpatient Physical Therapy 2 times weekly for 12 weeks    Continue to monitor BP. PT to follow up with PCP regarding therapeutic BP range. Vestibular challenges and R LE strengthening. Message PCP for OT order placement    Kristan Schwartz, PT, DPT  8/30/2023

## 2023-09-01 ENCOUNTER — HOME CARE VISIT (OUTPATIENT)
Dept: NEUROLOGY | Facility: HOSPITAL | Age: 48
End: 2023-09-01
Payer: COMMERCIAL

## 2023-09-07 ENCOUNTER — CLINICAL SUPPORT (OUTPATIENT)
Dept: REHABILITATION | Facility: HOSPITAL | Age: 48
End: 2023-09-07
Payer: COMMERCIAL

## 2023-09-07 DIAGNOSIS — Z74.09 IMPAIRED FUNCTIONAL MOBILITY, BALANCE, GAIT, AND ENDURANCE: Primary | ICD-10-CM

## 2023-09-07 PROCEDURE — 97530 THERAPEUTIC ACTIVITIES: CPT | Mod: PN

## 2023-09-07 NOTE — PROGRESS NOTES
[facility]   Physical Therapy Treatment Note     Name: Mirna Pollock  Clinic Number: [id number]    Therapy Diagnosis:   Encounter Diagnosis   Name Primary?    Impaired functional mobility, balance, gait, and endurance Yes       Physician: Marcus Osman MD    Visit Date: 9/7/2023    Physician Orders: PT Eval and Treat   Medical Diagnosis from Referral: I63.531 (ICD-10-CM) - Stroke due to occlusion of right posterior cerebral artery  Evaluation Date: 7/19/2023  Authorization Period Expiration: 12/31/23  Plan of Care Expiration: 10/19/23  Visit # / Visits authorized: 7/ 20     Progress Note Due: 8/19/23      Precautions: Standard, Fall, and Monitor BP    Time In: 9:35 AM  Time Out: 9:45 AM  Total Billable Time: 10 minutes      SUBJECTIVE     Pt reports: Presents with  to session today. No new reports or complaints.    She was not compliant with home exercise program as HEP not yet provided yet.  Response to previous treatment: No change   Functional change: None    Pain: 0/10  Location: no headache today    OBJECTIVE     BP initially = 142/109  BP after 5 min rest = 140/108     TREATMENT     Mirna received the treatments listed below:      therapeutic activities to improve functional performance for 10 minutes, including:    BP monitoring.   Education regarding driving evaluations by Occupational Therapy.    PATIENT EDUCATION AND HOME EXERCISES     Home Exercises Provided and Patient Education Provided     Education provided:   - following up with PCP regarding BP  - safety/home modifications secondary to limited peripheral vision    Written Home Exercises Provided: none provided this date.     ASSESSMENT     Pt presented to session today with no reports of headache. Pt BP high at the start of session, outside of typical therapeutic range for exercise. Pt reporting compliance with medication this AM but high levels of stress secondary to work and health related concerns. Pt BP with noted  decrease following seated rest break, however, continuing to measure outside of therapeutic range. Pt encouraged to message PCP regarding elevated pressure and present to ER if BP elevates. Pt also encouraged to monitor for symptoms and present to ER with symptoms of stroke. PT held again this date secondary to BP concerns.     Mirna Is progressing well towards her goals.   Pt prognosis is Fair.     Pt will continue to benefit from skilled outpatient physical therapy to address the deficits listed in the problem list box on initial evaluation, provide pt/family education and to maximize pt's level of independence in the home and community environment.     Pt's spiritual, cultural and educational needs considered and pt agreeable to plan of care and goals.     Anticipated barriers to physical therapy: ongoing medical management    Goals:    Short Term Goals- 4 Weeks  Pt to demonstrate independence in performance of HEP in order to improve daily level of physical activity and improve carry over session to session.  Pt to improve B LE strength by > / = 1/2 MMT score in order to improve strength for daily activities.  Pt to improve FGA score to > / = 26/30 in order to demonstrate improved dynamic balance and safety.  Pt to demonstrate improvement in condition 4 on MCTSIB to > / = 15 seconds in order to improve safety.   Pt to improve SLS time B to > / = 5 seconds in order to improve gait stability for community mobility.     Long Term Goals - 12 Weeks  Pt to demonstrate compliance with HEP > / = 3x per week in order to improve daily level of physical activity and improve carry over session to session.  Pt to improve B LE strength by > / = 1 MMT score in order to improve strength for daily activities.  Pt to improve FGA score to > / = 28/30 in order to demonstrate improved dynamic balance and safety.  Pt to demonstrate improvement in condition 4 on MCTSIB to > / = 30 seconds with min sway in order to improve safety.   Pt  to improve SLS time B to > / = 10 seocnds in order to improve gait stability for community mobility.    PLAN     Plan of care Certification: 7/19/2023 to 10/19/2023.     Outpatient Physical Therapy 2 times weekly for 12 weeks    Continue to monitor BP. PT to follow up with PCP regarding therapeutic BP range. Vestibular challenges and R LE strengthening.     Kristan Schwartz, PT, DPT  9/7/2023

## 2023-09-08 ENCOUNTER — PATIENT MESSAGE (OUTPATIENT)
Dept: PSYCHIATRY | Facility: CLINIC | Age: 48
End: 2023-09-08
Payer: COMMERCIAL

## 2023-09-08 DIAGNOSIS — G47.00 INSOMNIA, UNSPECIFIED TYPE: ICD-10-CM

## 2023-09-08 RX ORDER — ZOLPIDEM TARTRATE 10 MG/1
10 TABLET ORAL NIGHTLY
Qty: 30 TABLET | Refills: 3 | Status: CANCELLED | OUTPATIENT
Start: 2023-09-08 | End: 2024-01-06

## 2023-09-11 ENCOUNTER — TELEPHONE (OUTPATIENT)
Dept: PRIMARY CARE CLINIC | Facility: CLINIC | Age: 48
End: 2023-09-11
Payer: COMMERCIAL

## 2023-09-11 ENCOUNTER — CLINICAL SUPPORT (OUTPATIENT)
Dept: REHABILITATION | Facility: HOSPITAL | Age: 48
End: 2023-09-11
Payer: COMMERCIAL

## 2023-09-11 DIAGNOSIS — R29.898 UPPER EXTREMITY WEAKNESS: Primary | ICD-10-CM

## 2023-09-11 DIAGNOSIS — Z74.09 IMPAIRED FUNCTIONAL MOBILITY, BALANCE, GAIT, AND ENDURANCE: Primary | ICD-10-CM

## 2023-09-11 PROCEDURE — 97110 THERAPEUTIC EXERCISES: CPT | Mod: PN

## 2023-09-11 PROCEDURE — 97112 NEUROMUSCULAR REEDUCATION: CPT | Mod: PN

## 2023-09-11 NOTE — TELEPHONE ENCOUNTER
----- Message from Kristan Schwartz, PT sent at 9/11/2023 11:17 AM CDT -----  Regarding: OT orders  Good Morning,     I am currently seeing your pt Mirna Pollock in physical therapy. I think she would also benefit from an order to Occupational Therapy for UE weakness post CVA. Would it be possible for you to put this order in? Let me know if there are any questions/concerns.     Best,   Gerda Schwartz, PT, DPT

## 2023-09-11 NOTE — TELEPHONE ENCOUNTER
Contacted pt for further clarification on her request for refills. Refilled medications at our last appointment and per , filled ambien on 9/7.    Micheline Freeman DO  [facility], PGY-4  [facility]

## 2023-09-11 NOTE — PROGRESS NOTES
[facility]   Physical Therapy Treatment Note     Name: Mirna Pollock  Clinic Number: [id number]    Therapy Diagnosis:   Encounter Diagnosis   Name Primary?    Impaired functional mobility, balance, gait, and endurance Yes       Physician: Marcus Osman MD    Visit Date: 9/11/2023    Physician Orders: PT Eval and Treat   Medical Diagnosis from Referral: I63.531 (ICD-10-CM) - Stroke due to occlusion of right posterior cerebral artery  Evaluation Date: 7/19/2023  Authorization Period Expiration: 12/31/23  Plan of Care Expiration: 10/19/23  Visit # / Visits authorized: 8/ 20     Progress Note Due: 8/19/23      Precautions: Standard, Fall, and Monitor BP    Time In: 10:35 AM  Time Out: 11:15 AM  Total Billable Time: 20 minutes      SUBJECTIVE     Pt reports: Has appt scheduled with Dr. Venegas at the end of the month.     She was not compliant with home exercise program as HEP not yet provided yet.  Response to previous treatment: No change   Functional change: None    Pain: 0/10  Location: no headache today    OBJECTIVE     BP initially = 127/94  BP after 5 min rest = 137/95    TREATMENT     Mirna received the treatments listed below:      therapeutic exercises to develop strength, endurance, and ROM for 20 minutes including:    Nu-Step x 6 minutes - L2     Standing hip extension RTB 2x10  Standing hip abduction RTB 2x10  Standing heel raises 2x10 - minimal UE support      neuromuscular re-education activities to improve: Balance for 20 minutes. The following activities were included:     NBOS, firm, eyes closed 2x30 seconds  NBOS, firm, head turns 2x30 seconds   Semi Tandem x 30 seconds ea LE  Semi Tandem, eyes closed x 30 seconds ea LE  SLS 2x30 seconds ea LE - one UE support  NBOS, foam, eyes open 2x30 seconds  NBOS, foam, eyes closed 2x30 seconds    PATIENT EDUCATION AND HOME EXERCISES     Home Exercises Provided and Patient Education Provided     Education provided:   - following up with PCP  regarding BP  - safety/home modifications secondary to limited peripheral vision    Written Home Exercises Provided: none provided this date.     ASSESSMENT     Pt tolerated session well today overall. Pt BP remained within therapeutic range throughout session. Pt with significant fatigue reported to B LE following strengthening activities listed. Pt with greatest LOB with vision eliminated and vestibular challenges. Pt remains appropriate for therapy at this time. Continue to monitor BP throughout sessions.     Mirna Is progressing well towards her goals.   Pt prognosis is Fair.     Pt will continue to benefit from skilled outpatient physical therapy to address the deficits listed in the problem list box on initial evaluation, provide pt/family education and to maximize pt's level of independence in the home and community environment.     Pt's spiritual, cultural and educational needs considered and pt agreeable to plan of care and goals.     Anticipated barriers to physical therapy: ongoing medical management    Goals:    Short Term Goals- 4 Weeks  Pt to demonstrate independence in performance of HEP in order to improve daily level of physical activity and improve carry over session to session.  Pt to improve B LE strength by > / = 1/2 MMT score in order to improve strength for daily activities.  Pt to improve FGA score to > / = 26/30 in order to demonstrate improved dynamic balance and safety.  Pt to demonstrate improvement in condition 4 on MCTSIB to > / = 15 seconds in order to improve safety.   Pt to improve SLS time B to > / = 5 seconds in order to improve gait stability for community mobility.     Long Term Goals - 12 Weeks  Pt to demonstrate compliance with HEP > / = 3x per week in order to improve daily level of physical activity and improve carry over session to session.  Pt to improve B LE strength by > / = 1 MMT score in order to improve strength for daily activities.  Pt to improve FGA score to > / =  28/30 in order to demonstrate improved dynamic balance and safety.  Pt to demonstrate improvement in condition 4 on MCTSIB to > / = 30 seconds with min sway in order to improve safety.   Pt to improve SLS time B to > / = 10 seocnds in order to improve gait stability for community mobility.    PLAN     Plan of care Certification: 7/19/2023 to 10/19/2023.     Outpatient Physical Therapy 2 times weekly for 12 weeks    Continue to monitor BP. PT to follow up with PCP regarding therapeutic BP range. Vestibular challenges and R LE strengthening. Message PCP for OT order placement    Kristan Schwartz, PT, DPT  9/11/2023

## 2023-09-18 ENCOUNTER — CLINICAL SUPPORT (OUTPATIENT)
Dept: REHABILITATION | Facility: HOSPITAL | Age: 48
End: 2023-09-18
Payer: COMMERCIAL

## 2023-09-18 DIAGNOSIS — Z74.09 IMPAIRED FUNCTIONAL MOBILITY, BALANCE, GAIT, AND ENDURANCE: Primary | ICD-10-CM

## 2023-09-18 PROCEDURE — 97112 NEUROMUSCULAR REEDUCATION: CPT | Mod: PN

## 2023-09-18 PROCEDURE — 97110 THERAPEUTIC EXERCISES: CPT | Mod: PN

## 2023-09-18 NOTE — PROGRESS NOTES
[facility]   Physical Therapy Treatment Note     Name: Mirna Pollock  Clinic Number: [id number]    Therapy Diagnosis:   Encounter Diagnosis   Name Primary?    Impaired functional mobility, balance, gait, and endurance Yes       Physician: Marcus Osman MD    Visit Date: 9/18/2023    Physician Orders: PT Eval and Treat   Medical Diagnosis from Referral: I63.531 (ICD-10-CM) - Stroke due to occlusion of right posterior cerebral artery  Evaluation Date: 7/19/2023  Authorization Period Expiration: 12/31/23  Plan of Care Expiration: 10/19/23  Visit # / Visits authorized: 9/ 20     Progress Note Due: 8/19/23      Precautions: Standard, Fall, and Monitor BP    Time In: 10:35 AM  Time Out: 11:15 AM  Total Billable Time: 40 minutes      SUBJECTIVE     Pt reports: Took medication a little before 9 AM. B shoulders feeling very fatigued today.     She was not compliant with home exercise program as HEP not yet provided yet.  Response to previous treatment: No change   Functional change: None    Pain: 0/10  Location: no headache today    OBJECTIVE     BP initially = 94/79  BP after bike = 92/71  105/89 after sit to stands    TREATMENT     Mirna received the treatments listed below:      therapeutic exercises to develop strength, endurance, and ROM for 20 minutes including:    Nu-Step x 6 minutes - L2     Standing hip extension RTB 2x10  Standing hip abduction RTB 2x10  Standing heel raises 2x10 - minimal UE support     Sit to stands from 18 in mat with no UE support 2x10     neuromuscular re-education activities to improve: Balance for 20 minutes. The following activities were included:     NBOS, firm, eyes closed 2x30 seconds  NBOS, firm, head turns 2x30 seconds   Semi Tandem x 30 seconds ea LE  Semi Tandem, eyes closed x 30 seconds ea LE  SLS 2x30 seconds ea LE - one UE support  NBOS, foam, eyes open 2x30 seconds  NBOS, foam, eyes closed 2x30 seconds    PATIENT EDUCATION AND HOME EXERCISES     Home  Exercises Provided and Patient Education Provided     Education provided:   - following up with PCP regarding BP  - safety/home modifications secondary to limited peripheral vision    Written Home Exercises Provided: none provided this date.     ASSESSMENT     Pt tolerated session well today overall. Pt BP remained within therapeutic range throughout session. Pt with significant fatigue reported to B LE following strengthening activities listed. Pt with greatest LOB with vision eliminated and vestibular challenges. Pt remains appropriate for therapy at this time. Continue to monitor BP throughout sessions.     Mirna Is progressing well towards her goals.   Pt prognosis is Fair.     Pt will continue to benefit from skilled outpatient physical therapy to address the deficits listed in the problem list box on initial evaluation, provide pt/family education and to maximize pt's level of independence in the home and community environment.     Pt's spiritual, cultural and educational needs considered and pt agreeable to plan of care and goals.     Anticipated barriers to physical therapy: ongoing medical management    Goals:    Short Term Goals- 4 Weeks  Pt to demonstrate independence in performance of HEP in order to improve daily level of physical activity and improve carry over session to session.  Pt to improve B LE strength by > / = 1/2 MMT score in order to improve strength for daily activities.  Pt to improve FGA score to > / = 26/30 in order to demonstrate improved dynamic balance and safety.  Pt to demonstrate improvement in condition 4 on MCTSIB to > / = 15 seconds in order to improve safety.   Pt to improve SLS time B to > / = 5 seconds in order to improve gait stability for community mobility.     Long Term Goals - 12 Weeks  Pt to demonstrate compliance with HEP > / = 3x per week in order to improve daily level of physical activity and improve carry over session to session.  Pt to improve B LE strength by > / =  1 MMT score in order to improve strength for daily activities.  Pt to improve FGA score to > / = 28/30 in order to demonstrate improved dynamic balance and safety.  Pt to demonstrate improvement in condition 4 on MCTSIB to > / = 30 seconds with min sway in order to improve safety.   Pt to improve SLS time B to > / = 10 seocnds in order to improve gait stability for community mobility.    PLAN     Plan of care Certification: 7/19/2023 to 10/19/2023.     Outpatient Physical Therapy 2 times weekly for 12 weeks    Continue to monitor BP. PT to follow up with PCP regarding therapeutic BP range. Vestibular challenges and R LE strengthening. Message PCP for OT order placement    Kristan Schwartz, PT, DPT  9/18/2023

## 2023-09-19 ENCOUNTER — PATIENT MESSAGE (OUTPATIENT)
Dept: INTERNAL MEDICINE | Facility: CLINIC | Age: 48
End: 2023-09-19
Payer: COMMERCIAL

## 2023-09-24 NOTE — PROGRESS NOTES
[facility] Progress Note  9/28/2023          Reason for Visit:      had concerns including Follow-up.       History of Present Illness:     CVA/Migraine  Pt has recnetly had headaches in the posterior occipital area similar to the ones she experienced at the time of the stroke.  She also recently had some right facial tingling, but didn't have any weakness.    She has not seen neurology since these recent symptoms.    She has recently seen ophthalmology regarding the peripheral vision loss since the stroke.  She wasn't very satisfied with the visit and is requesting another referral.  We gave her a referral to Dr. Manav Harrison at [facility] for a second opinion.        Syncope  Since last visit she has had 2 episodes of syncope.  During the first she had gotten up and walked around a table and fell flat.  She did not have palpitations or dizziness prior to the episode, though she says she has had some baseline dizziness all of the time recently.    The second episode occurred after standing up from bed and was similar  There was no shaking, no loss of bowel or bladder continence.    Her blood pressure have been lower than normal at home.  It doesn't look like she has gotten a carotid US so far and so will do that.  Could consider workup for arrythmia if episodes recur    HTN  BP here today was ok, and the orthostatic blood pressures actually showed an increase in blood pressure when going from lying to sitting to standing.    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    CTS (carpal tunnel syndrome)    Accelerated hypertension    Cervical muscle pain    Cervical radiculopathy    H/O catheter-based closure of atrial septal defect    Adult  congenital heart disease    Overweight (BMI 25.0-29.9)    LALITA (generalized anxiety disorder)    Fibromyalgia    Social anxiety disorder    Status migrainosus    Insomnia    Recurrent major depressive disorder, in full remission    Stroke due to occlusion of right posterior cerebral artery    Hypokalemia    Stage 3a chronic kidney disease    Proteinuria    Cerebrovascular small vessel disease    Drug rash    Head Tingling, Right Sided    Impaired functional mobility, balance, gait, and endurance         Medications:       Current Outpatient Medications:     aspirin (ECOTRIN) 81 MG EC tablet, Take 1 tablet (81 mg total) by mouth once daily., Disp: 30 tablet, Rfl: 11    atorvastatin (LIPITOR) 40 MG tablet, Take 1 tablet (40 mg total) by mouth once daily., Disp: 90 tablet, Rfl: 3    benazepril-hydrochlorthiazide (LOTENSIN HCT) 20-25 mg Tab, Take 1 tablet by mouth once daily. Please check with your primary care provider. You've been started on Losartan, and so it's unclear if you'll need additional anti-hypertensive medications., Disp: 90 tablet, Rfl: 3    buPROPion (WELLBUTRIN XL) 300 MG 24 hr tablet, Take 1 tablet (300 mg total) by mouth once daily., Disp: 30 tablet, Rfl: 3    clopidogreL (PLAVIX) 75 mg tablet, Take 1 tablet (75 mg total) by mouth once daily., Disp: 30 tablet, Rfl: 0    DULoxetine (CYMBALTA) 60 MG capsule, Take 1 capsule (60 mg total) by mouth once daily., Disp: 30 capsule, Rfl: 3    galcanezumab-gnlm 120 mg/mL PnIj, Inject 120 mg (one pen) into the skin every 28 days. Begin 28 days after loading dose., Disp: 1 mL, Rfl: 10    hydrOXYzine pamoate (VISTARIL) 25 MG Cap, Take 1 capsule (25 mg total) by mouth every 8 (eight) hours as needed (anxiety)., Disp: 45 capsule, Rfl: 3    magnesium oxide (MAG-OX) 400 mg (241.3 mg magnesium) tablet, Take 1 tablet (400 mg total) by mouth once daily., Disp: 30 tablet, Rfl: 1    ondansetron (ZOFRAN-ODT) 4 MG TbDL, DISSOLVE 1 TABLET ON THE TONGUE EVERY 6 HOURS AS NEEDED  "FOR NAUSEA, Disp: , Rfl:     promethazine (PHENERGAN) 25 MG tablet, TAKE 1 TABLET BY MOUTH EVERY 4 TO 6 HOURS AS NEEDED FOR NAUSEA, Disp: 20 tablet, Rfl: 1    riboflavin, vitamin B2, 400 mg Tab, Take 400 mg by mouth once daily., Disp: 30 tablet, Rfl: 11    tiZANidine (ZANAFLEX) 4 MG tablet, Take 1.5 tablets (6 mg total) by mouth every evening. No alcohol, no driving with medication., Disp: 45 tablet, Rfl: 5    ubrogepant (UBRELVY) 100 mg tablet, TAKE 1 TABLET BY MOUTH EVERY 2 HOURS AS NEEDED FOR MIGRAINE. MAX 2 TABLETS (200 MG) PER DAY., Disp: 16 tablet, Rfl: 5    verapamiL (CALAN) 40 MG Tab, Take 1 tablet (40 mg total) by mouth 2 (two) times daily., Disp: 60 tablet, Rfl: 11    zolpidem (AMBIEN) 10 mg Tab, Take 1 tablet (10 mg total) by mouth every evening., Disp: 30 tablet, Rfl: 3    Current Facility-Administered Medications:     onabotulinumtoxina injection 200 Units, 200 Units, Intramuscular, q12 weeks, Kimich, Luci, FNP, 200 Units at 06/08/23 1200        Review of Systems:     Review of Systems   Constitutional:  Negative for chills and fever.   HENT:  Negative for ear pain and sore throat.    Respiratory:  Negative for cough, shortness of breath and wheezing.    Cardiovascular:  Negative for chest pain and palpitations.   Gastrointestinal:  Negative for constipation, diarrhea, nausea and vomiting.   Genitourinary:  Negative for dysuria and hematuria.   Musculoskeletal:  Negative for joint swelling and joint deformity.   Neurological:  Negative for dizziness and weakness.           Physical Exam:     Temp:             Blood Pressure:  114/76        Pulse:   94     Respirations:  16  Weight:   65.2 kg (143 lb 11.8 oz)  Height:   5' 5" (1.651 m)  BMI:     Body mass index is 23.92 kg/m².    Physical Exam  Constitutional:       General: She is not in acute distress.  HENT:      Right Ear: Tympanic membrane normal.      Left Ear: Tympanic membrane normal.      Nose: No congestion or rhinorrhea.      " Mouth/Throat:      Pharynx: No oropharyngeal exudate or posterior oropharyngeal erythema.   Cardiovascular:      Rate and Rhythm: Normal rate and regular rhythm.   Pulmonary:      Effort: Pulmonary effort is normal. No respiratory distress.      Breath sounds: No wheezing.   Abdominal:      Palpations: Abdomen is soft.      Tenderness: There is no abdominal tenderness.   Skin:     General: Skin is warm.   Neurological:      General: No focal deficit present.      Mental Status: She is alert and oriented to person, place, and time.           Labs/Imaging/Testing:     Most recent CBC:  Lab Results   Component Value Date    WBC 13.73 (H) 07/30/2023    HGB 14.0 07/30/2023     (H) 07/30/2023    MCV 91 07/30/2023       Most recent Lipids:  Lab Results   Component Value Date    CHOL 160 07/30/2023    HDL 47 07/30/2023    LDLCALC 80.2 07/30/2023    TRIG 164 (H) 07/30/2023       Most recent Chemistry:  Lab Results   Component Value Date     07/30/2023    K 2.8 (L) 07/30/2023     07/30/2023    CO2 29 07/30/2023    BUN 14 07/30/2023    CREATININE 0.8 07/30/2023     07/30/2023    CALCIUM 10.2 07/30/2023    ALT 36 07/30/2023    AST 29 07/30/2023    ALKPHOS 98 07/30/2023    PROT 8.1 07/30/2023    ALBUMIN 4.2 07/30/2023       Other tests:  Lab Results   Component Value Date    TSH 1.332 07/30/2023    FREET4 1.67 (H) 08/05/2010    INR 1.1 07/30/2023    HGBA1C 5.1 07/05/2023    FERRITIN 145 06/28/2022    SUBBEIEH84 333 07/30/2013    QQEYAUPC88RE 21 (L) 07/30/2013    MG 2.3 07/06/2023    SEDRATE 8 03/31/2014    LIPASE 9 09/28/2017    AMYLASE 63 12/13/2012             Assessment and Plan:     1. History of CVA (cerebrovascular accident)  Suspec the tingling on right side of face is a sequellae of the prvious stroke rather than signs of something new.    Encouraged to follow up with neurology to discuss  Gave referral to neuro-ophthalmology (Dr. Mnaav Felix) for a second opinon    2. Syncope, unspecified  syncope type  - US Carotid Bilateral; Future  NO evidence of orthostasis on vitals today  Consider workup for arrythmia if episodes recur    3. Hypertension, unspecified type  BP today here looked ok  No evidence of orthostatsis on orthostatic vital signs    4. Head Tingling, Right Sided  See above.    RTC if recurrence of syncope       Luc Venegas MD  9/28/2023    This note was prepared using voice-recognition software.  Although efforts are made to proofread the note, some errors may persist in the final document.

## 2023-09-25 ENCOUNTER — CLINICAL SUPPORT (OUTPATIENT)
Dept: REHABILITATION | Facility: HOSPITAL | Age: 48
End: 2023-09-25
Payer: COMMERCIAL

## 2023-09-25 DIAGNOSIS — Z74.09 IMPAIRED FUNCTIONAL MOBILITY, BALANCE, GAIT, AND ENDURANCE: Primary | ICD-10-CM

## 2023-09-25 PROCEDURE — 97112 NEUROMUSCULAR REEDUCATION: CPT | Mod: PN

## 2023-09-25 PROCEDURE — 97110 THERAPEUTIC EXERCISES: CPT | Mod: PN

## 2023-09-25 NOTE — PROGRESS NOTES
[facility]   Physical Therapy Treatment Note     Name: Mirna Pollock  Clinic Number: [id number]    Therapy Diagnosis:   Encounter Diagnosis   Name Primary?    Impaired functional mobility, balance, gait, and endurance Yes       Physician: Marcus Osman MD    Visit Date: 9/25/2023    Physician Orders: PT Eval and Treat   Medical Diagnosis from Referral: I63.531 (ICD-10-CM) - Stroke due to occlusion of right posterior cerebral artery  Evaluation Date: 7/19/2023  Authorization Period Expiration: 12/31/23  Plan of Care Expiration: 10/19/23  Visit # / Visits authorized: 10/ 20     Progress Note Due: 10/25/23    Precautions: Standard, Fall, and Monitor BP    Time In: 10:35 AM  Time Out: 11:15 AM  Total Billable Time: 40 minutes      SUBJECTIVE     Pt reports: Fell right before bed about a week ago. Also fell a second time. Happened both times when moving from sitting to standing. Felt ok this weekend. Took BP meds around 9AM.  Did walking from car to stadium this weekend for a family member's football game, significant fatigue with this.     She was not compliant with home exercise program as HEP not yet provided yet.  Response to previous treatment: No change   Functional change: None    Pain: 0/10  Location: no headache today    OBJECTIVE     BP initially = 127/98  BP after bike = 122/97  BP final: 133/97     Lower Extremity Strength    RLE R LE 9/25/23 LLE L LE 9/25/23   Hip Flexion: 5/5 5/5 5/5 5/5   Hip Extension:  4/5 4/5 4/5 5/5   Hip Abduction: 3+/5 5/5 4/5 4/5   Knee Extension: 4/5 5/5 5/5 5/5   Knee Flexion: NT 5/5 NT 5/5   Ankle Dorsiflexion: 4-/5 5/5 5/5 5/5   Ankle Plantarflexion:  seated 4/5 5/5 5/5 5/5          Evaluation 9/25/23   5 times sit-stand 15 seconds - B UE support    16 seconds - no UE support      5 times sit<>stand Cutoff scores:  >12 sec= fall risk  PD: >16 seconds= fall risk  Vestibular/balance: 15 seconds over 65 years  CVA: 12 seconds     Gait Assessment:   - AD  used: none  - Assistance: S  - Distance: 100 ft (in and out of clinic)     GAIT DEVIATIONS:  Gait component performance:   Slowed gait speed  Decreased step length  Intermittent LE weaving throughout gait  * Above impairments indicate decreased gait safety and increased fall risk.        Timed Up and Go fall risk:   Community dwelling older adults >13.5 sec   Chronic CVA >14 sec   Geriatric with h/o falls >15 sec   Frail elderly >32.6 sec    LE amputees >19 sec   PD >7.95- 11.5 sec   Hip OA >10 sec   Vestibular >11.1 sec      MCTSIB  Condition 1: 30 seconds  Condition 2: 30 seconds, no sway (previous:min sway)  Condition 3: 30 seconds, min sway (previous: min sway)  Condition 4: 13 seconds (previous: 5 seconds)     Functional Gait Assessment:   1. Gait on level surface =  2              (3) Normal: less than 5.5 sec, no A.D., no imbalance, normal gait pattern, deviates< 6in              (2) Mild impairment: 7-5.6 sec, uses A.D., mild gait deviations, or deviates 6-10 in              (1) Moderate impairment: > 7 sec, slow speed, imbalance, deviates 10-15 in.              (0) Severe impairment: needs assist, deviates >15 in, reach/touch wall  2. Change in Gait Speed = 3              (3) Normal: smooth change w/o loss of balance or gait deviation, deviates < 6 in, significant difference between speeds              (2) Mild impairment: changes speed, but demonstrates mild gait deviations, deviates 6-10 in, OR no deviations but unable to significantly speed, OR uses A.D.              (1) Moderate impairment: minor changes to speed, OR changes speed w/ significant deviations, deviates 10-15 in, OR  Changes speed , but loses balance & recovers              (0) Severe impairment: cannot change speed, deviates >15 in, or loses balance & needs assist  3. Gait with horizontal head turns  = 2              (3) Normal: no change in gait, deviates <6 in              (2) Mild impairment: slight change in speed, deviates 6-10 in, OR  uses A.D.              (1) Moderate impairment: moderate change in speed, deviates 10-15 in              (0) Severe impairment: severe disruption of gait, deviates >15in  4. Gait with vertical head turns = 2              (3) Normal: no change in gait, deviates <6 in              (2) Mild impairment: slight change in speed, deviates 6-10 in OR uses A.D.              (1) Moderate impairment: moderate change in speed, deviates 10-15 in              (0) Severe impairment: severe disruption of gait, deviates >15 in  5. Gait with pivot turns = 3              (3) Normal: performs safely in 3 sec, no LOB              (2) Mild impairment: performs in >3 sec & no LOB, OR turns safely & requires several steps to regain LOB              (1) Moderate impairment: turns slow, OR requires several small steps for balance following turn & stop              (0) Severe impairment: cannot turn safely, needs assist  6. Step over obstacle = 3              (3) Normal: steps over 2 stacked boxes w/o change in speed or LOB              (2) Mild impairment: able to step over 1 box w/o change in speed or LOB              (1) Moderate impairment: steps over 1 box but must slow down, may require VC              (0) Severe impairment: cannot perform w/o assist  7. Gait with Narrow CALLIE = 1              (3) Normal: 10 steps no staggering              (2) Mild impairment: 7-9 steps              (1) Moderate impairment: 4-7 steps              (0) Severe impairment: < 4 steps or cannot perform w/o assist  8. Gait with eyes closed = 2              (3) Normal: < 7 sec, no A.D., no LOB, normal gait pattern, deviates <6 in              (2) Mild impairment: 7.1-9 sec, mild gait deviations, deviates 6-10 in              (1) Moderate impairment: > 9 sec, abnormal pattern, LOB, deviates 10-15 in              (0) Severe impairment: cannot perform w/o assist, LOB, deviates >15in  9. Ambulating Backwards = 2              (3) Normal: no A.D., no LOB, normal gait  pattern, deviates <6in              (2) Mild impairment: uses A.D., slower speed, mild gait deviations, deviates 6-10 in              (1) Moderate impairment: slow speed, abnormal gait pattern, LOB, deviates 10-15 in              (0) Severe impairment: severe gait deviations or LOB, deviates >15in  10. Steps = 3              (3) Normal: alternating feet, no rail              (2) Mild Impairment: alternating feet, uses rail              (1) Moderate impairment: step-to, uses rail              (0) Severe impairment: cannot perform safely     Score 23/30    (previous 24/30)     Score:   <22/30 fall risk   <20/30 fall risk in older adults   <18/30 fall risk in Parkinsons     6 minute walk test - 1095 ft (334 meters)    TREATMENT     Mirna received the treatments listed below:      therapeutic exercises to develop strength, endurance, and flexibility for 20 minutes including:    Nu-Step x 8 minutes    Testing listed above.    neuromuscular re-education activities to improve: Balance, Coordination, and Posture for 20 minutes. The following activities were included:    Testing listed above.    NBOS, firm, eyes closed 2x30 seconds  NBOS, firm, head turns 2x30 seconds   Semi Tandem x 30 seconds ea LE  Semi Tandem, eyes closed x 30 seconds ea LE  SLS 2x30 seconds ea LE - one UE support  NBOS, foam, eyes open 2x30 seconds  NBOS, foam, eyes closed 2x30 seconds        PATIENT EDUCATION AND HOME EXERCISES     Home Exercises Provided and Patient Education Provided     Education provided:   - following up with PCP regarding BP  - safety/home modifications secondary to limited peripheral vision    Written Home Exercises Provided: none provided this date.     ASSESSMENT     Pt presented to session today with no reports of headache. Pt progress note performed this session. Pt with improvements noted in B LE strength as indicated by MMT. Pt with no significant change noted in 5 time sit to stand score or FGA score this date. Pt with  improvement in MCTSIB condition 4 score, however, continues with limitation in vestibular response as indicated by failure of condition 4. Six minute walk test assessed this session secondary to pt reports of fatigue with walking. Pt scoring significantly below age related normative data for 6MWT, indicating limited CV endurance. Goal added to reflect endurance testing. Overall, pt progressing well with therapy, however, continues with impaired B LE strength, impaired balance, limited vestibular response, poor endurance, and overall limitation in tolerance to activities. Pt met 2/5 STGs and is progressing toward all other goals. Pt remains appropriate for therapy at this time to address listed deficits.    Mirna Is progressing well towards her goals.   Pt prognosis is Fair.     Pt will continue to benefit from skilled outpatient physical therapy to address the deficits listed in the problem list box on initial evaluation, provide pt/family education and to maximize pt's level of independence in the home and community environment.     Pt's spiritual, cultural and educational needs considered and pt agreeable to plan of care and goals.     Anticipated barriers to physical therapy: ongoing medical management    Goals:    Short Term Goals- 4 Weeks  Pt to demonstrate independence in performance of HEP in order to improve daily level of physical activity and improve carry over session to session. -MET 9/25/23  Pt to improve B LE strength by > / = 1/2 MMT score in order to improve strength for daily activities. -MET 9/25/23  Pt to improve FGA score to > / = 26/30 in order to demonstrate improved dynamic balance and safety.  Pt to demonstrate improvement in condition 4 on MCTSIB to > / = 15 seconds in order to improve safety.   Pt to improve SLS time B to > / = 5 seconds in order to improve gait stability for community mobility.     Long Term Goals - 12 Weeks  Pt to demonstrate compliance with HEP > / = 3x per week in order  to improve daily level of physical activity and improve carry over session to session.  Pt to improve B LE strength by > / = 1 MMT score in order to improve strength for daily activities.  Pt to improve FGA score to > / = 28/30 in order to demonstrate improved dynamic balance and safety.  Pt to demonstrate improvement in condition 4 on MCTSIB to > / = 30 seconds with min sway in order to improve safety.   Pt to improve SLS time B to > / = 10 seocnds in order to improve gait stability for community mobility.  NEW: Pt to improve 6 minute walk test distance by 50 meters in order to demo improved endurance for community mobility.     PLAN     Plan of care Certification: 7/19/2023 to 10/19/2023.     Outpatient Physical Therapy 2 times weekly for 12 weeks    Continue to monitor BP. Vestibular challenges and R LE strengthening.    Kristan Schwartz, PT, DPT  9/25/2023

## 2023-09-28 ENCOUNTER — OFFICE VISIT (OUTPATIENT)
Dept: PRIMARY CARE CLINIC | Facility: CLINIC | Age: 48
End: 2023-09-28
Payer: COMMERCIAL

## 2023-09-28 VITALS
WEIGHT: 143.75 LBS | DIASTOLIC BLOOD PRESSURE: 76 MMHG | OXYGEN SATURATION: 99 % | RESPIRATION RATE: 16 BRPM | SYSTOLIC BLOOD PRESSURE: 114 MMHG | HEART RATE: 94 BPM | BODY MASS INDEX: 23.95 KG/M2 | HEIGHT: 65 IN

## 2023-09-28 DIAGNOSIS — R55 SYNCOPE, UNSPECIFIED SYNCOPE TYPE: Primary | ICD-10-CM

## 2023-09-28 DIAGNOSIS — I10 HYPERTENSION, UNSPECIFIED TYPE: ICD-10-CM

## 2023-09-28 DIAGNOSIS — Z86.73 HISTORY OF CVA (CEREBROVASCULAR ACCIDENT): ICD-10-CM

## 2023-09-28 DIAGNOSIS — R20.2 FACIAL TINGLING: ICD-10-CM

## 2023-09-28 PROCEDURE — 3008F BODY MASS INDEX DOCD: CPT | Mod: CPTII,S$GLB,, | Performed by: INTERNAL MEDICINE

## 2023-09-28 PROCEDURE — 3044F HG A1C LEVEL LT 7.0%: CPT | Mod: CPTII,S$GLB,, | Performed by: INTERNAL MEDICINE

## 2023-09-28 PROCEDURE — 4010F PR ACE/ARB THEARPY RXD/TAKEN: ICD-10-PCS | Mod: CPTII,S$GLB,, | Performed by: INTERNAL MEDICINE

## 2023-09-28 PROCEDURE — 99214 OFFICE O/P EST MOD 30 MIN: CPT | Mod: S$GLB,,, | Performed by: INTERNAL MEDICINE

## 2023-09-28 PROCEDURE — 99999 PR PBB SHADOW E&M-EST. PATIENT-LVL V: CPT | Mod: PBBFAC,,, | Performed by: INTERNAL MEDICINE

## 2023-09-28 PROCEDURE — 1159F PR MEDICATION LIST DOCUMENTED IN MEDICAL RECORD: ICD-10-PCS | Mod: CPTII,S$GLB,, | Performed by: INTERNAL MEDICINE

## 2023-09-28 PROCEDURE — 3008F PR BODY MASS INDEX (BMI) DOCUMENTED: ICD-10-PCS | Mod: CPTII,S$GLB,, | Performed by: INTERNAL MEDICINE

## 2023-09-28 PROCEDURE — 99214 PR OFFICE/OUTPT VISIT, EST, LEVL IV, 30-39 MIN: ICD-10-PCS | Mod: S$GLB,,, | Performed by: INTERNAL MEDICINE

## 2023-09-28 PROCEDURE — 99999 PR PBB SHADOW E&M-EST. PATIENT-LVL V: ICD-10-PCS | Mod: PBBFAC,,, | Performed by: INTERNAL MEDICINE

## 2023-09-28 PROCEDURE — 3074F SYST BP LT 130 MM HG: CPT | Mod: CPTII,S$GLB,, | Performed by: INTERNAL MEDICINE

## 2023-09-28 PROCEDURE — 3078F DIAST BP <80 MM HG: CPT | Mod: CPTII,S$GLB,, | Performed by: INTERNAL MEDICINE

## 2023-09-28 PROCEDURE — 4010F ACE/ARB THERAPY RXD/TAKEN: CPT | Mod: CPTII,S$GLB,, | Performed by: INTERNAL MEDICINE

## 2023-09-28 PROCEDURE — 3074F PR MOST RECENT SYSTOLIC BLOOD PRESSURE < 130 MM HG: ICD-10-PCS | Mod: CPTII,S$GLB,, | Performed by: INTERNAL MEDICINE

## 2023-09-28 PROCEDURE — 1159F MED LIST DOCD IN RCRD: CPT | Mod: CPTII,S$GLB,, | Performed by: INTERNAL MEDICINE

## 2023-09-28 PROCEDURE — 3044F PR MOST RECENT HEMOGLOBIN A1C LEVEL <7.0%: ICD-10-PCS | Mod: CPTII,S$GLB,, | Performed by: INTERNAL MEDICINE

## 2023-09-28 PROCEDURE — 3078F PR MOST RECENT DIASTOLIC BLOOD PRESSURE < 80 MM HG: ICD-10-PCS | Mod: CPTII,S$GLB,, | Performed by: INTERNAL MEDICINE

## 2023-09-29 ENCOUNTER — PATIENT MESSAGE (OUTPATIENT)
Dept: REHABILITATION | Facility: HOSPITAL | Age: 48
End: 2023-09-29

## 2023-10-02 ENCOUNTER — CLINICAL SUPPORT (OUTPATIENT)
Dept: REHABILITATION | Facility: HOSPITAL | Age: 48
End: 2023-10-02
Attending: INTERNAL MEDICINE
Payer: COMMERCIAL

## 2023-10-02 DIAGNOSIS — R29.898 UPPER EXTREMITY WEAKNESS: ICD-10-CM

## 2023-10-02 PROCEDURE — 97165 OT EVAL LOW COMPLEX 30 MIN: CPT | Mod: PN

## 2023-10-02 PROCEDURE — 97530 THERAPEUTIC ACTIVITIES: CPT | Mod: PN

## 2023-10-02 NOTE — PATIENT INSTRUCTIONS
Theraputty Hand Strengthening Home Exercise Program      Additional instructions: Keep putty away from clothes/fabric. Keep putty out of sun.  Retrieved from OT Toolkit by Malgorzata Espinoza (2011)

## 2023-10-02 NOTE — PROGRESS NOTES
[facility] Occupational Therapy  Initial Neurological Evaluation     Date: 10/2/2023  Patient: Mirna Pollock  Chart Number: [id number]    Therapy Diagnosis: No diagnosis found.  Physician: Luc Venegas MD    Physician Orders: OT eval and treat - Hand  Medical Diagnosis: Upper extremity weakness [R29.898]  Evaluation Date: 10/2/2023  Plan of Care Expiration Period: 12/01/2023  Insurance Authorization period Expiration: 09/24/2024  Visit # / Visits Authorized: 1 / 1  FOTO: 10/2/2023    Time In:1530  Time Out: 1615  Total Billable (one on one) Time: 45 minutes    Precautions: Standard, Fall, and Syncope    Subjective     History of Current Condition:  CVA on 07/05/2023. Currently, patient reports the following deficits: over the weekend began with some heaviness in the right arm and tingling in right face. States that  has noted some weakness in right side, especially when going up the stairs.  Difficulty in opening jars and decreased sensation in right hand since stroke in July.     Mechanism of Injury: CVA  Date of Onset: July 2023  Surgical Procedure: NA  Imaging: MRI studies reviewed  Previous Therapy: vestibular PT - ongoing     Right Left   Involved Side   [x]     []   Dominant Side    [x]     []     Pain:  NA    Occupation:   Working presently:  at [employer]   Duties: currently on disability     Functional Limitations/Social History:    Home/Living environment : lives with their spouse  No issues navigating home environment     Driving: not currently secondary to peripheral vision changes    Functional Status      Current functional status: modified independent to independent in all ADLs/most IADLs (not driving); participation limited by symptoms    Past Medical History/Physical Systems Review:     Past Medical History:  Mirna Pollock  has a past medical history of Anxiety, ASD (atrial septal defect), Endometriosis, Headache(784.0), Hypertension, and Kidney stones.    Past  Surgical History:  Mirna Pollock  has a past surgical history that includes Cholecystectomy; Appendectomy;  section; Hysterectomy; and Cardiac catheterization (10/01/2010).    Current Medications:  Mirna has a current medication list which includes the following prescription(s): aspirin, atorvastatin, benazepril-hydrochlorthiazide, bupropion, clopidogrel, duloxetine, galcanezumab-gnlm, hydroxyzine pamoate, magnesium oxide, ondansetron, promethazine, riboflavin (vitamin b2), tizanidine, ubrelvy, verapamil, and zolpidem, and the following Facility-Administered Medications: onabotulinumtoxina.    Allergies:  Review of patient's allergies indicates:   Allergen Reactions    Iodine and iodide containing products Other (See Comments)     Patient becomes hypotensive with DRESS-like syndrome even with pre-medication  Pt with hypotensive, severe reaction with CTA 2023    Losartan Rash     Fever, eosinophilia, diffuse rash    [id number] antimigraine agents Other (See Comments)     Patient has a history of probable migrainous stroke. Please proceed with caution with triptan medications specifically, given effects on vasculature. Patient may tolerate this medication, but added to chart simply to ensure it's brought to the attention of prescribers.    Penicillins Rash        Objective     Cognitive Exam:  Cognition intact    Visual/Perceptual:  See PT eval    Physical Exam:  Postural examination/scapula alignment: WNL  Joint integrity: WNL  Skin integrity: WNL  Edema: None noted  Palpation: No pain with palpation    Joint Evaluation:  - right upper extremity:  Active range of motion: WNL  MMS: 4/5 grossly  - left upper extremity:  Active range of motion: WNL  MMS: 5/5    Fist: normal     Strength: (ADAM Dynamometer in lbs.) Average 3 trials, Position II:      10/2/2023 10/2/2023   Rung II Right Left   Average 40.5# 31.9#   [norms for women aged 45-49: R=62.2 +/-15.1; L=56.0 +/-12.7 (Gina et al,  1985)]    Pinch Strength (Measured in lbs)      10/2/2023 10/2/2023    Right Left   Lateral Pinch 12.0 # 13.0 #   3pt Pinch 11.0 # 12.0 #   2pt Pinch 9.5 # 8.0 #   [Lateral pinch norms for women aged 45-49: right: 17.6+/-3.2; left: 16.6+/-2.9 (Gina et al, 1985)]   [3-point pinch norms for women aged 45-49: right: 17.9+/-3.0; left: 17.5+/-2.8 (Gina et al, 1985)]   [2-point pinch norms for women aged 45-49: right: 13.2+/-3.0; left: 12.1+/-2.7 (Gina et al, 1985)]     Fine/Gross Motor Coordination    Assessment  Right   10/2/2023 Left  10/2/2023   9 hole peg test 9 pegs in/out for time 31 31   STEPHON (Rapid Alternating Movements)    intact   intact   Finger to Nose (5 times)  intact intact   Finger Flicks (coordination moving from digit flexion to digit extension)  intact intact   *WNL - Within Normal Limits  *NT = Not Tested  Nine Hole Peg Test Norms: [norms for women aged 46-50: R=17.36 +/-2.01s; L=17.96s +/-2.30s (Rolando et al, 2003)]    Tone:    Modified Marin Scale:   0 - No increase in muscle tone    Sensation:  Mirna  reports tinging in distal upper extremity      Sensation Intact  Impaired Comments    [place] Rosaura  Deep Touch (6.65) [x] []     Protective Sensation (4.56) [x] []     Light Touch (3.61) [] [x] Left dorsal          Other Kinesthesia  [x] []     Temperature [x] []     Stereognosis  [x] []        Balance:   See PT evaluation.    Endurance Deficit: mild             CMS Impairment/Limitation/Restriction for FOTO Stroke upper extremity Survey    Therapist reviewed FOTO scores for Mirna Pollock on 10/2/2023.   FOTO documents entered into EPIC - see Media section.             Treatment     Treatment Time In: 1605  Treatment Time Out: 1615  Total Treatment time separate from Evaluation time:10    Mirna participated in dynamic functional therapeutic activities to improve functional performance for 10  minutes, including:  - Review and return demonstration of hand strengthening theraputty HEP:  see pt instructions    Home Exercises and Patient Education Provided    Education provided:   -role of OT, goals for OT, scheduling/cancellations, insurance limitations with patient.  -Additional Education provided: HEP    Written Home Exercises Provided: yes.  Exercises were reviewed and Mirna was able to demonstrate them prior to the end of the session.    Mirna demonstrated good  understanding of the education provided.     See EMR under Patient Instructions for exercises provided 10/2/2023.    Assessment     Mirna Pollock is a 47 y.o. female referred to outpatient occupational therapy and presents with a medical diagnosis of upper extremity weakness, resulting in decreased muscle strength, impaired function, and decreased work ability and demonstrates limitations as described in the chart below. Following medical record review it is determined that patient will benefit from occupational therapy services in order to maximize pain free and/or functional use of right UE.    Patient prognosis is Good due to high motivation to progress.  Patient will benefit from skilled outpatient Occupational Therapy to address the deficits stated above and in the chart below, provide patient/family education, and to maximize patient's level of independence.     Plan of care discussed with patient: Yes  Patient's spiritual, cultural and educational needs considered and patient is agreeable to the plan of care and goals as stated below:     Anticipated Barriers for therapy: blood pressure limitations    Medical Necessity is demonstrated by the following  Occupational Profile/History  Co-morbidities and personal factors that may impact the plan of care [] LOW: Brief chart review  [x] MODERATE: Expanded chart review   [] HIGH: Extensive chart review    Moderate / High Support Documentation: review of previous therapy notes, imaging, and MD notes     Examination  Performance deficits relating to physical, cognitive or psychosocial skills  that result in activity limitations and/or participation restrictions  [x] LOW: addressing 1-3 Performance deficits  [] MODERATE: 3-5 Performance deficits  [] HIGH: 5+ Performance deficits (please support below)    Moderate / High Support Documentation:    Physical:      Cognitive:      Psychosocial:         Treatment Options [] LOW: Limited options  [x] MODERATE: Several options  [] HIGH: Multiple options      Decision Making/ Complexity Score: moderate         The following goals were discussed with the patient and patient is in agreement with them as to be addressed in the treatment plan.     Patient's Goals for Therapy: regain strength in hand, improve overall endurance    Goals:     Short Term Goals = Long Term Goals: to be met within 10 weeks    Pt will be independent with Home Exercise Program (HEP) to promote long term maintenance of progress made in therapy.     Pt will reach expected FOTO score of 66 to demonstrate improved participation in ADLs for overall quality of life     Pt will improve B pinch strength by 3# (combined lateral + 2 pt + 3 pt) each in order to increase safety and participation with work tasks    Pt will improve B  strength by 5# each in order to increase safety and participation with home care tasks    Pt will demonstrate improved strength of MMS of >/= 4+/5 in all right upper extremity planes    Pt will report improved endurance with home care tasks, as demonstrated by reports of moving laundry up/down stairs with no increased fatigue      Goals to be added or modified as necessary.    Plan   Certification Period/Plan of care expiration: 10/2/2023 to 12/01/2023.    Outpatient Occupational Therapy 1-2 times weekly for 8 weeks to include the following interventions: Fluidotherapy, Moist Heat/ Ice, Neuromuscular Re-ed, Patient Education, Self Care, Therapeutic Activities, and Therapeutic Exercise.    Juliana Mcpherson OT      I certify the need for these services furnished under this  plan of treatment and while under my care.  ____________________________________ Physician/Referring Practitioner   Date of Signature

## 2023-10-04 ENCOUNTER — PATIENT MESSAGE (OUTPATIENT)
Dept: INTERNAL MEDICINE | Facility: CLINIC | Age: 48
End: 2023-10-04
Payer: COMMERCIAL

## 2023-10-08 ENCOUNTER — PATIENT MESSAGE (OUTPATIENT)
Dept: INTERNAL MEDICINE | Facility: CLINIC | Age: 48
End: 2023-10-08
Payer: COMMERCIAL

## 2023-10-09 ENCOUNTER — HOSPITAL ENCOUNTER (OUTPATIENT)
Dept: RADIOLOGY | Facility: HOSPITAL | Age: 48
Discharge: HOME | End: 2023-10-09
Attending: INTERNAL MEDICINE
Payer: COMMERCIAL

## 2023-10-09 DIAGNOSIS — R55 SYNCOPE, UNSPECIFIED SYNCOPE TYPE: ICD-10-CM

## 2023-10-09 PROCEDURE — 93880 EXTRACRANIAL BILAT STUDY: CPT | Mod: 26,,, | Performed by: RADIOLOGY

## 2023-10-09 PROCEDURE — 93880 US CAROTID BILATERAL: ICD-10-PCS | Mod: 26,,, | Performed by: RADIOLOGY

## 2023-10-09 PROCEDURE — 93880 EXTRACRANIAL BILAT STUDY: CPT | Mod: TC

## 2023-10-10 ENCOUNTER — CLINICAL SUPPORT (OUTPATIENT)
Dept: REHABILITATION | Facility: HOSPITAL | Age: 48
End: 2023-10-10
Payer: COMMERCIAL

## 2023-10-10 DIAGNOSIS — Z74.09 IMPAIRED FUNCTIONAL MOBILITY, BALANCE, GAIT, AND ENDURANCE: Primary | ICD-10-CM

## 2023-10-10 PROCEDURE — 97112 NEUROMUSCULAR REEDUCATION: CPT | Mod: PN

## 2023-10-10 PROCEDURE — 97110 THERAPEUTIC EXERCISES: CPT | Mod: PN

## 2023-10-10 NOTE — PROGRESS NOTES
[facility]   Physical Therapy Treatment Note     Name: Mirna Pollock  Clinic Number: [id number]    Therapy Diagnosis:   Encounter Diagnosis   Name Primary?    Impaired functional mobility, balance, gait, and endurance Yes     Physician: Marcus Osman MD    Visit Date: 10/10/2023    Physician Orders: PT Eval and Treat   Medical Diagnosis from Referral: I63.531 (ICD-10-CM) - Stroke due to occlusion of right posterior cerebral artery  Evaluation Date: 7/19/2023  Authorization Period Expiration: 12/31/23  Plan of Care Expiration: 10/19/23  Progress Note Due: 10/19/23  Visit # / Visits authorized: 11/20         Precautions: Standard, Fall, and Monitor BP    Time In: 1325  Time Out: 1420  Total Billable Time: 55 minutes    SUBJECTIVE     Pt reports: Doing well today. Still feels balance is a large issue. Feels unsteady with just household ambulation. Ongoing left visual field deficit contributing.     Response to previous treatment: No change   Functional change: None    Pain: 0/10  Location: no headache today    OBJECTIVE   Objective Measures updated at progress report unless specified.      TREATMENT     Mirna received the treatments listed below:      therapeutic exercises to develop strength, endurance, and flexibility for 10 minutes including:    Scifit recumbent stepper L3x 8 minutes twin peaks hill setting for cardiovascular endurance and reciprocal movement.    Bilateral heel cord stretch at incline 3 x 30s     neuromuscular re-education activities to improve: Balance, Coordination, and Posture for 45  minutes. The following activities were included:    At parallel bars:   Cone taps - 1 medium 1 large, x 10 bilateral feet - frequent hand rail use needed.   Bilateral heel rises - 1 finger touch for balance. X 10   Single leg stance left heel rise x 10 - 1 finger touch for balance.   Bilateral toe raises 2 x 10 - bilateral hand rail  Super slider bilateral feet x10-15 each, with hand  rail:  -abductions  -extensions  -circles  -IR rotations left only       On turf, SBA, x30' each:   3s marches   Tandem walking forwards  Backwards walking  Forward hurdles - left foot leading  Sidestepping hurdles - low yellow - bilateral leading feet trials    PATIENT EDUCATION AND HOME EXERCISES     Home Exercises Provided and Patient Education Provided     Education provided:   - following up with PCP regarding BP  - safety/home modifications secondary to limited peripheral vision    Written Home Exercises Provided: none provided this date.     ASSESSMENT     Pt presented to session today with no reports of headache. She continues to have difficulty with balance during household ambulation and tasks. She tolerated increased resistance on stepper today with added hill setting. She was well challenged with dynamic balance which was new today on the turf. She mentioned mid session that she would like to try the treadmill next session since she has one at home she has yet to resume since her stroke. She would like to try here for safety before at home.  Pt remains appropriate for therapy at this time to address impaired B LE strength, impaired balance, limited vestibular response, poor endurance, and overall limitation in tolerance to activities.   Continue plan of care (POC).     Mirna Is progressing well towards her goals.   Pt prognosis is Fair.     Pt will continue to benefit from skilled outpatient physical therapy to address the deficits listed in the problem list box on initial evaluation, provide pt/family education and to maximize pt's level of independence in the home and community environment.     Pt's spiritual, cultural and educational needs considered and pt agreeable to plan of care and goals.     Anticipated barriers to physical therapy: ongoing medical management    Goals:    Short Term Goals- 4 Weeks  Pt to demonstrate independence in performance of HEP in order to improve daily level of physical  activity and improve carry over session to session. -MET 9/25/23  Pt to improve B LE strength by > / = 1/2 MMT score in order to improve strength for daily activities. -MET 9/25/23  Pt to improve FGA score to > / = 26/30 in order to demonstrate improved dynamic balance and safety.  Pt to demonstrate improvement in condition 4 on MCTSIB to > / = 15 seconds in order to improve safety.   Pt to improve SLS time B to > / = 5 seconds in order to improve gait stability for community mobility.     Long Term Goals - 12 Weeks  Pt to demonstrate compliance with HEP > / = 3x per week in order to improve daily level of physical activity and improve carry over session to session.  Pt to improve B LE strength by > / = 1 MMT score in order to improve strength for daily activities.  Pt to improve FGA score to > / = 28/30 in order to demonstrate improved dynamic balance and safety.  Pt to demonstrate improvement in condition 4 on MCTSIB to > / = 30 seconds with min sway in order to improve safety.   Pt to improve SLS time B to > / = 10 seocnds in order to improve gait stability for community mobility.  NEW: Pt to improve 6 minute walk test distance by 50 meters in order to demo improved endurance for community mobility.     PLAN     Plan of care Certification: 7/19/2023 to 10/19/2023.     Outpatient Physical Therapy 2 times weekly for 12 weeks    Continue to monitor BP. Vestibular challenges and R LE strengthening.  TRY TREADMILL NEXT VISIT    Michelle Carlton, PT, DPT  10/10/2023

## 2023-10-11 ENCOUNTER — PATIENT MESSAGE (OUTPATIENT)
Dept: PRIMARY CARE CLINIC | Facility: CLINIC | Age: 48
End: 2023-10-11
Payer: COMMERCIAL

## 2023-10-12 PROBLEM — H53.462: Status: ACTIVE | Noted: 2023-10-12

## 2023-10-16 ENCOUNTER — OFFICE VISIT (OUTPATIENT)
Dept: NEUROLOGY | Facility: CLINIC | Age: 48
End: 2023-10-16
Payer: COMMERCIAL

## 2023-10-16 VITALS
BODY MASS INDEX: 24.8 KG/M2 | DIASTOLIC BLOOD PRESSURE: 118 MMHG | SYSTOLIC BLOOD PRESSURE: 176 MMHG | HEART RATE: 80 BPM | WEIGHT: 149.06 LBS

## 2023-10-16 DIAGNOSIS — G43.709 CHRONIC MIGRAINE WITHOUT AURA WITHOUT STATUS MIGRAINOSUS, NOT INTRACTABLE: Primary | ICD-10-CM

## 2023-10-16 DIAGNOSIS — G43.009 MIGRAINE WITHOUT AURA AND WITHOUT STATUS MIGRAINOSUS, NOT INTRACTABLE: ICD-10-CM

## 2023-10-16 DIAGNOSIS — F41.1 GAD (GENERALIZED ANXIETY DISORDER): ICD-10-CM

## 2023-10-16 DIAGNOSIS — F34.89 OTHER SPECIFIED PERSISTENT MOOD DISORDERS: ICD-10-CM

## 2023-10-16 DIAGNOSIS — I63.89 OTHER CEREBRAL INFARCTION: ICD-10-CM

## 2023-10-16 DIAGNOSIS — I10 PRIMARY HYPERTENSION: ICD-10-CM

## 2023-10-16 PROCEDURE — 1160F PR REVIEW ALL MEDS BY PRESCRIBER/CLIN PHARMACIST DOCUMENTED: ICD-10-PCS | Mod: CPTII,S$GLB,, | Performed by: NURSE PRACTITIONER

## 2023-10-16 PROCEDURE — 3080F PR MOST RECENT DIASTOLIC BLOOD PRESSURE >= 90 MM HG: ICD-10-PCS | Mod: CPTII,S$GLB,, | Performed by: NURSE PRACTITIONER

## 2023-10-16 PROCEDURE — 99999 PR PBB SHADOW E&M-EST. PATIENT-LVL III: CPT | Mod: PBBFAC,,, | Performed by: NURSE PRACTITIONER

## 2023-10-16 PROCEDURE — 3008F PR BODY MASS INDEX (BMI) DOCUMENTED: ICD-10-PCS | Mod: CPTII,S$GLB,, | Performed by: NURSE PRACTITIONER

## 2023-10-16 PROCEDURE — 3008F BODY MASS INDEX DOCD: CPT | Mod: CPTII,S$GLB,, | Performed by: NURSE PRACTITIONER

## 2023-10-16 PROCEDURE — 1159F MED LIST DOCD IN RCRD: CPT | Mod: CPTII,S$GLB,, | Performed by: NURSE PRACTITIONER

## 2023-10-16 PROCEDURE — 3077F SYST BP >= 140 MM HG: CPT | Mod: CPTII,S$GLB,, | Performed by: NURSE PRACTITIONER

## 2023-10-16 PROCEDURE — 3044F HG A1C LEVEL LT 7.0%: CPT | Mod: CPTII,S$GLB,, | Performed by: NURSE PRACTITIONER

## 2023-10-16 PROCEDURE — 1160F RVW MEDS BY RX/DR IN RCRD: CPT | Mod: CPTII,S$GLB,, | Performed by: NURSE PRACTITIONER

## 2023-10-16 PROCEDURE — 4010F PR ACE/ARB THEARPY RXD/TAKEN: ICD-10-PCS | Mod: CPTII,S$GLB,, | Performed by: NURSE PRACTITIONER

## 2023-10-16 PROCEDURE — 99214 OFFICE O/P EST MOD 30 MIN: CPT | Mod: S$GLB,,, | Performed by: NURSE PRACTITIONER

## 2023-10-16 PROCEDURE — 99214 PR OFFICE/OUTPT VISIT, EST, LEVL IV, 30-39 MIN: ICD-10-PCS | Mod: S$GLB,,, | Performed by: NURSE PRACTITIONER

## 2023-10-16 PROCEDURE — 3044F PR MOST RECENT HEMOGLOBIN A1C LEVEL <7.0%: ICD-10-PCS | Mod: CPTII,S$GLB,, | Performed by: NURSE PRACTITIONER

## 2023-10-16 PROCEDURE — 3080F DIAST BP >= 90 MM HG: CPT | Mod: CPTII,S$GLB,, | Performed by: NURSE PRACTITIONER

## 2023-10-16 PROCEDURE — 4010F ACE/ARB THERAPY RXD/TAKEN: CPT | Mod: CPTII,S$GLB,, | Performed by: NURSE PRACTITIONER

## 2023-10-16 PROCEDURE — 1159F PR MEDICATION LIST DOCUMENTED IN MEDICAL RECORD: ICD-10-PCS | Mod: CPTII,S$GLB,, | Performed by: NURSE PRACTITIONER

## 2023-10-16 PROCEDURE — 99999 PR PBB SHADOW E&M-EST. PATIENT-LVL III: ICD-10-PCS | Mod: PBBFAC,,, | Performed by: NURSE PRACTITIONER

## 2023-10-16 PROCEDURE — 3077F PR MOST RECENT SYSTOLIC BLOOD PRESSURE >= 140 MM HG: ICD-10-PCS | Mod: CPTII,S$GLB,, | Performed by: NURSE PRACTITIONER

## 2023-10-16 RX ORDER — TIZANIDINE 4 MG/1
6 TABLET ORAL NIGHTLY
Qty: 45 TABLET | Refills: 5 | Status: SHIPPED | OUTPATIENT
Start: 2023-10-16 | End: 2024-03-19

## 2023-10-16 NOTE — PROGRESS NOTES
"Established Patient   SUBJECTIVE:  Patient ID: Mirna Pollock   Chief Complaint: Follow-up    History of Present Illness:  Mirna Pollock is a 47 y.o. female who presents to clinic alone for follow-up of headaches.     Recommendations made at last Office Visit on 7/31/23:  - For migraine prevention - continue with Botox injections every 12 weeks   - Start Emgality 240 mg SQ loading dose followed by 120 mg SQ monthly injections.   - Continue verapamil 40 mg bid and cymbalta 60 mg daily   - For acute migraines - triptans contraindicated given hx stroke   - For acute migraines - ubrelvy 100 mg    - Continue tracking headaches   - Hx CVA - continue regular f/up with Dr. Osman for management/monitoring   - Mood disorder - continue wellbutrin, cymbalta daily, management per psych - will avoid use of anti-depressants for migraine prevention   - HTN - BP improved, management per PCP   - RTC in 2 months for next round of Botox      10/16/2023 - Interval History:  Migraines have been "pretty good", had a migraine when weather front came in, but was not as bad as it previously would have been.  Otherwise, migraines have been relatively infrequent.  She has been using emgality monthly for migriane prevention, is pleased with the current state of her migraines, does not wish to make adjustments to her treatment plan.       BP elevated today 176/118.  Has been checking BP at home, readings typically 170's systolic over 80's diastolic.  Has been seeing primary care provider regularly is who closely monitoring blood pressure and adjusting meds.     Otherwise, information below is still accurate and current.     07/31/2023 - Interval History:  Significant changes in medical history since last visit 6/8.  Patient presented to ED on 7/5 with complaints of intermittent migraines with visual migraines x 5 days.  Aura was described as black dots floating on both sides of her vision.  MRI in ED with R PCA infarct, stroke team consulted, CTA " with BL PCA stenosis.  She was started on verapamil and treated for migraine, blood pressure optimized.   She has established care with Dr. Osman who she has followed in outpatient vascular neurology clinic.  Still experiencing visual symptoms, has appt with neuro-ophthalmology late August.  She has continued experiencing frequent migraines since this hospitalization.       Otherwise, information below is still accurate and current.      03/16/2023- Interval History:  Currently nearly 3 months overdue for her Botox injections, has noticed an uptick in migraine frequency over the last 2 months or so.  Prior to this, migraines had remained stable.  She is no longer taking aimovig, did not realize she had to contact pharmacy to get prescription sent to her.  She has continued tizanidine 6 mg nightly.  Treats acute migraines with ubrelvy 100 mg.  Blood pressure remains above goal, though lower than readings at previous visits.       Otherwise, information below is still accurate and current.      10/27/2022- Interval History:  Migraines have been very well controlled recently, got a new job which has been better for her, less stress.  Blood pressure controlled.  Patient has continued to achieve a greater than 50% reduction in the frequency of their migraines with continued Botox injections.  Wishes to proceed with today's injections as scheduled.      Otherwise, information below is still accurate and current.      12/09/2021- Interval History:  Seen by internal medicine on 12/2, started on labetalol 100 mg BID and told to follow-up with her primary care provider (affiliated with [facility]) as soon as possible for continued management and monitoring.  Unfortunately she has not scheduled an appointment with her PCP yet.  Blood pressure improved today, though still markedly elevated.    Continues to experience frequent migraines, last round of Botox over 6 months ago.  She has tried ubrelvy 100 mg tabs which is  effective for treatment of acute migraines.  Migraines historically have responded very well to Botox injections and wishes to proceed with today's injections as scheduled.      Otherwise, information below is still accurate and current.      12/02/2021 - Interval History:  Patient presents to clinic today for Botox injections.  BP initially 221/147 mmHg automatically.  Rechecked manually at 218/140 mmHg.  She denies weakness, confusion, vision changes, dizziness, chest pain, SOB.  In August, she presented for Botox, blood pressure was elevated, she was referred to ED and instructed to schedule f/up appointment with her primary care provider.  She does not want to return to ED today as she does not feel they do anything for her.  Discussed I do not feel comfortable injecting her with blood pressure as high as it is in clinic today.  I have scheduled her an appointment with primary care here at [facility] later this morning and have instructed her to call her primary care provider's office today to schedule an appointment as soon as possible.    She does admit she has been under increased stress, mother in law is moving in to her home in two weeks.  She has not been sleeping well despite taking tizanidine 6 mg and ambien nightly.  She has continued seeing her psychiatrist regularly, last seen in September.    Feels she has been suffering with frequent migraines likely because her last round of Botox was nearly 6 months ago.  She has been accumulating occurrences at work due to missed days for migraines.  She is not eligible for intermittent FMLA until next summer.  She has continued using Aimovig 140 mg SQ monthly, though she uses it inconsistently as pharmacy does not call to remind her she is due for her next injection.  Treats acute migraines with vistaril 25 mg capsule, initially was helping her sleep however feels it is losing its effectiveness.      Otherwise, information below is still accurate and current.       06/04/2021- Interval History:  Migraines have been under good control, reports she has not had many migraines what so ever over the last 3 months.  Blood pressure elevated in clinic today, still has not seen PCP in quite some time.  Would like to proceed with next round of botox as scheduled today.      Otherwise, information below is still accurate and current.      02/25/2021- Interval History:  Migraines have gradually increased in frequency over the last few months, of note, she is currently 3 months overdue for her Botox injections.  For migraine prevention, she has continued with Aimovig 140 mg SQ monthly, tizanidine 6 mg qhs, amitriptyline 25 mg qhs and cymbalta 60 mg daily.  Treats acute migraines with sumatriptan 100 mg tabs, has vistaril 25 mg capsule available for rescue treatment.  She feels once she gets back on track with Botox injections, migraines are likely to be optimally controlled.       Otherwise, information below is still accurate and current.      09/24/2020- Interval History:  She continues to feels migraines are well controlled on her current regimen. She continues to experience a greater than 50% reduction in the frequency of her migraines since starting Botox injections and wishes to continue with Botox for migraine control.       Otherwise, information below is still accurate and current.      06/09/2020- Interval History:  Migraines continue to be well controlled with dual Aimovig and Botox injections, she is very pleased with the current state of her migraines and wishes to continue with current treatment plan.       Otherwise, information below is still accurate and current.      03/25/2020- Interval History:  She is about a month overdue for her Botox injections and has noticed her migraines have gradually become more frequent and more intense.  Prior to one month ago, migraines had been under good control.  She does admit she has been under a significant amount of stress related  to Covid-19 outbreak.       Otherwise, information below is still accurate and current.      12/04/2019- Interval History:  Reports she has been experiencing 5-6 migraines per month, admits she has been under increased stress found her birth mother who was not interested in meeting her, is in contact with twin brothers.   She has continued taking aimovig 140 mg, which she feels has greatly improved her.  She typically wakes up with a migraine if she is going to get one, typically is resolved with one dose of sumatriptan 100 mg, will feel back to normal by the afternoon.   She is pleased with the current state of her migraines and attributes the increased frequency of migraines over the last 12 weeks to fluctuating weather and increased stress.  Does not wish to make adjustments to her treatment plan at this time.      Otherwise, information below is still accurate and current.      08/16/2019- Interval History:  Migraines and headaches continue to respond well to Botox and Aimovig, gets one severe migraine per month, and 1-2 moderate migraines.  She has been treating her migraines with ibuprofen, which is not very effective.  In the past, she was using triptans, however she does not have any abortive medication available at this time.  Migraines continue to feel the same as they always have, she denies any change in the quality or nature of her migraines.  She is happy with her current regimen and does not feel adjustments to her treatment plan are needed at this time.    She has additional concerns including frequently feeling as if she is more off balance than usual, is occurring more frequently as time progresses.  She is not dizzy, just feels as if she is going to fall over, occurs 8-10 times per month.  She is also dropping things more frequently.       05/22/2019- Interval History:  Botox continues to be effective for migraine prevention, for the first 8-10 weeks she experiences less than 3 migraines per  "month.  She has continued using Aimovig 140 mg SQ monthly injections.  She is very happy with the response of her migraines to Botox injections and wishes to continue with current treatment plan.        02/14/2019- Interval History:  Headaches and migraines continue to be "much better", "I am not getting them nearly as much", though she is keenly aware of when a cold front is coming through as she will always get a migraine.  She has continued using Aimovig monthly as well as taking tizanidine, cymbalta, and amitriptyline daily for migraine prevention.  She is very happy with the current state of her migraines and does not wish to make any adjustments to her treatment plan at this time.  Migraines continue to feel the same as they always have.     11/01/2018- Interval History:  Headaches have been significantly better, she has only had 3-4 migraines since her last round of Botox and is very happy Botox is helping decrease her migraine frequency.  She has been using Aimovig monthly, which she also feels has been helping decrease her migraine frequency.  Migraines continue to feel the same as they always have, she denies any change in the quality or nature of her migraines.  Wishes to continue with Botox injections due to the pronounced effect it has had on her migraines.      08/08/2018- Interval History:  Finds her migraines are no better since last round of Botox, admits she was under increased stress at one point due to issues with her 's job.  She does know a lot of her migraines are triggered from stress.  She has been getting a lot of migraines above her eyes as well as throughout her occipitalis and neck.  Overall, she does wish to continue with Botox injections, but is open to discussing additional prevention options.      05/22/2018- Interval History:  Despite nerve blocks two weeks ago, she has been suffering with a headache nearly everyday since her last visit.  She did try Zomig nasal spray, " however she took the first dose on day two of her migraine because she was nervous.  She does think it lessened the pain of her migraine, however did not abort it.  She did not repeat the dose.  Additionally, she continues to report poor sleep nightly.  She stopped drinking caffeine.  Headaches are the same as they have always been.  She denies any change in the nature or quality of her headaches. Risks, Benefits, and potential side effects of Botox discussed with patient.  Alternative treatments offered.  After thorough discussed regarding the procedure, patient has decided to move forward with initiating Botox injections for Chronic Migraine.  Patient understands we will complete 3 rounds of injections, if after 3 rounds, we do not see a 50% reduction in headaches, we will discontinue Botox injections.      05/11/2018 - Interval History:  This past week she has only been able to work two days, has missed 3 days of work this week due to migraines.  She still is unsure about getting Botox injections for chronic migraine, she would like to try it but her  does not feel this is a good treatment option, she is trying to convince him.  Of note, blood pressure significantly lower in today's visit than in past visits.  She reports her psychiatrist has discontinued vyvanse until she is given clearance from Cardio to restart taking Vyvanse.  She was seen by a pediatric cardiologist who referred her to an adult cardiologist, however she has not seen this provider, had to cancel appointment due to having a headache.  She is requesting repeat nerve blocks today.      01/29/2018 - Interval History:  She has been having a terrible migraine for the last 3 days, she has tried numerous medications without any relief.  States she tried to go to work today, but she had to leave because her headache got so bad.  She is requesting to repeat the nerve blocks as those shots gave her relief form 6-8 weeks in the past.  She has done  research on the Botox injections and states her  is very hesitant for her to try Botox injections for her migraines.  She has not been seen by her PCP nor cardiologist for further management of her blood pressure.  Discussed the challenges of treating her migraines with uncontrolled hypertension because I cannot prescribe her any triptan therapy until her blood pressure is under better control.  She has continued to take Cymbalta daily for her depression, she is unsure if this is giving her any relief with regards to her headaches or not.       01/15/2018 - Interval History:  She was approved for Botox injections, but was a no-show to her appointment last week.  States she spoke with her  about the injections and she was very nervous to try the Botox injections for her migraines.    She continues to experience headaches on 1-4 days per week with migraines occurring 1-2 days per week.  Recently she has been experiencing more migraines and headaches than usually, but admits she has been going through a lot emotionally as well as she got sick with the flu.  She has continued to regularly follow-up with her psychiatrist who changed her anti-depressant, she is now taking Cymbalta.  Since change in medication, she feels she has been experiencing a mild headache each day.  She has had to miss a few days of work due to her migraines since last visit.  Additionally, she feels occasionally she begins to get dizzy, this occurs with or without headaches, she has an appointment with her eye doctor next week as her vision is also getting progressively worse.  She did not find vistaril to be useful with regards to her headaches.  She does feel Zanaflex helps with her neck as well as helping to relax her neck muscles.  When she gets a migraine she has been taking Vistaril and Ibuprofen together which does help the pain, she also has been trying Excedrin migraines which also gives her some relief.  Patient with known  HTN on multiple anti-hypertensive agents, blood pressure significantly elevated in clinic today.  She is not symptomatic.  States she needs to follow-up with her primary care (who is outside the [facility] system) for better management.       10/19/2017 - Interval History:  - Headaches have persisted in similar frequency, which she is happy about as she has been under increased stress with regards to her 's health, he has been suffering with his Gout, just got out of the hospital last week and has had to take time off work etc   - Blood pressure continues to be elevated despite numerous anti-hypertensive, states today is good for her (currently 141/102), has been regularly following up with her PCP for management   - Is very excited because she is going on a cruise with her  to [place] at the end of November/early December   - Has continued to have a headache nearly everyday, not necessarily a migraine, however migraine have been at least once per month    - Has noticed more recently that she feels like she has intense pressure behind her eyes and across the bridge of her nose that slightly feels like sinus pressure, this occurs 1-2 times per week   - Has been seeing a chiropractor as well as a masseuse regularly which she does feel has been helping   - Would still like to move forward with the Botox injections   - Does not feel adjustments to her medications are necessary at this time      08/17/2017 - Interval History:  - Had been seeing Iker Beth PA-C   - Was taking Topamax, Zanaflex, and Vistaril - however has been out of medications for about 3 months  - Since being off the medication, she had been okay, however recently noticed migraines were beginning to occur more frequently with increased intensity and duration   - She finds the nerve blocks had been helping a lot, as long as she is consistent with getting the nerve block, however hasn't had this procedure for over a year   - Has been getting  5-6 migraines per month, each lasting 1-4 days in length   - Current migraine has been going on since last Friday (6 days)  - Blood pressure significantly elevated in clinic today, states her PCP is managing her medications in order to get her blood pressure better controlled   - Is requesting refills of medication as well as repeat nerve occipital and trigeminal nerve blocks      Headache history:   Age of onset -  13  Nature of pain -  Throbbing   Location - bioccipital   Aura -  White floaters   Duration - hrs  Frequency -  2/week  Time of day - anytime   7 days of pain - jaw pain, L neck, L shoulder,  L side throbbing   Associated symptoms:   Meningeal symptoms - photophobia, phonophobia, exercise intolerance +   Nausea/vomitting +   Nasal drainage   Visual symptoms  Pallor/flushing  Vertigo  Stroke symptoms  Confusion  Impaired concentration +  Pain worsened with physical activity +  Neck pain +  Whooshing sounds   Visual spots/blotches +  Triggers:   Stress +   Bright or flickering light  Strong smell  Vigorous exercise  Dietary factors   Visual strain   Motion intolerance as passenger:  No   Resolution of headache with sleep: yes      Therapies Tried & Response:  Tylenol, motrin, alleve - not help  excedrin - not help   imitrex - helped initially   topamax 50 mg - helped  Percocet - helps   Gabapentin - helps  Amlodipine - for HTN  Bystolic - for HTN  Lexapro - hasn't noticed change in HA, for depression   GONB - helps   Cymbalta - unsure, for depression   Zomig - helps    Maxalt - no   Botox - helping   Sumatriptan -  Vistrail - effective   Aimovig - helping   Emgality -     Current Medications:    Current Outpatient Medications:     aspirin (ECOTRIN) 81 MG EC tablet, Take 1 tablet (81 mg total) by mouth once daily., Disp: 30 tablet, Rfl: 11    atorvastatin (LIPITOR) 40 MG tablet, Take 1 tablet (40 mg total) by mouth once daily., Disp: 90 tablet, Rfl: 3    benazepril-hydrochlorthiazide (LOTENSIN HCT) 20-25  mg Tab, Take 1 tablet by mouth once daily. Please check with your primary care provider. You've been started on Losartan, and so it's unclear if you'll need additional anti-hypertensive medications., Disp: 90 tablet, Rfl: 3    buPROPion (WELLBUTRIN XL) 300 MG 24 hr tablet, Take 1 tablet (300 mg total) by mouth once daily., Disp: 30 tablet, Rfl: 3    clopidogreL (PLAVIX) 75 mg tablet, Take 1 tablet (75 mg total) by mouth once daily., Disp: 30 tablet, Rfl: 0    DULoxetine (CYMBALTA) 60 MG capsule, Take 1 capsule (60 mg total) by mouth once daily., Disp: 30 capsule, Rfl: 3    hydrOXYzine pamoate (VISTARIL) 25 MG Cap, Take 1 capsule (25 mg total) by mouth every 8 (eight) hours as needed (anxiety)., Disp: 45 capsule, Rfl: 3    magnesium oxide (MAG-OX) 400 mg (241.3 mg magnesium) tablet, Take 1 tablet (400 mg total) by mouth once daily., Disp: 30 tablet, Rfl: 1    ondansetron (ZOFRAN-ODT) 4 MG TbDL, DISSOLVE 1 TABLET ON THE TONGUE EVERY 6 HOURS AS NEEDED FOR NAUSEA, Disp: , Rfl:     promethazine (PHENERGAN) 25 MG tablet, TAKE 1 TABLET BY MOUTH EVERY 4 TO 6 HOURS AS NEEDED FOR NAUSEA, Disp: 20 tablet, Rfl: 1    riboflavin, vitamin B2, 400 mg Tab, Take 400 mg by mouth once daily., Disp: 30 tablet, Rfl: 11    ubrogepant (UBRELVY) 100 mg tablet, TAKE 1 TABLET BY MOUTH EVERY 2 HOURS AS NEEDED FOR MIGRAINE. MAX 2 TABLETS (200 MG) PER DAY., Disp: 16 tablet, Rfl: 5    verapamiL (CALAN) 40 MG Tab, Take 1 tablet (40 mg total) by mouth 2 (two) times daily., Disp: 60 tablet, Rfl: 11    zolpidem (AMBIEN) 10 mg Tab, Take 1 tablet (10 mg total) by mouth every evening., Disp: 30 tablet, Rfl: 3    galcanezumab-gnlm 120 mg/mL PnIj, Inject 120 mg (one pen) into the skin every 28 days. Begin 28 days after loading dose., Disp: 1 mL, Rfl: 10    tiZANidine (ZANAFLEX) 4 MG tablet, Take 1.5 tablets (6 mg total) by mouth every evening. No alcohol, no driving with medication., Disp: 45 tablet, Rfl: 5    Current Facility-Administered Medications:      onabotulinumtoxina injection 200 Units, 200 Units, Intramuscular, q12 weeks, Luci Ortez, BC, 200 Units at 06/08/23 1200    Review of Systems - A review of 10+ systems was conducted with pertinent positive and negative findings documented in HPI with all other systems reviewed and negative.    PFSH: Past medical, family, and social history reviewed as documented in chart with pertinent positive medical, family, and social history detailed in HPI.    OBJECTIVE:  Vitals:  BP (!) 176/118   Pulse 80   Wt 67.6 kg (149 lb 0.5 oz)   LMP 12/14/2000   BMI 24.80 kg/m²      Physical Exam:  Constitutional: she appears well-developed and well-nourished. she is well groomed. NAD     Review of Data:   Notes from neuro-ophthalmology, Dr. Osman, internal medicine reviewed   Labs:  Admission on 07/30/2023, Discharged on 07/30/2023   Component Date Value Ref Range Status    WBC 07/30/2023 13.73 (H)  3.90 - 12.70 K/uL Final    RBC 07/30/2023 4.67  4.00 - 5.40 M/uL Final    Hemoglobin 07/30/2023 14.0  12.0 - 16.0 g/dL Final    Hematocrit 07/30/2023 42.5  37.0 - 48.5 % Final    MCV 07/30/2023 91  82 - 98 fL Final    MCH 07/30/2023 30.0  27.0 - 31.0 pg Final    MCHC 07/30/2023 32.9  32.0 - 36.0 g/dL Final    RDW 07/30/2023 12.5  11.5 - 14.5 % Final    Platelets 07/30/2023 556 (H)  150 - 450 K/uL Final    MPV 07/30/2023 10.1  9.2 - 12.9 fL Final    Immature Granulocytes 07/30/2023 1.9 (H)  0.0 - 0.5 % Final    Gran # (ANC) 07/30/2023 8.7 (H)  1.8 - 7.7 K/uL Final    Immature Grans (Abs) 07/30/2023 0.26 (H)  0.00 - 0.04 K/uL Final    Lymph # 07/30/2023 2.4  1.0 - 4.8 K/uL Final    Mono # 07/30/2023 1.3 (H)  0.3 - 1.0 K/uL Final    Eos # 07/30/2023 0.7 (H)  0.0 - 0.5 K/uL Final    Baso # 07/30/2023 0.40 (H)  0.00 - 0.20 K/uL Final    nRBC 07/30/2023 0  0 /100 WBC Final    Gran % 07/30/2023 63.4  38.0 - 73.0 % Final    Lymph % 07/30/2023 17.1 (L)  18.0 - 48.0 % Final    Mono % 07/30/2023 9.5  4.0 - 15.0 % Final    Eosinophil %  07/30/2023 5.2  0.0 - 8.0 % Final    Basophil % 07/30/2023 2.9 (H)  0.0 - 1.9 % Final    Differential Method 07/30/2023 Automated   Final    Sodium 07/30/2023 142  136 - 145 mmol/L Final    Potassium 07/30/2023 2.8 (L)  3.5 - 5.1 mmol/L Final    Chloride 07/30/2023 100  95 - 110 mmol/L Final    CO2 07/30/2023 29  23 - 29 mmol/L Final    Glucose 07/30/2023 107  70 - 110 mg/dL Final    BUN 07/30/2023 14  6 - 20 mg/dL Final    Creatinine 07/30/2023 0.8  0.5 - 1.4 mg/dL Final    Calcium 07/30/2023 10.2  8.7 - 10.5 mg/dL Final    Total Protein 07/30/2023 8.1  6.0 - 8.4 g/dL Final    Albumin 07/30/2023 4.2  3.5 - 5.2 g/dL Final    Total Bilirubin 07/30/2023 1.1 (H)  0.1 - 1.0 mg/dL Final    Alkaline Phosphatase 07/30/2023 98  55 - 135 U/L Final    AST 07/30/2023 29  10 - 40 U/L Final    ALT 07/30/2023 36  10 - 44 U/L Final    eGFR 07/30/2023 >60.0  >60 mL/min/1.73 m^2 Final    Anion Gap 07/30/2023 13  8 - 16 mmol/L Final    Prothrombin Time 07/30/2023 10.7  9.0 - 12.5 sec Final    INR 07/30/2023 1.0  0.8 - 1.2 Final    TSH 07/30/2023 1.332  0.400 - 4.000 uIU/mL Final    Cholesterol 07/30/2023 160  120 - 199 mg/dL Final    Triglycerides 07/30/2023 164 (H)  30 - 150 mg/dL Final    HDL 07/30/2023 47  40 - 75 mg/dL Final    LDL Cholesterol 07/30/2023 80.2  63.0 - 159.0 mg/dL Final    HDL/Cholesterol Ratio 07/30/2023 29.4  20.0 - 50.0 % Final    Total Cholesterol/HDL Ratio 07/30/2023 3.4  2.0 - 5.0 Final    Non-HDL Cholesterol 07/30/2023 113  mg/dL Final    POC PTWBT 07/30/2023 13.6  9.7 - 14.3 sec Final    POC PTINR 07/30/2023 1.1  0.9 - 1.2 Final    Sample 07/30/2023 unknown   Final    POC Creatinine 07/30/2023 0.8  0.5 - 1.4 mg/dL Final    Sample 07/30/2023 unknown   Final    POCT Glucose 07/30/2023 110  70 - 110 mg/dL Final   Lab Visit on 07/14/2023   Component Date Value Ref Range Status    Metanephrine, Free 07/14/2023 <25  < OR = 57 pg/mL Final    Metanephrine, Total, Plasma 07/14/2023 127  < OR = 205 pg/mL Final     Normetanephrine, Free 07/14/2023 127  < OR = 148 pg/mL Final    Aldosterone 07/14/2023 17.0  ng/dL Final    Renin 07/14/2023 6.6  ng/mL/hr Final    Aldosterone/Renin Activity Calcula* 07/14/2023 2.6  <=25.0 ratio Final   Lab Visit on 07/14/2023   Component Date Value Ref Range Status    Protein, Urine Random 07/14/2023 31 (H)  0 - 15 mg/dL Final    Creatinine, Urine 07/14/2023 277.0  15.0 - 325.0 mg/dL Final    Prot/Creat Ratio, Urine 07/14/2023 0.11  0.00 - 0.20 Final    Specimen UA 07/14/2023 Urine, Clean Catch   Final    Color, UA 07/14/2023 Orange (A)  Yellow, Straw, Janie Final    Appearance, UA 07/14/2023 Ex.Turbid  Clear Final    pH, UA 07/14/2023 6.0  5.0 - 8.0 Final    Specific Gravity, UA 07/14/2023 1.025  1.005 - 1.030 Final    Protein, UA 07/14/2023 Trace (A)  Negative Final    Glucose, UA 07/14/2023 Negative  Negative Final    Ketones, UA 07/14/2023 Negative  Negative Final    Bilirubin (UA) 07/14/2023 Negative  Negative Final    Occult Blood UA 07/14/2023 Negative  Negative Final    Nitrite, UA 07/14/2023 Negative  Negative Final    Leukocytes, UA 07/14/2023 Negative  Negative Final   Lab Visit on 07/13/2023   Component Date Value Ref Range Status    Sodium 07/13/2023 141  136 - 145 mmol/L Final    Potassium 07/13/2023 2.9 (L)  3.5 - 5.1 mmol/L Final    Chloride 07/13/2023 104  95 - 110 mmol/L Final    CO2 07/13/2023 27  23 - 29 mmol/L Final    Glucose 07/13/2023 132 (H)  70 - 110 mg/dL Final    BUN 07/13/2023 16  6 - 20 mg/dL Final    Creatinine 07/13/2023 0.9  0.5 - 1.4 mg/dL Final    Calcium 07/13/2023 9.3  8.7 - 10.5 mg/dL Final    Total Protein 07/13/2023 7.1  6.0 - 8.4 g/dL Final    Albumin 07/13/2023 3.3 (L)  3.5 - 5.2 g/dL Final    Total Bilirubin 07/13/2023 0.5  0.1 - 1.0 mg/dL Final    Alkaline Phosphatase 07/13/2023 81  55 - 135 U/L Final    AST 07/13/2023 21  10 - 40 U/L Final    ALT 07/13/2023 17  10 - 44 U/L Final    eGFR 07/13/2023 >60.0  >60 mL/min/1.73 m^2 Final    Anion Gap 07/13/2023  10  8 - 16 mmol/L Final    WBC 07/13/2023 23.20 (H)  3.90 - 12.70 K/uL Final    RBC 07/13/2023 4.62  4.00 - 5.40 M/uL Final    Hemoglobin 07/13/2023 14.1  12.0 - 16.0 g/dL Final    Hematocrit 07/13/2023 41.6  37.0 - 48.5 % Final    MCV 07/13/2023 90  82 - 98 fL Final    MCH 07/13/2023 30.5  27.0 - 31.0 pg Final    MCHC 07/13/2023 33.9  32.0 - 36.0 g/dL Final    RDW 07/13/2023 11.8  11.5 - 14.5 % Final    Platelets 07/13/2023 505 (H)  150 - 450 K/uL Final    MPV 07/13/2023 10.0  9.2 - 12.9 fL Final    Immature Granulocytes 07/13/2023 CANCELED  0.0 - 0.5 % Final    Immature Grans (Abs) 07/13/2023 CANCELED  0.00 - 0.04 K/uL Final    nRBC 07/13/2023 0  0 /100 WBC Final    Gran % 07/13/2023 67.0  38.0 - 73.0 % Final    Lymph % 07/13/2023 9.0 (L)  18.0 - 48.0 % Final    Mono % 07/13/2023 5.0  4.0 - 15.0 % Final    Eosinophil % 07/13/2023 12.0 (H)  0.0 - 8.0 % Final    Basophil % 07/13/2023 0.0  0.0 - 1.9 % Final    Bands 07/13/2023 2.0  % Final    Metamyelocytes 07/13/2023 4.0  % Final    Myelocytes 07/13/2023 1.0  % Final    Platelet Estimate 07/13/2023 Increased (A)   Final    Aniso 07/13/2023 Slight   Final    Poik 07/13/2023 Slight   Final    Tear Drop Cells 07/13/2023 Occasional   Final    Differential Method 07/13/2023 Manual   Final   No results displayed because visit has over 200 results.        Imaging:  Results for orders placed or performed during the hospital encounter of 07/30/23   MRI Brain Without Contrast    Narrative    EXAMINATION:  MRI BRAIN WITHOUT CONTRAST    CLINICAL HISTORY:  Headache, chronic, new features or increased frequency;.    TECHNIQUE:  Multiplanar multisequence MR imaging of the brain was performed without contrast.    COMPARISON:  CTA stroke multiphase earlier same day, MRI brain 07/14/2023    FINDINGS:  Intracranial compartment:    Evolving region of signal abnormality in the right PCA territory involving the right occipital lobe primarily in the white matter and right aspect spinal  corpus callosum, now barely visible most compatible with evolving late subacute aged infarction.  No hemorrhagic conversion.    Interval small focus of restricted diffusion with corresponding edema signal within the midportion of the right centrum semiovale.    No extra-axial blood or fluid collections.  The ventricles are midline and stable in overall size and configuration without distortion by mass effect or acute hydrocephalus.    Remainder of the brain parenchyma appears unchanged including scattered T2/FLAIR signal hyperintensity within the supratentorial white matter concerning for age advanced chronic ischemic change with scattered cystic encephalomalacia at the right more than left centrum semiovale, left thalamus and right kriss suggestive for remote lacunar-type infarcts.  No mass lesion, acute hemorrhage, new major vascular territory infarct or midline shift.  Unchanged punctate foci of susceptibility within the cerebral hemispheres specifically at the left parietal cortex, right inferior parietal white matter and left posterior temporal lobe suggestive for remote microhemorrhages.    Normal vascular flow voids are preserved.  Sella and parasellar regions are within normal limits.    Skull/extracranial contents (limited evaluation): Bone marrow signal intensity is normal.  Craniocervical junction is within normal limits.      Impression    1. Right centrum semiovale small, acute lacunar type infarct, new from 07/14/2023 study.  2. Nearly completely resolved evolving late subacute aged infarction in the right PCA territory.  3. Grossly stable additional findings as above.  This report was flagged in Epic as abnormal.      Electronically signed by: Gaurav Layton MD  Date:    07/30/2023  Time:    12:38   Results for orders placed or performed during the hospital encounter of 07/14/23   MRA Brain without contrast    Narrative    EXAMINATION:  MRI BRAIN WITHOUT CONTRAST; MRA BRAIN WITHOUT CONTRAST    CLINICAL  HISTORY:  Stroke, follow up;  Other cerebral infarction    TECHNIQUE:  Sagittal and axial T1, axial T2, axial FLAIR, axial gradient and axial diffusion imaging of the whole brain without contrast.    COMPARISON:  MRI and MRA 07/05/2023    FINDINGS:  MRI brain: Evolving region of signal abnormality right PCA territory involving the right occipital lobe primarily in the white matter and right aspect splenium corpus callosum most compatible with evolving subacute aged infarction more pronounced diffusion signal abnormality which is partially restricting in this region.  In addition there is increased T2 FLAIR signal hyperintensity without mass effect or midline shift.  There is no definite hemorrhagic conversion.  There continued punctate foci of susceptibility within the cerebral hemispheres specifically left parietal cortex right inferior parietal white matter and left posterior temporal lobe suggestive for possible remote microhemorrhages.    Scattered T2 FLAIR signal hyperintensity supratentorial white matter concerning for possible age advanced chronic ischemic change with scattered cystic encephalomalacia bilateral centrum semiovale, left thalamus and right kriss suggestive for remote lacunar-type infarcts.    MRA head: Anterior circulation: Bilateral distal cervical, petrous, cavernous and supraclinoid segments of the ICAs are patent without significant focal stenosis.  The visualized anterior middle cerebral arteries are patent without focal stenosis or aneurysm.    Posterior circulation: Distal left vertebral artery is dominant.  Slight hypoplasia distal right vertebral artery.  The basilar artery is patent.  Left posterior cerebral artery is patent.  There is high-grade stenosis or short segment occlusion in the right P3 segment of the PCA which is stable.    This report was flagged in Epic as abnormal.      Impression    MRI brain: Increased edema signal abnormality and diffusion partial restriction right  occipital white matter compatible with evolving recent to subacute right PCA infarction.  No significant mass effect or definite hemorrhagic conversion    Superimposed patchy and confluent T2 FLAIR signal abnormality elsewhere supratentorial white matter while nonspecific concerning for advanced chronic ischemic change with scattered cystic encephalomalacia centrum semiovale, left thalamus and right kriss suggestive for remote lacunar-type infarcts.    Few punctate scattered foci of susceptibility left posterior temporal lobe right inferior parietal lobe and left parietal lobe suggestive for remote microhemorrhages    MRA head: Stable high-grade stenosis versus short-segment inclusion right P3 PCA.    No additional significant focal stenosis or proximal large vessel occlusion.      Electronically signed by: Nahum Francois,   Date:    07/15/2023  Time:    14:21   Results for orders placed or performed during the hospital encounter of 09/28/17   MRV Brain Without Contrast    Narrative    Procedure: MRV of the head without contrast    Technique: Noncontrast 2-D time-of-flight MRV of the head with coronal and sagittal source imaging and 3-dimensional mip projection views.      Comparison: None    Results:The superior sagittal sinus is patent.  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  The paired internal cerebral veins and basal veins of Jay are patent.  The vein of Zac and torcula Herophili are patent.  The straight sinus is patent.  The bilateral transverse and sigmoid dural venous sinuses are patent to the jugular foramen.    Impression     Unremarkable noncontrast MRV specifically without evidence for venous sinus thrombosis..      Electronically signed by: NAHUM FRANCOIS DO  Date:     09/28/17  Time:    10:43    MRI Brain W WO Contrast    Narrative    Procedure: MRI the brain with andwithout contrast.    Technique: Sagittal and axial T1, axial T2, axial FLAIR, axial gradient,  axial diffusion imaging of the whole brain pre-contrast with postcontrast axial T1 and axial spoiled gradient imaging reformatted in the coronal and sagittal planes.. 10 ml of Gadavist injected intravenously.         Comparison: None    Findings: The brain parenchyma is normal in contour. There is a small focus of T2 flair signal hyperintensity in the right lateral putamen suggestive for remote lacunar type infarct with diffusion hyperintensity without restriction compatible with T2 shine through. There are no abnormal areas of parenchymal enhancement. There are no areas are restricted diffusion to suggest acute infarction. A few additional punctate foci of T2 flair signal hyperintensity supratentorial white matter which are nonspecific and may represent sequela migraine headaches in light of history. The ventricles are normal in size and configuration without evidence for hydrocephalus. There are no abnormal areas of the gradient susceptibility to suggest parenchymal hemorrhage. No abnormal intra or extra axial fluid collections. The major intracranial T2  flow-voids are present.    Impression     Small T2 flair hyperintense focus right lateral putamen with diffusion hyperintensity. 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    A few scattered punctate regions of T2 flair signal hyperintensity elsewhere supratentorial parenchyma while overall nonspecific in light of the given history this may be sequela migraine headaches. Alternative differential including prior demyelination to be considered.    otherwise unremarkable MRI brain specifically without evidence for acute infarction or enhancing lesion.          Electronically signed by: VIOLETTE CARO DO  Date:     09/28/17  Time:    10:49      Note: I have independently reviewed any/all imaging/labs/tests and agree with the report (s) as documented.  Any discrepancies will be as noted/demarcated by free text.  ARA HDZ 10/16/2023    ASSESSMENT:  1.  Chronic migraine without aura without status migrainosus, not intractable    2. Migraine without aura and without status migrainosus, not intractable    3. Primary hypertension    4. Other cerebral infarction      PLAN:  - For migraine prevention - continue emgality 120 mg SQ monthly, verapamil 40 mg bid and cymbalta 60 mg daily   - For acute migraines - triptans contraindicated given hx stroke and uncontrolled HTN  - For acute migraines - ubrelvy 100 mg    - Continue tracking headaches   - Hx CVA - continue regular f/up with Dr. Omsan for management/monitoring   - Mood disorder - continue wellbutrin, cymbalta daily, management per psych - will avoid use of anti-depressants for migraine prevention   - HTN - BP significantly elevated in clinic today, goal BP < 130/80 mmHg, encouraged her to schedule f/up with PCP for close monitoring/med adjustments   - RTC in 3-6 months or sooner if needed     Orders Placed This Encounter    galcanezumab-gnlm 120 mg/mL PnIj    tiZANidine (ZANAFLEX) 4 MG tablet     Questions and concerns were sought and answered to the patient's stated verbal satisfaction.  The patient verbalizes understanding and agreement with the above stated treatment plan.     CC: Luc Venegas MD Elizabeth C. Vulevich, FNP-C  [facility]   [phone]    Dr. Farfan was available during today's encounter.

## 2023-10-17 ENCOUNTER — CLINICAL SUPPORT (OUTPATIENT)
Dept: REHABILITATION | Facility: HOSPITAL | Age: 48
End: 2023-10-17
Payer: COMMERCIAL

## 2023-10-17 DIAGNOSIS — Z74.09 IMPAIRED FUNCTIONAL MOBILITY, BALANCE, GAIT, AND ENDURANCE: Primary | ICD-10-CM

## 2023-10-17 PROCEDURE — 97112 NEUROMUSCULAR REEDUCATION: CPT | Mod: PN

## 2023-10-17 PROCEDURE — 97110 THERAPEUTIC EXERCISES: CPT | Mod: PN

## 2023-10-17 NOTE — PROGRESS NOTES
[facility]   Physical Therapy Treatment Note     Name: Mirna Pollock  Clinic Number: [id number]    Therapy Diagnosis:   Encounter Diagnosis   Name Primary?    Impaired functional mobility, balance, gait, and endurance Yes       Physician: Marcus Osman MD    Visit Date: 10/17/2023    Physician Orders: PT Eval and Treat   Medical Diagnosis from Referral: I63.531 (ICD-10-CM) - Stroke due to occlusion of right posterior cerebral artery  Evaluation Date: 7/19/2023  Authorization Period Expiration: 12/31/23  Plan of Care Expiration: 12/15/23  Progress Note Due: 11/17/23  Visit # / Visits authorized: 11/20         Precautions: Standard, Fall, and Monitor BP  Time In: 1325  Time Out: 1420  Total Billable Time: 55 minutes      SUBJECTIVE     Pt reports: Had a neurologist appointment yesterday - BP was measuring really high. Feels stairs are still very unsteady and still drifts left with walking in community.     Response to previous treatment: No change   Functional change: None    Pain: 0/10  Location: no headache today    OBJECTIVE   See below.     TREATMENT     Mirna received the treatments listed below:      therapeutic exercises to develop strength, endurance, and flexibility for 15 minutes including:  BP taken manually - 125/95  BP with electric cuff for comparison - 159/114 - electric cuffs seem to be inaccurate - always use manual     Lower Extremity Strength  Right LE  Left LE    Hip flexion: 5/5 Hip flexion: 4+/5   Hip abduction: 5/5 Hip abduction: 5/5   Hip adduction: 5/5 Hip adduction 5/5   Knee extension: 5/5 Knee extension: 4+/5   Knee flexion: 5/5 Knee flexion: 4+/5   Ankle dorsiflexion: 5/5 Ankle dorsiflexion: 4+/5   Ankle plantarflexion: 5/5 Ankle plantarflexion: 5/5     Bilateral heel cord stretch at incline 3 x 30s   Treadmill 1.2 mph x 8 min - cues for symmetrical gait and to inform of fatigue.      neuromuscular re-education activities to improve: Balance, Coordination, and  Posture for 40 minutes. The following activities were included:     Single leg stance: right 13 s / left 5s    MCTSIB:  1. Eyes Open/feet together/Firm: 30 seconds  2. Eyes Closed/feet together/Firm: 30 seconds  3. Eyes Open/feet together/Foam: 30 seconds  4. Eyes Closed/feet together/Foam: 30 seconds    Functional Gait Assessment:    1. Gait on level surface (6m) =  2               (3) Normal: less than 5.5 sec, no A.D., no imbalance, normal gait pattern, deviates< 6in              (2) Mild impairment: 7-5.6 sec, uses A.D., mild gait deviations, or deviates 6-10 in              (1) Moderate impairment: > 7 sec, slow speed, imbalance, deviates 10-15 in.              (0) Severe impairment: needs assist, deviates >15 in, reach/touch wall  2. Change in Gait Speed = 3              (3) Normal: smooth change w/o loss of balance or gait deviation, deviates < 6 in, significant difference between speeds              (2) Mild impairment: changes speed, but demonstrates mild gait deviations, deviates 6-10 in, OR no deviations but unable to significantly speed, OR uses A.D.              (1) Moderate impairment: minor changes to speed, OR changes speed w/ significant deviations, deviates 10-15 in, OR  Changes speed , but loses balance & recovers              (0) Severe impairment: cannot change speed, deviates >15 in, or loses balance & needs assist  3. Gait with horizontal head turns  = 1              (3) Normal: no change in gait, deviates <6 in              (2) Mild impairment: slight change in speed, deviates 6-10 in, OR uses A.D.              (1) Moderate impairment: moderate change in speed, deviates 10-15 in              (0) Severe impairment: severe disruption of gait, deviates >15in  4. Gait with vertical head turns = 2              (3) Normal: no change in gait, deviates <6 in              (2) Mild impairment: slight change in speed, deviates 6-10 in OR uses A.D.              (1) Moderate impairment: moderate change in  speed, deviates 10-15 in              (0) Severe impairment: severe disruption of gait, deviates >15 in  5. Gait with pivot turns = 3              (3) Normal: performs safely in 3 sec, no LOB              (2) Mild impairment: performs in >3 sec & no LOB, OR turns safely & requires several steps to regain LOB              (1) Moderate impairment: turns slow, OR requires several small steps for balance following turn & stop              (0) Severe impairment: cannot turn safely, needs assist  6. Step over obstacle = 3              (3) Normal: steps over 2 stacked boxes w/o change in speed or LOB              (2) Mild impairment: able to step over 1 box w/o change in speed or LOB              (1) Moderate impairment: steps over 1 box but must slow down, may require VC              (0) Severe impairment: cannot perform w/o assist  7. Gait with Narrow CALLIE = 1              (3) Normal: 10 steps no staggering              (2) Mild impairment: 7-9 steps              (1) Moderate impairment: 4-7 steps              (0) Severe impairment: < 4 steps or cannot perform w/o assist  8. Gait with eyes closed = 1              (3) Normal: < 7 sec, no A.D., no LOB, normal gait pattern, deviates <6 in              (2) Mild impairment: 7.1-9 sec, mild gait deviations, deviates 6-10 in              (1) Moderate impairment: > 9 sec, abnormal pattern, LOB, deviates 10-15 in              (0) Severe impairment: cannot perform w/o assist, LOB, deviates >15in  9. Ambulating Backwards = 2              (3) Normal: no A.D., no LOB, normal gait pattern, deviates <6in              (2) Mild impairment: uses A.D., slower speed, mild gait deviations, deviates 6-10 in              (1) Moderate impairment: slow speed, abnormal gait pattern, LOB, deviates 10-15 in              (0) Severe impairment: severe gait deviations or LOB, deviates >15in  10. Steps = 2              (3) Normal: alternating feet, no rail              (2) Mild Impairment: alternating  feet, uses rail              (1) Moderate impairment: step-to, uses rail              (0) Severe impairment: cannot perform safely     Score 20/30      Score:   <22/30 fall risk   <20/30 fall risk in older adults   <18/30 fall risk in Parkinsons            BP at end of session: 135/95 taken manually.     PATIENT EDUCATION AND HOME EXERCISES     Home Exercises Provided and Patient Education Provided     Education provided:   - following up with PCP regarding BP  - safety/home modifications secondary to limited peripheral vision    Written Home Exercises Provided: none provided this date.     ASSESSMENT     Pt was reassessed for progress today. She has met multiple short term goals and continues to make progress towards long term goals. She improved with lower extremity strength, MCTSIB balance and single leg stance balance. Her dynamic balance has most significant ongoing deficits. She did tolerated the treadmill well today and is safe for initiating treadmill at home. Pt remains appropriate for therapy at this time to address impaired B LE strength, impaired balance, limited vestibular response, poor endurance, and overall limitation in tolerance to activities. Extend plan of care (POC) x 8 more weeks.     Mirna Is progressing well towards her goals.   Pt prognosis is Fair.     Pt will continue to benefit from skilled outpatient physical therapy to address the deficits listed in the problem list box on initial evaluation, provide pt/family education and to maximize pt's level of independence in the home and community environment.     Pt's spiritual, cultural and educational needs considered and pt agreeable to plan of care and goals.     Anticipated barriers to physical therapy: ongoing medical management    Goals:    Short Term Goals- 4 Weeks  Pt to demonstrate independence in performance of HEP in order to improve daily level of physical activity and improve carry over session to session. -MET 9/25/23  Pt to  improve B LE strength by > / = 1/2 MMT score in order to improve strength for daily activities. -MET 9/25/23  Pt to improve FGA score to > / = 26/30 in order to demonstrate improved dynamic balance and safety. Ongoing  Pt to demonstrate improvement in condition 4 on MCTSIB to > / = 15 seconds in order to improve safety. MET 10/17/23  Pt to improve SLS time B to > / = 5 seconds in order to improve gait stability for community mobility. MET 10/17/23     Long Term Goals - 12 Weeks  Pt to demonstrate compliance with HEP > / = 3x per week in order to improve daily level of physical activity and improve carry over session to session. Ongoing  Pt to improve B LE strength by > / = 1 MMT score in order to improve strength for daily activities. Ongoing.   Pt to improve FGA score to > / = 28/30 in order to demonstrate improved dynamic balance and safety. Ongoing  Pt to demonstrate improvement in condition 4 on MCTSIB to > / = 30 seconds with min sway in order to improve safety. MET 10/17/23  Pt to improve SLS time B to > / = 10 seocnds in order to improve gait stability for community mobility. Ongoing  NEW: Pt to improve 6 minute walk test distance by 50 meters in order to demo improved endurance for community mobility. Ongoing    PLAN     Updated certification dates: 10/17/23-12/15/23.    Outpatient Physical Therapy 2 times weekly for 8 more weeks    Continue to monitor BP. Vestibular challenges, dynamic balance and R LE strengthening.     Michelle Carlton, PT, DPT  10/17/2023

## 2023-10-23 ENCOUNTER — PATIENT MESSAGE (OUTPATIENT)
Dept: INTERNAL MEDICINE | Facility: CLINIC | Age: 48
End: 2023-10-23
Payer: COMMERCIAL

## 2023-10-23 PROBLEM — I63.531 STROKE DUE TO OCCLUSION OF RIGHT POSTERIOR CEREBRAL ARTERY: Status: RESOLVED | Noted: 2023-07-05 | Resolved: 2023-10-23

## 2023-10-24 ENCOUNTER — CLINICAL SUPPORT (OUTPATIENT)
Dept: REHABILITATION | Facility: HOSPITAL | Age: 48
End: 2023-10-24
Payer: COMMERCIAL

## 2023-10-24 VITALS
HEART RATE: 88 BPM | RESPIRATION RATE: 20 BRPM | SYSTOLIC BLOOD PRESSURE: 126 MMHG | OXYGEN SATURATION: 98 % | DIASTOLIC BLOOD PRESSURE: 74 MMHG

## 2023-10-24 DIAGNOSIS — R29.818 FINE MOTOR IMPAIRMENT: Primary | ICD-10-CM

## 2023-10-24 DIAGNOSIS — Z74.09 IMPAIRED FUNCTIONAL MOBILITY, BALANCE, GAIT, AND ENDURANCE: Primary | ICD-10-CM

## 2023-10-24 DIAGNOSIS — R29.898 FINE MOTOR IMPAIRMENT: Primary | ICD-10-CM

## 2023-10-24 DIAGNOSIS — R29.898 RIGHT HAND WEAKNESS: ICD-10-CM

## 2023-10-24 PROCEDURE — 97530 THERAPEUTIC ACTIVITIES: CPT | Mod: PN

## 2023-10-24 PROCEDURE — 97022 WHIRLPOOL THERAPY: CPT | Mod: PN

## 2023-10-24 PROCEDURE — 97110 THERAPEUTIC EXERCISES: CPT | Mod: PN

## 2023-10-24 NOTE — PROGRESS NOTES
[facility]   Physical Therapy Treatment Note     Name: Mirna Pollock  Clinic Number: [id number]    Therapy Diagnosis:   Encounter Diagnosis   Name Primary?    Impaired functional mobility, balance, gait, and endurance Yes     Physician: Marcus Osman MD    Visit Date: 10/24/2023    Physician Orders: PT Eval and Treat   Medical Diagnosis from Referral: I63.531 (ICD-10-CM) - Stroke due to occlusion of right posterior cerebral artery  Evaluation Date: 7/19/2023  Authorization Period Expiration: 12/31/23  Plan of Care Expiration: 12/15/23  Progress Note Due: 11/17/23  Visit # / Visits authorized: 12/20         Precautions: Standard, Fall, and Monitor BP    Time In: 1146  Time Out: 1206  Total Billable Time: 20 minutes    SUBJECTIVE     Pt reports:  Saturday and last night (Monday evening) tingling in head like at onset of first TIA. Has appointment with Neurologist to address it and states she sent them a message. Did try the treadmill once at home but not since that new onset of symptoms.     Response to previous treatment: No change   Functional change: None    Pain: 0/10  Location: no headache today    OBJECTIVE   See below.       TREATMENT     Mirna received the treatments listed below:    Patient participated in dynamic functional therapeutic activities - medical monitoring and education x 20 min     BP taken with manual cuff  154/100 - educated on too high for participation with exercise today.   154/102 - after 2 min rest break.   160/102 - after final 2 min rest break.  96 HR, O2 sat 96%     PATIENT EDUCATION AND HOME EXERCISES     Home Exercises Provided and Patient Education Provided     Education provided:   - following up with PCP regarding BP  - safety/home modifications secondary to limited peripheral vision    Written Home Exercises Provided: none provided this date.     ASSESSMENT     Pt entered reporting new onset symptoms and with high BP outside of safe exercise ranges. She was  educated on why we cannot complete exercises today. Education on monitoring for symptoms and to present to ER if symptoms persist or worsen. She denied any speech or vision changes (has mod vision deficits at baseline). Encouraged her to check BP daily at home. Unable to complete balance, endurance or strength today. Pt remains appropriate for therapy once BP is under control to address impaired B LE strength, impaired balance, limited vestibular response, poor endurance, and overall limitation in tolerance to activities.     Mirna Is progressing well towards her goals.   Pt prognosis is Fair.     Pt will continue to benefit from skilled outpatient physical therapy to address the deficits listed in the problem list box on initial evaluation, provide pt/family education and to maximize pt's level of independence in the home and community environment.     Pt's spiritual, cultural and educational needs considered and pt agreeable to plan of care and goals.     Anticipated barriers to physical therapy: ongoing medical management    Goals:    Short Term Goals- 4 Weeks  Pt to demonstrate independence in performance of HEP in order to improve daily level of physical activity and improve carry over session to session. -MET 9/25/23  Pt to improve B LE strength by > / = 1/2 MMT score in order to improve strength for daily activities. -MET 9/25/23  Pt to improve FGA score to > / = 26/30 in order to demonstrate improved dynamic balance and safety. Ongoing  Pt to demonstrate improvement in condition 4 on MCTSIB to > / = 15 seconds in order to improve safety. MET 10/17/23  Pt to improve SLS time B to > / = 5 seconds in order to improve gait stability for community mobility. MET 10/17/23     Long Term Goals - 12 Weeks  Pt to demonstrate compliance with HEP > / = 3x per week in order to improve daily level of physical activity and improve carry over session to session. Ongoing  Pt to improve B LE strength by > / = 1 MMT score in  order to improve strength for daily activities. Ongoing.   Pt to improve FGA score to > / = 28/30 in order to demonstrate improved dynamic balance and safety. Ongoing  Pt to demonstrate improvement in condition 4 on MCTSIB to > / = 30 seconds with min sway in order to improve safety. MET 10/17/23  Pt to improve SLS time B to > / = 10 seocnds in order to improve gait stability for community mobility. Ongoing  NEW: Pt to improve 6 minute walk test distance by 50 meters in order to demo improved endurance for community mobility. Ongoing    PLAN     Updated certification dates: 10/17/23-12/15/23.    Outpatient Physical Therapy 2 times weekly for 8 more weeks    Continue to monitor BP. Vestibular challenges, dynamic balance and R LE strengthening.     Michelle Carlton, PT, DPT  10/24/2023

## 2023-10-24 NOTE — PROGRESS NOTES
Patient doing well at home with her , very pleasant woman high functioning mentally and physically but continues to have loss of vision to her left peripheral. Independent with most ADL's when vision isn't an issue. BP/Pulse WNL eating/swallowing with no difficulty and states sleeps through the night with sleep aid. Anxious with recent developments and returning to work issues.

## 2023-10-24 NOTE — PROGRESS NOTES
Occupational Therapy Treatment Note     Date: 10/24/2023  Name: Mirna Pollock  Clinic Number: [id number]    Therapy Diagnosis:   Encounter Diagnoses   Name Primary?    Fine motor impairment Yes    Right hand weakness      Physician: Luc Venegas MD      Right Left   Involved Side    [x]        []    Dominant Side     [x]        []         Physician Orders: OT eval and treat - Hand  Medical Diagnosis: Upper extremity weakness [R29.898]  Evaluation Date: 10/2/2023  Plan of Care Expiration Period: 12/01/2023  Insurance Authorization period Expiration: 09/24/2024  Visit # / Visits Authorized: 1 / 20  FOTO: 10/2/2023     Time In:1032  Time Out: 1115  Total Billable (one on one) Time: 43 minutes     Precautions: Standard, Fall, and Syncope    Subjective     Pt reports: Pt thinks she might have experienced another TIA over the weekend; encouraged to reach out to MD to follow up   She was compliant with home exercise program given last session.   Response to previous treatment:no complaint  Functional change: independence in HEP    Pain: 0/10  Location: no pain noted today     Blood pressure: 132/104    Objective      Mirna received the following supervised modalities after being cleared for contradictions for 8 minutes:   - Patient received fluidotherapy to right upper extremity for 8 minutes for sensory re-education and pain management.       Mirna received therapeutic exercises for 8 minutes including:  -Resisted gripper exercises: picking up 2x15 pegs using resisted gripper on rung 2 for right  strength for improved independence in activities of daily living       Mirna participated in dynamic functional therapeutic activities to improve functional performance for 27 minutes, including:  -Mini peg board activity: placing x20 mini pegs into green putty using right hand for improved fine motor precision and manipulation   - Removing x20 mini pegs from green theraputty with black clothespin for pinch strength and fine motor  coordination   - Functional  activity using BAPS board: using right hand to place and remove x5 pieces of BAPS board to simulate replacing and removing lids of various sizes     Home Exercises and Education Provided      Education provided:   - HEP  - Progress towards goals    Written Home Exercises Provided: Patient instructed to cont prior HEP.  Exercises were reviewed and Mirna was able to demonstrate them prior to the end of the session.    Mirna demonstrated good  understanding of the education provided.     See EMR under Patient Instructions for exercises provided prior visit.    Assessment      Mirna would continue to benefit from skilled OT. Pt presented to therapy with no new pain or symptoms but experienced symptoms over the weekend consistent with previous TIA episodes. Blood pressure noted to be elevated; held any cardio/high resistance activities today. Good participation in all presented tasks. Gripper and BAPS board presented appropriate level of challenge.    Mirna is progressing well towards her goals and there are no updates to goals at this time. Pt prognosis is Good.     Pt will continue to benefit from skilled outpatient occupational therapy to address the deficits listed in the problem list on initial evaluation provide pt/family education and to maximize pt's level of independence in the home and community environment.     Pt's spiritual, cultural and educational needs considered and pt agreeable to plan of care and goals.    Anticipated barriers to occupational therapy: NA    Goals:      Short Term Goals = Long Term Goals: to be met within 10 weeks     Pt will be independent with Home Exercise Program (HEP) to promote long term maintenance of progress made in therapy.      Pt will reach expected FOTO score of 66 to demonstrate improved participation in ADLs for overall quality of life      Pt will improve B pinch strength by 3# (combined lateral + 2 pt + 3 pt) each in order to increase safety and  participation with work tasks     Pt will improve B  strength by 5# each in order to increase safety and participation with home care tasks     Pt will demonstrate improved strength of MMS of >/= 4+/5 in all right upper extremity planes     Pt will report improved endurance with home care tasks, as demonstrated by reports of moving laundry up/down stairs with no increased fatigue        Goals to be added or modified as necessary.     Plan   Certification Period/Plan of care expiration: 10/2/2023 to 12/01/2023.     Outpatient Occupational Therapy 1-2 times weekly for 8 weeks to include the following interventions: Fluidotherapy, Moist Heat/ Ice, Neuromuscular Re-ed, Patient Education, Self Care, Therapeutic Activities, and Therapeutic Exercise.    Updates for next session: cont fine motor; monitor BP    Juliana Mcpherson, OT

## 2023-10-25 NOTE — PROGRESS NOTES
[facility]   Physical Therapy Treatment Note     Name: Mirna Pollock  Clinic Number: [id number]    Therapy Diagnosis:   Encounter Diagnosis   Name Primary?    Impaired functional mobility, balance, gait, and endurance Yes       Physician: Marcus Osman MD    Visit Date: 2023    Physician Orders: PT Eval and Treat   Medical Diagnosis from Referral: I63.531 (ICD-10-CM) - Stroke due to occlusion of right posterior cerebral artery  Evaluation Date: 2023  Authorization Period Expiration: 23  Plan of Care Expiration: 12/15/23  Progress Note Due: 23  Visit # / Visits authorized:          Precautions: Standard, Fall, and Monitor BP    Time In: 1434  Time Out: 1515  Total Billable Time: 41 minutes    SUBJECTIVE     Pt reports:  did treadmill 1 time at home for 5 minutes, tired after.    Patient was compliant with home exercise program   Response to previous treatment: No adverse effects reported  Functional change: ongoing    Pain: 0/10  Location: no headache today    OBJECTIVE   See below.       TREATMENT     Mirna received the treatments listed below:    Patient participated in dynamic functional therapeutic activities - medical monitoring and education x 12 min     BP taken with manual cuff  200/110 - educated on too high for participation with exercise today.   190/108 - after rest break.   160/98 - after final rest break.    Gait Better Treadmill Training:   User name: [email or web address]  YARDS: 190  SPEED: 1.3 mph  TIME: 5 mins  Attention level: 0% fog, 100% light  Obstacle level: 1  Navigation level: easy    Other:   scannin%  Tamy Negotiation: 0%   [place]uddle Negotiation: 25%  Total Negotiation: 11%  Upper Extremity Support: bilateral       Mirna participated in neuromuscular re-education activities to improve: Balance for 29 minutes. The following activities were included:     Parallel bar:   Tandem stance x 30 sec- poor balance  Semi tandem stance, firm 2 x 30 sec  Semi  "Tandem stance with head turns x 30 sec  2 cone (12") tap, firm, intermittent upper extremity 10x  Tandem gait, intermittent upper extremity- 4 laps      Vitals at conclusion:   160/98    PATIENT EDUCATION AND HOME EXERCISES     Home Exercises Provided and Patient Education Provided     Education provided:   - continue with home exercise program, added semi tandem stance    Written Home Exercises Provided: Patient instructed to cont prior HEP.  Exercises were reviewed and Mirna was able to demonstrate them prior to the end of the session.  Mirna demonstrated good  understanding of the education provided.     See EMR under Patient Instructions for exercises provided 11/1/2023.      ASSESSMENT     Mirna tolerated physical therapy session well. At the beginning of session blood pressure was noted to be high, but this decreased to treatable ranges after sitting for a few minutes. Patient was challenged with balance in semi tandem, tandem, and modified single leg stance. Gait Better (virtual reality) treadmill was initiated to improve scanning ability and gait. Patient tolerated well, bilateral upper extremity support was used for safety. Home exercise program updated to include balance activities.  Patient can benefit from continued skilled physical therapy to improve mobility as close to prior level of function as possible.      Mirna Is progressing well towards her goals.   Pt prognosis is Fair.     Pt will continue to benefit from skilled outpatient physical therapy to address the deficits listed in the problem list box on initial evaluation, provide pt/family education and to maximize pt's level of independence in the home and community environment.     Pt's spiritual, cultural and educational needs considered and pt agreeable to plan of care and goals.     Anticipated barriers to physical therapy: ongoing medical management    Goals:    Short Term Goals- 4 Weeks  Pt to demonstrate independence in performance of HEP in order " to improve daily level of physical activity and improve carry over session to session. -MET 9/25/23  Pt to improve B LE strength by > / = 1/2 MMT score in order to improve strength for daily activities. -MET 9/25/23  Pt to improve FGA score to > / = 26/30 in order to demonstrate improved dynamic balance and safety. Ongoing  Pt to demonstrate improvement in condition 4 on MCTSIB to > / = 15 seconds in order to improve safety. MET 10/17/23  Pt to improve SLS time B to > / = 5 seconds in order to improve gait stability for community mobility. MET 10/17/23     Long Term Goals - 12 Weeks  Pt to demonstrate compliance with HEP > / = 3x per week in order to improve daily level of physical activity and improve carry over session to session. Ongoing  Pt to improve B LE strength by > / = 1 MMT score in order to improve strength for daily activities. Ongoing.   Pt to improve FGA score to > / = 28/30 in order to demonstrate improved dynamic balance and safety. Ongoing  Pt to demonstrate improvement in condition 4 on MCTSIB to > / = 30 seconds with min sway in order to improve safety. MET 10/17/23  Pt to improve SLS time B to > / = 10 seocnds in order to improve gait stability for community mobility. Ongoing  NEW: Pt to improve 6 minute walk test distance by 50 meters in order to demo improved endurance for community mobility. Ongoing    PLAN     Updated certification dates: 10/17/23-12/15/23.    Outpatient Physical Therapy 2 times weekly for 8 more weeks    Continue to monitor BP. Vestibular challenges, dynamic balance and R LE strengthening.     Sindy Valladares, PT, DPT,   Board-Certified Clinical Specialist in Neurologic Physical Therapy   Certified Brain Injury Specialist    11/1/2023

## 2023-11-01 ENCOUNTER — CLINICAL SUPPORT (OUTPATIENT)
Dept: REHABILITATION | Facility: HOSPITAL | Age: 48
End: 2023-11-01
Payer: COMMERCIAL

## 2023-11-01 DIAGNOSIS — R29.898 FINE MOTOR IMPAIRMENT: Primary | ICD-10-CM

## 2023-11-01 DIAGNOSIS — Z74.09 IMPAIRED FUNCTIONAL MOBILITY, BALANCE, GAIT, AND ENDURANCE: Primary | ICD-10-CM

## 2023-11-01 DIAGNOSIS — R29.818 FINE MOTOR IMPAIRMENT: Primary | ICD-10-CM

## 2023-11-01 DIAGNOSIS — R29.898 RIGHT HAND WEAKNESS: ICD-10-CM

## 2023-11-01 PROCEDURE — 97530 THERAPEUTIC ACTIVITIES: CPT | Mod: PN | Performed by: PHYSICAL THERAPIST

## 2023-11-01 PROCEDURE — 97022 WHIRLPOOL THERAPY: CPT | Mod: PN,59

## 2023-11-01 PROCEDURE — 97112 NEUROMUSCULAR REEDUCATION: CPT | Mod: PN | Performed by: PHYSICAL THERAPIST

## 2023-11-01 PROCEDURE — 97530 THERAPEUTIC ACTIVITIES: CPT | Mod: PN

## 2023-11-01 PROCEDURE — 97110 THERAPEUTIC EXERCISES: CPT | Mod: PN

## 2023-11-01 NOTE — PATIENT INSTRUCTIONS
Semi Tandem Standing        Heel of one foot against arch of other: 1. Balance for 30 seconds. 2. Look right and left 10 times. 2. Look up and down 10 times.   Repeat 1-2 times. Do 1 sessions per day.  [email or web address]   Copyright © [employer]. All rights reserved.

## 2023-11-03 ENCOUNTER — DOCUMENTATION ONLY (OUTPATIENT)
Dept: REHABILITATION | Facility: HOSPITAL | Age: 48
End: 2023-11-03
Payer: COMMERCIAL

## 2023-11-03 ENCOUNTER — PATIENT MESSAGE (OUTPATIENT)
Dept: REHABILITATION | Facility: HOSPITAL | Age: 48
End: 2023-11-03
Payer: COMMERCIAL

## 2023-11-03 NOTE — PROGRESS NOTES
Missed Visit/Cancellation      Date: 11/3/2023         Canceled Number: 1  No Show Number: 0                                                                                                                Pt initially had visit scheduled for today for 1345.   Reason for cancellation: migraine.    Pt's next scheduled physical therapy visit is 11/7/23.    Sindy Valladares, PT, DPT  11/3/2023

## 2023-11-06 NOTE — PLAN OF CARE
[facility] Occupational Therapy  Initial Neurological Evaluation     Date: 10/2/2023  Patient: Mirna Pollock  Chart Number: [id number]    Therapy Diagnosis: No diagnosis found.  Physician: Luc Venegas MD    Physician Orders: OT eval and treat - Hand  Medical Diagnosis: Upper extremity weakness [R29.898]  Evaluation Date: 10/2/2023  Plan of Care Expiration Period: 12/01/2023  Insurance Authorization period Expiration: 09/24/2024  Visit # / Visits Authorized: 1 / 1  FOTO: 10/2/2023    Time In:1530  Time Out: 1615  Total Billable (one on one) Time: 45 minutes    Precautions: Standard, Fall, and Syncope    Subjective     History of Current Condition:  CVA on 07/05/2023. Currently, patient reports the following deficits: over the weekend began with some heaviness in the right arm and tingling in right face. States that  has noted some weakness in right side, especially when going up the stairs.  Difficulty in opening jars and decreased sensation in right hand since stroke in July.     Mechanism of Injury: CVA  Date of Onset: July 2023  Surgical Procedure: NA  Imaging: MRI studies reviewed  Previous Therapy: vestibular PT - ongoing     Right Left   Involved Side   [x]     []   Dominant Side    [x]     []     Pain:  NA    Occupation:   Working presently:  at [employer]   Duties: currently on disability     Functional Limitations/Social History:    Home/Living environment : lives with their spouse  No issues navigating home environment     Driving: not currently secondary to peripheral vision changes    Functional Status      Current functional status: modified independent to independent in all ADLs/most IADLs (not driving); participation limited by symptoms    Past Medical History/Physical Systems Review:     Past Medical History:  Mirna Pollock  has a past medical history of Anxiety, ASD (atrial septal defect), Endometriosis, Headache(784.0), Hypertension, and Kidney stones.    Past  Surgical History:  Mirna Pollock  has a past surgical history that includes Cholecystectomy; Appendectomy;  section; Hysterectomy; and Cardiac catheterization (10/01/2010).    Current Medications:  Mirna has a current medication list which includes the following prescription(s): aspirin, atorvastatin, benazepril-hydrochlorthiazide, bupropion, clopidogrel, duloxetine, galcanezumab-gnlm, hydroxyzine pamoate, magnesium oxide, ondansetron, promethazine, riboflavin (vitamin b2), tizanidine, ubrelvy, verapamil, and zolpidem, and the following Facility-Administered Medications: onabotulinumtoxina.    Allergies:  Review of patient's allergies indicates:   Allergen Reactions    Iodine and iodide containing products Other (See Comments)     Patient becomes hypotensive with DRESS-like syndrome even with pre-medication  Pt with hypotensive, severe reaction with CTA 2023    Losartan Rash     Fever, eosinophilia, diffuse rash    Utjiyzzh-6-cw3 antimigraine agents Other (See Comments)     Patient has a history of probable migrainous stroke. Please proceed with caution with triptan medications specifically, given effects on vasculature. Patient may tolerate this medication, but added to chart simply to ensure it's brought to the attention of prescribers.    Penicillins Rash        Objective     Cognitive Exam:  Cognition intact    Visual/Perceptual:  See PT eval    Physical Exam:  Postural examination/scapula alignment: WNL  Joint integrity: WNL  Skin integrity: WNL  Edema: None noted  Palpation: No pain with palpation    Joint Evaluation:  - right upper extremity:  Active range of motion: WNL  MMS: 4/5 grossly  - left upper extremity:  Active range of motion: WNL  MMS: 5/5    Fist: normal     Strength: (ADAM Dynamometer in lbs.) Average 3 trials, Position II:      10/2/2023 10/2/2023   Rung II Right Left   Average 40.5# 31.9#   [norms for women aged 45-49: R=62.2 +/-15.1; L=56.0 +/-12.7 (Gina et al,  1985)]    Pinch Strength (Measured in lbs)      10/2/2023 10/2/2023    Right Left   Lateral Pinch 12.0 # 13.0 #   3pt Pinch 11.0 # 12.0 #   2pt Pinch 9.5 # 8.0 #   [Lateral pinch norms for women aged 45-49: right: 17.6+/-3.2; left: 16.6+/-2.9 (Gina et al, 1985)]   [3-point pinch norms for women aged 45-49: right: 17.9+/-3.0; left: 17.5+/-2.8 (Gina et al, 1985)]   [2-point pinch norms for women aged 45-49: right: 13.2+/-3.0; left: 12.1+/-2.7 (Gina et al, 1985)]     Fine/Gross Motor Coordination    Assessment  Right   10/2/2023 Left  10/2/2023   9 hole peg test 9 pegs in/out for time 31 31   STEPHON (Rapid Alternating Movements)    intact   intact   Finger to Nose (5 times)  intact intact   Finger Flicks (coordination moving from digit flexion to digit extension)  intact intact   *WNL - Within Normal Limits  *NT = Not Tested  Nine Hole Peg Test Norms: [norms for women aged 46-50: R=17.36 +/-2.01s; L=17.96s +/-2.30s (Rolando et al, 2003)]    Tone:    Modified Marin Scale:   0 - No increase in muscle tone    Sensation:  Mirna  reports tinging in distal upper extremity      Sensation Intact  Impaired Comments    [place]  Deep Touch (6.65) [x] []     Protective Sensation (4.56) [x] []     Light Touch (3.61) [] [x] Left dorsal          Other Kinesthesia  [x] []     Temperature [x] []     Stereognosis  [x] []        Balance:   See PT evaluation.    Endurance Deficit: mild             CMS Impairment/Limitation/Restriction for FOTO Stroke upper extremity Survey    Therapist reviewed FOTO scores for Mirna Pollock on 10/2/2023.   FOTO documents entered into EPIC - see Media section.             Treatment     Treatment Time In: 1605  Treatment Time Out: 1615  Total Treatment time separate from Evaluation time:10    Mirna participated in dynamic functional therapeutic activities to improve functional performance for 10  minutes, including:  - Review and return demonstration of hand strengthening theraputty HEP:  see pt instructions    Home Exercises and Patient Education Provided    Education provided:   -role of OT, goals for OT, scheduling/cancellations, insurance limitations with patient.  -Additional Education provided: HEP    Written Home Exercises Provided: yes.  Exercises were reviewed and Mirna was able to demonstrate them prior to the end of the session.    Mirna demonstrated good  understanding of the education provided.     See EMR under Patient Instructions for exercises provided 10/2/2023.    Assessment     Mirna Pollock is a 47 y.o. female referred to outpatient occupational therapy and presents with a medical diagnosis of upper extremity weakness, resulting in decreased muscle strength, impaired function, and decreased work ability and demonstrates limitations as described in the chart below. Following medical record review it is determined that patient will benefit from occupational therapy services in order to maximize pain free and/or functional use of right UE.    Patient prognosis is Good due to high motivation to progress.  Patient will benefit from skilled outpatient Occupational Therapy to address the deficits stated above and in the chart below, provide patient/family education, and to maximize patient's level of independence.     Plan of care discussed with patient: Yes  Patient's spiritual, cultural and educational needs considered and patient is agreeable to the plan of care and goals as stated below:     Anticipated Barriers for therapy: blood pressure limitations    Medical Necessity is demonstrated by the following  Occupational Profile/History  Co-morbidities and personal factors that may impact the plan of care [] LOW: Brief chart review  [x] MODERATE: Expanded chart review   [] HIGH: Extensive chart review    Moderate / High Support Documentation: review of previous therapy notes, imaging, and MD notes     Examination  Performance deficits relating to physical, cognitive or psychosocial skills  that result in activity limitations and/or participation restrictions  [x] LOW: addressing 1-3 Performance deficits  [] MODERATE: 3-5 Performance deficits  [] HIGH: 5+ Performance deficits (please support below)    Moderate / High Support Documentation:    Physical:      Cognitive:      Psychosocial:         Treatment Options [] LOW: Limited options  [x] MODERATE: Several options  [] HIGH: Multiple options      Decision Making/ Complexity Score: moderate         The following goals were discussed with the patient and patient is in agreement with them as to be addressed in the treatment plan.     Patient's Goals for Therapy: regain strength in hand, improve overall endurance    Goals:     Short Term Goals = Long Term Goals: to be met within 10 weeks    Pt will be independent with Home Exercise Program (HEP) to promote long term maintenance of progress made in therapy.     Pt will reach expected FOTO score of 66 to demonstrate improved participation in ADLs for overall quality of life     Pt will improve B pinch strength by 3# (combined lateral + 2 pt + 3 pt) each in order to increase safety and participation with work tasks    Pt will improve B  strength by 5# each in order to increase safety and participation with home care tasks    Pt will demonstrate improved strength of MMS of >/= 4+/5 in all right upper extremity planes    Pt will report improved endurance with home care tasks, as demonstrated by reports of moving laundry up/down stairs with no increased fatigue      Goals to be added or modified as necessary.    Plan   Certification Period/Plan of care expiration: 10/2/2023 to 12/01/2023.    Outpatient Occupational Therapy 1-2 times weekly for 8 weeks to include the following interventions: Fluidotherapy, Moist Heat/ Ice, Neuromuscular Re-ed, Patient Education, Self Care, Therapeutic Activities, and Therapeutic Exercise.    Juliana Mcpherson, OT

## 2023-11-07 ENCOUNTER — CLINICAL SUPPORT (OUTPATIENT)
Dept: REHABILITATION | Facility: HOSPITAL | Age: 48
End: 2023-11-07
Payer: COMMERCIAL

## 2023-11-07 DIAGNOSIS — R29.898 FINE MOTOR IMPAIRMENT: Primary | ICD-10-CM

## 2023-11-07 DIAGNOSIS — R29.818 FINE MOTOR IMPAIRMENT: Primary | ICD-10-CM

## 2023-11-07 DIAGNOSIS — Z74.09 IMPAIRED FUNCTIONAL MOBILITY, BALANCE, GAIT, AND ENDURANCE: Primary | ICD-10-CM

## 2023-11-07 DIAGNOSIS — R29.898 RIGHT HAND WEAKNESS: ICD-10-CM

## 2023-11-07 PROCEDURE — 97530 THERAPEUTIC ACTIVITIES: CPT | Mod: PN

## 2023-11-07 PROCEDURE — 97112 NEUROMUSCULAR REEDUCATION: CPT | Mod: PN

## 2023-11-07 PROCEDURE — 97110 THERAPEUTIC EXERCISES: CPT | Mod: PN

## 2023-11-07 PROCEDURE — 97022 WHIRLPOOL THERAPY: CPT | Mod: PN,59

## 2023-11-07 NOTE — PROGRESS NOTES
[facility]   Physical Therapy Treatment Note      Name: Mirna Pollock  Clinic Number: [id number]     Therapy Diagnosis:        Encounter Diagnosis   Name Primary?    Impaired functional mobility, balance, gait, and endurance Yes         Physician: Marcus Osman MD     Visit Date: 11/7/2023      Physician Orders: PT Eval and Treat   Medical Diagnosis from Referral: I63.531 (ICD-10-CM) - Stroke due to occlusion of right posterior cerebral artery  Evaluation Date: 7/19/2023  Authorization Period Expiration: 12/31/23  Plan of Care Expiration: 12/15/23  Progress Note Due: 11/17/23  Visit # / Visits authorized: 15/20          Precautions: Standard, Fall, and Monitor BP     Time In: 1105  Time Out: 1150  Total Billable Time: 45 minutes     SUBJECTIVE      Pt reports:  Feeling pretty good today  Patient was compliant with home exercise program   Response to previous treatment: No adverse effects reported  Functional change: ongoing     Pain: 0/10  Location: no headache today     OBJECTIVE   See below.         TREATMENT      Mirna received the treatments listed below:    Patient participated in dynamic functional therapeutic activities - medical monitoring and education x 10 min      BP taken with manual cuff  135/95    - walking on treadmill at 1.3 mph for 5 minutes with use of rails for balance      Mirna participated in neuromuscular re-education activities to improve: Balance for 30 minutes. The following activities were included:   Parallel bar:   - Tandem stance 2 x 30 sec each side - 1-2 taps of arms to restore balance  - Toe tap of 6 inch cone x 20 each side x 2  - Tandem gait, intermittent upper extremity- 4 laps  - modified single leg stance with foot on wobble disk x 120 seconds x 2 each side          PATIENT EDUCATION AND HOME EXERCISES      Home Exercises Provided and Patient Education Provided      Education provided:   - continue with home exercise program, added semi tandem stance      Written Home Exercises Provided: Patient instructed to cont prior HEP.  Exercises were reviewed and Mirna was able to demonstrate them prior to the end of the session.  Mirna demonstrated good  understanding of the education provided.      See EMR under Patient Instructions for exercises provided 11/1/2023.       ASSESSMENT      Mirna tolerated physical therapy session well. At the beginning of session blood pressure was taken twice to assure accurate reading within range. Improved ability to perform tandem standing this date in parallel bars, standing up to 26 seconds before requiring use of upper extremities for balance correction. Improved single leg stance with foot elevated on unstable surface as compared to standing in tandem. Use of gait better deferred due to time constraints in performing at end of session due to be in use at start of session.      Mirna Is progressing well towards her goals.   Pt prognosis is Fair.      Pt will continue to benefit from skilled outpatient physical therapy to address the deficits listed in the problem list box on initial evaluation, provide pt/family education and to maximize pt's level of independence in the home and community environment.      Pt's spiritual, cultural and educational needs considered and pt agreeable to plan of care and goals.     Anticipated barriers to physical therapy: ongoing medical management     Goals:    Short Term Goals- 4 Weeks  Pt to demonstrate independence in performance of HEP in order to improve daily level of physical activity and improve carry over session to session. -MET 9/25/23  Pt to improve B LE strength by > / = 1/2 MMT score in order to improve strength for daily activities. -MET 9/25/23  Pt to improve FGA score to > / = 26/30 in order to demonstrate improved dynamic balance and safety. Ongoing  Pt to demonstrate improvement in condition 4 on MCTSIB to > / = 15 seconds in order to improve safety. MET 10/17/23  Pt to improve SLS time B to  > / = 5 seconds in order to improve gait stability for community mobility. MET 10/17/23     Long Term Goals - 12 Weeks  Pt to demonstrate compliance with HEP > / = 3x per week in order to improve daily level of physical activity and improve carry over session to session. Ongoing  Pt to improve B LE strength by > / = 1 MMT score in order to improve strength for daily activities. Ongoing.   Pt to improve FGA score to > / = 28/30 in order to demonstrate improved dynamic balance and safety. Ongoing  Pt to demonstrate improvement in condition 4 on MCTSIB to > / = 30 seconds with min sway in order to improve safety. MET 10/17/23  Pt to improve SLS time B to > / = 10 seocnds in order to improve gait stability for community mobility. Ongoing  NEW: Pt to improve 6 minute walk test distance by 50 meters in order to demo improved endurance for community mobility. Ongoing     PLAN      Updated certification dates: 10/17/23-12/15/23.     Outpatient Physical Therapy 2 times weekly for 8 more weeks     Continue to monitor BP. Vestibular challenges, dynamic balance and R LE strengthening.    Marylin Link, PT, DPT  11/7/2023

## 2023-11-07 NOTE — PROGRESS NOTES
"Occupational Therapy Treatment Note     Date: 11/7/2023  Name: Mirna Pollock  Clinic Number: [id number]    Therapy Diagnosis: No diagnosis found.  Physician: Luc Venegas MD     Right Left   Involved Side   []     []   Dominant Side    []     []     Physician Orders: ***  Medical Diagnosis: ***  Surgical Procedure and Date: ***, ***  Evaluation Date: ***  Insurance Authorization Period Expiration: ***  Plan of Care Certification Period: ***  Date of Return to MD: ***    Visit # / Visits authorized: *** / ***  Time In:***  Time Out: ***  Total Billable (one on one) Time: *** minutes    Precautions: {IP WOUND PRECAUTIONS OHS:44852}    Subjective     Pt reports: ***  She {Actions; was/was not:62359} compliant with home exercise program given last session.   Response to previous treatment:***  Functional change: ***    Pain: {0-10:44372::"0"}/10  Location: {RIGHT/LEFT/BILATERAL:13123} {LOCATION ON BODY:14326}     Objective      Mirna received the following supervised modalities after being cleared for contradictions for *** minutes:   -***    Mirna received the following direct contact modalities after being cleared for contraindications for *** minutes:  -***    Mirna received the following manual therapy techniques for *** minutes:   -***    Mirna received therapeutic exercises for *** minutes including:  -***    Mirna participated in dynamic functional therapeutic activities to improve functional performance for ***  minutes, including:  -***    Mirna participated in dynamic functional self care to improve ADL and IADL tasks for ***  minutes, including:  -***    Mirna participated in neuromuscular re-education activities to improve: {AMB PT PROGRESS NEURO RE-ED:94920} for *** minutes, including:   -***    Home Exercises and Education Provided      Education provided:   - ***  - Progress towards goals    Written Home Exercises Provided: {Blank single:69610::"yes","Patient instructed to cont prior HEP"}.  Exercises were reviewed and " "Mirna was able to demonstrate them prior to the end of the session.    Mirna demonstrated {Desc; good/fair/poor:86853} understanding of the education provided.     See EMR under {Blank single:36390::"Media","Patient Instructions"} for exercises provided {Blank single:09944::"10/24/2023","prior visit"}.    Assessment      Mirna would continue to benefit from skilled OT. ***     Mirna {IS / IS NOT:47510} progressing well towards her goals and there are no updates to goals at this time. Pt prognosis is {REHAB PROGNOSIS OHS:84735}.     Pt will continue to benefit from skilled outpatient occupational therapy to address the deficits listed in the problem list on initial evaluation provide pt/family education and to maximize pt's level of independence in the home and community environment.     Pt's spiritual, cultural and educational needs considered and pt agreeable to plan of care and goals.    Anticipated barriers to occupational therapy: ***    Goals:  ***    Plan     ***  Updates/Grading for next session: ***      Juliana Mcpherson OT       "

## 2023-11-09 ENCOUNTER — PATIENT MESSAGE (OUTPATIENT)
Dept: REHABILITATION | Facility: HOSPITAL | Age: 48
End: 2023-11-09
Payer: COMMERCIAL

## 2023-11-09 ENCOUNTER — DOCUMENTATION ONLY (OUTPATIENT)
Dept: REHABILITATION | Facility: HOSPITAL | Age: 48
End: 2023-11-09
Payer: COMMERCIAL

## 2023-11-09 NOTE — PROGRESS NOTES
Missed Visit/Cancellation      Date: 11/9/2023          Canceled Number: 2  No Show Number: 0                                                                                                                Pt initially had visit scheduled for today for 1115.   Reason for cancellation: headache.    Pt's next scheduled physical therapy visit is 11/14/23. Patient messaged therapist to cancel. Due to reports of uncontrolled blood pressure and recent headaches, physical therapist informed patient of signs of stroke and to go to ED if these signs are present. Physical therapist also provided patient with a guideline BP (200/100) in which she should seek immediate medical care. Patient is scheduled to see her MD on 11/13/23.      Sindy Valladares, PT, DPT  11/9/2023

## 2023-11-11 NOTE — PROGRESS NOTES
[facility] Progress Note  11/13/2023          Reason for Visit:      had concerns including Follow-up and Hypertension.       History of Present Illness:     CVA/Migraine    Another possible TIA about 2 weekends ago.  Began to get tingling on right side of head that felt similar to the previous episodes.  Developed slurring of speech.   says that lasted about 15 minutes then resolved.  Pt says the tingling was only a minute or two.  She also recently had some right facial tingling, but didn't have any weakness.    She has not seen neurology since immediately following the stroke this summer.     She is still having the left lower quandrantopsia.  Saw ophthalmology previously but wanted to get a second opinion.  Previous gave referral to Dr. Harrison but patient says that he doesn't see stroke patients in the [facility] office, only in [place] - she needs referral to give to the office in [place].  Peripheral vision is still a major concern.  She is unable to drive because of this.  Her  has tested her out and says she cannot see oncoming traffic from her left side and that she would be a hazard driving.      She says neurology had initially recommended repeating a scan 6 months after the stroke, so she would like a referral back to discuss with them.    She is still having balance issues- it sounds like they are working on that at PT/OT.  Reports a recent session where she had to step over puddles while walking on a treadmill watching a TV screen, and another where she had to push a button when she saw a cat.  She still has to hold on to walls/items when she walks due to balance issues at times.        HTN  Benazepril-hctz 20/25 daily  Verapamil 40 bid    She hasn't had any more syncope episodes since last visit.  Blood pressure today looks good here.  She says that a few times it has been elevated at PT and they had to defer the session.           Problem List:     Patient Active  Problem List   Diagnosis    Occipital neuralgia    Intractable persistent migraine aura with cerebral infarction and status migrainosus    CTS (carpal tunnel syndrome)    Accelerated hypertension    Cervical muscle pain    Cervical radiculopathy    H/O catheter-based closure of atrial septal defect    Adult congenital heart disease    Overweight (BMI 25.0-29.9)    LALITA (generalized anxiety disorder)    Fibromyalgia    Social anxiety disorder    Status migrainosus    Insomnia    Recurrent major depressive disorder, in full remission    Hypokalemia    Stage 3a chronic kidney disease    Proteinuria    Cerebrovascular small vessel disease    Drug rash    Head Tingling, Right Sided    Impaired functional mobility, balance, gait, and endurance    Quadrantanopsia, left         Medications:       Current Outpatient Medications:     aspirin (ECOTRIN) 81 MG EC tablet, Take 1 tablet (81 mg total) by mouth once daily., Disp: 30 tablet, Rfl: 11    atorvastatin (LIPITOR) 40 MG tablet, Take 1 tablet (40 mg total) by mouth once daily., Disp: 90 tablet, Rfl: 3    benazepril-hydrochlorthiazide (LOTENSIN HCT) 20-25 mg Tab, Take 1 tablet by mouth once daily. Please check with your primary care provider. You've been started on Losartan, and so it's unclear if you'll need additional anti-hypertensive medications., Disp: 90 tablet, Rfl: 3    buPROPion (WELLBUTRIN XL) 300 MG 24 hr tablet, Take 1 tablet (300 mg total) by mouth once daily., Disp: 30 tablet, Rfl: 3    DULoxetine (CYMBALTA) 60 MG capsule, Take 1 capsule (60 mg total) by mouth once daily., Disp: 30 capsule, Rfl: 3    galcanezumab-gnlm 120 mg/mL PnIj, Inject 120 mg (one pen) into the skin every 28 days. Begin 28 days after loading dose., Disp: 1 mL, Rfl: 10    hydrOXYzine pamoate (VISTARIL) 25 MG Cap, Take 1 capsule (25 mg total) by mouth every 8 (eight) hours as needed (anxiety)., Disp: 45 capsule, Rfl: 3    losartan (COZAAR) 25 MG tablet, Take 25 mg by mouth., Disp: , Rfl:      ondansetron (ZOFRAN-ODT) 4 MG TbDL, DISSOLVE 1 TABLET ON THE TONGUE EVERY 6 HOURS AS NEEDED FOR NAUSEA, Disp: , Rfl:     promethazine (PHENERGAN) 25 MG tablet, TAKE 1 TABLET BY MOUTH EVERY 4 TO 6 HOURS AS NEEDED FOR NAUSEA, Disp: 20 tablet, Rfl: 1    riboflavin, vitamin B2, 400 mg Tab, Take 400 mg by mouth once daily., Disp: 30 tablet, Rfl: 11    tiZANidine (ZANAFLEX) 4 MG tablet, Take 1.5 tablets (6 mg total) by mouth every evening. No alcohol, no driving with medication., Disp: 45 tablet, Rfl: 5    ubrogepant (UBRELVY) 100 mg tablet, TAKE 1 TABLET BY MOUTH EVERY 2 HOURS AS NEEDED FOR MIGRAINE. MAX 2 TABLETS (200 MG) PER DAY., Disp: 16 tablet, Rfl: 5    verapamiL (CALAN) 40 MG Tab, Take 1 tablet (40 mg total) by mouth 2 (two) times daily., Disp: 60 tablet, Rfl: 11    zolpidem (AMBIEN) 10 mg Tab, Take 1 tablet (10 mg total) by mouth every evening., Disp: 30 tablet, Rfl: 3    clopidogreL (PLAVIX) 75 mg tablet, Take 1 tablet (75 mg total) by mouth once daily., Disp: 30 tablet, Rfl: 11    magnesium oxide (MAG-OX) 400 mg (241.3 mg magnesium) tablet, Take 1 tablet (400 mg total) by mouth once daily. (Patient not taking: Reported on 11/13/2023), Disp: 30 tablet, Rfl: 1    Current Facility-Administered Medications:     onabotulinumtoxina injection 200 Units, 200 Units, Intramuscular, q12 weeks, Luci Ortez, BC, 200 Units at 06/08/23 1200        Review of Systems:     Review of Systems   Constitutional:  Negative for chills and fever.   HENT:  Negative for ear pain and sore throat.    Respiratory:  Negative for cough, shortness of breath and wheezing.    Cardiovascular:  Negative for chest pain and palpitations.   Gastrointestinal:  Negative for constipation, diarrhea, nausea and vomiting.   Genitourinary:  Negative for dysuria and hematuria.   Musculoskeletal:  Negative for joint swelling and joint deformity.   Neurological:  Negative for dizziness and weakness.       Physical Exam:     Temp:             Blood  "Pressure:  130/80        Pulse:   68     Respirations:  16  Weight:   66.7 kg (147 lb 0.8 oz)  Height:   5' 5" (1.651 m)  BMI:     Body mass index is 24.47 kg/m².    Physical Exam  Constitutional:       General: She is not in acute distress.  HENT:      Right Ear: Tympanic membrane normal.      Left Ear: Tympanic membrane normal.      Nose: No congestion or rhinorrhea.      Mouth/Throat:      Pharynx: No oropharyngeal exudate or posterior oropharyngeal erythema.   Cardiovascular:      Rate and Rhythm: Normal rate and regular rhythm.   Pulmonary:      Effort: Pulmonary effort is normal. No respiratory distress.      Breath sounds: No wheezing.   Abdominal:      Palpations: Abdomen is soft.      Tenderness: There is no abdominal tenderness.   Skin:     General: Skin is warm.   Neurological:      General: No focal deficit present.      Mental Status: She is alert and oriented to person, place, and time.       Labs/Imaging/Testing:     Most recent CBC:  Lab Results   Component Value Date    WBC 13.73 (H) 07/30/2023    HGB 14.0 07/30/2023     (H) 07/30/2023    MCV 91 07/30/2023       Most recent Lipids:  Lab Results   Component Value Date    CHOL 160 07/30/2023    HDL 47 07/30/2023    LDLCALC 80.2 07/30/2023    TRIG 164 (H) 07/30/2023       Most recent Chemistry:  Lab Results   Component Value Date     07/30/2023    K 2.8 (L) 07/30/2023     07/30/2023    CO2 29 07/30/2023    BUN 14 07/30/2023    CREATININE 0.8 07/30/2023     07/30/2023    CALCIUM 10.2 07/30/2023    ALT 36 07/30/2023    AST 29 07/30/2023    ALKPHOS 98 07/30/2023    PROT 8.1 07/30/2023    ALBUMIN 4.2 07/30/2023       Other tests:  Lab Results   Component Value Date    TSH 1.332 07/30/2023    FREET4 1.67 (H) 08/05/2010    INR 1.1 07/30/2023    HGBA1C 5.1 07/05/2023    FERRITIN 145 06/28/2022    LLJTIXVD91 333 07/30/2013    UXAQQWIP12RY 21 (L) 07/30/2013    MG 2.3 07/06/2023    SEDRATE 8 03/31/2014    LIPASE 9 09/28/2017    AMYLASE " 63 12/13/2012       Assessment and Plan:     1. History of CVA (cerebrovascular accident)  Will refer back to neurology to follow up on the new episodes.  She says they mentioned wanting to get repeat imaging in 6 months which would be due soon.    Will try to arrange referral  Will also attempt to complete long-term disaiblity paprework.  Patient's vision issues have not improved after 6+ months following stroke and we discussed that they are not likely to improve at this point.  - Ambulatory referral/consult to Vascular Neurology; Future    2. Quadrantanopsia, left    3. Hypertension, unspecified type  Stable on current meds here.  She reports higher readings at PT  She will keep home logs and provide to us in 2 weeks - will set reminder.    4. Encounter for screening mammogram for malignant neoplasm of breast  - Mammo Digital Screening Bilat w/ Jose; Future     RTC 3 mos or sooner mike Venegas MD  11/13/2023    This note was prepared using voice-recognition software.  Although efforts are made to proofread the note, some errors may persist in the final document.

## 2023-11-13 ENCOUNTER — OFFICE VISIT (OUTPATIENT)
Dept: PRIMARY CARE CLINIC | Facility: CLINIC | Age: 48
End: 2023-11-13
Payer: COMMERCIAL

## 2023-11-13 VITALS
OXYGEN SATURATION: 97 % | DIASTOLIC BLOOD PRESSURE: 80 MMHG | BODY MASS INDEX: 24.5 KG/M2 | RESPIRATION RATE: 16 BRPM | HEIGHT: 65 IN | WEIGHT: 147.06 LBS | SYSTOLIC BLOOD PRESSURE: 130 MMHG | HEART RATE: 68 BPM

## 2023-11-13 DIAGNOSIS — Z86.73 HISTORY OF CVA (CEREBROVASCULAR ACCIDENT): Primary | ICD-10-CM

## 2023-11-13 DIAGNOSIS — I10 HYPERTENSION, UNSPECIFIED TYPE: ICD-10-CM

## 2023-11-13 DIAGNOSIS — Z12.31 ENCOUNTER FOR SCREENING MAMMOGRAM FOR MALIGNANT NEOPLASM OF BREAST: ICD-10-CM

## 2023-11-13 DIAGNOSIS — H53.462 QUADRANTANOPSIA, LEFT: ICD-10-CM

## 2023-11-13 PROCEDURE — 99214 PR OFFICE/OUTPT VISIT, EST, LEVL IV, 30-39 MIN: ICD-10-PCS | Mod: S$GLB,,, | Performed by: INTERNAL MEDICINE

## 2023-11-13 PROCEDURE — 3079F PR MOST RECENT DIASTOLIC BLOOD PRESSURE 80-89 MM HG: ICD-10-PCS | Mod: CPTII,S$GLB,, | Performed by: INTERNAL MEDICINE

## 2023-11-13 PROCEDURE — 1159F MED LIST DOCD IN RCRD: CPT | Mod: CPTII,S$GLB,, | Performed by: INTERNAL MEDICINE

## 2023-11-13 PROCEDURE — 1159F PR MEDICATION LIST DOCUMENTED IN MEDICAL RECORD: ICD-10-PCS | Mod: CPTII,S$GLB,, | Performed by: INTERNAL MEDICINE

## 2023-11-13 PROCEDURE — 3044F PR MOST RECENT HEMOGLOBIN A1C LEVEL <7.0%: ICD-10-PCS | Mod: CPTII,S$GLB,, | Performed by: INTERNAL MEDICINE

## 2023-11-13 PROCEDURE — 3075F PR MOST RECENT SYSTOLIC BLOOD PRESS GE 130-139MM HG: ICD-10-PCS | Mod: CPTII,S$GLB,, | Performed by: INTERNAL MEDICINE

## 2023-11-13 PROCEDURE — 4010F ACE/ARB THERAPY RXD/TAKEN: CPT | Mod: CPTII,S$GLB,, | Performed by: INTERNAL MEDICINE

## 2023-11-13 PROCEDURE — 3075F SYST BP GE 130 - 139MM HG: CPT | Mod: CPTII,S$GLB,, | Performed by: INTERNAL MEDICINE

## 2023-11-13 PROCEDURE — 3044F HG A1C LEVEL LT 7.0%: CPT | Mod: CPTII,S$GLB,, | Performed by: INTERNAL MEDICINE

## 2023-11-13 PROCEDURE — 99214 OFFICE O/P EST MOD 30 MIN: CPT | Mod: S$GLB,,, | Performed by: INTERNAL MEDICINE

## 2023-11-13 PROCEDURE — 3008F PR BODY MASS INDEX (BMI) DOCUMENTED: ICD-10-PCS | Mod: CPTII,S$GLB,, | Performed by: INTERNAL MEDICINE

## 2023-11-13 PROCEDURE — 99999 PR PBB SHADOW E&M-EST. PATIENT-LVL V: ICD-10-PCS | Mod: PBBFAC,,, | Performed by: INTERNAL MEDICINE

## 2023-11-13 PROCEDURE — 99999 PR PBB SHADOW E&M-EST. PATIENT-LVL V: CPT | Mod: PBBFAC,,, | Performed by: INTERNAL MEDICINE

## 2023-11-13 PROCEDURE — 3008F BODY MASS INDEX DOCD: CPT | Mod: CPTII,S$GLB,, | Performed by: INTERNAL MEDICINE

## 2023-11-13 PROCEDURE — 3079F DIAST BP 80-89 MM HG: CPT | Mod: CPTII,S$GLB,, | Performed by: INTERNAL MEDICINE

## 2023-11-13 PROCEDURE — 4010F PR ACE/ARB THEARPY RXD/TAKEN: ICD-10-PCS | Mod: CPTII,S$GLB,, | Performed by: INTERNAL MEDICINE

## 2023-11-13 RX ORDER — CLOPIDOGREL BISULFATE 75 MG/1
75 TABLET ORAL DAILY
Qty: 30 TABLET | Refills: 11 | Status: SHIPPED | OUTPATIENT
Start: 2023-11-13

## 2023-11-13 RX ORDER — LOSARTAN POTASSIUM 25 MG/1
25 TABLET ORAL
COMMUNITY
Start: 2023-10-03 | End: 2023-11-14

## 2023-11-14 ENCOUNTER — CLINICAL SUPPORT (OUTPATIENT)
Dept: REHABILITATION | Facility: HOSPITAL | Age: 48
End: 2023-11-14
Payer: COMMERCIAL

## 2023-11-14 ENCOUNTER — PATIENT OUTREACH (OUTPATIENT)
Dept: ADMINISTRATIVE | Facility: HOSPITAL | Age: 48
End: 2023-11-14
Payer: COMMERCIAL

## 2023-11-14 DIAGNOSIS — R29.898 RIGHT HAND WEAKNESS: ICD-10-CM

## 2023-11-14 DIAGNOSIS — R29.898 FINE MOTOR IMPAIRMENT: Primary | ICD-10-CM

## 2023-11-14 DIAGNOSIS — Z74.09 IMPAIRED FUNCTIONAL MOBILITY, BALANCE, GAIT, AND ENDURANCE: Primary | ICD-10-CM

## 2023-11-14 DIAGNOSIS — R29.818 FINE MOTOR IMPAIRMENT: Primary | ICD-10-CM

## 2023-11-14 PROCEDURE — 97112 NEUROMUSCULAR REEDUCATION: CPT | Mod: PN | Performed by: PHYSICAL THERAPIST

## 2023-11-14 PROCEDURE — 97530 THERAPEUTIC ACTIVITIES: CPT | Mod: PN

## 2023-11-14 PROCEDURE — 97022 WHIRLPOOL THERAPY: CPT | Mod: PN

## 2023-11-14 PROCEDURE — 97530 THERAPEUTIC ACTIVITIES: CPT | Mod: PN | Performed by: PHYSICAL THERAPIST

## 2023-11-14 NOTE — LETTER
AUTHORIZATION FOR RELEASE OF   CONFIDENTIAL INFORMATION        We are seeing Mirna Pollock, date of birth 1975, in the clinic at [facility]. Luc Venegas MD is the patient's PCP. Mirna Pollock has an outstanding lab/procedure at the time we reviewed her chart. In order to help keep her health information updated, she has authorized us to request the following medical record(s):        (  )  MAMMOGRAM                                      (  )  COLONOSCOPY      (  )  PAP SMEAR                                          (  )  OUTSIDE LAB RESULTS     (  )  DEXA SCAN                                          ( x )  EYE EXAM            (  )  FOOT EXAM                                          (  )  ENTIRE RECORD     (  )  OUTSIDE IMMUNIZATIONS                 (  )  _______________         Please fax records to [facility], Mizell, Joshua E., MD, [phone]     If you have any questions, please contact Jennifer at [phone].           Patient Name: Mirna Pollock  : 1975  Patient Phone #: [phone]

## 2023-11-14 NOTE — PATIENT INSTRUCTIONS
WALKING PROGRAM  Walking is one of the easiest forms of exercise.  All you need is a good pair of shoes, comfortable clothing, and desire.   Walking on a regular basis has been shown to have many healthy benefits:   Decrease in pain  Improved mood & overall sense of well-being: decrease in depression & anxiety  Improved sleep  Decrease stress levels. Decreased cortisol levels.  Improved blood pressure and heart strength  Improved fitness and musculature strength  GETTING STARTED:    Goal is to walk for at least 10 mins every day of the week  Tips:  Watch your posture.  Walk tall.  Pretend you are being pulled up by a rope attached to the crown of your head.  Your shoulders should be down, back and relaxed. Feel the natural swing of your arms in opposition to your leg swing.  Land on your heel and roll off on your toes.   Be sure to drink plenty of water before, during, and after walking.   Incorporate a warm up and cool down into your routine.  Start your walk at a slow warm up pace, then walk desired length of time.  End your walk with slower cool down.  Any stretches that your therapist has recommended for you may be done before and after your walk to prevent injury.  PROGRESSING:  GOAL: 30 minutes, 5 days a week   Increase your walking time by 2-3 minutes. Do this for several days until it gets easy. Then increase your time again until you have reached your maximum desired time.     Once you have achieved your time goal, you can further progress your program by increasing the speed at which you walk.    Make it a Habit:   Things to help with motivation to continue your walking program:   Get a walking partner  Listen to favorite music  SET GOALS & reward yourself for achieving them  Make it fun & remember all of the health benefits of simply walking  Record your progress  WEEK MON TUES WED THUR FRI SAT SUN   Week 1  Date:______  Goal:______          Week 2  Date:______  Goal:______          Week  3  Date:______  Goal:______          Week 4  Date:______  Goal:______          Week 5  Date:______  Goal:______          Week 6  Date:______  Goal:______          Week 7  Date:______  Goal:______          Week 8  Date:______  Goal:______          Week 9  Date:______  Goal:______          Week 10  Date:______  Goal:______          Week 11  Date:______  Goal:______

## 2023-11-14 NOTE — PROGRESS NOTES
Population Health Chart Review & Patient Outreach Details      Further Action Needed If Patient Returns Outreach:        Health Maintenance Due   Topic Date Due    Pneumococcal Vaccines (Age 0-64) (1 - PCV) Never done    Colorectal Cancer Screening  Never done    Mammogram  2021    Influenza Vaccine (1) 2023    COVID-19 Vaccine (5 - -24 season) 2023           Updates Requested / Reviewed:     [x]  Care Everywhere    []     []  External Sources ([facility], [facility], [facility], etc.)    [] [facility]   [] [facility]   [] Other:    []  Care Team Updated   []  Removed  or Duplicate Orders   [x]  Immunization Reconciliation Completed / Queried    [x] [place]   [] [place]   [] [place]   [] [place]      Health Maintenance Topics Addressed and Outreach Outcomes / Actions Taken:             Breast Cancer Screening []  Mammogram Order Placed    []  Mammogram Screening Scheduled    []  External Records Requested & Care Team Updated if Applicable    []  External Records Uploaded & Care Team Updated if Applicable    []  Pt Declined Scheduling Mammogram    []  Pt Will Schedule with External Provider / Order Routed & Care Team Updated if Applicable              Cervical Cancer Screening []  Pap Smear Scheduled in Primary Care or OBGYN    []  External Records Requested & Care Team Updated if Applicable       []  External Records Uploaded, Care Team Updated, & History Updated if Applicable    []  Patient Declined Scheduling Pap Smear    []  Patient Will Schedule with External Provider & Care Team Updated if Applicable                  Colorectal Cancer Screening []  Colonoscopy Case Request / Referral / Home Test Order Placed    [x]  External Records Requested & Care Team Updated if Applicable    []  External Records Uploaded, Care Team Updated, & History Updated if Applicable    []  Patient Declined Completing Colon Cancer Screening    []  Patient Will Schedule with External Provider & Care Team Updated  if Applicable    []  Fit Kit Mailed (add the SmartPhrase under additional notes)    []  Reminded Patient to Complete Home Test                Diabetic Eye Exam []  Eye Exam Screening Order Placed    []  Eye Camera Scheduled or Optometry/Ophthalmology Referral Placed    []  External Records Requested & Care Team Updated if Applicable    []  External Records Uploaded, Care Team Updated, & History Updated if Applicable    []  Patient Declined Scheduling Eye Exam    []  Patient Will Schedule with External Provider & Care Team Updated if Applicable             Blood Pressure Control []  Primary Care Follow Up Visit Scheduled     []  Remote Blood Pressure Reading Captured    []  Patient Declined Remote Reading or Scheduling Appt - Escalated to PCP    []  Patient Will Call Back or Send Portal Message with Reading                 HbA1c & Other Labs []  Overdue Lab(s) Ordered    []  Overdue Lab(s) Scheduled    []  External Records Uploaded & Care Team Updated if Applicable    []  Primary Care Follow Up Visit Scheduled     []  Reminded Patient to Complete A1c Home Test    []  Patient Declined Scheduling Labs or Will Call Back to Schedule    []  Patient Will Schedule with External Provider / Order Routed, & Care Team Updated if Applicable           Primary Care Appointment []  Primary Care Appt Scheduled    []  Patient Declined Scheduling or Will Call Back to Schedule    []  Pt Established with External Provider, Updated Care Team, & Informed Pt to Notify Payor if Applicable           Medication Adherence /    Statin Use []  Primary Care Appointment Scheduled    []  Patient Reminded to  Prescription    []  Patient Declined, Provider Notified if Needed    []  Sent Provider Message to Review to Evaluate Pt for Statin, Add Exclusion Dx Codes, Document   Exclusion in Problem List, Change Statin Intensity Level to Moderate or High Intensity if Applicable                Osteoporosis Screening []  Dexa Order Placed    []  Dexa  Appointment Scheduled    []  External Records Requested & Care Team Updated    []  External Records Uploaded, Care Team Updated, & History Updated if Applicable    []  Patient Declined Scheduling Dexa or Will Call Back to Schedule    []  Patient Will Schedule with External Provider / Order Routed & Care Team Updated if Applicable       Additional Notes:     Paige St

## 2023-11-14 NOTE — PROGRESS NOTES
"[facility]   Physical Therapy Treatment Note     Name: Mirna Pollock  Clinic Number: [id number]    Therapy Diagnosis:   Encounter Diagnosis   Name Primary?    Impaired functional mobility, balance, gait, and endurance Yes       Physician: Marcus Osman MD    Visit Date: 2023    Physician Orders: PT Eval and Treat   Medical Diagnosis from Referral: I63.531 (ICD-10-CM) - Stroke due to occlusion of right posterior cerebral artery  Evaluation Date: 2023  Authorization Period Expiration: 23  Plan of Care Expiration: 12/15/23  Progress Note Due: 23  Visit # / Visits authorized:          Precautions: Standard, Fall, and Monitor BP    Time In: 1115  Time Out: 1200  Total Billable Time: 45 minutes    SUBJECTIVE     Pt reports:  went to MD and reported that blood pressure was good at MD office, did get a prescription for Lasix   Patient was compliant with home exercise program   Response to previous treatment: No adverse effects reported  Functional change: ongoing    Pain: 0/10  Location: no headache today    OBJECTIVE   See below.       TREATMENT     Mirna received the treatments listed below:    Patient participated in dynamic functional therapeutic activities - medical monitoring and education x 11 min     BP taken with manual cuff  136/76     Gait Better Treadmill Training:   User name: [id number]  YARDS: not recorded  SPEED: 1.3 mph  TIME: 6 mins  Attention level: 0% fog, 100% light  Obstacle level: 1  Scanning level: medium    Other:   scannin%  Tamy Negotiation: 33%   Puddle Negotiation: 0%  Total Negotiation: 18%  Upper Extremity Support: bilateral       Mirna participated in neuromuscular re-education activities to improve: Balance for 34 minutes. The following activities were included:     Parallel bar:   Semi tandem stance, firm 2 x 30 sec  Semi Tandem stance with head turns x 30 sec  2 cone (12") tap, firm, intermittent upper extremity 10x  Modified single leg " "stance on 12" cone 2 x 30 sec  Bosu (flat side up) 2 x 1 mins  Tandem gait, intermittent upper extremity- 4 laps      PATIENT EDUCATION AND HOME EXERCISES     Home Exercises Provided and Patient Education Provided     Education provided:   - continue with home exercise program  - provided patient with information and recording sheet to initiate a walking program    Written Home Exercises Provided: Patient instructed to cont prior HEP.  Exercises were reviewed and Mirna was able to demonstrate them prior to the end of the session.  Mirna demonstrated good  understanding of the education provided.     See EMR under Patient Instructions for exercises provided 11/1/2023.      ASSESSMENT     Mirna tolerated physical therapy session well. Initial blood pressure was within normal ranges. Patient tolerated increased duration on treadmill, continues to report fatigue after use. Difficulty of scanning activity was increased an patient had more difficulty. Patient also has difficulty with dual tasking demonstrating increased need for attention to ambulate. Decreased balance responses (especially ankle) noted when attempting to balance on Bosu. Patient was provided with information to start a walking program at home to improve fatigue.  Patient can benefit from continued skilled physical therapy to improve mobility as close to prior level of function as possible.      Mirna Is progressing well towards her goals.   Pt prognosis is Fair.     Pt will continue to benefit from skilled outpatient physical therapy to address the deficits listed in the problem list box on initial evaluation, provide pt/family education and to maximize pt's level of independence in the home and community environment.     Pt's spiritual, cultural and educational needs considered and pt agreeable to plan of care and goals.     Anticipated barriers to physical therapy: ongoing medical management    Goals:    Short Term Goals- 4 Weeks  Pt to demonstrate " independence in performance of HEP in order to improve daily level of physical activity and improve carry over session to session. -MET 9/25/23  Pt to improve B LE strength by > / = 1/2 MMT score in order to improve strength for daily activities. -MET 9/25/23  Pt to improve FGA score to > / = 26/30 in order to demonstrate improved dynamic balance and safety. Ongoing  Pt to demonstrate improvement in condition 4 on MCTSIB to > / = 15 seconds in order to improve safety. MET 10/17/23  Pt to improve SLS time B to > / = 5 seconds in order to improve gait stability for community mobility. MET 10/17/23     Long Term Goals - 12 Weeks  Pt to demonstrate compliance with HEP > / = 3x per week in order to improve daily level of physical activity and improve carry over session to session. Ongoing  Pt to improve B LE strength by > / = 1 MMT score in order to improve strength for daily activities. Ongoing.   Pt to improve FGA score to > / = 28/30 in order to demonstrate improved dynamic balance and safety. Ongoing  Pt to demonstrate improvement in condition 4 on MCTSIB to > / = 30 seconds with min sway in order to improve safety. MET 10/17/23  Pt to improve SLS time B to > / = 10 seocnds in order to improve gait stability for community mobility. Ongoing  NEW: Pt to improve 6 minute walk test distance by 50 meters in order to demo improved endurance for community mobility. Ongoing    PLAN     Outpatient Physical Therapy 2 times weekly     Continue to monitor BP. Continue with Gait Better use and balance challenges     Sindy Valladares, PT, DPT,   Board-Certified Clinical Specialist in Neurologic Physical Therapy   Certified Brain Injury Specialist    11/14/2023

## 2023-11-16 ENCOUNTER — CLINICAL SUPPORT (OUTPATIENT)
Dept: REHABILITATION | Facility: HOSPITAL | Age: 48
End: 2023-11-16
Payer: COMMERCIAL

## 2023-11-16 DIAGNOSIS — Z74.09 IMPAIRED FUNCTIONAL MOBILITY, BALANCE, GAIT, AND ENDURANCE: Primary | ICD-10-CM

## 2023-11-16 PROCEDURE — 97530 THERAPEUTIC ACTIVITIES: CPT | Mod: PN | Performed by: PHYSICAL THERAPIST

## 2023-11-16 PROCEDURE — 97112 NEUROMUSCULAR REEDUCATION: CPT | Mod: PN | Performed by: PHYSICAL THERAPIST

## 2023-11-16 NOTE — PROGRESS NOTES
[facility]   Physical Therapy Treatment Note     Name: Mirna Pollock  [facility] Number: [id number]    Therapy Diagnosis:   Encounter Diagnosis   Name Primary?    Impaired functional mobility, balance, gait, and endurance Yes       Physician: Marcus Osman MD    Visit Date: 11/16/2023    Physician Orders: PT Eval and Treat   Medical Diagnosis from Referral: I63.531 (ICD-10-CM) - Stroke due to occlusion of right posterior cerebral artery  Evaluation Date: 7/19/2023  Authorization Period Expiration: 12/31/23  Plan of Care Expiration: 12/15/23  Progress Note Due: 12/16/23  Visit # / Visits authorized: 17/20         Precautions: Standard, Fall, and Monitor BP    Time In: 1120  Time Out: 1200  Total Billable Time: 40 minutes    SUBJECTIVE     Pt reports:  went to [facility] and reported that blood pressure was good at MD office, did get a prescription for Lasix   Patient was compliant with home exercise program   Response to previous treatment: No adverse effects reported  Functional change: ongoing    Pain: 0/10  Location: no headache today    OBJECTIVE   Lower Extremity Strength  Right LE eval Left LE eval 11/16/23   Hip flexion: 5/5 Hip flexion: 4+/5 4+/5   Hip abduction: 5/5 Hip abduction: 5/5 5/5   Hip adduction: 5/5 Hip adduction 5/5 5/5   Knee extension: 5/5 Knee extension: 4+/5 5/5   Knee flexion: 5/5 Knee flexion: 4+/5 4+/5   Ankle dorsiflexion: 5/5 Ankle dorsiflexion: 4+/5 5/5   Ankle plantarflexion: 5/5 Ankle plantarflexion: 5/5 4+/5         9/25/23 11/16/23   6 mins walk test 1095 ft 1080 ft   Right single leg stance  Not tested  7 sec   Left single leg stance  Not tested  7 sec       Functional Gait Assessment:   1. Gait on level surface =  1, 7.9s   (3) Normal: less than 5.5 sec, no A.D., no imbalance, normal gait pattern, deviates< 6in   (2) Mild impairment: 7-5.6 sec, uses A.D., mild gait deviations, or deviates 6-10 in   (1) Moderate impairment: > 7 sec, slow speed, imbalance, deviates 10-15  in.   (0) Severe impairment: needs assist, deviates >15 in, reach/touch wall  2. Change in Gait Speed = 3   (3) Normal: smooth change w/o loss of balance or gait deviation, deviates < 6 in, significant difference between speeds   (2) Mild impairment: changes speed, but demonstrates mild gait deviations, deviates 6-10 in, OR no deviations but unable to significantly speed, OR uses A.D.   (1) Moderate impairment: minor changes to speed, OR changes speed w/ significant deviations, deviates 10-15 in, OR  Changes speed , but loses balance & recovers   (0) Severe impairment: cannot change speed, deviates >15 in, or loses balance & needs assist  3. Gait with horizontal head turns  = 2   (3) Normal: no change in gait, deviates <6 in   (2) Mild impairment: slight change in speed, deviates 6-10 in, OR uses A.D.   (1) Moderate impairment: moderate change in speed, deviates 10-15 in   (0) Severe impairment: severe disruption of gait, deviates >15in  4. Gait with vertical head turns = 2   (3) Normal: no change in gait, deviates <6 in   (2) Mild impairment: slight change in speed, deviates 6-10 in OR uses A.D.   (1) Moderate impairment: moderate change in speed, deviates 10-15 in   (0) Severe impairment: severe disruption of gait, deviates >15 in  5. Gait with pivot turns = 3   (3) Normal: performs safely in 3 sec, no LOB   (2) Mild impairment: performs in >3 sec & no LOB, OR turns safely & requires several steps to regain LOB   (1) Moderate impairment: turns slow, OR requires several small steps for balance following turn & stop   (0) Severe impairment: cannot turn safely, needs assist  6. Step over obstacle = 3   (3) Normal: steps over 2 stacked boxes w/o change in speed or LOB   (2) Mild impairment: able to step over 1 box w/o change in speed or LOB   (1) Moderate impairment: steps over 1 box but must slow down, may require VC   (0) Severe impairment: cannot perform w/o assist  7. Gait with Narrow CALLIE = 0   (3) Normal: 10  steps no staggering   (2) Mild impairment: 7-9 steps   (1) Moderate impairment: 4-7 steps   (0) Severe impairment: < 4 steps or cannot perform w/o assist  8. Gait with eyes closed = 1   (3) Normal: < 7 sec, no A.D., no LOB, normal gait pattern, deviates <6 in   (2) Mild impairment: 7.1-9 sec, mild gait deviations, deviates 6-10 in   (1) Moderate impairment: > 9 sec, abnormal pattern, LOB, deviates 10-15 in   (0) Severe impairment: cannot perform w/o assist, LOB, deviates >15in  9. Ambulating Backwards = 1   (3) Normal: no A.D., no LOB, normal gait pattern, deviates <6in   (2) Mild impairment: uses A.D., slower speed, mild gait deviations, deviates 6-10 in   (1) Moderate impairment: slow speed, abnormal gait pattern, LOB, deviates 10-15 in   (0) Severe impairment: severe gait deviations or LOB, deviates >15in  10. Steps = 3   (3) Normal: alternating feet, no rail   (2) Mild Impairment: alternating feet, uses rail   (1) Moderate impairment: step-to, uses rail   (0) Severe impairment: cannot perform safely    Score 19/30   Score:   <22/30 fall risk   <20/30 fall risk in older adults   <18/30 fall risk in Parkinsons   Scores by Decade:                        Age    Score                        40-49  (24-30)                       50-59  (25-30)                       60-69  (20-30)                       70-79  (16-30)  SUSHILA Philippe (2007). Reference Group Data for the Functional Gait Assessment. Physical Therapy (31)30, [id number].       TREATMENT     Minra received the treatments listed below:      Mirna received therapeutic exercises to develop strength for 6 minutes including:     Strength assessment      Patient participated in dynamic functional therapeutic activities - medical monitoring and education x 11 min     BP taken with manual cuff  144/68    6 mins walk      Mirna participated in neuromuscular re-education activities to improve: Balance for 23 minutes. The following activities were included:     single leg  stance   Functional Gait Assessment     Parallel bar:   Semi tandem stance, foam 2 x 30 sec  Bosu (flat side up) 2 x 1 mins      PATIENT EDUCATION AND HOME EXERCISES     Home Exercises Provided and Patient Education Provided     Education provided:   - continue with home exercise program  - reviewed outcome of assessments    Written Home Exercises Provided: Patient instructed to cont prior HEP.  Exercises were reviewed and Mirna was able to demonstrate them prior to the end of the session.  Mirna demonstrated good  understanding of the education provided.     See EMR under Patient Instructions for exercises provided 11/1/2023.      ASSESSMENT   Assessment period: 10/24/2023 to 11/16/2023    Mirna tolerates physical therapy sessions well. She had to cancel 2 sessions due to headache and increased blood pressure. Upon arrival to last 2 sessions blood pressure was noted to be in normal ranges. Patient reports that she is still easily fatigued. Patient demonstrates improvement in single leg stance. She continues to demonstrate a slower gait velocity. Impaired balance is noted in tandem stance, walking with head turns, and stepping backwards. Fatigue is reported after walking for 6 minutes.  Patient can benefit from continued skilled physical therapy to improve mobility as close to prior level of function as possible.      Mirna Is progressing well towards her goals.   Pt prognosis is Fair.     Pt will continue to benefit from skilled outpatient physical therapy to address the deficits listed in the problem list box on initial evaluation, provide pt/family education and to maximize pt's level of independence in the home and community environment.     Pt's spiritual, cultural and educational needs considered and pt agreeable to plan of care and goals.     Anticipated barriers to physical therapy: ongoing medical management    Goals:    Status as of 11/16/23  Short Term Goals- 4 Weeks  Pt to demonstrate independence in performance  of HEP in order to improve daily level of physical activity and improve carry over session to session. -MET 9/25/23  Pt to improve B LE strength by > / = 1/2 MMT score in order to improve strength for daily activities. -MET 9/25/23  Pt to improve FGA score to > / = 26/30 in order to demonstrate improved dynamic balance and safety. ongoing  Pt to demonstrate improvement in condition 4 on MCTSIB to > / = 15 seconds in order to improve safety. MET 10/17/23  Pt to improve SLS time B to > / = 5 seconds in order to improve gait stability for community mobility. MET 10/17/23     Long Term Goals - 12 Weeks  Pt to demonstrate compliance with HEP > / = 3x per week in order to improve daily level of physical activity and improve carry over session to session. Ongoing  Pt to improve B LE strength by > / = 1 MMT score in order to improve strength for daily activities. Ongoing.   Pt to improve FGA score to > / = 28/30 in order to demonstrate improved dynamic balance and safety. ongoing  Pt to demonstrate improvement in condition 4 on MCTSIB to > / = 30 seconds with min sway in order to improve safety. MET 10/17/23  Pt to improve SLS time B to > / = 10 seocnds in order to improve gait stability for community mobility. Improved on left   NEW: Pt to improve 6 minute walk test distance by 50 meters in order to demo improved endurance for community mobility. Ongoing    PLAN     Outpatient Physical Therapy 2 times weekly     Continue to monitor BP. Continue with Gait Better use and balance challenges     Sindy Valladares, PT, DPT,   Board-Certified Clinical Specialist in Neurologic Physical Therapy   Certified Brain Injury Specialist    11/16/2023

## 2023-11-17 NOTE — PLAN OF CARE
[facility]   Physical Therapy Treatment Note     Name: Mirna Pollock  [facility] Number: [id number]    Therapy Diagnosis:   Encounter Diagnosis   Name Primary?    Impaired functional mobility, balance, gait, and endurance Yes       Physician: Marcus Osman MD    Visit Date: 11/16/2023    Physician Orders: PT Eval and Treat   Medical Diagnosis from Referral: I63.531 (ICD-10-CM) - Stroke due to occlusion of right posterior cerebral artery  Evaluation Date: 7/19/2023  Authorization Period Expiration: 12/31/23  Plan of Care Expiration: 12/15/23  Progress Note Due: 12/16/23  Visit # / Visits authorized: 17/20         Precautions: Standard, Fall, and Monitor BP    Time In: 1120  Time Out: 1200  Total Billable Time: 40 minutes    SUBJECTIVE     Pt reports:  went to [facility] and reported that blood pressure was good at MD office, did get a prescription for Lasix   Patient was compliant with home exercise program   Response to previous treatment: No adverse effects reported  Functional change: ongoing    Pain: 0/10  Location: no headache today    OBJECTIVE   Lower Extremity Strength  Right LE eval Left LE eval 11/16/23   Hip flexion: 5/5 Hip flexion: 4+/5 4+/5   Hip abduction: 5/5 Hip abduction: 5/5 5/5   Hip adduction: 5/5 Hip adduction 5/5 5/5   Knee extension: 5/5 Knee extension: 4+/5 5/5   Knee flexion: 5/5 Knee flexion: 4+/5 4+/5   Ankle dorsiflexion: 5/5 Ankle dorsiflexion: 4+/5 5/5   Ankle plantarflexion: 5/5 Ankle plantarflexion: 5/5 4+/5         9/25/23 11/16/23   6 mins walk test 1095 ft 1080 ft   Right single leg stance  Not tested  7 sec   Left single leg stance  Not tested  7 sec       Functional Gait Assessment:   1. Gait on level surface =  1, 7.9s   (3) Normal: less than 5.5 sec, no A.D., no imbalance, normal gait pattern, deviates< 6in   (2) Mild impairment: 7-5.6 sec, uses A.D., mild gait deviations, or deviates 6-10 in   (1) Moderate impairment: > 7 sec, slow speed, imbalance, deviates 10-15  in.   (0) Severe impairment: needs assist, deviates >15 in, reach/touch wall  2. Change in Gait Speed = 3   (3) Normal: smooth change w/o loss of balance or gait deviation, deviates < 6 in, significant difference between speeds   (2) Mild impairment: changes speed, but demonstrates mild gait deviations, deviates 6-10 in, OR no deviations but unable to significantly speed, OR uses A.D.   (1) Moderate impairment: minor changes to speed, OR changes speed w/ significant deviations, deviates 10-15 in, OR  Changes speed , but loses balance & recovers   (0) Severe impairment: cannot change speed, deviates >15 in, or loses balance & needs assist  3. Gait with horizontal head turns  = 2   (3) Normal: no change in gait, deviates <6 in   (2) Mild impairment: slight change in speed, deviates 6-10 in, OR uses A.D.   (1) Moderate impairment: moderate change in speed, deviates 10-15 in   (0) Severe impairment: severe disruption of gait, deviates >15in  4. Gait with vertical head turns = 2   (3) Normal: no change in gait, deviates <6 in   (2) Mild impairment: slight change in speed, deviates 6-10 in OR uses A.D.   (1) Moderate impairment: moderate change in speed, deviates 10-15 in   (0) Severe impairment: severe disruption of gait, deviates >15 in  5. Gait with pivot turns = 3   (3) Normal: performs safely in 3 sec, no LOB   (2) Mild impairment: performs in >3 sec & no LOB, OR turns safely & requires several steps to regain LOB   (1) Moderate impairment: turns slow, OR requires several small steps for balance following turn & stop   (0) Severe impairment: cannot turn safely, needs assist  6. Step over obstacle = 3   (3) Normal: steps over 2 stacked boxes w/o change in speed or LOB   (2) Mild impairment: able to step over 1 box w/o change in speed or LOB   (1) Moderate impairment: steps over 1 box but must slow down, may require VC   (0) Severe impairment: cannot perform w/o assist  7. Gait with Narrow CALLIE = 0   (3) Normal: 10  steps no staggering   (2) Mild impairment: 7-9 steps   (1) Moderate impairment: 4-7 steps   (0) Severe impairment: < 4 steps or cannot perform w/o assist  8. Gait with eyes closed = 1   (3) Normal: < 7 sec, no A.D., no LOB, normal gait pattern, deviates <6 in   (2) Mild impairment: 7.1-9 sec, mild gait deviations, deviates 6-10 in   (1) Moderate impairment: > 9 sec, abnormal pattern, LOB, deviates 10-15 in   (0) Severe impairment: cannot perform w/o assist, LOB, deviates >15in  9. Ambulating Backwards = 1   (3) Normal: no A.D., no LOB, normal gait pattern, deviates <6in   (2) Mild impairment: uses A.D., slower speed, mild gait deviations, deviates 6-10 in   (1) Moderate impairment: slow speed, abnormal gait pattern, LOB, deviates 10-15 in   (0) Severe impairment: severe gait deviations or LOB, deviates >15in  10. Steps = 3   (3) Normal: alternating feet, no rail   (2) Mild Impairment: alternating feet, uses rail   (1) Moderate impairment: step-to, uses rail   (0) Severe impairment: cannot perform safely    Score 19/30   Score:   <22/30 fall risk   <20/30 fall risk in older adults   <18/30 fall risk in Parkinsons   Scores by Decade:                        Age    Score                        40-49  (24-30)                       50-59  (25-30)                       60-69  (20-30)                       70-79  (16-30)  SUSHILA Philippe (2007). Reference Group Data for the Functional Gait Assessment. Physical Therapy (15)72, [id number].       TREATMENT     Mirna received the treatments listed below:      Mirna received therapeutic exercises to develop strength for 6 minutes including:     Strength assessment      Patient participated in dynamic functional therapeutic activities - medical monitoring and education x 11 min     BP taken with manual cuff  144/68    6 mins walk      Mirna participated in neuromuscular re-education activities to improve: Balance for 23 minutes. The following activities were included:     single leg  stance   Functional Gait Assessment     Parallel bar:   Semi tandem stance, foam 2 x 30 sec  Bosu (flat side up) 2 x 1 mins      PATIENT EDUCATION AND HOME EXERCISES     Home Exercises Provided and Patient Education Provided     Education provided:   - continue with home exercise program  - reviewed outcome of assessments    Written Home Exercises Provided: Patient instructed to cont prior HEP.  Exercises were reviewed and Mirna was able to demonstrate them prior to the end of the session.  Mirna demonstrated good  understanding of the education provided.     See EMR under Patient Instructions for exercises provided 11/1/2023.      ASSESSMENT   Assessment period: 10/24/2023 to 11/16/2023    Mirna tolerates physical therapy sessions well. She had to cancel 2 sessions due to headache and increased blood pressure. Upon arrival to last 2 sessions blood pressure was noted to be in normal ranges. Patient reports that she is still easily fatigued. Patient demonstrates improvement in single leg stance. She continues to demonstrate a slower gait velocity. Impaired balance is noted in tandem stance, walking with head turns, and stepping backwards. Fatigue is reported after walking for 6 minutes.  Patient can benefit from continued skilled physical therapy to improve mobility as close to prior level of function as possible.      Mirna Is progressing well towards her goals.   Pt prognosis is Fair.     Pt will continue to benefit from skilled outpatient physical therapy to address the deficits listed in the problem list box on initial evaluation, provide pt/family education and to maximize pt's level of independence in the home and community environment.     Pt's spiritual, cultural and educational needs considered and pt agreeable to plan of care and goals.     Anticipated barriers to physical therapy: ongoing medical management    Goals:    Status as of 11/16/23  Short Term Goals- 4 Weeks  Pt to demonstrate independence in performance  of HEP in order to improve daily level of physical activity and improve carry over session to session. -MET 9/25/23  Pt to improve B LE strength by > / = 1/2 MMT score in order to improve strength for daily activities. -MET 9/25/23  Pt to improve FGA score to > / = 26/30 in order to demonstrate improved dynamic balance and safety. ongoing  Pt to demonstrate improvement in condition 4 on MCTSIB to > / = 15 seconds in order to improve safety. MET 10/17/23  Pt to improve SLS time B to > / = 5 seconds in order to improve gait stability for community mobility. MET 10/17/23     Long Term Goals - 12 Weeks  Pt to demonstrate compliance with HEP > / = 3x per week in order to improve daily level of physical activity and improve carry over session to session. Ongoing  Pt to improve B LE strength by > / = 1 MMT score in order to improve strength for daily activities. Ongoing.   Pt to improve FGA score to > / = 28/30 in order to demonstrate improved dynamic balance and safety. ongoing  Pt to demonstrate improvement in condition 4 on MCTSIB to > / = 30 seconds with min sway in order to improve safety. MET 10/17/23  Pt to improve SLS time B to > / = 10 seocnds in order to improve gait stability for community mobility. Improved on left   NEW: Pt to improve 6 minute walk test distance by 50 meters in order to demo improved endurance for community mobility. Ongoing    PLAN     Outpatient Physical Therapy 2 times weekly     Continue to monitor BP. Continue with Gait Better use and balance challenges     Sindy Valladares, PT, DPT,   Board-Certified Clinical Specialist in Neurologic Physical Therapy   Certified Brain Injury Specialist    11/16/2023       o

## 2023-11-21 NOTE — PROGRESS NOTES
Vascular Neurology  Clinic Note    ___________________  ASSESSMENT & PLAN  47 y.o. female with extensive history known to me,   DINORAH, kayla VB system , , [place]  Migraine Hx      Home 's/90's  Transient Neurological Episodes  Half of her head goes numb with tingling, comes on and goes away within 15-20 minutes and speech occasionally is slurred. Given the stereotyped nature of the episodes and quality of symptoms, I am more inclined to describe these as migrainous aura phenomena rather than TIA.. We will continue to monitor but no current changes to management at this time.  Continue f/u w/ [facility] - - preventitive -botox q 12 wk, emgality qmonth, verapamil 40 bid, cymbalta 60 qd; abortive migraine - ubrelvy    History of Stroke (R PCA 2023)  Etiology: Large Artery Atherosclerosis Evident - intracranial atherostenosis VB system, kayla b/l PCA  Diagnostic Orders /Pending Results: none  Antithrombotic Regimen:  aspirin 81 mg & plavix 75 mg daily indifinitely - not a good candidate for cilostazol, tolerates DAPT  Goal LDL < 70, continue current statin therapy  Antihypertensive Regimen:  Goal BP < 130/80 mmHg, continue to titrate blood pressure medications accordingly ; while < 130/80 remains an ideal goal, she may not tolerate these levels long term. At this time however she continues to have BP readings above 160's and we need to start to achieve lower values. I have sent message to PCP Dr. Venegas. I also believe she needs a secondary HTN w/u. I performed renal artery US which was negative.  Goal A1c < 7.0  Stroke education administered  Continues to have disabling left lower quadrantanopsia on examination. Recommend continued f/u with neuro-ophthalmology and continued work with PT/OT    Cerebrovascular Small Vessel Disease  Dramatic changes in white matter disease over last decade, likely secondary to hx of uncontrolled severe hypertension. However given her history of migraines and extensive  "changes, will send for NOTCH3 mutation to r/o CADASIL. There are no clear specific imaging characteristics.        Visit Diagnoses:  1. Migraine aura without headache    2. History of stroke    3. Intracranial atherosclerosis    4. Essential hypertension    5. Cerebrovascular small vessel disease       Orders Placed This Encounter    Genetic Misc Sendout Test, Blood      I have spent a total of 41 minutes in chart review, face-to-face encounter, laboratory and/or imaging review and interpretation and documentation for this patient.  ____________________________  PCP 11/13  - "TIA" episodes of tinging on right side o head lasting 15 min, a/w slurred speech  Still left lower quadrantanopsia    Seen by Pérez 10/16  - migraine Rx, botox q 12 wk, emgality qmonth, verapamil 40 bid, cymbalta 60 qd  - acute migraine - ubrelvy    Seen in hospital 7/30 by myself, acute stroke code w/ reaction to CTA contrast w/ profound hypotension and development of asymptomatic incidental hypoperfusion based infarct.      Reason For Prior Visit 7/13/23 (Chief Complaint): Recent R PCA stroke    HPI: 47 y.o. right handed female [place] [facility] [place] and in setting of status migrainosus.    Continues to have aura of floating dots everywhere.  Has a left inferior quadrantanopsia.  Having a hard time walking because she is off balance and needs to hold onto the wall.    Discussed HA/migraine w/ Pérez - will set up infusions at outpatient center.    Brain Imaging:  MRI  Impression:     Small scattered areas of acute infarction in the right PCA territory.     Age advanced small vessel ischemic change with multiple remote lacunar type infarcts.  These findings notably progressed since the prior MRI of 09/28/2017.     Vessel Imaging:  CTA   Impression:     Small recent infarcts noted on the recent MR imaging study are too small to characterize.  No intracranial hemorrhage mass effect or acute territorial infarct.   " "  Prominent chronic ischemic changes again noted unless there is clinical suspicion for prior demyelinating disease.     No significant stenosis at the carotid bifurcations by NASCET criteria.  The vertebral arteries are patent.    VWI MRA     Impression:     1. Redemonstration of stenosis of the mid portions of the bilateral posterior cerebral arteries, left greater than right.  No vessel wall enhancement.  2. Minimal narrowing of the basilar artery.  This report was flagged in Epic as abnormal.     Cardiac Evaluation:  The left ventricle is normal in size with normal systolic function.  The estimated ejection fraction is 65%.  Normal left ventricular diastolic function.  Normal right ventricular size with normal right ventricular systolic function.  Intermediate central venous pressure (8 mmHg).  Very late bubbles present after injection with agitated saline, unable to tell when bubbles are present in relationship with initial injection, would recommended NARDA.      TCD  Impression:     Negative TCD shunt study.  Brain Imaging:    Vessel Imaging:    Cardiac Evaluation:    Other:     Relevant Labwork:  Recent Labs   Lab 07/05/23  1518 07/30/23  1136   Hemoglobin A1C 5.1  --    LDL Cholesterol 156.6 80.2   HDL 50 47   Triglycerides 82 164 H   Cholesterol 223 H 160       I independently viewed the above imaging studies in addition to reviewing the report.  I reviewed the above labwork.    Vital Signs:      8/25/2023     7:50 AM 8/28/2023     3:27 PM 9/1/2023    11:00 AM 9/28/2023    12:04 PM 10/16/2023     1:25 PM 11/13/2023     1:15 PM 11/22/2023     2:22 PM   Vitals - 1 value per visit   SYSTOLIC   126 114 176 130 154   DIASTOLIC   74 76 118 80 91   Pulse   88 82 80 68 69   Resp   20 16  16    SPO2   98 % 99 %  97 %    Weight (lb)    143.74 149.03 147.05 147.05   Weight (kg)    65.2 67.6 66.7 66.7   Height    5' 5" (1.651 m)  5' 5" (1.651 m) 5' 5" (1.651 m)   BMI (Calculated)    23.9  24.5 24.5   Pain Score  Zero  " Zero Zero Zero Zero       Information is confidential and restricted. Go to Review Flowsheets to unlock data.       Past Medical History  Past Medical History:   Diagnosis Date    Anxiety     ASD (atrial septal defect)     Endometriosis     Headache(784.0)     Hypertension     Kidney stones fibomyalgia, migraine, heart closer, kidey stones     Current Outpatient Medications   Medication Instructions    aspirin (ECOTRIN) 81 mg, Oral, Daily    atorvastatin (LIPITOR) 40 mg, Oral, Daily    benazepril-hydrochlorthiazide (LOTENSIN HCT) 20-25 mg Tab 1 tablet, Oral, Daily, Please check with your primary care provider. You've been started on Losartan, and so it's unclear if you'll need additional anti-hypertensive medications.    buPROPion (WELLBUTRIN XL) 300 mg, Oral, Daily    clopidogreL (PLAVIX) 75 mg, Oral, Daily    DULoxetine (CYMBALTA) 60 mg, Oral, Daily    galcanezumab-gnlm 120 mg/mL PnIj Inject 120 mg (one pen) into the skin every 28 days. Begin 28 days after loading dose.    hydrOXYzine pamoate (VISTARIL) 25 mg, Oral, Every 8 hours PRN    magnesium oxide (MAG-OX) 400 mg, Oral, Daily    ondansetron (ZOFRAN-ODT) 4 MG TbDL DISSOLVE 1 TABLET ON THE TONGUE EVERY 6 HOURS AS NEEDED FOR NAUSEA    promethazine (PHENERGAN) 25 MG tablet TAKE 1 TABLET BY MOUTH EVERY 4 TO 6 HOURS AS NEEDED FOR NAUSEA    riboflavin (vitamin B2) 400 mg, Oral, Daily    tiZANidine (ZANAFLEX) 6 mg, Oral, Nightly, No alcohol, no driving with medication.    ubrogepant (UBRELVY) 100 mg tablet TAKE 1 TABLET BY MOUTH EVERY 2 HOURS AS NEEDED FOR MIGRAINE. MAX 2 TABLETS (200 MG) PER DAY.    verapamiL (CALAN) 40 mg, Oral, 2 times daily    zolpidem (AMBIEN) 10 mg, Oral, Nightly        Social History  Social History     Socioeconomic History    Marital status:    Occupational History     Employer:  [employer]   Tobacco Use    Smoking status: Never    Smokeless tobacco: Never   Substance and Sexual Activity    Alcohol use: No      Alcohol/week: 0.0 standard drinks of alcohol    Drug use: No    Sexual activity: Yes     Partners: Male     Birth control/protection: Coitus interruptus     Social Determinants of Health     Financial Resource Strain: Low Risk  (7/6/2023)    Overall Financial Resource Strain (CARDIA)     Difficulty of Paying Living Expenses: Not hard at all   Food Insecurity: No Food Insecurity (7/6/2023)    Hunger Vital Sign     Worried About Running Out of Food in the Last Year: Never true     Ran Out of Food in the Last Year: Never true   Transportation Needs: No Transportation Needs (7/6/2023)    PRAPARE - Transportation     Lack of Transportation (Medical): No     Lack of Transportation (Non-Medical): No   Physical Activity: Sufficiently Active (7/6/2023)    Exercise Vital Sign     Days of Exercise per Week: 5 days     Minutes of Exercise per Session: 30 min   Stress: Stress Concern Present (7/6/2023)    [place] [employer] - Occupational Stress Questionnaire     Feeling of Stress : To some extent   Social Connections: Socially Integrated (7/6/2023)    Social Connection and Isolation Panel [NHANES]     Frequency of Communication with Friends and Family: More than three times a week     Frequency of Social Gatherings with Friends and Family: Three times a week     Attends Oriental orthodox Services: More than 4 times per year     Active Member of Clubs or Organizations: No     Attends Club or Organization Meetings: More than 4 times per year     Marital Status:    Housing Stability: Unknown (7/6/2023)    Housing Stability Vital Sign     Unable to Pay for Housing in the Last Year: No     Unstable Housing in the Last Year: No              MD Giselle  Vascular Neurology  Office [phone]

## 2023-11-22 ENCOUNTER — OFFICE VISIT (OUTPATIENT)
Dept: NEUROLOGY | Facility: CLINIC | Age: 48
End: 2023-11-22
Payer: COMMERCIAL

## 2023-11-22 VITALS
HEIGHT: 65 IN | DIASTOLIC BLOOD PRESSURE: 91 MMHG | BODY MASS INDEX: 24.5 KG/M2 | SYSTOLIC BLOOD PRESSURE: 154 MMHG | HEART RATE: 69 BPM | WEIGHT: 147.06 LBS

## 2023-11-22 DIAGNOSIS — I67.2 INTRACRANIAL ATHEROSCLEROSIS: ICD-10-CM

## 2023-11-22 DIAGNOSIS — G43.109 MIGRAINE AURA WITHOUT HEADACHE: Primary | ICD-10-CM

## 2023-11-22 DIAGNOSIS — Z86.73 HISTORY OF CVA (CEREBROVASCULAR ACCIDENT): ICD-10-CM

## 2023-11-22 DIAGNOSIS — I67.9 CEREBROVASCULAR SMALL VESSEL DISEASE: ICD-10-CM

## 2023-11-22 DIAGNOSIS — Z86.73 HISTORY OF STROKE: ICD-10-CM

## 2023-11-22 DIAGNOSIS — I10 ESSENTIAL HYPERTENSION: ICD-10-CM

## 2023-11-22 PROCEDURE — 3044F HG A1C LEVEL LT 7.0%: CPT | Mod: CPTII,S$GLB,, | Performed by: PSYCHIATRY & NEUROLOGY

## 2023-11-22 PROCEDURE — 99215 OFFICE O/P EST HI 40 MIN: CPT | Mod: S$GLB,,, | Performed by: PSYCHIATRY & NEUROLOGY

## 2023-11-22 PROCEDURE — 99215 PR OFFICE/OUTPT VISIT, EST, LEVL V, 40-54 MIN: ICD-10-PCS | Mod: S$GLB,,, | Performed by: PSYCHIATRY & NEUROLOGY

## 2023-11-22 PROCEDURE — 3077F PR MOST RECENT SYSTOLIC BLOOD PRESSURE >= 140 MM HG: ICD-10-PCS | Mod: CPTII,S$GLB,, | Performed by: PSYCHIATRY & NEUROLOGY

## 2023-11-22 PROCEDURE — 3008F BODY MASS INDEX DOCD: CPT | Mod: CPTII,S$GLB,, | Performed by: PSYCHIATRY & NEUROLOGY

## 2023-11-22 PROCEDURE — 3080F DIAST BP >= 90 MM HG: CPT | Mod: CPTII,S$GLB,, | Performed by: PSYCHIATRY & NEUROLOGY

## 2023-11-22 PROCEDURE — 3080F PR MOST RECENT DIASTOLIC BLOOD PRESSURE >= 90 MM HG: ICD-10-PCS | Mod: CPTII,S$GLB,, | Performed by: PSYCHIATRY & NEUROLOGY

## 2023-11-22 PROCEDURE — 3044F PR MOST RECENT HEMOGLOBIN A1C LEVEL <7.0%: ICD-10-PCS | Mod: CPTII,S$GLB,, | Performed by: PSYCHIATRY & NEUROLOGY

## 2023-11-22 PROCEDURE — 4010F PR ACE/ARB THEARPY RXD/TAKEN: ICD-10-PCS | Mod: CPTII,S$GLB,, | Performed by: PSYCHIATRY & NEUROLOGY

## 2023-11-22 PROCEDURE — 4010F ACE/ARB THERAPY RXD/TAKEN: CPT | Mod: CPTII,S$GLB,, | Performed by: PSYCHIATRY & NEUROLOGY

## 2023-11-22 PROCEDURE — 99999 PR PBB SHADOW E&M-EST. PATIENT-LVL II: CPT | Mod: PBBFAC,,, | Performed by: PSYCHIATRY & NEUROLOGY

## 2023-11-22 PROCEDURE — 99999 PR PBB SHADOW E&M-EST. PATIENT-LVL II: ICD-10-PCS | Mod: PBBFAC,,, | Performed by: PSYCHIATRY & NEUROLOGY

## 2023-11-22 PROCEDURE — 3077F SYST BP >= 140 MM HG: CPT | Mod: CPTII,S$GLB,, | Performed by: PSYCHIATRY & NEUROLOGY

## 2023-11-22 PROCEDURE — 3008F PR BODY MASS INDEX (BMI) DOCUMENTED: ICD-10-PCS | Mod: CPTII,S$GLB,, | Performed by: PSYCHIATRY & NEUROLOGY

## 2023-11-22 NOTE — LETTER
November 22, 2023      [facility] Neurology 8th Fl  [address]  [facility] [address]  Phone: [phone]  Fax: [phone]       Patient: Mirna Pollock   YOB: 1975  Date of Visit: 11/22/2023    To Whom It May Concern:    Luis A Pollock  was at [facility] on 11/22/2023. Jeremias Pollock is the sole caretaker of the patient above and accompanied them to their appointment today. Please excuse Jeremias Pollock from any and all work related responsibilities for the time they were absent.     If you have any questions, please feel free to reach out with any questions.    Sincerely,          Marcus Osman MD  Vascular Neurology      Partial Purse String (Intermediate) Text: Given the location of the defect and the characteristics of the surrounding skin an intermediate purse string closure was deemed most appropriate.  Undermining was performed circumferentially around the surgical defect.  A purse string suture was then placed and tightened. Wound tension of the circular defect prevented complete closure of the wound.

## 2023-11-24 ENCOUNTER — CLINICAL SUPPORT (OUTPATIENT)
Dept: REHABILITATION | Facility: HOSPITAL | Age: 48
End: 2023-11-24
Payer: COMMERCIAL

## 2023-11-24 DIAGNOSIS — Z74.09 IMPAIRED FUNCTIONAL MOBILITY, BALANCE, GAIT, AND ENDURANCE: Primary | ICD-10-CM

## 2023-11-24 PROCEDURE — 97530 THERAPEUTIC ACTIVITIES: CPT | Mod: PN | Performed by: PHYSICAL THERAPIST

## 2023-11-24 PROCEDURE — 97112 NEUROMUSCULAR REEDUCATION: CPT | Mod: PN | Performed by: PHYSICAL THERAPIST

## 2023-11-24 NOTE — PROGRESS NOTES
[facility]   Physical Therapy Treatment Note      Name: Mirna Pollock  Clinic Number: [id number]     Therapy Diagnosis:        Encounter Diagnosis   Name Primary?    Impaired functional mobility, balance, gait, and endurance Yes         Physician: Marcus Osman MD     Visit Date: 2023      Physician Orders: PT Eval and Treat   Medical Diagnosis from Referral: I63.531 (ICD-10-CM) - Stroke due to occlusion of right posterior cerebral artery  Evaluation Date: 2023  Authorization Period Expiration: 23  Plan of Care Expiration: 12/15/23  Progress Note Due: 23  Visit # / Visits authorized:           Precautions: Standard, Fall, and Monitor BP     Time In: 1030  Time Out: 1115  Total Billable Time: 45 minutes     SUBJECTIVE      Pt reports:  went to neurologist and neurologist suspects symptoms may have been related to migraines vs TIAs.  present for session  Patient was compliant with home exercise program   Response to previous treatment: No adverse effects reported  Functional change: ongoing     Pain: 0/10  Location: no headache today     OBJECTIVE    BP prior to session: 138/81     TREATMENT      Mirna received the treatments listed below:       Mirna participated in dynamic functional therapeutic activities to improve functional performance for 20  minutes, including:     Gait Better Treadmill Training:   User name: [email or web address]  YARDS: not recorded  SPEED: 1.3 mph  TIME: 7 mins  Attention level: 0% fog, 100% light  Obstacle level: 1  Scanning level: medium     Other:   scannin%  Tamy Negotiation: 0%   Puddle Negotiation: 33%  Total Negotiation: 23%  Upper Extremity Support: bilateral     Walking forward with ball toss to self- 30 ft x 4  Walking forward with trunk rotation (handing ball to physical therapy)- 40 ft x 4          Mirna participated in neuromuscular re-education activities to improve: Balance for 25 minutes. The following activities were included:    "  Parallel bar:   Semi tandem stance, foam 2 x 30 sec  Bosu (flat side up) 2 x 1 mins  2 cone (12") tap, firm, intermittent upper extremity 10x  Modified single leg stance on 12" cone 2 x 30 sec  Bosu (flat side up) 2 x 1 mins    Tandem gait, front/ back, no upper extremity support- 2 x 25 ft        PATIENT EDUCATION AND HOME EXERCISES      Home Exercises Provided and Patient Education Provided      Education provided:   - continue with home exercise program     Written Home Exercises Provided: Patient instructed to cont prior HEP.  Exercises were reviewed and Mirna was able to demonstrate them prior to the end of the session.  Mirna demonstrated good  understanding of the education provided.      See EMR under Patient Instructions for exercises provided 11/1/2023.       ASSESSMENT      Mirna tolerated physical therapy session well. Patient demonstrated improved balance on Bosu ball and with modified single leg stance activities. This indicates improve use of ankle strategies. She continues to demonstrate balance impairments in tandem or when turning her head. Patient had difficulty ambulating in a straight path during ball toss activity.   Patient can benefit from continued skilled physical therapy to improve mobility as close to prior level of function as possible.      Mirna Is progressing well towards her goals.   Pt prognosis is Fair.      Pt will continue to benefit from skilled outpatient physical therapy to address the deficits listed in the problem list box on initial evaluation, provide pt/family education and to maximize pt's level of independence in the home and community environment.      Pt's spiritual, cultural and educational needs considered and pt agreeable to plan of care and goals.     Anticipated barriers to physical therapy: ongoing medical management     Goals:    Status as of 11/16/23  Short Term Goals- 4 Weeks  Pt to demonstrate independence in performance of HEP in order to improve daily level of " physical activity and improve carry over session to session. -MET 9/25/23  Pt to improve B LE strength by > / = 1/2 MMT score in order to improve strength for daily activities. -MET 9/25/23  Pt to improve FGA score to > / = 26/30 in order to demonstrate improved dynamic balance and safety. ongoing  Pt to demonstrate improvement in condition 4 on MCTSIB to > / = 15 seconds in order to improve safety. MET 10/17/23  Pt to improve SLS time B to > / = 5 seconds in order to improve gait stability for community mobility. MET 10/17/23     Long Term Goals - 12 Weeks  Pt to demonstrate compliance with HEP > / = 3x per week in order to improve daily level of physical activity and improve carry over session to session. Ongoing  Pt to improve B LE strength by > / = 1 MMT score in order to improve strength for daily activities. Ongoing.   Pt to improve FGA score to > / = 28/30 in order to demonstrate improved dynamic balance and safety. ongoing  Pt to demonstrate improvement in condition 4 on MCTSIB to > / = 30 seconds with min sway in order to improve safety. MET 10/17/23  Pt to improve SLS time B to > / = 10 seocnds in order to improve gait stability for community mobility. Improved on left   NEW: Pt to improve 6 minute walk test distance by 50 meters in order to demo improved endurance for community mobility. Ongoing     PLAN      Outpatient Physical Therapy 2 times weekly      Continue to monitor BP. Continue with Gait Better use and balance challenges, increase to 3 cone tap      Sindy Valladares, PT, DPT,   Board-Certified Clinical Specialist in Neurologic Physical Therapy   Certified Brain Injury Specialist    11/16/2023

## 2023-11-29 ENCOUNTER — TELEPHONE (OUTPATIENT)
Dept: PRIMARY CARE CLINIC | Facility: CLINIC | Age: 48
End: 2023-11-29
Payer: COMMERCIAL

## 2023-11-29 NOTE — TELEPHONE ENCOUNTER
----- Message from Luc Venegas MD sent at 11/29/2023  4:13 PM CST -----  Can you reach out to patient to check on home blood pressures.  DR. Osman reached out saying he thought the blood pressures were too high and recommended a secondary workup for the blood pressures.  We can review her logs and consider adjusting meds and sending for additional testing for secondary causes.      ----- Message -----  From: Marcus Osman MD  Sent: 11/22/2023   2:53 PM CST  To: Luc Venegas MD    [facility]  I do not think her transient symptoms are TIA, more likely migrainous in nature.  She has had extensive cerebrovascular workup.  We need to get her blood pressure better controlled at this point, still in 160's at home. Can we be more aggressive with getting her to goal?  I also did a renal US a few months ago, but has she had a secondary HTN workup at this point? If not I think she could benefit from this.  [place]

## 2023-12-01 PROBLEM — R29.898 FINE MOTOR IMPAIRMENT: Status: ACTIVE | Noted: 2023-12-01

## 2023-12-01 PROBLEM — R29.898 RIGHT HAND WEAKNESS: Status: ACTIVE | Noted: 2023-12-01

## 2023-12-01 PROBLEM — R29.818 FINE MOTOR IMPAIRMENT: Status: ACTIVE | Noted: 2023-12-01

## 2023-12-01 NOTE — PROGRESS NOTES
Occupational Therapy Treatment Note     Date: 11/14/2023  Name: Mirna Pollock  Clinic Number: [id number]    Therapy Diagnosis:   No diagnosis found.    Physician: Luc Vengeas MD      Right Left   Involved Side    [x]        []    Dominant Side     [x]        []         Physician Orders: OT eval and treat - Hand  Medical Diagnosis: Upper extremity weakness [R29.898]  Evaluation Date: 10/2/2023  Plan of Care Expiration Period: 12/01/2023  Insurance Authorization period Expiration: 09/24/2024  Visit # / Visits Authorized: 4 / 20  FOTO: 10/2/2023     Time In:1032  Time Out: 1115  Total Billable (one on one) Time: 43 minutes     Precautions: Standard, Fall, and Syncope    Subjective     Pt reports: Feeling okay, no new symptoms or changes since last visit  She was compliant with home exercise program given last session.   Response to previous treatment:no complaint  Functional change: independence in HEP    Pain: 0/10  Location: no pain noted today     Blood pressure: NT; see PT note    Objective      Mirna received the following supervised modalities after being cleared for contradictions for 8 minutes:   - Patient received fluidotherapy to right upper extremity for 8 minutes for sensory re-education and pain management.     Mirna participated in dynamic functional therapeutic activities to improve functional performance for 35 minutes, including:  - Isospheres: using right hand with forearm in supination 2x1 minute in clockwise and counterclockwise direction for improved in-hand manipulation and digit coordination  - Participated in velcro hand exerciser activity with right hand using cylindrical grasp, lateral pinch, and gross grasp  for improved digit flexion/extension , wrist flexion/extension , and hand-eye coordination: performed 2x5 each direction       Home Exercises and Education Provided      Education provided:   - HEP  - Progress towards goals    Written Home Exercises Provided: Patient instructed to cont prior  HEP.  Exercises were reviewed and Mirna was able to demonstrate them prior to the end of the session.    Mirna demonstrated good  understanding of the education provided.     See EMR under Patient Instructions for exercises provided prior visit.    Assessment      Mirna would continue to benefit from skilled OT. Pt presented to therapy with no new pain or symptoms and participated well in all presented activities. Pt would benefit from interventions targeting simulated home care tasks (example- has difficulty carrying laundry basket around) but continues to be limited by blood pressure restrictions.Good participation in resisted velcro activity for endurance and strengthening in wrist/digit planes.    Mirna is progressing well towards her goals and there are no updates to goals at this time. Pt prognosis is Good.     Pt will continue to benefit from skilled outpatient occupational therapy to address the deficits listed in the problem list on initial evaluation provide pt/family education and to maximize pt's level of independence in the home and community environment.     Pt's spiritual, cultural and educational needs considered and pt agreeable to plan of care and goals.    Anticipated barriers to occupational therapy: NA    Goals:      Short Term Goals = Long Term Goals: to be met within 10 weeks     Pt will be independent with Home Exercise Program (HEP) to promote long term maintenance of progress made in therapy.      Pt will reach expected FOTO score of 66 to demonstrate improved participation in ADLs for overall quality of life      Pt will improve B pinch strength by 3# (combined lateral + 2 pt + 3 pt) each in order to increase safety and participation with work tasks     Pt will improve B  strength by 5# each in order to increase safety and participation with home care tasks     Pt will demonstrate improved strength of MMS of >/= 4+/5 in all right upper extremity planes     Pt will report improved endurance  with home care tasks, as demonstrated by reports of moving laundry up/down stairs with no increased fatigue        Goals to be added or modified as necessary.     Plan   Certification Period/Plan of care expiration: 10/2/2023 to 12/01/2023.     Outpatient Occupational Therapy 1-2 times weekly for 8 weeks to include the following interventions: Fluidotherapy, Moist Heat/ Ice, Neuromuscular Re-ed, Patient Education, Self Care, Therapeutic Activities, and Therapeutic Exercise.    Updates for next session: cont fine motor; monitor BP    Juliana Mcpherson OT

## 2023-12-01 NOTE — PROGRESS NOTES
Occupational Therapy Treatment Note     Date: 11/1/2023  Name: Mirna Pollock  Clinic Number: [id number]    Therapy Diagnosis:   Encounter Diagnoses   Name Primary?    Fine motor impairment Yes    Right hand weakness      Physician: Luc Venegas MD      Right Left   Involved Side    [x]        []    Dominant Side     [x]        []         Physician Orders: OT eval and treat - Hand  Medical Diagnosis: Upper extremity weakness [R29.898]  Evaluation Date: 10/2/2023  Plan of Care Expiration Period: 12/01/2023  Insurance Authorization period Expiration: 09/24/2024  Visit # / Visits Authorized: 2 / 20  FOTO: 10/2/2023     Time In:1535  Time Out: 1615  Total Billable (one on one) Time: 40 minutes     Precautions: Standard, Fall, and Syncope    Subjective     Pt reports: Feeling okay, no new symptoms or changes since last visit  She was compliant with home exercise program given last session.   Response to previous treatment:no complaint  Functional change: independence in HEP    Pain: 0/10  Location: no pain noted today     Blood pressure: 127/105    Objective      Mirna received the following supervised modalities after being cleared for contradictions for 8 minutes:   - Patient received fluidotherapy to right upper extremity for 8 minutes for sensory re-education and pain management.       Mirna received therapeutic exercises for 10 minutes including:  -Resisted gripper exercises: picking up 2x15 pegs using resisted gripper on rung 2 for right  strength for improved independence in activities of daily living       Mirna participated in dynamic functional therapeutic activities to improve functional performance for 22 minutes, including:  - Isospheres: using right hand with forearm in supination 2x1 minute in clockwise and counterclockwise direction for improved in-hand manipulation and digit coordination  - Functional  activity using BAPS board: using right hand to place and remove x5 pieces of BAPS board to simulate  replacing and removing lids of various sizes     Home Exercises and Education Provided      Education provided:   - HEP  - Progress towards goals    Written Home Exercises Provided: Patient instructed to cont prior HEP.  Exercises were reviewed and Mirna was able to demonstrate them prior to the end of the session.    Mirna demonstrated good  understanding of the education provided.     See EMR under Patient Instructions for exercises provided prior visit.    Assessment      Mirna would continue to benefit from skilled OT. Pt presented to therapy with no new pain or symptoms but experienced symptoms over the weekend consistent with previous TIA episodes. Blood pressure noted to be elevated; held any cardio/high resistance activities today. Good participation in all presented tasks. Some difficulty incorporating all digits in isosphere activity.     Mirna is progressing well towards her goals and there are no updates to goals at this time. Pt prognosis is Good.     Pt will continue to benefit from skilled outpatient occupational therapy to address the deficits listed in the problem list on initial evaluation provide pt/family education and to maximize pt's level of independence in the home and community environment.     Pt's spiritual, cultural and educational needs considered and pt agreeable to plan of care and goals.    Anticipated barriers to occupational therapy: NA    Goals:      Short Term Goals = Long Term Goals: to be met within 10 weeks     Pt will be independent with Home Exercise Program (HEP) to promote long term maintenance of progress made in therapy.      Pt will reach expected FOTO score of 66 to demonstrate improved participation in ADLs for overall quality of life      Pt will improve B pinch strength by 3# (combined lateral + 2 pt + 3 pt) each in order to increase safety and participation with work tasks     Pt will improve B  strength by 5# each in order to increase safety and participation with home  care tasks     Pt will demonstrate improved strength of MMS of >/= 4+/5 in all right upper extremity planes     Pt will report improved endurance with home care tasks, as demonstrated by reports of moving laundry up/down stairs with no increased fatigue        Goals to be added or modified as necessary.     Plan   Certification Period/Plan of care expiration: 10/2/2023 to 12/01/2023.     Outpatient Occupational Therapy 1-2 times weekly for 8 weeks to include the following interventions: Fluidotherapy, Moist Heat/ Ice, Neuromuscular Re-ed, Patient Education, Self Care, Therapeutic Activities, and Therapeutic Exercise.    Updates for next session: cont fine motor; monitor BP    Juliana Mcpherson, OT

## 2023-12-01 NOTE — PROGRESS NOTES
Occupational Therapy Treatment Note     Date: 11/7/2023  Name: Mirna Pollock  Clinic Number: [id number]    Therapy Diagnosis:   No diagnosis found.    Physician: Luc Venegas MD      Right Left   Involved Side    [x]        []    Dominant Side     [x]        []         Physician Orders: OT eval and treat - Hand  Medical Diagnosis: Upper extremity weakness [R29.898]  Evaluation Date: 10/2/2023  Plan of Care Expiration Period: 12/01/2023  Insurance Authorization period Expiration: 09/24/2024  Visit # / Visits Authorized: 3 / 20  FOTO: 10/2/2023     Time In:1023  Time Out: 1105  Total Billable (one on one) Time: 42 minutes     Precautions: Standard, Fall, and Syncope    Subjective     Pt reports: Feeling okay, no new symptoms or changes since last visit  She was compliant with home exercise program given last session.   Response to previous treatment:no complaint  Functional change: independence in HEP    Pain: 0/10  Location: no pain noted today     Blood pressure: 152/108    Objective      Mirna received the following supervised modalities after being cleared for contradictions for 8 minutes:   - Patient received fluidotherapy to right upper extremity for 8 minutes for sensory re-education and pain management.       Mirna received therapeutic exercises for 10 minutes including:  -Resisted gripper exercises: picking up 2x15 pegs using resisted gripper on rung 2 for right  strength for improved independence in activities of daily living       Mirna participated in dynamic functional therapeutic activities to improve functional performance for 24 minutes, including:  - Placing 2x15 mini pegs in green theraputty; picking up 3 at a time for improved fine motor translation skills  - Removing 2x15 mini pegs from green theraputty for improved right gross pinch and coordination   - Functional  activity using BAPS board: using right hand to place and remove x5 pieces of BAPS board to simulate replacing and removing lids of  various sizes     Home Exercises and Education Provided      Education provided:   - HEP  - Progress towards goals    Written Home Exercises Provided: Patient instructed to cont prior HEP.  Exercises were reviewed and Mirna was able to demonstrate them prior to the end of the session.    Mirna demonstrated good  understanding of the education provided.     See EMR under Patient Instructions for exercises provided prior visit.    Assessment      Mirna would continue to benefit from skilled OT. Pt presented to therapy with no new pain or symptoms and participated well in all presented activities. Pt would benefit from interventions targeting simulated home care tasks (example- has difficulty carrying laundry basket around) but continues to be limited by blood pressure restrictions.    Mirna is progressing well towards her goals and there are no updates to goals at this time. Pt prognosis is Good.     Pt will continue to benefit from skilled outpatient occupational therapy to address the deficits listed in the problem list on initial evaluation provide pt/family education and to maximize pt's level of independence in the home and community environment.     Pt's spiritual, cultural and educational needs considered and pt agreeable to plan of care and goals.    Anticipated barriers to occupational therapy: NA    Goals:      Short Term Goals = Long Term Goals: to be met within 10 weeks     Pt will be independent with Home Exercise Program (HEP) to promote long term maintenance of progress made in therapy.      Pt will reach expected FOTO score of 66 to demonstrate improved participation in ADLs for overall quality of life      Pt will improve B pinch strength by 3# (combined lateral + 2 pt + 3 pt) each in order to increase safety and participation with work tasks     Pt will improve B  strength by 5# each in order to increase safety and participation with home care tasks     Pt will demonstrate improved strength of MMS of >/=  4+/5 in all right upper extremity planes     Pt will report improved endurance with home care tasks, as demonstrated by reports of moving laundry up/down stairs with no increased fatigue        Goals to be added or modified as necessary.     Plan   Certification Period/Plan of care expiration: 10/2/2023 to 12/01/2023.     Outpatient Occupational Therapy 1-2 times weekly for 8 weeks to include the following interventions: Fluidotherapy, Moist Heat/ Ice, Neuromuscular Re-ed, Patient Education, Self Care, Therapeutic Activities, and Therapeutic Exercise.    Updates for next session: cont fine motor; monitor BP    Juliana Mcpherson, OT

## 2023-12-06 ENCOUNTER — PATIENT MESSAGE (OUTPATIENT)
Dept: REHABILITATION | Facility: HOSPITAL | Age: 48
End: 2023-12-06
Payer: COMMERCIAL

## 2023-12-07 ENCOUNTER — DOCUMENTATION ONLY (OUTPATIENT)
Dept: REHABILITATION | Facility: HOSPITAL | Age: 48
End: 2023-12-07

## 2023-12-07 NOTE — PROGRESS NOTES
Missed Visit/Cancellation      Date: 12/7/2023           Canceled Number: 3  No Show Number: 0                                                                                                                Pt initially had visit scheduled for today for 0845   Reason for cancellation: appointment conflict.    Pt's next scheduled physical therapy visit is 12/14/23. patient confirmed next appointment via chart message.     Sindy Valladares, PT, DPT  12/7/2023

## 2023-12-08 DIAGNOSIS — G43.009 MIGRAINE WITHOUT AURA AND WITHOUT STATUS MIGRAINOSUS, NOT INTRACTABLE: ICD-10-CM

## 2023-12-08 RX ORDER — HYDROXYZINE PAMOATE 25 MG/1
25 CAPSULE ORAL EVERY 8 HOURS PRN
Qty: 45 CAPSULE | Refills: 0 | Status: SHIPPED | OUTPATIENT
Start: 2023-12-08 | End: 2024-01-12 | Stop reason: SDUPTHER

## 2023-12-08 NOTE — TELEPHONE ENCOUNTER
Requested Vistaril refill. Will provide and ask pt to make an appointment, was supposed to follow up last month, 3 months after last appointment.    Micheline Freeman,   [facility], PGY-4  [facility]

## 2023-12-12 ENCOUNTER — HOSPITAL ENCOUNTER (OUTPATIENT)
Dept: RADIOLOGY | Facility: HOSPITAL | Age: 48
Discharge: HOME | End: 2023-12-12
Attending: INTERNAL MEDICINE
Payer: COMMERCIAL

## 2023-12-12 ENCOUNTER — PATIENT MESSAGE (OUTPATIENT)
Dept: PRIMARY CARE CLINIC | Facility: CLINIC | Age: 48
End: 2023-12-12
Payer: COMMERCIAL

## 2023-12-12 DIAGNOSIS — Z12.31 ENCOUNTER FOR SCREENING MAMMOGRAM FOR MALIGNANT NEOPLASM OF BREAST: ICD-10-CM

## 2023-12-12 PROCEDURE — 77063 BREAST TOMOSYNTHESIS BI: CPT | Mod: 26,,, | Performed by: RADIOLOGY

## 2023-12-12 PROCEDURE — 77067 SCR MAMMO BI INCL CAD: CPT | Mod: 26,,, | Performed by: RADIOLOGY

## 2023-12-12 PROCEDURE — 77063 MAMMO DIGITAL SCREENING BILAT WITH TOMO: ICD-10-PCS | Mod: 26,,, | Performed by: RADIOLOGY

## 2023-12-12 PROCEDURE — 77067 MAMMO DIGITAL SCREENING BILAT WITH TOMO: ICD-10-PCS | Mod: 26,,, | Performed by: RADIOLOGY

## 2023-12-12 PROCEDURE — 77067 SCR MAMMO BI INCL CAD: CPT | Mod: TC

## 2023-12-14 ENCOUNTER — CLINICAL SUPPORT (OUTPATIENT)
Dept: REHABILITATION | Facility: HOSPITAL | Age: 48
End: 2023-12-14
Payer: COMMERCIAL

## 2023-12-14 DIAGNOSIS — R29.898 RIGHT HAND WEAKNESS: ICD-10-CM

## 2023-12-14 DIAGNOSIS — Z74.09 IMPAIRED FUNCTIONAL MOBILITY, BALANCE, GAIT, AND ENDURANCE: Primary | ICD-10-CM

## 2023-12-14 DIAGNOSIS — R29.818 FINE MOTOR IMPAIRMENT: Primary | ICD-10-CM

## 2023-12-14 DIAGNOSIS — R29.898 FINE MOTOR IMPAIRMENT: Primary | ICD-10-CM

## 2023-12-14 PROCEDURE — 97110 THERAPEUTIC EXERCISES: CPT | Mod: PN | Performed by: PHYSICAL THERAPIST

## 2023-12-14 PROCEDURE — 97530 THERAPEUTIC ACTIVITIES: CPT | Mod: PN

## 2023-12-14 PROCEDURE — 97112 NEUROMUSCULAR REEDUCATION: CPT | Mod: PN | Performed by: PHYSICAL THERAPIST

## 2023-12-14 NOTE — PROGRESS NOTES
See plan of care for physical therapy progress note and updated plan of care    Sindy Valladares, PT, DPT,   Board-Certified Clinical Specialist in Neurologic Physical Therapy   Certified Brain Injury Specialist

## 2023-12-14 NOTE — PROGRESS NOTES
Occupational Therapy Treatment Note and Discharge Summary     Date: 12/14/2023  Name: Mirna Pollock  Clinic Number: [id number]    Therapy Diagnosis:   No diagnosis found.    Physician: Luc Venegas MD      Right Left   Involved Side    [x]        []    Dominant Side     [x]        []         Physician Orders: OT eval and treat - Hand  Medical Diagnosis: Upper extremity weakness [R29.898]  Evaluation Date: 10/2/2023  Plan of Care Expiration Period: 12/01/2023  Insurance Authorization period Expiration: 09/24/2024  Visit # / Visits Authorized: 5 / 20  FOTO: 10/2/2023     Time In: 0805  Time Out: 0825  Total Billable (one on one) Time: 20 minutes     Precautions: Standard, Fall, and Syncope    Subjective     Pt reports: Feeling okay, no new symptoms or changes since last visit  She was compliant with home exercise program given last session.   Response to previous treatment:no complaint  Functional change: independence in HEP    Pain: 0/10  Location: no pain noted today     Blood pressure: NT; see PT note    Objective       Strength: (ADAM Dynamometer in lbs.) Average 3 trials, Position II:        10/2/2023 10/2/2023 12/14/2023 12/14/2023   Rung II Right Left Right  Left    Average 40.5# 31.9# 41.0# 32.5#   [norms for women aged 45-49: R=62.2 +/-15.1; L=56.0 +/-12.7 (Tanyatz et al, 1985)]     Pinch Strength (Measured in lbs)        10/2/2023 10/2/2023 12/14/2023 12/14/2023     Right Left Right  Left    Lateral Pinch 12.0 # 13.0 # 11.0# 11.0#   3pt Pinch 11.0 # 12.0 # 12.5# 10.5#   2pt Pinch 9.5 # 8.0 # 10.5# 9.5#   [Lateral pinch norms for women aged 45-49: right: 17.6+/-3.2; left: 16.6+/-2.9 (Tanyatz et al, 1985)]   [3-point pinch norms for women aged 45-49: right: 17.9+/-3.0; left: 17.5+/-2.8 (Gina et al, 1985)]   [2-point pinch norms for women aged 45-49: right: 13.2+/-3.0; left: 12.1+/-2.7 (Gina et al, 1985)]      Fine/Gross Motor Coordination     Assessment   Right   10/2/2023  Left  10/2/2023 Right   12/14/2023 Left   12/14/2023   9 hole peg test 9 pegs in/out for time 31 31 29 33   *WNL - Within Normal Limits  *NT = Not Tested  Nine Hole Peg Test Norms: [norms for women aged 46-50: R=17.36 +/-2.01s; L=17.96s +/-2.30s (Rolando et al, 2003)]    Mirna participated in dynamic functional therapeutic activities to improve functional performance for 20  minutes, including:  - Reassessment of objective measures for progress toward goals  - Pt education regarding discharge planning  - Readministration of FOTO       Home Exercises and Education Provided      Education provided:   - HEP  - Progress towards goals    Written Home Exercises Provided: Patient instructed to cont prior HEP.  Exercises were reviewed and Mirna was able to demonstrate them prior to the end of the session.    Mirna demonstrated good  understanding of the education provided.     See EMR under Patient Instructions for exercises provided prior visit.    Assessment      Mirna has participated well in this OT plan of care to address decreased sensation,  strength, and fine motor control following CVAs which limit full participation in ADLs and home care tasks. Mirna has made progress in all areas and is appropriate for d/c with HEP to continue therapeutic gains.     Discharge reason: Patient has reached the maximum rehab potential for the present time    Discharge FOTO Score:       Goals:     Short Term Goals = Long Term Goals: to be met within 10 weeks     Pt will be independent with Home Exercise Program (HEP) to promote long term maintenance of progress made in therapy.   Met   Pt will reach expected FOTO score of 66 to demonstrate improved participation in ADLs for overall quality of life   Met   Pt will improve B pinch strength by 3# (combined lateral + 2 pt + 3 pt) each in order to increase safety and participation with work tasks  Not met   Pt will improve B  strength by 5# each in order to increase safety and participation  with home care tasks  Not met   Pt will demonstrate improved strength of MMS of >/= 4+/5 in all right upper extremity planes  Met; left 4+/5; right 4+ to 5/5   Pt will report improved endurance with home care tasks, as demonstrated by reports of moving laundry up/down stairs with no increased fatigue  Not met secondary to balance deficits   Can do laundry without fatigue       PLAN   This patient is discharged from Occupational Therapy    Juliana JOSE DAVID Mcpherson LOTR

## 2023-12-19 NOTE — PLAN OF CARE
[facility]   Physical Therapy Treatment Note/ progress note     Name: Mirna Pollock  Clinic Number: [id number]     Therapy Diagnosis:        Encounter Diagnosis   Name Primary?    Impaired functional mobility, balance, gait, and endurance Yes         Physician: Marcus Osman MD     Visit Date: 12/14/2023      Physician Orders: PT Eval and Treat   Medical Diagnosis from Referral: I63.531 (ICD-10-CM) - Stroke due to occlusion of right posterior cerebral artery  Evaluation Date: 7/19/2023  Authorization Period Expiration: 12/31/23  Plan of Care Expiration: 12/15/23  New plan of care: 12/16/23 to 2/10/24  Progress Note Due: 1/14/2024  Visit # / Visits authorized: 19/30          Precautions: Standard, Fall, and Monitor BP     Time In: 0857  Time Out: 0942  Total Billable Time: 45 minutes     SUBJECTIVE      Pt reports:  blood pressure was good this morning prior to OT session  Patient was compliant with home exercise program   Response to previous treatment: No adverse effects reported  Functional change: ongoing     Pain: 0/10  Location: no headache today     OBJECTIVE         9/25/23 11/16/23 12/14/23   6 mins walk test 1095 ft 1080 ft 1105 ft   Right single leg stance  Not tested  7 sec 3 sec   Left single leg stance  Not tested  7 sec 6-7 sec      Functional Gait Assessment:   1. Gait on level surface =  2, 6.7s   (3) Normal: less than 5.5 sec, no A.D., no imbalance, normal gait pattern, deviates< 6in   (2) Mild impairment: 7-5.6 sec, uses A.D., mild gait deviations, or deviates 6-10 in   (1) Moderate impairment: > 7 sec, slow speed, imbalance, deviates 10-15 in.   (0) Severe impairment: needs assist, deviates >15 in, reach/touch wall  2. Change in Gait Speed = 3   (3) Normal: smooth change w/o loss of balance or gait deviation, deviates < 6 in, significant difference between speeds   (2) Mild impairment: changes speed, but demonstrates mild gait deviations, deviates 6-10 in, OR no deviations  but unable to significantly speed, OR uses A.D.   (1) Moderate impairment: minor changes to speed, OR changes speed w/ significant deviations, deviates 10-15 in, OR  Changes speed , but loses balance & recovers   (0) Severe impairment: cannot change speed, deviates >15 in, or loses balance & needs assist  3. Gait with horizontal head turns  = 2   (3) Normal: no change in gait, deviates <6 in   (2) Mild impairment: slight change in speed, deviates 6-10 in, OR uses A.D.   (1) Moderate impairment: moderate change in speed, deviates 10-15 in   (0) Severe impairment: severe disruption of gait, deviates >15in  4. Gait with vertical head turns = 2   (3) Normal: no change in gait, deviates <6 in   (2) Mild impairment: slight change in speed, deviates 6-10 in OR uses A.D.   (1) Moderate impairment: moderate change in speed, deviates 10-15 in   (0) Severe impairment: severe disruption of gait, deviates >15 in  5. Gait with pivot turns = 3   (3) Normal: performs safely in 3 sec, no LOB   (2) Mild impairment: performs in >3 sec & no LOB, OR turns safely & requires several steps to regain LOB   (1) Moderate impairment: turns slow, OR requires several small steps for balance following turn & stop   (0) Severe impairment: cannot turn safely, needs assist  6. Step over obstacle = 3   (3) Normal: steps over 2 stacked boxes w/o change in speed or LOB   (2) Mild impairment: able to step over 1 box w/o change in speed or LOB   (1) Moderate impairment: steps over 1 box but must slow down, may require VC   (0) Severe impairment: cannot perform w/o assist  7. Gait with Narrow CALLIE = 1   (3) Normal: 10 steps no staggering   (2) Mild impairment: 7-9 steps   (1) Moderate impairment: 4-7 steps   (0) Severe impairment: < 4 steps or cannot perform w/o assist  8. Gait with eyes closed = 1   (3) Normal: < 7 sec, no A.D., no LOB, normal gait pattern, deviates <6 in   (2) Mild impairment: 7.1-9 sec, mild gait deviations, deviates 6-10 in   (1)  "Moderate impairment: > 9 sec, abnormal pattern, LOB, deviates 10-15 in   (0) Severe impairment: cannot perform w/o assist, LOB, deviates >15in  9. Ambulating Backwards = 2   (3) Normal: no A.D., no LOB, normal gait pattern, deviates <6in   (2) Mild impairment: uses A.D., slower speed, mild gait deviations, deviates 6-10 in   (1) Moderate impairment: slow speed, abnormal gait pattern, LOB, deviates 10-15 in   (0) Severe impairment: severe gait deviations or LOB, deviates >15in  10. Steps = 3   (3) Normal: alternating feet, no rail   (2) Mild Impairment: alternating feet, uses rail   (1) Moderate impairment: step-to, uses rail   (0) Severe impairment: cannot perform safely    Score 22/30     Score:   <22/30 fall risk   <20/30 fall risk in older adults   <18/30 fall risk in Parkinsons   Scores by Decade:                        Age    Score                        40-49  (24-30)                       50-59  (25-30)                       60-69  (20-30)                       70-79  (16-30)  SUSHILA Philippe (2007). Reference Group Data for the Functional Gait Assessment. Physical Therapy ([id number], [phone].        TREATMENT      Mirna received the treatments listed below:       Mirna received therapeutic exercises to develop strength and endurance for 13 minutes includin mins walk   - decreased foot clearance noted with increased duration     Leg press 45# 2 x 8  Heel raises 45# 3 x 8        Mirna participated in neuromuscular re-education activities to improve: Balance for 32 minutes. The following activities were included:     single leg stance   Functional Gait Assessment     Parallel bar:   Tandem gait front/ back, intermittent touch on bar- 4 laps  Semi tandem stance, foam 2 x 30 sec  Bosu (flat side up) 3 x 1 mins  2 cone (12") tap, firm, intermittent upper extremity 10x  Modified single leg stance on 12" cone 2 x 30 sec       PATIENT EDUCATION AND HOME EXERCISES      Home Exercises Provided and Patient Education " Provided      Education provided:   - continue with home exercise program, resume walking program on treadmill  - reviewed progress noted     Written Home Exercises Provided: Patient instructed to cont prior HEP.  Exercises were reviewed and Mirna was able to demonstrate them prior to the end of the session.  Mirna demonstrated good  understanding of the education provided.      See EMR under Patient Instructions for exercises provided 11/1/2023.       ASSESSMENT   Assessment period: 11/24/23 to 12/14/2023    Mirna tolerates physical therapy sessions well. Patient's blood pressure has been better managed over last several sessions. Improvement noted on Functional Gait Assessment and gait velocity. Patient continues to demonstrate balance impairments. She demonstrates decreased ability to negotiate a straight path while turning head and with eyes closed. She also demonstrates decreased single leg stance and tandem stance due to poor balance responses. Decreased endurance continues to limit gait duration on land and on treadmill.  Patient can benefit from continued skilled physical therapy to improve mobility as close to prior level of function as possible. Plan of care updated x 8 weeks to allow for continued progress towards goals set.      Mirna Is progressing well towards her goals.   Pt prognosis is Fair.      Pt will continue to benefit from skilled outpatient physical therapy to address the deficits listed in the problem list box on initial evaluation, provide pt/family education and to maximize pt's level of independence in the home and community environment.      Pt's spiritual, cultural and educational needs considered and pt agreeable to plan of care and goals.     Anticipated barriers to physical therapy: ongoing medical management     Goals:    Status as of 12/14/23  Short Term Goals- 4 Weeks  Pt to demonstrate independence in performance of HEP in order to improve daily level of physical activity and improve  carry over session to session. -MET 9/25/23  Pt to improve B LE strength by > / = 1/2 MMT score in order to improve strength for daily activities. -MET 9/25/23  Pt to improve FGA score to > / = 26/30 in order to demonstrate improved dynamic balance and safety. ongoing  Pt to demonstrate improvement in condition 4 on MCTSIB to > / = 15 seconds in order to improve safety. MET 10/17/23  Pt to improve SLS time B to > / = 5 seconds in order to improve gait stability for community mobility. MET 10/17/23     Long Term Goals - 12 Weeks  Pt to demonstrate compliance with HEP > / = 3x per week in order to improve daily level of physical activity and improve carry over session to session. Ongoing  Pt to improve B LE strength by > / = 1 MMT score in order to improve strength for daily activities. Ongoing.   Pt to improve FGA score to > / = 28/30 in order to demonstrate improved dynamic balance and safety. ongoing  Pt to demonstrate improvement in condition 4 on MCTSIB to > / = 30 seconds with min sway in order to improve safety. MET 10/17/23  Pt to improve SLS time B to > / = 10 seocnds in order to improve gait stability for community mobility. ongoing  NEW: Pt to improve 6 minute walk test distance by 50 meters in order to demo improved endurance for community mobility. Ongoing/ improved     PLAN   New plan of care: 12/16/23 to 2/10/24  Outpatient Physical Therapy 2 times weekly      Continue to monitor BP. Continue with Gait Better use and balance challenges, increase to 3 cone tap      Sindy Valladares, PT, DPT,   Board-Certified Clinical Specialist in Neurologic Physical Therapy   Certified Brain Injury Specialist    12/14/2023

## 2023-12-21 ENCOUNTER — PATIENT MESSAGE (OUTPATIENT)
Dept: REHABILITATION | Facility: HOSPITAL | Age: 48
End: 2023-12-21
Payer: COMMERCIAL

## 2023-12-28 ENCOUNTER — TELEPHONE (OUTPATIENT)
Dept: PRIMARY CARE CLINIC | Facility: CLINIC | Age: 48
End: 2023-12-28
Payer: COMMERCIAL

## 2023-12-28 NOTE — TELEPHONE ENCOUNTER
Called pt and she has not been taking her blood pressure lately. Advised pt to start taking her pressure more often and to send the readings on the portal as soon as possible.

## 2023-12-28 NOTE — TELEPHONE ENCOUNTER
----- Message from Luc Venegas MD sent at 11/13/2023  5:48 PM CST -----  Regarding: Repeat BP reading  Can we reach out to patient to get updated home bp readings?  Thanks  JM

## 2024-01-03 ENCOUNTER — PATIENT MESSAGE (OUTPATIENT)
Dept: REHABILITATION | Facility: HOSPITAL | Age: 49
End: 2024-01-03
Payer: COMMERCIAL

## 2024-01-03 ENCOUNTER — DOCUMENTATION ONLY (OUTPATIENT)
Dept: REHABILITATION | Facility: HOSPITAL | Age: 49
End: 2024-01-03
Payer: COMMERCIAL

## 2024-01-03 NOTE — PROGRESS NOTES
Missed Visit/Cancellation      Date: 1/3/2024            Canceled Number: 4  No Show Number: 0                                                                                                                Pt initially had visit scheduled for today for 1115  Reason for cancellation: migraine.    Pt's next scheduled physical therapy visit is 1/10/24     Sindy Valladares, PT, DPT  1/3/2024

## 2024-01-09 ENCOUNTER — PATIENT MESSAGE (OUTPATIENT)
Dept: REHABILITATION | Facility: HOSPITAL | Age: 49
End: 2024-01-09
Payer: COMMERCIAL

## 2024-01-09 ENCOUNTER — E-VISIT (OUTPATIENT)
Dept: PRIMARY CARE CLINIC | Facility: CLINIC | Age: 49
End: 2024-01-09
Payer: COMMERCIAL

## 2024-01-09 DIAGNOSIS — J20.9 ACUTE BRONCHITIS, UNSPECIFIED ORGANISM: Primary | ICD-10-CM

## 2024-01-09 PROCEDURE — 99421 OL DIG E/M SVC 5-10 MIN: CPT | Mod: ,,, | Performed by: INTERNAL MEDICINE

## 2024-01-09 RX ORDER — AZITHROMYCIN 250 MG/1
TABLET, FILM COATED ORAL
Qty: 6 TABLET | Refills: 0 | Status: SHIPPED | OUTPATIENT
Start: 2024-01-09 | End: 2024-01-14

## 2024-01-09 NOTE — PROGRESS NOTES
[facility] E-Visit Note      Reason for Visit:     Chief Complaint: URI (Entered automatically based on patient selection in Patient Portal.)    The patient initiated a request through [employer] on 1/9/2024 for evaluation and management with a chief complaint of URI (Entered automatically based on patient selection in Patient Portal.)     History of Present Illness:     I evaluated the questionnaire submission on 1/9/24.    [facility] Evisit Upper Respitatory/Cough Questionnaire    1/9/2024 11:20 AM CST - Filed by Patient   Do you agree to participate in an E-Visit? Yes   If you have any of the following symptoms, please present to your local ER or call 911:  I acknowledge   What is the main issue that you would like for your doctor to address today? Been sock for about 3 days and i really would like something for it   Are you able to take your vital signs? Yes   Systolic Blood Pressure: 157   Diastolic Blood Pressure: 108   Weight: 145   Height:    Pulse:    Temperature:    Respiration rate:    Pulse Oxygen:    Are you currently pregnant, could you be pregnant, or are you breast feeding? Could be pregnant   What symptoms do you currently have?  Cough;  Nasal Congestion;  Sore throat;  Ear pain   Describe your cough: Dry   Have you had any of the following? None of the above   Have you ever smoked? I have never smoked   Have you had a fever? No   When did your symptoms first appear? 1/6/2024   In the last two weeks, have you been in close contact with someone who has COVID-19 or the Flu? No   In the last two weeks, have you worked or volunteered in a healthcare facility or as a ? Healthcare facilities include a hospital, medical or dental clinic, long-term care facility, or nursing home No   Do you live in a long-term care facility, nursing home, group home, or homeless shelter? No   List what you have done or taken to help your symptoms. DayQuil and NyQuil for low bp   How severe are your  symptoms? Moderate   Have your symptoms improved since they first appeared? No change   Have you taken an at home Covid test? Yes   What were the results? Negative   Have you taken a Flu test? No   Have you been fully vaccinated for COVID? (2 Pfizer, 2 Moderna or 1 Luis Miguel & Luis Miguel vaccine injections) Yes   Have you received a booster? No   Have you recieved a Flu shot? No   Do you have transportation to get tested for COVID if it is indicated and ordered for you at an [facility] location? 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    CTS (carpal tunnel syndrome)    Accelerated hypertension    Cervical muscle pain    Cervical radiculopathy    H/O catheter-based closure of atrial septal defect    Adult congenital heart disease    Overweight (BMI 25.0-29.9)    LALITA (generalized anxiety disorder)    Fibromyalgia    Social anxiety disorder    Status migrainosus    Insomnia    Recurrent major depressive disorder, in full remission    Hypokalemia    Stage 3a chronic kidney disease    Proteinuria    Cerebrovascular small vessel disease    Drug rash    Head Tingling, Right Sided    Impaired functional mobility, balance, gait, and endurance    Quadrantanopsia, left    Fine motor impairment    Right hand weakness         Medications:       Current Outpatient Medications:     aspirin (ECOTRIN) 81 MG EC tablet, Take 1 tablet (81 mg total) by mouth once daily., Disp: 30 tablet, Rfl: 11    atorvastatin (LIPITOR) 40 MG tablet, Take 1 tablet (40 mg total) by mouth once daily., Disp: 90 tablet, Rfl: 3    azithromycin (Z-MUNDO) 250 MG tablet, Take 2 tablets by mouth on day 1; Take 1 tablet by mouth on days 2-5, Disp: 6 tablet, Rfl: 0    benazepril-hydrochlorthiazide (LOTENSIN  HCT) 20-25 mg Tab, Take 1 tablet by mouth once daily. Please check with your primary care provider. You've been started on Losartan, and so it's unclear if you'll need additional anti-hypertensive medications., Disp: 90 tablet, Rfl: 3    buPROPion (WELLBUTRIN XL) 300 MG 24 hr tablet, Take 1 tablet (300 mg total) by mouth once daily., Disp: 30 tablet, Rfl: 3    clopidogreL (PLAVIX) 75 mg tablet, Take 1 tablet (75 mg total) by mouth once daily., Disp: 30 tablet, Rfl: 11    DULoxetine (CYMBALTA) 60 MG capsule, Take 1 capsule (60 mg total) by mouth once daily., Disp: 30 capsule, Rfl: 3    galcanezumab-gnlm 120 mg/mL PnIj, Inject 120 mg (one pen) into the skin every 28 days. Begin 28 days after loading dose., Disp: 1 mL, Rfl: 10    hydrOXYzine pamoate (VISTARIL) 25 MG Cap, Take 1 capsule (25 mg total) by mouth every 8 (eight) hours as needed (anxiety)., Disp: 45 capsule, Rfl: 0    magnesium oxide (MAG-OX) 400 mg (241.3 mg magnesium) tablet, Take 1 tablet (400 mg total) by mouth once daily. (Patient not taking: Reported on 11/13/2023), Disp: 30 tablet, Rfl: 1    ondansetron (ZOFRAN-ODT) 4 MG TbDL, DISSOLVE 1 TABLET ON THE TONGUE EVERY 6 HOURS AS NEEDED FOR NAUSEA, Disp: , Rfl:     promethazine (PHENERGAN) 25 MG tablet, TAKE 1 TABLET BY MOUTH EVERY 4 TO 6 HOURS AS NEEDED FOR NAUSEA, Disp: 20 tablet, Rfl: 1    riboflavin, vitamin B2, 400 mg Tab, Take 400 mg by mouth once daily., Disp: 30 tablet, Rfl: 11    tiZANidine (ZANAFLEX) 4 MG tablet, Take 1.5 tablets (6 mg total) by mouth every evening. No alcohol, no driving with medication., Disp: 45 tablet, Rfl: 5    ubrogepant (UBRELVY) 100 mg tablet, TAKE 1 TABLET BY MOUTH EVERY 2 HOURS AS NEEDED FOR MIGRAINE. MAX 2 TABLETS (200 MG) PER DAY., Disp: 16 tablet, Rfl: 5    verapamiL (CALAN) 40 MG Tab, Take 1 tablet (40 mg total) by mouth 2 (two) times daily., Disp: 60 tablet, Rfl: 11    zolpidem (AMBIEN) 10 mg Tab, Take 1 tablet (10 mg total) by mouth every evening., Disp: 30  tablet, Rfl: 3    Current Facility-Administered Medications:     onabotulinumtoxina injection 200 Units, 200 Units, Intramuscular, q12 weeks, Kimnoé Luci, BC, 200 Units at 06/08/23 1200      Labs/Imaging/Testing:     No visits with results within 7 Day(s) from this visit.   Latest known visit with results is:   Admission on 07/30/2023, Discharged on 07/30/2023   Component Date Value Ref Range Status    WBC 07/30/2023 13.73 (H)  3.90 - 12.70 K/uL Final    RBC 07/30/2023 4.67  4.00 - 5.40 M/uL Final    Hemoglobin 07/30/2023 14.0  12.0 - 16.0 g/dL Final    Hematocrit 07/30/2023 42.5  37.0 - 48.5 % Final    MCV 07/30/2023 91  82 - 98 fL Final    MCH 07/30/2023 30.0  27.0 - 31.0 pg Final    MCHC 07/30/2023 32.9  32.0 - 36.0 g/dL Final    RDW 07/30/2023 12.5  11.5 - 14.5 % Final    Platelets 07/30/2023 556 (H)  150 - 450 K/uL Final    MPV 07/30/2023 10.1  9.2 - 12.9 fL Final    Immature Granulocytes 07/30/2023 1.9 (H)  0.0 - 0.5 % Final    Gran # (ANC) 07/30/2023 8.7 (H)  1.8 - 7.7 K/uL Final    Immature Grans (Abs) 07/30/2023 0.26 (H)  0.00 - 0.04 K/uL Final    Comment: Mild elevation in immature granulocytes is non specific and   can be seen in a variety of conditions including stress response,   acute inflammation, trauma and pregnancy. Correlation with other   laboratory and clinical findings is essential.      Lymph # 07/30/2023 2.4  1.0 - 4.8 K/uL Final    Mono # 07/30/2023 1.3 (H)  0.3 - 1.0 K/uL Final    Eos # 07/30/2023 0.7 (H)  0.0 - 0.5 K/uL Final    Baso # 07/30/2023 0.40 (H)  0.00 - 0.20 K/uL Final    nRBC 07/30/2023 0  0 /100 WBC Final    Gran % 07/30/2023 63.4  38.0 - 73.0 % Final    Lymph % 07/30/2023 17.1 (L)  18.0 - 48.0 % Final    Mono % 07/30/2023 9.5  4.0 - 15.0 % Final    Eosinophil % 07/30/2023 5.2  0.0 - 8.0 % Final    Basophil % 07/30/2023 2.9 (H)  0.0 - 1.9 % Final    Differential Method 07/30/2023 Automated   Final    Sodium 07/30/2023 142  136 - 145 mmol/L Final    Potassium 07/30/2023  2.8 (L)  3.5 - 5.1 mmol/L Final    Chloride 07/30/2023 100  95 - 110 mmol/L Final    CO2 07/30/2023 29  23 - 29 mmol/L Final    Glucose 07/30/2023 107  70 - 110 mg/dL Final    BUN 07/30/2023 14  6 - 20 mg/dL Final    Creatinine 07/30/2023 0.8  0.5 - 1.4 mg/dL Final    Calcium 07/30/2023 10.2  8.7 - 10.5 mg/dL Final    Total Protein 07/30/2023 8.1  6.0 - 8.4 g/dL Final    Albumin 07/30/2023 4.2  3.5 - 5.2 g/dL Final    Total Bilirubin 07/30/2023 1.1 (H)  0.1 - 1.0 mg/dL Final    Comment: For infants and newborns, interpretation of results should be based  on gestational age, weight and in agreement with clinical  observations.    Premature Infant recommended reference ranges:  Up to 24 hours.............<8.0 mg/dL  Up to 48 hours............<12.0 mg/dL  3-5 days..................<15.0 mg/dL  6-29 days.................<15.0 mg/dL      Alkaline Phosphatase 07/30/2023 98  55 - 135 U/L Final    AST 07/30/2023 29  10 - 40 U/L Final    ALT 07/30/2023 36  10 - 44 U/L Final    eGFR 07/30/2023 >60.0  >60 mL/min/1.73 m^2 Final    Anion Gap 07/30/2023 13  8 - 16 mmol/L Final    Prothrombin Time 07/30/2023 10.7  9.0 - 12.5 sec Final    INR 07/30/2023 1.0  0.8 - 1.2 Final    Comment: Coumadin Therapy:  2.0 - 3.0 for INR for all indicators except mechanical heart valves  and antiphospholipid syndromes which should use 2.5 - 3.5.      TSH 07/30/2023 1.332  0.400 - 4.000 uIU/mL Final    Cholesterol 07/30/2023 160  120 - 199 mg/dL Final    Comment: The National Cholesterol Education Program (NCEP) has set the  following guidelines (reference ranges) for Cholesterol:  Optimal.....................<200 mg/dL  Borderline High.............200-239 mg/dL  High........................> or = 240 mg/dL      Triglycerides 07/30/2023 164 (H)  30 - 150 mg/dL Final    Comment: The National Cholesterol Education Program (NCEP) has set the  following guidelines (reference values) for triglycerides:  Normal......................<150 mg/dL  Borderline  High.............150-199 mg/dL  High........................200-499 mg/dL      HDL 07/30/2023 47  40 - 75 mg/dL Final    Comment: The National Cholesterol Education Program (NCEP) has set the  following guidelines (reference values) for HDL Cholesterol:  Low...............<40 mg/dL  Optimal...........>60 mg/dL      LDL Cholesterol 07/30/2023 80.2  63.0 - 159.0 mg/dL Final    Comment: The National Cholesterol Education Program (NCEP) has set the  following guidelines (reference values) for LDL Cholesterol:  Optimal.......................<130 mg/dL  Borderline High...............130-159 mg/dL  High..........................160-189 mg/dL  Very High.....................>190 mg/dL      HDL/Cholesterol Ratio 07/30/2023 29.4  20.0 - 50.0 % Final    Total Cholesterol/HDL Ratio 07/30/2023 3.4  2.0 - 5.0 Final    Non-HDL Cholesterol 07/30/2023 113  mg/dL Final    Comment: Risk category and Non-HDL cholesterol goals:  Coronary heart disease (CHD)or equivalent (10-year risk of CHD >20%):  Non-HDL cholesterol goal     <130 mg/dL  Two or more CHD risk factors and 10-year risk of CHD <= 20%:  Non-HDL cholesterol goal     <160 mg/dL  0 to 1 CHD risk factor:  Non-HDL cholesterol goal     <190 mg/dL      POC PTWBT 07/30/2023 13.6  9.7 - 14.3 sec Final    POC PTINR 07/30/2023 1.1  0.9 - 1.2 Final    Sample 07/30/2023 unknown   Final    POC Creatinine 07/30/2023 0.8  0.5 - 1.4 mg/dL Final    Sample 07/30/2023 unknown   Final    POCT Glucose 07/30/2023 110  70 - 110 mg/dL Final         Assessment and Plan:     1. Acute bronchitis, unspecified organism    Hi, I reviewed your e-visit questionnaire.  Based on your symptoms, I will suggest treating with azithromycin for possible bronchitis.  You can continue to use over the coutner medications such as the dayquil and nyquil you are using.  If you are not having improvement after these antibitoics, then would recommend follow up in person for an exam        E-Visit time Tracking     Day 1  Time (in minutes): 6     Total Time (in minutes): 6        Luc Venegas MD  1/9/2024

## 2024-01-12 ENCOUNTER — OFFICE VISIT (OUTPATIENT)
Dept: PSYCHIATRY | Facility: CLINIC | Age: 49
End: 2024-01-12
Payer: COMMERCIAL

## 2024-01-12 ENCOUNTER — PATIENT MESSAGE (OUTPATIENT)
Dept: PRIMARY CARE CLINIC | Facility: CLINIC | Age: 49
End: 2024-01-12
Payer: COMMERCIAL

## 2024-01-12 DIAGNOSIS — F33.41 RECURRENT MAJOR DEPRESSION IN PARTIAL REMISSION: ICD-10-CM

## 2024-01-12 DIAGNOSIS — F41.1 GAD (GENERALIZED ANXIETY DISORDER): ICD-10-CM

## 2024-01-12 DIAGNOSIS — G47.00 INSOMNIA, UNSPECIFIED TYPE: ICD-10-CM

## 2024-01-12 DIAGNOSIS — G43.009 MIGRAINE WITHOUT AURA AND WITHOUT STATUS MIGRAINOSUS, NOT INTRACTABLE: ICD-10-CM

## 2024-01-12 DIAGNOSIS — M79.7 FIBROMYALGIA: Primary | ICD-10-CM

## 2024-01-12 PROCEDURE — 99213 OFFICE O/P EST LOW 20 MIN: CPT | Mod: GQ,95,, | Performed by: STUDENT IN AN ORGANIZED HEALTH CARE EDUCATION/TRAINING PROGRAM

## 2024-01-12 PROCEDURE — 1160F RVW MEDS BY RX/DR IN RCRD: CPT | Mod: CPTII,95,, | Performed by: STUDENT IN AN ORGANIZED HEALTH CARE EDUCATION/TRAINING PROGRAM

## 2024-01-12 PROCEDURE — 1159F MED LIST DOCD IN RCRD: CPT | Mod: CPTII,95,, | Performed by: STUDENT IN AN ORGANIZED HEALTH CARE EDUCATION/TRAINING PROGRAM

## 2024-01-12 PROCEDURE — 4010F ACE/ARB THERAPY RXD/TAKEN: CPT | Mod: CPTII,95,, | Performed by: STUDENT IN AN ORGANIZED HEALTH CARE EDUCATION/TRAINING PROGRAM

## 2024-01-12 RX ORDER — BUPROPION HYDROCHLORIDE 300 MG/1
300 TABLET ORAL DAILY
Qty: 90 TABLET | Refills: 1 | Status: SHIPPED | OUTPATIENT
Start: 2024-01-12 | End: 2024-07-10

## 2024-01-12 RX ORDER — HYDROXYZINE PAMOATE 25 MG/1
25 CAPSULE ORAL EVERY 8 HOURS PRN
Qty: 90 CAPSULE | Refills: 1 | Status: SHIPPED | OUTPATIENT
Start: 2024-01-12 | End: 2024-04-11

## 2024-01-12 RX ORDER — DULOXETIN HYDROCHLORIDE 60 MG/1
60 CAPSULE, DELAYED RELEASE ORAL DAILY
Qty: 90 CAPSULE | Refills: 1 | Status: SHIPPED | OUTPATIENT
Start: 2024-01-12 | End: 2024-07-10

## 2024-01-13 DIAGNOSIS — G47.00 INSOMNIA, UNSPECIFIED TYPE: ICD-10-CM

## 2024-01-13 RX ORDER — ZOLPIDEM TARTRATE 10 MG/1
10 TABLET ORAL NIGHTLY
Qty: 30 TABLET | Refills: 3 | Status: CANCELLED | OUTPATIENT
Start: 2024-01-13 | End: 2024-05-12

## 2024-01-17 ENCOUNTER — PATIENT MESSAGE (OUTPATIENT)
Dept: PSYCHIATRY | Facility: CLINIC | Age: 49
End: 2024-01-17
Payer: COMMERCIAL

## 2024-01-17 RX ORDER — ZOLPIDEM TARTRATE 10 MG/1
10 TABLET ORAL NIGHTLY
Qty: 30 TABLET | Refills: 3 | Status: SHIPPED | OUTPATIENT
Start: 2024-01-23 | End: 2024-05-06

## 2024-01-25 ENCOUNTER — CLINICAL SUPPORT (OUTPATIENT)
Dept: REHABILITATION | Facility: HOSPITAL | Age: 49
End: 2024-01-25
Payer: COMMERCIAL

## 2024-01-25 DIAGNOSIS — Z74.09 IMPAIRED FUNCTIONAL MOBILITY, BALANCE, GAIT, AND ENDURANCE: Primary | ICD-10-CM

## 2024-01-25 PROCEDURE — 97110 THERAPEUTIC EXERCISES: CPT | Mod: PN

## 2024-01-25 PROCEDURE — 97112 NEUROMUSCULAR REEDUCATION: CPT | Mod: PN

## 2024-01-25 NOTE — PROGRESS NOTES
[facility]   Physical Therapy Treatment Note     Name: Mirna Pollock  Clinic Number: [id number]     Therapy Diagnosis:           Encounter Diagnosis   Name Primary?    Impaired functional mobility, balance, gait, and endurance Yes         Physician: Marcus Osman MD     Visit Date: 12/14/2023      Physician Orders: PT Eval and Treat   Medical Diagnosis from Referral: I63.531 (ICD-10-CM) - Stroke due to occlusion of right posterior cerebral artery  Evaluation Date: 7/19/2023  Authorization Period Expiration: 12/31/23  Plan of Care Expiration: 12/15/23  New plan of care: 12/16/23 to 2/10/24  Progress Note Due: 1/14/2024  Visit # / Visits authorized: 1/20 (19 visits in 2023)      Precautions: Standard, Fall, and Monitor BP     Time In: 09:25  Time Out: 10:20  Total Billable Time: 55 minutes     SUBJECTIVE      Pt reports: She woke with with a headaches but her blood pressure this morning was normal.   Patient was compliant with home exercise program   Response to previous treatment: No adverse effects reported  Functional change: ongoing     Pain: 4/10  Location: headache      OBJECTIVE             TREATMENT      Mirna received the treatments listed below:       - 50 = 170   Therapeutic HR range 95 - 136     Mirna received therapeutic exercises to develop strength and endurance for 12 minutes including:     Ambulation on treadmill at 2.5 mph at 5 incline 6 mins walk              - decreased foot clearance noted with increased duration  HR upto 124     Standing:  X 15 row with green theraband, cues to decrease scap elevation   X 15 standing pull down green theraband    given green theraband for HEP      Mirna participated in neuromuscular re-education activities to improve: Balance for 43 minutes. The following activities were included:  GaitBetter: [id number]  Scanning environment  Speed: 2.0 mph -->2.5  Time: 7 minutes  Overall Score: 676  Distance: 377 yds  Distractors: easy  Obstacles:  42%  Light & Fo%  Navigation: 100%  Scannin%     Parallel bar:   X 30 sec split stance on soft side of bosu, bilateral left E  X 10 forward lunge onto soft side of bosu, bilateral lower extremity   X 10 step up onto soft side of bosu with opposite high knee, bilateral lower extremity   X 60 sec standing on firm side of bosu  X 30 sec bosu A<>P mobility on firm side of bosu  X 30 sec bosu lateral mobility on firm side of bosu     All contact guard assist:  25 feet x 2 walking lunge with rotation  25 feet x 2 walking holding 15 lb kettle bell  25 feet x 2 butt kicks  25 feet x 2 walking high knee march holding 15 lb kettle bell, 3 sec hold each side  25 feet x 1 walking lunge with 15lb kettle bell        Time above includes seated rest breaks     PATIENT EDUCATION AND HOME EXERCISES      Home Exercises Provided and Patient Education Provided      Education provided:   - continue with home exercise program, resume walking program on treadmill  - reviewed progress noted     Written Home Exercises Provided: Patient instructed to cont prior HEP.  Exercises were reviewed and Mirna was able to demonstrate them prior to the end of the session.  Mirna demonstrated good  understanding of the education provided.      See EMR under Patient Instructions for exercises provided 2023.       ASSESSMENT     Mirna tolerated physical therapy session well. She had appropriate heart rate elevation with aerobic and balance activity.  She requires a few seated rest breaks during session due to fatigue. Performed some scapular strengthening exercises due to complaints of shoulder soreness with holding treamill rail, discussed potential referral to Occupational Therapist or Ortho Physical Therapy for shoulder strengthening.   She was able to progress dynamic balance today with addition of kettle bell.  She had some loss of balance with dynamic activity but able to self recover.  She will continue to benefit from skilled  Physical Therapy to address impairments and maximize mobility.      Mirna Is progressing well towards her goals.   Pt prognosis is Fair.      Pt will continue to benefit from skilled outpatient physical therapy to address the deficits listed in the problem list box on initial evaluation, provide pt/family education and to maximize pt's level of independence in the home and community environment.      Pt's spiritual, cultural and educational needs considered and pt agreeable to plan of care and goals.     Anticipated barriers to physical therapy: ongoing medical management     Goals:    Status as of 12/14/23  Short Term Goals- 4 Weeks  Pt to demonstrate independence in performance of HEP in order to improve daily level of physical activity and improve carry over session to session. -MET 9/25/23  Pt to improve B LE strength by > / = 1/2 MMT score in order to improve strength for daily activities. -MET 9/25/23  Pt to improve FGA score to > / = 26/30 in order to demonstrate improved dynamic balance and safety. ongoing  Pt to demonstrate improvement in condition 4 on MCTSIB to > / = 15 seconds in order to improve safety. MET 10/17/23  Pt to improve SLS time B to > / = 5 seconds in order to improve gait stability for community mobility. MET 10/17/23     Long Term Goals - 12 Weeks  Pt to demonstrate compliance with HEP > / = 3x per week in order to improve daily level of physical activity and improve carry over session to session. Ongoing  Pt to improve B LE strength by > / = 1 MMT score in order to improve strength for daily activities. Ongoing.   Pt to improve FGA score to > / = 28/30 in order to demonstrate improved dynamic balance and safety. ongoing  Pt to demonstrate improvement in condition 4 on MCTSIB to > / = 30 seconds with min sway in order to improve safety. MET 10/17/23  Pt to improve SLS time B to > / = 10 seocnds in order to improve gait stability for community mobility. ongoing  NEW: Pt to improve 6 minute  walk test distance by 50 meters in order to demo improved endurance for community mobility. Ongoing/ improved     PLAN   New plan of care: 12/16/23 to 2/10/24  Outpatient Physical Therapy 2 times weekly      Continue to monitor BP. Continue with Gait Better use and balance challenges, increase to 3 cone tap      Sindy Valladares, PT, DPT,   Board-Certified Clinical Specialist in Neurologic Physical Therapy   1/25/2023

## 2024-01-26 ENCOUNTER — PATIENT MESSAGE (OUTPATIENT)
Dept: PRIMARY CARE CLINIC | Facility: CLINIC | Age: 49
End: 2024-01-26
Payer: COMMERCIAL

## 2024-01-31 ENCOUNTER — PATIENT MESSAGE (OUTPATIENT)
Dept: REHABILITATION | Facility: HOSPITAL | Age: 49
End: 2024-01-31

## 2024-02-02 ENCOUNTER — PATIENT MESSAGE (OUTPATIENT)
Dept: REHABILITATION | Facility: HOSPITAL | Age: 49
End: 2024-02-02
Payer: COMMERCIAL

## 2024-02-02 ENCOUNTER — DOCUMENTATION ONLY (OUTPATIENT)
Dept: REHABILITATION | Facility: HOSPITAL | Age: 49
End: 2024-02-02
Payer: COMMERCIAL

## 2024-02-02 NOTE — PROGRESS NOTES
Missed Visit/Cancellation      Date: 2/2/2024             Canceled Number: 5  No Show Number: 0                                                                                                                Pt initially had visit scheduled for today for 1515  Reason for cancellation: sinus problems, MD visit  Pt's next scheduled physical therapy visit is 2/5/24      Sindy Valladares, PT, DPT  2/2/2024

## 2024-02-05 ENCOUNTER — DOCUMENTATION ONLY (OUTPATIENT)
Dept: REHABILITATION | Facility: HOSPITAL | Age: 49
End: 2024-02-05
Payer: COMMERCIAL

## 2024-02-05 NOTE — PROGRESS NOTES
Missed Visit/Cancellation      Date: 2/5/2024             Canceled Number: 6  No Show Number: 0                                                                                                                Pt initially had visit scheduled for today for 1030  Reason for cancellation: sick  Pt's next scheduled physical therapy visit is 2/7/24      Sindy Valladares, PT, DPT  2/5/2024

## 2024-02-06 DIAGNOSIS — Z12.11 COLON CANCER SCREENING: ICD-10-CM

## 2024-02-07 ENCOUNTER — CLINICAL SUPPORT (OUTPATIENT)
Dept: REHABILITATION | Facility: HOSPITAL | Age: 49
End: 2024-02-07
Payer: COMMERCIAL

## 2024-02-07 DIAGNOSIS — Z74.09 IMPAIRED FUNCTIONAL MOBILITY, BALANCE, GAIT, AND ENDURANCE: Primary | ICD-10-CM

## 2024-02-07 PROCEDURE — 97110 THERAPEUTIC EXERCISES: CPT | Mod: PN | Performed by: PHYSICAL THERAPIST

## 2024-02-07 PROCEDURE — 97112 NEUROMUSCULAR REEDUCATION: CPT | Mod: PN | Performed by: PHYSICAL THERAPIST

## 2024-02-07 NOTE — PROGRESS NOTES
See plan of care for updated physical therapy plan of care and physical therapy progress note.     Sindy Valladares, PT, DPT,   Board-Certified Clinical Specialist in Neurologic Physical Therapy   Certified Brain Injury Specialist

## 2024-02-08 NOTE — PROGRESS NOTES
[facility] Progress Note  2/9/2024          Reason for Visit:      is having trouble with sinus congestion.      History of Present Illness:     Sinus sytmpoms  2 months of runny nose, congestion, difficulty breathing out of nose.  Covid test at home negative at beginning  No fevers.  No yellow or green secretions.  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    CTS (carpal tunnel syndrome)    Accelerated hypertension    Cervical muscle pain    Cervical radiculopathy    H/O catheter-based closure of atrial septal defect    Adult congenital heart disease    Overweight (BMI 25.0-29.9)    LALITA (generalized anxiety disorder)    Fibromyalgia    Social anxiety disorder    Status migrainosus    Insomnia    Recurrent major depressive disorder, in full remission    Hypokalemia    Stage 3a chronic kidney disease    Proteinuria    Cerebrovascular small vessel disease    Drug rash    Head Tingling, Right Sided    Impaired functional mobility, balance, gait, and endurance    Quadrantanopsia, left    Fine motor impairment    Right hand weakness         Medications:       Current Outpatient Medications:     aspirin (ECOTRIN) 81 MG EC tablet, Take 1 tablet (81 mg total) by mouth once daily., Disp: 30 tablet, Rfl: 11    atorvastatin (LIPITOR) 40 MG tablet, Take 1 tablet (40 mg total) by mouth once daily., Disp: 90 tablet, Rfl: 3    benazepril-hydrochlorthiazide (LOTENSIN HCT) 20-25 mg Tab, Take 1 tablet by mouth once daily. Please check with your primary care provider. You've been started on Losartan, and so it's unclear if you'll need additional  anti-hypertensive medications., Disp: 90 tablet, Rfl: 3    buPROPion (WELLBUTRIN XL) 300 MG 24 hr tablet, Take 1 tablet (300 mg total) by mouth once daily., Disp: 90 tablet, Rfl: 1    clopidogreL (PLAVIX) 75 mg tablet, Take 1 tablet (75 mg total) by mouth once daily., Disp: 30 tablet, Rfl: 11    DULoxetine (CYMBALTA) 60 MG capsule, Take 1 capsule (60 mg total) by mouth once daily., Disp: 90 capsule, Rfl: 1    galcanezumab-gnlm 120 mg/mL PnIj, Inject 120 mg (one pen) into the skin every 28 days. Begin 28 days after loading dose., Disp: 1 mL, Rfl: 10    hydrOXYzine pamoate (VISTARIL) 25 MG Cap, Take 1 capsule (25 mg total) by mouth every 8 (eight) hours as needed (anxiety)., Disp: 90 capsule, Rfl: 1    magnesium oxide (MAG-OX) 400 mg (241.3 mg magnesium) tablet, Take 1 tablet (400 mg total) by mouth once daily. (Patient not taking: Reported on 11/13/2023), Disp: 30 tablet, Rfl: 1    ondansetron (ZOFRAN-ODT) 4 MG TbDL, DISSOLVE 1 TABLET ON THE TONGUE EVERY 6 HOURS AS NEEDED FOR NAUSEA, Disp: , Rfl:     promethazine (PHENERGAN) 25 MG tablet, TAKE 1 TABLET BY MOUTH EVERY 4 TO 6 HOURS AS NEEDED FOR NAUSEA, Disp: 20 tablet, Rfl: 1    riboflavin, vitamin B2, 400 mg Tab, Take 400 mg by mouth once daily., Disp: 30 tablet, Rfl: 11    tiZANidine (ZANAFLEX) 4 MG tablet, Take 1.5 tablets (6 mg total) by mouth every evening. No alcohol, no driving with medication., Disp: 45 tablet, Rfl: 5    ubrogepant (UBRELVY) 100 mg tablet, TAKE 1 TABLET BY MOUTH EVERY 2 HOURS AS NEEDED FOR MIGRAINE. MAX 2 TABLETS (200 MG) PER DAY., Disp: 16 tablet, Rfl: 5    verapamiL (CALAN) 40 MG Tab, Take 1 tablet (40 mg total) by mouth 2 (two) times daily., Disp: 60 tablet, Rfl: 11    zolpidem (AMBIEN) 10 mg Tab, Take 1 tablet (10 mg total) by mouth every evening., Disp: 30 tablet, Rfl: 3    Current Facility-Administered Medications:     onabotulinumtoxina injection 200 Units, 200 Units, Intramuscular, q12 weeks, Luci Ortez FNP, 200 Units at  06/08/23 1200        Review of Systems:     Review of Systems   Constitutional:  Negative for chills and fever.   HENT:  Positive for nasal congestion and rhinorrhea. Negative for ear pain and sore throat.    Respiratory:  Negative for cough, shortness of breath and wheezing.    Cardiovascular:  Negative for chest pain and palpitations.   Gastrointestinal:  Negative for constipation, diarrhea, nausea and vomiting.   Genitourinary:  Negative for dysuria and hematuria.   Musculoskeletal:  Negative for joint swelling and joint deformity.   Neurological:  Negative for dizziness and weakness.           Physical Exam:       Physical Exam  Constitutional:       General: She is not in acute distress.  HENT:      Right Ear: Tympanic membrane normal.      Left Ear: Tympanic membrane normal.      Nose: Congestion and rhinorrhea present.      Mouth/Throat:      Pharynx: No oropharyngeal exudate or posterior oropharyngeal erythema.   Cardiovascular:      Rate and Rhythm: Normal rate and regular rhythm.   Pulmonary:      Effort: Pulmonary effort is normal. No respiratory distress.      Breath sounds: No wheezing.   Skin:     General: Skin is warm.   Neurological:      General: No focal deficit present.      Mental Status: She is alert and oriented to person, place, and time.           Labs/Imaging/Testing:     Most recent CBC:  Lab Results   Component Value Date    WBC 13.73 (H) 07/30/2023    HGB 14.0 07/30/2023     (H) 07/30/2023    MCV 91 07/30/2023       Most recent Lipids:  Lab Results   Component Value Date    CHOL 160 07/30/2023    HDL 47 07/30/2023    LDLCALC 80.2 07/30/2023    TRIG 164 (H) 07/30/2023       Most recent Chemistry:  Lab Results   Component Value Date     07/30/2023    K 2.8 (L) 07/30/2023     07/30/2023    CO2 29 07/30/2023    BUN 14 07/30/2023    CREATININE 0.8 07/30/2023     07/30/2023    CALCIUM 10.2 07/30/2023    ALT 36 07/30/2023    AST 29 07/30/2023    ALKPHOS 98 07/30/2023     PROT 8.1 07/30/2023    ALBUMIN 4.2 07/30/2023       Other tests:  Lab Results   Component Value Date    TSH 1.332 07/30/2023    FREET4 1.67 (H) 08/05/2010    INR 1.1 07/30/2023    HGBA1C 5.1 07/05/2023    FERRITIN 145 06/28/2022    CXSQWTCY35 333 07/30/2013    VHQHCAAZ74GE 21 (L) 07/30/2013    MG 2.3 07/06/2023    SEDRATE 8 03/31/2014    LIPASE 9 09/28/2017    AMYLASE 63 12/13/2012     COVID and Flu negative    Assessment and Plan:     1. Sinusitis, unspecified chronicity, unspecified location  Will give a brief course of steroids to try to help with this  Follow up if no improvement or worsening  Would suggest using a daily anthistamine like cetirizine 10 mg daily    2. Chronic kidney disease, stage 3a  Stable       Luc Venegas MD  2/9/2024    This note was prepared using voice-recognition software.  Although efforts are made to proofread the note, some errors may persist in the final document.

## 2024-02-09 ENCOUNTER — OFFICE VISIT (OUTPATIENT)
Dept: PRIMARY CARE CLINIC | Facility: CLINIC | Age: 49
End: 2024-02-09
Payer: COMMERCIAL

## 2024-02-09 DIAGNOSIS — J32.9 SINUSITIS, UNSPECIFIED CHRONICITY, UNSPECIFIED LOCATION: Primary | ICD-10-CM

## 2024-02-09 DIAGNOSIS — N18.31 CHRONIC KIDNEY DISEASE, STAGE 3A: ICD-10-CM

## 2024-02-09 LAB
CTP QC/QA: YES
CTP QC/QA: YES
POC MOLECULAR INFLUENZA A AGN: NEGATIVE
POC MOLECULAR INFLUENZA B AGN: NEGATIVE
SARS-COV-2 RDRP RESP QL NAA+PROBE: NEGATIVE

## 2024-02-09 PROCEDURE — 87635 SARS-COV-2 COVID-19 AMP PRB: CPT | Mod: QW,S$GLB,, | Performed by: INTERNAL MEDICINE

## 2024-02-09 PROCEDURE — 87502 INFLUENZA DNA AMP PROBE: CPT | Mod: QW,S$GLB,, | Performed by: INTERNAL MEDICINE

## 2024-02-09 PROCEDURE — 99999 PR PBB SHADOW E&M-EST. PATIENT-LVL II: CPT | Mod: PBBFAC,,, | Performed by: INTERNAL MEDICINE

## 2024-02-09 PROCEDURE — 4010F ACE/ARB THERAPY RXD/TAKEN: CPT | Mod: CPTII,S$GLB,, | Performed by: INTERNAL MEDICINE

## 2024-02-09 PROCEDURE — 99214 OFFICE O/P EST MOD 30 MIN: CPT | Mod: S$GLB,,, | Performed by: INTERNAL MEDICINE

## 2024-02-09 RX ORDER — METHYLPREDNISOLONE 4 MG/1
TABLET ORAL
Qty: 21 EACH | Refills: 0 | Status: SHIPPED | OUTPATIENT
Start: 2024-02-09 | End: 2024-03-01

## 2024-02-09 NOTE — PLAN OF CARE
[facility]   Physical Therapy Treatment Note/ Progress Note     Name: Mirna Pollock  Clinic Number: [id number]     Therapy Diagnosis:           Encounter Diagnosis   Name Primary?    Impaired functional mobility, balance, gait, and endurance Yes         Physician: Marcus Osman MD     Visit Date: 2/7/2024      Physician Orders: PT Eval and Treat   Medical Diagnosis from Referral: I63.531 (ICD-10-CM) - Stroke due to occlusion of right posterior cerebral artery  Evaluation Date: 7/19/2023  Authorization Period Expiration: 12/31/23  plan of care: 12/16/23 to 2/10/24  Updated plan of care: 2/11/24 to 3/29/24    Progress Note Due: 3/8/2024  Visit # / Visits authorized: 2/20 (19 visits in 2023)      Precautions: Standard, Fall, and Monitor BP     Time In: 1027  Time Out: 1113  Total Billable Time: 45 minutes     SUBJECTIVE      Pt reports: has been dealing with a sinus infection for about a month. Balance hs been about the same, more unsteady in the morning.    Patient was compliant with home exercise program   Response to previous treatment: No adverse effects reported  Functional change: ongoing     Pain: 0/10  Location: headache      OBJECTIVE          9/25/23 11/16/23 12/14/23 2/7/24   6 mins walk test 1095 ft 1080 ft 1105 ft 1119 ft   Right single leg stance  Not tested  7 sec 3 sec 14 sec   Left single leg stance  Not tested  7 sec 6-7 sec 3-5 sec     Functional Gait Assessment:   1. Gait on level surface =  2, 6.2 sec   (3) Normal: less than 5.5 sec, no A.D., no imbalance, normal gait pattern, deviates< 6in   (2) Mild impairment: 7-5.6 sec, uses A.D., mild gait deviations, or deviates 6-10 in   (1) Moderate impairment: > 7 sec, slow speed, imbalance, deviates 10-15 in.   (0) Severe impairment: needs assist, deviates >15 in, reach/touch wall  2. Change in Gait Speed = 3   (3) Normal: smooth change w/o loss of balance or gait deviation, deviates < 6 in, significant difference between  speeds   (2) Mild impairment: changes speed, but demonstrates mild gait deviations, deviates 6-10 in, OR no deviations but unable to significantly speed, OR uses A.D.   (1) Moderate impairment: minor changes to speed, OR changes speed w/ significant deviations, deviates 10-15 in, OR  Changes speed , but loses balance & recovers   (0) Severe impairment: cannot change speed, deviates >15 in, or loses balance & needs assist  3. Gait with horizontal head turns  = 2   (3) Normal: no change in gait, deviates <6 in   (2) Mild impairment: slight change in speed, deviates 6-10 in, OR uses A.D.   (1) Moderate impairment: moderate change in speed, deviates 10-15 in   (0) Severe impairment: severe disruption of gait, deviates >15in  4. Gait with vertical head turns = 2   (3) Normal: no change in gait, deviates <6 in   (2) Mild impairment: slight change in speed, deviates 6-10 in OR uses A.D.   (1) Moderate impairment: moderate change in speed, deviates 10-15 in   (0) Severe impairment: severe disruption of gait, deviates >15 in  5. Gait with pivot turns = 3   (3) Normal: performs safely in 3 sec, no LOB   (2) Mild impairment: performs in >3 sec & no LOB, OR turns safely & requires several steps to regain LOB   (1) Moderate impairment: turns slow, OR requires several small steps for balance following turn & stop   (0) Severe impairment: cannot turn safely, needs assist  6. Step over obstacle = 3   (3) Normal: steps over 2 stacked boxes w/o change in speed or LOB   (2) Mild impairment: able to step over 1 box w/o change in speed or LOB   (1) Moderate impairment: steps over 1 box but must slow down, may require VC   (0) Severe impairment: cannot perform w/o assist  7. Gait with Narrow CALLIE = 1   (3) Normal: 10 steps no staggering   (2) Mild impairment: 7-9 steps   (1) Moderate impairment: 4-7 steps   (0) Severe impairment: < 4 steps or cannot perform w/o assist  8. Gait with eyes closed = 0   (3) Normal: < 7 sec, no A.D., no LOB,  normal gait pattern, deviates <6 in   (2) Mild impairment: 7.1-9 sec, mild gait deviations, deviates 6-10 in   (1) Moderate impairment: > 9 sec, abnormal pattern, LOB, deviates 10-15 in   (0) Severe impairment: cannot perform w/o assist, LOB, deviates >15in  9. Ambulating Backwards = 2   (3) Normal: no A.D., no LOB, normal gait pattern, deviates <6in   (2) Mild impairment: uses A.D., slower speed, mild gait deviations, deviates 6-10 in   (1) Moderate impairment: slow speed, abnormal gait pattern, LOB, deviates 10-15 in   (0) Severe impairment: severe gait deviations or LOB, deviates >15in  10. Steps = 3   (3) Normal: alternating feet, no rail   (2) Mild Impairment: alternating feet, uses rail   (1) Moderate impairment: step-to, uses rail   (0) Severe impairment: cannot perform safely    Score 21/30     Score:   <22/30 fall risk   <20/30 fall risk in older adults   <18/30 fall risk in Parkinsons   Scores by Decade:                        Age    Score                        40-49  (24-30)                       50-59  (25-30)                       60-69  (20-30)                       70-79  (16-30)  SUSHILA Philippe (2007). Reference Group Data for the Functional Gait Assessment. Physical Therapy (34)11, [id number].       TREATMENT      Mirna received the treatments listed below:       - 50 = 170   Therapeutic HR range 95 - 136    Mirna received therapeutic exercises to develop strength and endurance for 12 minutes includin mins walk    Leg press 45# 3 x 8  Heel raises 45# 3 x 8      Mirna participated in neuromuscular re-education activities to improve: Balance for 33 minutes. The following activities were included:     Functional Gait Assessment   single leg stance     Parallel bar:   Foam beam, tandem stance 2 x 30 sec  Tandem gait with 5 sec hold x 2 laps  X 10 step up onto soft side of bosu with opposite high knee, bilateral lower extremity, light upper extremity support  2 X 60 sec standing on flat side of  bosu  X 60 sec bosu A<>P mobility on firm side of bosu  X 60 sec bosu lateral mobility on firm side of bosu     All contact guard assist:  25 feet x 2 walking lunge with rotation       PATIENT EDUCATION AND HOME EXERCISES      Home Exercises Provided and Patient Education Provided      Education provided:   - continue with home exercise program, resume walking program on treadmill  - reviewed progress noted     Written Home Exercises Provided: Patient instructed to cont prior HEP.  Exercises were reviewed and Mirna was able to demonstrate them prior to the end of the session.  Mirna demonstrated good  understanding of the education provided.      See EMR under Patient Instructions for exercises provided 11/1/2023.       ASSESSMENT   Assessment period: 1/25/24 to 2/7/2024    Mirna tolerates physical therapy sessions well. She is compliant with home exercise program. Patient continues to note imbalance, more prevalent in the morning. She reports that she occasionally wall walks at home due to imbalance. single leg stance on right has improved but on left continues to be poor. Decreased accuracy of ankle strategy noted. Slight increase in gait velocity noted per 6 minute walk test. Imbalance is noted when walking with head turns and eyes closed. Patient veers significantly to left when walking with eyes closed. Per Functional Gait Assessment patient is still considered at increased risk for falls.  Progress may be limited due to lack of attendance due to recent sinus infection. Patient will continue to benefit from skilled Physical Therapy to address impairments and maximize mobility. Plan of care extended x 8 weeks with frequency decreased to 1x/week to allow for continued progression of home exercise program and improvement of balance responses.      Mirna Is progressing well towards her goals.   Pt prognosis is Fair.      Pt will continue to benefit from skilled outpatient physical therapy to address the deficits listed in  the problem list box on initial evaluation, provide pt/family education and to maximize pt's level of independence in the home and community environment.      Pt's spiritual, cultural and educational needs considered and pt agreeable to plan of care and goals.     Anticipated barriers to physical therapy: ongoing medical management     Goals:    Status as of 2/7/24  Short Term Goals- 4 Weeks  Pt to demonstrate independence in performance of HEP in order to improve daily level of physical activity and improve carry over session to session. -MET 9/25/23  Pt to improve B LE strength by > / = 1/2 MMT score in order to improve strength for daily activities. -MET 9/25/23  Pt to improve FGA score to > / = 26/30 in order to demonstrate improved dynamic balance and safety. ongoing  Pt to demonstrate improvement in condition 4 on MCTSIB to > / = 15 seconds in order to improve safety. MET 10/17/23  Pt to improve SLS time B to > / = 5 seconds in order to improve gait stability for community mobility. MET 10/17/23     Long Term Goals - 12 Weeks  Pt to demonstrate compliance with HEP > / = 3x per week in order to improve daily level of physical activity and improve carry over session to session. Ongoing  Pt to improve B LE strength by > / = 1 MMT score in order to improve strength for daily activities. Ongoing.   Pt to improve FGA score to > / = 28/30 in order to demonstrate improved dynamic balance and safety. ongoing  Pt to demonstrate improvement in condition 4 on MCTSIB to > / = 30 seconds with min sway in order to improve safety. MET 10/17/23  Pt to improve SLS time B to > / = 10 seocnds in order to improve gait stability for community mobility. Improved on right   NEW: Pt to improve 6 minute walk test distance by 50 meters in order to demo improved endurance for community mobility. Ongoing/ improved     PLAN   Updated plan of care: 2/11/24 to 3/29/24  Outpatient Physical Therapy 1 times weekly      Continue to monitor BP.  Continue with Gait Better use and balance challenges, increase to 3 cone tap      Sindy Valladares, PT, DPT,   Board-Certified Clinical Specialist in Neurologic Physical Therapy   2/7/2024

## 2024-02-14 ENCOUNTER — PATIENT MESSAGE (OUTPATIENT)
Dept: REHABILITATION | Facility: HOSPITAL | Age: 49
End: 2024-02-14

## 2024-03-06 ENCOUNTER — PATIENT MESSAGE (OUTPATIENT)
Dept: REHABILITATION | Facility: HOSPITAL | Age: 49
End: 2024-03-06
Payer: COMMERCIAL

## 2024-03-06 ENCOUNTER — DOCUMENTATION ONLY (OUTPATIENT)
Dept: REHABILITATION | Facility: HOSPITAL | Age: 49
End: 2024-03-06
Payer: COMMERCIAL

## 2024-03-06 NOTE — PROGRESS NOTES
Missed Visit/Cancellation      Date: 3/6/2024              Canceled Number: 7  No Show Number: 0                                                                                                                Pt initially had visit scheduled for today for 1345  Reason for cancellation: sick  Pt's next scheduled physical therapy visit is 3/13/24      Sindy Valladares, PT, DPT  3/6/2024

## 2024-03-19 DIAGNOSIS — G43.009 MIGRAINE WITHOUT AURA AND WITHOUT STATUS MIGRAINOSUS, NOT INTRACTABLE: ICD-10-CM

## 2024-03-19 RX ORDER — TIZANIDINE 4 MG/1
TABLET ORAL
Qty: 45 TABLET | Refills: 5 | Status: SHIPPED | OUTPATIENT
Start: 2024-03-19 | End: 2024-05-01 | Stop reason: SDUPTHER

## 2024-03-20 ENCOUNTER — DOCUMENTATION ONLY (OUTPATIENT)
Dept: REHABILITATION | Facility: HOSPITAL | Age: 49
End: 2024-03-20
Payer: COMMERCIAL

## 2024-03-20 NOTE — PROGRESS NOTES
Missed Visit/Cancellation      Date: 3/20/2024             Canceled Number: 8  No Show Number: 0                                                                                                                Pt initially had visit scheduled for today for 1300  Reason for cancellation: no reason provided  Pt's has no future physical therapy visits scheduled     Sindy Valladares, PT, DPT  3/20/2024

## 2024-03-26 ENCOUNTER — TELEPHONE (OUTPATIENT)
Dept: NEUROLOGY | Facility: CLINIC | Age: 49
End: 2024-03-26
Payer: COMMERCIAL

## 2024-03-26 NOTE — TELEPHONE ENCOUNTER
Spoke to Pt about her April 10 appt. I needed to clarify with the Pt that this appt would be a follow up and not an appt for Botox. Pt was last seen 10/16/23. Pt needs an updated note and new Botox referral in order to receive Botox. Pt is aware and knows that she needs to be seen first and that her Botox will be scheduled at a later date.

## 2024-04-05 ENCOUNTER — TELEPHONE (OUTPATIENT)
Dept: NEUROLOGY | Facility: CLINIC | Age: 49
End: 2024-04-05
Payer: COMMERCIAL

## 2024-04-16 ENCOUNTER — PATIENT MESSAGE (OUTPATIENT)
Dept: PSYCHIATRY | Facility: CLINIC | Age: 49
End: 2024-04-16
Payer: COMMERCIAL

## 2024-04-16 DIAGNOSIS — G47.00 INSOMNIA, UNSPECIFIED TYPE: ICD-10-CM

## 2024-04-28 ENCOUNTER — PATIENT MESSAGE (OUTPATIENT)
Dept: PSYCHIATRY | Facility: CLINIC | Age: 49
End: 2024-04-28
Payer: COMMERCIAL

## 2024-04-28 DIAGNOSIS — G47.00 INSOMNIA, UNSPECIFIED TYPE: ICD-10-CM

## 2024-05-01 ENCOUNTER — OFFICE VISIT (OUTPATIENT)
Dept: NEUROLOGY | Facility: CLINIC | Age: 49
End: 2024-05-01
Payer: COMMERCIAL

## 2024-05-01 VITALS
WEIGHT: 157.06 LBS | BODY MASS INDEX: 26.14 KG/M2 | DIASTOLIC BLOOD PRESSURE: 85 MMHG | HEART RATE: 63 BPM | SYSTOLIC BLOOD PRESSURE: 147 MMHG

## 2024-05-01 DIAGNOSIS — I10 ESSENTIAL HYPERTENSION: ICD-10-CM

## 2024-05-01 DIAGNOSIS — F34.89 OTHER SPECIFIED PERSISTENT MOOD DISORDERS: ICD-10-CM

## 2024-05-01 DIAGNOSIS — F41.1 GAD (GENERALIZED ANXIETY DISORDER): ICD-10-CM

## 2024-05-01 DIAGNOSIS — G43.009 MIGRAINE WITHOUT AURA AND WITHOUT STATUS MIGRAINOSUS, NOT INTRACTABLE: Primary | ICD-10-CM

## 2024-05-01 DIAGNOSIS — Z86.73 HISTORY OF STROKE: ICD-10-CM

## 2024-05-01 PROCEDURE — 1159F MED LIST DOCD IN RCRD: CPT | Mod: CPTII,S$GLB,, | Performed by: NURSE PRACTITIONER

## 2024-05-01 PROCEDURE — 99214 OFFICE O/P EST MOD 30 MIN: CPT | Mod: S$GLB,,, | Performed by: NURSE PRACTITIONER

## 2024-05-01 PROCEDURE — 99999 PR PBB SHADOW E&M-EST. PATIENT-LVL III: CPT | Mod: PBBFAC,,, | Performed by: NURSE PRACTITIONER

## 2024-05-01 PROCEDURE — 3077F SYST BP >= 140 MM HG: CPT | Mod: CPTII,S$GLB,, | Performed by: NURSE PRACTITIONER

## 2024-05-01 PROCEDURE — 3008F BODY MASS INDEX DOCD: CPT | Mod: CPTII,S$GLB,, | Performed by: NURSE PRACTITIONER

## 2024-05-01 PROCEDURE — 3079F DIAST BP 80-89 MM HG: CPT | Mod: CPTII,S$GLB,, | Performed by: NURSE PRACTITIONER

## 2024-05-01 PROCEDURE — 1160F RVW MEDS BY RX/DR IN RCRD: CPT | Mod: CPTII,S$GLB,, | Performed by: NURSE PRACTITIONER

## 2024-05-01 PROCEDURE — G2211 COMPLEX E/M VISIT ADD ON: HCPCS | Mod: S$GLB,,, | Performed by: NURSE PRACTITIONER

## 2024-05-01 PROCEDURE — 4010F ACE/ARB THERAPY RXD/TAKEN: CPT | Mod: CPTII,S$GLB,, | Performed by: NURSE PRACTITIONER

## 2024-05-01 RX ORDER — TIZANIDINE 4 MG/1
TABLET ORAL
Qty: 45 TABLET | Refills: 5 | Status: SHIPPED | OUTPATIENT
Start: 2024-05-01

## 2024-05-01 NOTE — PROGRESS NOTES
"Established Patient   SUBJECTIVE:  Patient ID: Mirna Pollock   Chief Complaint: No chief complaint on file.    History of Present Illness:  Mirna Pollock is a 48 y.o. female who presents to clinic with her  for follow-up of headaches.     Recommendations made at last Office Visit on 10/16/23:  - For migraine prevention - continue emgality 120 mg SQ monthly, verapamil 40 mg bid and cymbalta 60 mg daily   - For acute migraines - triptans contraindicated given hx stroke and uncontrolled HTN  - For acute migraines - ubrelvy 100 mg    - Continue tracking headaches   - Hx CVA - continue regular f/up with Dr. Osman for management/monitoring   - Mood disorder - continue wellbutrin, cymbalta daily, management per psych - will avoid use of anti-depressants for migraine prevention   - HTN - BP significantly elevated in clinic today, goal BP < 130/80 mmHg, encouraged her to schedule f/up with PCP for close monitoring/med adjustments   - RTC in 3-6 months or sooner if needed     05/01/2024 - Interval History:  Migraines have been "not bad at all", down to only 1, "maybe 2" per month.  She is able to successfully abort her migraines with aspirin.  She has continued emgality 120 mg SQ monthly for migraine prevention, verapamil 40 mg bid, tizanidine 6 mg qhs and cymbalta 60 mg daily.  Pleased with her current control, here today to discuss restarting botox.     Otherwise, information below is still accurate and current.     10/16/2023 - Interval History:  Migraines have been "pretty good", had a migraine when weather front came in, but was not as bad as it previously would have been.  Otherwise, migraines have been relatively infrequent.  She has been using emgality monthly for migriane prevention, is pleased with the current state of her migraines, does not wish to make adjustments to her treatment plan.       BP elevated today 176/118.  Has been checking BP at home, readings typically 170's systolic over 80's diastolic.  Has " been seeing primary care provider regularly is who closely monitoring blood pressure and adjusting meds.      Otherwise, information below is still accurate and current.      07/31/2023 - Interval History:  Significant changes in medical history since last visit 6/8.  Patient presented to ED on 7/5 with complaints of intermittent migraines with visual migraines x 5 days.  Aura was described as black dots floating on both sides of her vision.  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  She was started on verapamil and treated for migraine, blood pressure optimized.   She has established care with Dr. Osman who she has followed in outpatient vascular neurology clinic.  Still experiencing visual symptoms, has appt with neuro-ophthalmology late August.  She has continued experiencing frequent migraines since this hospitalization.       Otherwise, information below is still accurate and current.      03/16/2023- Interval History:  Currently nearly 3 months overdue for her Botox injections, has noticed an uptick in migraine frequency over the last 2 months or so.  Prior to this, migraines had remained stable.  She is no longer taking aimovig, did not realize she had to contact pharmacy to get prescription sent to her.  She has continued tizanidine 6 mg nightly.  Treats acute migraines with ubrelvy 100 mg.  Blood pressure remains above goal, though lower than readings at previous visits.       Otherwise, information below is still accurate and current.      10/27/2022- Interval History:  Migraines have been very well controlled recently, got a new job which has been better for her, less stress.  Blood pressure controlled.  Patient has continued to achieve a greater than 50% reduction in the frequency of their migraines with continued Botox injections.  Wishes to proceed with today's injections as scheduled.      Otherwise, information below is still accurate and current.       12/09/2021- Interval History:  Seen by internal medicine on 12/2, started on labetalol 100 mg BID and told to follow-up with her primary care provider (affiliated with [facility]) as soon as possible for continued management and monitoring.  Unfortunately she has not scheduled an appointment with her PCP yet.  Blood pressure improved today, though still markedly elevated.    Continues to experience frequent migraines, last round of Botox over 6 months ago.  She has tried ubrelvy 100 mg tabs which is effective for treatment of acute migraines.  Migraines historically have responded very well to Botox injections and wishes to proceed with today's injections as scheduled.      Otherwise, information below is still accurate and current.      12/02/2021 - Interval History:  Patient presents to clinic today for Botox injections.  BP initially 221/147 mmHg automatically.  Rechecked manually at 218/140 mmHg.  She denies weakness, confusion, vision changes, dizziness, chest pain, SOB.  In August, she presented for Botox, blood pressure was elevated, she was referred to ED and instructed to schedule f/up appointment with her primary care provider.  She does not want to return to ED today as she does not feel they do anything for her.  Discussed I do not feel comfortable injecting her with blood pressure as high as it is in clinic today.  I have scheduled her an appointment with primary care here at [facility] later this morning and have instructed her to call her primary care provider's office today to schedule an appointment as soon as possible.    She does admit she has been under increased stress, mother in law is moving in to her home in two weeks.  She has not been sleeping well despite taking tizanidine 6 mg and ambien nightly.  She has continued seeing her psychiatrist regularly, last seen in September.    Feels she has been suffering with frequent migraines likely because her last round of Botox was nearly 6 months  ago.  She has been accumulating occurrences at work due to missed days for migraines.  She is not eligible for intermittent FMLA until next summer.  She has continued using Aimovig 140 mg SQ monthly, though she uses it inconsistently as pharmacy does not call to remind her she is due for her next injection.  Treats acute migraines with vistaril 25 mg capsule, initially was helping her sleep however feels it is losing its effectiveness.      Otherwise, information below is still accurate and current.      06/04/2021- Interval History:  Migraines have been under good control, reports she has not had many migraines what so ever over the last 3 months.  Blood pressure elevated in clinic today, still has not seen PCP in quite some time.  Would like to proceed with next round of botox as scheduled today.      Otherwise, information below is still accurate and current.      02/25/2021- Interval History:  Migraines have gradually increased in frequency over the last few months, of note, she is currently 3 months overdue for her Botox injections.  For migraine prevention, she has continued with Aimovig 140 mg SQ monthly, tizanidine 6 mg qhs, amitriptyline 25 mg qhs and cymbalta 60 mg daily.  Treats acute migraines with sumatriptan 100 mg tabs, has vistaril 25 mg capsule available for rescue treatment.  She feels once she gets back on track with Botox injections, migraines are likely to be optimally controlled.       Otherwise, information below is still accurate and current.      09/24/2020- Interval History:  She continues to feels migraines are well controlled on her current regimen. She continues to experience a greater than 50% reduction in the frequency of her migraines since starting Botox injections and wishes to continue with Botox for migraine control.       Otherwise, information below is still accurate and current.      06/09/2020- Interval History:  Migraines continue to be well controlled with dual Aimovig and  Botox injections, she is very pleased with the current state of her migraines and wishes to continue with current treatment plan.       Otherwise, information below is still accurate and current.      03/25/2020- Interval History:  She is about a month overdue for her Botox injections and has noticed her migraines have gradually become more frequent and more intense.  Prior to one month ago, migraines had been under good control.  She does admit she has been under a significant amount of stress related to Covid-19 outbreak.       Otherwise, information below is still accurate and current.      12/04/2019- Interval History:  Reports she has been experiencing 5-6 migraines per month, admits she has been under increased stress found her birth mother who was not interested in meeting her, is in contact with twin brothers.   She has continued taking aimovig 140 mg, which she feels has greatly improved her.  She typically wakes up with a migraine if she is going to get one, typically is resolved with one dose of sumatriptan 100 mg, will feel back to normal by the afternoon.   She is pleased with the current state of her migraines and attributes the increased frequency of migraines over the last 12 weeks to fluctuating weather and increased stress.  Does not wish to make adjustments to her treatment plan at this time.      Otherwise, information below is still accurate and current.      08/16/2019- Interval History:  Migraines and headaches continue to respond well to Botox and Aimovig, gets one severe migraine per month, and 1-2 moderate migraines.  She has been treating her migraines with ibuprofen, which is not very effective.  In the past, she was using triptans, however she does not have any abortive medication available at this time.  Migraines continue to feel the same as they always have, she denies any change in the quality or nature of her migraines.  She is happy with her current regimen and does not feel  "adjustments to her treatment plan are needed at this time.    She has additional concerns including frequently feeling as if she is more off balance than usual, is occurring more frequently as time progresses.  She is not dizzy, just feels as if she is going to fall over, occurs 8-10 times per month.  She is also dropping things more frequently.       05/22/2019- Interval History:  Botox continues to be effective for migraine prevention, for the first 8-10 weeks she experiences less than 3 migraines per month.  She has continued using Aimovig 140 mg SQ monthly injections.  She is very happy with the response of her migraines to Botox injections and wishes to continue with current treatment plan.        02/14/2019- Interval History:  Headaches and migraines continue to be "much better", "I am not getting them nearly as much", though she is keenly aware of when a cold front is coming through as she will always get a migraine.  She has continued using Aimovig monthly as well as taking tizanidine, cymbalta, and amitriptyline daily for migraine prevention.  She is very happy with the current state of her migraines and does not wish to make any adjustments to her treatment plan at this time.  Migraines continue to feel the same as they always have.     11/01/2018- Interval History:  Headaches have been significantly better, she has only had 3-4 migraines since her last round of Botox and is very happy Botox is helping decrease her migraine frequency.  She has been using Aimovig monthly, which she also feels has been helping decrease her migraine frequency.  Migraines continue to feel the same as they always have, she denies any change in the quality or nature of her migraines.  Wishes to continue with Botox injections due to the pronounced effect it has had on her migraines.      08/08/2018- Interval History:  Finds her migraines are no better since last round of Botox, admits she was under increased stress at one point " due to issues with her 's job.  She does know a lot of her migraines are triggered from stress.  She has been getting a lot of migraines above her eyes as well as throughout her occipitalis and neck.  Overall, she does wish to continue with Botox injections, but is open to discussing additional prevention options.      05/22/2018- Interval History:  Despite nerve blocks two weeks ago, she has been suffering with a headache nearly everyday since her last visit.  She did try Zomig nasal spray, however she took the first dose on day two of her migraine because she was nervous.  She does think it lessened the pain of her migraine, however did not abort it.  She did not repeat the dose.  Additionally, she continues to report poor sleep nightly.  She stopped drinking caffeine.  Headaches are the same as they have always been.  She denies any change in the nature or quality of her headaches. Risks, Benefits, and potential side effects of Botox discussed with patient.  Alternative treatments offered.  After thorough discussed regarding the procedure, patient has decided to move forward with initiating Botox injections for Chronic Migraine.  Patient understands we will complete 3 rounds of injections, if after 3 rounds, we do not see a 50% reduction in headaches, we will discontinue Botox injections.      05/11/2018 - Interval History:  This past week she has only been able to work two days, has missed 3 days of work this week due to migraines.  She still is unsure about getting Botox injections for chronic migraine, she would like to try it but her  does not feel this is a good treatment option, she is trying to convince him.  Of note, blood pressure significantly lower in today's visit than in past visits.  She reports her psychiatrist has discontinued vyvanse until she is given clearance from Cardio to restart taking Vyvanse.  She was seen by a pediatric cardiologist who referred her to an adult  cardiologist, however she has not seen this provider, had to cancel appointment due to having a headache.  She is requesting repeat nerve blocks today.      01/29/2018 - Interval History:  She has been having a terrible migraine for the last 3 days, she has tried numerous medications without any relief.  States she tried to go to work today, but she had to leave because her headache got so bad.  She is requesting to repeat the nerve blocks as those shots gave her relief form 6-8 weeks in the past.  She has done research on the Botox injections and states her  is very hesitant for her to try Botox injections for her migraines.  She has not been seen by her PCP nor cardiologist for further management of her blood pressure.  Discussed the challenges of treating her migraines with uncontrolled hypertension because I cannot prescribe her any triptan therapy until her blood pressure is under better control.  She has continued to take Cymbalta daily for her depression, she is unsure if this is giving her any relief with regards to her headaches or not.       01/15/2018 - Interval History:  She was approved for Botox injections, but was a no-show to her appointment last week.  States she spoke with her  about the injections and she was very nervous to try the Botox injections for her migraines.    She continues to experience headaches on 1-4 days per week with migraines occurring 1-2 days per week.  Recently she has been experiencing more migraines and headaches than usually, but admits she has been going through a lot emotionally as well as she got sick with the flu.  She has continued to regularly follow-up with her psychiatrist who changed her anti-depressant, she is now taking Cymbalta.  Since change in medication, she feels she has been experiencing a mild headache each day.  She has had to miss a few days of work due to her migraines since last visit.  Additionally, she feels occasionally she begins to  get dizzy, this occurs with or without headaches, she has an appointment with her eye doctor next week as her vision is also getting progressively worse.  She did not find vistaril to be useful with regards to her headaches.  She does feel Zanaflex helps with her neck as well as helping to relax her neck muscles.  When she gets a migraine she has been taking Vistaril and Ibuprofen together which does help the pain, she also has been trying Excedrin migraines which also gives her some relief.  Patient with known HTN on multiple anti-hypertensive agents, blood pressure significantly elevated in clinic today.  She is not symptomatic.  States she needs to follow-up with her primary care (who is outside the [facility] system) for better management.       10/19/2017 - Interval History:  - Headaches have persisted in similar frequency, which she is happy about as she has been under increased stress with regards to her 's health, he has been suffering with his Gout, just got out of the hospital last week and has had to take time off work etc   - Blood pressure continues to be elevated despite numerous anti-hypertensive, states today is good for her (currently 141/102), has been regularly following up with her PCP for management   - Is very excited because she is going on a cruise with her  to [place] at the end of November/early December   - Has continued to have a headache nearly everyday, not necessarily a migraine, however migraine have been at least once per month    - Has noticed more recently that she feels like she has intense pressure behind her eyes and across the bridge of her nose that slightly feels like sinus pressure, this occurs 1-2 times per week   - Has been seeing a chiropractor as well as a masseuse regularly which she does feel has been helping   - Would still like to move forward with the Botox injections   - Does not feel adjustments to her medications are necessary at this time       08/17/2017 - Interval History:  - Had been seeing Iker Beth PA-C   - Was taking Topamax, Zanaflex, and Vistaril - however has been out of medications for about 3 months  - Since being off the medication, she had been okay, however recently noticed migraines were beginning to occur more frequently with increased intensity and duration   - She finds the nerve blocks had been helping a lot, as long as she is consistent with getting the nerve block, however hasn't had this procedure for over a year   - Has been getting 5-6 migraines per month, each lasting 1-4 days in length   - Current migraine has been going on since last Friday (6 days)  - Blood pressure significantly elevated in clinic today, states her PCP is managing her medications in order to get her blood pressure better controlled   - Is requesting refills of medication as well as repeat nerve occipital and trigeminal nerve blocks      Headache history:   Age of onset -  13  Nature of pain -  Throbbing   Location - bioccipital   Aura -  White floaters   Duration - hrs  Frequency -  2/week  Time of day - anytime   7 days of pain - jaw pain, L neck, L shoulder,  L side throbbing   Associated symptoms:   Meningeal symptoms - photophobia, phonophobia, exercise intolerance +   Nausea/vomitting +   Nasal drainage   Visual symptoms  Pallor/flushing  Vertigo  Stroke symptoms  Confusion  Impaired concentration +  Pain worsened with physical activity +  Neck pain +  Whooshing sounds   Visual spots/blotches +  Triggers:   Stress +   Bright or flickering light  Strong smell  Vigorous exercise  Dietary factors   Visual strain   Motion intolerance as passenger:  No   Resolution of headache with sleep: yes      Therapies Tried & Response:  Tylenol, motrin, alleve - not help  excedrin - not help   imitrex - helped initially   topamax 50 mg - helped  Percocet - helps   Gabapentin - helps  Amlodipine - for HTN  Bystolic - for HTN  Lexapro - hasn't noticed change in HA, for  depression   GONB - helps   Cymbalta - unsure, for depression   Zomig - helps    Maxalt - no   Botox - helping   Sumatriptan -  Vistrail - effective   Aimovig - helping   Emgality -     Current Medications:    aspirin (ECOTRIN) 81 MG EC tablet, Take 1 tablet (81 mg total) by mouth once daily., Disp: 30 tablet, Rfl: 11    atorvastatin (LIPITOR) 40 MG tablet, Take 1 tablet (40 mg total) by mouth once daily., Disp: 90 tablet, Rfl: 3    benazepril-hydrochlorthiazide (LOTENSIN HCT) 20-25 mg Tab, Take 1 tablet by mouth once daily. Please check with your primary care provider. You've been started on Losartan, and so it's unclear if you'll need additional anti-hypertensive medications., Disp: 90 tablet, Rfl: 3    buPROPion (WELLBUTRIN XL) 300 MG 24 hr tablet, Take 1 tablet (300 mg total) by mouth once daily., Disp: 90 tablet, Rfl: 1    clopidogreL (PLAVIX) 75 mg tablet, Take 1 tablet (75 mg total) by mouth once daily., Disp: 30 tablet, Rfl: 11    DULoxetine (CYMBALTA) 60 MG capsule, Take 1 capsule (60 mg total) by mouth once daily., Disp: 90 capsule, Rfl: 1    magnesium oxide (MAG-OX) 400 mg (241.3 mg magnesium) tablet, Take 1 tablet (400 mg total) by mouth once daily., Disp: 30 tablet, Rfl: 1    ondansetron (ZOFRAN-ODT) 4 MG TbDL, DISSOLVE 1 TABLET ON THE TONGUE EVERY 6 HOURS AS NEEDED FOR NAUSEA, Disp: , Rfl:     promethazine (PHENERGAN) 25 MG tablet, TAKE 1 TABLET BY MOUTH EVERY 4 TO 6 HOURS AS NEEDED FOR NAUSEA, Disp: 20 tablet, Rfl: 1    riboflavin, vitamin B2, 400 mg Tab, Take 400 mg by mouth once daily., Disp: 30 tablet, Rfl: 11    ubrogepant (UBRELVY) 100 mg tablet, TAKE 1 TABLET BY MOUTH EVERY 2 HOURS AS NEEDED FOR MIGRAINE. MAX 2 TABLETS (200 MG) PER DAY., Disp: 16 tablet, Rfl: 5    verapamiL (CALAN) 40 MG Tab, Take 1 tablet (40 mg total) by mouth 2 (two) times daily., Disp: 60 tablet, Rfl: 11    zolpidem (AMBIEN) 10 mg Tab, Take 1 tablet (10 mg total) by mouth every evening., Disp: 30 tablet, Rfl: 3     galcanezumab-gnlm 120 mg/mL Sid, Inject 120 mg (one pen) into the skin every 28 days. Begin 28 days after loading dose., Disp: 1 mL, Rfl: 10    tiZANidine (ZANAFLEX) 4 MG tablet, Take 1 and 1/2 tablets by mouth every evening.  No driving, no alcohol with medication., Disp: 45 tablet, Rfl: 5    Current Facility-Administered Medications:     onabotulinumtoxina injection 200 Units, 200 Units, Intramuscular, q12 weeks, Luci Ortez, AVERYP, 200 Units at 06/08/23 1200    Review of Systems - A review of 10+ systems was conducted with pertinent positive and negative findings documented in HPI with all other systems reviewed and negative.    PFSH: Past medical, family, and social history reviewed as documented in chart with pertinent positive medical, family, and social history detailed in HPI.    OBJECTIVE:  Vitals:  BP (!) 147/85   Pulse 63   Wt 71.2 kg (157 lb 1.2 oz)   LMP 12/14/2000   BMI 26.14 kg/m²      Physical Exam:  Constitutional: she appears well-developed and well-nourished. she is well groomed. NAD    Review of Data:   Notes from psych, Dr. Osman reviewed   Labs:  Office Visit on 02/09/2024   Component Date Value Ref Range Status    POC Molecular Influenza A Ag 02/09/2024 Negative  Negative, Not Reported Final    POC Molecular Influenza B Ag 02/09/2024 Negative  Negative, Not Reported Final     Acceptable 02/09/2024 Yes   Final    POC Rapid COVID 02/09/2024 Negative  Negative Final     Acceptable 02/09/2024 Yes   Final     Imaging:  Results for orders placed or performed during the hospital encounter of 07/30/23   MRI Brain Without Contrast    Narrative    EXAMINATION:  MRI BRAIN WITHOUT CONTRAST    CLINICAL HISTORY:  Headache, chronic, new features or increased frequency;.    TECHNIQUE:  Multiplanar multisequence MR imaging of the brain was performed without contrast.    COMPARISON:  CTA stroke multiphase earlier same day, MRI brain 07/14/2023    FINDINGS:  Intracranial  compartment:    Evolving region of signal abnormality in the right PCA territory involving the right occipital lobe primarily in the white matter and right aspect spinal corpus callosum, now barely visible most compatible with evolving late subacute aged infarction.  No hemorrhagic conversion.    Interval small focus of restricted diffusion with corresponding edema signal within the midportion of the right centrum semiovale.    No extra-axial blood or fluid collections.  The ventricles are midline and stable in overall size and configuration without distortion by mass effect or acute hydrocephalus.    Remainder of the brain parenchyma appears unchanged including scattered T2/FLAIR signal hyperintensity within the supratentorial white matter concerning for age advanced chronic ischemic change with scattered cystic encephalomalacia at the right more than left centrum semiovale, left thalamus and right kriss suggestive for remote lacunar-type infarcts.  No mass lesion, acute hemorrhage, new major vascular territory infarct or midline shift.  Unchanged punctate foci of susceptibility within the cerebral hemispheres specifically at the left parietal cortex, right inferior parietal white matter and left posterior temporal lobe suggestive for remote microhemorrhages.    Normal vascular flow voids are preserved.  Sella and parasellar regions are within normal limits.    Skull/extracranial contents (limited evaluation): Bone marrow signal intensity is normal.  Craniocervical junction is within normal limits.      Impression    1. Right centrum semiovale small, acute lacunar type infarct, new from 07/14/2023 study.  2. Nearly completely resolved evolving late subacute aged infarction in the right PCA territory.  3. Grossly stable additional findings as above.  This report was flagged in Epic as abnormal.      Electronically signed by: Gaurav Layton MD  Date:    07/30/2023  Time:    12:38   Results for orders placed or performed  during the hospital encounter of 07/14/23   MRA Brain without contrast    Narrative    EXAMINATION:  MRI BRAIN WITHOUT CONTRAST; MRA BRAIN WITHOUT CONTRAST    CLINICAL HISTORY:  Stroke, follow up;  Other cerebral infarction    TECHNIQUE:  Sagittal and axial T1, axial T2, axial FLAIR, axial gradient and axial diffusion imaging of the whole brain without contrast.    COMPARISON:  MRI and MRA 07/05/2023    FINDINGS:  MRI brain: Evolving region of signal abnormality right PCA territory involving the right occipital lobe primarily in the white matter and right aspect splenium corpus callosum most compatible with evolving subacute aged infarction more pronounced diffusion signal abnormality which is partially restricting in this region.  In addition there is increased T2 FLAIR signal hyperintensity without mass effect or midline shift.  There is no definite hemorrhagic conversion.  There continued punctate foci of susceptibility within the cerebral hemispheres specifically left parietal cortex right inferior parietal white matter and left posterior temporal lobe suggestive for possible remote microhemorrhages.    Scattered T2 FLAIR signal hyperintensity supratentorial white matter concerning for possible age advanced chronic ischemic change with scattered cystic encephalomalacia bilateral centrum semiovale, left thalamus and right kriss suggestive for remote lacunar-type infarcts.    MRA head: Anterior circulation: Bilateral distal cervical, petrous, cavernous and supraclinoid segments of the ICAs are patent without significant focal stenosis.  The visualized anterior middle cerebral arteries are patent without focal stenosis or aneurysm.    Posterior circulation: Distal left vertebral artery is dominant.  Slight hypoplasia distal right vertebral artery.  The basilar artery is patent.  Left posterior cerebral artery is patent.  There is high-grade stenosis or short segment occlusion in the right P3 segment of the PCA which  is stable.    This report was flagged in Epic as abnormal.      Impression    MRI brain: Increased edema signal abnormality and diffusion partial restriction right occipital white matter compatible with evolving recent to subacute right PCA infarction.  No significant mass effect or definite hemorrhagic conversion    Superimposed patchy and confluent T2 FLAIR signal abnormality elsewhere supratentorial white matter while nonspecific concerning for advanced chronic ischemic change with scattered cystic encephalomalacia centrum semiovale, left thalamus and right kriss suggestive for remote lacunar-type infarcts.    Few punctate scattered foci of susceptibility left posterior temporal lobe right inferior parietal lobe and left parietal lobe suggestive for remote microhemorrhages    MRA head: Stable high-grade stenosis versus short-segment inclusion right P3 PCA.    No additional significant focal stenosis or proximal large vessel occlusion.      Electronically signed by: Nahum Francois DO  Date:    07/15/2023  Time:    14:21   Results for orders placed or performed during the hospital encounter of 09/28/17   MRV Brain Without Contrast    Narrative    Procedure: MRV of the head without contrast    Technique: Noncontrast 2-D time-of-flight MRV of the head with coronal and sagittal source imaging and 3-dimensional mip projection views.      Comparison: None    Results:The superior sagittal sinus is patent.  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  The paired internal cerebral veins and basal veins of Jay are patent.  The vein of Zac and torcula Herophili are patent.  The straight sinus is patent.  The bilateral transverse and sigmoid dural venous sinuses are patent to the jugular foramen.    Impression     Unremarkable noncontrast MRV specifically without evidence for venous sinus thrombosis..      Electronically signed by: NAHUM FRANCOIS DO  Date:     09/28/17  Time:    10:43     MRI Brain W WO Contrast    Narrative    Procedure: MRI the brain with andwithout contrast.    Technique: Sagittal and axial T1, axial T2, axial FLAIR, axial gradient, axial diffusion imaging of the whole brain pre-contrast with postcontrast axial T1 and axial spoiled gradient imaging reformatted in the coronal and sagittal planes.. 10 ml of Gadavist injected intravenously.         Comparison: None    Findings: The brain parenchyma is normal in contour. There is a small focus of T2 flair signal hyperintensity in the right lateral putamen suggestive for remote lacunar type infarct with diffusion hyperintensity without restriction compatible with T2 shine through. There are no abnormal areas of parenchymal enhancement. There are no areas are restricted diffusion to suggest acute infarction. A few additional punctate foci of T2 flair signal hyperintensity supratentorial white matter which are nonspecific and may represent sequela migraine headaches in light of history. The ventricles are normal in size and configuration without evidence for hydrocephalus. There are no abnormal areas of the gradient susceptibility to suggest parenchymal hemorrhage. No abnormal intra or extra axial fluid collections. The major intracranial T2  flow-voids are present.    Impression     Small T2 flair hyperintense focus right lateral putamen with diffusion hyperintensity. 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    A few scattered punctate regions of T2 flair signal hyperintensity elsewhere supratentorial parenchyma while overall nonspecific in light of the given history this may be sequela migraine headaches. Alternative differential including prior demyelination to be considered.    otherwise unremarkable MRI brain specifically without evidence for acute infarction or enhancing lesion.          Electronically signed by: VIOLETTE CARO DO  Date:     09/28/17  Time:    10:49      *Note: Due to a large number of results  and/or encounters for the requested time period, some results have not been displayed. A complete set of results can be found in Results Review.     Note: I have independently reviewed any/all imaging/labs/tests and agree with the report (s) as documented.  Any discrepancies will be as noted/demarcated by free text.  ARA HDZ 5/1/2024    ASSESSMENT:  1. Migraine without aura and without status migrainosus, not intractable    2. History of stroke    3. Essential hypertension    4. LALITA (generalized anxiety disorder)    5. Other specified persistent mood disorders      PLAN:  - Patient no longer a candidate for Botox given only 1-2 migraines per month    - For migraine prevention - continue emgality 120 mg SQ monthly, verapamil 40 mg bid and cymbalta 60 mg daily   - For acute migraines - triptans contraindicated given hx stroke and uncontrolled HTN  - For acute migraines - ubrelvy 100 mg    - Continue tracking headaches   - Hx CVA - continue regular f/up with Dr. Osman for management/monitoring   - Mood disorder - continue wellbutrin, cymbalta daily, management per psych - will avoid use of anti-depressants for migraine prevention   - HTN - BP elevated in clinic today, goal BP < 130/80 mmHg, encouraged her to schedule f/up with PCP for close monitoring/med adjustments   - RTC in 6 months or sooner if needed     Orders Placed This Encounter    galcanezumab-gnlm 120 mg/mL PnIj    tiZANidine (ZANAFLEX) 4 MG tablet     Questions and concerns were sought and answered to the patient's stated verbal satisfaction.  The patient verbalizes understanding and agreement with the above stated treatment plan.     Visit today included increased complexity associated with the care of the episodic problem migraine with aura addressed and managing the longitudinal care of the patient due to the serious and/or complex managed problem(s). Plan for patient to return to clinic in 6 months for follow-up visit.     CC: Luc Venegas,  MD Luci Ortez, FNP-C  [facility]   [phone]    Dr. Farfan was available during today's encounter.

## 2024-05-02 ENCOUNTER — PATIENT MESSAGE (OUTPATIENT)
Dept: PSYCHIATRY | Facility: CLINIC | Age: 49
End: 2024-05-02
Payer: COMMERCIAL

## 2024-05-03 ENCOUNTER — OFFICE VISIT (OUTPATIENT)
Dept: PSYCHIATRY | Facility: CLINIC | Age: 49
End: 2024-05-03
Payer: COMMERCIAL

## 2024-05-03 DIAGNOSIS — F33.42 RECURRENT MAJOR DEPRESSIVE DISORDER, IN FULL REMISSION: ICD-10-CM

## 2024-05-03 DIAGNOSIS — F41.1 GAD (GENERALIZED ANXIETY DISORDER): ICD-10-CM

## 2024-05-03 DIAGNOSIS — G47.00 INSOMNIA, UNSPECIFIED TYPE: ICD-10-CM

## 2024-05-03 PROCEDURE — 4010F ACE/ARB THERAPY RXD/TAKEN: CPT | Mod: CPTII,95,, | Performed by: STUDENT IN AN ORGANIZED HEALTH CARE EDUCATION/TRAINING PROGRAM

## 2024-05-03 PROCEDURE — 99214 OFFICE O/P EST MOD 30 MIN: CPT | Mod: GQ,95,, | Performed by: STUDENT IN AN ORGANIZED HEALTH CARE EDUCATION/TRAINING PROGRAM

## 2024-05-03 RX ORDER — HYDROXYZINE PAMOATE 25 MG/1
25 CAPSULE ORAL EVERY 8 HOURS PRN
Qty: 30 CAPSULE | Refills: 1 | Status: SHIPPED | OUTPATIENT
Start: 2024-05-03 | End: 2024-06-14 | Stop reason: SDUPTHER

## 2024-05-03 RX ORDER — DULOXETIN HYDROCHLORIDE 60 MG/1
60 CAPSULE, DELAYED RELEASE ORAL DAILY
Qty: 90 CAPSULE | Refills: 1 | Status: SHIPPED | OUTPATIENT
Start: 2024-05-03 | End: 2024-06-14 | Stop reason: SDUPTHER

## 2024-05-03 RX ORDER — BUPROPION HYDROCHLORIDE 300 MG/1
300 TABLET ORAL DAILY
Qty: 90 TABLET | Refills: 1 | Status: SHIPPED | OUTPATIENT
Start: 2024-05-03 | End: 2024-06-14 | Stop reason: SDUPTHER

## 2024-05-03 NOTE — PATIENT INSTRUCTIONS
AFTER VISIT INSTRUCTIONS    Take medications as prescribed.  If questions or concerns arise, or if experiencing side effects, adverse reactions or worsening symptoms, contact me through the [facility] portal or call [phone] to speak with office staff.  In cases of emergencies, call [phone] or present to the emergency department for immediate assistance.  Expect a transition to a new resident doctor in July. Be sure to schedule a follow up appointment with me before June 30th or with the new resident after June 30th to ensure consistent medication refills and a smooth transition.    RESOURCES:    [place] of Mental Health: information on mental health medications.  https://www.nimh.nih.gov/health/topics/mental-health-medications/    The National Suicide Prevention Lifeline: 1-349.350.9228.  Provides 24/7, free and confidential support for people in distress, prevention and crisis resources for you or your loved ones, and best practices for professionals.  https://suicidepreventionlifeline.org/         Thank you for allowing me to participate as part of your health care team, and thank you for choosing [facility].    Micheline Freeman DO  [facility] Resident, PGY-4

## 2024-05-03 NOTE — PROGRESS NOTES
ESTABLISHED VISIT: PSYCHIATRY     TELE PSYCHIATRY Disclaimer   *The patient was informed despite using HIPPA compliant technology there may be risks including security breach, technological failure, inability to perform a comprehensive physical exam which could delay or prevent an accurate diagnosis, and potential complications from treatment decisions rendered over a telemedical platform. The patient understands and consented to the use of tele-health service.  The patient was also informed of the relationship between the physician and patient and the respective role of any other health care provider with respect to management of the patient; and notified that the pt may decline to receive medical services by telemedicine and may withdraw from such care at any time.     Patient's Current location: [address] [place] [address]   Visit type: Synchronous audio and video  Total time spent with patient: 20 MINS    ASSESSMENT AND PLAN:     DIAGNOSES & PROBLEMS:  1. Insomnia, unspecified type    2. Recurrent major depressive disorder, in full remission    3. LALITA (generalized anxiety disorder)        In Summary:  - 48 y.o.F with psych history of anxiety, depression, insomnia presenting for follow up, last seen in January. Significant past medical history of stroke in July 2023 (PCA and lacunar-type with left field vision loss), chronic migraines, fibromyalgia, IBS. Stable on current regimen. Presents virtually secondary to inability to drive from vision loss. Due for in-person visit August 2024.    Plan:  - Reviewed psychotropic medications for increased risk of stroke. None found. Continue current regimen as pt finds it helpful with managing stress, sleep, decreasing anxiety and depression. Denies seizures.  - Continue Cymbalta 60mg daily for anxiety, depression, fibromyalgia  - Continue Wellbutrin XL 300mg daily for depression, motivation, concentration  - Continue Ambien 10mg nightly for insomnia  - Continue  "PRN Vistaril 25mg TID for anxiety, using about once a week     Per neurology for migraines:  Zanaflex 4 mg nightly     RTC 3 months, pt prefers virtual visits as she is not allowed to drive 2/2 vision loss from stroke. Asked her to have someone drive her to the appointment with new resident.  Discussed resident transition on June 30th; patient voiced understanding to immediately schedule follow up with me before June 30th or with new resident after June 30th to ensure consistent medication refills and a smooth transition.      INTERVAL HISTORY AND REVIEW OF SYSTEMS:     Mirna Pollock is a 48 y.o. patient seen for a follow up psychiatric evaluation.  An interval history of the presenting illness (HPI) was obtained, and a pertinent psychiatric and medical review of systems was performed.    Global Subjective Rating: good    [] Y  [x] N  sleep: **    [] Y  [x] N  fatigue: **    [] Y  [x] N  appetite/weight: **    [] Y  [x] N  concentration: **     [] Y  [x] N  depression: "irritability" but no other depressive symptoms**     [] Y  [x] N  anxiety: **     [] Y  [x] N  dysregulated mood/behavior: **     [] Y  [x] N  vidya: **     [] Y  [x] N  psychosis: **     [] Y  [x] N  substance: **  current use    [] Y  [x] N  pain: **    [] Y  [x] N  cardiac: **    [] Y  [x] N  abnormal movements: **          Still not able to drive. Applying for SSI. Denies depression, anhedonia, amotivation, looking forward to a cruise with . Denies anxiety or insomnia. Had some difficulty getting a prior auth through insurance.    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 , has been off work since stroke in July 2023, applying for disability as of January 2024   and living with  in their home    EXAMINATION:     Vitals:  LMP 12/14/2000     Psychiatric Mental Status Exam:  General Appearance: good grooming, casually dressed, NAD, appears stated " "age   Behavior/Cooperation:  calm, cooperative, pleasant, engaged  Abnormal Involuntary Movements: none  Level of Consciousness: awake, alert   Orientation: oriented to person, place, time, situation, president  Psychomotor Behavior: no psychomotor agitation or retardation  Speech: normal tone, normal rate, normal pitch, normal volume  Language: fluent English, uses words appropriately; No aphasia or dysarthria  Mood: "good"  Affect: mood-congruent, full, reactive, appropriate   Thought Process: linear, logical, goal oriented  Thought Content: denies SI/HI/paranoia/delusions; no objective evidence of paranoia or delusions.  Perception: denies AVH. No objective evidence of AVH   Memory: intact to conversation, remote intact, recent intact.  Attention: intact to conversation, not easily distracted.  Fund of Knowledge: appropriate for education level  Insight: Intact, as evidenced by understanding of illness and appropriate risk/benefit analysis while discussing treatment plan  Judgment: Intact, as evidenced by appropriate behavior throughout interview  Reliability: Good and reliable historian      Case to be discussed with staff psychiatrist: MD Micheline Fagan DO  \[facility] [place] [facility]\""-[facility], PGY-4  [facility]         MANAGEMENT:     I[]I Y = Yes / Present / Endorses.  I[]I N = No / Absent / Denies.  I[]I U = Unknown / Unable to Assess / Unwilling to Participate.  I[]I A = Ambiguity Exists / Accuracy Uncertain.  I[]I D = Denial or Minimization is Suspected/Evident.  I[]I N/A = Non-Applicable.    Complexity (level) of medical decision making employed in the encounter: MODERATE  The total time spent on the date of the encounter: 20 minutes    Chart Review: Available documentation has been reviewed, and pertinent elements of the chart have been incorporated into this evaluation where appropriate.  Last [facility] encounter with me: Visit date not found.    [x] In cases of emergencies " (e.g. SI/HI resulting in danger to self or others, functioning deteriorates to the level of grave disability), call 911 or [phone], or present to the emergency department for immediate assistance.  [x] Patient should not operate a motor vehicle or heavy machinery if effects of medications or underlying symptoms/condition impair the ability to safely do so.  [x] Comply with ANY/ALL medication fully as prescribed/instructed and report ANY/ALL suspected adverse effects to appropriate health care providers.    Written material has been provided to supplement, augment, and reinforce any discussions and interventions, via the AVS and/or other pre-printed handouts.  Alcohol, Tobacco, and Drug Counseling, as well as applicable resources, has been provided, as warranted.  Shared medical decision making and informed consent are the hallmark and bedrock of good clinical care, and as such have been employed and obtained, respectively, to the degree possible.  Risk Mitigation Strategies, Harm Reduction Techniques, and Safety Netting are important interventions that can reduce acute and chronic risk, and as such have been employed to the degree possible.  Prescription Drug Management entails the review, recommendation, or consideration without recommendation of medications, and as such was employed during the encounter.  Additional Psychoeducation has been provided, as warranted.      -- Discussed, to the extent possible, diagnosis, risks and benefits of proposed treatment vs alternative treatments vs no treatment, potential side effects of these treatments and the inherent unpredictability of treatment. The patient's ability to understand, participate and engage in a conversation surrounding this was deemed to be: intact. The patient expresses understanding of the above and displays the capacity to agree with this treatment given said understanding. Patient also agrees that, currently, the benefits outweigh the risks and consents to  "treatment at this time.      [place] Prescription Monitoring Program was reviewed with no evidence of inconsistencies either in the patient's account or healthcare provider continuity.      DIAGNOSTIC TESTING:     Wt Readings from Last 3 Encounters:   05/01/24 71.2 kg (157 lb 1.2 oz)   03/12/24 66.7 kg (147 lb 0.8 oz)   11/22/23 66.7 kg (147 lb 0.8 oz)     BP Readings from Last 1 Encounters:   05/01/24 (!) 147/85     Pulse Readings from Last 1 Encounters:   05/01/24 63        Blood Counts, Electrolytes & Glucose:   Lab Results   Component Value Date    WBC 13.73 (H) 07/30/2023    GRAN 8.7 (H) 07/30/2023    GRAN 63.4 07/30/2023    HGB 14.0 07/30/2023    HCT 42.5 07/30/2023    MCV 91 07/30/2023     (H) 07/30/2023     07/30/2023    K 2.8 (L) 07/30/2023    CALCIUM 10.2 07/30/2023    PHOS 3.1 07/06/2023    MG 2.3 07/06/2023    CO2 29 07/30/2023    ANIONGAP 13 07/30/2023     07/30/2023    HGBA1C 5.1 07/05/2023       Renal, Liver, Pancreas, Thyroid, Parathyroid, Prolactin, CPK, Lipids & Vitamin Levels:   Lab Results   Component Value Date    CREATININE 0.8 07/30/2023    BUN 14 07/30/2023    EGFRNORACEVR >60.0 07/30/2023    SPECGRAV 1.025 07/14/2023    PROTEINUA Trace (A) 07/14/2023    AST 29 07/30/2023    ALT 36 07/30/2023    ALKPHOS 98 07/30/2023    BILITOT 1.1 (H) 07/30/2023    LABPROT 10.7 07/30/2023    ALBUMIN 4.2 07/30/2023    INR 1.1 07/30/2023    AMYLASE 63 12/13/2012    LIPASE 9 09/28/2017    TSH 1.332 07/30/2023    B9ZFCIN 6.8 07/30/2013    FREET4 1.67 (H) 08/05/2010    CHOL 160 07/30/2023    TRIG 164 (H) 07/30/2023    LDLCALC 80.2 07/30/2023    HDL 47 07/30/2023    SWVNUYSI02 333 07/30/2013    THIAMINEBLOO 46 07/30/2013    DXILBTCQ43CL 21 (L) 07/30/2013       Therapeutic Drug Levels:   No results found for: "LITHIUM", "VALPROATE", "CBMZ", "LAMOTRIGINE", "CLOZAPINE", "NORCLOZAP", "CLOZNORCLOZ"    Addiction:   No results found for: "PCDSOALCOHOL", "PCDSOBENZOD", "BARBITURATES", "PCDSCOMETHA", " ""OPIATESCREEN", "COCAINEMETAB", "AMPHETAMINES", "MARIJUANATHC", "PCDSOPHENCYN", "PCDSUBUPRE", "PCDSUFENTANY", "PCDSUOXYCOD", "PCDSUTRAMA", "[id number]", "PETH", "ALCOHOLMEDIC", "THEYLGLUCU", "ETHYLSULF", "BUPRENORPH", "NORBUPRENOR"    Results for orders placed or performed during the hospital encounter of 07/05/23   EKG 12-lead    Collection Time: 07/30/23 11:41 AM    Narrative    Test Reason : I63.9,    Vent. Rate : 094 BPM     Atrial Rate : 094 BPM     P-R Int : 126 ms          QRS Dur : 086 ms      QT Int : 418 ms       P-R-T Axes : 065 018 066 degrees     QTc Int : 522 ms    Normal sinus rhythm  Nonspecific ST abnormality  Prolonged QT  Abnormal ECG  When compared with ECG of 20-MAY-2022 09:48,  QT has lengthened  Confirmed by Marcus Piper MD ([id number]) on 7/31/2023 7:49:53 AM    Referred By: [email or web address]   SELF           Confirmed By:Marcus Piper MD               "

## 2024-05-05 ENCOUNTER — PATIENT MESSAGE (OUTPATIENT)
Dept: PSYCHIATRY | Facility: CLINIC | Age: 49
End: 2024-05-05
Payer: COMMERCIAL

## 2024-05-05 DIAGNOSIS — G47.00 INSOMNIA, UNSPECIFIED TYPE: ICD-10-CM

## 2024-05-06 RX ORDER — ZOLPIDEM TARTRATE 10 MG/1
10 TABLET ORAL NIGHTLY PRN
Qty: 30 TABLET | Refills: 5 | Status: SHIPPED | OUTPATIENT
Start: 2024-05-06 | End: 2024-06-14

## 2024-05-06 RX ORDER — ZOLPIDEM TARTRATE 10 MG/1
10 TABLET ORAL NIGHTLY PRN
Qty: 30 TABLET | Refills: 5 | Status: SHIPPED | OUTPATIENT
Start: 2024-05-06 | End: 2024-05-06

## 2024-05-17 NOTE — PROGRESS NOTES
STAFF NOTE    The chart was reviewed and the case was discussed, including the assessment and management plan.  I agree with the overall findings, with corrections or clarifications, if any, noted herein.    *Will need in person visit given current med regimen - it is difficult for her to get to office as she is no longer able to drive status post CVA.*    En Lafleur MD  Board Certification: Psychiatry and Addiction Medicine

## 2024-05-22 ENCOUNTER — PATIENT MESSAGE (OUTPATIENT)
Dept: PSYCHIATRY | Facility: CLINIC | Age: 49
End: 2024-05-22
Payer: COMMERCIAL

## 2024-06-14 ENCOUNTER — PATIENT MESSAGE (OUTPATIENT)
Dept: PSYCHIATRY | Facility: CLINIC | Age: 49
End: 2024-06-14
Payer: COMMERCIAL

## 2024-06-14 ENCOUNTER — OFFICE VISIT (OUTPATIENT)
Dept: PSYCHIATRY | Facility: CLINIC | Age: 49
End: 2024-06-14
Payer: COMMERCIAL

## 2024-06-14 DIAGNOSIS — F33.41 RECURRENT MAJOR DEPRESSION IN PARTIAL REMISSION: Primary | ICD-10-CM

## 2024-06-14 DIAGNOSIS — F41.1 GAD (GENERALIZED ANXIETY DISORDER): ICD-10-CM

## 2024-06-14 DIAGNOSIS — F48.2 PSEUDOBULBAR AFFECT: ICD-10-CM

## 2024-06-14 DIAGNOSIS — G47.00 INSOMNIA, UNSPECIFIED TYPE: ICD-10-CM

## 2024-06-14 DIAGNOSIS — F33.42 RECURRENT MAJOR DEPRESSIVE DISORDER, IN FULL REMISSION: ICD-10-CM

## 2024-06-14 PROCEDURE — 4010F ACE/ARB THERAPY RXD/TAKEN: CPT | Mod: CPTII,95,, | Performed by: STUDENT IN AN ORGANIZED HEALTH CARE EDUCATION/TRAINING PROGRAM

## 2024-06-14 PROCEDURE — 99214 OFFICE O/P EST MOD 30 MIN: CPT | Mod: GQ,95,, | Performed by: STUDENT IN AN ORGANIZED HEALTH CARE EDUCATION/TRAINING PROGRAM

## 2024-06-14 RX ORDER — HYDROXYZINE PAMOATE 25 MG/1
25 CAPSULE ORAL EVERY 8 HOURS PRN
Qty: 30 CAPSULE | Refills: 1 | Status: SHIPPED | OUTPATIENT
Start: 2024-06-14

## 2024-06-14 RX ORDER — ESZOPICLONE 2 MG/1
2 TABLET, FILM COATED ORAL NIGHTLY
Qty: 30 TABLET | Refills: 2 | Status: SHIPPED | OUTPATIENT
Start: 2024-06-14 | End: 2024-09-12

## 2024-06-14 RX ORDER — BUPROPION HYDROCHLORIDE 300 MG/1
300 TABLET ORAL DAILY
Qty: 90 TABLET | Refills: 1 | Status: SHIPPED | OUTPATIENT
Start: 2024-06-14 | End: 2024-12-11

## 2024-06-14 RX ORDER — DULOXETIN HYDROCHLORIDE 60 MG/1
60 CAPSULE, DELAYED RELEASE ORAL DAILY
Qty: 90 CAPSULE | Refills: 1 | Status: SHIPPED | OUTPATIENT
Start: 2024-06-14 | End: 2024-12-11

## 2024-06-14 NOTE — PROGRESS NOTES
ESTABLISHED VISIT: PSYCHIATRY     TELE PSYCHIATRY Disclaimer   *The patient was informed despite using HIPPA compliant technology there may be risks including security breach, technological failure, inability to perform a comprehensive physical exam which could delay or prevent an accurate diagnosis, and potential complications from treatment decisions rendered over a telemedical platform. The patient understands and consented to the use of tele-health service.  The patient was also informed of the relationship between the physician and patient and the respective role of any other health care provider with respect to management of the patient; and notified that the pt may decline to receive medical services by telemedicine and may withdraw from such care at any time.     Patient's Current location: [address] [place] [address]   Visit type: Synchronous audio and video  Total time spent with patient: 20 MINS    ASSESSMENT AND PLAN:     DIAGNOSES & PROBLEMS:  1. Recurrent major depression in partial remission    2. Insomnia, unspecified type    3. Pseudobulbar affect    4. Recurrent major depressive disorder, in full remission    5. LALITA (generalized anxiety disorder)      In Summary:  - 48 y.o.F with psych history of anxiety, depression, insomnia presenting for follow up, last seen 1 month ago. Significant past medical history of stroke in July 2023 (PCA and lacunar-type with left field vision loss), chronic migraines, fibromyalgia, IBS. Presents virtually secondary to inability to drive from vision loss. Due for in-person visit August 2024.    Plan:  - Reviewed psychotropic medications for increased risk of stroke. None found. Denies seizures  - Add memantine 10mg daily off label for pseudobulbar affect and neuroprotective benefits post-stroke. Discussed option of Neudexta but strong cyp interactions with current regimen.  - Continue Cymbalta 60mg daily for anxiety, depression, fibromyalgia  - Continue  "Wellbutrin XL 300mg daily for depression, motivation, concentration  - Switch from Ambien 10mg to Lunesta 1-2mg nightly for insomnia. If no improvement, could taper off and try a ERON and refer to CBT-I.  - Continue PRN Vistaril 25mg TID for anxiety, using about once a week  - Referred to psychotherapy for assistance with coping skills in managing stressors     Per neurology for migraines:  Zanaflex 4 mg nightly     RTC 3 months, pt prefers virtual visits as she is not allowed to drive 2/2 vision loss from stroke. Asked her to have someone drive her to the appointment with new resident.  Discussed resident transition on June 30th; patient voiced understanding to immediately schedule follow up with me before June 30th or with new resident after June 30th to ensure consistent medication refills and a smooth transition.      INTERVAL HISTORY AND REVIEW OF SYSTEMS:     Mirna Pollock is a 48 y.o. patient seen for a follow up psychiatric evaluation.  An interval history of the presenting illness (HPI) was obtained, and a pertinent psychiatric and medical review of systems was performed.    Global Subjective Rating: okay    [x] Y  [] N  sleep: *no issues with sleep initiation, but early morning awakening at 3:30AM x months*    [x] Y  [] N  fatigue: **    [] Y  [x] N  appetite/weight: **    [] Y  [x] N  concentration: **     [] Y  [x] N  depression: "irritability" but no other depressive symptoms**     [] Y  [x] N  anxiety: **     [] Y  [x] N  dysregulated mood/behavior: **     [] Y  [x] N  vidya: **     [] Y  [x] N  psychosis: **     [] Y  [x] N  substance: **     [] Y  [x] N  pain: **    [] Y  [x] N  cardiac: **    [] Y  [x] N  abnormal movements: **          Has been feeling more jumpy and blunt since having the stroke. Notices she cries when she isn't feeling sad. Does not laugh inappropriately.  Goes to sleep easily at night, but awakens early 3:30AM. Feels like the " "ambien helps her fall asleep but not stay asleep. Not able to fall back asleep at 3:30AM. Feeling tired during the day.    Still not able to drive. Applying for SSI. Denies depression, anhedonia, amotivation, looking forward to a cruise with . Denies anxiety.    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 , has been off work since stroke in July 2023, applying for disability as of January 2024   and living with  in their home    EXAMINATION:     Vitals:  [facility] 12/14/2000     Psychiatric Mental Status Exam:  General Appearance: good grooming, casually dressed, NAD, appears stated age   Behavior/Cooperation:  calm, cooperative, pleasant, engaged  Abnormal Involuntary Movements: none  Level of Consciousness: awake, alert   Orientation: oriented to person, place, time, situation, president  Psychomotor Behavior: no psychomotor agitation or retardation  Speech: normal tone, normal rate, normal pitch, normal volume  Language: fluent English, uses words appropriately; No aphasia or dysarthria  Mood: "okay"  Affect: constricted, mood-congruent, appropriate   Thought Process: linear, logical, goal oriented  Thought Content: denies SI/HI/paranoia/delusions; no objective evidence of paranoia or delusions.  Perception: denies AVH. No objective evidence of AVH   Memory: intact to conversation, remote intact, recent intact.  Attention: intact to conversation, not easily distracted.  Fund of Knowledge: appropriate for education level  Insight: Intact, as evidenced by understanding of illness and appropriate risk/benefit analysis while discussing treatment plan  Judgment: Intact, as evidenced by appropriate behavior throughout interview  Reliability: Good and reliable historian      Case to be discussed with staff psychiatrist: MD Micheline Fagan, DO  [facility], PGY-4  [facility]         MANAGEMENT:     I[]I Y = Yes / " Present / Endorses.  I[]I N = No / Absent / Denies.  I[]I U = Unknown / Unable to Assess / Unwilling to Participate.  I[]I A = Ambiguity Exists / Accuracy Uncertain.  I[]I D = Denial or Minimization is Suspected/Evident.  I[]I N/A = Non-Applicable.    Complexity (level) of medical decision making employed in the encounter: MODERATE  The total time spent on the date of the encounter: 20 minutes    Chart Review: Available documentation has been reviewed, and pertinent elements of the chart have been incorporated into this evaluation where appropriate.  Last [facility] encounter with me: Visit date not found.    [x] In cases of emergencies (e.g. SI/HI resulting in danger to self or others, functioning deteriorates to the level of grave disability), call 911 or [phone], or present to the emergency department for immediate assistance.  [x] Patient should not operate a motor vehicle or heavy machinery if effects of medications or underlying symptoms/condition impair the ability to safely do so.  [x] Comply with ANY/ALL medication fully as prescribed/instructed and report ANY/ALL suspected adverse effects to appropriate health care providers.    Written material has been provided to supplement, augment, and reinforce any discussions and interventions, via the AVS and/or other pre-printed handouts.  Alcohol, Tobacco, and Drug Counseling, as well as applicable resources, has been provided, as warranted.  Shared medical decision making and informed consent are the hallmark and bedrock of good clinical care, and as such have been employed and obtained, respectively, to the degree possible.  Risk Mitigation Strategies, Harm Reduction Techniques, and Safety Netting are important interventions that can reduce acute and chronic risk, and as such have been employed to the degree possible.  Prescription Drug Management entails the review, recommendation, or consideration without recommendation of medications, and as such was employed during  the encounter.  Additional Psychoeducation has been provided, as warranted.      -- Discussed, to the extent possible, diagnosis, risks and benefits of proposed treatment vs alternative treatments vs no treatment, potential side effects of these treatments and the inherent unpredictability of treatment. The patient's ability to understand, participate and engage in a conversation surrounding this was deemed to be: intact. The patient expresses understanding of the above and displays the capacity to agree with this treatment given said understanding. Patient also agrees that, currently, the benefits outweigh the risks and consents to treatment at this time.      [place] Prescription Monitoring Program was reviewed with no evidence of inconsistencies either in the patient's account or healthcare provider continuity.      DIAGNOSTIC TESTING:     Wt Readings from Last 3 Encounters:   05/01/24 71.2 kg (157 lb 1.2 oz)   03/12/24 66.7 kg (147 lb 0.8 oz)   11/22/23 66.7 kg (147 lb 0.8 oz)     BP Readings from Last 1 Encounters:   05/01/24 (!) 147/85     Pulse Readings from Last 1 Encounters:   05/01/24 63        Blood Counts, Electrolytes & Glucose:   Lab Results   Component Value Date    WBC 13.73 (H) 07/30/2023    GRAN 8.7 (H) 07/30/2023    GRAN 63.4 07/30/2023    HGB 14.0 07/30/2023    HCT 42.5 07/30/2023    MCV 91 07/30/2023     (H) 07/30/2023     07/30/2023    K 2.8 (L) 07/30/2023    CALCIUM 10.2 07/30/2023    PHOS 3.1 07/06/2023    MG 2.3 07/06/2023    CO2 29 07/30/2023    ANIONGAP 13 07/30/2023     07/30/2023    HGBA1C 5.1 07/05/2023       Renal, Liver, Pancreas, Thyroid, Parathyroid, Prolactin, CPK, Lipids & Vitamin Levels:   Lab Results   Component Value Date    CREATININE 0.8 07/30/2023    BUN 14 07/30/2023    EGFRNORACEVR >60.0 07/30/2023    SPECGRAV 1.025 07/14/2023    PROTEINUA Trace (A) 07/14/2023    AST 29 07/30/2023    ALT 36 07/30/2023    ALKPHOS 98 07/30/2023    BILITOT 1.1 (H)  "07/30/2023    LABPROT 10.7 07/30/2023    ALBUMIN 4.2 07/30/2023    INR 1.1 07/30/2023    AMYLASE 63 12/13/2012    LIPASE 9 09/28/2017    TSH 1.332 07/30/2023    X8SYSVF 6.8 07/30/2013    FREET4 1.67 (H) 08/05/2010    CHOL 160 07/30/2023    TRIG 164 (H) 07/30/2023    LDLCALC 80.2 07/30/2023    HDL 47 07/30/2023    PBAXHYQD33 333 07/30/2013    THIAMINEBLOO 46 07/30/2013    PYYVWFYS82EC 21 (L) 07/30/2013       Therapeutic Drug Levels:   No results found for: "LITHIUM", "VALPROATE", "CBMZ", "LAMOTRIGINE", "CLOZAPINE", "NORCLOZAP", "CLOZNORCLOZ"    Addiction:   No results found for: "PCDSOALCOHOL", "PCDSOBENZOD", "BARBITURATES", "PCDSCOMETHA", "OPIATESCREEN", "COCAINEMETAB", "AMPHETAMINES", "MARIJUANATHC", "PCDSOPHENCYN", "PCDSUBUPRE", "PCDSUFENTANY", "PCDSUOXYCOD", "PCDSUTRAMA", "[id number]", "PETH", "ALCOHOLMEDIC", "THEYLGLUCU", "ETHYLSULF", "BUPRENORPH", "NORBUPRENOR"    Results for orders placed or performed during the hospital encounter of 07/05/23   EKG 12-lead    Collection Time: 07/30/23 11:41 AM    Narrative    Test Reason : I63.9,    Vent. Rate : 094 BPM     Atrial Rate : 094 BPM     P-R Int : 126 ms          QRS Dur : 086 ms      QT Int : 418 ms       P-R-T Axes : 065 018 066 degrees     QTc Int : 522 ms    Normal sinus rhythm  Nonspecific ST abnormality  Prolonged QT  Abnormal ECG  When compared with ECG of 20-MAY-2022 09:48,  QT has lengthened  Confirmed by Marcus Piper MD ([id number]) on 7/31/2023 7:49:53 AM    Referred By: [id number]   SELF           Confirmed By:Marcus Piper MD                 "

## 2024-06-14 NOTE — PATIENT INSTRUCTIONS
AFTER VISIT INSTRUCTIONS    Take medications as prescribed.  If questions or concerns arise, or if experiencing side effects, adverse reactions or worsening symptoms, contact me through the [facility] portal or call [phone] to speak with office staff.  In cases of emergencies, call [phone] or present to the emergency department for immediate assistance.  Expect a transition to a new resident doctor in July. Be sure to schedule a follow up appointment with me before June 30th or with the new resident after June 30th to ensure consistent medication refills and a smooth transition.      ADJUSTMENTS TO REGIMEN: Stop taking Ambien. Start taking Lunesta 1 or 2mg at night for sleep.       RESOURCES:    National [place] of Mental Health: information on mental health medications.  https://www.nimh.nih.gov/health/topics/mental-health-medications/    The National Suicide Prevention Lifeline: 1-436.578.8234.  Provides 24/7, free and confidential support for people in distress, prevention and crisis resources for you or your loved ones, and best practices for professionals.  https://suicidepreventionlifeline.org/         Thank you for allowing me to participate as part of your health care team, and thank you for choosing [facility].    Micheline Freeman DO  [facility] Resident, PGY-4

## 2024-06-16 NOTE — PROGRESS NOTES
STAFF NOTE    The chart was reviewed and the case was discussed, including the assessment and management plan.  I agree with the overall findings, with corrections or clarifications, if any, noted herein.    **    En Lafleur MD  Board Certification: Psychiatry and Addiction Medicine

## 2024-06-17 ENCOUNTER — TELEPHONE (OUTPATIENT)
Dept: PSYCHIATRY | Facility: CLINIC | Age: 49
End: 2024-06-17
Payer: COMMERCIAL

## 2024-07-18 ENCOUNTER — PATIENT MESSAGE (OUTPATIENT)
Dept: NEUROLOGY | Facility: CLINIC | Age: 49
End: 2024-07-18
Payer: COMMERCIAL

## 2024-07-24 ENCOUNTER — DOCUMENTATION ONLY (OUTPATIENT)
Dept: REHABILITATION | Facility: HOSPITAL | Age: 49
End: 2024-07-24

## 2024-07-24 NOTE — PROGRESS NOTES
Outpatient Therapy Discharge Summary     Name: Mirna Pollock  Clinic Number: [id number]    Therapy Diagnosis:           Encounter Diagnosis   Name Primary?    Impaired functional mobility, balance, gait, and endurance Yes         Physician: Marcus Osman MD  Physician Orders: PT Eval and Treat   Medical Diagnosis from Referral: I63.531 (ICD-10-CM) - Stroke due to occlusion of right posterior cerebral artery    Evaluation Date: 7/19/2023  Authorization Period Expiration: 12/31/23  Updated plan of care: 2/11/24 to 3/29/24    Date of Last visit: 2/7/24  Total Visits Received: 22  Cancelled Visits: at least 8      OBJECTIVE     ROM:   UPPER EXTREMITY--AROM/PROM  Not tested            RANGE OF MOTION--LOWER EXTREMITIES  Not tested     Strength: manual muscle test grades below   Upper Extremity Strength  Not tested     Lower Extremity Strength  Not tested         9/25/23 11/16/23 2/7/24   6 mins walk test 1095 ft 1080 ft 1119 ft   Right single leg stance  Not tested  7 sec 14 sec   Left single leg stance  Not tested  7 sec 3-5 sec       Gait Assessment:   - AD used: none  - Assistance: modified independent   - Distance: community  - Curb: Mod I  - Ramp:  I      Functional Mobility (Bed mobility, transfers)  Bed mobility: I  Supine to sit: I  Sit to supine: I  Rolling: I  Transfers to bed: I  Transfers to toilet: not tested   Sit to stand:  I  Stand pivot:  I  Car transfers: I  Wheelchair mobility: not applicable   Floor transfers: not tested     Written Home Exercises Provided: Patient instructed to cont prior HEP.   Mirna demonstrated good  understanding of the education provided.     See EMR under Patient Instructions for exercises provided prior visit.      Assessment    48 year old female with diagnosis of Stroke due to occlusion of right posterior cerebral artery referred to [facility] received skilled outpatient PT 7/19/23 to 2/7/24. Patient made fair to good progress with physical therapy meeting 4/5  short term goals and 1/6 long term goal set. Improvement noted in bilateral lower extremity strength and balance. Standing balance continued to vary as of last attended physical therapy session. Patient had inconsistent attendance with physical therapy due to various reasons, this limited progress made. Patient was independent with home exercise program at time of last session. Patient discharged from physical therapy due to attendance policy.      Goals:   Status as of 2/7/24  Short Term Goals  Pt to demonstrate independence in performance of HEP in order to improve daily level of physical activity and improve carry over session to session. -MET   Pt to improve B LE strength by > / = 1/2 MMT score in order to improve strength for daily activities. -MET  Pt to improve FGA score to > / = 26/30 in order to demonstrate improved dynamic balance and safety. Not met  Pt to demonstrate improvement in condition 4 on MCTSIB to > / = 15 seconds in order to improve safety. MET   Pt to improve SLS time B to > / = 5 seconds in order to improve gait stability for community mobility. MET      Long Term Goals -  Pt to demonstrate compliance with HEP > / = 3x per week in order to improve daily level of physical activity and improve carry over session to session. Not met  Pt to improve B LE strength by > / = 1 MMT score in order to improve strength for daily activities. Not met   Pt to improve FGA score to > / = 28/30 in order to demonstrate improved dynamic balance and safety. Not met  Pt to demonstrate improvement in condition 4 on MCTSIB to > / = 30 seconds with min sway in order to improve safety. MET   Pt to improve SLS time B to > / = 10 seocnds in order to improve gait stability for community mobility. Improved/ not met  NEW: Pt to improve 6 minute walk test distance by 50 meters in order to demo improved endurance for community mobility. Improved/ not met    Discharge reason: Other:  per attendance policy    Plan   This  patient is discharged from Physical Therapy       Sindy Valladares, PT, DPT,   Board-Certified Clinical Specialist in Neurologic Physical Therapy   Certified Brain Injury Specialist    7/24/2024

## 2024-07-28 NOTE — PROGRESS NOTES
[facility] Progress Note  7/30/2024          Reason for Visit:      had concerns including Diarrhea (3 weeks of diarrhea about 5 times a day. After eating and if she does not eat) and balance issues (Feels like she is going to fall or feels like she is leaning at times when she walks since her stroke.).       History of Present Illness:     Pt is here today for a check up    Stomach problems  Diarrhea x 3-4 weeks  Happens whether she eats or not  Watery, No blood or mucus  No abdominal pains  The urge to defectate is sudden  Only new medicine was Lunesta  No fevers  Doesn't feel sick otherwise  No recent abx  No abdominal pains        Off balance  We had previously discussed troubles with balance after her previous stroke.  That has mostly gotten better in the past year.  However, recently, in grocery strore- 2-3 weeeks ago, she felt like she was going to fall forward.  She says this has happened a few times since then, each time when she was walking for prolonged periods of time.  No vertigo symptoms- no spinning.  Did not feel light-headed/dizzy.  BP is elevated today, but home readings had been looking good before this.  Doesn't correlate the bp with the episodes.      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    CTS (carpal tunnel syndrome)    Accelerated hypertension    Cervical muscle pain    Cervical radiculopathy    H/O catheter-based closure of atrial septal defect    Adult congenital heart disease    Overweight (BMI 25.0-29.9)    LALITA (generalized anxiety disorder)    Fibromyalgia    Social anxiety disorder    Status migrainosus    Insomnia    Recurrent major depressive disorder, in full remission    Hypokalemia    Stage 3a chronic kidney disease    Proteinuria    Cerebrovascular small vessel disease    Drug rash    Head Tingling, Right Sided    Impaired  functional mobility, balance, gait, and endurance    Quadrantanopsia, left    Fine motor impairment    Right hand weakness         Medications:       Current Outpatient Medications:     aspirin (ECOTRIN) 81 MG EC tablet, Take 1 tablet (81 mg total) by mouth once daily., Disp: 30 tablet, Rfl: 11    atorvastatin (LIPITOR) 40 MG tablet, Take 1 tablet (40 mg total) by mouth once daily., Disp: 90 tablet, Rfl: 3    benazepril-hydrochlorthiazide (LOTENSIN HCT) 20-25 mg Tab, Take 1 tablet by mouth once daily. Please check with your primary care provider. You've been started on Losartan, and so it's unclear if you'll need additional anti-hypertensive medications., Disp: 90 tablet, Rfl: 3    buPROPion (WELLBUTRIN XL) 300 MG 24 hr tablet, Take 1 tablet (300 mg total) by mouth once daily., Disp: 90 tablet, Rfl: 1    clopidogreL (PLAVIX) 75 mg tablet, Take 1 tablet (75 mg total) by mouth once daily., Disp: 30 tablet, Rfl: 11    DULoxetine (CYMBALTA) 60 MG capsule, Take 1 capsule (60 mg total) by mouth once daily., Disp: 90 capsule, Rfl: 1    eszopiclone (LUNESTA) 2 MG Tab, Take 1 tablet (2 mg total) by mouth every evening. Can break in half for 1mg nightly., Disp: 30 tablet, Rfl: 2    galcanezumab-gnlm 120 mg/mL PnIj, Inject 120 mg (one pen) into the skin every 28 days. Begin 28 days after loading dose., Disp: 1 mL, Rfl: 10    hydrOXYzine pamoate (VISTARIL) 25 MG Cap, Take 1 capsule (25 mg total) by mouth every 8 (eight) hours as needed (anxiety)., Disp: 30 capsule, Rfl: 1    losartan (COZAAR) 25 MG tablet, Take 25 mg by mouth once daily., Disp: , Rfl:     magnesium oxide (MAG-OX) 400 mg (241.3 mg magnesium) tablet, Take 1 tablet (400 mg total) by mouth once daily., Disp: 30 tablet, Rfl: 1    ondansetron (ZOFRAN-ODT) 4 MG TbDL, DISSOLVE 1 TABLET ON THE TONGUE EVERY 6 HOURS AS NEEDED FOR NAUSEA, Disp: , Rfl:     promethazine (PHENERGAN) 25 MG tablet, TAKE 1 TABLET BY MOUTH EVERY 4 TO 6 HOURS AS NEEDED FOR NAUSEA, Disp: 20 tablet,  "Rfl: 1    riboflavin, vitamin B2, 400 mg Tab, Take 400 mg by mouth once daily., Disp: 30 tablet, Rfl: 11    tiZANidine (ZANAFLEX) 4 MG tablet, Take 1 and 1/2 tablets by mouth every evening.  No driving, no alcohol with medication., Disp: 45 tablet, Rfl: 5    ubrogepant (UBRELVY) 100 mg tablet, TAKE 1 TABLET BY MOUTH EVERY 2 HOURS AS NEEDED FOR MIGRAINE. MAX 2 TABLETS (200 MG) PER DAY., Disp: 16 tablet, Rfl: 5    verapamiL (CALAN) 40 MG Tab, Take 1 tablet (40 mg total) by mouth 2 (two) times daily., Disp: 60 tablet, Rfl: 11    zolpidem (AMBIEN) 10 mg Tab, Take 10 mg by mouth nightly as needed., Disp: , Rfl:     Current Facility-Administered Medications:     onabotulinumtoxina injection 200 Units, 200 Units, Intramuscular, q12 weeks, Venkatevich, Luci, FNP, 200 Units at 06/08/23 1200        Review of Systems:     Review of Systems   Constitutional:  Negative for chills and fever.   HENT:  Negative for ear pain and sore throat.    Respiratory:  Negative for cough, shortness of breath and wheezing.    Cardiovascular:  Negative for chest pain and palpitations.   Gastrointestinal:  Negative for constipation, diarrhea, nausea and vomiting.   Genitourinary:  Negative for dysuria and hematuria.   Musculoskeletal:  Negative for joint swelling and joint deformity.   Neurological:  Negative for dizziness and weakness.           Physical Exam:     Temp:             Blood Pressure:  (!) 176/108        Pulse:   85     Respirations:     Weight:   70.9 kg (156 lb 4.9 oz)  Height:   5' 5" (1.651 m)  BMI:     Body mass index is 26.01 kg/m².    Physical Exam  Constitutional:       General: She is not in acute distress.  HENT:      Right Ear: Tympanic membrane normal.      Left Ear: Tympanic membrane normal.      Nose: No congestion or rhinorrhea.      Mouth/Throat:      Pharynx: No oropharyngeal exudate or posterior oropharyngeal erythema.   Cardiovascular:      Rate and Rhythm: Normal rate and regular rhythm.   Pulmonary:      " Effort: Pulmonary effort is normal. No respiratory distress.      Breath sounds: No wheezing.   Abdominal:      Palpations: Abdomen is soft.      Tenderness: There is no abdominal tenderness.   Skin:     General: Skin is warm.   Neurological:      General: No focal deficit present.      Mental Status: She is alert and oriented to person, place, and time.           Labs/Imaging/Testing:     Most recent CBC:  Lab Results   Component Value Date    WBC 13.73 (H) 07/30/2023    HGB 14.0 07/30/2023     (H) 07/30/2023    MCV 91 07/30/2023       Most recent Lipids:  Lab Results   Component Value Date    CHOL 160 07/30/2023    HDL 47 07/30/2023    LDLCALC 80.2 07/30/2023    TRIG 164 (H) 07/30/2023       Most recent Chemistry:  Lab Results   Component Value Date     07/30/2023    K 2.8 (L) 07/30/2023     07/30/2023    CO2 29 07/30/2023    BUN 14 07/30/2023    CREATININE 0.8 07/30/2023     07/30/2023    CALCIUM 10.2 07/30/2023    ALT 36 07/30/2023    AST 29 07/30/2023    ALKPHOS 98 07/30/2023    PROT 8.1 07/30/2023    ALBUMIN 4.2 07/30/2023       Other tests:  Lab Results   Component Value Date    TSH 1.332 07/30/2023    FREET4 1.67 (H) 08/05/2010    INR 1.1 07/30/2023    HGBA1C 5.1 07/05/2023    FERRITIN 145 06/28/2022    IRMSZFAT87 333 07/30/2013    GHBCHBCX69BR 21 (L) 07/30/2013    MG 2.3 07/06/2023    SEDRATE 8 03/31/2014    LIPASE 9 09/28/2017    AMYLASE 63 12/13/2012       Assessment and Plan:     1. Chronic diarrhea  Check stool studies.  If they are negative, then could consider gi referral for additional workup  - Clostridium difficile EIA; Future  - Cyclospora; Future  - Giardia / Cryptosporidum, EIA; Future  - Fecal Immunochemical Test (iFOBT); Future      2. Balance disorder  If labs are all unremarkable, then consider follow up with neurology  - CBC Auto Differential; Future  - Comprehensive Metabolic Panel; Future  - Lipid Panel; Future  - Hemoglobin A1C; Future  - TSH; Future    3. Stage  3a chronic kidney disease  - CBC Auto Differential; Future  - Comprehensive Metabolic Panel; Future  - Lipid Panel; Future  - Hemoglobin A1C; Future  - TSH; Future    RTC if no improvement or worsening.       Luc Venegas MD  7/30/2024    This note was prepared using voice-recognition software.  Although efforts are made to proofread the note, some errors may persist in the final document.

## 2024-07-30 ENCOUNTER — OFFICE VISIT (OUTPATIENT)
Dept: PRIMARY CARE CLINIC | Facility: CLINIC | Age: 49
End: 2024-07-30
Payer: COMMERCIAL

## 2024-07-30 VITALS
WEIGHT: 156.31 LBS | OXYGEN SATURATION: 97 % | HEART RATE: 85 BPM | SYSTOLIC BLOOD PRESSURE: 176 MMHG | HEIGHT: 65 IN | DIASTOLIC BLOOD PRESSURE: 108 MMHG | BODY MASS INDEX: 26.04 KG/M2

## 2024-07-30 DIAGNOSIS — N18.31 STAGE 3A CHRONIC KIDNEY DISEASE: ICD-10-CM

## 2024-07-30 DIAGNOSIS — K52.9 CHRONIC DIARRHEA: Primary | ICD-10-CM

## 2024-07-30 DIAGNOSIS — R26.89 BALANCE DISORDER: ICD-10-CM

## 2024-07-30 PROCEDURE — 1159F MED LIST DOCD IN RCRD: CPT | Mod: CPTII,S$GLB,, | Performed by: INTERNAL MEDICINE

## 2024-07-30 PROCEDURE — 4010F ACE/ARB THERAPY RXD/TAKEN: CPT | Mod: CPTII,S$GLB,, | Performed by: INTERNAL MEDICINE

## 2024-07-30 PROCEDURE — 3008F BODY MASS INDEX DOCD: CPT | Mod: CPTII,S$GLB,, | Performed by: INTERNAL MEDICINE

## 2024-07-30 PROCEDURE — 99999 PR PBB SHADOW E&M-EST. PATIENT-LVL IV: CPT | Mod: PBBFAC,,, | Performed by: INTERNAL MEDICINE

## 2024-07-30 PROCEDURE — 3077F SYST BP >= 140 MM HG: CPT | Mod: CPTII,S$GLB,, | Performed by: INTERNAL MEDICINE

## 2024-07-30 PROCEDURE — 99214 OFFICE O/P EST MOD 30 MIN: CPT | Mod: S$GLB,,, | Performed by: INTERNAL MEDICINE

## 2024-07-30 PROCEDURE — 3080F DIAST BP >= 90 MM HG: CPT | Mod: CPTII,S$GLB,, | Performed by: INTERNAL MEDICINE

## 2024-07-30 RX ORDER — LOSARTAN POTASSIUM 25 MG/1
25 TABLET ORAL DAILY
COMMUNITY
Start: 2024-04-06

## 2024-07-30 RX ORDER — ZOLPIDEM TARTRATE 10 MG/1
10 TABLET ORAL NIGHTLY PRN
COMMUNITY
Start: 2024-07-02

## 2024-07-31 ENCOUNTER — PATIENT MESSAGE (OUTPATIENT)
Dept: PSYCHIATRY | Facility: CLINIC | Age: 49
End: 2024-07-31
Payer: COMMERCIAL

## 2024-07-31 ENCOUNTER — LAB VISIT (OUTPATIENT)
Dept: LAB | Facility: HOSPITAL | Age: 49
End: 2024-07-31
Payer: COMMERCIAL

## 2024-07-31 DIAGNOSIS — K52.9 CHRONIC DIARRHEA: ICD-10-CM

## 2024-07-31 LAB
C DIFF GDH STL QL: NEGATIVE
C DIFF TOX A+B STL QL IA: NEGATIVE

## 2024-07-31 PROCEDURE — 82274 ASSAY TEST FOR BLOOD FECAL: CPT | Performed by: INTERNAL MEDICINE

## 2024-07-31 PROCEDURE — 87449 NOS EACH ORGANISM AG IA: CPT | Performed by: INTERNAL MEDICINE

## 2024-07-31 PROCEDURE — 87207 SMEAR SPECIAL STAIN: CPT | Performed by: INTERNAL MEDICINE

## 2024-07-31 PROCEDURE — 87015 SPECIMEN INFECT AGNT CONCNTJ: CPT | Performed by: INTERNAL MEDICINE

## 2024-07-31 PROCEDURE — 87329 GIARDIA AG IA: CPT | Performed by: INTERNAL MEDICINE

## 2024-07-31 PROCEDURE — 87324 CLOSTRIDIUM AG IA: CPT | Performed by: INTERNAL MEDICINE

## 2024-08-01 ENCOUNTER — PATIENT OUTREACH (OUTPATIENT)
Dept: ADMINISTRATIVE | Facility: HOSPITAL | Age: 49
End: 2024-08-01
Payer: COMMERCIAL

## 2024-08-01 LAB
CRYPTOSP AG STL QL IA: NEGATIVE
G LAMBLIA AG STL QL IA: NEGATIVE

## 2024-08-03 ENCOUNTER — LAB VISIT (OUTPATIENT)
Dept: LAB | Facility: HOSPITAL | Age: 49
End: 2024-08-03
Attending: INTERNAL MEDICINE
Payer: COMMERCIAL

## 2024-08-03 DIAGNOSIS — K52.9 CHRONIC DIARRHEA: ICD-10-CM

## 2024-08-03 DIAGNOSIS — N18.31 STAGE 3A CHRONIC KIDNEY DISEASE: ICD-10-CM

## 2024-08-03 DIAGNOSIS — R26.89 BALANCE DISORDER: ICD-10-CM

## 2024-08-03 LAB
ALBUMIN SERPL BCP-MCNC: 3.9 G/DL (ref 3.5–5.2)
ALP SERPL-CCNC: 108 U/L (ref 55–135)
ALT SERPL W/O P-5'-P-CCNC: 16 U/L (ref 10–44)
ANION GAP SERPL CALC-SCNC: 9 MMOL/L (ref 8–16)
AST SERPL-CCNC: 23 U/L (ref 10–40)
BASOPHILS # BLD AUTO: 0.17 K/UL (ref 0–0.2)
BASOPHILS NFR BLD: 1.3 % (ref 0–1.9)
BILIRUB SERPL-MCNC: 0.3 MG/DL (ref 0.1–1)
BUN SERPL-MCNC: 10 MG/DL (ref 6–20)
CALCIUM SERPL-MCNC: 9.6 MG/DL (ref 8.7–10.5)
CHLORIDE SERPL-SCNC: 104 MMOL/L (ref 95–110)
CHOLEST SERPL-MCNC: 149 MG/DL (ref 120–199)
CHOLEST/HDLC SERPL: 3.5 {RATIO} (ref 2–5)
CO2 SERPL-SCNC: 28 MMOL/L (ref 23–29)
CREAT SERPL-MCNC: 1 MG/DL (ref 0.5–1.4)
DIFFERENTIAL METHOD BLD: ABNORMAL
EOSINOPHIL # BLD AUTO: 0.8 K/UL (ref 0–0.5)
EOSINOPHIL NFR BLD: 6 % (ref 0–8)
ERYTHROCYTE [DISTWIDTH] IN BLOOD BY AUTOMATED COUNT: 13.2 % (ref 11.5–14.5)
EST. GFR  (NO RACE VARIABLE): >60 ML/MIN/1.73 M^2
ESTIMATED AVG GLUCOSE: 103 MG/DL (ref 68–131)
GLUCOSE SERPL-MCNC: 87 MG/DL (ref 70–110)
HBA1C MFR BLD: 5.2 % (ref 4–5.6)
HCT VFR BLD AUTO: 44.4 % (ref 37–48.5)
HDLC SERPL-MCNC: 43 MG/DL (ref 40–75)
HDLC SERPL: 28.9 % (ref 20–50)
HGB BLD-MCNC: 14.1 G/DL (ref 12–16)
IMM GRANULOCYTES # BLD AUTO: 0.11 K/UL (ref 0–0.04)
IMM GRANULOCYTES NFR BLD AUTO: 0.9 % (ref 0–0.5)
LDLC SERPL CALC-MCNC: 74.8 MG/DL (ref 63–159)
LYMPHOCYTES # BLD AUTO: 2.6 K/UL (ref 1–4.8)
LYMPHOCYTES NFR BLD: 20 % (ref 18–48)
MCH RBC QN AUTO: 31 PG (ref 27–31)
MCHC RBC AUTO-ENTMCNC: 31.8 G/DL (ref 32–36)
MCV RBC AUTO: 98 FL (ref 82–98)
MONOCYTES # BLD AUTO: 1.1 K/UL (ref 0.3–1)
MONOCYTES NFR BLD: 8.3 % (ref 4–15)
NEUTROPHILS # BLD AUTO: 8.1 K/UL (ref 1.8–7.7)
NEUTROPHILS NFR BLD: 63.5 % (ref 38–73)
NONHDLC SERPL-MCNC: 106 MG/DL
NRBC BLD-RTO: 0 /100 WBC
PLATELET # BLD AUTO: 477 K/UL (ref 150–450)
PMV BLD AUTO: 10.3 FL (ref 9.2–12.9)
POTASSIUM SERPL-SCNC: 3 MMOL/L (ref 3.5–5.1)
PROT SERPL-MCNC: 7.4 G/DL (ref 6–8.4)
RBC # BLD AUTO: 4.55 M/UL (ref 4–5.4)
SODIUM SERPL-SCNC: 141 MMOL/L (ref 136–145)
TRIGL SERPL-MCNC: 156 MG/DL (ref 30–150)
TSH SERPL DL<=0.005 MIU/L-ACNC: 2.29 UIU/ML (ref 0.4–4)
WBC # BLD AUTO: 12.77 K/UL (ref 3.9–12.7)

## 2024-08-03 PROCEDURE — 80061 LIPID PANEL: CPT | Performed by: INTERNAL MEDICINE

## 2024-08-03 PROCEDURE — 83036 HEMOGLOBIN GLYCOSYLATED A1C: CPT | Performed by: INTERNAL MEDICINE

## 2024-08-03 PROCEDURE — 80053 COMPREHEN METABOLIC PANEL: CPT | Performed by: INTERNAL MEDICINE

## 2024-08-03 PROCEDURE — 85025 COMPLETE CBC W/AUTO DIFF WBC: CPT | Performed by: INTERNAL MEDICINE

## 2024-08-03 PROCEDURE — 84443 ASSAY THYROID STIM HORMONE: CPT | Performed by: INTERNAL MEDICINE

## 2024-08-03 PROCEDURE — 36415 COLL VENOUS BLD VENIPUNCTURE: CPT | Mod: PO | Performed by: INTERNAL MEDICINE

## 2024-08-04 RX ORDER — POTASSIUM CHLORIDE 20 MEQ/1
20 TABLET, EXTENDED RELEASE ORAL DAILY
Qty: 30 TABLET | Refills: 5 | Status: SHIPPED | OUTPATIENT
Start: 2024-08-04

## 2024-08-05 ENCOUNTER — TELEPHONE (OUTPATIENT)
Dept: PRIMARY CARE CLINIC | Facility: CLINIC | Age: 49
End: 2024-08-05
Payer: COMMERCIAL

## 2024-08-05 LAB — CYCLOSPORA STL SAFRANIN STN: NORMAL

## 2024-08-06 LAB — HEMOCCULT STL QL IA: NEGATIVE

## 2024-08-12 ENCOUNTER — PATIENT MESSAGE (OUTPATIENT)
Dept: NEUROLOGY | Facility: CLINIC | Age: 49
End: 2024-08-12
Payer: COMMERCIAL

## 2024-08-23 ENCOUNTER — HOSPITAL ENCOUNTER (OUTPATIENT)
Dept: RADIOLOGY | Facility: HOSPITAL | Age: 49
Discharge: HOME | End: 2024-08-23
Attending: NURSE PRACTITIONER
Payer: COMMERCIAL

## 2024-08-23 ENCOUNTER — OFFICE VISIT (OUTPATIENT)
Dept: ORTHOPEDICS | Facility: CLINIC | Age: 49
End: 2024-08-23
Payer: COMMERCIAL

## 2024-08-23 VITALS — BODY MASS INDEX: 26.04 KG/M2 | HEIGHT: 65 IN | WEIGHT: 156.31 LBS

## 2024-08-23 DIAGNOSIS — M25.562 ACUTE PAIN OF LEFT KNEE: Primary | ICD-10-CM

## 2024-08-23 DIAGNOSIS — Z74.09 IMPAIRED FUNCTIONAL MOBILITY, BALANCE, GAIT, AND ENDURANCE: ICD-10-CM

## 2024-08-23 DIAGNOSIS — I67.9 CEREBROVASCULAR SMALL VESSEL DISEASE: ICD-10-CM

## 2024-08-23 DIAGNOSIS — M25.562 ACUTE PAIN OF LEFT KNEE: ICD-10-CM

## 2024-08-23 PROCEDURE — 73564 X-RAY EXAM KNEE 4 OR MORE: CPT | Mod: 26,LT,, | Performed by: RADIOLOGY

## 2024-08-23 PROCEDURE — 99999 PR PBB SHADOW E&M-EST. PATIENT-LVL IV: CPT | Mod: PBBFAC,,, | Performed by: NURSE PRACTITIONER

## 2024-08-23 PROCEDURE — 73562 X-RAY EXAM OF KNEE 3: CPT | Mod: TC,59,RT

## 2024-08-23 PROCEDURE — 73562 X-RAY EXAM OF KNEE 3: CPT | Mod: 26,59,RT, | Performed by: RADIOLOGY

## 2024-08-23 NOTE — PROGRESS NOTES
SUBJECTIVE:     Chief Complaint & History of Present Illness:  Mirna Pollock is a New 48 y.o. year old female patient here with a history of intermittent left knee pain which started 1 week ago.  There is a history of trauma.  The patient reports she fell and landed on her knees.  She has a pain that wraps around the lateral side of her knee.  She endorses that she has a past medical history of CVA which left her with weakness on the left side.  She is followed by neurology.    The pain is located in the lateral aspect of the knee.  The pain is described as dull, 3-4/10.  There is not radiation.  There is not catching or locking.  Aggravating factors include going up and down stairs.  Associated symptoms include none.  There is not numbness or tingling of the lower extremity.  There is not back pain. Previous treatments include ice and rest which have provided adequate relief.  There is not a history of previous injury or surgery to the knee.  The patient does not use an assistive device.    Review of patient's allergies indicates:   Allergen Reactions    Iodine and iodide containing products Other (See Comments)     Patient becomes hypotensive with DRESS-like syndrome even with pre-medication  Pt with hypotensive, severe reaction with CTA July 2023    Losartan Rash     Fever, eosinophilia, diffuse rash    Qaeyknxy-5-to2 antimigraine agents Other (See Comments)     Patient has a history of probable migrainous stroke. Please proceed with caution with triptan medications specifically, given effects on vasculature. Patient may tolerate this medication, but added to chart simply to ensure it's brought to the attention of prescribers.    Penicillins Rash         Current Outpatient Medications   Medication Sig Dispense Refill    aspirin (ECOTRIN) 81 MG EC tablet Take 1 tablet (81 mg total) by mouth once daily. 30 tablet 11    benazepril-hydrochlorthiazide (LOTENSIN HCT) 20-25 mg Tab Take 1 tablet by mouth once daily.  Please check with your primary care provider. You've been started on Losartan, and so it's unclear if you'll need additional anti-hypertensive medications. 90 tablet 3    buPROPion (WELLBUTRIN XL) 300 MG 24 hr tablet Take 1 tablet (300 mg total) by mouth once daily. 90 tablet 1    clopidogreL (PLAVIX) 75 mg tablet Take 1 tablet (75 mg total) by mouth once daily. 30 tablet 11    DULoxetine (CYMBALTA) 60 MG capsule Take 1 capsule (60 mg total) by mouth once daily. 90 capsule 1    eszopiclone (LUNESTA) 2 MG Tab Take 1 tablet (2 mg total) by mouth every evening. Can break in half for 1mg nightly. 30 tablet 2    galcanezumab-gnlm 120 mg/mL PnIj Inject 120 mg (one pen) into the skin every 28 days. Begin 28 days after loading dose. 1 mL 10    hydrOXYzine pamoate (VISTARIL) 25 MG Cap Take 1 capsule (25 mg total) by mouth every 8 (eight) hours as needed (anxiety). 30 capsule 1    losartan (COZAAR) 25 MG tablet Take 25 mg by mouth once daily.      magnesium oxide (MAG-OX) 400 mg (241.3 mg magnesium) tablet Take 1 tablet (400 mg total) by mouth once daily. 30 tablet 1    ondansetron (ZOFRAN-ODT) 4 MG TbDL DISSOLVE 1 TABLET ON THE TONGUE EVERY 6 HOURS AS NEEDED FOR NAUSEA      potassium chloride SA (K-DUR,KLOR-CON) 20 MEQ tablet Take 1 tablet (20 mEq total) by mouth once daily. 30 tablet 5    promethazine (PHENERGAN) 25 MG tablet TAKE 1 TABLET BY MOUTH EVERY 4 TO 6 HOURS AS NEEDED FOR NAUSEA 20 tablet 1    riboflavin, vitamin B2, 400 mg Tab Take 400 mg by mouth once daily. 30 tablet 11    tiZANidine (ZANAFLEX) 4 MG tablet Take 1 and 1/2 tablets by mouth every evening.  No driving, no alcohol with medication. 45 tablet 5    ubrogepant (UBRELVY) 100 mg tablet TAKE 1 TABLET BY MOUTH EVERY 2 HOURS AS NEEDED FOR MIGRAINE. MAX 2 TABLETS (200 MG) PER DAY. 16 tablet 5    zolpidem (AMBIEN) 10 mg Tab Take 10 mg by mouth nightly as needed.      atorvastatin (LIPITOR) 40 MG tablet Take 1 tablet (40 mg total) by mouth once daily. 90 tablet  "3    verapamiL (CALAN) 40 MG Tab Take 1 tablet (40 mg total) by mouth 2 (two) times daily. 60 tablet 11     Current Facility-Administered Medications   Medication Dose Route Frequency Provider Last Rate Last Admin    onabotulinumtoxina injection 200 Units  200 Units Intramuscular q12 weeks Luci Ortez FNP   200 Units at 23 1200       Past Medical History:   Diagnosis Date    Anxiety     ASD (atrial septal defect)     Endometriosis     Headache(784.0)     Hypertension     Kidney stones fibomyalgia, migraine, heart closer, kidey stones       Past Surgical History:   Procedure Laterality Date    APPENDECTOMY      BREAST BIOPSY Left 2019    FA    CARDIAC CATHETERIZATION  10/01/2010    ASD device closure     SECTION      x 2    CHOLECYSTECTOMY      HYSTERECTOMY      12 yrs ago, still has ovaries, due to endometriosis by Dr. Hernandez       Family History   Adopted: Yes   Problem Relation Name Age of Onset    Migraines Son      No Known Problems Son           Review of Systems:  ROS:  Constitutional: no fever or chills  Eyes: no visual changes  ENT: no nasal congestion or sore throat  Respiratory: no cough or shortness of breath  Cardiovascular: no chest pain or palpitations  Gastrointestinal: no nausea or vomiting, tolerating diet  Genitourinary: no hematuria or dysuria  Integument/Breast: no rash or pruritis  Hematologic/Lymphatic: no easy bruising or lymphadenopathy  Musculoskeletal: no arthralgias or myalgias  Neurological: no seizures or tremors  Behavioral/Psych: no auditory or visual hallucinations  Endocrine: no heat or cold intolerance      OBJECTIVE:     PHYSICAL EXAM:  Vital Signs (Most Recent)  There were no vitals filed for this visit.  Height: 5' 5" (165.1 cm) Weight: 70.9 kg (156 lb 4.9 oz),   Estimated body mass index is 26.01 kg/m² as calculated from the following:    Height as of this encounter: 5' 5" (1.651 m).    Weight as of this encounter: 70.9 kg (156 lb 4.9 oz).   General " Appearance: Well nourished, well developed, in no acute distress.  HENT: Normal cephalic, oropharynx pink and moist  Eyes: PERRLA bilaterally and EOM x 4  Respiratory: Even and unlabored  Skin: Warm and Dry.   Psychiatric: AAO x 4, Mood & affect are normal.    left  Knee Exam:  Knee Range of Motion:normal   Effusion:none  Condition of skin:intact  Location of tenderness:Lateral joint line   Strength:normal  Stability:  stable to testing, Lachman: stable, LCL: stable, MCL: stable, and PCL: stable  Varus /Valgus stress:  normal  Blanquita:   negative    Hip Examination:  normal    RADIOGRAPHS:  X-ray of the left knee was obtained, findings show no acute fractures.  All radiographs were personally reviewed by me.    ASSESSMENT/PLAN:       ICD-10-CM ICD-9-CM   1. Acute pain of left knee  M25.562 719.46   2. Impaired functional mobility, balance, gait, and endurance  Z74.09 V49.89   3. Cerebrovascular small vessel disease  I67.9 437.9       -Mirna Pollock presents to clinic today with c/c left knee pain for the past week secondary to a fall.  -X-ray as above.  -Recommend RICE therapy.    I will refer the patient over to physical therapy where they can work on core strengthening and steps.  Advised the patient to follow up in the clinic in 6 weeks p.r.n., sooner for new or worsening symptoms.

## 2024-08-27 ENCOUNTER — TELEPHONE (OUTPATIENT)
Dept: PSYCHIATRY | Facility: CLINIC | Age: 49
End: 2024-08-27
Payer: COMMERCIAL

## 2024-08-27 ENCOUNTER — PATIENT MESSAGE (OUTPATIENT)
Dept: PSYCHIATRY | Facility: CLINIC | Age: 49
End: 2024-08-27
Payer: COMMERCIAL

## 2024-09-16 ENCOUNTER — OFFICE VISIT (OUTPATIENT)
Dept: PSYCHIATRY | Facility: CLINIC | Age: 49
End: 2024-09-16
Payer: COMMERCIAL

## 2024-09-16 DIAGNOSIS — F41.1 GAD (GENERALIZED ANXIETY DISORDER): ICD-10-CM

## 2024-09-16 DIAGNOSIS — F33.41 RECURRENT MAJOR DEPRESSION IN PARTIAL REMISSION: Primary | ICD-10-CM

## 2024-09-16 PROCEDURE — 90791 PSYCH DIAGNOSTIC EVALUATION: CPT | Mod: 95,,, | Performed by: SOCIAL WORKER

## 2024-09-16 PROCEDURE — 4010F ACE/ARB THERAPY RXD/TAKEN: CPT | Mod: CPTII,95,, | Performed by: SOCIAL WORKER

## 2024-09-16 PROCEDURE — 3044F HG A1C LEVEL LT 7.0%: CPT | Mod: CPTII,95,, | Performed by: SOCIAL WORKER
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
## 2024-09-23 ENCOUNTER — DOCUMENTATION ONLY (OUTPATIENT)
Dept: REHABILITATION | Facility: HOSPITAL | Age: 49
End: 2024-09-23
Payer: COMMERCIAL

## 2024-09-23 ENCOUNTER — PATIENT MESSAGE (OUTPATIENT)
Dept: PSYCHIATRY | Facility: CLINIC | Age: 49
End: 2024-09-23
Payer: COMMERCIAL

## 2024-09-26 ENCOUNTER — PATIENT MESSAGE (OUTPATIENT)
Dept: PSYCHIATRY | Facility: CLINIC | Age: 49
End: 2024-09-26
Payer: COMMERCIAL

## 2024-09-26 ENCOUNTER — PATIENT OUTREACH (OUTPATIENT)
Dept: ADMINISTRATIVE | Facility: HOSPITAL | Age: 49
End: 2024-09-26
Payer: COMMERCIAL

## 2024-09-30 ENCOUNTER — OFFICE VISIT (OUTPATIENT)
Dept: PSYCHIATRY | Facility: CLINIC | Age: 49
End: 2024-09-30
Payer: COMMERCIAL

## 2024-09-30 DIAGNOSIS — F41.1 GAD (GENERALIZED ANXIETY DISORDER): Primary | ICD-10-CM

## 2024-09-30 DIAGNOSIS — F33.41 RECURRENT MAJOR DEPRESSION IN PARTIAL REMISSION: ICD-10-CM

## 2024-09-30 PROCEDURE — 90837 PSYTX W PT 60 MINUTES: CPT | Mod: 95,,, | Performed by: SOCIAL WORKER

## 2024-09-30 PROCEDURE — 3044F HG A1C LEVEL LT 7.0%: CPT | Mod: CPTII,95,, | Performed by: SOCIAL WORKER

## 2024-09-30 PROCEDURE — 4010F ACE/ARB THERAPY RXD/TAKEN: CPT | Mod: CPTII,95,, | Performed by: SOCIAL WORKER

## 2024-10-07 ENCOUNTER — OFFICE VISIT (OUTPATIENT)
Dept: PSYCHIATRY | Facility: CLINIC | Age: 49
End: 2024-10-07
Payer: COMMERCIAL

## 2024-10-07 ENCOUNTER — PATIENT MESSAGE (OUTPATIENT)
Dept: PSYCHIATRY | Facility: CLINIC | Age: 49
End: 2024-10-07

## 2024-10-07 DIAGNOSIS — F41.1 GAD (GENERALIZED ANXIETY DISORDER): Primary | ICD-10-CM

## 2024-10-07 DIAGNOSIS — F33.41 RECURRENT MAJOR DEPRESSION IN PARTIAL REMISSION: ICD-10-CM

## 2024-10-07 PROCEDURE — 4010F ACE/ARB THERAPY RXD/TAKEN: CPT | Mod: CPTII,95,, | Performed by: SOCIAL WORKER

## 2024-10-07 PROCEDURE — 90837 PSYTX W PT 60 MINUTES: CPT | Mod: 95,,, | Performed by: SOCIAL WORKER

## 2024-10-07 PROCEDURE — 3044F HG A1C LEVEL LT 7.0%: CPT | Mod: CPTII,95,, | Performed by: SOCIAL WORKER
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
## 2024-10-14 ENCOUNTER — PATIENT MESSAGE (OUTPATIENT)
Dept: PSYCHIATRY | Facility: CLINIC | Age: 49
End: 2024-10-14
Payer: COMMERCIAL

## 2024-10-16 ENCOUNTER — PATIENT MESSAGE (OUTPATIENT)
Dept: ADMINISTRATIVE | Facility: HOSPITAL | Age: 49
End: 2024-10-16
Payer: COMMERCIAL

## 2024-11-01 ENCOUNTER — TELEPHONE (OUTPATIENT)
Dept: PHARMACY | Facility: CLINIC | Age: 49
End: 2024-11-01
Payer: COMMERCIAL

## 2024-11-05 ENCOUNTER — TELEPHONE (OUTPATIENT)
Dept: PHARMACY | Facility: CLINIC | Age: 49
End: 2024-11-05
Payer: COMMERCIAL

## 2024-11-05 NOTE — TELEPHONE ENCOUNTER
[facility]/Population Health Chart Review & Patient Outreach Details For Medication Adherence Project    Reason for Outreach Encounter: 3rd Party payor non-compliance report ([employer], [employer], C, etc)  2.  Patient Outreach Method: Reviewed Patient Chart  3.   Medication in question: losartan   LAST FILLED: n/a  Hypertension Medications               benazepril-hydrochlorthiazide (LOTENSIN HCT) 20-25 mg Tab Take 1 tablet by mouth once daily. Please check with your primary care provider. You've been started on Losartan, and so it's unclear if you'll need additional anti-hypertensive medications.    losartan (COZAAR) 25 MG tablet Take 25 mg by mouth once daily.    verapamiL (CALAN) 40 MG Tab Take 1 tablet (40 mg total) by mouth 2 (two) times daily.              4.  Reviewed and or Updates Made To: Patient Chart  5. Outreach Outcomes and/or actions taken: Medication discontinued    Additional Notes:

## 2024-11-11 ENCOUNTER — PATIENT MESSAGE (OUTPATIENT)
Dept: PSYCHIATRY | Facility: CLINIC | Age: 49
End: 2024-11-11
Payer: COMMERCIAL

## 2024-11-12 ENCOUNTER — PATIENT MESSAGE (OUTPATIENT)
Dept: NEUROLOGY | Facility: CLINIC | Age: 49
End: 2024-11-12
Payer: COMMERCIAL

## 2024-11-12 DIAGNOSIS — G43.009 MIGRAINE WITHOUT AURA AND WITHOUT STATUS MIGRAINOSUS, NOT INTRACTABLE: ICD-10-CM

## 2024-11-13 RX ORDER — TIZANIDINE 4 MG/1
TABLET ORAL
Qty: 45 TABLET | Refills: 5 | Status: SHIPPED | OUTPATIENT
Start: 2024-11-13

## 2024-12-23 DIAGNOSIS — Z12.31 OTHER SCREENING MAMMOGRAM: ICD-10-CM

## 2025-01-07 ENCOUNTER — HOSPITAL ENCOUNTER (OUTPATIENT)
Dept: RADIOLOGY | Facility: HOSPITAL | Age: 50
Discharge: HOME | End: 2025-01-07
Attending: INTERNAL MEDICINE
Payer: COMMERCIAL

## 2025-01-07 DIAGNOSIS — Z12.31 OTHER SCREENING MAMMOGRAM: ICD-10-CM

## 2025-01-07 PROCEDURE — 77063 BREAST TOMOSYNTHESIS BI: CPT | Mod: 26,,, | Performed by: RADIOLOGY

## 2025-01-07 PROCEDURE — 77067 SCR MAMMO BI INCL CAD: CPT | Mod: 26,,, | Performed by: RADIOLOGY

## 2025-01-07 PROCEDURE — 77067 SCR MAMMO BI INCL CAD: CPT | Mod: TC

## 2025-01-09 ENCOUNTER — PATIENT MESSAGE (OUTPATIENT)
Dept: PRIMARY CARE CLINIC | Facility: CLINIC | Age: 50
End: 2025-01-09
Payer: COMMERCIAL

## 2025-01-09 DIAGNOSIS — R92.8 ABNORMAL MAMMOGRAM OF RIGHT BREAST: Primary | ICD-10-CM

## 2025-01-10 ENCOUNTER — TELEPHONE (OUTPATIENT)
Dept: RADIOLOGY | Facility: HOSPITAL | Age: 50
End: 2025-01-10
Payer: COMMERCIAL

## 2025-01-13 ENCOUNTER — TELEPHONE (OUTPATIENT)
Dept: RADIOLOGY | Facility: HOSPITAL | Age: 50
End: 2025-01-13
Payer: COMMERCIAL

## 2025-01-13 ENCOUNTER — PATIENT MESSAGE (OUTPATIENT)
Dept: RADIOLOGY | Facility: HOSPITAL | Age: 50
End: 2025-01-13
Payer: COMMERCIAL

## 2025-01-14 ENCOUNTER — TELEPHONE (OUTPATIENT)
Dept: RADIOLOGY | Facility: HOSPITAL | Age: 50
End: 2025-01-14
Payer: COMMERCIAL

## 2025-01-14 NOTE — TELEPHONE ENCOUNTER
Called patient in reference to her breast imaging and scheduling a abnormal mammogram follow up. No answer. Left the patient a message with my direct phone number.  Patient was left several messages and the patient has not called back to schedule follow up recommended by the Radiologist. Mailed patient her results certified with a phone number to call and schedule follow up.

## 2025-01-29 ENCOUNTER — HOSPITAL ENCOUNTER (OUTPATIENT)
Dept: RADIOLOGY | Facility: HOSPITAL | Age: 50
Discharge: HOME | End: 2025-01-29
Attending: INTERNAL MEDICINE
Payer: COMMERCIAL

## 2025-01-29 DIAGNOSIS — R92.8 ABNORMAL MAMMOGRAM OF RIGHT BREAST: ICD-10-CM

## 2025-01-29 PROCEDURE — 77061 BREAST TOMOSYNTHESIS UNI: CPT | Mod: 26,RT,, | Performed by: RADIOLOGY

## 2025-01-29 PROCEDURE — 76642 ULTRASOUND BREAST LIMITED: CPT | Mod: 26,RT,, | Performed by: RADIOLOGY

## 2025-01-29 PROCEDURE — 76642 ULTRASOUND BREAST LIMITED: CPT | Mod: TC,RT

## 2025-01-29 PROCEDURE — 77065 DX MAMMO INCL CAD UNI: CPT | Mod: 26,RT,, | Performed by: RADIOLOGY

## 2025-01-29 PROCEDURE — 77065 DX MAMMO INCL CAD UNI: CPT | Mod: TC,RT

## 2025-02-04 ENCOUNTER — PATIENT MESSAGE (OUTPATIENT)
Dept: PRIMARY CARE CLINIC | Facility: CLINIC | Age: 50
End: 2025-02-04
Payer: COMMERCIAL

## 2025-02-05 ENCOUNTER — PATIENT MESSAGE (OUTPATIENT)
Dept: PSYCHIATRY | Facility: CLINIC | Age: 50
End: 2025-02-05
Payer: COMMERCIAL

## 2025-02-05 ENCOUNTER — OFFICE VISIT (OUTPATIENT)
Dept: PSYCHIATRY | Facility: CLINIC | Age: 50
End: 2025-02-05
Payer: COMMERCIAL

## 2025-02-05 VITALS
SYSTOLIC BLOOD PRESSURE: 205 MMHG | DIASTOLIC BLOOD PRESSURE: 94 MMHG | BODY MASS INDEX: 26.95 KG/M2 | HEART RATE: 100 BPM | WEIGHT: 161.94 LBS

## 2025-02-05 DIAGNOSIS — F41.1 GAD (GENERALIZED ANXIETY DISORDER): Primary | ICD-10-CM

## 2025-02-05 DIAGNOSIS — G47.00 INSOMNIA, UNSPECIFIED TYPE: ICD-10-CM

## 2025-02-05 DIAGNOSIS — F33.41 RECURRENT MAJOR DEPRESSION IN PARTIAL REMISSION: ICD-10-CM

## 2025-02-05 PROCEDURE — 99999 PR PBB SHADOW E&M-EST. PATIENT-LVL II: CPT | Mod: PBBFAC,,,

## 2025-02-05 PROCEDURE — 3080F DIAST BP >= 90 MM HG: CPT | Mod: CPTII,S$GLB,,

## 2025-02-05 PROCEDURE — 3077F SYST BP >= 140 MM HG: CPT | Mod: CPTII,S$GLB,,

## 2025-02-05 PROCEDURE — 90792 PSYCH DIAG EVAL W/MED SRVCS: CPT | Mod: S$GLB,,,

## 2025-02-05 NOTE — PROGRESS NOTES
"Outpatient Psychiatry Clinic Follow-up  [facility]  2025  Patient Name: Mirna Pollock   : 1975       Source of information: Patient      History of Present Illness (HPI):     Mirna Pollock is a 49 y.o. female with history of MDD, Insomnia, LALITA who presents for follow up appointment.      Plan at last appointment on Visit date not found:  - Add memantine 10mg daily off label for pseudobulbar affect and neuroprotective benefits post-stroke. Discussed option of Neudexta but strong cyp interactions with current regimen.  - Continue Cymbalta 60mg daily for anxiety, depression, fibromyalgia  - Continue Wellbutrin XL 300mg daily for depression, motivation, concentration  - Switch from Ambien 10mg to Lunesta 1-2mg nightly for insomnia. If no improvement, could taper off and try a ERON and refer to CBT-I.  - Continue PRN Vistaril 25mg TID for anxiety, using about once a week    Current medication regimen:   Cymbalta 60 mg  Wellbutrin  mg   Ambien 10 mg  Vistaril 25 mg TID PRN    Interval History      Today, reports doing well during interim. Reports being engaged in mental health/psychiatry for "many years now." States that she first started seeing a psychiatrist when she had noticed worsening mood and sleep. Reports ongoing psychosocial stressors regarding her children (reports one child as transgender with strained relation, and another who recently had a child and has not been communicating as much as normal), and taking care of her mother (who currently lives with her and her ). Reports taking vacations with  when able (reports taking cruise in October this past year).      Past Medical, Psychiatric, Social, and Family History:     Past Medical History:   Stroke in 2019    Social History:  Occupational: Currently unemployed  Marital: Currently   Children: 2  Housing: Lives with her  and his mother  Alcohol Use: Occasional social use  Drug Use: Denies  Psychosocial " stressors: Children, husbands mother    Past Psychiatric History:  Previous Diagnosis: MDD, Anxiety  Previous Medication Trials: cannot recall  Previous SA: Denies  Previous psychiatric hospitalizations: Denies    Psychiatric Review of Systems (ROS):     Depression: depressed mood and hopelessness     Jane/hypomania: denies      Anxiety (LALITA and PA): excessive anxiety/worry that is difficult to control, easily fatigued, and irritability     Psychosis: denies current symptoms      PTSD: denies current symptoms     Eating Disorders: denies current symptoms      OCD: OCD Symptoms: denies current symptoms      ADHD: denies current symptoms    Sleep: denies current symptoms/complaints       Examination:       VITALS  Vitals:    02/05/25 1450   BP: (!) 205/94   Pulse: 100   Weight: 73.5 kg (161 lb 14.9 oz)       RELEVANT LABS/STUDIES:    Lab Results   Component Value Date    WBC 12.77 (H) 08/03/2024    HGB 14.1 08/03/2024    HCT 44.4 08/03/2024    MCV 98 08/03/2024     (H) 08/03/2024     BMP  Lab Results   Component Value Date     08/03/2024    K 3.0 (L) 08/03/2024     08/03/2024    CO2 28 08/03/2024    BUN 10 08/03/2024    CREATININE 1.0 08/03/2024    CALCIUM 9.6 08/03/2024    ANIONGAP 9 08/03/2024    ESTGFRAFRICA >60.0 06/28/2022    EGFRNONAA >60.0 06/28/2022     Lab Results   Component Value Date    ALT 16 08/03/2024    AST 23 08/03/2024    ALKPHOS 108 08/03/2024    BILITOT 0.3 08/03/2024     Lab Results   Component Value Date    TSH 2.292 08/03/2024     Lab Results   Component Value Date    HGBA1C 5.2 08/03/2024         PHYSICAL EXAM  General: well developed, well nourished  Neurologic:   Gait: Normal   Psychomotor signs:  No involuntary movements or tremor  AIMS: none    Risk Parameters:  Patient reports no suicidal ideation  Patient reports no homicidal ideation  Patient reports no self-injurious behavior  Patient reports no violent behavior      MENTAL STATUS EXAMINATION  General Appearance:  "appears stated age, well developed and nourished, adequately groomed and appropriately dressed, in no acute distress  Behavior: normal; cooperative; reasonably friendly, pleasant, and polite; appropriate eye-contact; under good behavioral control  Involuntary Movements and Motor Activity: no abnormal involuntary movements noted; no tics, no tremors, no akathisia, no dystonia, no evidence of tardive dyskinesia; no psychomotor agitation or retardation  Muscle Strength and Tone: intact  Gait and Station: intact, normal gait and station, ambulates without assistance  Speech: intact; normal rate, rhythm, volume, tone and pitch; conversational, spontaneous, and coherent  Language: intact; speaks and understands English fluently and proficiently; repeats words and phrases, no word finding difficulties are noted  Mood: "good"  Affect: normal, euthymic, reactive, full-range, mood-congruent, appropriate to situation and context  Thought Process: intact, linear, goal-directed, organized, logical  Associations: intact, no loosening of associations  Thought Content and Perceptions: no suicidal or homicidal ideation, no auditory or visual hallucinations, no paranoid ideation, no ideas of reference, no evidence of delusions or psychosis  Sensorium/Arousal: alert with clear sensorium  Orientation: intact; oriented fully to person, place, time and situation  Recent and Remote Memory: grossly intact, able to recall relevant and salient information from the recent and remote past  Attention and Concentration: grossly intact, attentive to the conversation and not readily distractible  Fund of Knowledge: grossly intact, used appropriate vocabulary and demonstrated an awareness of current events  Insight: intact, demonstrates awareness of illness and situation  Judgment: intact, behavior is adequate/appropriate to the circumstances, compliant with health provider's recommendations and instructions      Medical Decision Making - " Assessment:     DIAGNOSIS:    ICD-10-CM ICD-9-CM   1. LALITA (generalized anxiety disorder)  F41.1 300.02   2. Recurrent major depression in partial remission  F33.41 296.35   3. Insomnia, unspecified type  G47.00 780.52         General Impression:  48 y.o.F with psych history of anxiety, depression, insomnia presenting for follow up, last seen 1 month ago. Significant past medical history of stroke in July 2023 (PCA and lacunar-type with left field vision loss), chronic migraines, fibromyalgia, IBS.  Patient reports being stable on current medication regimen of Cymbalta 60 mg. Wellbutrin  mg, Ambien 10 mg, Vistaril 25 mg TID PRN    Strengths and liabilities:  Strength: Patient accepts guidance/feedback, Strength: Patient is expressive/articulate., Strength: Patient is intelligent., Strength: Patient is motivated for change., Strength: Patient is physically healthy., Strength: Patient has positive support network., Strength: Patient has reasonable judgment., Strength: Patient is stable.  Protective factors: help-seeking/motivated for change, intact reality testing, intact support system, and no recent attempts  Risk factors:         Medical Decision Making - Plan:     Continue  Cymbalta 60 mg  Wellbutrin  mg   Ambien 10 mg  Vistaril 25 mg TID PRN  [place] PDMP reviewed: no concern for misuse; filling as prescribed; no other controlled substances being filled  Discussed lifestyle modifications to reduce symptoms non-pharmacologically (ex: sleep, exercise, diet, etc).  Encouraged psychotherapy, patient expresses understanding  Counseled to limit substance use as drugs/alcohol can exacerbate psychiatric symptoms.  Discussed importance of sleep hygiene    Return to Clinic: 3 months or sooner if needed    Discussed with patient verbal informed consent including: risks and benefits of proposed treatment above vs. alternative treatments vs. no treatment, serious and common side effects of these respective  treatments, as well as the inherent unpredictability of individual responses to any treatments, contingency plans if adverse reactions occur, precautions in which to me through Epic MyChart portal or call the clinic, and precautions in which to call 911 and/or go to the emergency room. The patient expresses understanding of the above and displays the capacity to agree with this current plan. All questions were answered.    Total of 50 minutes spent today on encounter, including chart review,  review, seeing patient, documenting encounter, ordering medications.      Billing Documentation:     Method of Encounter IN PERSON visit at the clinic   Type of Encounter New patient to me, NOT seen by department within last 3 years   Counseling;  Psychotherapy Not applicable: Not done or UNDER 16 minutes   Counseling;  Tobacco and/or Nicotine Not applicable: Not done or UNDER 3 minutes   Time Remaining Chart/Pt 10992: NEW patient to me and DID prescribe meds (and two or fewer 33376/01925 codes within a year), 45+mins   Total Mins  (2/5/2025) N/A - Not billing for time         Job Tiwari MD  [facility], PGY-III  [facility]

## 2025-02-06 ENCOUNTER — PATIENT MESSAGE (OUTPATIENT)
Dept: PSYCHIATRY | Facility: CLINIC | Age: 50
End: 2025-02-06
Payer: COMMERCIAL

## 2025-02-07 DIAGNOSIS — G47.00 INSOMNIA, UNSPECIFIED TYPE: Primary | ICD-10-CM

## 2025-02-07 RX ORDER — ZOLPIDEM TARTRATE 10 MG/1
10 TABLET ORAL NIGHTLY PRN
Qty: 30 TABLET | Refills: 2 | Status: SHIPPED | OUTPATIENT
Start: 2025-02-07 | End: 2025-05-08

## 2025-02-07 NOTE — PROGRESS NOTES
I have reviewed the notes, assessments, and/or procedures performed this visit, and I concur with the documentation.  Patient not evaluated face to face by this md

## 2025-02-10 ENCOUNTER — HOSPITAL ENCOUNTER (OUTPATIENT)
Dept: RADIOLOGY | Facility: HOSPITAL | Age: 50
Discharge: HOME | End: 2025-02-10
Attending: INTERNAL MEDICINE
Payer: COMMERCIAL

## 2025-02-10 DIAGNOSIS — R92.8 ABNORMAL MAMMOGRAM OF RIGHT BREAST: ICD-10-CM

## 2025-02-10 PROCEDURE — 19083 BX BREAST 1ST LESION US IMAG: CPT | Mod: RT

## 2025-02-10 PROCEDURE — 77065 DX MAMMO INCL CAD UNI: CPT | Mod: TC,RT

## 2025-02-10 PROCEDURE — 63600175 PHARM REV CODE 636 W HCPCS: Performed by: INTERNAL MEDICINE

## 2025-02-10 PROCEDURE — 77065 DX MAMMO INCL CAD UNI: CPT | Mod: 26,RT,, | Performed by: RADIOLOGY

## 2025-02-10 PROCEDURE — 19083 BX BREAST 1ST LESION US IMAG: CPT | Mod: RT,,, | Performed by: RADIOLOGY

## 2025-02-10 RX ORDER — LIDOCAINE HYDROCHLORIDE 20 MG/ML
10 INJECTION, SOLUTION INFILTRATION; PERINEURAL ONCE
Status: COMPLETED | OUTPATIENT
Start: 2025-02-10 | End: 2025-02-10

## 2025-02-10 RX ORDER — LIDOCAINE HYDROCHLORIDE AND EPINEPHRINE 10; 20 UG/ML; MG/ML
10 INJECTION, SOLUTION INFILTRATION; PERINEURAL ONCE
Status: COMPLETED | OUTPATIENT
Start: 2025-02-10 | End: 2025-02-10

## 2025-02-10 RX ADMIN — LIDOCAINE HYDROCHLORIDE,EPINEPHRINE BITARTRATE 8 ML: 20; .01 INJECTION, SOLUTION INFILTRATION; PERINEURAL at 11:02

## 2025-02-10 RX ADMIN — LIDOCAINE HYDROCHLORIDE 9 ML: 20 INJECTION, SOLUTION INFILTRATION; PERINEURAL at 11:02

## 2025-02-19 ENCOUNTER — RESULTS FOLLOW-UP (OUTPATIENT)
Dept: PRIMARY CARE CLINIC | Facility: CLINIC | Age: 50
End: 2025-02-19
Payer: COMMERCIAL

## 2025-02-19 DIAGNOSIS — N60.09 CYST OF BREAST, UNSPECIFIED LATERALITY: Primary | ICD-10-CM

## 2025-02-24 ENCOUNTER — TELEPHONE (OUTPATIENT)
Dept: RADIOLOGY | Facility: HOSPITAL | Age: 50
End: 2025-02-24
Payer: COMMERCIAL

## 2025-02-28 DIAGNOSIS — G43.009 MIGRAINE WITHOUT AURA AND WITHOUT STATUS MIGRAINOSUS, NOT INTRACTABLE: ICD-10-CM

## 2025-03-08 ENCOUNTER — PATIENT MESSAGE (OUTPATIENT)
Dept: PSYCHIATRY | Facility: CLINIC | Age: 50
End: 2025-03-08
Payer: COMMERCIAL

## 2025-03-10 ENCOUNTER — OFFICE VISIT (OUTPATIENT)
Dept: SURGERY | Facility: CLINIC | Age: 50
End: 2025-03-10
Payer: COMMERCIAL

## 2025-03-10 VITALS
DIASTOLIC BLOOD PRESSURE: 117 MMHG | HEIGHT: 65 IN | SYSTOLIC BLOOD PRESSURE: 196 MMHG | HEART RATE: 80 BPM | WEIGHT: 161 LBS | BODY MASS INDEX: 26.82 KG/M2

## 2025-03-10 DIAGNOSIS — N64.89 COMPLEX SCLEROSING LESION OF RIGHT BREAST: Primary | ICD-10-CM

## 2025-03-10 DIAGNOSIS — N60.09 CYST OF BREAST, UNSPECIFIED LATERALITY: ICD-10-CM

## 2025-03-10 PROCEDURE — 99999 PR PBB SHADOW E&M-EST. PATIENT-LVL V: CPT | Mod: PBBFAC,,, | Performed by: SURGERY

## 2025-03-10 RX ORDER — TIZANIDINE 4 MG/1
TABLET ORAL
Qty: 45 TABLET | Refills: 5 | Status: SHIPPED | OUTPATIENT
Start: 2025-03-10

## 2025-03-10 NOTE — PROGRESS NOTES
General Surgery  History and Physical    Chief Complaint: Right breast complex sclerosing lesion    HPI: 49 F who presents after screening MMG showed BIRADS-0 of right breast asymmetry in the outer position and middle depth. Diag US and MMG on 25 showed 1.0 cm mass, BIRADS 4. Then underwent biopsy on 2/10/25 showing a complex sclerosing lesion of the right breast.    Hx of previous left breast biopsy which was fibroadenoma in 2019.    Lesion: right breast, 9 o'clock, 8 CFN, 1.0 x 0.9 x 0.8 cm hypoechoic mass, complex sclerosing lesion    Past Medical History:   Diagnosis Date    Anxiety     ASD (atrial septal defect)     Endometriosis     Headache(784.0)     Hypertension     Kidney stones fibomyalgia, migraine, heart closer, kidey stones     Past Surgical History:   Procedure Laterality Date    APPENDECTOMY      BREAST BIOPSY Left 2019    FA    CARDIAC CATHETERIZATION  10/01/2010    ASD device closure     SECTION      x 2    CHOLECYSTECTOMY      HYSTERECTOMY      12 yrs ago, still has ovaries, due to endometriosis by Dr. Hernandez     Social History[1]  Family History   Adopted: Yes   Problem Relation Name Age of Onset    Migraines Son      No Known Problems Son       Current Medications[2]  Review of patient's allergies indicates:   Allergen Reactions    Iodine and iodide containing products Other (See Comments)     Patient becomes hypotensive with DRESS-like syndrome even with pre-medication  Pt with hypotensive, severe reaction with CTA 2023    Losartan Rash     Fever, eosinophilia, diffuse rash    [id number] antimigraine agents Other (See Comments)     Patient has a history of probable migrainous stroke. Please proceed with caution with triptan medications specifically, given effects on vasculature. Patient may tolerate this medication, but added to chart simply to ensure it's brought to the attention of prescribers.    Penicillins Rash       Review of Systems   Constitutional:   Negative for chills, fever and weight loss.   HENT:  Negative for ear pain, hearing loss and tinnitus.    Eyes:  Negative for blurred vision, double vision and pain.   Respiratory:  Negative for cough, shortness of breath and wheezing.    Cardiovascular:  Negative for chest pain, palpitations and orthopnea.   Gastrointestinal:  Negative for abdominal pain, nausea and vomiting.   Genitourinary:  Negative for frequency, hematuria and urgency.   Musculoskeletal:  Negative for back pain, myalgias and neck pain.   Neurological:  Negative for dizziness, tingling and headaches.   All other systems reviewed and are negative.    There were no vitals filed for this visit.    Physical Exam  Constitutional:       Appearance: Normal appearance.   HENT:      Head: Normocephalic and atraumatic.      Mouth/Throat:      Mouth: Mucous membranes are moist.   Eyes:      Extraocular Movements: Extraocular movements intact.   Cardiovascular:      Rate and Rhythm: Normal rate and regular rhythm.   Pulmonary:      Effort: Pulmonary effort is normal.   Chest:   Breasts:     Right: Normal. No swelling, bleeding, inverted nipple, mass, nipple discharge, skin change or tenderness.      Left: Normal. No swelling, bleeding, inverted nipple, mass, nipple discharge, skin change or tenderness.   Abdominal:      General: Abdomen is flat.      Palpations: Abdomen is soft.   Lymphadenopathy:      Upper Body:      Right upper body: No supraclavicular or axillary adenopathy.      Left upper body: No supraclavicular or axillary adenopathy.   Skin:     General: Skin is warm and dry.   Neurological:      General: No focal deficit present.      Mental Status: She is alert.   Psychiatric:         Mood and Affect: Mood normal.         Behavior: Behavior normal.       Labs: Reviewed.    Imaging:       Assessment/Plan:      ICD-10-CM ICD-9-CM    1. Complex sclerosing lesion of right breast  N64.89 611.89         Discussed and decided to continue standard  screening. No surgery.    Lucien Gaines MD  General Surgery PGY-2      I have personally performed a detailed history and physical examination on this patient. My findings are summarized in the resident's note included in the record.   Low risk of associated DCIS from core biopsy right lateral breast  Patient prefers observation and I agree  Will see her back in 6 months       [1]   Social History  Socioeconomic History    Marital status:    Occupational History     Employer: [employer]   Tobacco Use    Smoking status: Never     Passive exposure: Past    Smokeless tobacco: Never   Substance and Sexual Activity    Alcohol use: No     Alcohol/week: 0.0 standard drinks of alcohol    Drug use: No    Sexual activity: Yes     Partners: Male     Birth control/protection: Coitus interruptus     Social Drivers of Health     Financial Resource Strain: Patient Declined (2/20/2025)    Overall Financial Resource Strain (CARDIA)     Difficulty of Paying Living Expenses: Patient declined   Food Insecurity: Food Insecurity Present (2/20/2025)    Hunger Vital Sign     Worried About Running Out of Food in the Last Year: Never true     Ran Out of Food in the Last Year: Often true   Transportation Needs: No Transportation Needs (2/20/2025)    PRAPARE - Transportation     Lack of Transportation (Medical): No     Lack of Transportation (Non-Medical): No   Physical Activity: Insufficiently Active (2/20/2025)    Exercise Vital Sign     Days of Exercise per Week: 3 days     Minutes of Exercise per Session: 10 min   Stress: Stress Concern Present (2/20/2025)    [employer] - Occupational Stress Questionnaire     Feeling of Stress : Rather much   Housing Stability: Low Risk  (2/20/2025)    Housing Stability Vital Sign     Unable to Pay for Housing in the Last Year: No     Homeless in the Last Year: No   [2]   Current Outpatient Medications   Medication Sig Dispense Refill    aspirin  (ECOTRIN) 81 MG EC tablet Take 1 tablet (81 mg total) by mouth once daily. 30 tablet 11    atorvastatin (LIPITOR) 40 MG tablet Take 1 tablet (40 mg total) by mouth once daily. 90 tablet 3    benazepril-hydrochlorthiazide (LOTENSIN HCT) 20-25 mg Tab Take 1 tablet by mouth once daily. Please check with your primary care provider. You've been started on Losartan, and so it's unclear if you'll need additional anti-hypertensive medications. 90 tablet 3    buPROPion (WELLBUTRIN XL) 300 MG 24 hr tablet Take 1 tablet (300 mg total) by mouth once daily. 90 tablet 1    clopidogreL (PLAVIX) 75 mg tablet Take 1 tablet (75 mg total) by mouth once daily. 30 tablet 11    DULoxetine (CYMBALTA) 60 MG capsule Take 1 capsule (60 mg total) by mouth once daily. 90 capsule 1    galcanezumab-gnlm 120 mg/mL PnIj Inject 120 mg (one pen) into the skin every 28 days. Begin 28 days after loading dose. 1 mL 10    hydrOXYzine pamoate (VISTARIL) 25 MG Cap Take 1 capsule (25 mg total) by mouth every 8 (eight) hours as needed (anxiety). 30 capsule 1    losartan (COZAAR) 25 MG tablet Take 25 mg by mouth once daily.      magnesium oxide (MAG-OX) 400 mg (241.3 mg magnesium) tablet Take 1 tablet (400 mg total) by mouth once daily. 30 tablet 1    ondansetron (ZOFRAN-ODT) 4 MG TbDL DISSOLVE 1 TABLET ON THE TONGUE EVERY 6 HOURS AS NEEDED FOR NAUSEA      potassium chloride SA (K-DUR,KLOR-CON) 20 MEQ tablet Take 1 tablet (20 mEq total) by mouth once daily. 30 tablet 5    promethazine (PHENERGAN) 25 MG tablet TAKE 1 TABLET BY MOUTH EVERY 4 TO 6 HOURS AS NEEDED FOR NAUSEA 20 tablet 1    riboflavin, vitamin B2, 400 mg Tab Take 400 mg by mouth once daily. 30 tablet 11    tiZANidine (ZANAFLEX) 4 MG tablet Take 1 and 1/2 tablets by mouth every evening.  No driving, no alcohol with medication. 45 tablet 5    ubrogepant (UBRELVY) 100 mg tablet TAKE 1 TABLET BY MOUTH EVERY 2 HOURS AS NEEDED FOR MIGRAINE. MAX 2 TABLETS (200 MG) PER DAY. 16 tablet 5    verapamiL  (CALAN) 40 MG Tab Take 1 tablet (40 mg total) by mouth 2 (two) times daily. 60 tablet 11    zolpidem (AMBIEN) 10 mg Tab Take 1 tablet (10 mg total) by mouth nightly as needed (As needed for insomnia). 30 tablet 2     Current Facility-Administered Medications   Medication Dose Route Frequency Provider Last Rate Last Admin    onabotulinumtoxina injection 200 Units  200 Units Intramuscular q12 weeks Luci Ortez FNP   200 Units at 06/08/23 1200

## 2025-03-25 NOTE — PROGRESS NOTES
[facility]  4/1/2025          Reason for Visit:      had concerns including neck gland swelling (Hurts at night, affects right ear), Fall (Struggles with maintaining balance), leg cramping (Started a few months ago/), and Eye Problem (When she stands up randomly world seems slanted angle).       History of Present Illness:     Patient presents today with swollen gland and ear pain    ENT SYMPTOMS:  She reports pain behind ear with associated tender gland that improves when lying on affected side. She has experienced runny nose for the last couple days. Symptoms are improving over the past two days. She denies fever and colored nasal secretions.    POST-STROKE SYMPTOMS:  She has a history of stroke from July 2020. She reports balance issues with sensation that the world feels tilted when standing. She denies current spinning sensations, though experienced this initially post-stroke. She experiences leg cramps at night while in bed requiring stretching for relief. The cramps cause significant discomfort necessitating movement but are not painful. These episodes started after her stroke and were not present even during periods of low potassium. The frequency has decreased with no occurrences in the past couple weeks. She denies any new activities or medications associated with onset.    MEDICAL HISTORY:  She has a history of low potassium levels without associated symptoms, which has never been problematic.         Problem List:     Problem List[1]      Medications:     Current Medications[2]        Review of Systems:     Review of Systems   Constitutional:  Negative for activity change and unexpected weight change.   HENT:  Negative for hearing loss, rhinorrhea and trouble swallowing.    Eyes:  Negative for discharge and visual disturbance.   Respiratory:  Negative for chest tightness and wheezing.    Cardiovascular:  Negative for chest pain and palpitations.   Gastrointestinal:   "Negative for blood in stool, constipation, diarrhea and vomiting.   Endocrine: Negative for polydipsia and polyuria.   Genitourinary:  Negative for difficulty urinating, dysuria, hematuria and menstrual problem.   Musculoskeletal:  Negative for arthralgias, joint swelling and neck pain.   Neurological:  Positive for headaches. Negative for weakness.   Psychiatric/Behavioral:  Negative for confusion and dysphoric mood.            Physical Exam:     Temp:             Blood Pressure:  138/84        Pulse:   71     Respirations:     Weight:   70.4 kg (155 lb 3.3 oz)  Height:   5' 5" (1.651 m)  BMI:     Body mass index is 25.83 kg/m².    Physical Exam  Constitutional:       General: She is not in acute distress.  HENT:      Right Ear: Tympanic membrane normal.      Left Ear: Tympanic membrane normal.      Nose: No congestion or rhinorrhea.      Mouth/Throat:      Pharynx: No oropharyngeal exudate or posterior oropharyngeal erythema.   Cardiovascular:      Rate and Rhythm: Normal rate and regular rhythm.   Pulmonary:      Effort: Pulmonary effort is normal. No respiratory distress.      Breath sounds: No wheezing.   Abdominal:      Palpations: Abdomen is soft.      Tenderness: There is no abdominal tenderness.   Musculoskeletal:      Comments: Gait appears normal, but she did lose balance with turning 180 degrees   Romberg test negative  CN 2- 12 intact   Skin:     General: Skin is warm.   Neurological:      General: No focal deficit present.      Mental Status: She is alert and oriented to person, place, and time.           Labs/Imaging/Testing:     Most recent CBC:  Lab Results   Component Value Date    WBC 12.77 (H) 08/03/2024    HGB 14.1 08/03/2024     (H) 08/03/2024    MCV 98 08/03/2024       Most recent Lipids:  Lab Results   Component Value Date    CHOL 149 08/03/2024    HDL 43 08/03/2024    LDLCALC 74.8 08/03/2024    TRIG 156 (H) 08/03/2024       Most recent Chemistry:  Lab Results   Component Value Date    "  08/03/2024    K 3.0 (L) 08/03/2024     08/03/2024    CO2 28 08/03/2024    BUN 10 08/03/2024    CREATININE 1.0 08/03/2024    GLU 87 08/03/2024    CALCIUM 9.6 08/03/2024    ALT 16 08/03/2024    AST 23 08/03/2024    ALKPHOS 108 08/03/2024    PROT 7.4 08/03/2024    ALBUMIN 3.9 08/03/2024       Other tests:  Lab Results   Component Value Date    TSH 2.292 08/03/2024    FREET4 1.67 (H) 08/05/2010    INR 1.1 07/30/2023    HGBA1C 5.2 08/03/2024    FERRITIN 145 06/28/2022    HQSMJZUL17 333 07/30/2013    [id number] 21 (L) 07/30/2013    MG 2.3 07/06/2023    SEDRATE 8 03/31/2014    LIPASE 9 09/28/2017    AMYLASE 63 12/13/2012         Assessment and Plan:     1. Stage 3a chronic kidney disease  - Reviewed previous lab results, noting low potassium levels in the past.  - Ordered labs to check potassium and magnesium levels to further evaluate electrolyte imbalances.  - Will review results and adjust treatment if necessary, potentially including changes to supplements.  - Instructed the patient to follow up after lab results are available to discuss findings and determine next steps.    2. Balance problem  - Performed comprehensive neurological exam including eye movement, shoulder shrug, tongue movement, and sensory tests.  - Conducted balance tests, including Romberg test and gait assessment, to evaluate the patient's condition.  - Observed the patient's gait and balance during walking test in the hallway and heel-to-toe walking test.  - Noted the patient reports feeling unsteady and leaning while walking, especially when turning around cones during therapy.  - Acknowledged the patient's difficulty with balance and gait, particularly with heel-to-toe walking.  - Referred the patient for vestibular therapy to address balance and gait issues.    - A- Evaluated tilting sensation reported by the patient.  - Noted the patient reports feeling of tilting world and unsteadiness, but denies spinning sensation.  - Referred  the patient for vestibular therapy to address the tilting sensation and balance issues.  Ambulatory Referral/Consult to Physical Therapy; Future    3. History of CVA (cerebrovascular accident)  - Ambulatory Referral/Consult to Physical Therapy; Future    4. Muscle spasm  - Noted the patient reports experiencing leg cramps and discomfort at night, requiring stretching for relief.  - Considered potential causes including dehydration, electrolyte imbalances, and medication side effects.    - Comprehensive Metabolic Panel; Future  - CBC Auto Differential; Future  - Magnesium; Future  - TSH; Future  - Hemoglobin A1C; Future    5. Hypertension  - Comprehensive Metabolic Panel; Future  - CBC Auto Differential; Future  - Magnesium; Future  - TSH; Future  - Hemoglobin A1C; Future    6. Hypokalemia  - Considered the possibility of persistent low potassium and its relation to magnesium levels.    ENLARGED LYMPH NODE:  - Assessed reported swollen gland reported by the patient near the ear.  - Examined the area and found no visible abnormalities in the ear or surrounding area.  - Noted the patient reports pain and swelling in a gland near the ear, which has improved over the past couple of days.  - Considered possible causes including lymphadenitis, resolving ear infection, or skin issue.  - Recommend watchful waiting and follow-up if symptoms recur.  - Assessed ear pain, noting normal ear exam findings.  - Noted the patient reports ear pain at night, which improves when lying on the affected side.  - Considered possible causes including referred pain from lymph node inflammation or resolving ear infection.  - Recommend watchful waiting and instructed the patient to contact the office if ear pain recurs.              Luc Venegas MD  4/1/2025    This note was prepared using voice-recognition software.  Although efforts are made to proofread the note, some errors may persist in the final document.         [1]   Patient Active  Problem List  Diagnosis    Occipital neuralgia    Intractable persistent migraine aura with cerebral infarction and status migrainosus    CTS (carpal tunnel syndrome)    Accelerated hypertension    Cervical muscle pain    Cervical radiculopathy    H/O catheter-based closure of atrial septal defect    Adult congenital heart disease    Overweight (BMI 25.0-29.9)    LALITA (generalized anxiety disorder)    Fibromyalgia    Social anxiety disorder    Status migrainosus    Insomnia    Recurrent major depressive disorder, in full remission    Hypokalemia    Stage 3a chronic kidney disease    Proteinuria    Cerebrovascular small vessel disease    Drug rash    Head Tingling, Right Sided    Impaired functional mobility, balance, gait, and endurance    Quadrantanopsia, left    Fine motor impairment    Right hand weakness   [2]   Current Outpatient Medications:     aspirin (ECOTRIN) 81 MG EC tablet, Take 1 tablet (81 mg total) by mouth once daily., Disp: 30 tablet, Rfl: 11    atorvastatin (LIPITOR) 40 MG tablet, Take 1 tablet (40 mg total) by mouth once daily., Disp: 90 tablet, Rfl: 3    benazepril-hydrochlorthiazide (LOTENSIN HCT) 20-25 mg Tab, Take 1 tablet by mouth once daily. Please check with your primary care provider. You've been started on Losartan, and so it's unclear if you'll need additional anti-hypertensive medications., Disp: 90 tablet, Rfl: 3    buPROPion (WELLBUTRIN XL) 300 MG 24 hr tablet, Take 1 tablet (300 mg total) by mouth once daily., Disp: 90 tablet, Rfl: 1    clopidogreL (PLAVIX) 75 mg tablet, Take 1 tablet (75 mg total) by mouth once daily., Disp: 30 tablet, Rfl: 11    DULoxetine (CYMBALTA) 60 MG capsule, Take 1 capsule (60 mg total) by mouth once daily., Disp: 90 capsule, Rfl: 1    galcanezumab-gnlm 120 mg/mL PnIj, Inject 120 mg (one pen) into the skin every 28 days. Begin 28 days after loading dose., Disp: 1 mL, Rfl: 10    hydrOXYzine pamoate (VISTARIL) 25 MG Cap, Take 1 capsule (25 mg total) by mouth  every 8 (eight) hours as needed (anxiety)., Disp: 30 capsule, Rfl: 1    magnesium oxide (MAG-OX) 400 mg (241.3 mg magnesium) tablet, Take 1 tablet (400 mg total) by mouth once daily., Disp: 30 tablet, Rfl: 1    ondansetron (ZOFRAN-ODT) 4 MG TbDL, as needed., Disp: , Rfl:     potassium chloride SA (K-DUR,KLOR-CON) 20 MEQ tablet, Take 1 tablet (20 mEq total) by mouth once daily., Disp: 30 tablet, Rfl: 5    promethazine (PHENERGAN) 25 MG tablet, TAKE 1 TABLET BY MOUTH EVERY 4 TO 6 HOURS AS NEEDED FOR NAUSEA (Patient taking differently: as needed. TAKE 1 TABLET BY MOUTH EVERY 4 TO 6 HOURS AS NEEDED FOR NAUSEA), Disp: 20 tablet, Rfl: 1    riboflavin, vitamin B2, 400 mg Tab, Take 400 mg by mouth once daily., Disp: 30 tablet, Rfl: 11    tiZANidine (ZANAFLEX) 4 MG tablet, Take 1 and 1/2 tablets by mouth every evening.  No driving, no alcohol with medication., Disp: 45 tablet, Rfl: 5    ubrogepant (UBRELVY) 100 mg tablet, TAKE 1 TABLET BY MOUTH EVERY 2 HOURS AS NEEDED FOR MIGRAINE. MAX 2 TABLETS (200 MG) PER DAY. (Patient taking differently: as needed. TAKE 1 TABLET BY MOUTH EVERY 2 HOURS AS NEEDED FOR MIGRAINE. MAX 2 TABLETS (200 MG) PER DAY.), Disp: 16 tablet, Rfl: 5    verapamiL (CALAN) 40 MG Tab, Take 1 tablet (40 mg total) by mouth 2 (two) times daily., Disp: 60 tablet, Rfl: 11    zolpidem (AMBIEN) 10 mg Tab, Take 1 tablet (10 mg total) by mouth nightly as needed (As needed for insomnia)., Disp: 30 tablet, Rfl: 2  No current facility-administered medications for this visit.

## 2025-04-01 ENCOUNTER — LAB VISIT (OUTPATIENT)
Dept: LAB | Facility: HOSPITAL | Age: 50
End: 2025-04-01
Attending: INTERNAL MEDICINE
Payer: COMMERCIAL

## 2025-04-01 ENCOUNTER — OFFICE VISIT (OUTPATIENT)
Dept: PRIMARY CARE CLINIC | Facility: CLINIC | Age: 50
End: 2025-04-01
Payer: COMMERCIAL

## 2025-04-01 ENCOUNTER — RESULTS FOLLOW-UP (OUTPATIENT)
Dept: PRIMARY CARE CLINIC | Facility: CLINIC | Age: 50
End: 2025-04-01

## 2025-04-01 VITALS
BODY MASS INDEX: 25.85 KG/M2 | SYSTOLIC BLOOD PRESSURE: 138 MMHG | WEIGHT: 155.19 LBS | HEART RATE: 71 BPM | HEIGHT: 65 IN | DIASTOLIC BLOOD PRESSURE: 84 MMHG | OXYGEN SATURATION: 97 %

## 2025-04-01 DIAGNOSIS — M62.838 MUSCLE SPASM: ICD-10-CM

## 2025-04-01 DIAGNOSIS — N18.31 STAGE 3A CHRONIC KIDNEY DISEASE: ICD-10-CM

## 2025-04-01 DIAGNOSIS — I10 ACCELERATED HYPERTENSION: ICD-10-CM

## 2025-04-01 DIAGNOSIS — E87.6 HYPOKALEMIA: ICD-10-CM

## 2025-04-01 DIAGNOSIS — Z86.73 HISTORY OF CVA (CEREBROVASCULAR ACCIDENT): ICD-10-CM

## 2025-04-01 DIAGNOSIS — R26.89 BALANCE PROBLEM: Primary | ICD-10-CM

## 2025-04-01 LAB
ABSOLUTE EOSINOPHIL (OHS): 0.48 K/UL
ABSOLUTE MONOCYTE (OHS): 1.39 K/UL (ref 0.3–1)
ABSOLUTE NEUTROPHIL COUNT (OHS): 10.94 K/UL (ref 1.8–7.7)
ALBUMIN SERPL BCP-MCNC: 4 G/DL (ref 3.5–5.2)
ALP SERPL-CCNC: 99 UNIT/L (ref 40–150)
ALT SERPL W/O P-5'-P-CCNC: 14 UNIT/L (ref 10–44)
ANION GAP (OHS): 13 MMOL/L (ref 8–16)
AST SERPL-CCNC: 22 UNIT/L (ref 11–45)
BASOPHILS # BLD AUTO: 0.14 K/UL
BASOPHILS NFR BLD AUTO: 0.9 %
BILIRUB SERPL-MCNC: 1.1 MG/DL (ref 0.1–1)
BUN SERPL-MCNC: 10 MG/DL (ref 6–20)
CALCIUM SERPL-MCNC: 10.1 MG/DL (ref 8.7–10.5)
CHLORIDE SERPL-SCNC: 103 MMOL/L (ref 95–110)
CO2 SERPL-SCNC: 24 MMOL/L (ref 23–29)
CREAT SERPL-MCNC: 1.1 MG/DL (ref 0.5–1.4)
EAG (OHS): 103 MG/DL (ref 68–131)
ERYTHROCYTE [DISTWIDTH] IN BLOOD BY AUTOMATED COUNT: 13.3 % (ref 11.5–14.5)
GFR SERPLBLD CREATININE-BSD FMLA CKD-EPI: >60 ML/MIN/1.73/M2
GLUCOSE SERPL-MCNC: 83 MG/DL (ref 70–110)
HBA1C MFR BLD: 5.2 % (ref 4–5.6)
HCT VFR BLD AUTO: 46.3 % (ref 37–48.5)
HGB BLD-MCNC: 15.1 GM/DL (ref 12–16)
IMM GRANULOCYTES # BLD AUTO: 0.15 K/UL (ref 0–0.04)
IMM GRANULOCYTES NFR BLD AUTO: 1 % (ref 0–0.5)
LYMPHOCYTES # BLD AUTO: 2.29 K/UL (ref 1–4.8)
MAGNESIUM SERPL-MCNC: 1.9 MG/DL (ref 1.6–2.6)
MCH RBC QN AUTO: 31.1 PG (ref 27–31)
MCHC RBC AUTO-ENTMCNC: 32.6 G/DL (ref 32–36)
MCV RBC AUTO: 95 FL (ref 82–98)
NUCLEATED RBC (/100WBC) (OHS): 0 /100 WBC
PLATELET # BLD AUTO: 508 K/UL (ref 150–450)
PMV BLD AUTO: 10.8 FL (ref 9.2–12.9)
POTASSIUM SERPL-SCNC: 3.4 MMOL/L (ref 3.5–5.1)
PROT SERPL-MCNC: 8 GM/DL (ref 6–8.4)
RBC # BLD AUTO: 4.86 M/UL (ref 4–5.4)
RELATIVE EOSINOPHIL (OHS): 3.1 %
RELATIVE LYMPHOCYTE (OHS): 14.9 % (ref 18–48)
RELATIVE MONOCYTE (OHS): 9 % (ref 4–15)
RELATIVE NEUTROPHIL (OHS): 71.1 % (ref 38–73)
SODIUM SERPL-SCNC: 140 MMOL/L (ref 136–145)
TSH SERPL-ACNC: 0.79 UIU/ML (ref 0.4–4)
WBC # BLD AUTO: 15.39 K/UL (ref 3.9–12.7)

## 2025-04-01 PROCEDURE — 36415 COLL VENOUS BLD VENIPUNCTURE: CPT | Mod: PN

## 2025-04-01 PROCEDURE — 83735 ASSAY OF MAGNESIUM: CPT

## 2025-04-01 PROCEDURE — 83036 HEMOGLOBIN GLYCOSYLATED A1C: CPT

## 2025-04-01 PROCEDURE — 80053 COMPREHEN METABOLIC PANEL: CPT

## 2025-04-01 PROCEDURE — 3008F BODY MASS INDEX DOCD: CPT | Mod: CPTII,S$GLB,, | Performed by: INTERNAL MEDICINE

## 2025-04-01 PROCEDURE — 99214 OFFICE O/P EST MOD 30 MIN: CPT | Mod: S$GLB,,, | Performed by: INTERNAL MEDICINE

## 2025-04-01 PROCEDURE — 1159F MED LIST DOCD IN RCRD: CPT | Mod: CPTII,S$GLB,, | Performed by: INTERNAL MEDICINE

## 2025-04-01 PROCEDURE — 84443 ASSAY THYROID STIM HORMONE: CPT

## 2025-04-01 PROCEDURE — 85025 COMPLETE CBC W/AUTO DIFF WBC: CPT

## 2025-04-01 PROCEDURE — 3075F SYST BP GE 130 - 139MM HG: CPT | Mod: CPTII,S$GLB,, | Performed by: INTERNAL MEDICINE

## 2025-04-01 PROCEDURE — 3079F DIAST BP 80-89 MM HG: CPT | Mod: CPTII,S$GLB,, | Performed by: INTERNAL MEDICINE

## 2025-04-01 PROCEDURE — 99999 PR PBB SHADOW E&M-EST. PATIENT-LVL V: CPT | Mod: PBBFAC,,, | Performed by: INTERNAL MEDICINE

## 2025-04-01 RX ORDER — POTASSIUM CHLORIDE 20 MEQ/1
20 TABLET, EXTENDED RELEASE ORAL 2 TIMES DAILY
Qty: 60 TABLET | Refills: 5 | Status: SHIPPED | OUTPATIENT
Start: 2025-04-01

## 2025-04-02 ENCOUNTER — PATIENT MESSAGE (OUTPATIENT)
Dept: PRIMARY CARE CLINIC | Facility: CLINIC | Age: 50
End: 2025-04-02
Payer: COMMERCIAL

## 2025-04-03 NOTE — TELEPHONE ENCOUNTER
LOV with Luc Venegas MD , 4/1/2025  Pt c/o throat and ear pain. Complaints addressed during 4/1/25 visit.

## 2025-04-15 ENCOUNTER — TELEPHONE (OUTPATIENT)
Dept: NEUROLOGY | Facility: CLINIC | Age: 50
End: 2025-04-15
Payer: COMMERCIAL

## 2025-04-15 NOTE — TELEPHONE ENCOUNTER
----- Message from Marcus Osman MD sent at 4/15/2025  1:39 PM CDT -----  Regarding: RE: Appt Access  Contact: [phone]  This is one of my old and very complicated patients. You can schedule her in my clinic when next available. If she can't wait, then its ok if she gets seen sooner for headache. Its not CAA  ----- Message -----  From: Rohit Garcia MA  Sent: 4/14/2025  11:39 AM CDT  To: Marcus Osman MD  Subject: FW: Appt Access                                  With you or send elsewhere?  ----- Message -----  From: Aurora Osuna  Sent: 4/14/2025  11:24 AM CDT  To: Jacqui Velazquez Staff  Subject: Appt Access                                      Patient calling regarding Appointment Access (message) for #Scheduling Request Appt Type: Established - Follow UpDate/Time Preference: first available Treating Provider: Jacqui  Caller Name: Patient  Contact Preference: [phone] Comments/notes: Pt is calling to get scheduled a f/u w provider for balance issues, and bad headaches per pt.

## 2025-04-15 NOTE — TELEPHONE ENCOUNTER
----- Message from Cheryle sent at 4/15/2025  2:18 PM CDT -----  Regarding: missed call  Pt is returning a missed call from someone in the office and is asking for a return call back soon. Thanks. Who Called: Rohit Patient requesting call back or [facility] msg: Call back

## 2025-05-07 ENCOUNTER — OFFICE VISIT (OUTPATIENT)
Dept: PSYCHIATRY | Facility: CLINIC | Age: 50
End: 2025-05-07
Payer: COMMERCIAL

## 2025-05-07 DIAGNOSIS — F33.42 RECURRENT MAJOR DEPRESSIVE DISORDER, IN FULL REMISSION: ICD-10-CM

## 2025-05-07 DIAGNOSIS — G47.00 INSOMNIA, UNSPECIFIED TYPE: ICD-10-CM

## 2025-05-07 DIAGNOSIS — F41.1 GAD (GENERALIZED ANXIETY DISORDER): ICD-10-CM

## 2025-05-07 RX ORDER — DULOXETIN HYDROCHLORIDE 60 MG/1
60 CAPSULE, DELAYED RELEASE ORAL DAILY
Qty: 90 CAPSULE | Refills: 1 | Status: SHIPPED | OUTPATIENT
Start: 2025-05-07 | End: 2025-11-03

## 2025-05-07 RX ORDER — BUPROPION HYDROCHLORIDE 300 MG/1
300 TABLET ORAL DAILY
Qty: 90 TABLET | Refills: 1 | Status: SHIPPED | OUTPATIENT
Start: 2025-05-07 | End: 2025-11-03

## 2025-05-07 RX ORDER — ZOLPIDEM TARTRATE 10 MG/1
10 TABLET ORAL NIGHTLY PRN
Qty: 30 TABLET | Refills: 2 | Status: SHIPPED | OUTPATIENT
Start: 2025-05-07 | End: 2025-08-05

## 2025-05-07 NOTE — PROGRESS NOTES
TELE-HEALTH / VIRTUAL VISIT:     The patient location is: in the [facility].    Visit type: audiovisual    Face to Face time with patient: 30 minutes  Total time spent on the encounter: 30 minutes    Each patient to whom he or she provides medical services by telemedicine is:  (1) informed of the relationship between the physician and patient and the respective role of any other health care provider with respect to management of the patient; and (2) notified that he or she may decline to receive medical services by telemedicine and may withdraw from such care at any time.       Outpatient Psychiatry Clinic Follow-up  [facility]  2025  Patient Name: Mirna Pollock   : 1975       Source of information: Patient      History of Present Illness (HPI):     Mirna Pollock is a 49 y.o. female with history of MDD, Insomnia, LALITA who presents for follow up appointment.      Plan at last appointment  Continue  Cymbalta 60 mg  Wellbutrin  mg   Ambien 10 mg  Vistaril 25 mg TID PRN      Interval History      Patient presents for follow-up appointment.  Denies acute complaints or concerns during interim.  States that mood has been stable and denies any changes in symptoms depression or anxiety.  States that she has been out of her Ambien for the last couple of days but denies any side effects of current medications.  Denies any questions or concerns at this time.  Denies thoughts of self-harm or wanting to die.  Discussed upcoming resident transitioned for which patient expressed understanding.     Past Medical, Psychiatric, Social, and Family History:     Past Medical History:   Stroke in 2019    Social History:  Occupational: Currently unemployed  Marital: Currently   Children: 2  Housing: Lives with her  and his mother  Alcohol Use: Occasional social use  Drug Use: Denies  Psychosocial stressors: Children, husbands mother    Past Psychiatric History:  Previous Diagnosis: MDD,  Anxiety  Previous Medication Trials: cannot recall  Previous SA: Denies  Previous psychiatric hospitalizations: Denies    Psychiatric Review of Systems (ROS):     Depression: depressed mood and hopelessness     Jane/hypomania: denies      Anxiety (LALITA and PA): excessive anxiety/worry that is difficult to control, easily fatigued, and irritability     Psychosis: denies current symptoms      PTSD: denies current symptoms     Eating Disorders: denies current symptoms      OCD: OCD Symptoms: denies current symptoms      ADHD: denies current symptoms    Sleep: denies current symptoms/complaints       Examination:       VITALS  There were no vitals filed for this visit.      RELEVANT LABS/STUDIES:    Lab Results   Component Value Date    WBC 15.39 (H) 04/01/2025    HGB 15.1 04/01/2025    HCT 46.3 04/01/2025    MCV 95 04/01/2025     (H) 04/01/2025     BMP  Lab Results   Component Value Date     04/01/2025    K 3.4 (L) 04/01/2025     04/01/2025    CO2 24 04/01/2025    BUN 10 04/01/2025    CREATININE 1.1 04/01/2025    CALCIUM 10.1 04/01/2025    ANIONGAP 13 04/01/2025    ESTGFRAFRICA >60.0 06/28/2022    EGFRNONAA >60.0 06/28/2022     Lab Results   Component Value Date    ALT 14 04/01/2025    AST 22 04/01/2025    ALKPHOS 99 04/01/2025    BILITOT 1.1 (H) 04/01/2025     Lab Results   Component Value Date    TSH 0.794 04/01/2025     Lab Results   Component Value Date    HGBA1C 5.2 04/01/2025         PHYSICAL EXAM  General: well developed, well nourished  Neurologic:   Gait: Normal   Psychomotor signs:  No involuntary movements or tremor  AIMS: none    Risk Parameters:  Patient reports no suicidal ideation  Patient reports no homicidal ideation  Patient reports no self-injurious behavior  Patient reports no violent behavior      MENTAL STATUS EXAMINATION  General Appearance: appears stated age, well developed and nourished, adequately groomed and appropriately dressed, in no acute distress  Behavior: normal;  "cooperative; reasonably friendly, pleasant, and polite; appropriate eye-contact; under good behavioral control  Involuntary Movements and Motor Activity: no abnormal involuntary movements noted; no tics, no tremors, no akathisia, no dystonia, no evidence of tardive dyskinesia; no psychomotor agitation or retardation  Muscle Strength and Tone: intact  Gait and Station: intact, normal gait and station, ambulates without assistance  Speech: intact; normal rate, rhythm, volume, tone and pitch; conversational, spontaneous, and coherent  Language: intact; speaks and understands English fluently and proficiently; repeats words and phrases, no word finding difficulties are noted  Mood: "good"  Affect: normal, euthymic, reactive, full-range, mood-congruent, appropriate to situation and context  Thought Process: intact, linear, goal-directed, organized, logical  Associations: intact, no loosening of associations  Thought Content and Perceptions: no suicidal or homicidal ideation, no auditory or visual hallucinations, no paranoid ideation, no ideas of reference, no evidence of delusions or psychosis  Sensorium/Arousal: alert with clear sensorium  Orientation: intact; oriented fully to person, place, time and situation  Recent and Remote Memory: grossly intact, able to recall relevant and salient information from the recent and remote past  Attention and Concentration: grossly intact, attentive to the conversation and not readily distractible  Fund of Knowledge: grossly intact, used appropriate vocabulary and demonstrated an awareness of current events  Insight: intact, demonstrates awareness of illness and situation  Judgment: intact, behavior is adequate/appropriate to the circumstances, compliant with health provider's recommendations and instructions      Medical Decision Making - Assessment:     DIAGNOSIS:    ICD-10-CM ICD-9-CM   1. Recurrent major depressive disorder, in full remission  F33.42 296.36   2. LALITA (generalized " anxiety disorder)  F41.1 300.02   3. Insomnia, unspecified type  G47.00 780.52           General Impression:  48 y.o.F with psych history of anxiety, depression, insomnia presenting for follow up, last seen 1 month ago. Significant past medical history of stroke in July 2023 (PCA and lacunar-type with left field vision loss), chronic migraines, fibromyalgia, IBS.  Patient reports being stable on current medication regimen of Cymbalta 60 mg. Wellbutrin  mg, Ambien 10 mg, Vistaril 25 mg TID PRN    Strengths and liabilities:  Strength: Patient accepts guidance/feedback, Strength: Patient is expressive/articulate., Strength: Patient is intelligent., Strength: Patient is motivated for change., Strength: Patient is physically healthy., Strength: Patient has positive support network., Strength: Patient has reasonable judgment., Strength: Patient is stable.  Protective factors: help-seeking/motivated for change, intact reality testing, intact support system, and no recent attempts  Risk factors:         Medical Decision Making - Plan:     Continue  Cymbalta 60 mg  Wellbutrin  mg   Ambien 10 mg  Vistaril 25 mg TID PRN  [place] PDMP reviewed: no concern for misuse; filling as prescribed; no other controlled substances being filled  Discussed lifestyle modifications to reduce symptoms non-pharmacologically (ex: sleep, exercise, diet, etc).  Encouraged psychotherapy, patient expresses understanding  Counseled to limit substance use as drugs/alcohol can exacerbate psychiatric symptoms.  Discussed importance of sleep hygiene    Return to Clinic: 3 months or sooner if needed    Discussed with patient verbal informed consent including: risks and benefits of proposed treatment above vs. alternative treatments vs. no treatment, serious and common side effects of these respective treatments, as well as the inherent unpredictability of individual responses to any treatments, contingency plans if adverse reactions occur,  precautions in which to me through Epic MyChart portal or call the clinic, and precautions in which to call 911 and/or go to the emergency room. The patient expresses understanding of the above and displays the capacity to agree with this current plan. All questions were answered.    Total of 50 minutes spent today on encounter, including chart review,  review, seeing patient, documenting encounter, ordering medications.      Billing Documentation:     Method of Encounter AUDIOVISUAL - time on video was approximately 30 mins   Type of Encounter New patient to me, NOT seen by department within last 3 years   Counseling;  Psychotherapy Not applicable: Not done or UNDER 16 minutes   Counseling;  Tobacco and/or Nicotine Not applicable: Not done or UNDER 3 minutes   Time Remaining Chart/Pt 37169: NEW patient to me and DID prescribe meds (and two or fewer 57824/16099 codes within a year), 45+mins   Total Mins  (5/7/2025) N/A - Not billing for time         Job Tiwari MD  [facility], PGY-III  [facility]

## 2025-05-09 ENCOUNTER — OFFICE VISIT (OUTPATIENT)
Dept: NEUROLOGY | Facility: CLINIC | Age: 50
End: 2025-05-09
Payer: COMMERCIAL

## 2025-05-09 ENCOUNTER — PATIENT MESSAGE (OUTPATIENT)
Dept: NEUROLOGY | Facility: CLINIC | Age: 50
End: 2025-05-09

## 2025-05-09 DIAGNOSIS — I61.9 CEREBROVASCULAR SMALL VESSEL DISEASE WITH HEMORRHAGE: ICD-10-CM

## 2025-05-09 DIAGNOSIS — Z86.73 HISTORY OF STROKE: Primary | ICD-10-CM

## 2025-05-09 DIAGNOSIS — I67.2 INTRACRANIAL ATHEROSCLEROSIS: ICD-10-CM

## 2025-05-09 DIAGNOSIS — I63.89 OTHER CEREBRAL INFARCTION: ICD-10-CM

## 2025-05-09 PROCEDURE — 99999 PR PBB SHADOW E&M-EST. PATIENT-LVL II: CPT | Mod: PBBFAC,,, | Performed by: PSYCHIATRY & NEUROLOGY

## 2025-05-09 NOTE — PROGRESS NOTES
Vascular Neurology  Clinic Note    ___________________  ASSESSMENT & PLAN  49 y.o. female with extensive history known to me, with DINORAH, kayla of the VB system , , [place] HTN and Migraine Hx    IMPRESSION:   Concerned about potential new stroke in December based on reported symptoms of world leaning and balance issues, sudden in onset, symptoms remain but have now stabilized.   Considered progression of previously identified diseased posterior cerebral arteries.   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   Recognized overlap between migraine and stroke symptoms, complicating diagnosis, noting migraines can sometimes present without headache.   Deferred to physical/occupational therapy recommendations regarding evaluating the ability to drive.    PLAN SUMMARY:   Ordered MRI (3T microvascular protocol) and MRA Brain WO Contrast - rule out interval ischemic disease or vascular compromise in the posterior circulation   Continue f/u with SHAMA Ortez soon re: migraine mgmt  Microvascular genetic panel [facility] sendout      History of Stroke (R PCA 2023)  Etiology: Large Artery Atherosclerosis Evident - intracranial atherostenosis VB system, kayla b/l PCA  Diagnostic Orders /Pending Results: none  Antithrombotic Regimen:  aspirin 81 mg & plavix 75 mg daily indifinitely - not a good candidate for cilostazol d/t headache hx, tolerates DAPT well  Goal LDL < 70, continue current statin therapy  Antihypertensive Regimen:  Goal BP < 130/80 mmHg, continue to titrate blood pressure medications accordingly ;   Goal A1c < 7.0  Stroke education administered    Cerebrovascular Small Vessel Disease w/ lobar microbleeds  Progression of subcortical white matter disease and multiple bilateral lacunar infarcts within the centrum semiovale. She also has one brainstem and multiple lobar located microbleeds (2 R temp, 1 R front, 3 L temp, 1 L front).  Microvascular panel sending to [place].       Visit Diagnoses:  1.  "History of stroke    2. Cerebrovascular small vessel disease with hemorrhage    3. Intracranial atherosclerosis    4. Other cerebral infarction         Orders Placed This Encounter    MRI Brain Without Contrast    MRA Brain without contrast        I have spent a total of 59 minutes in chart review, face-to-face encounter, laboratory and/or imaging review and interpretation and documentation for this patient.  ____________________________     CHIEF COMPLAINT:  Patient presents today with complaints of constant leaning to the left when standing.    HISTORY OF PRESENT ILLNESS:  She reports left-sided leaning symptoms that began 6-7 months ago, occurring only when standing with no symptoms while sitting. The symptoms came on suddenly, initially worsened but have now stabilized. She is unable to perform heel-to-toe walking and veers off when attempting to walk in a straight line, expressing concern about her lack of control over these symptoms.    NEUROLOGICAL SYMPTOMS:  She experiences brief episodes of lip numbness and head tingling that resolve quickly. She denies any persistent neurological symptoms, weakness, loss of sensation, vision changes, hearing changes, cognitive changes, or language difficulties.    POST-STROKE SEQUELAE:  She has persistent left inferior quadrantanopsia, describing the left quadrant of her visual field as "all black," confirmed by her eye doctor. She reports significant fatigue with easy fatigability and marked decrease in endurance compared to her pre-stroke baseline. She also notes behavioral changes, becoming uncharacteristically aggressive and argumentative.    HEADACHES:  She reports increasingly frequent headaches with a recent severe episode that prevented her from eating dinner. During this episode, she required ice on her neck for relief and experienced difficulty texting, which was a new symptom for her migraines. No vision changes were reported during the episode.    CURRENT " MEDICATIONS:  Psychiatric: Cymbalta, Wellbutrin, Ambien, and Visceral  Headache: Emgality monthly injections, Verapamil twice daily  Stroke Prevention: Aspirin, Plavix (Clopidogrel), and Lipitor (Atorvastatin)      ROS:  General: +fatigue  Eyes: +blind spots  Neurological: +dizziness, +numbness, +tingling, +vertigo, +abnormal gait, +decreased coordination, +migraines  Psychiatric: +anger problems, +irritability         Brain Imaging:  MRI  Impression:     Small scattered areas of acute infarction in the right PCA territory.     Age advanced small vessel ischemic change with multiple remote lacunar type infarcts.  These findings notably progressed since the prior MRI of 09/28/2017.     Vessel Imaging:  CTA   Impression:     Small recent infarcts noted on the recent MR imaging study are too small to characterize.  No intracranial hemorrhage mass effect or acute territorial infarct.     Prominent chronic ischemic changes again noted unless there is clinical suspicion for prior demyelinating disease.     No significant stenosis at the carotid bifurcations by NASCET criteria.  The vertebral arteries are patent.    VWI MRA     Impression:     1. Redemonstration of stenosis of the mid portions of the bilateral posterior cerebral arteries, left greater than right.  No vessel wall enhancement.  2. Minimal narrowing of the basilar artery.  This report was flagged in Epic as abnormal.     Cardiac Evaluation:  The left ventricle is normal in size with normal systolic function.  The estimated ejection fraction is 65%.  Normal left ventricular diastolic function.  Normal right ventricular size with normal right ventricular systolic function.  Intermediate central venous pressure (8 mmHg).  Very late bubbles present after injection with agitated saline, unable to tell when bubbles are present in relationship with initial injection, would recommended NARDA.      TCD  Impression:     Negative TCD shunt study.  Brain Imaging:    Vessel  "Imaging:    Cardiac Evaluation:    Other:     Relevant Labwork:  Recent Labs   Lab 08/03/24  0918 04/01/25  1353   Hemoglobin A1C 5.2  --    Hemoglobin A1c  --  5.2   LDL Cholesterol 74.8  --    HDL 43  --    Triglycerides 156 H  --    Cholesterol 149  --        I independently viewed the above imaging studies in addition to reviewing the report.  I reviewed the above labwork.    Vital Signs:      3/12/2024    10:51 AM 5/1/2024    12:36 PM 7/30/2024     3:18 PM 8/23/2024     2:48 PM 2/5/2025     2:50 PM 3/10/2025    10:49 AM 4/1/2025     1:01 PM   Vitals - 1 value per visit   SYSTOLIC 124 147 176  205 196 138   DIASTOLIC 85 85 108  94 117 84   Pulse 108 63 85  100 80 71   SPO2   97 %    97 %   Weight (lb) 147.05 157.08 156.31 156.31 161.93 161 155.2   Weight (kg) 66.7 71.25 70.9 70.9 73.45 73.029 70.4   Height 5' 5" (1.651 m)  5' 5" (1.651 m) 5' 5" (1.651 m)  5' 5" (1.651 m) 5' 5" (1.651 m)   BMI (Calculated) 24.5  26 26  26.8 25.8   Pain Score Zero Zero Zero Three  Zero Zero       Past Medical History  Past Medical History:   Diagnosis Date    Anxiety     ASD (atrial septal defect)     Endometriosis     Headache(784.0)     Hypertension     Kidney stones fibomyalgia, migraine, heart closer, kidey stones     Current Outpatient Medications   Medication Instructions    aspirin (ECOTRIN) 81 mg, Oral, Daily    atorvastatin (LIPITOR) 40 mg, Oral, Daily    benazepril-hydrochlorthiazide (LOTENSIN HCT) 20-25 mg Tab 1 tablet, Oral, Daily, Please check with your primary care provider. You've been started on Losartan, and so it's unclear if you'll need additional anti-hypertensive medications.    buPROPion (WELLBUTRIN XL) 300 mg, Oral, Daily    clopidogreL (PLAVIX) 75 mg, Oral, Daily    DULoxetine (CYMBALTA) 60 mg, Oral, Daily    galcanezumab-gnlm 120 mg/mL PnIj Inject 120 mg (one pen) into the skin every 28 days. Begin 28 days after loading dose.    hydrOXYzine pamoate (VISTARIL) 25 mg, Oral, Every 8 hours PRN    magnesium " oxide (MAG-OX) 400 mg, Oral, Daily    ondansetron (ZOFRAN-ODT) 4 MG TbDL As needed (PRN)    potassium chloride SA (K-DUR,KLOR-CON) 20 MEQ tablet 20 mEq, Oral, 2 times daily    promethazine (PHENERGAN) 25 MG tablet TAKE 1 TABLET BY MOUTH EVERY 4 TO 6 HOURS AS NEEDED FOR NAUSEA    riboflavin (vitamin B2) 400 mg, Oral, Daily    tiZANidine (ZANAFLEX) 4 MG tablet Take 1 and 1/2 tablets by mouth every evening.  No driving, no alcohol with medication.    ubrogepant (UBRELVY) 100 mg tablet TAKE 1 TABLET BY MOUTH EVERY 2 HOURS AS NEEDED FOR MIGRAINE. MAX 2 TABLETS (200 MG) PER DAY.    verapamiL (CALAN) 40 mg, Oral, 2 times daily    zolpidem (AMBIEN) 10 mg, Oral, Nightly PRN        Social History  Social History     Socioeconomic History    Marital status:    Occupational History     Employer: [employer]   Tobacco Use    Smoking status: Never     Passive exposure: Past    Smokeless tobacco: Never   Substance and Sexual Activity    Alcohol use: No     Alcohol/week: 0.0 standard drinks of alcohol    Drug use: No    Sexual activity: Yes     Partners: Male     Birth control/protection: Coitus interruptus     Social Drivers of Health     Financial Resource Strain: Patient Declined (2/20/2025)    Overall Financial Resource Strain (CARDIA)     Difficulty of Paying Living Expenses: Patient declined   Food Insecurity: Food Insecurity Present (2/20/2025)    Hunger Vital Sign     Worried About Running Out of Food in the Last Year: Never true     Ran Out of Food in the Last Year: Often true   Transportation Needs: No Transportation Needs (2/20/2025)    PRAPARE - Transportation     Lack of Transportation (Medical): No     Lack of Transportation (Non-Medical): No   Physical Activity: Insufficiently Active (2/20/2025)    Exercise Vital Sign     Days of Exercise per Week: 3 days     Minutes of Exercise per Session: 10 min   Stress: Stress Concern Present (2/20/2025)    [employer] [place] [employer] -  Occupational Stress Questionnaire     Feeling of Stress : Rather much   Housing Stability: Low Risk  (2/20/2025)    Housing Stability Vital Sign     Unable to Pay for Housing in the Last Year: No     Homeless in the Last Year: No             MD Giselle  Vascular Neurology

## 2025-05-23 ENCOUNTER — TELEPHONE (OUTPATIENT)
Dept: NEUROLOGY | Facility: HOSPITAL | Age: 50
End: 2025-05-23
Payer: COMMERCIAL

## 2025-05-23 ENCOUNTER — HOSPITAL ENCOUNTER (OUTPATIENT)
Dept: RADIOLOGY | Facility: HOSPITAL | Age: 50
Discharge: HOME | End: 2025-05-23
Attending: PSYCHIATRY & NEUROLOGY
Payer: COMMERCIAL

## 2025-05-23 DIAGNOSIS — I63.50 RIGHT PONTINE STROKE: Primary | ICD-10-CM

## 2025-05-23 DIAGNOSIS — I63.89 OTHER CEREBRAL INFARCTION: ICD-10-CM

## 2025-05-23 PROCEDURE — 70544 MR ANGIOGRAPHY HEAD W/O DYE: CPT | Mod: TC

## 2025-05-23 PROCEDURE — 70551 MRI BRAIN STEM W/O DYE: CPT | Mod: TC

## 2025-05-24 ENCOUNTER — TELEPHONE (OUTPATIENT)
Dept: NEUROLOGY | Facility: HOSPITAL | Age: 50
End: 2025-05-24
Payer: COMMERCIAL

## 2025-05-24 RX ORDER — BENAZEPRIL HYDROCHLORIDE AND HYDROCHLOROTHIAZIDE 20; 12.5 MG/1; MG/1
1 TABLET ORAL DAILY
Qty: 90 TABLET | Refills: 3 | Status: SHIPPED | OUTPATIENT
Start: 2025-05-24 | End: 2026-05-24

## 2025-05-24 NOTE — TELEPHONE ENCOUNTER
Spoke to patient again this AM (we spoke briefly last night after MRI results showed acute/subacute infarct in R midbrain).  She is not currently symptomatic from this lesion but does appear to have true restriction.  Vessels/MRA appear stable from 2023.  No e/o large/medium vessel vasculitis. Not amenable for DSA d/t life threatening contrast allergy and suspicion low given the MRA. Not suitable for VWI.   Recurrent headaches are present but hx of migraines although last few episodes do seem different. I think CSF analysis makes sense - we will pursue this after next week given the acuity of the scan and she is on DAPT and I don't want to hold plavix this week. While again suspicion remains low for vasculitic process, the headaches and recent stroke and atypical presentation do warrant further workup.  BP still elevated > 130 / 80 mmHg. Much of what we see in the brain can be attributed to very poorly controlled BP for many years as previously noted and she continues to remain above goal.  Increasing benazapril to 40 w/ HCTZ 25. Not changing formulation as she has had significant reactions to meds in the past and prefer to not change much with this.  
no

## 2025-05-24 NOTE — TELEPHONE ENCOUNTER
Radiology Resident called the Inpatient Stroke team to state this patient has a new acute infarction on the patient's outpatient MRI that was obtained today (5/23/2025) and he was unable to get in touch with the patient or ordering provider to notify of the result.  The patient was contacted on the number listed in her chart. Imaging results were reviewed. The patient states she has noted mild weakness in the RLE but denies any new symptoms since seeing Dr. Osman in May 2025. She states she has not had any numbness or tingling since her appointment and denies dizziness, N/V, or gait difficulty. She does state she has noticed her HAs have returned and are becoming more frequent but she has an appointment this Wednesday w/ROYA Ortez NP in the Neurology clinic. The patient states she is still on DAPT and taking her statin. She is not a smoker. She would like to avoid the ED and have close follow up in the Vascular Neurology clinic if possible.     PLAN:  -Discussed s/s of stroke with the patient including but not limited to dizziness, n/v, unable to swallow, altered LOC, numbness, weakness. She was advised to seek evaluation immediately for any s/s of stroke.  -Encouraged to continue DAPT and statin  -Will attempt to get close follow up scheduled for Tuesday or Wednesday next week.  -Plavix assay, Hold BP meds for now    Of note, the patient [facility] The language and imaging findings were thoroughly discussed. Strict precautions for emergent evaluation reiterated prior to the end of the call. All of the patient's questions were answered and she verbalized understanding of and agreement with the plan.      Meena Vora, DANI, AGACNP-BC, FNP-C  [facility]  Department of Vascular Neurology   [facility]    Discussed w/Vascular Neurology Attending: Dr. Welch

## 2025-05-27 DIAGNOSIS — I61.9 CEREBROVASCULAR SMALL VESSEL DISEASE WITH HEMORRHAGE: Primary | ICD-10-CM

## 2025-05-28 ENCOUNTER — OFFICE VISIT (OUTPATIENT)
Facility: CLINIC | Age: 50
End: 2025-05-28
Payer: COMMERCIAL

## 2025-05-28 VITALS
BODY MASS INDEX: 26.78 KG/M2 | HEART RATE: 86 BPM | WEIGHT: 160.94 LBS | SYSTOLIC BLOOD PRESSURE: 143 MMHG | DIASTOLIC BLOOD PRESSURE: 91 MMHG

## 2025-05-28 DIAGNOSIS — F41.1 GAD (GENERALIZED ANXIETY DISORDER): ICD-10-CM

## 2025-05-28 DIAGNOSIS — F34.89 OTHER SPECIFIED PERSISTENT MOOD DISORDERS: ICD-10-CM

## 2025-05-28 DIAGNOSIS — F32.A DEPRESSION, UNSPECIFIED DEPRESSION TYPE: ICD-10-CM

## 2025-05-28 DIAGNOSIS — G43.009 MIGRAINE WITHOUT AURA AND WITHOUT STATUS MIGRAINOSUS, NOT INTRACTABLE: Primary | ICD-10-CM

## 2025-05-28 DIAGNOSIS — Z86.73 HISTORY OF CVA (CEREBROVASCULAR ACCIDENT): ICD-10-CM

## 2025-05-28 DIAGNOSIS — I10 ESSENTIAL HYPERTENSION: ICD-10-CM

## 2025-05-28 PROCEDURE — 1159F MED LIST DOCD IN RCRD: CPT | Mod: CPTII,S$GLB,, | Performed by: NURSE PRACTITIONER

## 2025-05-28 PROCEDURE — 4010F ACE/ARB THERAPY RXD/TAKEN: CPT | Mod: CPTII,S$GLB,, | Performed by: NURSE PRACTITIONER

## 2025-05-28 PROCEDURE — 1160F RVW MEDS BY RX/DR IN RCRD: CPT | Mod: CPTII,S$GLB,, | Performed by: NURSE PRACTITIONER

## 2025-05-28 PROCEDURE — 99999 PR PBB SHADOW E&M-EST. PATIENT-LVL III: CPT | Mod: PBBFAC,,, | Performed by: NURSE PRACTITIONER

## 2025-05-28 PROCEDURE — 99215 OFFICE O/P EST HI 40 MIN: CPT | Mod: S$GLB,,, | Performed by: NURSE PRACTITIONER

## 2025-05-28 PROCEDURE — 3077F SYST BP >= 140 MM HG: CPT | Mod: CPTII,S$GLB,, | Performed by: NURSE PRACTITIONER

## 2025-05-28 PROCEDURE — 3008F BODY MASS INDEX DOCD: CPT | Mod: CPTII,S$GLB,, | Performed by: NURSE PRACTITIONER

## 2025-05-28 PROCEDURE — 3080F DIAST BP >= 90 MM HG: CPT | Mod: CPTII,S$GLB,, | Performed by: NURSE PRACTITIONER

## 2025-05-28 PROCEDURE — G2211 COMPLEX E/M VISIT ADD ON: HCPCS | Mod: S$GLB,,, | Performed by: NURSE PRACTITIONER

## 2025-05-28 PROCEDURE — 3044F HG A1C LEVEL LT 7.0%: CPT | Mod: CPTII,S$GLB,, | Performed by: NURSE PRACTITIONER

## 2025-05-28 RX ORDER — UBROGEPANT 100 MG/1
TABLET ORAL
Qty: 16 TABLET | Refills: 5 | Status: SHIPPED | OUTPATIENT
Start: 2025-05-28

## 2025-05-28 RX ORDER — CLOPIDOGREL BISULFATE 75 MG/1
75 TABLET ORAL DAILY
Qty: 30 TABLET | Refills: 2 | Status: SHIPPED | OUTPATIENT
Start: 2025-05-28

## 2025-05-28 NOTE — PROGRESS NOTES
"Established Patient   SUBJECTIVE:  Patient ID: Mirna Pollock   Chief Complaint: Follow-up    History of Present Illness:  Mirna Pollock is a 49 y.o. female who presents to clinic with her  for follow-up of headaches.      05/28/2025 - Interval History:  Currently suffering with migraines once per week, feels migraines have been different in that they are various locations, more frontally distributed rather than over the occipitalis.  She continues to describe headaches as a moderate throbbing pain associated with photophobia, phonophobia.  Migraines last for the remainder of the day, migraines are no longer lasting multiple days in duration.  She has been treating acute migraines with tylenol.  Migraines have been gradually worsening over the last 3-4 months.  Currently suffering with 5-6 headache days per month with at least 1-2 migraine days per week.       Otherwise, information below is still accurate and current.     05/01/2024 - Interval History:  Migraines have been "not bad at all", down to only 1, "maybe 2" per month.  She is able to successfully abort her migraines with aspirin.  She has continued emgality 120 mg SQ monthly for migraine prevention, verapamil 40 mg bid, tizanidine 6 mg qhs and cymbalta 60 mg daily.  Pleased with her current control, here today to discuss restarting botox.      Otherwise, information below is still accurate and current.      10/16/2023 - Interval History:  Migraines have been "pretty good", had a migraine when weather front came in, but was not as bad as it previously would have been.  Otherwise, migraines have been relatively infrequent.  She has been using emgality monthly for migriane prevention, is pleased with the current state of her migraines, does not wish to make adjustments to her treatment plan.       BP elevated today 176/118.  Has been checking BP at home, readings typically 170's systolic over 80's diastolic.  Has been seeing primary care provider regularly " is who closely monitoring blood pressure and adjusting meds.      Otherwise, information below is still accurate and current.      07/31/2023 - Interval History:  Significant changes in medical history since last visit 6/8.  Patient presented to ED on 7/5 with complaints of intermittent migraines with visual migraines x 5 days.  Aura was described as black dots floating on both sides of her vision.  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  She was started on verapamil and treated for migraine, blood pressure optimized.   She has established care with Dr. Osman who she has followed in outpatient vascular neurology clinic.  Still experiencing visual symptoms, has appt with neuro-ophthalmology late August.  She has continued experiencing frequent migraines since this hospitalization.       Otherwise, information below is still accurate and current.      03/16/2023- Interval History:  Currently nearly 3 months overdue for her Botox injections, has noticed an uptick in migraine frequency over the last 2 months or so.  Prior to this, migraines had remained stable.  She is no longer taking aimovig, did not realize she had to contact pharmacy to get prescription sent to her.  She has continued tizanidine 6 mg nightly.  Treats acute migraines with ubrelvy 100 mg.  Blood pressure remains above goal, though lower than readings at previous visits.       Otherwise, information below is still accurate and current.      10/27/2022- Interval History:  Migraines have been very well controlled recently, got a new job which has been better for her, less stress.  Blood pressure controlled.  Patient has continued to achieve a greater than 50% reduction in the frequency of their migraines with continued Botox injections.  Wishes to proceed with today's injections as scheduled.      Otherwise, information below is still accurate and current.      12/09/2021- Interval History:  Seen by internal  medicine on 12/2, started on labetalol 100 mg BID and told to follow-up with her primary care provider (affiliated with [facility]) as soon as possible for continued management and monitoring.  Unfortunately she has not scheduled an appointment with her PCP yet.  Blood pressure improved today, though still markedly elevated.    Continues to experience frequent migraines, last round of Botox over 6 months ago.  She has tried ubrelvy 100 mg tabs which is effective for treatment of acute migraines.  Migraines historically have responded very well to Botox injections and wishes to proceed with today's injections as scheduled.      Otherwise, information below is still accurate and current.      12/02/2021 - Interval History:  Patient presents to clinic today for Botox injections.  BP initially 221/147 mmHg automatically.  Rechecked manually at 218/140 mmHg.  She denies weakness, confusion, vision changes, dizziness, chest pain, SOB.  In August, she presented for Botox, blood pressure was elevated, she was referred to ED and instructed to schedule f/up appointment with her primary care provider.  She does not want to return to ED today as she does not feel they do anything for her.  Discussed I do not feel comfortable injecting her with blood pressure as high as it is in clinic today.  I have scheduled her an appointment with primary care here at [facility] later this morning and have instructed her to call her primary care provider's office today to schedule an appointment as soon as possible.    She does admit she has been under increased stress, mother in law is moving in to her home in two weeks.  She has not been sleeping well despite taking tizanidine 6 mg and ambien nightly.  She has continued seeing her psychiatrist regularly, last seen in September.    Feels she has been suffering with frequent migraines likely because her last round of Botox was nearly 6 months ago.  She has been accumulating occurrences at  work due to missed days for migraines.  She is not eligible for intermittent FMLA until next summer.  She has continued using Aimovig 140 mg SQ monthly, though she uses it inconsistently as pharmacy does not call to remind her she is due for her next injection.  Treats acute migraines with vistaril 25 mg capsule, initially was helping her sleep however feels it is losing its effectiveness.      Otherwise, information below is still accurate and current.      06/04/2021- Interval History:  Migraines have been under good control, reports she has not had many migraines what so ever over the last 3 months.  Blood pressure elevated in clinic today, still has not seen PCP in quite some time.  Would like to proceed with next round of botox as scheduled today.      Otherwise, information below is still accurate and current.      02/25/2021- Interval History:  Migraines have gradually increased in frequency over the last few months, of note, she is currently 3 months overdue for her Botox injections.  For migraine prevention, she has continued with Aimovig 140 mg SQ monthly, tizanidine 6 mg qhs, amitriptyline 25 mg qhs and cymbalta 60 mg daily.  Treats acute migraines with sumatriptan 100 mg tabs, has vistaril 25 mg capsule available for rescue treatment.  She feels once she gets back on track with Botox injections, migraines are likely to be optimally controlled.       Otherwise, information below is still accurate and current.      09/24/2020- Interval History:  She continues to feels migraines are well controlled on her current regimen. She continues to experience a greater than 50% reduction in the frequency of her migraines since starting Botox injections and wishes to continue with Botox for migraine control.       Otherwise, information below is still accurate and current.      06/09/2020- Interval History:  Migraines continue to be well controlled with dual Aimovig and Botox injections, she is very pleased with the  current state of her migraines and wishes to continue with current treatment plan.       Otherwise, information below is still accurate and current.      03/25/2020- Interval History:  She is about a month overdue for her Botox injections and has noticed her migraines have gradually become more frequent and more intense.  Prior to one month ago, migraines had been under good control.  She does admit she has been under a significant amount of stress related to Covid-19 outbreak.       Otherwise, information below is still accurate and current.      12/04/2019- Interval History:  Reports she has been experiencing 5-6 migraines per month, admits she has been under increased stress found her birth mother who was not interested in meeting her, is in contact with twin brothers.   She has continued taking aimovig 140 mg, which she feels has greatly improved her.  She typically wakes up with a migraine if she is going to get one, typically is resolved with one dose of sumatriptan 100 mg, will feel back to normal by the afternoon.   She is pleased with the current state of her migraines and attributes the increased frequency of migraines over the last 12 weeks to fluctuating weather and increased stress.  Does not wish to make adjustments to her treatment plan at this time.      Otherwise, information below is still accurate and current.      08/16/2019- Interval History:  Migraines and headaches continue to respond well to Botox and Aimovig, gets one severe migraine per month, and 1-2 moderate migraines.  She has been treating her migraines with ibuprofen, which is not very effective.  In the past, she was using triptans, however she does not have any abortive medication available at this time.  Migraines continue to feel the same as they always have, she denies any change in the quality or nature of her migraines.  She is happy with her current regimen and does not feel adjustments to her treatment plan are needed at this  "time.    She has additional concerns including frequently feeling as if she is more off balance than usual, is occurring more frequently as time progresses.  She is not dizzy, just feels as if she is going to fall over, occurs 8-10 times per month.  She is also dropping things more frequently.       05/22/2019- Interval History:  Botox continues to be effective for migraine prevention, for the first 8-10 weeks she experiences less than 3 migraines per month.  She has continued using Aimovig 140 mg SQ monthly injections.  She is very happy with the response of her migraines to Botox injections and wishes to continue with current treatment plan.        02/14/2019- Interval History:  Headaches and migraines continue to be "much better", "I am not getting them nearly as much", though she is keenly aware of when a cold front is coming through as she will always get a migraine.  She has continued using Aimovig monthly as well as taking tizanidine, cymbalta, and amitriptyline daily for migraine prevention.  She is very happy with the current state of her migraines and does not wish to make any adjustments to her treatment plan at this time.  Migraines continue to feel the same as they always have.     11/01/2018- Interval History:  Headaches have been significantly better, she has only had 3-4 migraines since her last round of Botox and is very happy Botox is helping decrease her migraine frequency.  She has been using Aimovig monthly, which she also feels has been helping decrease her migraine frequency.  Migraines continue to feel the same as they always have, she denies any change in the quality or nature of her migraines.  Wishes to continue with Botox injections due to the pronounced effect it has had on her migraines.      08/08/2018- Interval History:  Finds her migraines are no better since last round of Botox, admits she was under increased stress at one point due to issues with her 's job.  She does know a " lot of her migraines are triggered from stress.  She has been getting a lot of migraines above her eyes as well as throughout her occipitalis and neck.  Overall, she does wish to continue with Botox injections, but is open to discussing additional prevention options.      05/22/2018- Interval History:  Despite nerve blocks two weeks ago, she has been suffering with a headache nearly everyday since her last visit.  She did try Zomig nasal spray, however she took the first dose on day two of her migraine because she was nervous.  She does think it lessened the pain of her migraine, however did not abort it.  She did not repeat the dose.  Additionally, she continues to report poor sleep nightly.  She stopped drinking caffeine.  Headaches are the same as they have always been.  She denies any change in the nature or quality of her headaches. Risks, Benefits, and potential side effects of Botox discussed with patient.  Alternative treatments offered.  After thorough discussed regarding the procedure, patient has decided to move forward with initiating Botox injections for Chronic Migraine.  Patient understands we will complete 3 rounds of injections, if after 3 rounds, we do not see a 50% reduction in headaches, we will discontinue Botox injections.      05/11/2018 - Interval History:  This past week she has only been able to work two days, has missed 3 days of work this week due to migraines.  She still is unsure about getting Botox injections for chronic migraine, she would like to try it but her  does not feel this is a good treatment option, she is trying to convince him.  Of note, blood pressure significantly lower in today's visit than in past visits.  She reports her psychiatrist has discontinued vyvanse until she is given clearance from Cardio to restart taking Vyvanse.  She was seen by a pediatric cardiologist who referred her to an adult cardiologist, however she has not seen this provider, had to cancel  appointment due to having a headache.  She is requesting repeat nerve blocks today.      01/29/2018 - Interval History:  She has been having a terrible migraine for the last 3 days, she has tried numerous medications without any relief.  States she tried to go to work today, but she had to leave because her headache got so bad.  She is requesting to repeat the nerve blocks as those shots gave her relief form 6-8 weeks in the past.  She has done research on the Botox injections and states her  is very hesitant for her to try Botox injections for her migraines.  She has not been seen by her PCP nor cardiologist for further management of her blood pressure.  Discussed the challenges of treating her migraines with uncontrolled hypertension because I cannot prescribe her any triptan therapy until her blood pressure is under better control.  She has continued to take Cymbalta daily for her depression, she is unsure if this is giving her any relief with regards to her headaches or not.       01/15/2018 - Interval History:  She was approved for Botox injections, but was a no-show to her appointment last week.  States she spoke with her  about the injections and she was very nervous to try the Botox injections for her migraines.    She continues to experience headaches on 1-4 days per week with migraines occurring 1-2 days per week.  Recently she has been experiencing more migraines and headaches than usually, but admits she has been going through a lot emotionally as well as she got sick with the flu.  She has continued to regularly follow-up with her psychiatrist who changed her anti-depressant, she is now taking Cymbalta.  Since change in medication, she feels she has been experiencing a mild headache each day.  She has had to miss a few days of work due to her migraines since last visit.  Additionally, she feels occasionally she begins to get dizzy, this occurs with or without headaches, she has an  appointment with her eye doctor next week as her vision is also getting progressively worse.  She did not find vistaril to be useful with regards to her headaches.  She does feel Zanaflex helps with her neck as well as helping to relax her neck muscles.  When she gets a migraine she has been taking Vistaril and Ibuprofen together which does help the pain, she also has been trying Excedrin migraines which also gives her some relief.  Patient with known HTN on multiple anti-hypertensive agents, blood pressure significantly elevated in clinic today.  She is not symptomatic.  States she needs to follow-up with her primary care (who is outside the [facility] system) for better management.       10/19/2017 - Interval History:  - Headaches have persisted in similar frequency, which she is happy about as she has been under increased stress with regards to her 's health, he has been suffering with his Gout, just got out of the hospital last week and has had to take time off work etc   - Blood pressure continues to be elevated despite numerous anti-hypertensive, states today is good for her (currently 141/102), has been regularly following up with her PCP for management   - Is very excited because she is going on a cruise with her  to [email or web address] at the end of November/early December   - Has continued to have a headache nearly everyday, not necessarily a migraine, however migraine have been at least once per month    - Has noticed more recently that she feels like she has intense pressure behind her eyes and across the bridge of her nose that slightly feels like sinus pressure, this occurs 1-2 times per week   - Has been seeing a chiropractor as well as a masseuse regularly which she does feel has been helping   - Would still like to move forward with the Botox injections   - Does not feel adjustments to her medications are necessary at this time      08/17/2017 - Interval History:  - Had been seeing Iker Beth PA-C    - Was taking Topamax, Zanaflex, and Vistaril - however has been out of medications for about 3 months  - Since being off the medication, she had been okay, however recently noticed migraines were beginning to occur more frequently with increased intensity and duration   - She finds the nerve blocks had been helping a lot, as long as she is consistent with getting the nerve block, however hasn't had this procedure for over a year   - Has been getting 5-6 migraines per month, each lasting 1-4 days in length   - Current migraine has been going on since last Friday (6 days)  - Blood pressure significantly elevated in clinic today, states her PCP is managing her medications in order to get her blood pressure better controlled   - Is requesting refills of medication as well as repeat nerve occipital and trigeminal nerve blocks      Headache history:   Age of onset -  13  Nature of pain -  Throbbing   Location - bioccipital   Aura -  White floaters   Duration - hrs  Frequency -  2/week  Time of day - anytime   7 days of pain - jaw pain, L neck, L shoulder,  L side throbbing   Associated symptoms:   Meningeal symptoms - photophobia, phonophobia, exercise intolerance +   Nausea/vomitting +   Nasal drainage   Visual symptoms  Pallor/flushing  Vertigo  Stroke symptoms  Confusion  Impaired concentration +  Pain worsened with physical activity +  Neck pain +  Whooshing sounds   Visual spots/blotches +  Triggers:   Stress +   Bright or flickering light  Strong smell  Vigorous exercise  Dietary factors   Visual strain   Motion intolerance as passenger:  No   Resolution of headache with sleep: yes      Therapies Tried & Response:  Tylenol, motrin, alleve - not help  excedrin - not help   imitrex - helped initially   topamax 50 mg - helped  Percocet - helps   Gabapentin - helps  Amlodipine - for HTN  Bystolic - for HTN  Lexapro - hasn't noticed change in HA, for depression   GONB - helps   Cymbalta - unsure, for depression   Zomig  - helps    Maxalt - no   Botox - helping   Sumatriptan -  Vistrail - effective   Aimovig - helping   Emgality -     Current Medications:  Current Medications[1]    Review of Systems - A review of 10+ systems was conducted with pertinent positive and negative findings documented in HPI with all other systems reviewed and negative.    PFSH: Past medical, family, and social history reviewed as documented in chart with pertinent positive medical, family, and social history detailed in HPI.    OBJECTIVE:  Vitals:  BP (!) 143/91 (Patient Position: Sitting)   Pulse 86   Wt 73 kg (160 lb 15 oz)   LMP 12/14/2000   BMI 26.78 kg/m²      Physical Exam:  Constitutional: she appears well-developed and well-nourished. she is well groomed. NAD     Review of Data:   Notes from psych, Dr. Osman, primary care reviewed   Labs:  Lab Visit on 04/01/2025   Component Date Value Ref Range Status    Sodium 04/01/2025 140  136 - 145 mmol/L Final    Potassium 04/01/2025 3.4 (L)  3.5 - 5.1 mmol/L Final    Chloride 04/01/2025 103  95 - 110 mmol/L Final    CO2 04/01/2025 24  23 - 29 mmol/L Final    Glucose 04/01/2025 83  70 - 110 mg/dL Final    BUN 04/01/2025 10  6 - 20 mg/dL Final    Creatinine 04/01/2025 1.1  0.5 - 1.4 mg/dL Final    Calcium 04/01/2025 10.1  8.7 - 10.5 mg/dL Final    Protein Total 04/01/2025 8.0  6.0 - 8.4 gm/dL Final    Albumin 04/01/2025 4.0  3.5 - 5.2 g/dL Final    Bilirubin Total 04/01/2025 1.1 (H)  0.1 - 1.0 mg/dL Final    ALP 04/01/2025 99  40 - 150 unit/L Final    AST 04/01/2025 22  11 - 45 unit/L Final    ALT 04/01/2025 14  10 - 44 unit/L Final    Anion Gap 04/01/2025 13  8 - 16 mmol/L Final    eGFR 04/01/2025 >60  >60 mL/min/1.73/m2 Final    Magnesium  04/01/2025 1.9  1.6 - 2.6 mg/dL Final    TSH 04/01/2025 0.794  0.400 - 4.000 uIU/mL Final    Hemoglobin A1c 04/01/2025 5.2  4.0 - 5.6 % Final    Estimated Average Glucose 04/01/2025 103  68 - 131 mg/dL Final    WBC 04/01/2025 15.39 (H)  3.90 - 12.70 K/uL Final     RBC 04/01/2025 4.86  4.00 - 5.40 M/uL Final    HGB 04/01/2025 15.1  12.0 - 16.0 gm/dL Final    HCT 04/01/2025 46.3  37.0 - 48.5 % Final    MCV 04/01/2025 95  82 - 98 fL Final    MCH 04/01/2025 31.1 (H)  27.0 - 31.0 pg Final    MCHC 04/01/2025 32.6  32.0 - 36.0 g/dL Final    RDW 04/01/2025 13.3  11.5 - 14.5 % Final    Platelet Count 04/01/2025 508 (H)  150 - 450 K/uL Final    MPV 04/01/2025 10.8  9.2 - 12.9 fL Final    Nucleated RBC 04/01/2025 0  <=0 /100 WBC Final    Neut % 04/01/2025 71.1  38 - 73 % Final    Lymph % 04/01/2025 14.9 (L)  18 - 48 % Final    Mono % 04/01/2025 9.0  4 - 15 % Final    Eos % 04/01/2025 3.1  <=8 % Final    Basophil % 04/01/2025 0.9  <=1.9 % Final    Imm Grans % 04/01/2025 1.0 (H)  0.0 - 0.5 % Final    Neut # 04/01/2025 10.94 (H)  1.8 - 7.7 K/uL Final    Lymph # 04/01/2025 2.29  1 - 4.8 K/uL Final    Mono # 04/01/2025 1.39 (H)  0.3 - 1 K/uL Final    Eos # 04/01/2025 0.48  <=0.5 K/uL Final    Baso # 04/01/2025 0.14  <=0.2 K/uL Final    Imm Grans # 04/01/2025 0.15 (H)  0.00 - 0.04 K/uL Final   Hospital Outpatient Visit on 02/10/2025   Component Date Value Ref Range Status    Final Pathologic Diagnosis 02/10/2025    Final                    Value:Right breast, 9:00, N + 8, biopsy:  Breast tissue with cystic glandular dilatation, microcysts, fibroadenomatoid change, columnar cell change/hyperplasia, and stromal fibrosis (see comment)  Focal changes suggestive of cyst rupture and microcalcifications present  No definitive atypia or malignancy    Comment:  A review of the patient's right breast imaging notes presence of a 10 mm round hypoechoic mass with circumscribed margins.  These combined microscopic findings may represent a complex sclerosing lesion.    This case has been reviewed by Dr. Papi Burnett MD, who concurs with the diagnosis.      Gross 02/10/2025    Final                    Value:One part    Pathology label MRN:  [id number]  Hospital/ Clinic label MRN:  [id number]    Received in formalin  patient's name and MRN and labeled &quot;right breast 9 o'clock N +8 cm&quot; are 4 fragments of soft yellow white tissue each 0.2 cm in width and ranging in length from 0.9 cm to 1.4 cm.  Entirely submitted in BMT--1-A    Time out of patient:  Not provided  Time in formalin:  1050 on 02/10/2025  Cold ischemic time:  Not provided  Total fixation time:  6-72 hours    Jimmy Mccracken       Microscopic Exam 02/10/2025    Final                    Value:P63 highlights myoepithelial cells around benign ductal elements without significant loss staining identified.  There is a mosaic pattern of staining on CK5/6 in hyperplastic foci.  There is a heterogenous pattern of staining in breast ducts.  Stains   adequately controlled.      Disclaimer 02/10/2025    Final                    Value:Unless the case is a 'gross only' or additional testing only, the final diagnosis for each specimen is based on a microscopic examination of appropriate tissue sections.  ER immunohistochemical staining (clone [id number], DAB detection method) is performed on formalin-fixed, paraffin embedded tissue sections. The percentage of cell nuclei stained and the strength of staining is reported (weak, moderate, strong), using the 2010   ACSO/CAP scoring guidelines. Tumors used for determining predictive markers are fixed in 10% neutral buffered formalin for 6-72 hours. It has been cleared by the [place] FDA for use as an IVD test. This assay has not been validated on decalcified tissues.   Results should be interpreted with caution given the likelihood of false negativity on decalcified specimens.       Imaging:  Results for orders placed or performed during the hospital encounter of 05/23/25   MRI Brain Without Contrast    Narrative    EXAMINATION:  MRI BRAIN WITHOUT CONTRAST; MRA BRAIN WITHOUT CONTRAST    CLINICAL HISTORY:  Stroke, follow up; Other cerebral infarction    TECHNIQUE:  Multiplanar multisequence MR imaging of the brain was performed the  administration of intravenous contrast.    Per separate acquisition, 3D time-of-flight noncontrast MR angiogram of the intracranial vasculature with multiple MIP reconstructions.    COMPARISON:  MR brain 07/30/2023.    CTA dated 07/30/2023.    FINDINGS:  MR BRAIN    Small focus of diffusion restriction in the right midbrain.  Minimal associated edema signal without significant mass effect.    Prominence of the ventricles and sulci compatible with age-advanced cerebral volume loss.  No hydrocephalus.    Small remote right occipital infarct.  Small foci of encephalomalacia in the bilateral corona radiata/centrum semiovale, bilateral thalami, and right kriss, likely remote lacunar-type infarcts.  Few scattered punctate foci of susceptibility artifact, likely remote microhemorrhages.    Superimposed patchy and confluent T2/FLAIR hyperintensity in the supratentorial white matter, nonspecific but most likely reflecting chronic microvascular ischemic changes.    No extra-axial blood or fluid collections.    The T2 skull base flow voids are preserved.  Bone marrow signal intensity unremarkable.  Craniocervical junction within normal limits.    Paranasal sinuses and mastoid air cells are essentially clear.    MRA BRAIN    The visualized internal carotid arteries are normal in course and caliber. The vertebrobasilar system is unremarkable. Hypoplastic right A1 segment.  The proximal ACAs, MCAs and PCAs otherwise appear within normal limits. No high-grade stenosis, large vessel occlusion, or intracranial aneurysm identified.      Impression    Small recent infarct of the right mid brain.  No significant mass effect.    MRA of the intracranial vasculature appears within normal limits.    Cerebral volume loss with chronic ischemic changes as described.    This report was flagged in Epic as abnormal.    Case discussed with SHAMA Vora on 05/23/2025 at 19:28.    Electronically signed by resident: Bryant  Lavelle  Date:    05/23/2025  Time:    18:22    Electronically signed by: Nasim Pedersen MD  Date:    05/23/2025  Time:    19:31   Results for orders placed or performed during the hospital encounter of 05/23/25   MRA Brain without contrast    Narrative    EXAMINATION:  MRI BRAIN WITHOUT CONTRAST; MRA BRAIN WITHOUT CONTRAST    CLINICAL HISTORY:  Stroke, follow up; Other cerebral infarction    TECHNIQUE:  Multiplanar multisequence MR imaging of the brain was performed the administration of intravenous contrast.    Per separate acquisition, 3D time-of-flight noncontrast MR angiogram of the intracranial vasculature with multiple MIP reconstructions.    COMPARISON:  MR brain 07/30/2023.    CTA dated 07/30/2023.    FINDINGS:  MR BRAIN    Small focus of diffusion restriction in the right midbrain.  Minimal associated edema signal without significant mass effect.    Prominence of the ventricles and sulci compatible with age-advanced cerebral volume loss.  No hydrocephalus.    Small remote right occipital infarct.  Small foci of encephalomalacia in the bilateral corona radiata/centrum semiovale, bilateral thalami, and right kriss, likely remote lacunar-type infarcts.  Few scattered punctate foci of susceptibility artifact, likely remote microhemorrhages.    Superimposed patchy and confluent T2/FLAIR hyperintensity in the supratentorial white matter, nonspecific but most likely reflecting chronic microvascular ischemic changes.    No extra-axial blood or fluid collections.    The T2 skull base flow voids are preserved.  Bone marrow signal intensity unremarkable.  Craniocervical junction within normal limits.    Paranasal sinuses and mastoid air cells are essentially clear.    MRA BRAIN    The visualized internal carotid arteries are normal in course and caliber. The vertebrobasilar system is unremarkable. Hypoplastic right A1 segment.  The proximal ACAs, MCAs and PCAs otherwise appear within normal limits. No high-grade stenosis,  large vessel occlusion, or intracranial aneurysm identified.      Impression    Small recent infarct of the right mid brain.  No significant mass effect.    MRA of the intracranial vasculature appears within normal limits.    Cerebral volume loss with chronic ischemic changes as described.    This report was flagged in Epic as abnormal.    Case discussed with NP Meena Vora on 05/23/2025 at 19:28.    Electronically signed by resident: Bryant Valderrama  Date:    05/23/2025  Time:    18:22    Electronically signed by: Nasim Pedersen MD  Date:    05/23/2025  Time:    19:31     *Note: Due to a large number of results and/or encounters for the requested time period, some results have not been displayed. A complete set of results can be found in Results Review.     Note: I have independently reviewed any/all imaging/labs/tests and agree with the report (s) as documented.  Any discrepancies will be as noted/demarcated by free text.  ARA HDZ 5/28/2025    ASSESSMENT:  1. Migraine without aura and without status migrainosus, not intractable    2. History of CVA (cerebrovascular accident)    3. Essential hypertension    4. LALITA (generalized anxiety disorder)    5. Other specified persistent mood disorders    6. Depression, unspecified depression type      PLAN:  - Discussed symptoms appear to be consistent with migraine without aura complicated by anxiety/depression, hx CVA, hypertension, discussed treatment options and patient agreed with the following plan:  - For migraine prevention - restart emgality with 240 mg SQ loading dose followed by 120 mg SQ monthly   - Send update via patient portal in 1-2 weeks  - continue tizanidine 4-6 mg nightly    - For treatment of acute migraine - ubrelvy 100 mg   - triptans contraindicated given hx stroke   - For nausea - zofran 4 mg odt    - hx CVA - continue regular f/up with Dr. Osman for management/monitoring - restart plavix 75 mg daily, continue aspirin 81 mg daily   - MDD/Anxiety -  chronic and stable, management per psych - will avoid use of anti-depressants for migraine prevention   - HTN - BP remains above goal, encouraged her to monitor BP regularly at home and keep record, goal < 130/80 mmHg - management per Dr. Osman and PCP    - Continue tracking headaches   - RTC in 1 month or sooner if needed         I spent a total of 40 minutes on the day of the visit.This includes face to face time and non-face to face time preparing to see the patient (eg, review of tests), obtaining and/or reviewing separately obtained history, documenting clinical information in the electronic or other health record, independently interpreting results and communicating results to the patient/family/caregiver, or care coordinator.    Questions and concerns were sought and answered to the patient's stated verbal satisfaction.  The patient verbalizes understanding and agreement with the above stated treatment plan.     Visit today included increased complexity associated with the care of the episodic problem migriane without aura addressed and managing the longitudinal care of the patient due to the serious and/or complex managed problem(s). Plan for patient to return to clinic in 1 months for follow-up visit.     CC: Luc Venegas MD Elizabeth C. Vulevich, FNP-C  [facility]   [phone]    Dr. Farfan was available during today's encounter.            [1]   Current Outpatient Medications:     aspirin (ECOTRIN) 81 MG EC tablet, Take 1 tablet (81 mg total) by mouth once daily., Disp: 30 tablet, Rfl: 11    benazepril-hydrochlorthiazide (LOTENSIN HCT) 20-12.5 mg per tablet, Take 1 tablet by mouth once daily., Disp: 90 tablet, Rfl: 3    buPROPion (WELLBUTRIN XL) 300 MG 24 hr tablet, Take 1 tablet (300 mg total) by mouth once daily., Disp: 90 tablet, Rfl: 1    clopidogreL (PLAVIX) 75 mg tablet, Take 1 tablet (75 mg total) by mouth once daily., Disp: 30 tablet, Rfl: 11    DULoxetine  (CYMBALTA) 60 MG capsule, Take 1 capsule (60 mg total) by mouth once daily., Disp: 90 capsule, Rfl: 1    galcanezumab-gnlm 120 mg/mL PnIj, Inject 120 mg (one pen) into the skin every 28 days. Begin 28 days after loading dose., Disp: 1 mL, Rfl: 10    hydrOXYzine pamoate (VISTARIL) 25 MG Cap, Take 1 capsule (25 mg total) by mouth every 8 (eight) hours as needed (anxiety)., Disp: 30 capsule, Rfl: 1    magnesium oxide (MAG-OX) 400 mg (241.3 mg magnesium) tablet, Take 1 tablet (400 mg total) by mouth once daily., Disp: 30 tablet, Rfl: 1    ondansetron (ZOFRAN-ODT) 4 MG TbDL, as needed., Disp: , Rfl:     potassium chloride SA (K-DUR,KLOR-CON) 20 MEQ tablet, Take 1 tablet (20 mEq total) by mouth 2 (two) times daily., Disp: 60 tablet, Rfl: 5    promethazine (PHENERGAN) 25 MG tablet, TAKE 1 TABLET BY MOUTH EVERY 4 TO 6 HOURS AS NEEDED FOR NAUSEA (Patient taking differently: as needed. TAKE 1 TABLET BY MOUTH EVERY 4 TO 6 HOURS AS NEEDED FOR NAUSEA), Disp: 20 tablet, Rfl: 1    riboflavin, vitamin B2, 400 mg Tab, Take 400 mg by mouth once daily., Disp: 30 tablet, Rfl: 11    tiZANidine (ZANAFLEX) 4 MG tablet, Take 1 and 1/2 tablets by mouth every evening.  No driving, no alcohol with medication., Disp: 45 tablet, Rfl: 5    ubrogepant (UBRELVY) 100 mg tablet, TAKE 1 TABLET BY MOUTH EVERY 2 HOURS AS NEEDED FOR MIGRAINE. MAX 2 TABLETS (200 MG) PER DAY. (Patient taking differently: as needed. TAKE 1 TABLET BY MOUTH EVERY 2 HOURS AS NEEDED FOR MIGRAINE. MAX 2 TABLETS (200 MG) PER DAY.), Disp: 16 tablet, Rfl: 5    zolpidem (AMBIEN) 10 mg Tab, Take 1 tablet (10 mg total) by mouth nightly as needed (As needed for insomnia)., Disp: 30 tablet, Rfl: 2    atorvastatin (LIPITOR) 40 MG tablet, Take 1 tablet (40 mg total) by mouth once daily., Disp: 90 tablet, Rfl: 3    verapamiL (CALAN) 40 MG Tab, Take 1 tablet (40 mg total) by mouth 2 (two) times daily., Disp: 60 tablet, Rfl: 11

## 2025-05-29 ENCOUNTER — PATIENT MESSAGE (OUTPATIENT)
Facility: CLINIC | Age: 50
End: 2025-05-29
Payer: COMMERCIAL

## 2025-06-05 ENCOUNTER — PATIENT MESSAGE (OUTPATIENT)
Dept: NEUROLOGY | Facility: CLINIC | Age: 50
End: 2025-06-05
Payer: COMMERCIAL

## 2025-06-23 ENCOUNTER — OFFICE VISIT (OUTPATIENT)
Facility: CLINIC | Age: 50
End: 2025-06-23
Payer: COMMERCIAL

## 2025-06-23 VITALS
SYSTOLIC BLOOD PRESSURE: 157 MMHG | BODY MASS INDEX: 27.07 KG/M2 | DIASTOLIC BLOOD PRESSURE: 109 MMHG | HEART RATE: 94 BPM | WEIGHT: 162.69 LBS

## 2025-06-23 DIAGNOSIS — I10 ESSENTIAL HYPERTENSION: ICD-10-CM

## 2025-06-23 DIAGNOSIS — F32.A DEPRESSION, UNSPECIFIED DEPRESSION TYPE: ICD-10-CM

## 2025-06-23 DIAGNOSIS — G43.009 MIGRAINE WITHOUT AURA AND WITHOUT STATUS MIGRAINOSUS, NOT INTRACTABLE: Primary | ICD-10-CM

## 2025-06-23 DIAGNOSIS — Z86.73 HISTORY OF CVA (CEREBROVASCULAR ACCIDENT): ICD-10-CM

## 2025-06-23 DIAGNOSIS — F34.89 OTHER SPECIFIED PERSISTENT MOOD DISORDERS: ICD-10-CM

## 2025-06-23 DIAGNOSIS — F41.1 GAD (GENERALIZED ANXIETY DISORDER): ICD-10-CM

## 2025-06-23 PROCEDURE — 99214 OFFICE O/P EST MOD 30 MIN: CPT | Mod: S$GLB,,, | Performed by: NURSE PRACTITIONER

## 2025-06-23 PROCEDURE — 99999 PR PBB SHADOW E&M-EST. PATIENT-LVL III: CPT | Mod: PBBFAC,,, | Performed by: NURSE PRACTITIONER

## 2025-06-23 PROCEDURE — 3044F HG A1C LEVEL LT 7.0%: CPT | Mod: CPTII,S$GLB,, | Performed by: NURSE PRACTITIONER

## 2025-06-23 PROCEDURE — 1160F RVW MEDS BY RX/DR IN RCRD: CPT | Mod: CPTII,S$GLB,, | Performed by: NURSE PRACTITIONER

## 2025-06-23 PROCEDURE — G2211 COMPLEX E/M VISIT ADD ON: HCPCS | Mod: S$GLB,,, | Performed by: NURSE PRACTITIONER

## 2025-06-23 PROCEDURE — 4010F ACE/ARB THERAPY RXD/TAKEN: CPT | Mod: CPTII,S$GLB,, | Performed by: NURSE PRACTITIONER

## 2025-06-23 PROCEDURE — 3077F SYST BP >= 140 MM HG: CPT | Mod: CPTII,S$GLB,, | Performed by: NURSE PRACTITIONER

## 2025-06-23 PROCEDURE — 1159F MED LIST DOCD IN RCRD: CPT | Mod: CPTII,S$GLB,, | Performed by: NURSE PRACTITIONER

## 2025-06-23 PROCEDURE — 3008F BODY MASS INDEX DOCD: CPT | Mod: CPTII,S$GLB,, | Performed by: NURSE PRACTITIONER

## 2025-06-23 PROCEDURE — 3080F DIAST BP >= 90 MM HG: CPT | Mod: CPTII,S$GLB,, | Performed by: NURSE PRACTITIONER

## 2025-06-23 RX ORDER — CLOPIDOGREL BISULFATE 75 MG/1
75 TABLET ORAL DAILY
Qty: 90 TABLET | Refills: 1 | Status: SHIPPED | OUTPATIENT
Start: 2025-06-23

## 2025-06-23 NOTE — PROGRESS NOTES
Established Patient   SUBJECTIVE:  Patient ID: Mirna Pollock   Chief Complaint: Follow-up    History of Present Illness:  Mirna Pollock is a 49 y.o. female who presents to clinic alone for follow-up of headaches.     Recommendations made at last Office Visit on 5/28/2025:  - Discussed symptoms appear to be consistent with migraine without aura complicated by anxiety/depression, hx CVA, hypertension, discussed treatment options and patient agreed with the following plan:  - For migraine prevention - restart emgality with 240 mg SQ loading dose followed by 120 mg SQ monthly   - Send update via patient portal in 1-2 weeks  - continue tizanidine 4-6 mg nightly    - For treatment of acute migraine - ubrelvy 100 mg   - triptans contraindicated given hx stroke   - For nausea - zofran 4 mg odt    - hx CVA - continue regular f/up with Dr. Osman for management/monitoring - restart plavix 75 mg daily, continue aspirin 81 mg daily   - MDD/Anxiety - chronic and stable, management per psych - will avoid use of anti-depressants for migraine prevention   - HTN - BP remains above goal, encouraged her to monitor BP regularly at home and keep record, goal < 130/80 mmHg - management per Dr. Osman and PCP    - Continue tracking headaches   - RTC in 1 month or sooner if needed    06/23/2025 - Interval History:  Migraines have been better since restarting emgality on 5/28, due for first maintenance dose in 2 days.  Prior to restarting emgality was having at least 1-2 migraines per week, now down to 2-3 migraines over the last month.      Has fallen twice over the last month.      Blood pressure elevated in clinic today 157/109 mmHg.  Reports compliance with anti-hypertensive meds.      Otherwise, information below is still accurate and current.     05/28/2025 - Interval History:  Currently suffering with migraines once per week, feels migraines have been different in that they are various locations, more frontally distributed rather than  "over the occipitalis.  She continues to describe headaches as a moderate throbbing pain associated with photophobia, phonophobia.  Migraines last for the remainder of the day, migraines are no longer lasting multiple days in duration.  She has been treating acute migraines with tylenol.  Migraines have been gradually worsening over the last 3-4 months.  Currently suffering with 5-6 headache days per month with at least 1-2 migraine days per week.        Otherwise, information below is still accurate and current.      05/01/2024 - Interval History:  Migraines have been "not bad at all", down to only 1, "maybe 2" per month.  She is able to successfully abort her migraines with aspirin.  She has continued emgality 120 mg SQ monthly for migraine prevention, verapamil 40 mg bid, tizanidine 6 mg qhs and cymbalta 60 mg daily.  Pleased with her current control, here today to discuss restarting botox.      Otherwise, information below is still accurate and current.      10/16/2023 - Interval History:  Migraines have been "pretty good", had a migraine when weather front came in, but was not as bad as it previously would have been.  Otherwise, migraines have been relatively infrequent.  She has been using emgality monthly for migriane prevention, is pleased with the current state of her migraines, does not wish to make adjustments to her treatment plan.       BP elevated today 176/118.  Has been checking BP at home, readings typically 170's systolic over 80's diastolic.  Has been seeing primary care provider regularly is who closely monitoring blood pressure and adjusting meds.      Otherwise, information below is still accurate and current.      07/31/2023 - Interval History:  Significant changes in medical history since last visit 6/8.  Patient presented to ED on 7/5 with complaints of intermittent migraines with visual migraines x 5 days.  Aura was described as black dots floating on both sides of her vision.  MRI in ED " with R PCA infarct, stroke team consulted, CTA with BL PCA stenosis.  She was started on verapamil and treated for migraine, blood pressure optimized.   She has established care with Dr. Osman who she has followed in outpatient vascular neurology clinic.  Still experiencing visual symptoms, has appt with neuro-ophthalmology late August.  She has continued experiencing frequent migraines since this hospitalization.       Otherwise, information below is still accurate and current.      03/16/2023- Interval History:  Currently nearly 3 months overdue for her Botox injections, has noticed an uptick in migraine frequency over the last 2 months or so.  Prior to this, migraines had remained stable.  She is no longer taking aimovig, did not realize she had to contact pharmacy to get prescription sent to her.  She has continued tizanidine 6 mg nightly.  Treats acute migraines with ubrelvy 100 mg.  Blood pressure remains above goal, though lower than readings at previous visits.       Otherwise, information below is still accurate and current.      10/27/2022- Interval History:  Migraines have been very well controlled recently, got a new job which has been better for her, less stress.  Blood pressure controlled.  Patient has continued to achieve a greater than 50% reduction in the frequency of their migraines with continued Botox injections.  Wishes to proceed with today's injections as scheduled.      Otherwise, information below is still accurate and current.      12/09/2021- Interval History:  Seen by internal medicine on 12/2, started on labetalol 100 mg BID and told to follow-up with her primary care provider (affiliated with [facility]) as soon as possible for continued management and monitoring.  Unfortunately she has not scheduled an appointment with her PCP yet.  Blood pressure improved today, though still markedly elevated.    Continues to experience frequent migraines, last round of Botox over 6 months ago.   She has tried ubrelvy 100 mg tabs which is effective for treatment of acute migraines.  Migraines historically have responded very well to Botox injections and wishes to proceed with today's injections as scheduled.      Otherwise, information below is still accurate and current.      12/02/2021 - Interval History:  Patient presents to clinic today for Botox injections.  BP initially 221/147 mmHg automatically.  Rechecked manually at 218/140 mmHg.  She denies weakness, confusion, vision changes, dizziness, chest pain, SOB.  In August, she presented for Botox, blood pressure was elevated, she was referred to ED and instructed to schedule f/up appointment with her primary care provider.  She does not want to return to ED today as she does not feel they do anything for her.  Discussed I do not feel comfortable injecting her with blood pressure as high as it is in clinic today.  I have scheduled her an appointment with primary care here at [facility] later this morning and have instructed her to call her primary care provider's office today to schedule an appointment as soon as possible.    She does admit she has been under increased stress, mother in law is moving in to her home in two weeks.  She has not been sleeping well despite taking tizanidine 6 mg and ambien nightly.  She has continued seeing her psychiatrist regularly, last seen in September.    Feels she has been suffering with frequent migraines likely because her last round of Botox was nearly 6 months ago.  She has been accumulating occurrences at work due to missed days for migraines.  She is not eligible for intermittent FMLA until next summer.  She has continued using Aimovig 140 mg SQ monthly, though she uses it inconsistently as pharmacy does not call to remind her she is due for her next injection.  Treats acute migraines with vistaril 25 mg capsule, initially was helping her sleep however feels it is losing its effectiveness.      Otherwise, information  below is still accurate and current.      06/04/2021- Interval History:  Migraines have been under good control, reports she has not had many migraines what so ever over the last 3 months.  Blood pressure elevated in clinic today, still has not seen PCP in quite some time.  Would like to proceed with next round of botox as scheduled today.      Otherwise, information below is still accurate and current.      02/25/2021- Interval History:  Migraines have gradually increased in frequency over the last few months, of note, she is currently 3 months overdue for her Botox injections.  For migraine prevention, she has continued with Aimovig 140 mg SQ monthly, tizanidine 6 mg qhs, amitriptyline 25 mg qhs and cymbalta 60 mg daily.  Treats acute migraines with sumatriptan 100 mg tabs, has vistaril 25 mg capsule available for rescue treatment.  She feels once she gets back on track with Botox injections, migraines are likely to be optimally controlled.       Otherwise, information below is still accurate and current.      09/24/2020- Interval History:  She continues to feels migraines are well controlled on her current regimen. She continues to experience a greater than 50% reduction in the frequency of her migraines since starting Botox injections and wishes to continue with Botox for migraine control.       Otherwise, information below is still accurate and current.      06/09/2020- Interval History:  Migraines continue to be well controlled with dual Aimovig and Botox injections, she is very pleased with the current state of her migraines and wishes to continue with current treatment plan.       Otherwise, information below is still accurate and current.      03/25/2020- Interval History:  She is about a month overdue for her Botox injections and has noticed her migraines have gradually become more frequent and more intense.  Prior to one month ago, migraines had been under good control.  She does admit she has been under  a significant amount of stress related to Covid-19 outbreak.       Otherwise, information below is still accurate and current.      12/04/2019- Interval History:  Reports she has been experiencing 5-6 migraines per month, admits she has been under increased stress found her birth mother who was not interested in meeting her, is in contact with twin brothers.   She has continued taking aimovig 140 mg, which she feels has greatly improved her.  She typically wakes up with a migraine if she is going to get one, typically is resolved with one dose of sumatriptan 100 mg, will feel back to normal by the afternoon.   She is pleased with the current state of her migraines and attributes the increased frequency of migraines over the last 12 weeks to fluctuating weather and increased stress.  Does not wish to make adjustments to her treatment plan at this time.      Otherwise, information below is still accurate and current.      08/16/2019- Interval History:  Migraines and headaches continue to respond well to Botox and Aimovig, gets one severe migraine per month, and 1-2 moderate migraines.  She has been treating her migraines with ibuprofen, which is not very effective.  In the past, she was using triptans, however she does not have any abortive medication available at this time.  Migraines continue to feel the same as they always have, she denies any change in the quality or nature of her migraines.  She is happy with her current regimen and does not feel adjustments to her treatment plan are needed at this time.    She has additional concerns including frequently feeling as if she is more off balance than usual, is occurring more frequently as time progresses.  She is not dizzy, just feels as if she is going to fall over, occurs 8-10 times per month.  She is also dropping things more frequently.       05/22/2019- Interval History:  Botox continues to be effective for migraine prevention, for the first 8-10 weeks she  "experiences less than 3 migraines per month.  She has continued using Aimovig 140 mg SQ monthly injections.  She is very happy with the response of her migraines to Botox injections and wishes to continue with current treatment plan.        02/14/2019- Interval History:  Headaches and migraines continue to be "much better", "I am not getting them nearly as much", though she is keenly aware of when a cold front is coming through as she will always get a migraine.  She has continued using Aimovig monthly as well as taking tizanidine, cymbalta, and amitriptyline daily for migraine prevention.  She is very happy with the current state of her migraines and does not wish to make any adjustments to her treatment plan at this time.  Migraines continue to feel the same as they always have.     11/01/2018- Interval History:  Headaches have been significantly better, she has only had 3-4 migraines since her last round of Botox and is very happy Botox is helping decrease her migraine frequency.  She has been using Aimovig monthly, which she also feels has been helping decrease her migraine frequency.  Migraines continue to feel the same as they always have, she denies any change in the quality or nature of her migraines.  Wishes to continue with Botox injections due to the pronounced effect it has had on her migraines.      08/08/2018- Interval History:  Finds her migraines are no better since last round of Botox, admits she was under increased stress at one point due to issues with her 's job.  She does know a lot of her migraines are triggered from stress.  She has been getting a lot of migraines above her eyes as well as throughout her occipitalis and neck.  Overall, she does wish to continue with Botox injections, but is open to discussing additional prevention options.      05/22/2018- Interval History:  Despite nerve blocks two weeks ago, she has been suffering with a headache nearly everyday since her last visit.  " She did try Zomig nasal spray, however she took the first dose on day two of her migraine because she was nervous.  She does think it lessened the pain of her migraine, however did not abort it.  She did not repeat the dose.  Additionally, she continues to report poor sleep nightly.  She stopped drinking caffeine.  Headaches are the same as they have always been.  She denies any change in the nature or quality of her headaches. Risks, Benefits, and potential side effects of Botox discussed with patient.  Alternative treatments offered.  After thorough discussed regarding the procedure, patient has decided to move forward with initiating Botox injections for Chronic Migraine.  Patient understands we will complete 3 rounds of injections, if after 3 rounds, we do not see a 50% reduction in headaches, we will discontinue Botox injections.      05/11/2018 - Interval History:  This past week she has only been able to work two days, has missed 3 days of work this week due to migraines.  She still is unsure about getting Botox injections for chronic migraine, she would like to try it but her  does not feel this is a good treatment option, she is trying to convince him.  Of note, blood pressure significantly lower in today's visit than in past visits.  She reports her psychiatrist has discontinued vyvanse until she is given clearance from Cardio to restart taking Vyvanse.  She was seen by a pediatric cardiologist who referred her to an adult cardiologist, however she has not seen this provider, had to cancel appointment due to having a headache.  She is requesting repeat nerve blocks today.      01/29/2018 - Interval History:  She has been having a terrible migraine for the last 3 days, she has tried numerous medications without any relief.  States she tried to go to work today, but she had to leave because her headache got so bad.  She is requesting to repeat the nerve blocks as those shots gave her relief form 6-8  weeks in the past.  She has done research on the Botox injections and states her  is very hesitant for her to try Botox injections for her migraines.  She has not been seen by her PCP nor cardiologist for further management of her blood pressure.  Discussed the challenges of treating her migraines with uncontrolled hypertension because I cannot prescribe her any triptan therapy until her blood pressure is under better control.  She has continued to take Cymbalta daily for her depression, she is unsure if this is giving her any relief with regards to her headaches or not.       01/15/2018 - Interval History:  She was approved for Botox injections, but was a no-show to her appointment last week.  States she spoke with her  about the injections and she was very nervous to try the Botox injections for her migraines.    She continues to experience headaches on 1-4 days per week with migraines occurring 1-2 days per week.  Recently she has been experiencing more migraines and headaches than usually, but admits she has been going through a lot emotionally as well as she got sick with the flu.  She has continued to regularly follow-up with her psychiatrist who changed her anti-depressant, she is now taking Cymbalta.  Since change in medication, she feels she has been experiencing a mild headache each day.  She has had to miss a few days of work due to her migraines since last visit.  Additionally, she feels occasionally she begins to get dizzy, this occurs with or without headaches, she has an appointment with her eye doctor next week as her vision is also getting progressively worse.  She did not find vistaril to be useful with regards to her headaches.  She does feel Zanaflex helps with her neck as well as helping to relax her neck muscles.  When she gets a migraine she has been taking Vistaril and Ibuprofen together which does help the pain, she also has been trying Excedrin migraines which also gives her  some relief.  Patient with known HTN on multiple anti-hypertensive agents, blood pressure significantly elevated in clinic today.  She is not symptomatic.  States she needs to follow-up with her primary care (who is outside the [facility] system) for better management.       10/19/2017 - Interval History:  - Headaches have persisted in similar frequency, which she is happy about as she has been under increased stress with regards to her 's health, he has been suffering with his Gout, just got out of the hospital last week and has had to take time off work etc   - Blood pressure continues to be elevated despite numerous anti-hypertensive, states today is good for her (currently 141/102), has been regularly following up with her PCP for management   - Is very excited because she is going [place] a [place] with [place]  [place] at the end of November/early December   - Has continued to have a headache nearly everyday, not necessarily a migraine, however migraine have been at least once per month    - Has noticed more recently that she feels like she has intense pressure behind her eyes and across the bridge of her nose that slightly feels like sinus pressure, this occurs 1-2 times per week   - Has been seeing a chiropractor as well as a masseuse regularly which she does feel has been helping   - Would still like to move forward with the Botox injections   - Does not feel adjustments to her medications are necessary at this time      08/17/2017 - Interval History:  - Had been seeing Iker Beth PA-C   - Was taking Topamax, Zanaflex, and Vistaril - however has been out of medications for about 3 months  - Since being off the medication, she had been okay, however recently noticed migraines were beginning to occur more frequently with increased intensity and duration   - She finds the nerve blocks had been helping a lot, as long as she is consistent with getting the nerve block, however hasn't had this procedure for  over a year   - Has been getting 5-6 migraines per month, each lasting 1-4 days in length   - Current migraine has been going on since last Friday (6 days)  - Blood pressure significantly elevated in clinic today, states her PCP is managing her medications in order to get her blood pressure better controlled   - Is requesting refills of medication as well as repeat nerve occipital and trigeminal nerve blocks      Headache history:   Age of onset -  13  Nature of pain -  Throbbing   Location - bioccipital   Aura -  White floaters   Duration - hrs  Frequency -  2/week  Time of day - anytime   7 days of pain - jaw pain, L neck, L shoulder,  L side throbbing   Associated symptoms:   Meningeal symptoms - photophobia, phonophobia, exercise intolerance +   Nausea/vomitting +   Nasal drainage   Visual symptoms  Pallor/flushing  Vertigo  Stroke symptoms  Confusion  Impaired concentration +  Pain worsened with physical activity +  Neck pain +  Whooshing sounds   Visual spots/blotches +  Triggers:   Stress +   Bright or flickering light  Strong smell  Vigorous exercise  Dietary factors   Visual strain   Motion intolerance as passenger:  No   Resolution of headache with sleep: yes      Therapies Tried & Response:  Tylenol, motrin, alleve - not help  excedrin - not help   imitrex - helped initially   topamax 50 mg - helped  Percocet - helps   Gabapentin - helps  Amlodipine - for HTN  Bystolic - for HTN  Lexapro - hasn't noticed change in HA, for depression   GONB - helps   Cymbalta - unsure, for depression   Zomig - helps    Maxalt - no   Botox - helping   Sumatriptan -  Vistrail - effective   Aimovig - helping   Emgality -     Current Medications:  Current Medications[1]    Review of Systems - A review of 10+ systems was conducted with pertinent positive and negative findings documented in HPI with all other systems reviewed and negative.    PFSH: Past medical, family, and social history reviewed as documented in chart with  pertinent positive medical, family, and social history detailed in HPI.    OBJECTIVE:  Vitals:  BP (!) 157/109 (Patient Position: Sitting)   Pulse 94   Wt 73.8 kg (162 lb 11.2 oz)   LMP 12/14/2000   BMI 27.07 kg/m²      Physical Exam:  Constitutional: she appears well-developed and well-nourished. she is well groomed. NAD     Review of Data:   Notes from vascular neurology reviewed   Labs:  Lab Visit on 04/01/2025   Component Date Value Ref Range Status    Sodium 04/01/2025 140  136 - 145 mmol/L Final    Potassium 04/01/2025 3.4 (L)  3.5 - 5.1 mmol/L Final    Chloride 04/01/2025 103  95 - 110 mmol/L Final    CO2 04/01/2025 24  23 - 29 mmol/L Final    Glucose 04/01/2025 83  70 - 110 mg/dL Final    BUN 04/01/2025 10  6 - 20 mg/dL Final    Creatinine 04/01/2025 1.1  0.5 - 1.4 mg/dL Final    Calcium 04/01/2025 10.1  8.7 - 10.5 mg/dL Final    Protein Total 04/01/2025 8.0  6.0 - 8.4 gm/dL Final    Albumin 04/01/2025 4.0  3.5 - 5.2 g/dL Final    Bilirubin Total 04/01/2025 1.1 (H)  0.1 - 1.0 mg/dL Final    ALP 04/01/2025 99  40 - 150 unit/L Final    AST 04/01/2025 22  11 - 45 unit/L Final    ALT 04/01/2025 14  10 - 44 unit/L Final    Anion Gap 04/01/2025 13  8 - 16 mmol/L Final    eGFR 04/01/2025 >60  >60 mL/min/1.73/m2 Final    Magnesium  04/01/2025 1.9  1.6 - 2.6 mg/dL Final    TSH 04/01/2025 0.794  0.400 - 4.000 uIU/mL Final    Hemoglobin A1c 04/01/2025 5.2  4.0 - 5.6 % Final    Estimated Average Glucose 04/01/2025 103  68 - 131 mg/dL Final    WBC 04/01/2025 15.39 (H)  3.90 - 12.70 K/uL Final    RBC 04/01/2025 4.86  4.00 - 5.40 M/uL Final    HGB 04/01/2025 15.1  12.0 - 16.0 gm/dL Final    HCT 04/01/2025 46.3  37.0 - 48.5 % Final    MCV 04/01/2025 95  82 - 98 fL Final    MCH 04/01/2025 31.1 (H)  27.0 - 31.0 pg Final    MCHC 04/01/2025 32.6  32.0 - 36.0 g/dL Final    RDW 04/01/2025 13.3  11.5 - 14.5 % Final    Platelet Count 04/01/2025 508 (H)  150 - 450 K/uL Final    MPV 04/01/2025 10.8  9.2 - 12.9 fL Final     Nucleated RBC 04/01/2025 0  <=0 /100 WBC Final    Neut % 04/01/2025 71.1  38 - 73 % Final    Lymph % 04/01/2025 14.9 (L)  18 - 48 % Final    Mono % 04/01/2025 9.0  4 - 15 % Final    Eos % 04/01/2025 3.1  <=8 % Final    Basophil % 04/01/2025 0.9  <=1.9 % Final    Imm Grans % 04/01/2025 1.0 (H)  0.0 - 0.5 % Final    Neut # 04/01/2025 10.94 (H)  1.8 - 7.7 K/uL Final    Lymph # 04/01/2025 2.29  1 - 4.8 K/uL Final    Mono # 04/01/2025 1.39 (H)  0.3 - 1 K/uL Final    Eos # 04/01/2025 0.48  <=0.5 K/uL Final    Baso # 04/01/2025 0.14  <=0.2 K/uL Final    Imm Grans # 04/01/2025 0.15 (H)  0.00 - 0.04 K/uL Final   Hospital Outpatient Visit on 02/10/2025   Component Date Value Ref Range Status    Final Pathologic Diagnosis 02/10/2025    Final                    Value:Right breast, 9:00, N + 8, biopsy:  Breast tissue with cystic glandular dilatation, microcysts, fibroadenomatoid change, columnar cell change/hyperplasia, and stromal fibrosis (see comment)  Focal changes suggestive of cyst rupture and microcalcifications present  No definitive atypia or malignancy    Comment:  A review of the patient's right breast imaging notes presence of a 10 mm round hypoechoic mass with circumscribed margins.  These combined microscopic findings may represent a complex sclerosing lesion.    This case has been reviewed by Dr. Papi Burnett MD, who concurs with the diagnosis.      Gross 02/10/2025    Final                    Value:One part    Pathology label MRN:  [id number]  Hospital/ Clinic label MRN:  [id number]    Received in formalin patient's name and MRN and labeled &quot;right breast 9 o'clock N +8 cm&quot; are 4 fragments of soft yellow white tissue each 0.2 cm in width and ranging in length from 0.9 cm to 1.4 cm.  Entirely submitted in OCY--1-A    Time out of patient:  Not provided  Time in formalin:  1050 on 02/10/2025  Cold ischemic time:  Not provided  Total fixation time:  6-72 hours    Jimmy Mccracken       Microscopic Exam  02/10/2025    Final                    Value:P63 highlights myoepithelial cells around benign ductal elements without significant loss staining identified.  There is a mosaic pattern of staining on CK5/6 in hyperplastic foci.  There is a heterogenous pattern of staining in breast ducts.  Stains   adequately controlled.      Disclaimer 02/10/2025    Final                    Value:Unless the case is a 'gross only' or additional testing only, the final diagnosis for each specimen is based on a microscopic examination of appropriate tissue sections.  ER immunohistochemical staining (clone [id number], DAB detection method) is performed on formalin-fixed, paraffin embedded tissue sections. The percentage of cell nuclei stained and the strength of staining is reported (weak, moderate, strong), using the 2010   ACSO/CAP scoring guidelines. Tumors used for determining predictive markers are fixed in 10% neutral buffered formalin for 6-72 hours. It has been cleared by the [place] FDA for use as an IVD test. This assay has not been validated on decalcified tissues.   Results should be interpreted with caution given the likelihood of false negativity on decalcified specimens.       Imaging:  Results for orders placed or performed during the hospital encounter of 05/23/25   MRI Brain Without Contrast    Narrative    EXAMINATION:  MRI BRAIN WITHOUT CONTRAST; MRA BRAIN WITHOUT CONTRAST    CLINICAL HISTORY:  Stroke, follow up; Other cerebral infarction    TECHNIQUE:  Multiplanar multisequence MR imaging of the brain was performed the administration of intravenous contrast.    Per separate acquisition, 3D time-of-flight noncontrast MR angiogram of the intracranial vasculature with multiple MIP reconstructions.    COMPARISON:  MR brain 07/30/2023.    CTA dated 07/30/2023.    FINDINGS:  MR BRAIN    Small focus of diffusion restriction in the right midbrain.  Minimal associated edema signal without significant mass effect.    Prominence of the  ventricles and sulci compatible with age-advanced cerebral volume loss.  No hydrocephalus.    Small remote right occipital infarct.  Small foci of encephalomalacia in the bilateral corona radiata/centrum semiovale, bilateral thalami, and right kriss, likely remote lacunar-type infarcts.  Few scattered punctate foci of susceptibility artifact, likely remote microhemorrhages.    Superimposed patchy and confluent T2/FLAIR hyperintensity in the supratentorial white matter, nonspecific but most likely reflecting chronic microvascular ischemic changes.    No extra-axial blood or fluid collections.    The T2 skull base flow voids are preserved.  Bone marrow signal intensity unremarkable.  Craniocervical junction within normal limits.    Paranasal sinuses and mastoid air cells are essentially clear.    MRA BRAIN    The visualized internal carotid arteries are normal in course and caliber. The vertebrobasilar system is unremarkable. Hypoplastic right A1 segment.  The proximal ACAs, MCAs and PCAs otherwise appear within normal limits. No high-grade stenosis, large vessel occlusion, or intracranial aneurysm identified.      Impression    Small recent infarct of the right mid brain.  No significant mass effect.    MRA of the intracranial vasculature appears within normal limits.    Cerebral volume loss with chronic ischemic changes as described.    This report was flagged in Epic as abnormal.    Case discussed with SHAMA Vora on 05/23/2025 at 19:28.    Electronically signed by resident: Bryant Valderrama  Date:    05/23/2025  Time:    18:22    Electronically signed by: Nasim Pedersen MD  Date:    05/23/2025  Time:    19:31   Results for orders placed or performed during the hospital encounter of 05/23/25   MRA Brain without contrast    Narrative    EXAMINATION:  MRI BRAIN WITHOUT CONTRAST; MRA BRAIN WITHOUT CONTRAST    CLINICAL HISTORY:  Stroke, follow up; Other cerebral infarction    TECHNIQUE:  Multiplanar multisequence MR  imaging of the brain was performed the administration of intravenous contrast.    Per separate acquisition, 3D time-of-flight noncontrast MR angiogram of the intracranial vasculature with multiple MIP reconstructions.    COMPARISON:  MR brain 07/30/2023.    CTA dated 07/30/2023.    FINDINGS:  MR BRAIN    Small focus of diffusion restriction in the right midbrain.  Minimal associated edema signal without significant mass effect.    Prominence of the ventricles and sulci compatible with age-advanced cerebral volume loss.  No hydrocephalus.    Small remote right occipital infarct.  Small foci of encephalomalacia in the bilateral corona radiata/centrum semiovale, bilateral thalami, and right kriss, likely remote lacunar-type infarcts.  Few scattered punctate foci of susceptibility artifact, likely remote microhemorrhages.    Superimposed patchy and confluent T2/FLAIR hyperintensity in the supratentorial white matter, nonspecific but most likely reflecting chronic microvascular ischemic changes.    No extra-axial blood or fluid collections.    The T2 skull base flow voids are preserved.  Bone marrow signal intensity unremarkable.  Craniocervical junction within normal limits.    Paranasal sinuses and mastoid air cells are essentially clear.    MRA BRAIN    The visualized internal carotid arteries are normal in course and caliber. The vertebrobasilar system is unremarkable. Hypoplastic right A1 segment.  The proximal ACAs, MCAs and PCAs otherwise appear within normal limits. No high-grade stenosis, large vessel occlusion, or intracranial aneurysm identified.      Impression    Small recent infarct of the right mid brain.  No significant mass effect.    MRA of the intracranial vasculature appears within normal limits.    Cerebral volume loss with chronic ischemic changes as described.    This report was flagged in Epic as abnormal.    Case discussed with SHAMA Vora on 05/23/2025 at 19:28.    Electronically signed by  resident: Bryant Valderrama  Date:    05/23/2025  Time:    18:22    Electronically signed by: Nasim Pedersen MD  Date:    05/23/2025  Time:    19:31     *Note: Due to a large number of results and/or encounters for the requested time period, some results have not been displayed. A complete set of results can be found in Results Review.     Note: I have independently reviewed any/all imaging/labs/tests and agree with the report (s) as documented.  Any discrepancies will be as noted/demarcated by free text.  ARA HDZ 6/23/2025    ASSESSMENT:  1. Migraine without aura and without status migrainosus, not intractable    2. History of CVA (cerebrovascular accident)    3. Essential hypertension    4. LALITA (generalized anxiety disorder)    5. Depression, unspecified depression type    6. Other specified persistent mood disorders      PLAN:  - For migraine prevention - continue emgality 120 mg SQ monthly and tizanidine 4-6 mg nightly    - For treatment of acute migraine - ubrelvy 100 mg   - triptans contraindicated given hx stroke   - For nausea - zofran 4 mg odt    - hx CVA - continue regular f/up with Dr. Osman for management/monitoring - continue plavix 75 mg daily, continue aspirin 81 mg daily   - MDD/Anxiety - chronic and stable, management per psych - will avoid use of anti-depressants for migraine prevention   - HTN - BP remains above goal, encouraged her to monitor BP regularly at home and keep record, goal < 130/80 mmHg - management per Dr. Osman and PCP    - RTC in 2-3 months or sooner if needed    Orders Placed This Encounter    clopidogreL (PLAVIX) 75 mg tablet     Questions and concerns were sought and answered to the patient's stated verbal satisfaction.  The patient verbalizes understanding and agreement with the above stated treatment plan.     Visit today included increased complexity associated with the care of the episodic problem migraine without aura addressed and managing the longitudinal care of the  patient due to the serious and/or complex managed problem(s). Plan for patient to return to clinic in 3 months for follow-up visit.     CC: Luc Venegas MD [facility], [facility]   [phone]    Dr. Farfan was available during today's encounter.            [1]   Current Outpatient Medications:     aspirin (ECOTRIN) 81 MG EC tablet, Take 1 tablet (81 mg total) by mouth once daily., Disp: 30 tablet, Rfl: 11    benazepril-hydrochlorthiazide (LOTENSIN HCT) 20-12.5 mg per tablet, Take 1 tablet by mouth once daily., Disp: 90 tablet, Rfl: 3    buPROPion (WELLBUTRIN XL) 300 MG 24 hr tablet, Take 1 tablet (300 mg total) by mouth once daily., Disp: 90 tablet, Rfl: 1    clopidogreL (PLAVIX) 75 mg tablet, Take 1 tablet (75 mg total) by mouth once daily., Disp: 30 tablet, Rfl: 2    DULoxetine (CYMBALTA) 60 MG capsule, Take 1 capsule (60 mg total) by mouth once daily., Disp: 90 capsule, Rfl: 1    galcanezumab-gnlm 120 mg/mL PnIj, Inject 120 mg (one pen) into the skin every 28 days. Begin 28 days after loading dose., Disp: 1 mL, Rfl: 10    hydrOXYzine pamoate (VISTARIL) 25 MG Cap, Take 1 capsule (25 mg total) by mouth every 8 (eight) hours as needed (anxiety)., Disp: 30 capsule, Rfl: 1    magnesium oxide (MAG-OX) 400 mg (241.3 mg magnesium) tablet, Take 1 tablet (400 mg total) by mouth once daily., Disp: 30 tablet, Rfl: 1    ondansetron (ZOFRAN-ODT) 4 MG TbDL, as needed., Disp: , Rfl:     potassium chloride SA (K-DUR,KLOR-CON) 20 MEQ tablet, Take 1 tablet (20 mEq total) by mouth 2 (two) times daily., Disp: 60 tablet, Rfl: 5    promethazine (PHENERGAN) 25 MG tablet, TAKE 1 TABLET BY MOUTH EVERY 4 TO 6 HOURS AS NEEDED FOR NAUSEA (Patient taking differently: as needed. TAKE 1 TABLET BY MOUTH EVERY 4 TO 6 HOURS AS NEEDED FOR NAUSEA), Disp: 20 tablet, Rfl: 1    riboflavin, vitamin B2, 400 mg Tab, Take 400 mg by mouth once daily., Disp: 30 tablet, Rfl: 11    tiZANidine (ZANAFLEX) 4 MG  tablet, Take 1 and 1/2 tablets by mouth every evening.  No driving, no alcohol with medication., Disp: 45 tablet, Rfl: 5    ubrogepant (UBRELVY) 100 mg tablet, TAKE 1 TABLET BY MOUTH EVERY 2 HOURS AS NEEDED FOR MIGRAINE. MAX 2 TABLETS (200 MG) PER DAY., Disp: 16 tablet, Rfl: 5    zolpidem (AMBIEN) 10 mg Tab, Take 1 tablet (10 mg total) by mouth nightly as needed (As needed for insomnia)., Disp: 30 tablet, Rfl: 2    atorvastatin (LIPITOR) 40 MG tablet, Take 1 tablet (40 mg total) by mouth once daily., Disp: 90 tablet, Rfl: 3    verapamiL (CALAN) 40 MG Tab, Take 1 tablet (40 mg total) by mouth 2 (two) times daily., Disp: 60 tablet, Rfl: 11

## 2025-07-02 ENCOUNTER — TELEPHONE (OUTPATIENT)
Dept: PSYCHIATRY | Facility: CLINIC | Age: 50
End: 2025-07-02
Payer: COMMERCIAL

## 2025-07-02 DIAGNOSIS — G47.00 INSOMNIA, UNSPECIFIED TYPE: ICD-10-CM

## 2025-07-02 RX ORDER — ZOLPIDEM TARTRATE 10 MG/1
10 TABLET ORAL NIGHTLY PRN
Qty: 5 TABLET | Refills: 0 | Status: SHIPPED | OUTPATIENT
Start: 2025-07-02 | End: 2025-07-07

## 2025-07-03 NOTE — TELEPHONE ENCOUNTER
Patient requesting early refill of medication. Sent over new prescription to cover the days until the existing one would star.

## 2025-07-13 DIAGNOSIS — G43.009 MIGRAINE WITHOUT AURA AND WITHOUT STATUS MIGRAINOSUS, NOT INTRACTABLE: ICD-10-CM

## 2025-07-14 ENCOUNTER — PATIENT MESSAGE (OUTPATIENT)
Dept: PSYCHIATRY | Facility: CLINIC | Age: 50
End: 2025-07-14
Payer: COMMERCIAL

## 2025-07-14 ENCOUNTER — OFFICE VISIT (OUTPATIENT)
Dept: PSYCHIATRY | Facility: CLINIC | Age: 50
End: 2025-07-14
Payer: COMMERCIAL

## 2025-07-14 DIAGNOSIS — F41.1 GAD (GENERALIZED ANXIETY DISORDER): Primary | Chronic | ICD-10-CM

## 2025-07-14 DIAGNOSIS — G47.00 INSOMNIA, UNSPECIFIED TYPE: Chronic | ICD-10-CM

## 2025-07-14 PROCEDURE — 4010F ACE/ARB THERAPY RXD/TAKEN: CPT | Mod: CPTII,95,, | Performed by: PSYCHIATRY & NEUROLOGY

## 2025-07-14 PROCEDURE — 1160F RVW MEDS BY RX/DR IN RCRD: CPT | Mod: CPTII,95,, | Performed by: PSYCHIATRY & NEUROLOGY

## 2025-07-14 PROCEDURE — 98006 SYNCH AUDIO-VIDEO EST MOD 30: CPT | Mod: 95,,, | Performed by: PSYCHIATRY & NEUROLOGY

## 2025-07-14 PROCEDURE — 3044F HG A1C LEVEL LT 7.0%: CPT | Mod: CPTII,95,, | Performed by: PSYCHIATRY & NEUROLOGY

## 2025-07-14 PROCEDURE — 1159F MED LIST DOCD IN RCRD: CPT | Mod: CPTII,95,, | Performed by: PSYCHIATRY & NEUROLOGY

## 2025-07-14 RX ORDER — TIZANIDINE 4 MG/1
TABLET ORAL
Qty: 45 TABLET | Refills: 5 | Status: SHIPPED | OUTPATIENT
Start: 2025-07-14

## 2025-07-14 NOTE — PROGRESS NOTES
TELE-HEALTH / VIRTUAL VISIT:     The patient location is: in the [facility].    Visit type: audiovisual    Face to Face time with patient: 33 minutes  Total time spent on the encounter: 40 minutes    Each patient to whom he or she provides medical services by telemedicine is:  (1) informed of the relationship between the physician and patient and the respective role of any other health care provider with respect to management of the patient; and (2) notified that he or she may decline to receive medical services by telemedicine and may withdraw from such care at any time.       Outpatient Psychiatry Clinic Follow-up  [facility]  2025  Patient Name: Mirna Pollock   : 1975       Source of information: Patient      History of Present Illness (HPI):     Mirna Pollock is a 49 y.o. female with history of MDD, Insomnia, LALITA who presents for follow up appointment.      Plan at last appointment with Dr. Tiwari on 2025  Continue  Cymbalta 60 mg  Wellbutrin  mg   Ambien 10 mg  Vistaril 25 mg TID PRN  [place] PDMP reviewed: no concern for misuse; filling as prescribed; no other controlled substances being filled  Discussed lifestyle modifications to reduce symptoms non-pharmacologically (ex: sleep, exercise, diet, etc).  Encouraged psychotherapy, patient expresses understanding  Counseled to limit substance use as drugs/alcohol can exacerbate psychiatric symptoms.  Discussed importance of sleep hygiene      Interval History      Reports coming to the clinic chronically for depression, anxiety, and insomnia. States that sleep issues are the most prominent issues currently    Reports issues with sleep initiation and sleep maintenance issues. States that sleep initiation issues are related to racing thoughts, anxiety regarding her lack of autonomy post stroke. States that she chronically worries about depending on people to get to appointments (post stroke),  Stroke was 3 years ago in which  she has peripheral vision loss. Reports general frustration about needing to rely on others. Also reports sleep issues whenever she has a prolonged migraine. Migraine managed by her neurologist.     States she has been on ambien for 4-5 years now. Has previously trailed lunesta, but did not help with sleep. Goes to bed at 10 pm. Normally wakes up at 9 am. Reports appropriate sleep hygiene techniques. States that she will still occasionally wake up at night when taking ambien, but will not be able to sleep at all without the medication. She has not tried CBT-I or psychotherapy.    Reports anxiety has chronically been present, but generally improved since leaving her job at [employer]. Still, she avoids large crowds. States that she feels that leaving the house generally leads to more danger. Denies having heightened periods of anxiety. States she has not been taking vistaril 25 mg PRN for anxiety for some time now.     Reports no current depressive symptoms. Denies anhedonia, feelings of hopelessness. Denies SI, self harming behavior. Reports chronically experiencing depressive symptoms when experiencing death in the family. States that her current regimen of cymbalta and wellbutrin have been helpful as well. She is content with medication regimen.    Currently lives with  and mother in law. Reports getting along with them; states they help bring her to appointments. She is currently unemployed; not on disability.     Denies Denies recreational substance. Denies daily alcohol use, reports occasional use. Denies vaping/tobacco use.     Overall feels content with medication regimen. Not looking to make a change today. Okay for referral for [facility] insomnia. Counseled that next appointment needs to be inperson.       Past Medical, Psychiatric, Social, and Family History:     Past Medical History:   Stroke in 2019  HTN    Social History:  Occupational: Currently unemployed  Marital: Currently   Children:  2  Housing: Lives with her  and his mother  Alcohol Use: Occasional social use  Drug Use: Denies  Psychosocial stressors: Children, husbands mother    Past Psychiatric History:  Previous Diagnosis: MDD, Anxiety  Previous Medication Trials:    - Lunesta, did not help with sleep  Previous SA: Denies  Previous psychiatric hospitalizations: Denies    Psychiatric Review of Systems (ROS):     Depression: depressed mood and hopelessness     Jane/hypomania: denies      Anxiety (LALITA and PA): excessive anxiety/worry that is difficult to control, easily fatigued, and irritability     Psychosis: denies current symptoms      PTSD: denies current symptoms     Eating Disorders: denies current symptoms      OCD: OCD Symptoms: denies current symptoms      ADHD: denies current symptoms    Sleep: sleep initiation.       Examination:       VITALS  There were no vitals filed for this visit.  - virtual      RELEVANT LABS/STUDIES:    Lab Results   Component Value Date    WBC 15.39 (H) 04/01/2025    HGB 15.1 04/01/2025    HCT 46.3 04/01/2025    MCV 95 04/01/2025     (H) 04/01/2025     BMP  Lab Results   Component Value Date     04/01/2025    K 3.4 (L) 04/01/2025     04/01/2025    CO2 24 04/01/2025    BUN 10 04/01/2025    CREATININE 1.1 04/01/2025    CALCIUM 10.1 04/01/2025    ANIONGAP 13 04/01/2025    ESTGFRAFRICA >60.0 06/28/2022    EGFRNONAA >60.0 06/28/2022     Lab Results   Component Value Date    ALT 14 04/01/2025    AST 22 04/01/2025    ALKPHOS 99 04/01/2025    BILITOT 1.1 (H) 04/01/2025     Lab Results   Component Value Date    TSH 0.794 04/01/2025     Lab Results   Component Value Date    HGBA1C 5.2 04/01/2025         PHYSICAL EXAM  General: well developed, well nourished  Neurologic:   Gait: Normal   Psychomotor signs:  No involuntary movements or tremor  AIMS: none    Risk Parameters:  Patient reports no suicidal ideation  Patient reports no homicidal ideation  Patient reports no self-injurious  "behavior  Patient reports no violent behavior      MENTAL STATUS EXAMINATION  General Appearance: appears stated age, well developed and nourished, adequately groomed and appropriately dressed, in no acute distress  Behavior: normal; cooperative; reasonably friendly, pleasant, and polite; appropriate eye-contact; under good behavioral control  Involuntary Movements and Motor Activity: no abnormal involuntary movements noted; no tics, no tremors, no akathisia, no dystonia, no evidence of tardive dyskinesia; no psychomotor agitation or retardation  Muscle Strength and Tone: intact  Gait and Station: intact, normal gait and station, ambulates without assistance  Speech: intact; normal rate, rhythm, volume, tone and pitch; conversational, spontaneous, and coherent  Language: intact; speaks and understands English fluently and proficiently; repeats words and phrases, no word finding difficulties are noted  Mood: "fine"  Affect: normal, euthymic, reactive, full-range, mood-congruent, appropriate to situation and context  Thought Process: intact, linear, goal-directed, organized, logical  Associations: intact, no loosening of associations  Thought Content and Perceptions: no suicidal or homicidal ideation, no auditory or visual hallucinations, no paranoid ideation, no ideas of reference, no evidence of delusions or psychosis  Sensorium/Arousal: alert with clear sensorium  Orientation: intact; oriented fully to person, place, time and situation  Recent and Remote Memory: grossly intact, able to recall relevant and salient information from the recent and remote past  Attention and Concentration: grossly intact, attentive to the conversation and not readily distractible  Fund of Knowledge: grossly intact, used appropriate vocabulary and demonstrated an awareness of current events  Insight: intact, demonstrates awareness of illness and situation  Judgment: intact, behavior is adequate/appropriate to the circumstances, " compliant with health provider's recommendations and instructions      Medical Decision Making - Assessment:     DIAGNOSIS:    ICD-10-CM ICD-9-CM   1. LALITA (generalized anxiety disorder)  F41.1 300.02   2. Insomnia, unspecified type  G47.00 780.52       First Impression 7/14/2025:  48 y.o.F with psych history of anxiety, depression, insomnia presenting for follow up.. Significant past medical history of stroke in July 2023 (PCA and lacunar-type with left field vision loss), chronic migraines, fibromyalgia, IBS.  Patient reports being stable on current medication regimen of Cymbalta 60 mg. Wellbutrin  mg, Ambien 10 mg qhs. Had previously trailed Lunesta without benefit. She has not been engaging in psychotherapy, CBT-I. Will seek to engage patient in nonpharmacological methods to address insomnia in addition to ambien. Will consider ERON agent at future appointments. Depressive and anxiety sx well controlled; will not make changes to medication.     Strengths and liabilities:  Strength: Patient accepts guidance/feedback, Strength: Patient is expressive/articulate., Strength: Patient is intelligent., Strength: Patient is motivated for change., Strength: Patient is physically healthy., Strength: Patient has positive support network., Strength: Patient has reasonable judgment., Strength: Patient is stable.  Protective factors: help-seeking/motivated for change, intact reality testing, intact support system, and no recent attempts  Risk factors:         Medical Decision Making - Plan:     Continue  Cymbalta 60 mg  Wellbutrin  mg   Ambien 10 mg  Discontinue vistaril; patient not using  Referral to [facility] Insomnia program; to attend virtually  [place]MP reviewed: no concern for misuse; filling as prescribed; no other controlled substances being filled;   Discussed lifestyle modifications to reduce symptoms non-pharmacologically (ex: sleep, exercise, diet, etc).  Encouraged psychotherapy, patient expresses  understanding  Counseled to limit substance use as drugs/alcohol can exacerbate psychiatric symptoms.  Discussed importance of sleep hygiene    Return to Clinic: 2 months or sooner if needed    Discussed with patient verbal informed consent including: risks and benefits of proposed treatment above vs. alternative treatments vs. no treatment, serious and common side effects of these respective treatments, as well as the inherent unpredictability of individual responses to any treatments, contingency plans if adverse reactions occur, precautions in which to me through Epic MyChart portal or call the clinic, and precautions in which to call 911 and/or go to the emergency room. The patient expresses understanding of the above and displays the capacity to agree with this current plan. All questions were answered.    Total of 44 minutes spent today on encounter, including chart review,  review, seeing patient, documenting encounter, ordering medications.      Billing Documentation:     Method of Encounter AUDIOVISUAL - time on video was approximately 30 mins   Type of Encounter New patient to me, BUT seen by department within last 3 years   Counseling;  Psychotherapy Not applicable: Not done or UNDER 16 minutes   Counseling;  Tobacco and/or Nicotine Not applicable: Not done or UNDER 3 minutes   Time Remaining Chart/Pt 92497: NEW patient to me, stable chronic illnesses with medication management   Total Mins  (7/14/2025) N/A - Not billing for time       Stu Boles MD  [facility] PGY-III

## 2025-08-01 ENCOUNTER — PATIENT MESSAGE (OUTPATIENT)
Dept: PSYCHIATRY | Facility: CLINIC | Age: 50
End: 2025-08-01
Payer: COMMERCIAL

## 2025-08-01 DIAGNOSIS — G47.00 INSOMNIA, UNSPECIFIED TYPE: ICD-10-CM

## 2025-08-05 RX ORDER — ZOLPIDEM TARTRATE 10 MG/1
10 TABLET ORAL NIGHTLY PRN
Qty: 30 TABLET | Refills: 0 | Status: SHIPPED | OUTPATIENT
Start: 2025-08-05

## 2025-08-19 ENCOUNTER — PATIENT OUTREACH (OUTPATIENT)
Dept: ADMINISTRATIVE | Facility: HOSPITAL | Age: 50
End: 2025-08-19
Payer: COMMERCIAL

## 2025-08-21 ENCOUNTER — OFFICE VISIT (OUTPATIENT)
Dept: PRIMARY CARE CLINIC | Facility: CLINIC | Age: 50
End: 2025-08-21
Payer: COMMERCIAL

## 2025-08-21 DIAGNOSIS — G43.909 MIGRAINE WITHOUT STATUS MIGRAINOSUS, NOT INTRACTABLE, UNSPECIFIED MIGRAINE TYPE: ICD-10-CM

## 2025-08-21 DIAGNOSIS — T78.2XXA ANAPHYLACTIC REACTION TO BEE STING, ACCIDENTAL OR UNINTENTIONAL, INITIAL ENCOUNTER: Primary | ICD-10-CM

## 2025-08-21 DIAGNOSIS — T63.441A ANAPHYLACTIC REACTION TO BEE STING, ACCIDENTAL OR UNINTENTIONAL, INITIAL ENCOUNTER: Primary | ICD-10-CM

## 2025-08-21 DIAGNOSIS — I10 HYPERTENSION, UNSPECIFIED TYPE: ICD-10-CM

## 2025-08-21 PROCEDURE — 98005 SYNCH AUDIO-VIDEO EST LOW 20: CPT | Mod: 95,,, | Performed by: INTERNAL MEDICINE

## 2025-08-21 PROCEDURE — 4010F ACE/ARB THERAPY RXD/TAKEN: CPT | Mod: CPTII,95,, | Performed by: INTERNAL MEDICINE

## 2025-08-21 PROCEDURE — 3044F HG A1C LEVEL LT 7.0%: CPT | Mod: CPTII,95,, | Performed by: INTERNAL MEDICINE

## 2025-08-21 RX ORDER — EPINEPHRINE 0.3 MG/.3ML
1 INJECTION SUBCUTANEOUS ONCE
Qty: 0.6 ML | Refills: 2 | Status: SHIPPED | OUTPATIENT
Start: 2025-08-21 | End: 2025-08-21

## 2025-08-28 DIAGNOSIS — N64.89 COMPLEX SCLEROSING LESION OF RIGHT BREAST: Primary | ICD-10-CM

## 2025-08-31 ENCOUNTER — PATIENT MESSAGE (OUTPATIENT)
Dept: PSYCHIATRY | Facility: CLINIC | Age: 50
End: 2025-08-31
Payer: COMMERCIAL

## 2025-08-31 DIAGNOSIS — G47.00 INSOMNIA, UNSPECIFIED TYPE: ICD-10-CM

## 2025-09-04 RX ORDER — ZOLPIDEM TARTRATE 10 MG/1
10 TABLET ORAL NIGHTLY PRN
Qty: 5 TABLET | Refills: 0 | Status: SHIPPED | OUTPATIENT
Start: 2025-09-04